# Patient Record
Sex: FEMALE | Race: WHITE | NOT HISPANIC OR LATINO | Employment: UNEMPLOYED | URBAN - METROPOLITAN AREA
[De-identification: names, ages, dates, MRNs, and addresses within clinical notes are randomized per-mention and may not be internally consistent; named-entity substitution may affect disease eponyms.]

---

## 2018-12-18 ENCOUNTER — HOSPITAL ENCOUNTER (INPATIENT)
Facility: HOSPITAL | Age: 58
LOS: 4 days | Discharge: HOME HEALTH | End: 2018-12-23
Attending: EMERGENCY MEDICINE | Admitting: HOSPITALIST

## 2018-12-18 DIAGNOSIS — G47.34 NOCTURNAL HYPOXIA: ICD-10-CM

## 2018-12-18 DIAGNOSIS — G47.33 OBSTRUCTIVE SLEEP APNEA SYNDROME: ICD-10-CM

## 2018-12-18 DIAGNOSIS — J95.00 TRACHEOSTOMY COMPLICATION, UNSPECIFIED COMPLICATION TYPE (HCC): Primary | ICD-10-CM

## 2018-12-18 PROCEDURE — 99284 EMERGENCY DEPT VISIT MOD MDM: CPT

## 2018-12-18 PROCEDURE — G0378 HOSPITAL OBSERVATION PER HR: HCPCS

## 2018-12-18 RX ORDER — OXYCODONE HCL 10 MG/1
10 TABLET, FILM COATED, EXTENDED RELEASE ORAL EVERY 12 HOURS
Status: ON HOLD | COMMUNITY
End: 2018-12-19

## 2018-12-18 RX ORDER — METOPROLOL SUCCINATE 100 MG/1
100 TABLET, EXTENDED RELEASE ORAL DAILY
COMMUNITY
End: 2018-12-23 | Stop reason: HOSPADM

## 2018-12-18 RX ORDER — ACETAMINOPHEN 650 MG
TABLET, EXTENDED RELEASE ORAL AS NEEDED
COMMUNITY
End: 2019-07-12 | Stop reason: HOSPADM

## 2018-12-18 RX ORDER — BISACODYL 10 MG
10 SUPPOSITORY, RECTAL RECTAL DAILY
COMMUNITY
End: 2018-12-23 | Stop reason: HOSPADM

## 2018-12-18 RX ORDER — IPRATROPIUM BROMIDE AND ALBUTEROL SULFATE 2.5; .5 MG/3ML; MG/3ML
3 SOLUTION RESPIRATORY (INHALATION) EVERY 4 HOURS PRN
Status: ON HOLD | COMMUNITY
End: 2019-09-25 | Stop reason: SDUPTHER

## 2018-12-18 RX ORDER — BUDESONIDE 0.5 MG/2ML
0.5 INHALANT ORAL
Status: ON HOLD | COMMUNITY
End: 2019-09-25 | Stop reason: SDUPTHER

## 2018-12-18 RX ORDER — FERROUS SULFATE 300 MG/5ML
300 LIQUID (ML) ORAL DAILY
COMMUNITY
End: 2018-12-23 | Stop reason: HOSPADM

## 2018-12-18 RX ORDER — OXYCODONE HYDROCHLORIDE AND ACETAMINOPHEN 5; 325 MG/1; MG/1
2 TABLET ORAL ONCE
Status: COMPLETED | OUTPATIENT
Start: 2018-12-18 | End: 2018-12-18

## 2018-12-18 RX ORDER — NICOTINE 21 MG/24HR
1 PATCH, TRANSDERMAL 24 HOURS TRANSDERMAL EVERY 24 HOURS
Status: ON HOLD | COMMUNITY
End: 2018-12-24

## 2018-12-18 RX ORDER — ESCITALOPRAM OXALATE 20 MG/1
20 TABLET ORAL DAILY
Status: ON HOLD | COMMUNITY
End: 2019-09-25 | Stop reason: SDUPTHER

## 2018-12-18 RX ORDER — DIAPER,BRIEF,INFANT-TODD,DISP
EACH MISCELLANEOUS 2 TIMES DAILY
COMMUNITY
End: 2018-12-23 | Stop reason: HOSPADM

## 2018-12-18 RX ORDER — PRAMIPEXOLE DIHYDROCHLORIDE 0.25 MG/1
0.25 TABLET ORAL NIGHTLY
Status: ON HOLD | COMMUNITY
End: 2019-09-25 | Stop reason: SDUPTHER

## 2018-12-18 RX ORDER — THIAMINE MONONITRATE (VIT B1) 100 MG
100 TABLET ORAL DAILY
Status: ON HOLD | COMMUNITY
End: 2019-09-25 | Stop reason: SDUPTHER

## 2018-12-18 RX ORDER — ACETAMINOPHEN 325 MG/1
650 TABLET ORAL EVERY 6 HOURS PRN
COMMUNITY
End: 2018-12-23 | Stop reason: HOSPADM

## 2018-12-18 RX ORDER — CHLORHEXIDINE GLUCONATE 0.12 MG/ML
15 RINSE ORAL 2 TIMES DAILY
COMMUNITY
End: 2018-12-23 | Stop reason: HOSPADM

## 2018-12-18 RX ORDER — FERROUS SULFATE 325(65) MG
325 TABLET ORAL
COMMUNITY
End: 2018-12-23 | Stop reason: HOSPADM

## 2018-12-18 RX ORDER — POTASSIUM CHLORIDE 20MEQ/15ML
LIQUID (ML) ORAL DAILY
COMMUNITY
End: 2018-12-23 | Stop reason: HOSPADM

## 2018-12-18 RX ORDER — CYCLOBENZAPRINE HCL 10 MG
10 TABLET ORAL 3 TIMES DAILY PRN
COMMUNITY
End: 2018-12-23 | Stop reason: HOSPADM

## 2018-12-18 RX ORDER — CLONAZEPAM 1 MG/1
1 TABLET ORAL 2 TIMES DAILY PRN
COMMUNITY
End: 2019-07-12 | Stop reason: HOSPADM

## 2018-12-18 RX ORDER — MINERAL OIL AND PETROLATUM 150; 830 MG/G; MG/G
1 OINTMENT OPHTHALMIC EVERY 12 HOURS
COMMUNITY
End: 2019-07-12 | Stop reason: HOSPADM

## 2018-12-18 RX ORDER — TRAZODONE HYDROCHLORIDE 50 MG/1
50 TABLET ORAL NIGHTLY PRN
Status: ON HOLD | COMMUNITY
End: 2019-09-22

## 2018-12-18 RX ORDER — PANTOPRAZOLE SODIUM 40 MG/1
40 TABLET, DELAYED RELEASE ORAL DAILY
Status: ON HOLD | COMMUNITY
End: 2019-09-22

## 2018-12-18 RX ORDER — SODIUM PHOSPHATE, DIBASIC AND SODIUM PHOSPHATE, MONOBASIC 7; 19 G/133ML; G/133ML
ENEMA RECTAL ONCE
COMMUNITY
End: 2018-12-23 | Stop reason: HOSPADM

## 2018-12-18 RX ADMIN — OXYCODONE HYDROCHLORIDE AND ACETAMINOPHEN 2 TABLET: 5; 325 TABLET ORAL at 22:30

## 2018-12-19 ENCOUNTER — APPOINTMENT (OUTPATIENT)
Dept: ULTRASOUND IMAGING | Facility: HOSPITAL | Age: 58
End: 2018-12-19

## 2018-12-19 ENCOUNTER — APPOINTMENT (OUTPATIENT)
Dept: GENERAL RADIOLOGY | Facility: HOSPITAL | Age: 58
End: 2018-12-19

## 2018-12-19 LAB
ANION GAP SERPL CALCULATED.3IONS-SCNC: 13.6 MMOL/L
BACTERIA UR QL AUTO: ABNORMAL /HPF
BASOPHILS # BLD AUTO: 0.04 10*3/MM3 (ref 0–0.2)
BASOPHILS NFR BLD AUTO: 0.4 % (ref 0–1.5)
BILIRUB UR QL STRIP: NEGATIVE
BUN BLD-MCNC: 30 MG/DL (ref 6–20)
BUN/CREAT SERPL: 12.6 (ref 7–25)
CALCIUM SPEC-SCNC: 10.7 MG/DL (ref 8.6–10.5)
CHLORIDE SERPL-SCNC: 100 MMOL/L (ref 98–107)
CHLORIDE UR-SCNC: <20 MMOL/L
CK SERPL-CCNC: 29 U/L (ref 20–180)
CLARITY UR: CLEAR
CO2 SERPL-SCNC: 26.4 MMOL/L (ref 22–29)
COLOR UR: YELLOW
CREAT BLD-MCNC: 2.36 MG/DL (ref 0.57–1)
CREAT BLD-MCNC: 2.39 MG/DL (ref 0.57–1)
CREAT UR-MCNC: 99.6 MG/DL
DEPRECATED RDW RBC AUTO: 48 FL (ref 37–54)
EOSINOPHIL # BLD AUTO: 0.54 10*3/MM3 (ref 0–0.7)
EOSINOPHIL NFR BLD AUTO: 6 % (ref 0.3–6.2)
EOSINOPHIL SPEC QL MICRO: 0 % EOS/100 CELLS (ref 0–0)
ERYTHROCYTE [DISTWIDTH] IN BLOOD BY AUTOMATED COUNT: 15.7 % (ref 11.7–13)
GFR SERPL CREATININE-BSD FRML MDRD: 21 ML/MIN/1.73
GFR SERPL CREATININE-BSD FRML MDRD: 21 ML/MIN/1.73
GLUCOSE BLD-MCNC: 119 MG/DL (ref 65–99)
GLUCOSE BLDC GLUCOMTR-MCNC: 140 MG/DL (ref 70–130)
GLUCOSE UR STRIP-MCNC: NEGATIVE MG/DL
HCT VFR BLD AUTO: 33 % (ref 35.6–45.5)
HGB BLD-MCNC: 10.4 G/DL (ref 11.9–15.5)
HGB UR QL STRIP.AUTO: NEGATIVE
HYALINE CASTS UR QL AUTO: ABNORMAL /LPF
IMM GRANULOCYTES # BLD: 0.01 10*3/MM3 (ref 0–0.03)
IMM GRANULOCYTES NFR BLD: 0.1 % (ref 0–0.5)
KETONES UR QL STRIP: NEGATIVE
LEUKOCYTE ESTERASE UR QL STRIP.AUTO: ABNORMAL
LYMPHOCYTES # BLD AUTO: 2.4 10*3/MM3 (ref 0.9–4.8)
LYMPHOCYTES NFR BLD AUTO: 26.5 % (ref 19.6–45.3)
MCH RBC QN AUTO: 26.5 PG (ref 26.9–32)
MCHC RBC AUTO-ENTMCNC: 31.5 G/DL (ref 32.4–36.3)
MCV RBC AUTO: 84.2 FL (ref 80.5–98.2)
MONOCYTES # BLD AUTO: 0.67 10*3/MM3 (ref 0.2–1.2)
MONOCYTES NFR BLD AUTO: 7.4 % (ref 5–12)
NEUTROPHILS # BLD AUTO: 5.42 10*3/MM3 (ref 1.9–8.1)
NEUTROPHILS NFR BLD AUTO: 59.7 % (ref 42.7–76)
NITRITE UR QL STRIP: NEGATIVE
PH UR STRIP.AUTO: 5.5 [PH] (ref 5–8)
PLATELET # BLD AUTO: 231 10*3/MM3 (ref 140–500)
PMV BLD AUTO: 10.8 FL (ref 6–12)
POTASSIUM BLD-SCNC: 3.8 MMOL/L (ref 3.5–5.2)
PROT UR QL STRIP: ABNORMAL
PROT UR-MCNC: 40 MG/DL
RBC # BLD AUTO: 3.92 10*6/MM3 (ref 3.9–5.2)
RBC # UR: ABNORMAL /HPF
REF LAB TEST METHOD: ABNORMAL
SODIUM BLD-SCNC: 140 MMOL/L (ref 136–145)
SODIUM UR-SCNC: 23 MMOL/L
SP GR UR STRIP: 1.02 (ref 1–1.03)
SQUAMOUS #/AREA URNS HPF: ABNORMAL /HPF
UROBILINOGEN UR QL STRIP: ABNORMAL
VANCOMYCIN TROUGH SERPL-MCNC: 32.2 MCG/ML (ref 5–20)
WBC NRBC COR # BLD: 9.07 10*3/MM3 (ref 4.5–10.7)
WBC UR QL AUTO: ABNORMAL /HPF
YEAST URNS QL MICRO: ABNORMAL /HPF

## 2018-12-19 PROCEDURE — 85025 COMPLETE CBC W/AUTO DIFF WBC: CPT | Performed by: HOSPITALIST

## 2018-12-19 PROCEDURE — 82565 ASSAY OF CREATININE: CPT | Performed by: HOSPITALIST

## 2018-12-19 PROCEDURE — 76775 US EXAM ABDO BACK WALL LIM: CPT

## 2018-12-19 PROCEDURE — 82570 ASSAY OF URINE CREATININE: CPT | Performed by: INTERNAL MEDICINE

## 2018-12-19 PROCEDURE — 87205 SMEAR GRAM STAIN: CPT | Performed by: INTERNAL MEDICINE

## 2018-12-19 PROCEDURE — 84156 ASSAY OF PROTEIN URINE: CPT | Performed by: INTERNAL MEDICINE

## 2018-12-19 PROCEDURE — 80048 BASIC METABOLIC PNL TOTAL CA: CPT | Performed by: HOSPITALIST

## 2018-12-19 PROCEDURE — 82550 ASSAY OF CK (CPK): CPT | Performed by: INTERNAL MEDICINE

## 2018-12-19 PROCEDURE — G0378 HOSPITAL OBSERVATION PER HR: HCPCS

## 2018-12-19 PROCEDURE — 80202 ASSAY OF VANCOMYCIN: CPT | Performed by: HOSPITALIST

## 2018-12-19 PROCEDURE — 84300 ASSAY OF URINE SODIUM: CPT | Performed by: INTERNAL MEDICINE

## 2018-12-19 PROCEDURE — 82436 ASSAY OF URINE CHLORIDE: CPT | Performed by: INTERNAL MEDICINE

## 2018-12-19 PROCEDURE — 94799 UNLISTED PULMONARY SVC/PX: CPT

## 2018-12-19 PROCEDURE — 94762 N-INVAS EAR/PLS OXIMTRY CONT: CPT

## 2018-12-19 PROCEDURE — 82962 GLUCOSE BLOOD TEST: CPT

## 2018-12-19 PROCEDURE — 81001 URINALYSIS AUTO W/SCOPE: CPT | Performed by: INTERNAL MEDICINE

## 2018-12-19 RX ORDER — POTASSIUM CHLORIDE 1.5 G/1.77G
10 POWDER, FOR SOLUTION ORAL ONCE
Status: DISCONTINUED | OUTPATIENT
Start: 2018-12-19 | End: 2018-12-19

## 2018-12-19 RX ORDER — BUDESONIDE 0.5 MG/2ML
0.5 INHALANT ORAL
Status: DISCONTINUED | OUTPATIENT
Start: 2018-12-19 | End: 2018-12-19

## 2018-12-19 RX ORDER — SODIUM CHLORIDE 9 MG/ML
125 INJECTION, SOLUTION INTRAVENOUS CONTINUOUS
Status: DISCONTINUED | OUTPATIENT
Start: 2018-12-19 | End: 2018-12-19

## 2018-12-19 RX ORDER — CHLORHEXIDINE GLUCONATE 0.12 MG/ML
15 RINSE ORAL EVERY 12 HOURS SCHEDULED
Status: DISCONTINUED | OUTPATIENT
Start: 2018-12-19 | End: 2018-12-19

## 2018-12-19 RX ORDER — THIAMINE MONONITRATE (VIT B1) 100 MG
100 TABLET ORAL DAILY
Status: DISCONTINUED | OUTPATIENT
Start: 2018-12-19 | End: 2018-12-23 | Stop reason: HOSPADM

## 2018-12-19 RX ORDER — FERROUS SULFATE 325(65) MG
325 TABLET ORAL 2 TIMES DAILY WITH MEALS
Status: DISCONTINUED | OUTPATIENT
Start: 2018-12-19 | End: 2018-12-19

## 2018-12-19 RX ORDER — CLONAZEPAM 0.5 MG/1
0.5 TABLET ORAL 2 TIMES DAILY
Status: DISCONTINUED | OUTPATIENT
Start: 2019-01-02 | End: 2018-12-23 | Stop reason: HOSPADM

## 2018-12-19 RX ORDER — ASCORBIC ACID 500 MG
500 TABLET ORAL DAILY
Status: DISCONTINUED | OUTPATIENT
Start: 2018-12-19 | End: 2018-12-23 | Stop reason: HOSPADM

## 2018-12-19 RX ORDER — ESCITALOPRAM OXALATE 20 MG/1
20 TABLET ORAL DAILY
Status: DISCONTINUED | OUTPATIENT
Start: 2018-12-19 | End: 2018-12-23 | Stop reason: HOSPADM

## 2018-12-19 RX ORDER — PRAMIPEXOLE DIHYDROCHLORIDE 0.25 MG/1
0.25 TABLET ORAL NIGHTLY
Status: DISCONTINUED | OUTPATIENT
Start: 2018-12-19 | End: 2018-12-23 | Stop reason: HOSPADM

## 2018-12-19 RX ORDER — CYCLOBENZAPRINE HCL 10 MG
10 TABLET ORAL 3 TIMES DAILY PRN
Status: DISCONTINUED | OUTPATIENT
Start: 2018-12-19 | End: 2018-12-23

## 2018-12-19 RX ORDER — OXYCODONE HYDROCHLORIDE 5 MG/1
10 TABLET ORAL EVERY 4 HOURS PRN
Status: DISCONTINUED | OUTPATIENT
Start: 2018-12-19 | End: 2018-12-21

## 2018-12-19 RX ORDER — ARFORMOTEROL TARTRATE 15 UG/2ML
15 SOLUTION RESPIRATORY (INHALATION)
Status: DISCONTINUED | OUTPATIENT
Start: 2018-12-19 | End: 2018-12-23 | Stop reason: HOSPADM

## 2018-12-19 RX ORDER — MINERAL OIL AND PETROLATUM 150; 830 MG/G; MG/G
1 OINTMENT OPHTHALMIC EVERY 12 HOURS
Status: DISCONTINUED | OUTPATIENT
Start: 2018-12-19 | End: 2018-12-23 | Stop reason: HOSPADM

## 2018-12-19 RX ORDER — DIPHENOXYLATE HYDROCHLORIDE AND ATROPINE SULFATE 2.5; .025 MG/1; MG/1
1 TABLET ORAL DAILY
Status: DISCONTINUED | OUTPATIENT
Start: 2018-12-19 | End: 2018-12-23 | Stop reason: HOSPADM

## 2018-12-19 RX ORDER — IPRATROPIUM BROMIDE AND ALBUTEROL SULFATE 2.5; .5 MG/3ML; MG/3ML
3 SOLUTION RESPIRATORY (INHALATION)
Status: DISCONTINUED | OUTPATIENT
Start: 2018-12-19 | End: 2018-12-19

## 2018-12-19 RX ORDER — IPRATROPIUM BROMIDE AND ALBUTEROL SULFATE 2.5; .5 MG/3ML; MG/3ML
3 SOLUTION RESPIRATORY (INHALATION) EVERY 4 HOURS PRN
Status: DISCONTINUED | OUTPATIENT
Start: 2018-12-19 | End: 2018-12-23

## 2018-12-19 RX ORDER — PANTOPRAZOLE SODIUM 40 MG/1
40 TABLET, DELAYED RELEASE ORAL
Status: DISCONTINUED | OUTPATIENT
Start: 2018-12-19 | End: 2018-12-23 | Stop reason: HOSPADM

## 2018-12-19 RX ORDER — NICOTINE 21 MG/24HR
1 PATCH, TRANSDERMAL 24 HOURS TRANSDERMAL
Status: DISCONTINUED | OUTPATIENT
Start: 2018-12-19 | End: 2018-12-23

## 2018-12-19 RX ORDER — IPRATROPIUM BROMIDE AND ALBUTEROL SULFATE 2.5; .5 MG/3ML; MG/3ML
3 SOLUTION RESPIRATORY (INHALATION) EVERY 4 HOURS PRN
Status: DISCONTINUED | OUTPATIENT
Start: 2018-12-19 | End: 2018-12-19

## 2018-12-19 RX ORDER — OXYCODONE HYDROCHLORIDE AND ACETAMINOPHEN 5; 325 MG/1; MG/1
2 TABLET ORAL ONCE
Status: DISCONTINUED | OUTPATIENT
Start: 2018-12-19 | End: 2018-12-19

## 2018-12-19 RX ORDER — METOPROLOL TARTRATE 100 MG/1
100 TABLET ORAL EVERY 12 HOURS SCHEDULED
Status: DISCONTINUED | OUTPATIENT
Start: 2018-12-19 | End: 2018-12-19

## 2018-12-19 RX ORDER — CLONAZEPAM 1 MG/1
1 TABLET ORAL 3 TIMES DAILY
Status: DISCONTINUED | OUTPATIENT
Start: 2018-12-19 | End: 2018-12-19

## 2018-12-19 RX ORDER — TRAZODONE HYDROCHLORIDE 50 MG/1
50 TABLET ORAL NIGHTLY
Status: DISCONTINUED | OUTPATIENT
Start: 2018-12-19 | End: 2018-12-23 | Stop reason: HOSPADM

## 2018-12-19 RX ORDER — ACETAMINOPHEN 325 MG/1
650 TABLET ORAL EVERY 4 HOURS PRN
Status: DISCONTINUED | OUTPATIENT
Start: 2018-12-19 | End: 2018-12-23

## 2018-12-19 RX ORDER — CLONAZEPAM 0.5 MG/1
0.5 TABLET ORAL 3 TIMES DAILY
Status: DISCONTINUED | OUTPATIENT
Start: 2018-12-19 | End: 2018-12-23 | Stop reason: HOSPADM

## 2018-12-19 RX ADMIN — IPRATROPIUM BROMIDE AND ALBUTEROL SULFATE 3 ML: 2.5; .5 SOLUTION RESPIRATORY (INHALATION) at 19:55

## 2018-12-19 RX ADMIN — OXYCODONE HYDROCHLORIDE 10 MG: 5 TABLET ORAL at 18:24

## 2018-12-19 RX ADMIN — TRAZODONE HYDROCHLORIDE 50 MG: 50 TABLET ORAL at 21:36

## 2018-12-19 RX ADMIN — OXYCODONE HYDROCHLORIDE 10 MG: 5 TABLET ORAL at 10:56

## 2018-12-19 RX ADMIN — Medication 1 TABLET: at 08:45

## 2018-12-19 RX ADMIN — SODIUM CHLORIDE 125 ML/HR: 9 INJECTION, SOLUTION INTRAVENOUS at 12:49

## 2018-12-19 RX ADMIN — OXYCODONE HYDROCHLORIDE AND ACETAMINOPHEN 500 MG: 500 TABLET ORAL at 08:45

## 2018-12-19 RX ADMIN — FERROUS SULFATE TAB 325 MG (65 MG ELEMENTAL FE) 325 MG: 325 (65 FE) TAB at 08:46

## 2018-12-19 RX ADMIN — OXYCODONE HYDROCHLORIDE 10 MG: 5 TABLET ORAL at 07:02

## 2018-12-19 RX ADMIN — IPRATROPIUM BROMIDE AND ALBUTEROL SULFATE 3 ML: 2.5; .5 SOLUTION RESPIRATORY (INHALATION) at 15:40

## 2018-12-19 RX ADMIN — Medication 100 MG: at 08:46

## 2018-12-19 RX ADMIN — ESCITALOPRAM 20 MG: 20 TABLET, FILM COATED ORAL at 08:45

## 2018-12-19 RX ADMIN — METOPROLOL TARTRATE 25 MG: 25 TABLET ORAL at 21:35

## 2018-12-19 RX ADMIN — IPRATROPIUM BROMIDE AND ALBUTEROL SULFATE 3 ML: 2.5; .5 SOLUTION RESPIRATORY (INHALATION) at 11:58

## 2018-12-19 RX ADMIN — METOPROLOL TARTRATE 100 MG: 100 TABLET, FILM COATED ORAL at 08:45

## 2018-12-19 RX ADMIN — CLONAZEPAM 0.5 MG: 0.5 TABLET ORAL at 21:36

## 2018-12-19 RX ADMIN — CLONAZEPAM 0.5 MG: 0.5 TABLET ORAL at 08:45

## 2018-12-19 RX ADMIN — BUDESONIDE 0.5 MG: 0.5 INHALANT RESPIRATORY (INHALATION) at 07:49

## 2018-12-19 RX ADMIN — PANTOPRAZOLE SODIUM 40 MG: 40 TABLET, DELAYED RELEASE ORAL at 07:02

## 2018-12-19 RX ADMIN — IPRATROPIUM BROMIDE AND ALBUTEROL SULFATE 3 ML: 2.5; .5 SOLUTION RESPIRATORY (INHALATION) at 07:49

## 2018-12-19 RX ADMIN — BUDESONIDE 0.5 MG: 0.5 INHALANT RESPIRATORY (INHALATION) at 19:55

## 2018-12-19 RX ADMIN — CHLORHEXIDINE GLUCONATE 15 ML: 1.2 RINSE ORAL at 08:46

## 2018-12-19 RX ADMIN — PRAMIPEXOLE DIHYDROCHLORIDE 0.25 MG: 0.25 TABLET ORAL at 21:36

## 2018-12-19 NOTE — ED TRIAGE NOTES
To ER via EMS.  Pt from [facility].  Pt sneezed and trach came out.  Sent in to replace trach.  Pt states trach was a #6. Pt c/o pain to rt jaw into neck.  Pt states they changed her trach yesterday and she has had pain since then.

## 2018-12-19 NOTE — H&P
History and physical    Primary care physician  Not known    Chief complaint  Patient pulled the trach out and wants it out    History of present illness  58-year-old white female with history of COPD chronic pain syndrome who is a nursing home resident who has had tracheostomy and G-tube placement 3 months ago brought to the emergency room when she put her trach out.  Patient is fully alert oriented eating and does not want her trach to be placed back.  Patient denies any shortness of breath.  Patient also wants his G-tube out.  Patient has no other complaint but hurting all over and wants her pain medications.    PAST MEDICAL HISTORY  COPD  Hypertension  Anxiety disorder  Depression  Gastroesophageal reflux disease  Chronic pain syndrome  Status post recent respiratory failure  Status post trach and G-tube placement    PAST SURGICAL HISTORY  Surgical History   History reviewed. No pertinent surgical history.     FAMILY HISTORY  History reviewed. No pertinent family history.     SOCIAL HISTORY  Social History               Socioeconomic History   • Marital status:        Spouse name: Not on file   • Number of children: Not on file   • Years of education: Not on file   • Highest education level: Not on file   Social Needs   • Financial resource strain: Not on file   • Food insecurity - worry: Not on file   • Food insecurity - inability: Not on file   • Transportation needs - medical: Not on file   • Transportation needs - non-medical: Not on file   Occupational History   • Not on file   Tobacco Use   • Smoking status: Not on file   Substance and Sexual Activity   • Alcohol use: Not on file   • Drug use: Not on file   • Sexual activity: Not on file   Other Topics Concern   • Not on file   Social History Narrative   • Not on file         ALLERGIES  Hydrocodone; Strawberry; and Zithromax [azithromycin]  Nursing home medications reviewed     REVIEW OF SYSTEMS  Review of Systems   Constitutional: Negative for  "chills and fever.        (+) trach replacement   HENT: Negative for sore throat.         (+) jaw pain   Respiratory: Negative for shortness of breath.    Cardiovascular: Negative for chest pain.   Gastrointestinal: Negative for nausea and vomiting.   Genitourinary: Negative for dysuria.   Musculoskeletal: Positive for neck pain. Negative for back pain.   Skin: Negative for rash.   Neurological: Negative for dizziness.   Psychiatric/Behavioral: The patient is not nervous/anxious.       PHYSICAL EXAM  Blood pressure 97/52, pulse 69, temperature 97 °F (36.1 °C), temperature source Oral, resp. rate 20, height 167.6 cm (66\"), weight 71.8 kg (158 lb 4.8 oz), SpO2 94 %.    Constitutional: No distress.   Head: Normocephalic and atraumatic.   Mouth/Throat: Oropharynx is clear and moist.   Eyes: Conjunctivae are normal.   Neck: There is a relatively small trach stoma without drainage or bleeding.    Cardiovascular: Normal rate and regular rhythm.   Pulmonary/Chest: Breath sounds normal. No respiratory distress.   Pt's O2 sats are 100% on 2L. Respirations unlabored    Abdominal: There is no tenderness.  G-tube in place   Musculoskeletal: She exhibits no edema or tenderness.   Neurological: She is alert.   Skin: No rash noted.     LAB RESULTS  Lab Results (last 24 hours)     Procedure Component Value Units Date/Time    POC Glucose Once [[id number]]  (Abnormal) Collected:  12/19/18 1148    Specimen:  Blood Updated:  12/19/18 1149     Glucose 140 mg/dL     Creatinine, Serum [[id number]]  (Abnormal) Collected:  12/19/18 0546    Specimen:  Blood Updated:  12/19/18 0813     Creatinine 2.36 mg/dL      eGFR Non African Amer 21 mL/min/1.73     Vancomycin, Trough [[id number]]  (Abnormal) Collected:  12/19/18 0546    Specimen:  Blood Updated:  12/19/18 0643     Vancomycin Trough 32.20 mcg/mL         Imaging Results (last 24 hours)     Procedure Component Value Units Date/Time    XR Chest Post CVA Port [[id number]] Collected:  12/19/18 1006 "     Updated:  12/19/18 1006    Narrative:       ONE VIEW PORTABLE CHEST AT 6:23 AM     HISTORY: PICC line placement.     FINDINGS:  There has been recent insertion of a left-sided PICC line  which ends in the SVC. There is mild cardiomegaly and slight vascular  congestion and no focal infiltrate.                   Current Facility-Administered Medications:   •  acetaminophen (TYLENOL) tablet 650 mg, 650 mg, Oral, Q4H PRN, Jose Antonio Stack MD  •  artificial tears 83-15 % ophthalmic ointment 1 application, 1 application, Both Eyes, Q12H, Jose Antonio Stack MD  •  budesonide (PULMICORT) nebulizer solution 0.5 mg, 0.5 mg, Nebulization, BID - RT, Jose Antonio Stack MD, 0.5 mg at 12/19/18 0749  •  chlorhexidine (PERIDEX) 0.12 % solution 15 mL, 15 mL, Mouth/Throat, Q12H, Jose Antonio Stack MD, 15 mL at 12/19/18 0846  •  clonazePAM (KlonoPIN) tablet 0.5 mg, 0.5 mg, Oral, TID, 0.5 mg at 12/19/18 0845 **FOLLOWED BY** [START ON 1/2/2019] clonazePAM (KlonoPIN) tablet 0.5 mg, 0.5 mg, Oral, BID, Jose Antonio Stack MD  •  cyclobenzaprine (FLEXERIL) tablet 10 mg, 10 mg, Oral, TID PRN, Jose Antonio Stack MD  •  escitalopram (LEXAPRO) tablet 20 mg, 20 mg, Oral, Daily, Jose Antonio Stack MD, 20 mg at 12/19/18 0845  •  ferrous sulfate tablet 325 mg, 325 mg, Oral, BID With Meals, Jose Antonio Stack MD, 325 mg at 12/19/18 0846  •  ipratropium-albuterol (DUO-NEB) nebulizer solution 3 mL, 3 mL, Nebulization, Q4H PRN, Jose Antonio Stack MD  •  metoprolol tartrate (LOPRESSOR) tablet 100 mg, 100 mg, Oral, Q12H, Jose Antonio Stack MD, 100 mg at 12/19/18 0845  •  multivitamin (THERAGRAN) tablet 1 tablet, 1 tablet, Oral, Daily, Jose Antonio Stack MD, 1 tablet at 12/19/18 0845  •  nicotine (NICODERM CQ) 21 MG/24HR patch 1 patch, 1 patch, Transdermal, Q24H, Jose Antonio Stack MD  •  oxyCODONE (ROXICODONE) immediate release tablet 10 mg, 10 mg, Oral, Q4H PRN, Jose Antonio Stack MD, 10 mg at 12/19/18 1056  •  pantoprazole (PROTONIX) EC tablet 40 mg, 40 mg, Oral, Q AM, Jose Antonio Stack MD, 40 mg at 12/19/18 0702  •   Pharmacy to dose vancomycin, , Does not apply, Continuous PRN, Cherri Stack MD  •  pramipexole (MIRAPEX) tablet 0.25 mg, 0.25 mg, Oral, Nightly, Cherri Stack MD  •  thiamine (VITAMIN B-1) tablet 100 mg, 100 mg, Oral, Daily, Cherri Stack MD, 100 mg at 12/19/18 0846  •  traZODone (DESYREL) tablet 50 mg, 50 mg, Oral, Nightly, Cherri Stack MD  •  Vancomycin Pharmacy Intermittent Dosing, , Does not apply, Daily, Cherri Stack MD  •  vitamin C (ASCORBIC ACID) tablet 500 mg, 500 mg, Oral, Daily, Cherri Stack MD, 500 mg at 12/19/18 0845     ASSESSMENT  Acute kidney injury  COPD  Hypertension  Gastroesophageal reflux disease  Status post recent respiratory failure  Status post trach which she removed  Status post G-tube placement  Anxiety disorder  Depression  Chronic pain syndrome    PLAN  Admit  IV fluid  Nephrology consult  Pulmonary to follow trach  Continue nursing home medication and adjust the doses  Stress ulcer DVT prophylaxis  Supportive care  Follow closely further recommendation according to hospital course    CHERRI STACK MD

## 2018-12-19 NOTE — PLAN OF CARE
Problem: Patient Care Overview  Goal: Plan of Care Review  Outcome: Ongoing (interventions implemented as appropriate)   12/19/18 0456   Coping/Psychosocial   Plan of Care Reviewed With patient   Plan of Care Review   Progress no change   OTHER   Outcome Summary o2 sats 88 89 when asleep oxygen applied at 2 liters does not complain of soa lungs clear       Problem: Fall Risk (Adult)  Goal: Identify Related Risk Factors and Signs and Symptoms  Outcome: Ongoing (interventions implemented as appropriate)   12/19/18 0456   Fall Risk (Adult)   Related Risk Factors (Fall Risk) environment unfamiliar;sensory deficits;gait/mobility problems   Signs and Symptoms (Fall Risk) presence of risk factors       Problem: Airway, Artificial (Adult)  Goal: Signs and Symptoms of Listed Potential Problems Will be Absent, Minimized or Managed (Airway, Artificial)   12/19/18 0456   Goal/Outcome Evaluation   Problems Assessed (Artificial Airway) all   Problems Present (Artificial Airway) none

## 2018-12-19 NOTE — ED NOTES
Pt resting in bed comfortably.   Pt asking for something to eat and drink.   RN will ask Dr. Bone.      Deepak Robles, JEMIMA  12/18/18 4854

## 2018-12-19 NOTE — CONSULTS
[facility]                                                                                             Nephrology Initial Consult Note    Patient Identification:  Name: Swetha Luis MRN: [id number]  Age: 58 y.o. : 1960  Sex: female  Date:2018    Requesting Physician: As per consult order.  Reason for Consultation: Acute renal failure  Information from:patient/ family/ chart      History of Present Illness: This is a 58 y.o. year old female  with the previously normal renal function, baseline creatinine about 1.0.  Labs about 1 month ago prior to her prolonged hospitalization.  She has received most for care after the [facility], she was treated at that facility for osteomyelitis on IV antibiotics also developed respiratory failure requiring tracheostomy placement and dysphagia requiring PEG tube placement.  She was a [place] at the nursing home, tracheostomy somehow got displaced but it sounds like her respiratory status had improved and she actually had not been needing the tracheostomy, only some nighttime oxygen.  She was started on IV vancomycin at [facility], creatinine had increased from baseline up to around 2.3 and was stable at that level for the last 5-6 days prior to discharge and she was discharged home on vancomycin 750 mg twice a day with home health.  Medical history also significant for COPD, hypertension, anxiety.  She was seen in the emergency department last night, transfer was being arranged back to [facility] but they were unable to secure bed so she was admitted to this facility for further management.  A random vancomycin level was drawn and was 32, creatinine here is 2.39 with normal electrolytes other than a calcium of 10.7.  She denies any acute complaints at this time     The following medical history and medications personally reviewed by me:    Problem List:   Patient  Active Problem List    Diagnosis   • Tracheostomy complication (CMS/[facility]) [J95.00]       Past Medical History:  Past Medical History:   Diagnosis Date   • Acute osteomyelitis (CMS/[facility])     Right shoulder   • COPD (chronic obstructive pulmonary disease) (CMS/[facility])    • Nontraumatic subarachnoid hemorrhage (CMS/[facility])    • Renal disorder     chronic kidney disease   • Tracheostomy present (CMS/[facility])        Past Surgical History:  Past Surgical History:   Procedure Laterality Date   • TRACHEOSTOMY          Home Meds:   Medications Prior to Admission   Medication Sig Dispense Refill Last Dose   • acetaminophen (TYLENOL) 325 MG tablet Take 650 mg by mouth Every 6 (Six) Hours As Needed for Mild Pain .   Past Week at Unknown time   • Ascorbic Acid 500 MG capsule Take  by mouth.   12/17/2018 at Unknown time   • bisacodyl (DULCOLAX) 10 MG suppository Insert 10 mg into the rectum Daily.   Past Month at Unknown time   • budesonide (PULMICORT) 0.5 MG/2ML nebulizer solution Take 0.5 mg by nebulization Daily. Via trach 2 times a day for SOA   12/17/2018 at Unknown time   • chlorhexidine (PERIDEX) 0.12 % solution Apply 15 mL to the mouth or throat 2 (Two) Times a Day. Give 15 mL by mouth two times a day for mouth care   Past Month at Unknown time   • clonazePAM (KlonoPIN) 1 MG tablet Take 1 mg by mouth 2 (Two) Times a Day As Needed for Seizures.   12/17/2018 at Unknown time   • cyclobenzaprine (FLEXERIL) 10 MG tablet Take 10 mg by mouth 3 (Three) Times a Day As Needed for Muscle Spasms.   12/17/2018 at Unknown time   • escitalopram (LEXAPRO) 20 MG tablet Take 20 mg by mouth Daily.   12/17/2018 at Unknown time   • ferrous sulfate 300 (60 Fe) MG/5ML syrup Take 300 mg by mouth Daily. Give 5 mL verbal by mouth two times a day for anemia   Past Month at Unknown time   • ferrous sulfate 325 (65 FE) MG tablet Take 325 mg by mouth Daily With Breakfast.   12/17/2018 at Unknown time   • hydrocortisone 1 % cream Apply  topically to the  appropriate area as directed 2 (Two) Times a Day.   Past Month at Unknown time   • ipratropium-albuterol (DUO-NEB) 0.5-2.5 mg/3 ml nebulizer Take 3 mL by nebulization Every 4 (Four) Hours As Needed for Wheezing.   Past Week at Unknown time   • magnesium hydroxide (MILK OF MAGNESIA) 400 MG/5ML suspension Take  by mouth Daily As Needed for Constipation.   Past Month at Unknown time   • metoprolol succinate XL (TOPROL-XL) 100 MG 24 hr tablet Take 100 mg by mouth Daily.   12/17/2018 at Unknown time   • MULTIPLE VITAMINS-MINERALS PO Take  by mouth.   12/17/2018 at Unknown time   • mupirocin (BACTROBAN) 2 % ointment Apply  topically to the appropriate area as directed 3 (Three) Times a Day. Apply to nose topically two times a day for redness   12/17/2018 at Unknown time   • OxyCODONE HCl (OXYCONTIN PO) Take 10 mg by mouth Every 4 (Four) Hours As Needed for Moderate Pain .      • pantoprazole (PROTONIX) 40 MG EC tablet Take 40 mg by mouth Daily.   12/17/2018 at Unknown time   • potassium chloride (KAYCIEL) 20 MEQ/15ML (10%) solution Take  by mouth Daily. Give 7.5 mL by mouth one time a day for supplement   12/17/2018 at Unknown time   • povidone-iodine (BETADINE) 10 % external solution Apply  topically to the appropriate area as directed As Needed for Wound Care (apply to left 2nd digit topically every day shift for unstageable.).   Past Week at Unknown time   • povidone-iodine (BETADINE) 10 % external solution Apply  topically to the appropriate area as directed As Needed for Wound Care (apply to right great toe, 2nd, 3rd topically every digit with day shift for unstageable).   Past Week at Unknown time   • pramipexole (MIRAPEX) 0.25 MG tablet Take 0.25 mg by mouth Every Night.   12/17/2018 at Unknown time   • Sodium Phosphates (FLEET ENEMA) 7-19 GM/118ML enema Insert  into the rectum 1 (One) Time.   Past Month at Unknown time   • thiamine (VITAMIN B-1) 100 MG tablet Take 100 mg by mouth Daily.   Past Week at Unknown time    • traZODone (DESYREL) 50 MG tablet Take 50 mg by mouth Every Night.   12/17/2018 at Unknown time   • Vancomycin HCl (VANCOCIN) 750 MG/7.5ML injection Infuse  into a venous catheter. Use 750 mg intravenously two times a day for MRSA infection   Past Week at Unknown time   • White Petrolatum-Mineral Oil (ARTIFICIAL TEARS) 83-15 % ointment ophthalmic ointment 1 application Every 12 (Twelve) Hours.   Past Week at Unknown time   • nicotine (NICODERM CQ) 21 MG/24HR patch Place 1 patch on the skin as directed by provider Daily.   Unknown at Unknown time       Current Meds:   Current Facility-Administered Medications   Medication Dose Route Frequency Provider Last Rate Last Dose   • acetaminophen (TYLENOL) tablet 650 mg  650 mg Oral Q4H PRN Jose Antonio Stack MD       • artificial tears 83-15 % ophthalmic ointment 1 application  1 application Both Eyes Q12H Jose Antonio Stack MD       • budesonide (PULMICORT) nebulizer solution 0.5 mg  0.5 mg Nebulization BID - RT Jose Antonio Stack MD   0.5 mg at 12/19/18 0749   • clonazePAM (KlonoPIN) tablet 0.5 mg  0.5 mg Oral TID Jose Antonio Stack MD   0.5 mg at 12/19/18 0845    Followed by   • [START ON 1/2/2019] clonazePAM (KlonoPIN) tablet 0.5 mg  0.5 mg Oral BID Jose Antonio Stack MD       • cyclobenzaprine (FLEXERIL) tablet 10 mg  10 mg Oral TID PRN Jose Antonio Stack MD       • escitalopram (LEXAPRO) tablet 20 mg  20 mg Oral Daily Jose Antonio Stack MD   20 mg at 12/19/18 0845   • ipratropium-albuterol (DUO-NEB) nebulizer solution 3 mL  3 mL Nebulization Q4H PRN Jose Antonio Stack MD   3 mL at 12/19/18 1540   • metoprolol tartrate (LOPRESSOR) tablet 25 mg  25 mg Oral Q12H Jose Antonio Stack MD       • multivitamin (THERAGRAN) tablet 1 tablet  1 tablet Oral Daily Jose Antonio Stack MD   1 tablet at 12/19/18 0845   • nicotine (NICODERM CQ) 21 MG/24HR patch 1 patch  1 patch Transdermal Q24H Jose Antonio Stack MD       • oxyCODONE (ROXICODONE) immediate release tablet 10 mg  10 mg Oral Q4H PRN Jose Antonio Stack MD   10 mg at 12/19/18 1053    • pantoprazole (PROTONIX) EC tablet 40 mg  40 mg Oral Q AM Jose Antonio Stack MD   40 mg at 18 0702   • pramipexole (MIRAPEX) tablet 0.25 mg  0.25 mg Oral Nightly Jose Antonio Stack MD       • Sodium chloride 0.9 % infusion  125 mL/hr Intravenous Continuous Jose Antonio Stack  mL/hr at 18 1249 125 mL/hr at 18 1249   • thiamine (VITAMIN B-1) tablet 100 mg  100 mg Oral Daily Jose Antonio Stack MD   100 mg at 18 0846   • traZODone (DESYREL) tablet 50 mg  50 mg Oral Nightly Jose Antonio Satck MD       • vitamin C (ASCORBIC ACID) tablet 500 mg  500 mg Oral Daily Jose Antonio Stack MD   500 mg at 18 0845       Allergies:  Allergies   Allergen Reactions   • Hydrocodone Unknown (See Comments)     unk   • Leigh Unknown (See Comments)     unk   • Zithromax [Azithromycin] Unknown (See Comments)     unk       Social History:   Social History     Socioeconomic History   • Marital status:      Spouse name: Not on file   • Number of children: Not on file   • Years of education: Not on file   • Highest education level: Not on file   Tobacco Use   • Smoking status: Former Smoker     Packs/day: 1.00     Types: Cigarettes     Last attempt to quit: 2018     Years since quittin.4   • Smokeless tobacco: Never Used   Substance and Sexual Activity   • Alcohol use: No     Frequency: Never   • Drug use: Yes     Types: Cocaine     Comment: 20 years ago occasional   • Sexual activity: Defer        Family History:  History reviewed. No pertinent family history.     Review of Systems: as per HPI, in addition:    General:      + weakness / fatigue,                       No fevers / chills                       no weight loss  HEENT:       no dysphagia / odynophagia  Neck:           normal range of motion, no swelling  Respiratory: no cough / congestion                      + mild shortness of air                       No wheezing  CV:              No chest pain                       No palpitations  Abdomen/GI: no  "nausea / vomiting                      No diarrhea / constipation                      No abdominal pain  :             no dysuria / urinary frequency                       No urgency, normal output  Endocrine:   no polyuria / polydipsia,                      No heat or cold intolerance  Skin:           no rashes or skin breakdown   Vascular:   No edema                     No claudication  Psych:        no depression/ anxiety  Neuro:        no focal weakness, no seizures  Musculoskeletal: no joint pain or deformities      Physical Exam:  Vitals:   Temp (24hrs), Av.7 °F (36.5 °C), Min:97 °F (36.1 °C), Max:98.6 °F (37 °C)    BP (!) 86/56 (BP Location: Right arm, Patient Position: Lying)   Pulse 68   Temp 97 °F (36.1 °C) (Oral)   Resp 18   Ht 167.6 cm (66\")   Wt 71.8 kg (158 lb 4.8 oz)   SpO2 96%   BMI 25.55 kg/m²   Intake/Output:     Intake/Output Summary (Last 24 hours) at 2018 1703  Last data filed at 2018 1203  Gross per 24 hour   Intake 600 ml   Output 800 ml   Net -200 ml        Wt Readings from Last 1 Encounters:   18 71.8 kg (158 lb 4.8 oz)       Exam:    General Appearance:  Awake, alert, oriented x3, no acute distress  Chronically ill-appearing   Head and Face:  Normocephalic, atraumatic, mucus membranes moist, oropharynx clear   Eyes:  No icterus, pupils equal round and reactive to light, extraocular movements intact    ENMT: Moist mucosa, tongue symmetric    Neck: Supple  no jugular venous distention  no thyromegaly, trach site clean    Pulmonary:  Respiratory effort: Normal  Auscultation of lungs: Clear bilaterally  No wheezes  No rhonchi  Good air movement, good expansion   Chest wall:  No tenderness or deformity   Cardiovascular:  Auscultation of the heart: Normal rhythm, no murmurs  No edema of extremities    Abdomen:  Abdomen: soft, non-tender, normal bowel sounds all four quadrants, no masses   Liver and spleen: no hepatosplenomegaly   Musculoskeletal: Digits and " nails: normal  Normal range of motion  No joint swelling or gross deformities    Skin: Skin inspection: color normal, no visible rashes or lesions  Skin palpation: texture, turgor normal, no palpable lesions   Lymphatic:  no cervical lymphadenopathy    Psychiatric: Judgement and insight: normal  Orientation to person place and time: normal  Mood and affect: normal       DATA:  Radiology and Labs:  The following labs independently reviewed by me  Old records independently reviewed showing records from [place] summarized above  The following radiologic studies independently viewed by me, findings chest x-ray with no acute findings, mild cardiomegaly  Interval notes, chart personally reviewed by me.   New problems include acute renal failure, probable antibiotic toxicity  Discussed with pt, pharmacist, RN  Platelet count 231    Risk/ complexity of medical care/ medical decision making  High, new ARF          Labs:   Recent Results (from the past 24 hour(s))   Vancomycin, Trough    Collection Time: 12/19/18  5:46 AM   Result Value Ref Range    Vancomycin Trough 32.20 (C) 5.00 - 20.00 mcg/mL   Creatinine, Serum    Collection Time: 12/19/18  5:46 AM   Result Value Ref Range    Creatinine 2.36 (H) 0.57 - 1.00 mg/dL    eGFR Non African Amer 21 (L) >60 mL/min/1.73   POC Glucose Once    Collection Time: 12/19/18 11:48 AM   Result Value Ref Range    Glucose 140 (H) 70 - 130 mg/dL   Basic Metabolic Panel    Collection Time: 12/19/18 12:46 PM   Result Value Ref Range    Glucose 119 (H) 65 - 99 mg/dL    BUN 30 (H) 6 - 20 mg/dL    Creatinine 2.39 (H) 0.57 - 1.00 mg/dL    Sodium 140 136 - 145 mmol/L    Potassium 3.8 3.5 - 5.2 mmol/L    Chloride 100 98 - 107 mmol/L    CO2 26.4 22.0 - 29.0 mmol/L    Calcium 10.7 (H) 8.6 - 10.5 mg/dL    eGFR Non African Amer 21 (L) >60 mL/min/1.73    BUN/Creatinine Ratio 12.6 7.0 - 25.0    Anion Gap 13.6 mmol/L   CBC Auto Differential    Collection Time: 12/19/18 12:46 PM   Result Value Ref Range     WBC 9.07 4.50 - 10.70 10*3/mm3    RBC 3.92 3.90 - 5.20 10*6/mm3    Hemoglobin 10.4 (L) 11.9 - 15.5 g/dL    Hematocrit 33.0 (L) 35.6 - 45.5 %    MCV 84.2 80.5 - 98.2 fL    MCH 26.5 (L) 26.9 - 32.0 pg    MCHC 31.5 (L) 32.4 - 36.3 g/dL    RDW 15.7 (H) 11.7 - 13.0 %    RDW-SD 48.0 37.0 - 54.0 fl    MPV 10.8 6.0 - 12.0 fL    Platelets 231 140 - 500 10*3/mm3    Neutrophil % 59.7 42.7 - 76.0 %    Lymphocyte % 26.5 19.6 - 45.3 %    Monocyte % 7.4 5.0 - 12.0 %    Eosinophil % 6.0 0.3 - 6.2 %    Basophil % 0.4 0.0 - 1.5 %    Immature Grans % 0.1 0.0 - 0.5 %    Neutrophils, Absolute 5.42 1.90 - 8.10 10*3/mm3    Lymphocytes, Absolute 2.40 0.90 - 4.80 10*3/mm3    Monocytes, Absolute 0.67 0.20 - 1.20 10*3/mm3    Eosinophils, Absolute 0.54 0.00 - 0.70 10*3/mm3    Basophils, Absolute 0.04 0.00 - 0.20 10*3/mm3    Immature Grans, Absolute 0.01 0.00 - 0.03 10*3/mm3       Radiology:  Imaging Results (last 24 hours)     Procedure Component Value Units Date/Time    XR Chest Post CVA Port [[id number]] Collected:  12/19/18 1006     Updated:  12/19/18 1252    Narrative:       ONE VIEW PORTABLE CHEST AT 6:23 AM     HISTORY: PICC line placement.     FINDINGS:  There has been recent insertion of a left-sided PICC line  which ends in the SVC. There is mild cardiomegaly and slight vascular  congestion and no focal infiltrate.     This report was finalized on 12/19/2018 12:48 PM by Dr. Mike Rivas M.D.                  ASSESSMENT:   Problem List:   New, acute renal failure, likely vancomycin toxicity  Chronic hypertension  Respiratory failure with recent tracheostomy  Dysphagia with recent G-tube placement  Chronic pain syndrome  Osteomyelitis on IV antibiotics  Tracheostomy complication  Hypercalcemia    PLAN:   Renal failure likely related to vancomycin toxicity  Volume status stable on exam, can stop IV fluids  Check renal ultrasound, urine studies, urine electrolytes  Recommend discontinuing vancomycin  Await input from infectious  disease      Continue to monitor electrolytes and volume closely, avoid IV contrast and nephrotoxic medications     I appreciate the opportunity to participate in this patient's care.  Please call with any questions or concerns.       Eliseo Davis M.D  [facility]  Office phone number: [phone]  Answering service phone number: [phone]        12/19/2018        Dictation via Dragon dictation software

## 2018-12-19 NOTE — CONSULTS
Adult Nutrition  Assessment/PES    Patient Name:  Swetha Luis  YOB: 1960  MRN: [id number]  Admit Date:  12/18/2018    Assessment Date:  12/19/2018    Nutrition assessment triggered by MST score-4 and RN consult. Patient reported appetite is improving. #.  Provided nutrition therapy. Encouraged adequate po intake. She agreed to boost BID.  RD to monitor/follow per protocol.     Reason for Assessment     Row Name 12/19/18 1140          Reason for Assessment    Reason For Assessment  nurse/nurse practitioner consult;identified at risk by screening criteria     Diagnosis   Primary Problem:  Tracheostomy complication (CMS/HCC)     Identified At Risk by Screening Criteria  MST SCORE 2+           Anthropometrics     Row Name 12/19/18 1141          Usual Body Weight (UBW)    Usual Body Weight  74.8 kg (165 lb)        Body Mass Index (BMI)    BMI Assessment  BMI 25-29.9: overweight         Labs/Tests/Procedures/Meds     Row Name 12/19/18 1141          Labs/Procedures/Meds    Lab Results Reviewed  reviewed, pertinent        Diagnostic Tests/Procedures    Diagnostic Test/Procedure Reviewed  reviewed, pertinent        Medications    Pertinent Medications Reviewed  reviewed, pertinent     Pertinent Medications Comments  iron, multivitamin, PPI, thiamine, Vitamin C         Physical Findings     Row Name 12/19/18 1141          Physical Findings    Overall Physical Appearance  -- B=19         Estimated/Assessed Needs     Row Name 12/19/18 1141          Calculation Measurements    Weight Used For Calculations  71.8 kg (158 lb 4.6 oz)        Estimated/Assessed Needs    Additional Documentation  KCAL/KG (Group);Protein Requirements (Group);Fluid Requirements (Group)        KCAL/KG                                                                kcal/kg (Specify)  -- 6209-9542        Nunnelly-St. Jeor Equation    RMR (Nunnelly-St. Jeor Equation)  1314.75        Protein Requirements    Est Protein Requirement Amount  (gms/kg)  1.2 gm protein     Estimated Protein Requirements (gms/day)  86.16        Fluid Requirements    Estimated Fluid Requirements (mL/day)  1800     RDA Method (mL)  1800     [place]-Haydenar Method (over 20 kg)  2936         Nutrition Prescription Ordered     Row Name 12/19/18 1142          Nutrition Prescription PO    Common Modifiers  Cardiac         Evaluation of Received Nutrient/Fluid Intake     Row Name 12/19/18 1141          Calculation Measurements    Weight Used For Calculations  71.8 kg (158 lb 4.6 oz)         Evaluation of Prescribed Nutrient/Fluid Intake     Row Name 12/19/18 1141          Calculation Measurements    Weight Used For Calculations  71.8 kg (158 lb 4.6 oz)             Problem/Interventions:  Problem 1     Row Name 12/19/18 1142          Nutrition Diagnoses Problem 1    Problem 1  Inadequate Nutrient Intake     Etiology (related to)  MNT for Treatment/Condition     Signs/Symptoms (evidenced by)  Report of Mnimal PO Intake                 Intervention Goal     Row Name 12/19/18 1142          Intervention Goal    General  Maintain nutrition;Meet nutritional needs for age/condition     PO  Tolerate PO;Maintain intake     Weight  Maintain weight         Nutrition Intervention     Row Name 12/19/18 1142          Nutrition Intervention    RD/Tech Action  Interview for preference;Follow Tx progress;Care plan reviewd;Encourage intake;Recommend/ordered;Supplement provided     Recommended/Ordered  Supplement         Nutrition Prescription     Row Name 12/19/18 1142          Nutrition Prescription PO    PO Prescription  Begin/change supplement     Supplement  Boost     Supplement Frequency  2 times a day     New PO Prescription Ordered?  Yes         Education/Evaluation     Row Name 12/19/18 1143          Education    Education  Will Instruct as appropriate        Monitor/Evaluation    Monitor  Per protocol           Electronically signed by:  Judie Fowler RD  12/19/18 11:43 AM

## 2018-12-19 NOTE — PROGRESS NOTES
Pharmacy was consulted for vanc dosing however med has since been dcd    Her vanc level  here 12/19 at 0546 was elevated at 32.2  Pt was on vanc 750 q48 12/17 at [place], see copied note dated 12/17   Nursing home vanc order was vancomycin 750 mg Q12H     Of note, creatinine similar to that 12/17 at [place] (~2.3) but it did increase from her baseline of ~1 during first week of that 19 day admission starting 11/29.     Would be happy to help if re-consulted.     Michelle Peterson, PharmD, BCPS  12/19/2018 2:23 PM

## 2018-12-19 NOTE — PLAN OF CARE
Problem: Pain, Acute (Adult)  Goal: Identify Related Risk Factors and Signs and Symptoms   12/19/18 0458   Pain, Acute (Adult)   Related Risk Factors (Acute Pain) infection;disease process   Signs and Symptoms (Acute Pain) alteration in muscle tone;verbalization of pain descriptors

## 2018-12-19 NOTE — PROGRESS NOTES
Continued Stay Note  [facility]     Patient Name: Swetha Luis  MRN: [id number]  Today's Date: 12/19/2018    Admit Date: 12/18/2018    Discharge Plan     Row Name 12/19/18 1342       Plan    Plan  Return to [facility]     Patient/Family in Agreement with Plan  yes    Plan Comments  Spoke with Monse/Zenaida who states pt has skilled bed with Medicaid bedhold and can return.  JACQUIE Hargrove RN    Row Name 12/19/18 1143       Plan    Plan  Return to [facility]     Patient/Family in Agreement with Plan  yes    Plan Comments  Met wi pt at bedside.  Introduced self, explained CCP role, facesheet verified.  Pt states she has been at [facility].  Could not tell me if it was for rehab or long term care.  Spoke with Monse/Zenaida who will check on bed status and notify CCP.  Pt states she has been back and forth from hosp to rehab facilities for 5 months.  Had been at a facility in [place] but could not remember name of facility.   Has BSC, walker, and rollator at home.  Pt states she will return to [facility] and her  can transport but she does not have O2 tank.  JACQUIE Hargrove RN        Discharge Codes    No documentation.             Angelina Hargrove

## 2018-12-19 NOTE — DISCHARGE PLACEMENT REQUEST
"Neo Spencer (58 y.o. Female)     Date of Birth Social Security Number Address Home Phone MRN    1960  [address]  [facility] [id number] [phone] [id number]    Mormonism Marital Status          None        Admission Date Admission Type Admitting Provider Attending Provider Department, Room/Bed    12/18/18 Emergency Jose Antonio Stack MD Ahmed, Aftab, MD [address], [address]    Discharge Date Discharge Disposition Discharge Destination                       Attending Provider:  Jose Antonio Stack MD    Allergies:  Hydrocodone, Strawberry, Zithromax [Azithromycin]    Isolation:  Contact   Infection:  MRSA (12/19/18)   Code Status:  CPR    Ht:  167.6 cm (66\")   Wt:  71.8 kg (158 lb 4.8 oz)    Admission Cmt:  None   Principal Problem:  None                Active Insurance as of 12/18/2018     Primary Coverage     Payor Plan Insurance Group Employer/Plan Group    P[employer]ORT [employer]ORT MEDICAID     Payor Plan Address Payor Plan Phone Number Payor Plan Fax Number Effective Dates    [address] [phone]  11/1/1997 - None Entered    [facility] [address]       Subscriber Name Subscriber Birth Date Member ID       NEO SPENCER 1960 [id number]                 Emergency Contacts      (Rel.) Home Phone Work Phone Mobile Phone    TJMATT CARRILLO (Spouse) [phone] -- --              "

## 2018-12-20 PROBLEM — I96 GANGRENE OF TOE (HCC): Status: ACTIVE | Noted: 2018-12-20

## 2018-12-20 LAB
ALBUMIN SERPL-MCNC: 3.2 G/DL (ref 3.5–5.2)
ALBUMIN/GLOB SERPL: 0.9 G/DL
ALP SERPL-CCNC: 123 U/L (ref 39–117)
ALT SERPL W P-5'-P-CCNC: 12 U/L (ref 1–33)
ANION GAP SERPL CALCULATED.3IONS-SCNC: 13.5 MMOL/L
AST SERPL-CCNC: 19 U/L (ref 1–32)
BASOPHILS # BLD AUTO: 0.04 10*3/MM3 (ref 0–0.2)
BASOPHILS NFR BLD AUTO: 0.6 % (ref 0–1.5)
BILIRUB SERPL-MCNC: 0.2 MG/DL (ref 0.1–1.2)
BUN BLD-MCNC: 33 MG/DL (ref 6–20)
BUN/CREAT SERPL: 16 (ref 7–25)
CALCIUM SPEC-SCNC: 10.5 MG/DL (ref 8.6–10.5)
CHLORIDE SERPL-SCNC: 104 MMOL/L (ref 98–107)
CHOLEST SERPL-MCNC: 223 MG/DL (ref 0–200)
CO2 SERPL-SCNC: 24.5 MMOL/L (ref 22–29)
CREAT BLD-MCNC: 2.06 MG/DL (ref 0.57–1)
DEPRECATED RDW RBC AUTO: 51.1 FL (ref 37–54)
EOSINOPHIL # BLD AUTO: 0.42 10*3/MM3 (ref 0–0.7)
EOSINOPHIL NFR BLD AUTO: 6.1 % (ref 0.3–6.2)
ERYTHROCYTE [DISTWIDTH] IN BLOOD BY AUTOMATED COUNT: 15.8 % (ref 11.7–13)
GFR SERPL CREATININE-BSD FRML MDRD: 25 ML/MIN/1.73
GLOBULIN UR ELPH-MCNC: 3.6 GM/DL
GLUCOSE BLD-MCNC: 92 MG/DL (ref 65–99)
HBA1C MFR BLD: 5.3 % (ref 4.8–5.6)
HCT VFR BLD AUTO: 32.5 % (ref 35.6–45.5)
HDLC SERPL-MCNC: 31 MG/DL (ref 40–60)
HGB BLD-MCNC: 9.7 G/DL (ref 11.9–15.5)
IMM GRANULOCYTES # BLD: 0 10*3/MM3 (ref 0–0.03)
IMM GRANULOCYTES NFR BLD: 0 % (ref 0–0.5)
LDLC SERPL CALC-MCNC: 146 MG/DL (ref 0–100)
LDLC/HDLC SERPL: 4.7 {RATIO}
LYMPHOCYTES # BLD AUTO: 1.91 10*3/MM3 (ref 0.9–4.8)
LYMPHOCYTES NFR BLD AUTO: 27.8 % (ref 19.6–45.3)
MCH RBC QN AUTO: 26.3 PG (ref 26.9–32)
MCHC RBC AUTO-ENTMCNC: 29.8 G/DL (ref 32.4–36.3)
MCV RBC AUTO: 88.1 FL (ref 80.5–98.2)
MONOCYTES # BLD AUTO: 0.49 10*3/MM3 (ref 0.2–1.2)
MONOCYTES NFR BLD AUTO: 7.1 % (ref 5–12)
NEUTROPHILS # BLD AUTO: 4 10*3/MM3 (ref 1.9–8.1)
NEUTROPHILS NFR BLD AUTO: 58.4 % (ref 42.7–76)
NT-PROBNP SERPL-MCNC: 1745 PG/ML (ref 5–900)
PHOSPHATE SERPL-MCNC: 4.1 MG/DL (ref 2.5–4.5)
PLATELET # BLD AUTO: 230 10*3/MM3 (ref 140–500)
PMV BLD AUTO: 11.2 FL (ref 6–12)
POTASSIUM BLD-SCNC: 3.4 MMOL/L (ref 3.5–5.2)
PROT SERPL-MCNC: 6.8 G/DL (ref 6–8.5)
RBC # BLD AUTO: 3.69 10*6/MM3 (ref 3.9–5.2)
SODIUM BLD-SCNC: 142 MMOL/L (ref 136–145)
TRIGL SERPL-MCNC: 232 MG/DL (ref 0–150)
TSH SERPL DL<=0.05 MIU/L-ACNC: 3.21 MIU/ML (ref 0.27–4.2)
URATE SERPL-MCNC: 11.5 MG/DL (ref 2.4–5.7)
VLDLC SERPL-MCNC: 46.4 MG/DL (ref 5–40)
WBC NRBC COR # BLD: 6.86 10*3/MM3 (ref 4.5–10.7)

## 2018-12-20 PROCEDURE — 80061 LIPID PANEL: CPT | Performed by: HOSPITALIST

## 2018-12-20 PROCEDURE — 84443 ASSAY THYROID STIM HORMONE: CPT | Performed by: HOSPITALIST

## 2018-12-20 PROCEDURE — 84100 ASSAY OF PHOSPHORUS: CPT | Performed by: INTERNAL MEDICINE

## 2018-12-20 PROCEDURE — 80053 COMPREHEN METABOLIC PANEL: CPT | Performed by: HOSPITALIST

## 2018-12-20 PROCEDURE — 83880 ASSAY OF NATRIURETIC PEPTIDE: CPT | Performed by: HOSPITALIST

## 2018-12-20 PROCEDURE — 25810000003 SODIUM CHLORIDE 0.9 % WITH KCL 20 MEQ 20-0.9 MEQ/L-% SOLUTION: Performed by: HOSPITALIST

## 2018-12-20 PROCEDURE — 83036 HEMOGLOBIN GLYCOSYLATED A1C: CPT | Performed by: HOSPITALIST

## 2018-12-20 PROCEDURE — 25010000002 DAPTOMYCIN PER 1 MG: Performed by: INTERNAL MEDICINE

## 2018-12-20 PROCEDURE — 85025 COMPLETE CBC W/AUTO DIFF WBC: CPT | Performed by: HOSPITALIST

## 2018-12-20 PROCEDURE — 84550 ASSAY OF BLOOD/URIC ACID: CPT | Performed by: INTERNAL MEDICINE

## 2018-12-20 PROCEDURE — 90791 PSYCH DIAGNOSTIC EVALUATION: CPT | Performed by: SOCIAL WORKER

## 2018-12-20 PROCEDURE — G0378 HOSPITAL OBSERVATION PER HR: HCPCS

## 2018-12-20 PROCEDURE — 94799 UNLISTED PULMONARY SVC/PX: CPT

## 2018-12-20 RX ORDER — SODIUM CHLORIDE 9 MG/ML
75 INJECTION, SOLUTION INTRAVENOUS CONTINUOUS
Status: DISCONTINUED | OUTPATIENT
Start: 2018-12-20 | End: 2018-12-20

## 2018-12-20 RX ORDER — ALLOPURINOL 100 MG/1
100 TABLET ORAL DAILY
Status: DISCONTINUED | OUTPATIENT
Start: 2018-12-20 | End: 2018-12-23 | Stop reason: HOSPADM

## 2018-12-20 RX ORDER — POTASSIUM CHLORIDE 750 MG/1
40 CAPSULE, EXTENDED RELEASE ORAL ONCE
Status: COMPLETED | OUTPATIENT
Start: 2018-12-20 | End: 2018-12-20

## 2018-12-20 RX ORDER — ATORVASTATIN CALCIUM 20 MG/1
40 TABLET, FILM COATED ORAL NIGHTLY
Status: DISCONTINUED | OUTPATIENT
Start: 2018-12-20 | End: 2018-12-23 | Stop reason: HOSPADM

## 2018-12-20 RX ORDER — SODIUM CHLORIDE AND POTASSIUM CHLORIDE 150; 900 MG/100ML; MG/100ML
75 INJECTION, SOLUTION INTRAVENOUS CONTINUOUS
Status: DISCONTINUED | OUTPATIENT
Start: 2018-12-20 | End: 2018-12-21

## 2018-12-20 RX ADMIN — OXYCODONE HYDROCHLORIDE 10 MG: 5 TABLET ORAL at 16:10

## 2018-12-20 RX ADMIN — PANTOPRAZOLE SODIUM 40 MG: 40 TABLET, DELAYED RELEASE ORAL at 05:06

## 2018-12-20 RX ADMIN — PRAMIPEXOLE DIHYDROCHLORIDE 0.25 MG: 0.25 TABLET ORAL at 22:37

## 2018-12-20 RX ADMIN — POTASSIUM CHLORIDE 40 MEQ: 750 CAPSULE, EXTENDED RELEASE ORAL at 08:31

## 2018-12-20 RX ADMIN — OXYCODONE HYDROCHLORIDE AND ACETAMINOPHEN 500 MG: 500 TABLET ORAL at 08:20

## 2018-12-20 RX ADMIN — ESCITALOPRAM 20 MG: 20 TABLET, FILM COATED ORAL at 08:20

## 2018-12-20 RX ADMIN — Medication 1 TABLET: at 08:20

## 2018-12-20 RX ADMIN — METOPROLOL TARTRATE 12.5 MG: 25 TABLET ORAL at 22:37

## 2018-12-20 RX ADMIN — POTASSIUM CHLORIDE AND SODIUM CHLORIDE 75 ML/HR: 900; 150 INJECTION, SOLUTION INTRAVENOUS at 16:10

## 2018-12-20 RX ADMIN — OXYCODONE HYDROCHLORIDE 10 MG: 5 TABLET ORAL at 22:37

## 2018-12-20 RX ADMIN — SODIUM CHLORIDE 75 ML/HR: 9 INJECTION, SOLUTION INTRAVENOUS at 09:00

## 2018-12-20 RX ADMIN — CLONAZEPAM 0.5 MG: 0.5 TABLET ORAL at 16:09

## 2018-12-20 RX ADMIN — MINERAL OIL AND WHITE PETROLATUM 1 APPLICATION: 150; 830 OINTMENT OPHTHALMIC at 16:32

## 2018-12-20 RX ADMIN — ARFORMOTEROL TARTRATE 15 MCG: 15 SOLUTION RESPIRATORY (INHALATION) at 08:40

## 2018-12-20 RX ADMIN — DAPTOMYCIN 400 MG: 500 INJECTION, POWDER, LYOPHILIZED, FOR SOLUTION INTRAVENOUS at 17:45

## 2018-12-20 RX ADMIN — ALLOPURINOL 100 MG: 100 TABLET ORAL at 16:10

## 2018-12-20 RX ADMIN — OXYCODONE HYDROCHLORIDE 10 MG: 5 TABLET ORAL at 03:49

## 2018-12-20 RX ADMIN — OXYCODONE HYDROCHLORIDE 10 MG: 5 TABLET ORAL at 08:31

## 2018-12-20 RX ADMIN — ACETAMINOPHEN 650 MG: 325 TABLET, FILM COATED ORAL at 16:09

## 2018-12-20 RX ADMIN — CLONAZEPAM 0.5 MG: 0.5 TABLET ORAL at 22:37

## 2018-12-20 RX ADMIN — ATORVASTATIN CALCIUM 40 MG: 20 TABLET, FILM COATED ORAL at 22:36

## 2018-12-20 RX ADMIN — Medication 100 MG: at 08:20

## 2018-12-20 RX ADMIN — TRAZODONE HYDROCHLORIDE 50 MG: 50 TABLET ORAL at 22:37

## 2018-12-20 RX ADMIN — ARFORMOTEROL TARTRATE 15 MCG: 15 SOLUTION RESPIRATORY (INHALATION) at 20:34

## 2018-12-20 RX ADMIN — METOPROLOL TARTRATE 25 MG: 25 TABLET ORAL at 08:20

## 2018-12-20 RX ADMIN — CLONAZEPAM 0.5 MG: 0.5 TABLET ORAL at 08:31

## 2018-12-20 NOTE — PROGRESS NOTES
"Daily progress note    Chief complaint  Doing much better  No new complaints  Wants to go back to nursing home    History of present illness  58-year-old white female with history of COPD chronic pain syndrome who is a nursing home resident who has had tracheostomy and G-tube placement 3 months ago brought to the emergency room when she put her trach out.  Patient is fully alert oriented eating and does not want her trach to be placed back.  Patient denies any shortness of breath.  Patient also wants his G-tube out.  Patient has no other complaint but hurting all over and wants her pain medications.     REVIEW OF SYSTEMS  Review of Systems   Constitutional: Negative for chills and fever.        (+) trach replacement   HENT: Negative for sore throat.         (+) jaw pain   Respiratory: Negative for shortness of breath.    Cardiovascular: Negative for chest pain.   Gastrointestinal: Negative for nausea and vomiting.   Genitourinary: Negative for dysuria.   Musculoskeletal: Positive for neck pain. Negative for back pain.   Skin: Negative for rash.   Neurological: Negative for dizziness.   Psychiatric/Behavioral: The patient is not nervous/anxious.       PHYSICAL EXAM  Blood pressure 101/79, pulse 72, temperature 98.2 °F (36.8 °C), temperature source Oral, resp. rate 20, height 167.6 cm (66\"), weight 71.8 kg (158 lb 4.8 oz), SpO2 99 %.    Constitutional: No distress.   Head: Normocephalic and atraumatic.   Mouth/Throat: Oropharynx is clear and moist.   Eyes: Conjunctivae are normal.   Neck: There is a relatively small trach stoma without drainage or bleeding.    Cardiovascular: Normal rate and regular rhythm.   Pulmonary/Chest: Breath sounds normal. No respiratory distress.   Pt's O2 sats are 100% on 2L. Respirations unlabored    Abdominal: There is no tenderness.  G-tube in place   Musculoskeletal: She exhibits no edema or tenderness.   Neurological: She is alert.   Skin: No rash noted.     LAB RESULTS  Lab Results " (last 24 hours)     Procedure Component Value Units Date/Time    Hemoglobin A1c [[id number]]  (Normal) Collected:  12/20/18 0437    Specimen:  Blood Updated:  12/20/18 0727     Hemoglobin A1C 5.30 %     Narrative:       Hemoglobin A1C Ranges:    Increased Risk for Diabetes  5.7% to 6.4%  Diabetes                     >= 6.5%  Diabetic Goal                < 7.0%    Comprehensive Metabolic Panel [[id number]]  (Abnormal) Collected:  12/20/18 0437    Specimen:  Blood Updated:  12/20/18 0647     Glucose 92 mg/dL      BUN 33 mg/dL      Creatinine 2.06 mg/dL      Sodium 142 mmol/L      Potassium 3.4 mmol/L      Chloride 104 mmol/L      CO2 24.5 mmol/L      Calcium 10.5 mg/dL      Total Protein 6.8 g/dL      Albumin 3.20 g/dL      ALT (SGPT) 12 U/L      AST (SGOT) 19 U/L      Alkaline Phosphatase 123 U/L      Total Bilirubin 0.2 mg/dL      eGFR Non African Amer 25 mL/min/1.73      Globulin 3.6 gm/dL      A/G Ratio 0.9 g/dL      BUN/Creatinine Ratio 16.0     Anion Gap 13.5 mmol/L     Uric Acid [[id number]]  (Abnormal) Collected:  12/20/18 0437    Specimen:  Blood Updated:  12/20/18 0647     Uric Acid 11.5 mg/dL     Lipid Panel [[id number]]  (Abnormal) Collected:  12/20/18 0437    Specimen:  Blood Updated:  12/20/18 0647     Total Cholesterol 223 mg/dL      Triglycerides 232 mg/dL      HDL Cholesterol 31 mg/dL      LDL Cholesterol  146 mg/dL      VLDL Cholesterol 46.4 mg/dL      LDL/HDL Ratio 4.70    Narrative:       Cholesterol Reference Ranges  (U.S. Department of Health and Human Services ATP III Classifications)    Desirable          <200 mg/dL  Borderline High    200-239 mg/dL  High Risk          >240 mg/dL      Triglyceride Reference Ranges  (U.S. Department of Health and Human Services ATP III Classifications)    Normal           <150 mg/dL  Borderline High  150-199 mg/dL  High             200-499 mg/dL  Very High        >500 mg/dL    HDL Reference Ranges  (U.S. Department of Health and Human Services ATP III  Classifcations)    Low     <40 mg/dl (major risk factor for CHD)  High    >60 mg/dl ('negative' risk factor for CHD)        LDL Reference Ranges  (U.S. Department of Health and Human Services ATP III Classifcations)    Optimal          <100 mg/dL  Near Optimal     100-129 mg/dL  Borderline High  130-159 mg/dL  High             160-189 mg/dL  Very High        >189 mg/dL    Phosphorus [[id number]]  (Normal) Collected:  12/20/18 0437    Specimen:  Blood Updated:  12/20/18 0647     Phosphorus 4.1 mg/dL     BNP [[id number]]  (Abnormal) Collected:  12/20/18 0437    Specimen:  Blood Updated:  12/20/18 0647     proBNP 1,745.0 pg/mL     Narrative:       Among patients with dyspnea, NT-proBNP is highly sensitive for the detection of acute congestive heart failure. In addition NT-proBNP of <300 pg/ml effectively rules out acute congestive heart failure with 99% negative predictive value.    TSH [[id number]]  (Normal) Collected:  12/20/18 0437    Specimen:  Blood Updated:  12/20/18 0647     TSH 3.210 mIU/mL     CBC & Differential [[id number]] Collected:  12/20/18 0437    Specimen:  Blood Updated:  12/20/18 0605    Narrative:       The following orders were created for panel order CBC & Differential.  Procedure                               Abnormality         Status                     ---------                               -----------         ------                     CBC Auto Differential[[id number]]        Abnormal            Final result                 Please view results for these tests on the individual orders.    CBC Auto Differential [[id number]]  (Abnormal) Collected:  12/20/18 0437    Specimen:  Blood Updated:  12/20/18 0605     WBC 6.86 10*3/mm3      RBC 3.69 10*6/mm3      Hemoglobin 9.7 g/dL      Hematocrit 32.5 %      MCV 88.1 fL      MCH 26.3 pg      MCHC 29.8 g/dL      RDW 15.8 %      RDW-SD 51.1 fl      MPV 11.2 fL      Platelets 230 10*3/mm3      Neutrophil % 58.4 %      Lymphocyte % 27.8 %      Monocyte % 7.1 %       Eosinophil % 6.1 %      Basophil % 0.6 %      Immature Grans % 0.0 %      Neutrophils, Absolute 4.00 10*3/mm3      Lymphocytes, Absolute 1.91 10*3/mm3      Monocytes, Absolute 0.49 10*3/mm3      Eosinophils, Absolute 0.42 10*3/mm3      Basophils, Absolute 0.04 10*3/mm3      Immature Grans, Absolute 0.00 10*3/mm3     Urinalysis With Microscopic If Indicated (No Culture) - [[id number]]  (Abnormal) Collected:  12/19/18 1747    Specimen:  Urine Updated:  12/19/18 1943     Color, UA Yellow     Appearance, UA Clear     pH, UA 5.5     Specific Gravity, UA 1.019     Glucose, UA Negative     Ketones, UA Negative     Bilirubin, UA Negative     Blood, UA Negative     Protein, UA 30 mg/dL (1+)     Leuk Esterase, UA Moderate (2+)     Nitrite, UA Negative     Urobilinogen, UA 0.2 E.U./dL    Urinalysis, Microscopic Only - Urine, Clean Catch [[id number]]  (Abnormal) Collected:  12/19/18 1747    Specimen:  Urine, Clean Catch Updated:  12/19/18 1943     RBC, UA 0-2 /HPF      WBC, UA 31-50 /HPF      Bacteria, UA None Seen /HPF      Squamous Epithelial Cells, UA 3-6 /HPF      Yeast, UA Small/1+ Yeast /HPF      Hyaline Casts, UA 3-6 /LPF      Methodology Manual Light Microscopy    Eosinophil Smear - Urine, Urine, Clean Catch [[id number]]  (Normal) Collected:  12/19/18 1747    Specimen:  Urine, Clean Catch Updated:  12/19/18 1941     Eosinophil Smear 0 % EOS/100 Cells     Chloride, Urine, Random - [[id number]] Collected:  12/19/18 1747    Specimen:  Urine Updated:  12/19/18 1902     Chloride, Urine <20 mmol/L     Narrative:       Reference intervals for random urine have not been established.  Clinical usage is dependent upon physician's interpretation in combination with other laboratory tests.     CK [[id number]]  (Normal) Collected:  12/19/18 1818    Specimen:  Blood Updated:  12/19/18 1901     Creatine Kinase 29 U/L     Sodium, Urine, Random - [[id number]] Collected:  12/19/18 1747    Specimen:  Urine Updated:  12/19/18 1822      Sodium, Urine 23 mmol/L     Narrative:       Reference intervals for random urine have not been established.  Clinical usage is dependent upon physician's interpretation in combination with other laboratory tests.     Creatinine, Urine, Random - [[id number]] Collected:  12/19/18 1747    Specimen:  Urine Updated:  12/19/18 1821     Creatinine, Urine 99.6 mg/dL     Narrative:       Reference intervals for random urine have not been established.  Clinical usage is dependent upon physician's interpretation in combination with other laboratory tests.     Protein, Urine, Random - [[id number]] Collected:  12/19/18 1747    Specimen:  Urine Updated:  12/19/18 1821     Total Protein, Urine 40.0 mg/dL     Narrative:       Reference intervals for random urine have not been established.  Clinical usage is dependent upon physician's interpretation in combination with other laboratory tests.         Imaging Results (last 24 hours)     Procedure Component Value Units Date/Time    US Renal Bilateral [[id number]] Collected:  12/19/18 2047     Updated:  12/19/18 2054    Narrative:       BILATERAL RENAL ULTRASOUND     CLINICAL HISTORY: Acute renal failure     Transverse and longitudinal images of both kidneys were obtained. There  is focal cortical thinning in the polar region of the right kidney  consistent with scarring, likely due to previous infection. A tiny cyst  is also noted in the upper pole. The right kidney is otherwise  unremarkable. There is no hydronephrosis. The right kidney measures 9.8  x 3.4 x 4.1 cm. There are no left kidney is normal in size and shape and  shows no hydronephrosis. There is a tiny minimally complex cyst in the  upper pole measuring 12 mm in diameter. A tiny 9 mm diameter lower pole  cyst is also noted. The left kidney measures 12.0 x 4.4 x 3.8 cm. The  urinary bladder could not be imaged due to the presence of Dove  catheter.     IMPRESSIONS: No evidence of hydronephrosis. Tiny bilateral renal  cysts  as noted.     This report was finalized on 12/19/2018 8:51 PM by Dr. Andrea Jones M.D.             Current Facility-Administered Medications:   •  acetaminophen (TYLENOL) tablet 650 mg, 650 mg, Oral, Q4H PRN, Jose Antonio Stack MD  •  arformoterol (BROVANA) nebulizer solution 15 mcg, 15 mcg, Nebulization, BID - RT, Dany Baez MD, 15 mcg at 12/20/18 0840  •  artificial tears 83-15 % ophthalmic ointment 1 application, 1 application, Both Eyes, Q12H, Jose Antonio Stack MD  •  clonazePAM (KlonoPIN) tablet 0.5 mg, 0.5 mg, Oral, TID, 0.5 mg at 12/20/18 0831 **FOLLOWED BY** [START ON 1/2/2019] clonazePAM (KlonoPIN) tablet 0.5 mg, 0.5 mg, Oral, BID, Jose Antonio Stack MD  •  cyclobenzaprine (FLEXERIL) tablet 10 mg, 10 mg, Oral, TID PRN, Jose Antonio Stack MD  •  escitalopram (LEXAPRO) tablet 20 mg, 20 mg, Oral, Daily, Jose Antonio Stack MD, 20 mg at 12/20/18 0820  •  ipratropium-albuterol (DUO-NEB) nebulizer solution 3 mL, 3 mL, Nebulization, Q4H PRN, Jose Antonio Stack MD, 3 mL at 12/19/18 1955  •  metoprolol tartrate (LOPRESSOR) tablet 25 mg, 25 mg, Oral, Q12H, Jose Antonio Stack MD, 25 mg at 12/20/18 0820  •  multivitamin (THERAGRAN) tablet 1 tablet, 1 tablet, Oral, Daily, Jose Antonio Stack MD, 1 tablet at 12/20/18 0820  •  nicotine (NICODERM CQ) 21 MG/24HR patch 1 patch, 1 patch, Transdermal, Q24H, Jose Antonio Stack MD  •  oxyCODONE (ROXICODONE) immediate release tablet 10 mg, 10 mg, Oral, Q4H PRN, Jose Antonio Stack MD, 10 mg at 12/20/18 0831  •  pantoprazole (PROTONIX) EC tablet 40 mg, 40 mg, Oral, Q AM, Jose Antonio Stack MD, 40 mg at 12/20/18 0506  •  pramipexole (MIRAPEX) tablet 0.25 mg, 0.25 mg, Oral, Nightly, Jose Antonio Stack MD, 0.25 mg at 12/19/18 2136  •  Sodium chloride 0.9 % infusion, 75 mL/hr, Intravenous, Continuous, Eliseo Davis MD, Last Rate: 75 mL/hr at 12/20/18 1226, 75 mL/hr at 12/20/18 1226  •  thiamine (VITAMIN B-1) tablet 100 mg, 100 mg, Oral, Daily, Jose Antonio Stack MD, 100 mg at 12/20/18 0820  •  traZODone (DESYREL) tablet 50  mg, 50 mg, Oral, Nightly, Cherri Stack MD, 50 mg at 12/19/18 2136  •  vitamin C (ASCORBIC ACID) tablet 500 mg, 500 mg, Oral, Daily, Cherri Stack MD, 500 mg at 12/20/18 0820     ASSESSMENT  Acute kidney injury  COPD  Hypertension  Gastroesophageal reflux disease  Status post recent respiratory failure  Status post trach which she removed and will close in 1-2 weeks  Status post G-tube placement  Anxiety disorder  Depression  Chronic pain syndrome  Peripheral vascular disease    PLAN  CPM  IV fluid  Off vancomycin  ID consult pending  Vascular surgery to see patient   Nephrology consult appreciated  Continue nursing home medication and adjust the doses  Stress ulcer DVT prophylaxis  Supportive care  Follow closely further recommendation according to hospital course    CHERRI STACK MD
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
## 2018-12-20 NOTE — PLAN OF CARE
Problem: Patient Care Overview  Goal: Plan of Care Review  Outcome: Ongoing (interventions implemented as appropriate)   12/20/18 0407   Coping/Psychosocial   Plan of Care Reviewed With patient   Plan of Care Review   Progress no change   OTHER   Outcome Summary VSS. Sleep study in progress. Patient c/o generalized pain being controlled with PO pain meds. Will monitor.      Goal: Individualization and Mutuality  Outcome: Ongoing (interventions implemented as appropriate)    Goal: Discharge Needs Assessment  Outcome: Ongoing (interventions implemented as appropriate)    Goal: Interprofessional Rounds/Family Conf  Outcome: Ongoing (interventions implemented as appropriate)      Problem: Fall Risk (Adult)  Goal: Identify Related Risk Factors and Signs and Symptoms  Outcome: Ongoing (interventions implemented as appropriate)    Goal: Absence of Fall  Outcome: Ongoing (interventions implemented as appropriate)      Problem: Airway, Artificial (Adult)  Goal: Signs and Symptoms of Listed Potential Problems Will be Absent, Minimized or Managed (Airway, Artificial)  Outcome: Ongoing (interventions implemented as appropriate)      Problem: Pain, Acute (Adult)  Goal: Identify Related Risk Factors and Signs and Symptoms  Outcome: Ongoing (interventions implemented as appropriate)    Goal: Acceptable Pain Control/Comfort Level  Outcome: Ongoing (interventions implemented as appropriate)      Problem: Skin Injury Risk (Adult)  Goal: Identify Related Risk Factors and Signs and Symptoms  Outcome: Outcome(s) achieved Date Met: 12/20/18    Goal: Skin Health and Integrity  Outcome: Ongoing (interventions implemented as appropriate)

## 2018-12-20 NOTE — CONSULTS
"Name: Swetha Luis ADMIT: 2018   : 1960  PCP: Provider, No Known    MRN: [id number] LOS: 0 days   AGE/SEX: 58 y.o. female  ROOM: E564/1     Inpatient Vascular Surgery Consult  Consult performed by: Mick Robles MD  Consult ordered by: Jose Antonio Stack MD  Reason for consult: Dry gangrene of the toes        Mick Robles MD     LOS: 0 days   Patient Care Team:  Provider, No Known as PCP - General  Provider, No Known as PCP - Family Medicine    Subjective     History of Present Illness  58 y.o. female patient who has chronic pain syndrome and COPD who is a nursing home resident after prolonged hospitalization at an outside hospital.  At that institution, she was treated for a sternoclavicular joint infection requiring multiple operations and debridement.  She became quite ill and had an extended ICU stay on multiple pressors.  As a result of this vasoconstriction she had necrosis of her toes.  This has been managed out the outside hospital with podiatry follow-up recommended.  She was admitted here because her trach came out and for concern about declining renal function.  We were asked to see her because of the toes.  The patient is very upset because Dr. Stack just left the room and told her \"you need to have her toes cut off\" according to the patient.  She tells me that she doesn't want that person to care for her any longer.    HPI Elements: Patient complains of wound. Problem is located mainly in the bilateral feet. The pain is described as aching, and is 3/10 in intensity. Pain is intermittent. Onset was several months ago. Symptoms have been gradually improving since.  The patient's risks factors for peripheral vascular disease include pressor use.  The patient denies a history of peripheral vascular disease    Review of Systems   Constitutional: Positive for fatigue.   Respiratory: Positive for shortness of breath.        PMH: Status post tracheostomy, COPD, chronic kidney disease, " "chronic pain syndrome,    Family History: Negative for DVT or aneurysm    Social History: Patient is a former smoker.  She quit during all of these acute hospitalizations    Allergies: Hydrocodone; Strawberry; and Zithromax [azithromycin]      Objective   Temp:  [97 °F (36.1 °C)-98.2 °F (36.8 °C)] 98.2 °F (36.8 °C)  Heart Rate:  [67-78] 72  Resp:  [16-20] 20  BP: ()/(55-79) 101/79    I/O this shift:  In: 1360 [P.O.:360; I.V.:1000]  Out: 300 [Urine:300]    Physical Exam   Constitutional: She is oriented to person, place, and time. She appears well-developed. She appears ill.   HENT:   Right Ear: External ear normal.   Left Ear: External ear normal.   Nose: Nose normal.   Mouth/Throat: Normal dentition.   Eyes: Conjunctivae, EOM and lids are normal. Pupils are equal, round, and reactive to light.   Neck: No JVD present. Carotid bruit is not present. No thyromegaly present.   Cardiovascular: Normal rate, regular rhythm and normal heart sounds.   No murmur heard.  Pulses:       Carotid pulses are 2+ on the right side, and 2+ on the left side.       Radial pulses are 2+ on the right side, and 2+ on the left side.   No peripheral edema.  Dry gangrene is photographed below.  Patient has excellent pedal Doppler signals   Pulmonary/Chest: Effort normal and breath sounds normal.   Patient has a \"trap door \"\" incision around the right clavicle and sternum consistent with the sternoclavicular joint exposure.  This appears to be healed with a minor area of granulation tissue at the corner.  No infection obvious.   Abdominal: Soft. She exhibits no distension. There is no hepatosplenomegaly. There is no tenderness.   PEG tube in place   Musculoskeletal:   Gait not examined. No bony tenderness or deformities.  No clubbing or cyanosis.     Neurological: She is alert and oriented to person, place, and time.   Skin: Skin is warm, dry and intact.   Psychiatric: Her behavior is normal. Judgment normal. Her mood appears anxious. "   Vitals reviewed.            Data Reviewed:  CBC    Results from last 7 days   Lab Units  12/20/18   0437  12/19/18   1246   WBC 10*3/mm3  6.86  9.07   HEMOGLOBIN g/dL  9.7*  10.4*   PLATELETS 10*3/mm3  230  231     BMP   Results from last 7 days   Lab Units  12/20/18   0437  12/19/18   1246  12/19/18   0546   SODIUM mmol/L  142  140   --    POTASSIUM mmol/L  3.4*  3.8   --    CHLORIDE mmol/L  104  100   --    CO2 mmol/L  24.5  26.4   --    BUN mg/dL  33*  30*   --    CREATININE mg/dL  2.06*  2.39*  2.36*   GLUCOSE mg/dL  92  119*   --    PHOSPHORUS mg/dL  4.1   --    --      Coag     HbA1C   Lab Results   Component Value Date    HGBA1C 5.30 12/20/2018     Infection     Radiology(recent) No radiology results for the last day    Imaging Studies:  Patient's chest x-ray reviewed independently.  She does have a left-sided PICC line terminates in the SVC.  There is some suggestion of cardiomegaly but this could be also due to technique.    Ronaldo's PENELOPE 11/2018  PROCEDURE PERFORMED: NVL ANKLE BRACHIAL INDEX    Conclusions: Right lower extremity demonstrates mild arterial circulation at rest with ankle-brachial index of 0.96. Unable to obtain digit pressure and waveform due to patients pain tolerance.   Left lower extremity demonstrates normal arterial circulation at rest with ankle-brachial index of 0.99 and digit pressure of 47 mmHg. Digit pressure is adequate for healing.  Compared to previous exam 8/7/18, the left digit pressure has increased from 0 to 47 mmHg.       Active Hospital Problems    Diagnosis Date Noted   • Gangrene of toe (CMS/[facility]) [I96] 12/20/2018   • Tracheostomy complication (CMS/[facility]) [J95.00] 12/18/2018      Resolved Hospital Problems   No resolved problems to display.     Problem Points:  4:  Patient has a new problem, with additional work-up planned  Total problem points:4 or more    Data Points:  1:  I have reviewed or order clinical lab test  2:  I have personally and independently review of  image, tracing, or specimen  2:  I have reviewed and summation of old records and/or discussed the patients care with another health care provider  Total data points:4 or more    Risk Points:  Moderate: New diagnosis with unknown prognosis    [place]illin    Assessment/Plan       Tracheostomy complication (CMS/[facility])    Gangrene of toe (CMS/[facility])        58 y.o. female with a prolonged hospitalization at an outside hospital where she was treated for a sternoclavicular joint infection by cardiothoracic surgery.  She required operative debridement was quite ill and spent a significant amount of time in the ICU.  During this time, she required pressor support for blood pressure and this came at the expense of digital necrosis.  We were asked to see her because of dry gangrene of the toes.  She already has seen podiatry at [facility] and had a lower extremity arterial study a few weeks ago that showed ABIs of 0.96 on one side and 0.97 on the other.  Toe pressures were reduced at 47.  Recommendation was to continue local wound care.  The patient says that some of the necrosis is are soft off.  She denies fevers.    On exam today I think she has excellent pedal signals but her pulses aren't palpable.  For this reason I'll disconfirm with a lower extremity arterial study to previous measurements to make sure we think she has adequate perfusion to heal.  I do think that's the case.  I agree with the current care plan of Betadine this and allowing it to continue to demarcate.    I discussed the patients findings and my recommendations with patient and nursing staff.  Please call my office with any question: [phone]    Mick Robles MD  18  12:55 PM

## 2018-12-20 NOTE — PROGRESS NOTES
"   LOS: 0 days   Patient Care Team:  Provider, No Known as PCP - General  Provider, No Known as PCP - Family Medicine    Chief Complaint/ Reason for encounter: Acute renal failure, hypokalemia        Subjective     Medical history reviewed:  History of Present Illness    Subjective:  Symptoms:  Improved.  She reports weakness.  No shortness of breath or chest pain.  (No new complaints today, feels somewhat better).    Diet:  Adequate intake.    Activity level: Returning to normal.    Pain:  She reports no pain.          History taken from: Patient and chart    Objective     Vital Signs  Temp:  [97.4 °F (36.3 °C)-98.2 °F (36.8 °C)] 98.2 °F (36.8 °C)  Heart Rate:  [67-78] 72  Resp:  [16-20] 20  BP: ()/(55-79) 101/79       Wt Readings from Last 1 Encounters:   12/18/18 2000 71.8 kg (158 lb 4.8 oz)       Objective:  General Appearance:  Comfortable, well-appearing, in no acute distress and not in pain.    Vital signs: (most recent): Blood pressure 101/79, pulse 72, temperature 98.2 °F (36.8 °C), temperature source Oral, resp. rate 20, height 167.6 cm (66\"), weight 71.8 kg (158 lb 4.8 oz), SpO2 99 %.  Vital signs are normal.  No fever.    Output: Producing urine.    HEENT: Normal HEENT exam.  (Right upper chest incision dressed)    Lungs:  Normal effort and normal respiratory rate.  Breath sounds clear to auscultation.  No rales or rhonchi.    Heart: Normal rate.  Regular rhythm.  S1 normal.    Abdomen: Abdomen is soft.  Bowel sounds are normal.   There is no abdominal tenderness.     Extremities: Normal range of motion.  There is deformity.  There is no dependent edema.  (Right foot bandaged)  Neurological: Patient is alert and oriented to person, place and time.    Skin:  Warm and dry.  No rash or cyanosis.             Results Review:    Intake/Output:     Intake/Output Summary (Last 24 hours) at 12/20/2018 1513  Last data filed at 12/20/2018 0911  Gross per 24 hour   Intake 1600 ml   Output 300 ml   Net 1300 ml "         DATA:  Radiology and Labs:  The following labs independently reviewed by me. Additional labs ordered for tomorrow a.m.  Interval notes, chart personally reviewed by me.   Old records independently reviewed showing previous records from [facility] reviewed showing renal failure likely due to antibiotics versus sepsis  The following radiologic studies independently viewed by me, findings renal ultrasound no sign of hydronephrosis, bilateral small cysts  New problems include hypokalemia  Discussed with the patient herself      Risk/ complexity of medical care/ medical decision making high given the severity of renal failure and need for close monitoring of labs      Labs:   Recent Results (from the past 24 hour(s))   Chloride, Urine, Random -    Collection Time: 12/19/18  5:47 PM   Result Value Ref Range    Chloride, Urine <20 mmol/L   Creatinine, Urine, Random -    Collection Time: 12/19/18  5:47 PM   Result Value Ref Range    Creatinine, Urine 99.6 mg/dL   Eosinophil Smear - Urine, Urine, Clean Catch    Collection Time: 12/19/18  5:47 PM   Result Value Ref Range    Eosinophil Smear 0 0 - 0 % EOS/100 Cells   Urinalysis With Microscopic If Indicated (No Culture) -    Collection Time: 12/19/18  5:47 PM   Result Value Ref Range    Color, UA Yellow Yellow, Straw    Appearance, UA Clear Clear    pH, UA 5.5 5.0 - 8.0    Specific Gravity, UA 1.019 1.005 - 1.030    Glucose, UA Negative Negative    Ketones, UA Negative Negative    Bilirubin, UA Negative Negative    Blood, UA Negative Negative    Protein, UA 30 mg/dL (1+) (A) Negative    Leuk Esterase, UA Moderate (2+) (A) Negative    Nitrite, UA Negative Negative    Urobilinogen, UA 0.2 E.U./dL 0.2 - 1.0 E.U./dL   Sodium, Urine, Random -    Collection Time: 12/19/18  5:47 PM   Result Value Ref Range    Sodium, Urine 23 mmol/L   Protein, Urine, Random -    Collection Time: 12/19/18  5:47 PM   Result Value Ref Range    Total Protein, Urine 40.0 mg/dL   Urinalysis,  Microscopic Only - Urine, Clean Catch    Collection Time: 12/19/18  5:47 PM   Result Value Ref Range    RBC, UA 0-2 None Seen, 0-2 /HPF    WBC, UA 31-50 (A) None Seen, 0-2 /HPF    Bacteria, UA None Seen None Seen /HPF    Squamous Epithelial Cells, UA 3-6 (A) None Seen, 0-2 /HPF    Yeast, UA Small/1+ Yeast None Seen /HPF    Hyaline Casts, UA 3-6 None Seen /LPF    Methodology Manual Light Microscopy    CK    Collection Time: 12/19/18  6:18 PM   Result Value Ref Range    Creatine Kinase 29 20 - 180 U/L   Comprehensive Metabolic Panel    Collection Time: 12/20/18  4:37 AM   Result Value Ref Range    Glucose 92 65 - 99 mg/dL    BUN 33 (H) 6 - 20 mg/dL    Creatinine 2.06 (H) 0.57 - 1.00 mg/dL    Sodium 142 136 - 145 mmol/L    Potassium 3.4 (L) 3.5 - 5.2 mmol/L    Chloride 104 98 - 107 mmol/L    CO2 24.5 22.0 - 29.0 mmol/L    Calcium 10.5 8.6 - 10.5 mg/dL    Total Protein 6.8 6.0 - 8.5 g/dL    Albumin 3.20 (L) 3.50 - 5.20 g/dL    ALT (SGPT) 12 1 - 33 U/L    AST (SGOT) 19 1 - 32 U/L    Alkaline Phosphatase 123 (H) 39 - 117 U/L    Total Bilirubin 0.2 0.1 - 1.2 mg/dL    eGFR Non African Amer 25 (L) >60 mL/min/1.73    Globulin 3.6 gm/dL    A/G Ratio 0.9 g/dL    BUN/Creatinine Ratio 16.0 7.0 - 25.0    Anion Gap 13.5 mmol/L   BNP    Collection Time: 12/20/18  4:37 AM   Result Value Ref Range    proBNP 1,745.0 (H) 5.0 - 900.0 pg/mL   TSH    Collection Time: 12/20/18  4:37 AM   Result Value Ref Range    TSH 3.210 0.270 - 4.200 mIU/mL   Lipid Panel    Collection Time: 12/20/18  4:37 AM   Result Value Ref Range    Total Cholesterol 223 (H) 0 - 200 mg/dL    Triglycerides 232 (H) 0 - 150 mg/dL    HDL Cholesterol 31 (L) 40 - 60 mg/dL    LDL Cholesterol  146 (H) 0 - 100 mg/dL    VLDL Cholesterol 46.4 (H) 5 - 40 mg/dL    LDL/HDL Ratio 4.70    Hemoglobin A1c    Collection Time: 12/20/18  4:37 AM   Result Value Ref Range    Hemoglobin A1C 5.30 4.80 - 5.60 %   Uric Acid    Collection Time: 12/20/18  4:37 AM   Result Value Ref Range    Uric  Acid 11.5 (H) 2.4 - 5.7 mg/dL   CBC Auto Differential    Collection Time: 12/20/18  4:37 AM   Result Value Ref Range    WBC 6.86 4.50 - 10.70 10*3/mm3    RBC 3.69 (L) 3.90 - 5.20 10*6/mm3    Hemoglobin 9.7 (L) 11.9 - 15.5 g/dL    Hematocrit 32.5 (L) 35.6 - 45.5 %    MCV 88.1 80.5 - 98.2 fL    MCH 26.3 (L) 26.9 - 32.0 pg    MCHC 29.8 (L) 32.4 - 36.3 g/dL    RDW 15.8 (H) 11.7 - 13.0 %    RDW-SD 51.1 37.0 - 54.0 fl    MPV 11.2 6.0 - 12.0 fL    Platelets 230 140 - 500 10*3/mm3    Neutrophil % 58.4 42.7 - 76.0 %    Lymphocyte % 27.8 19.6 - 45.3 %    Monocyte % 7.1 5.0 - 12.0 %    Eosinophil % 6.1 0.3 - 6.2 %    Basophil % 0.6 0.0 - 1.5 %    Immature Grans % 0.0 0.0 - 0.5 %    Neutrophils, Absolute 4.00 1.90 - 8.10 10*3/mm3    Lymphocytes, Absolute 1.91 0.90 - 4.80 10*3/mm3    Monocytes, Absolute 0.49 0.20 - 1.20 10*3/mm3    Eosinophils, Absolute 0.42 0.00 - 0.70 10*3/mm3    Basophils, Absolute 0.04 0.00 - 0.20 10*3/mm3    Immature Grans, Absolute 0.00 0.00 - 0.03 10*3/mm3   Phosphorus    Collection Time: 12/20/18  4:37 AM   Result Value Ref Range    Phosphorus 4.1 2.5 - 4.5 mg/dL       Radiology:  Imaging Results (last 24 hours)     Procedure Component Value Units Date/Time    US Renal Bilateral [[id number]] Collected:  12/19/18 2047     Updated:  12/19/18 2054    Narrative:       BILATERAL RENAL ULTRASOUND     CLINICAL HISTORY: Acute renal failure     Transverse and longitudinal images of both kidneys were obtained. There  is focal cortical thinning in the polar region of the right kidney  consistent with scarring, likely due to previous infection. A tiny cyst  is also noted in the upper pole. The right kidney is otherwise  unremarkable. There is no hydronephrosis. The right kidney measures 9.8  x 3.4 x 4.1 cm. There are no left kidney is normal in size and shape and  shows no hydronephrosis. There is a tiny minimally complex cyst in the  upper pole measuring 12 mm in diameter. A tiny 9 mm diameter lower pole  cyst is  also noted. The left kidney measures 12.0 x 4.4 x 3.8 cm. The  urinary bladder could not be imaged due to the presence of Dove  catheter.     IMPRESSIONS: No evidence of hydronephrosis. Tiny bilateral renal cysts  as noted.     This report was finalized on 12/19/2018 8:51 PM by Dr. Andrea Jones M.D.                Medications have been reviewed:  Current Facility-Administered Medications   Medication Dose Route Frequency Provider Last Rate Last Dose   • acetaminophen (TYLENOL) tablet 650 mg  650 mg Oral Q4H PRN Jose Antonio Stack MD       • allopurinol (ZYLOPRIM) tablet 100 mg  100 mg Oral Daily Jose Antonio Stack MD       • arformoterol (BROVANA) nebulizer solution 15 mcg  15 mcg Nebulization BID - RT Dany Baez MD   15 mcg at 12/20/18 0840   • artificial tears 83-15 % ophthalmic ointment 1 application  1 application Both Eyes Q12H Jose Antonio Stack MD       • atorvastatin (LIPITOR) tablet 40 mg  40 mg Oral Nightly Jose Antonio Stack MD       • clonazePAM (KlonoPIN) tablet 0.5 mg  0.5 mg Oral TID Jose Antonio Stack MD   0.5 mg at 12/20/18 0831    Followed by   • [START ON 1/2/2019] clonazePAM (KlonoPIN) tablet 0.5 mg  0.5 mg Oral BID Jose Antonio Stack MD       • cyclobenzaprine (FLEXERIL) tablet 10 mg  10 mg Oral TID PRN Jose Antonio Stack MD       • escitalopram (LEXAPRO) tablet 20 mg  20 mg Oral Daily Jose Antonio Stack MD   20 mg at 12/20/18 0820   • ipratropium-albuterol (DUO-NEB) nebulizer solution 3 mL  3 mL Nebulization Q4H PRN Jose Antonio Stack MD   3 mL at 12/19/18 1955   • metoprolol tartrate (LOPRESSOR) tablet 12.5 mg  12.5 mg Oral Q12H Jose Antonio Stack MD       • multivitamin (THERAGRAN) tablet 1 tablet  1 tablet Oral Daily Jose Antonio Stack MD   1 tablet at 12/20/18 0820   • nicotine (NICODERM CQ) 21 MG/24HR patch 1 patch  1 patch Transdermal Q24H Jose Antonio Stack MD       • oxyCODONE (ROXICODONE) immediate release tablet 10 mg  10 mg Oral Q4H PRN Jose Antonio Stack MD   10 mg at 12/20/18 0831   • pantoprazole (PROTONIX) EC tablet 40 mg  40 mg  Oral Q AM Jose Antonio Stack MD   40 mg at 12/20/18 0506   • pramipexole (MIRAPEX) tablet 0.25 mg  0.25 mg Oral Nightly Jose Antonio Stack MD   0.25 mg at 12/19/18 2136   • sodium chloride 0.9 % with KCl 20 mEq/L infusion  75 mL/hr Intravenous Continuous Jose Antonio Stack MD       • thiamine (VITAMIN B-1) tablet 100 mg  100 mg Oral Daily Jose Antonio Stack MD   100 mg at 12/20/18 0820   • traZODone (DESYREL) tablet 50 mg  50 mg Oral Nightly Jose Antonio Stack MD   50 mg at 12/19/18 2136   • vitamin C (ASCORBIC ACID) tablet 500 mg  500 mg Oral Daily Jose Antonio Stack MD   500 mg at 12/20/18 0820       ASSESSMENT:  acute renal failure, likely vancomycin toxicity, slightly improved today  Chronic hypertension, controlled  Respiratory failure with recent tracheostomy  Dysphagia with recent G-tube placement  Chronic pain syndrome  Osteomyelitis on IV antibiotics  Tracheostomy complication  Hypercalcemia  Gangrene right toes     PLAN:   Renal failure likely related to vancomycin toxicity, improved today  Continue IV fluids overnight and encourage oral fluid intake as able  Unremarkable renal ultrasound, urine studies, urine electrolytes  Recommend continuing off vancomycin  Vascular surgery recommendations noted         Eliseo Davis MD   [facility]   Office phone number: [phone]  Answering service phone number: [phone]    12/20/18  3:13 PM      Dictation performed using Dragon dictation software

## 2018-12-20 NOTE — NURSING NOTE
Called her sister, Hattie, per patient and spouse request.  Multiple questions answered.  Encouraged her to visit in light of the fact that no one has been here to visit and she continues to cry and want them here.

## 2018-12-20 NOTE — CONSULTS
Patient Care Team:  Provider, No Known as PCP - General  Provider, No Known as PCP - Family Medicine      Subjective     Patient is a 58 y.o. female.  Asked to see in consultation regarding tracheostomy.  Patient has received most of her care at [facility].  She has had a protracted course over the last 5 months or so she's had apparently severe sepsis and renal failure she had right shoulder MRSA osteomyelitis she's had 5 months of antibiotics now and her course of all this she was on a ventilator and was trached..  She says she has not liked the trach and she had some secretions or something stuck in it last night and she was trying to pull those out and pulled the trachea.  And when she arrived here she decided she did not want it replaced.  She says her breathing is doing fine since she had about an factor breathing is much better.  Initially she couldn't talk but now she can talk.  She does have a history of sleep apnea she used to use a CPAP machine but hasn't in a long time.  With her illness she has lost a whole lot of weight and says she is not aware that she has a problem with sleep apnea anymore as she does have a history of COPD and she was a smoker up until this event she says she made do with God if he would let her live she would stop smoking and she has honored it thus far and intends to continue remaining off of tobacco use.  This particular illness her breathing was starting to cause problems she has 2 grandkids that are the light of her life and she noticed if she did something fast with her chased him she would get short of breath also climbing stairs she was getting short of breath.  She is trying to do a little physical therapy trying to recover but apparently with all of her sepsis sounds which she was on vasopressors and got severe ischemia and some gangrene in her toes and she is not been able to walk much because of this are still working to try and save some of her  toes.      Review of Systems:    Her knowledge she's never had any heart disease in fact until this infection she thought she was pretty healthy other than smoking since she let a very clean life she didn't use drugs or alcohol and no history of liver disease or seizures or strokes no history of blood clots or bleeding problems or diabetes or dyslipidemia    History  Past Medical History:   Diagnosis Date   • Acute osteomyelitis (CMS/HCC)     Right shoulder   • COPD (chronic obstructive pulmonary disease) (CMS/HCC)    • Nontraumatic subarachnoid hemorrhage (CMS/HCC)    • Renal disorder     chronic kidney disease   • Tracheostomy present (CMS/HCC)      Past Surgical History:   Procedure Laterality Date   • TRACHEOSTOMY       Social History     Socioeconomic History   • Marital status:      Spouse name: Not on file   • Number of children: Not on file   • Years of education: Not on file   • Highest education level: Not on file   Tobacco Use   • Smoking status: Former Smoker     Packs/day: 1.00     Types: Cigarettes     Last attempt to quit: 2018     Years since quittin.4   • Smokeless tobacco: Never Used   Substance and Sexual Activity   • Alcohol use: No     Frequency: Never   • Drug use: Yes     Types: Cocaine     Comment: 20 years ago occasional   • Sexual activity: Defer     History reviewed. No pertinent family history.      Allergies:  Hydrocodone; Strawberry; and Zithromax [azithromycin]    Medications:  Prior to Admission medications    Medication Sig Start Date End Date Taking? Authorizing Provider   acetaminophen (TYLENOL) 325 MG tablet Take 650 mg by mouth Every 6 (Six) Hours As Needed for Mild Pain .   Yes Hugh Espana MD   Ascorbic Acid 500 MG capsule Take  by mouth.   Yes ProviderHugh MD   bisacodyl (DULCOLAX) 10 MG suppository Insert 10 mg into the rectum Daily.   Yes Hugh Espana MD   budesonide (PULMICORT) 0.5 MG/2ML nebulizer solution Take 0.5 mg by  nebulization Daily. Via trach 2 times a day for SOA   Yes Hugh Espana MD   chlorhexidine (PERIDEX) 0.12 % solution Apply 15 mL to the mouth or throat 2 (Two) Times a Day. Give 15 mL by mouth two times a day for mouth care   Yes Hugh Espana MD   clonazePAM (KlonoPIN) 1 MG tablet Take 1 mg by mouth 2 (Two) Times a Day As Needed for Seizures.   Yes Hugh Espana MD   cyclobenzaprine (FLEXERIL) 10 MG tablet Take 10 mg by mouth 3 (Three) Times a Day As Needed for Muscle Spasms.   Yes Hugh Espana MD   escitalopram (LEXAPRO) 20 MG tablet Take 20 mg by mouth Daily.   Yes Hugh Espana MD   ferrous sulfate 300 (60 Fe) MG/5ML syrup Take 300 mg by mouth Daily. Give 5 mL verbal by mouth two times a day for anemia   Yes Hugh Espana MD   ferrous sulfate 325 (65 FE) MG tablet Take 325 mg by mouth Daily With Breakfast.   Yes Hugh Espana MD   hydrocortisone 1 % cream Apply  topically to the appropriate area as directed 2 (Two) Times a Day.   Yes Hugh Espana MD   ipratropium-albuterol (DUO-NEB) 0.5-2.5 mg/3 ml nebulizer Take 3 mL by nebulization Every 4 (Four) Hours As Needed for Wheezing.   Yes Hugh Espana MD   magnesium hydroxide (MILK OF MAGNESIA) 400 MG/5ML suspension Take  by mouth Daily As Needed for Constipation.   Yes Hugh Espana MD   metoprolol succinate XL (TOPROL-XL) 100 MG 24 hr tablet Take 100 mg by mouth Daily.   Yes Hugh Espana MD   MULTIPLE VITAMINS-MINERALS PO Take  by mouth.   Yes Hugh Espana MD   mupirocin (BACTROBAN) 2 % ointment Apply  topically to the appropriate area as directed 3 (Three) Times a Day. Apply to nose topically two times a day for redness   Yes Hugh Espana MD   OxyCODONE HCl (OXYCONTIN PO) Take 10 mg by mouth Every 4 (Four) Hours As Needed for Moderate Pain .   Yes Hugh Espana MD   pantoprazole (PROTONIX) 40 MG EC tablet Take 40 mg by mouth Daily.   Yes Malorie  MD Hugh   potassium chloride (KAYCIEL) 20 MEQ/15ML (10%) solution Take  by mouth Daily. Give 7.5 mL by mouth one time a day for supplement   Yes Hugh Espana MD   povidone-iodine (BETADINE) 10 % external solution Apply  topically to the appropriate area as directed As Needed for Wound Care (apply to left 2nd digit topically every day shift for unstageable.).   Yes Hugh Espana MD   povidoneErastoiodine (BETADINE) 10 % external solution Apply  topically to the appropriate area as directed As Needed for Wound Care (apply to right great toe, 2nd, 3rd topically every digit with day shift for unstageable).   Yes Hugh Espana MD   pramipexole (MIRAPEX) 0.25 MG tablet Take 0.25 mg by mouth Every Night.   Yes Hugh Espana MD   Sodium Phosphates (FLEET ENEMA) 7-19 GM/118ML enema Insert  into the rectum 1 (One) Time.   Yes Hugh Espana MD   thiamine (VITAMIN B-1) 100 MG tablet Take 100 mg by mouth Daily.   Yes Hugh Espana MD   traZODone (DESYREL) 50 MG tablet Take 50 mg by mouth Every Night.   Yes Hugh Espana MD   Vancomycin HCl (VANCOCIN) 750 MG/7.5ML injection Infuse  into a venous catheter. Use 750 mg intravenously two times a day for MRSA infection   Yes Hugh Espana MD   White Petrolatum-Mineral Oil (ARTIFICIAL TEARS) 83-15 % ointment ophthalmic ointment 1 application Every 12 (Twelve) Hours.   Yes Hugh Espana MD   oxyCODONE (oxyCONTIN) 10 MG 12 hr tablet Take 10 mg by mouth Every 12 (Twelve) Hours.  12/19/18 Yes Hugh Espana MD   nicotine (NICODERM CQ) 21 MG/24HR patch Place 1 patch on the skin as directed by provider Daily.    Hugh Espana MD       artificial tears 1 application Both Eyes Q12H   budesonide 0.5 mg Nebulization BID - RT   clonazePAM 0.5 mg Oral TID   Followed by      [START ON 1/2/2019] clonazePAM 0.5 mg Oral BID   escitalopram 20 mg Oral Daily   metoprolol tartrate 25 mg Oral Q12H   multivitamin 1  "tablet Oral Daily   nicotine 1 patch Transdermal Q24H   pantoprazole 40 mg Oral Q AM   pramipexole 0.25 mg Oral Nightly   thiamine 100 mg Oral Daily   traZODone 50 mg Oral Nightly   vitamin C 500 mg Oral Daily          Objective     Vital Signs  Vital Sign Min/Max for last 24 hours  Temp  Min: 97 °F (36.1 °C)  Max: 98.1 °F (36.7 °C)   BP  Min: 83/55  Max: 125/72   Pulse  Min: 60  Max: 75   Resp  Min: 16  Max: 20   SpO2  Min: 94 %  Max: 100 %   Flow (L/min)  Min: 2  Max: 2   No Data Recorded       Intake/Output Summary (Last 24 hours) at 12/19/2018 2032  Last data filed at 12/19/2018 1743  Gross per 24 hour   Intake 840 ml   Output 800 ml   Net 40 ml     No intake/output data recorded.  Last Weight and Admission Weight        12/18/18 2000   Weight: 71.8 kg (158 lb 4.8 oz)     Flowsheet Rows      First Filed Value   Admission Height  167.6 cm (66\") Documented at 12/18/2018 1944   Admission Weight  71.8 kg (158 lb 4.8 oz) Documented at 12/18/2018 2000          Body mass index is 25.55 kg/m².           Physical Exam:  General Appearance: Well-developed white female looks older than her stated age in no acute distress  Eyes: Conjunctiva are clear and anicteric  ENT: His membranes are moist no erythema no exudates she has a Mallampati 2 almost 3 airway  Neck: No adenopathy or thyromegaly trachea is midline her tracheostomy site has almost completely closed there is just a small about 2 mm opening there is a small amount of granulation tissue sticking out  Lungs: Clear nonlabored symmetric expansion  Cardiac: Regular rate and rhythm no murmur  Abdomen: Soft nontender no palpable organomegaly or masses left upper quadrant PEG tube type site  : Not examined  Musculoskeletal: Both of her feet are in dressings I did not take these down.  She has some scars and healed incisions in her upper anterior right chest and there is a left upper extremity PICC type catheter site looks okay  Skin: No jaundice no petechiae  Neuro: " Alert oriented pleasant cooperative  Extremities/P Vascular: No clubbing in her upper extremities again I have looked at her distal lower extremities  MSE: Seems to be in pretty good spirits      Labs:  Results from last 7 days   Lab Units  12/19/18   1246  12/19/18   0546   GLUCOSE mg/dL  119*   --    SODIUM mmol/L  140   --    POTASSIUM mmol/L  3.8   --    CO2 mmol/L  26.4   --    CHLORIDE mmol/L  100   --    ANION GAP mmol/L  13.6   --    CREATININE mg/dL  2.39*  2.36*   BUN mg/dL  30*   --    BUN / CREAT RATIO   12.6   --    CALCIUM mg/dL  10.7*   --    EGFR IF NONAFRICN AM mL/min/1.73  21*  21*     Estimated Creatinine Clearance: 26 mL/min (A) (by C-G formula based on SCr of 2.39 mg/dL (H)).      Results from last 7 days   Lab Units  12/19/18   1246   WBC 10*3/mm3  9.07   RBC 10*6/mm3  3.92   HEMOGLOBIN g/dL  10.4*   HEMATOCRIT %  33.0*   MCV fL  84.2   MCH pg  26.5*   MCHC g/dL  31.5*   RDW %  15.7*   RDW-SD fl  48.0   MPV fL  10.8   PLATELETS 10*3/mm3  231   NEUTROPHIL % %  59.7   LYMPHOCYTE % %  26.5   MONOCYTES % %  7.4   EOSINOPHIL % %  6.0   BASOPHIL % %  0.4   IMM GRAN % %  0.1   NEUTROS ABS 10*3/mm3  5.42   LYMPHS ABS 10*3/mm3  2.40   MONOS ABS 10*3/mm3  0.67   EOS ABS 10*3/mm3  0.54   BASOS ABS 10*3/mm3  0.04   IMMATURE GRANS (ABS) 10*3/mm3  0.01         Results from last 7 days   Lab Units  12/19/18   1818   CK TOTAL U/L  29                     Microbiology Results (last 10 days)     Procedure Component Value - Date/Time    Eosinophil Smear - Urine, Urine, Clean Catch [[id number]]  (Normal) Collected:  12/19/18 1747    Lab Status:  Final result Specimen:  Urine, Clean Catch Updated:  12/19/18 1941     Eosinophil Smear 0 % EOS/100 Cells             Diagnostics:  Xr Chest Post Cva Port    Result Date: 12/19/2018  ONE VIEW PORTABLE CHEST AT 6:23 AM  HISTORY: PICC line placement.  FINDINGS:  There has been recent insertion of a left-sided PICC line which ends in the SVC. There is mild cardiomegaly and  slight vascular congestion and no focal infiltrate.  This report was finalized on 12/19/2018 12:48 PM by Dr. Mike Rivas M.D.         Chest x-ray reviewed and I agree with the above      Assessment/Plan     1. Tracheostomy status post dislodgment patient does not want the trach replaced she seems to be doing very well with the trach out the whole his closest significantly there is a small piece of granulation tissue tissue that I hope doesn't prevent complete closure and we'll need to give it a week or 2 to see how she does right now is closing so fast I'm not going occluded but if it doesn't keep closing we may put an occlusive dressing over it.  2. Obstructive sleep apnea by history as she was considerably heavier when she was diagnosed whether she has apnea now or not until the ostomy seals it's not a good time to use but I will check a nocturnal oximetry tonight just to see if there is suggestion of apnea.  3. COPD she's done the best for herself by stopping smoking I'm not sure that inhaled steroids are the best treatment I think long-acting beta agonist or anticholinergic would be better since for right now we will probably need to use a nebulizer I will go with the long-acting beta agonist as they are available.  4. Acute kidney injury per nephrology  5. Oral pharyngeal dysphagia suspect this is improved line osteomyelitis on IV antibiotics  6. Hypertension chronic anemia mild  7. Hypocalcemia mild      Dany Baez MD  12/19/18  8:32 PM    Time:

## 2018-12-20 NOTE — CONSULTS
CONSULT NOTE    Infectious Diseases - Rafa Tidwell MD  [facility]       Patient Identification:  Name: Swetha Luis  Age: 58 y.o.  Sex: female  :  1960  MRN: [id number]             Date of Consultation:  2018       Primary Care Physician: Provider, No Known                               Requesting Physician: Dr. varela   Reason for Consultation: Disseminated MRSA infection    Impression: 58-year-old female with a complicated past medical history including history of severe sepsis requiring hospitalization and admission to ICU with prolonged use of ventilator and pressors in the preceding months with most latest hospitalization from 2018 through 2018 when she was hospitalized with MRSA sepsis associated with right-sided sternoclavicular joint septic arthritis and clavicular osteomyelitis requiring debridement and resection of the first rib and clavicle followed by local wound care and subsequent closure by plastic surgery service.  Because of patient's prolonged hospitalization and being dependent on ventilator patient was given tracheostomy.  Patient was discharged to subacute rehabilitation facility on IV antibiotics via left arm PICC line with instructions to complete antibiotic treatment on 2019.  Her discharge: 2018.  Patient came back to the emergency room.  Because while there she coughed a tracheostomy tube came out.  Patient has been doing well without replacement of tracheostomy in terms of maintaining her oxygen saturation and keeping a conversation.  During her ICU stay at [facility], patient developed digital necrosis involving the feet because of the prolonged need for pressors for sepsis.  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  2-chronic respiratory failure requiring tracheostomy and PEG tube placement dislodgment and able to maintain good oxygen saturation despite lack of tracheostomy management as per pulmonary service  3-COPD with obstructive sleep apnea  4-recent prolonged ICU stay requiring mechanical ventilator and pressors resulting in digital ischemia of the lower extremities  5-history of IV drug abuse  6-acute on chronic renal failure multifactorial including use of vancomycin  7-chronic anemia  8-critical illness and deconditioning      Recommendations/Discussions: At this juncture continue and MRSA treatment as planned by infectious disease physician and surgery service at [facility] with intention to complete the treatment on 1/20/2019.  Vancomycin toxicity is a concern extruding renal insufficiency then empirically switch her to IV daptomycin while in the function is supportive.  Patient would need close follow-up with plastic surgery service, thoracic surgery service and infectious disease service as per their recommendation.        History of Present Illness:   8-year-old female with a complicated past medical history including history of severe sepsis requiring hospitalization and admission to ICU with prolonged use of ventilator and pressors in the preceding months with most latest hospitalization from 11/29/2018 through 12/17/2018 when she was hospitalized with MRSA sepsis associated with right-sided sternoclavicular joint septic arthritis and clavicular osteomyelitis requiring debridement and resection of the first rib and clavicle followed by local wound care and subsequent closure by plastic surgery service.  Because of patient's prolonged hospitalization and being dependent on ventilator patient was given tracheostomy.  Patient was discharged to subacute rehabilitation facility on IV antibiotics via left arm PICC line with instructions to complete antibiotic  treatment on 1/20/2019.  Her discharge: 12/17/2018.  Patient came back to the emergency room.  Because while there she coughed a tracheostomy tube came out.  Patient has been doing well without replacement of tracheostomy in terms of maintaining her oxygen saturation and keeping a conversation.  During her ICU stay at [facility], patient developed digital necrosis involving the feet because of the prolonged need for pressors for sepsis.      Past Medical History:  Past Medical History:   Diagnosis Date   • Acute osteomyelitis (CMS/HCC)     Right shoulder   • COPD (chronic obstructive pulmonary disease) (CMS/HCC)    • Nontraumatic subarachnoid hemorrhage (CMS/HCC)    • Renal disorder     chronic kidney disease   • Tracheostomy present (CMS/[facility])      Past Surgical History:  Past Surgical History:   Procedure Laterality Date   • TRACHEOSTOMY        Home Meds:  Medications Prior to Admission   Medication Sig Dispense Refill Last Dose   • acetaminophen (TYLENOL) 325 MG tablet Take 650 mg by mouth Every 6 (Six) Hours As Needed for Mild Pain .   Past Week at Unknown time   • Ascorbic Acid 500 MG capsule Take  by mouth.   12/17/2018 at Unknown time   • bisacodyl (DULCOLAX) 10 MG suppository Insert 10 mg into the rectum Daily.   Past Month at Unknown time   • budesonide (PULMICORT) 0.5 MG/2ML nebulizer solution Take 0.5 mg by nebulization Daily. Via trach 2 times a day for SOA   12/17/2018 at Unknown time   • chlorhexidine (PERIDEX) 0.12 % solution Apply 15 mL to the mouth or throat 2 (Two) Times a Day. Give 15 mL by mouth two times a day for mouth care   Past Month at Unknown time   • clonazePAM (KlonoPIN) 1 MG tablet Take 1 mg by mouth 2 (Two) Times a Day As Needed for Seizures.   12/17/2018 at Unknown time   • cyclobenzaprine (FLEXERIL) 10 MG tablet Take 10 mg by mouth 3 (Three) Times a Day As Needed for Muscle Spasms.   12/17/2018 at Unknown time   • escitalopram (LEXAPRO) 20 MG tablet Take 20 mg by mouth Daily.    12/17/2018 at Unknown time   • ferrous sulfate 300 (60 Fe) MG/5ML syrup Take 300 mg by mouth Daily. Give 5 mL verbal by mouth two times a day for anemia   Past Month at Unknown time   • ferrous sulfate 325 (65 FE) MG tablet Take 325 mg by mouth Daily With Breakfast.   12/17/2018 at Unknown time   • hydrocortisone 1 % cream Apply  topically to the appropriate area as directed 2 (Two) Times a Day.   Past Month at Unknown time   • ipratropium-albuterol (DUO-NEB) 0.5-2.5 mg/3 ml nebulizer Take 3 mL by nebulization Every 4 (Four) Hours As Needed for Wheezing.   Past Week at Unknown time   • magnesium hydroxide (MILK OF MAGNESIA) 400 MG/5ML suspension Take  by mouth Daily As Needed for Constipation.   Past Month at Unknown time   • metoprolol succinate XL (TOPROL-XL) 100 MG 24 hr tablet Take 100 mg by mouth Daily.   12/17/2018 at Unknown time   • MULTIPLE VITAMINS-MINERALS PO Take  by mouth.   12/17/2018 at Unknown time   • mupirocin (BACTROBAN) 2 % ointment Apply  topically to the appropriate area as directed 3 (Three) Times a Day. Apply to nose topically two times a day for redness   12/17/2018 at Unknown time   • OxyCODONE HCl (OXYCONTIN PO) Take 10 mg by mouth Every 4 (Four) Hours As Needed for Moderate Pain .      • pantoprazole (PROTONIX) 40 MG EC tablet Take 40 mg by mouth Daily.   12/17/2018 at Unknown time   • potassium chloride (KAYCIEL) 20 MEQ/15ML (10%) solution Take  by mouth Daily. Give 7.5 mL by mouth one time a day for supplement   12/17/2018 at Unknown time   • povidone-iodine (BETADINE) 10 % external solution Apply  topically to the appropriate area as directed As Needed for Wound Care (apply to left 2nd digit topically every day shift for unstageable.).   Past Week at Unknown time   • povidone-iodine (BETADINE) 10 % external solution Apply  topically to the appropriate area as directed As Needed for Wound Care (apply to right great toe, 2nd, 3rd topically every digit with day shift for unstageable).    Past Week at Unknown time   • pramipexole (MIRAPEX) 0.25 MG tablet Take 0.25 mg by mouth Every Night.   12/17/2018 at Unknown time   • Sodium Phosphates (FLEET ENEMA) 7-19 GM/118ML enema Insert  into the rectum 1 (One) Time.   Past Month at Unknown time   • thiamine (VITAMIN B-1) 100 MG tablet Take 100 mg by mouth Daily.   Past Week at Unknown time   • traZODone (DESYREL) 50 MG tablet Take 50 mg by mouth Every Night.   12/17/2018 at Unknown time   • Vancomycin HCl (VANCOCIN) 750 MG/7.5ML injection Infuse  into a venous catheter. Use 750 mg intravenously two times a day for MRSA infection   Past Week at Unknown time   • White Petrolatum-Mineral Oil (ARTIFICIAL TEARS) 83-15 % ointment ophthalmic ointment 1 application Every 12 (Twelve) Hours.   Past Week at Unknown time   • nicotine (NICODERM CQ) 21 MG/24HR patch Place 1 patch on the skin as directed by provider Daily.   Unknown at Unknown time     Current Meds:     Current Facility-Administered Medications:   •  acetaminophen (TYLENOL) tablet 650 mg, 650 mg, Oral, Q4H PRN, Jose Antonio Stack MD  •  allopurinol (ZYLOPRIM) tablet 100 mg, 100 mg, Oral, Daily, Jose Antonio Stack MD  •  arformoterol (BROVANA) nebulizer solution 15 mcg, 15 mcg, Nebulization, BID - RT, Dany Baez MD, 15 mcg at 12/20/18 0840  •  artificial tears 83-15 % ophthalmic ointment 1 application, 1 application, Both Eyes, Q12H, Jose Antonio Stack MD  •  atorvastatin (LIPITOR) tablet 40 mg, 40 mg, Oral, Nightly, Jose Antonio Stack MD  •  clonazePAM (KlonoPIN) tablet 0.5 mg, 0.5 mg, Oral, TID, 0.5 mg at 12/20/18 0831 **FOLLOWED BY** [START ON 1/2/2019] clonazePAM (KlonoPIN) tablet 0.5 mg, 0.5 mg, Oral, BID, Jose Antonio Stack MD  •  cyclobenzaprine (FLEXERIL) tablet 10 mg, 10 mg, Oral, TID PRN, Jose Antonio Stack MD  •  escitalopram (LEXAPRO) tablet 20 mg, 20 mg, Oral, Daily, Jose Antonio Stack MD, 20 mg at 12/20/18 0820  •  ipratropium-albuterol (DUO-NEB) nebulizer solution 3 mL, 3 mL, Nebulization, Q4H PRN, Jose Antonio Stack MD,  "3 mL at 185  •  metoprolol tartrate (LOPRESSOR) tablet 12.5 mg, 12.5 mg, Oral, Q12H, Jose Antonio Stack MD  •  multivitamin (THERAGRAN) tablet 1 tablet, 1 tablet, Oral, Daily, Jose Antonio Stack MD, 1 tablet at 18 0820  •  nicotine (NICODERM CQ) 21 MG/24HR patch 1 patch, 1 patch, Transdermal, Q24H, Jose Antonio Stack MD  •  oxyCODONE (ROXICODONE) immediate release tablet 10 mg, 10 mg, Oral, Q4H PRN, Jose Antonio Stack MD, 10 mg at 18 0831  •  pantoprazole (PROTONIX) EC tablet 40 mg, 40 mg, Oral, Q AM, Jose Antonio Stack MD, 40 mg at 18 0506  •  pramipexole (MIRAPEX) tablet 0.25 mg, 0.25 mg, Oral, Nightly, Jose Antonio Stack MD, 0.25 mg at 18  •  sodium chloride 0.9 % with KCl 20 mEq/L infusion, 75 mL/hr, Intravenous, Continuous, Jose Antonio Stack MD  •  thiamine (VITAMIN B-1) tablet 100 mg, 100 mg, Oral, Daily, Jose Antonio Stack MD, 100 mg at 18  •  traZODone (DESYREL) tablet 50 mg, 50 mg, Oral, Nightly, Jose Antonio Stack MD, 50 mg at 18  •  vitamin C (ASCORBIC ACID) tablet 500 mg, 500 mg, Oral, Daily, Jose Antonio Stack MD, 500 mg at 18  Allergies:  Allergies   Allergen Reactions   • Hydrocodone Unknown (See Comments)     unk   • [place] Unknown (See Comments)     unk   • Zithromax [Azithromycin] Unknown (See Comments)     unk     Social History:   Social History     Tobacco Use   • Smoking status: Former Smoker     Packs/day: 1.00     Types: Cigarettes     Last attempt to quit: 2018     Years since quittin.4   • Smokeless tobacco: Never Used   Substance Use Topics   • Alcohol use: No     Frequency: Never      Family History:  History reviewed. No pertinent family history.       Review of Systems  See history of present illness and past medical history.  Remainder of ROS is negative.      Vitals:   /79 (BP Location: Right arm, Patient Position: Sitting)   Pulse 72   Temp 98.2 °F (36.8 °C) (Oral)   Resp 20   Ht 167.6 cm (66\")   Wt 71.8 kg (158 lb 4.8 oz)   SpO2 99%   " BMI 25.55 kg/m²   I/O:     Intake/Output Summary (Last 24 hours) at 12/20/2018 1534  Last data filed at 12/20/2018 0911  Gross per 24 hour   Intake 1600 ml   Output 300 ml   Net 1300 ml     Exam:  General Appearance:    Alert, cooperative, no distress, appears stated age, emotional and crying during conversation.     Head:    Normocephalic, without obvious abnormality, atraumatic   Eyes:    PERRL, conjunctiva/corneas clear, EOM's intact, both eyes   Ears:    Normal external ear canals, both ears   Nose:   Nares normal, septum midline, mucosa normal, no drainage    or sinus tenderness   Throat:   Lips, tongue, gums normal; oral mucosa pink and moist   Neck:   Tracheostomy site appears to be covered    Back:     Symmetric, no curvature, ROM normal, no CVA tenderness   Lungs:     Clear to auscultation bilaterally, respirations unlabored   Chest Wall:    Right upper chest surgical site clean and no cellulitis or drainage noted     Heart:    Regular rate and rhythm, S1 and S2 normal, no murmur, rub   or gallop   Abdomen:     Soft, non-tender, bowel sounds active all four quadrants,     no masses, no hepatomegaly, no splenomegaly   Extremities:   Dry Digital gangrene with no surrounding cellulitis noted    Pulses:   Pulses palpable in all extremities; symmetric all extremities   Skin:   Skin color normal, Skin is warm and dry,  no rashes or palpable lesions       Data Review:    I reviewed the patient's new clinical results.  Results from last 7 days   Lab Units  12/20/18   0437  12/19/18   1246   WBC 10*3/mm3  6.86  9.07   HEMOGLOBIN g/dL  9.7*  10.4*   PLATELETS 10*3/mm3  230  231     Results from last 7 days   Lab Units  12/20/18   0437  12/19/18   1246  12/19/18   0546   SODIUM mmol/L  142  140   --    POTASSIUM mmol/L  3.4*  3.8   --    CHLORIDE mmol/L  104  100   --    CO2 mmol/L  24.5  26.4   --    BUN mg/dL  33*  30*   --    CREATININE mg/dL  2.06*  2.39*  2.36*   CALCIUM mg/dL  10.5  10.7*   --    GLUCOSE mg/dL   92  119*   --      Microbiology Results (last 10 days)     Procedure Component Value - Date/Time    Eosinophil Smear - Urine, Urine, Clean Catch [[id number]]  (Normal) Collected:  12/19/18 1747    Lab Status:  Final result Specimen:  Urine, Clean Catch Updated:  12/19/18 1941     Eosinophil Smear 0 % EOS/100 Cells             Assessment:  Active Hospital Problems    Diagnosis Date Noted   • Gangrene of toe (CMS/[facility]) [I96] 12/20/2018   • Tracheostomy complication (CMS/[facility]) [J95.00] 12/18/2018      Resolved Hospital Problems   No resolved problems to display.         Plan:  See above  Rafa Fowler MD   12/20/2018  3:34 PM    Much of this encounter note is an electronic transcription/translation of spoken language to printed text. The electronic translation of spoken language may permit erroneous, or at times, nonsensical words or phrases to be inadvertently transcribed; Although I have reviewed the note for such errors, some may still exist

## 2018-12-20 NOTE — PLAN OF CARE
Problem: Patient Care Overview  Goal: Plan of Care Review  Outcome: Ongoing (interventions implemented as appropriate)   12/20/18 5523   Coping/Psychosocial   Plan of Care Reviewed With patient;spouse;daughter;sibling   Plan of Care Review   Progress improving   OTHER   Outcome Summary VSS. AAO. Up in chair much of day. Very emotional. Much pain--states has a bad back.. IVFs and anitbiotics continue. Will continue to monitor.       Problem: Fall Risk (Adult)  Goal: Identify Related Risk Factors and Signs and Symptoms  Outcome: Ongoing (interventions implemented as appropriate)      Problem: Pain, Acute (Adult)  Goal: Acceptable Pain Control/Comfort Level  Outcome: Ongoing (interventions implemented as appropriate)

## 2018-12-20 NOTE — NURSING NOTE
Returned call to daughter, Shazia, after speaking to patient to confirm that is okay.  She also has multiple questions about her mother.

## 2018-12-21 ENCOUNTER — APPOINTMENT (OUTPATIENT)
Dept: CARDIOLOGY | Facility: HOSPITAL | Age: 58
End: 2018-12-21
Attending: SURGERY

## 2018-12-21 LAB
ALBUMIN SERPL-MCNC: 3.2 G/DL (ref 3.5–5.2)
ANION GAP SERPL CALCULATED.3IONS-SCNC: 11.8 MMOL/L
BASOPHILS # BLD AUTO: 0.02 10*3/MM3 (ref 0–0.2)
BASOPHILS NFR BLD AUTO: 0.3 % (ref 0–1.5)
BH CV LOWER ARTERIAL LEFT 2ND DIGIT SYS MAX: 122 MMHG
BH CV LOWER ARTERIAL LEFT ABI RATIO: 1
BH CV LOWER ARTERIAL LEFT DORSALIS PEDIS SYS MAX: 139 MMHG
BH CV LOWER ARTERIAL LEFT GREAT TOE SYS MAX: 127 MMHG
BH CV LOWER ARTERIAL LEFT HIGH THIGH SYS MAX: 161 MMHG
BH CV LOWER ARTERIAL LEFT LOW THIGH SYS MAX: 135 MMHG
BH CV LOWER ARTERIAL LEFT POPLITEAL SYS MAX: 142 MMHG
BH CV LOWER ARTERIAL LEFT POST TIBIAL SYS MAX: 122 MMHG
BH CV LOWER ARTERIAL RIGHT ABI RATIO: 1
BH CV LOWER ARTERIAL RIGHT DORSALIS PEDIS SYS MAX: 138 MMHG
BH CV LOWER ARTERIAL RIGHT HIGH THIGH SYS MAX: 157 MMHG
BH CV LOWER ARTERIAL RIGHT LOW THIGH SYS MAX: 158 MMHG
BH CV LOWER ARTERIAL RIGHT POPLITEAL SYS MAX: 121 MMHG
BH CV LOWER ARTERIAL RIGHT POST TIBIAL SYS MAX: 126 MMHG
BUN BLD-MCNC: 28 MG/DL (ref 6–20)
BUN/CREAT SERPL: 13.8 (ref 7–25)
CALCIUM SPEC-SCNC: 10.3 MG/DL (ref 8.6–10.5)
CHLORIDE SERPL-SCNC: 111 MMOL/L (ref 98–107)
CK SERPL-CCNC: 30 U/L (ref 20–180)
CO2 SERPL-SCNC: 22.2 MMOL/L (ref 22–29)
CREAT BLD-MCNC: 2.03 MG/DL (ref 0.57–1)
DEPRECATED RDW RBC AUTO: 49 FL (ref 37–54)
EOSINOPHIL # BLD AUTO: 0.43 10*3/MM3 (ref 0–0.7)
EOSINOPHIL NFR BLD AUTO: 6.6 % (ref 0.3–6.2)
ERYTHROCYTE [DISTWIDTH] IN BLOOD BY AUTOMATED COUNT: 15.7 % (ref 11.7–13)
GFR SERPL CREATININE-BSD FRML MDRD: 25 ML/MIN/1.73
GLUCOSE BLD-MCNC: 123 MG/DL (ref 65–99)
HCT VFR BLD AUTO: 30.1 % (ref 35.6–45.5)
HGB BLD-MCNC: 9.4 G/DL (ref 11.9–15.5)
IMM GRANULOCYTES # BLD AUTO: 0.02 10*3/MM3 (ref 0–0.03)
IMM GRANULOCYTES NFR BLD AUTO: 0.3 % (ref 0–0.5)
LYMPHOCYTES # BLD AUTO: 1.45 10*3/MM3 (ref 0.9–4.8)
LYMPHOCYTES NFR BLD AUTO: 22.2 % (ref 19.6–45.3)
MCH RBC QN AUTO: 26.6 PG (ref 26.9–32)
MCHC RBC AUTO-ENTMCNC: 31.2 G/DL (ref 32.4–36.3)
MCV RBC AUTO: 85 FL (ref 80.5–98.2)
MONOCYTES # BLD AUTO: 0.45 10*3/MM3 (ref 0.2–1.2)
MONOCYTES NFR BLD AUTO: 6.9 % (ref 5–12)
NEUTROPHILS # BLD AUTO: 4.19 10*3/MM3 (ref 1.9–8.1)
NEUTROPHILS NFR BLD AUTO: 64 % (ref 42.7–76)
PHOSPHATE SERPL-MCNC: 3.6 MG/DL (ref 2.5–4.5)
PLATELET # BLD AUTO: 196 10*3/MM3 (ref 140–500)
PMV BLD AUTO: 11 FL (ref 6–12)
POTASSIUM BLD-SCNC: 3.8 MMOL/L (ref 3.5–5.2)
RBC # BLD AUTO: 3.54 10*6/MM3 (ref 3.9–5.2)
SODIUM BLD-SCNC: 145 MMOL/L (ref 136–145)
UPPER ARTERIAL RIGHT ARM BRACHIAL SYS MAX: 137 MMHG
WBC NRBC COR # BLD: 6.54 10*3/MM3 (ref 4.5–10.7)

## 2018-12-21 PROCEDURE — 25010000002 DAPTOMYCIN PER 1 MG: Performed by: INTERNAL MEDICINE

## 2018-12-21 PROCEDURE — 93923 UPR/LXTR ART STDY 3+ LVLS: CPT

## 2018-12-21 PROCEDURE — 82550 ASSAY OF CK (CPK): CPT | Performed by: INTERNAL MEDICINE

## 2018-12-21 PROCEDURE — 94799 UNLISTED PULMONARY SVC/PX: CPT

## 2018-12-21 PROCEDURE — 85025 COMPLETE CBC W/AUTO DIFF WBC: CPT | Performed by: HOSPITALIST

## 2018-12-21 PROCEDURE — 80069 RENAL FUNCTION PANEL: CPT | Performed by: INTERNAL MEDICINE

## 2018-12-21 PROCEDURE — 25810000003 SODIUM CHLORIDE 0.9 % WITH KCL 20 MEQ 20-0.9 MEQ/L-% SOLUTION: Performed by: HOSPITALIST

## 2018-12-21 PROCEDURE — 25010000002 HYDROMORPHONE PER 4 MG: Performed by: HOSPITALIST

## 2018-12-21 RX ORDER — HYDROMORPHONE HYDROCHLORIDE 1 MG/ML
0.5 INJECTION, SOLUTION INTRAMUSCULAR; INTRAVENOUS; SUBCUTANEOUS
Status: DISCONTINUED | OUTPATIENT
Start: 2018-12-21 | End: 2018-12-21

## 2018-12-21 RX ORDER — HYDROMORPHONE HYDROCHLORIDE 1 MG/ML
0.5 INJECTION, SOLUTION INTRAMUSCULAR; INTRAVENOUS; SUBCUTANEOUS EVERY 4 HOURS PRN
Status: DISCONTINUED | OUTPATIENT
Start: 2018-12-21 | End: 2018-12-23

## 2018-12-21 RX ADMIN — CLONAZEPAM 0.5 MG: 0.5 TABLET ORAL at 21:09

## 2018-12-21 RX ADMIN — METOPROLOL TARTRATE 12.5 MG: 25 TABLET ORAL at 08:50

## 2018-12-21 RX ADMIN — DAPTOMYCIN 400 MG: 500 INJECTION, POWDER, LYOPHILIZED, FOR SOLUTION INTRAVENOUS at 19:00

## 2018-12-21 RX ADMIN — POTASSIUM CHLORIDE AND SODIUM CHLORIDE 75 ML/HR: 900; 150 INJECTION, SOLUTION INTRAVENOUS at 05:43

## 2018-12-21 RX ADMIN — IPRATROPIUM BROMIDE AND ALBUTEROL SULFATE 3 ML: 2.5; .5 SOLUTION RESPIRATORY (INHALATION) at 20:05

## 2018-12-21 RX ADMIN — MINERAL OIL AND WHITE PETROLATUM 1 APPLICATION: 150; 830 OINTMENT OPHTHALMIC at 04:46

## 2018-12-21 RX ADMIN — METOPROLOL TARTRATE 12.5 MG: 25 TABLET ORAL at 21:09

## 2018-12-21 RX ADMIN — IPRATROPIUM BROMIDE AND ALBUTEROL SULFATE 3 ML: 2.5; .5 SOLUTION RESPIRATORY (INHALATION) at 03:01

## 2018-12-21 RX ADMIN — ATORVASTATIN CALCIUM 40 MG: 20 TABLET, FILM COATED ORAL at 21:09

## 2018-12-21 RX ADMIN — ARFORMOTEROL TARTRATE 15 MCG: 15 SOLUTION RESPIRATORY (INHALATION) at 23:39

## 2018-12-21 RX ADMIN — CLONAZEPAM 0.5 MG: 0.5 TABLET ORAL at 08:49

## 2018-12-21 RX ADMIN — ALLOPURINOL 100 MG: 100 TABLET ORAL at 08:48

## 2018-12-21 RX ADMIN — HYDROMORPHONE HYDROCHLORIDE 0.5 MG: 1 INJECTION, SOLUTION INTRAMUSCULAR; INTRAVENOUS; SUBCUTANEOUS at 16:39

## 2018-12-21 RX ADMIN — Medication 100 MG: at 08:48

## 2018-12-21 RX ADMIN — MINERAL OIL AND WHITE PETROLATUM 1 APPLICATION: 150; 830 OINTMENT OPHTHALMIC at 16:40

## 2018-12-21 RX ADMIN — OXYCODONE HYDROCHLORIDE 10 MG: 5 TABLET ORAL at 04:45

## 2018-12-21 RX ADMIN — CLONAZEPAM 0.5 MG: 0.5 TABLET ORAL at 16:39

## 2018-12-21 RX ADMIN — HYDROMORPHONE HYDROCHLORIDE 0.5 MG: 1 INJECTION, SOLUTION INTRAMUSCULAR; INTRAVENOUS; SUBCUTANEOUS at 21:07

## 2018-12-21 RX ADMIN — ARFORMOTEROL TARTRATE 15 MCG: 15 SOLUTION RESPIRATORY (INHALATION) at 11:21

## 2018-12-21 RX ADMIN — NICOTINE 1 PATCH: 21 PATCH, EXTENDED RELEASE TRANSDERMAL at 08:48

## 2018-12-21 RX ADMIN — PRAMIPEXOLE DIHYDROCHLORIDE 0.25 MG: 0.25 TABLET ORAL at 21:09

## 2018-12-21 RX ADMIN — ESCITALOPRAM 20 MG: 20 TABLET, FILM COATED ORAL at 08:49

## 2018-12-21 RX ADMIN — Medication 1 TABLET: at 08:48

## 2018-12-21 RX ADMIN — PANTOPRAZOLE SODIUM 40 MG: 40 TABLET, DELAYED RELEASE ORAL at 05:44

## 2018-12-21 RX ADMIN — OXYCODONE HYDROCHLORIDE AND ACETAMINOPHEN 500 MG: 500 TABLET ORAL at 08:49

## 2018-12-21 RX ADMIN — OXYCODONE HYDROCHLORIDE 10 MG: 5 TABLET ORAL at 08:49

## 2018-12-21 RX ADMIN — TRAZODONE HYDROCHLORIDE 50 MG: 50 TABLET ORAL at 21:09

## 2018-12-21 RX ADMIN — HYDROMORPHONE HYDROCHLORIDE 0.5 MG: 1 INJECTION, SOLUTION INTRAMUSCULAR; INTRAVENOUS; SUBCUTANEOUS at 12:34

## 2018-12-21 NOTE — PLAN OF CARE
Problem: Patient Care Overview  Goal: Plan of Care Review  Outcome: Ongoing (interventions implemented as appropriate)   12/21/18 0658   Coping/Psychosocial   Plan of Care Reviewed With patient   Plan of Care Review   Progress improving   OTHER   Outcome Summary VSS, Oxycodone for back pain, IV antibiotics, continue IVF, labs in am, will continue to monitor       Problem: Fall Risk (Adult)  Goal: Absence of Fall  Outcome: Ongoing (interventions implemented as appropriate)      Problem: Pain, Acute (Adult)  Goal: Acceptable Pain Control/Comfort Level  Outcome: Ongoing (interventions implemented as appropriate)      Problem: Skin Injury Risk (Adult)  Goal: Skin Health and Integrity  Outcome: Ongoing (interventions implemented as appropriate)

## 2018-12-21 NOTE — PROGRESS NOTES
LOS: 0 days   Patient Care Team:  Bettye Suarez MD as PCP - General (Internal Medicine)  Provider, No Known as PCP - Family Medicine    Subjective     Patient has no problems breathing but she is complaining and crying that she hurts all over from her feet to the top of her head    Review of Systems:         Objective     Vital Signs  Vital Sign Min/Max for last 24 hours  Temp  Min: 97.9 °F (36.6 °C)  Max: 98.1 °F (36.7 °C)   BP  Min: 107/80  Max: 161/86   Pulse  Min: 68  Max: 100   Resp  Min: 18  Max: 24   SpO2  Min: 93 %  Max: 100 %   Flow (L/min)  Min: 2  Max: 2   No Data Recorded        Ventilator/Non-Invasive Ventilation Settings (From admission, onward)    None                       Body mass index is 25.55 kg/m².  I/O last 3 completed shifts:  In: 2500 [P.O.:600; I.V.:1900]  Out: 2600 [Urine:2600]  I/O this shift:  In: 600 [P.O.:600]  Out: 900 [Urine:900]        Physical Exam:  Is lying in bed crying wanting her pain medication.  Chest: Clear good air movement no wheezes or rales  Neck her tracheal ostomy site has almost completely closed when she coughed I heard a little squeaky air I couldn't feel it       Labs:  Results from last 7 days   Lab Units  12/21/18   0616  12/20/18   0437  12/19/18   1246  12/19/18   0546   GLUCOSE mg/dL  123*  92  119*   --    SODIUM mmol/L  145  142  140   --    POTASSIUM mmol/L  3.8  3.4*  3.8   --    CO2 mmol/L  22.2  24.5  26.4   --    CHLORIDE mmol/L  111*  104  100   --    ANION GAP mmol/L  11.8  13.5  13.6   --    CREATININE mg/dL  2.03*  2.06*  2.39*  2.36*   BUN mg/dL  28*  33*  30*   --    BUN / CREAT RATIO   13.8  16.0  12.6   --    CALCIUM mg/dL  10.3  10.5  10.7*   --    EGFR IF NONAFRICN AM mL/min/1.73  25*  25*  21*  21*   ALK PHOS U/L   --   123*   --    --    TOTAL PROTEIN g/dL   --   6.8   --    --    ALT (SGPT) U/L   --   12   --    --    AST (SGOT) U/L   --   19   --    --    BILIRUBIN mg/dL   --   0.2   --    --    ALBUMIN g/dL  3.20*  3.20*   --     --    GLOBULIN gm/dL   --   3.6   --    --      Estimated Creatinine Clearance: 30.7 mL/min (A) (by C-G formula based on SCr of 2.03 mg/dL (H)).      Results from last 7 days   Lab Units  12/21/18   0616  12/20/18   0437  12/19/18   1246   WBC 10*3/mm3  6.54  6.86  9.07   RBC 10*6/mm3  3.54*  3.69*  3.92   HEMOGLOBIN g/dL  9.4*  9.7*  10.4*   HEMATOCRIT %  30.1*  32.5*  33.0*   MCV fL  85.0  88.1  84.2   MCH pg  26.6*  26.3*  26.5*   MCHC g/dL  31.2*  29.8*  31.5*   RDW %  15.7*  15.8*  15.7*   RDW-SD fl  49.0  51.1  48.0   MPV fL  11.0  11.2  10.8   PLATELETS 10*3/mm3  196  230  231   NEUTROPHIL % %  64.0  58.4  59.7   LYMPHOCYTE % %  22.2  27.8  26.5   MONOCYTES % %  6.9  7.1  7.4   EOSINOPHIL % %  6.6*  6.1  6.0   BASOPHIL % %  0.3  0.6  0.4   IMM GRAN % %  0.3  0.0  0.1   NEUTROS ABS 10*3/mm3  4.19  4.00  5.42   LYMPHS ABS 10*3/mm3  1.45  1.91  2.40   MONOS ABS 10*3/mm3  0.45  0.49  0.67   EOS ABS 10*3/mm3  0.43  0.42  0.54   BASOS ABS 10*3/mm3  0.02  0.04  0.04   IMMATURE GRANS (ABS) 10*3/mm3  0.02  0.00  0.01         Results from last 7 days   Lab Units  12/21/18   0616  12/19/18   1818   CK TOTAL U/L  30  29     Results from last 7 days   Lab Units  12/20/18   0437   PROBNP pg/mL  1,745.0*     Results from last 7 days   Lab Units  12/20/18   0437   TSH mIU/mL  3.210             Microbiology Results (last 10 days)     Procedure Component Value - Date/Time    Eosinophil Smear - Urine, Urine, Clean Catch [[id number]]  (Normal) Collected:  12/19/18 1747    Lab Status:  Final result Specimen:  Urine, Clean Catch Updated:  12/19/18 1941     Eosinophil Smear 0 % EOS/100 Cells                 allopurinol 100 mg Oral Daily   arformoterol 15 mcg Nebulization BID - RT   artificial tears 1 application Both Eyes Q12H   atorvastatin 40 mg Oral Nightly   clonazePAM 0.5 mg Oral TID   Followed by      [START ON 1/2/2019] clonazePAM 0.5 mg Oral BID   DAPTOmycin 6 mg/kg (Adjusted) Intravenous Q24H   escitalopram 20 mg Oral  Daily   metoprolol tartrate 12.5 mg Oral Q12H   multivitamin 1 tablet Oral Daily   nicotine 1 patch Transdermal Q24H   pantoprazole 40 mg Oral Q AM   pramipexole 0.25 mg Oral Nightly   thiamine 100 mg Oral Daily   traZODone 50 mg Oral Nightly   vitamin C 500 mg Oral Daily          Diagnostics:  Us Renal Bilateral    Result Date: 12/19/2018  BILATERAL RENAL ULTRASOUND  CLINICAL HISTORY: Acute renal failure  Transverse and longitudinal images of both kidneys were obtained. There is focal cortical thinning in the polar region of the right kidney consistent with scarring, likely due to previous infection. A tiny cyst is also noted in the upper pole. The right kidney is otherwise unremarkable. There is no hydronephrosis. The right kidney measures 9.8 x 3.4 x 4.1 cm. There are no left kidney is normal in size and shape and shows no hydronephrosis. There is a tiny minimally complex cyst in the upper pole measuring 12 mm in diameter. A tiny 9 mm diameter lower pole cyst is also noted. The left kidney measures 12.0 x 4.4 x 3.8 cm. The urinary bladder could not be imaged due to the presence of Dove catheter.  IMPRESSIONS: No evidence of hydronephrosis. Tiny bilateral renal cysts as noted.  This report was finalized on 12/19/2018 8:51 PM by Dr. Andrea Jones M.D.      Xr Chest Post Cva Port    Result Date: 12/19/2018  ONE VIEW PORTABLE CHEST AT 6:23 AM  HISTORY: PICC line placement.  FINDINGS:  There has been recent insertion of a left-sided PICC line which ends in the SVC. There is mild cardiomegaly and slight vascular congestion and no focal infiltrate.  This report was finalized on 12/19/2018 12:48 PM by Dr. Mike Rivas M.D.               Active Hospital Problems    Diagnosis Date Noted   • Gangrene of toe (CMS/HCC) [I96] 12/20/2018   • Tracheostomy complication (CMS/HCC) [J95.00] 12/18/2018      Resolved Hospital Problems   No resolved problems to display.         Assessment/Plan     1. Trach stoma seems to be healing  very nicely I think we'll be completely close in another couple of days.  I don't think any therapy is necessary if it doesn't close completely in a couple of weeks then we might need to do a little silver nitrate to some granulation tissue.  Really don't even see much granulation tissue   2. Nocturnal hypoxemia she did spend 26 minutes with her oxygen saturations less than 89% last night she needs supplemental O2 with sleep at 2 L.  3. History of sleep apnea with nocturnal desaturation she will need a outpatient sleep study we'll need to wait several weeks at least until her stoma has healed and the patient cannot use a Pap machine until her tracheal stoma has completely healed which may take a couple of weeks or more    Plan for disposition: Pulmonary standpoint she could be discharged any time I have put orders for a sleep study and follow-up in the computer.  Spoke with Lena rizzo [facility] regarding oxygen set up.    Dany Baez MD  12/21/18  3:55 PM    Time:

## 2018-12-21 NOTE — PROGRESS NOTES
"Daily progress note    Chief complaint  Doing better  No new complaints  Wants to go back to nursing home    History of present illness  58-year-old white female with history of COPD chronic pain syndrome who is a nursing home resident who has had tracheostomy and G-tube placement 3 months ago brought to the emergency room when she put her trach out.  Patient is fully alert oriented eating and does not want her trach to be placed back.  Patient denies any shortness of breath.  Patient also wants his G-tube out.  Patient has no other complaint but hurting all over and wants her pain medications.     REVIEW OF SYSTEMS  Review of Systems   Constitutional: Negative for chills and fever.        (+) trach replacement   HENT: Negative for sore throat.         (+) jaw pain   Respiratory: Negative for shortness of breath.    Cardiovascular: Negative for chest pain.   Gastrointestinal: Negative for nausea and vomiting.   Genitourinary: Negative for dysuria.   Musculoskeletal: Positive for neck pain. Negative for back pain.   Skin: Negative for rash.   Neurological: Negative for dizziness.   Psychiatric/Behavioral: The patient is not nervous/anxious.       PHYSICAL EXAM  Blood pressure 161/86, pulse 74, temperature 97.9 °F (36.6 °C), temperature source Oral, resp. rate 18, height 167.6 cm (66\"), weight 71.8 kg (158 lb 4.8 oz), SpO2 97 %.    Constitutional: No distress.   Head: Normocephalic and atraumatic.   Mouth/Throat: Oropharynx is clear and moist.   Eyes: Conjunctivae are normal.   Neck: There is a relatively small trach stoma without drainage or bleeding.    Cardiovascular: Normal rate and regular rhythm.   Pulmonary/Chest: Breath sounds normal. No respiratory distress.   Pt's O2 sats are 100% on 2L. Respirations unlabored    Abdominal: There is no tenderness.  G-tube in place   Musculoskeletal: She exhibits no edema or tenderness.   Neurological: She is alert.   Skin: No rash noted.     LAB RESULTS  Lab Results (last 24 " hours)     Procedure Component Value Units Date/Time    Renal Function Panel [[id number]]  (Abnormal) Collected:  12/21/18 0616    Specimen:  Blood Updated:  12/21/18 0713     Glucose 123 mg/dL      BUN 28 mg/dL      Creatinine 2.03 mg/dL      Sodium 145 mmol/L      Potassium 3.8 mmol/L      Chloride 111 mmol/L      CO2 22.2 mmol/L      Calcium 10.3 mg/dL      Albumin 3.20 g/dL      Phosphorus 3.6 mg/dL      Anion Gap 11.8 mmol/L      BUN/Creatinine Ratio 13.8     eGFR Non African Amer 25 mL/min/1.73     CK [[id number]]  (Normal) Collected:  12/21/18 0616    Specimen:  Blood Updated:  12/21/18 0713     Creatine Kinase 30 U/L     CBC & Differential [[id number]] Collected:  12/21/18 0616    Specimen:  Blood Updated:  12/21/18 0651    Narrative:       The following orders were created for panel order CBC & Differential.  Procedure                               Abnormality         Status                     ---------                               -----------         ------                     CBC Auto Differential[[id number]]        Abnormal            Final result                 Please view results for these tests on the individual orders.    CBC Auto Differential [[id number]]  (Abnormal) Collected:  12/21/18 0616    Specimen:  Blood Updated:  12/21/18 0651     WBC 6.54 10*3/mm3      RBC 3.54 10*6/mm3      Hemoglobin 9.4 g/dL      Hematocrit 30.1 %      MCV 85.0 fL      MCH 26.6 pg      MCHC 31.2 g/dL      RDW 15.7 %      RDW-SD 49.0 fl      MPV 11.0 fL      Platelets 196 10*3/mm3      Neutrophil % 64.0 %      Lymphocyte % 22.2 %      Monocyte % 6.9 %      Eosinophil % 6.6 %      Basophil % 0.3 %      Immature Grans % 0.3 %      Neutrophils, Absolute 4.19 10*3/mm3      Lymphocytes, Absolute 1.45 10*3/mm3      Monocytes, Absolute 0.45 10*3/mm3      Eosinophils, Absolute 0.43 10*3/mm3      Basophils, Absolute 0.02 10*3/mm3      Immature Grans, Absolute 0.02 10*3/mm3         Imaging Results (last 24 hours)     ** No results  found for the last 24 hours. **          Current Facility-Administered Medications:   •  acetaminophen (TYLENOL) tablet 650 mg, 650 mg, Oral, Q4H PRN, Jose Antonio Stack MD, 650 mg at 12/20/18 1609  •  allopurinol (ZYLOPRIM) tablet 100 mg, 100 mg, Oral, Daily, Jose Antonio Stack MD, 100 mg at 12/21/18 0848  •  arformoterol (BROVANA) nebulizer solution 15 mcg, 15 mcg, Nebulization, BID - RT, Dany Baez MD, 15 mcg at 12/21/18 1121  •  artificial tears 83-15 % ophthalmic ointment 1 application, 1 application, Both Eyes, Q12H, Jose Antonio Stack MD, 1 application at 12/21/18 0446  •  atorvastatin (LIPITOR) tablet 40 mg, 40 mg, Oral, Nightly, Jose Antonio Stack MD, 40 mg at 12/20/18 2236  •  clonazePAM (KlonoPIN) tablet 0.5 mg, 0.5 mg, Oral, TID, 0.5 mg at 12/21/18 0849 **FOLLOWED BY** [START ON 1/2/2019] clonazePAM (KlonoPIN) tablet 0.5 mg, 0.5 mg, Oral, BID, Jose Antonio Stack MD  •  cyclobenzaprine (FLEXERIL) tablet 10 mg, 10 mg, Oral, TID PRN, Jose Antonio Stack MD  •  DAPTOmycin (CUBICIN) 400 mg in Sodium chloride 0.9 % 50 mL IVPB, 6 mg/kg (Adjusted), Intravenous, Q24H, Rafa Fowler MD, Stopped at 12/20/18 2316  •  escitalopram (LEXAPRO) tablet 20 mg, 20 mg, Oral, Daily, Jose Antonio Stack MD, 20 mg at 12/21/18 0849  •  HYDROmorphone (DILAUDID) injection 0.5 mg, 0.5 mg, Intravenous, Q4H PRN, Jose Antonio Stack MD, 0.5 mg at 12/21/18 1234  •  ipratropium-albuterol (DUO-NEB) nebulizer solution 3 mL, 3 mL, Nebulization, Q4H PRN, Jose Antonio Stack MD, 3 mL at 12/21/18 0301  •  metoprolol tartrate (LOPRESSOR) tablet 12.5 mg, 12.5 mg, Oral, Q12H, Jose Antonio Stack MD, 12.5 mg at 12/21/18 0850  •  multivitamin (THERAGRAN) tablet 1 tablet, 1 tablet, Oral, Daily, Jose Antonio Stack MD, 1 tablet at 12/21/18 0848  •  nicotine (NICODERM CQ) 21 MG/24HR patch 1 patch, 1 patch, Transdermal, Q24H, Jose Antonio Stack MD, 1 patch at 12/21/18 0848  •  pantoprazole (PROTONIX) EC tablet 40 mg, 40 mg, Oral, Q AM, Jose Antonio Stack MD, 40 mg at 12/21/18 0544  •  pramipexole (MIRAPEX)  tablet 0.25 mg, 0.25 mg, Oral, Nightly, Cherri Stack MD, 0.25 mg at 12/20/18 2237  •  thiamine (VITAMIN B-1) tablet 100 mg, 100 mg, Oral, Daily, Cherri Stack MD, 100 mg at 12/21/18 0848  •  traZODone (DESYREL) tablet 50 mg, 50 mg, Oral, Nightly, Cherri Stack MD, 50 mg at 12/20/18 2237  •  vitamin C (ASCORBIC ACID) tablet 500 mg, 500 mg, Oral, Daily, Cherri Stack MD, 500 mg at 12/21/18 0849     ASSESSMENT  Acute kidney injury  COPD  Hypertension  Gastroesophageal reflux disease  Status post recent respiratory failure  Status post trach which she removed and will close in 1-2 weeks  Status post G-tube placement  Anxiety disorder  Depression  Chronic pain syndrome  Peripheral vascular disease    PLAN  CPM  IV fluid STOP  IV antibiotics per infectious disease  Nephrology consult appreciated  Continue nursing home medication and adjust the doses  Stress ulcer DVT prophylaxis  Supportive care  Discussed with her   Follow closely further recommendation according to hospital course    CHERRI STACK MD

## 2018-12-21 NOTE — PROGRESS NOTES
"  Infectious Diseases Progress Note    Rafa Fowler MD     [facility]  [place]: 0 days  Patient Identification:  Name: Swetha Luis  Age: 58 y.o.  Sex: female  :  1960  MRN: [id number]         Primary Care Physician: Provider, No Known            Subjective: Crying and complaining about when can she go home.  Interval History: See consultation note    Objective:    Scheduled Meds:  allopurinol 100 mg Oral Daily   arformoterol 15 mcg Nebulization BID - RT   artificial tears 1 application Both Eyes Q12H   atorvastatin 40 mg Oral Nightly   clonazePAM 0.5 mg Oral TID   Followed by      [START ON 2019] clonazePAM 0.5 mg Oral BID   DAPTOmycin 6 mg/kg (Adjusted) Intravenous Q24H   escitalopram 20 mg Oral Daily   metoprolol tartrate 12.5 mg Oral Q12H   multivitamin 1 tablet Oral Daily   nicotine 1 patch Transdermal Q24H   pantoprazole 40 mg Oral Q AM   pramipexole 0.25 mg Oral Nightly   thiamine 100 mg Oral Daily   traZODone 50 mg Oral Nightly   vitamin C 500 mg Oral Daily     Continuous Infusions:     Vital signs in last 24 hours:  Temp:  [97.9 °F (36.6 °C)-98.4 °F (36.9 °C)] 98 °F (36.7 °C)  Heart Rate:  [] 78  Resp:  [18-24] 18  BP: (101-142)/(65-91) 142/91    Intake/Output:    Intake/Output Summary (Last 24 hours) at 2018 1125  Last data filed at 2018 1100  Gross per 24 hour   Intake 1500 ml   Output 3200 ml   Net -1700 ml       Exam:  /91 (BP Location: Right arm)   Pulse 78   Temp 98 °F (36.7 °C) (Oral)   Resp 18   Ht 167.6 cm (66\")   Wt 71.8 kg (158 lb 4.8 oz)   SpO2 95%   BMI 25.55 kg/m²     General Appearance:    Alert, cooperative, no distress, AAOx3                          Head:    Normocephalic, without obvious abnormality, atraumatic                           Eyes:    PERRL, conjunctivae/corneas clear, EOM's intact, both eyes                         Throat:   Lips, tongue, gums normal; oral mucosa pink and moist                           Neck:   Excite is no " inflammation                         Lungs:    Clear to auscultation bilaterally, respirations unlabored                 Chest Wall:    Right sternoclavicular joint area surgical site is clean                          Heart:    Regular rate and rhythm, S1 and S2 normal, no murmur, no rub                                         or gallop                  Abdomen:     Soft, non-tender, bowel sounds active, no masses, no                                                        organomegaly                  Extremities:   Extremities normal, atraumatic, no cyanosis or edema                        Pulses:   Pulses palpable in all extremities                            Skin:   Skin is warm and dry,  no rashes or palpable lesions                Data Review:    I reviewed the patient's new clinical results.  Results from last 7 days   Lab Units  12/21/18   0616  12/20/18   0437  12/19/18   1246   WBC 10*3/mm3  6.54  6.86  9.07   HEMOGLOBIN g/dL  9.4*  9.7*  10.4*   PLATELETS 10*3/mm3  196  230  231     Results from last 7 days   Lab Units  12/21/18   0616  12/20/18   0437  12/19/18   1246  12/19/18   0546   SODIUM mmol/L  145  142  140   --    POTASSIUM mmol/L  3.8  3.4*  3.8   --    CHLORIDE mmol/L  111*  104  100   --    CO2 mmol/L  22.2  24.5  26.4   --    BUN mg/dL  28*  33*  30*   --    CREATININE mg/dL  2.03*  2.06*  2.39*  2.36*   CALCIUM mg/dL  10.3  10.5  10.7*   --    GLUCOSE mg/dL  123*  92  119*   --          Assessment:  1-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  2-chronic respiratory failure requiring tracheostomy and PEG tube placement dislodgment and able to maintain good oxygen saturation despite lack of tracheostomy management as per pulmonary  service  3-COPD with obstructive sleep apnea  4-recent prolonged ICU stay requiring mechanical ventilator and pressors resulting in digital ischemia of the lower extremities  5-history of IV drug abuse  6-acute on chronic renal failure multifactorial including use of vancomycin  7-chronic anemia  8-critical illness and deconditioning         Plan:  Continue daptomycin.  Upon reviewing the database from [facility] and it looks like the end of the treatment was supposed to be 1/20/2019.    Rafa Fowler MD  12/21/2018  11:25 AM    Much of this encounter note is an electronic transcription/translation of spoken language to printed text. The electronic translation of spoken language may permit erroneous, or at times, nonsensical words or phrases to be inadvertently transcribed; Although I have reviewed the note for such errors, some may still exist

## 2018-12-21 NOTE — PAYOR COMM NOTE
"UR CONTACT:  OSCAR   P: [phone]         F: [phone]        Neo Spencer (58 y.o. Female)     Date of Birth Social Security Number Address Home Phone MRN    1960  [id number]  [facility] [address] [phone] [id number]    Mosque Marital Status          None        Admission Date Admission Type Admitting Provider Attending Provider Department, Room/Bed    12/18/18 Emergency Jose Antonio Stack MD Ahmed, Aftab, MD [address], [address]    Discharge Date Discharge Disposition Discharge Destination                       Attending Provider:  Jose Antonio Stack MD    Allergies:  Hydrocodone, Strawberry, Zithromax [Azithromycin]    Isolation:  Contact   Infection:  MRSA (12/19/18)   Code Status:  CPR    Ht:  167.6 cm (66\")   Wt:  71.8 kg (158 lb 4.8 oz)    Admission Cmt:  None   Principal Problem:  None                Active Insurance as of 12/18/2018     Primary Coverage     Payor Plan Insurance Group Employer/Plan Group    PASSPORT PAS[employer]ORT MEDICAID     Payor Plan Address Payor Plan Phone Number Payor Plan Fax Number Effective Dates    [address] [phone]  11/1/1997 - None Entered    [facility] [id number]       Subscriber Name Subscriber Birth Date Member ID       NEO SPENCER 1960 [id number]                 Emergency Contacts      (Rel.) Home Phone Work Phone Mobile Phone    MATT SPENCER (Spouse) [phone] -- --    Shazia Spencer (Daughter) -- -- [phone]    Sister, \"Hattie\" (Sister) -- -- [phone]               History & Physical      Jose Antonio Stack MD at 12/19/2018 12:26 PM          History and physical    Primary care physician  Not known    Chief complaint  Patient pulled the trach out and wants it out    History of present illness  58-year-old white female with history of COPD chronic pain syndrome who is a nursing home resident who has had tracheostomy and G-tube placement 3 months ago brought to the emergency room when she put her trach " out.  Patient is fully alert oriented eating and does not want her trach to be placed back.  Patient denies any shortness of breath.  Patient also wants his G-tube out.  Patient has no other complaint but hurting all over and wants her pain medications.    PAST MEDICAL HISTORY  COPD  Hypertension  Anxiety disorder  Depression  Gastroesophageal reflux disease  Chronic pain syndrome  Status post recent respiratory failure  Status post trach and G-tube placement    PAST SURGICAL HISTORY  Surgical History   History reviewed. No pertinent surgical history.     FAMILY HISTORY  History reviewed. No pertinent family history.     SOCIAL HISTORY  Social History               Socioeconomic History   • Marital status:        Spouse name: Not on file   • Number of children: Not on file   • Years of education: Not on file   • Highest education level: Not on file   Social Needs   • Financial resource strain: Not on file   • Food insecurity - worry: Not on file   • Food insecurity - inability: Not on file   • Transportation needs - medical: Not on file   • Transportation needs - non-medical: Not on file   Occupational History   • Not on file   Tobacco Use   • Smoking status: Not on file   Substance and Sexual Activity   • Alcohol use: Not on file   • Drug use: Not on file   • Sexual activity: Not on file   Other Topics Concern   • Not on file   Social History Narrative   • Not on file         ALLERGIES  Hydrocodone; Strawberry; and Zithromax [azithromycin]  Nursing home medications reviewed     REVIEW OF SYSTEMS  Review of Systems   Constitutional: Negative for chills and fever.        (+) trach replacement   HENT: Negative for sore throat.         (+) jaw pain   Respiratory: Negative for shortness of breath.    Cardiovascular: Negative for chest pain.   Gastrointestinal: Negative for nausea and vomiting.   Genitourinary: Negative for dysuria.   Musculoskeletal: Positive for neck pain. Negative for back pain.   Skin:  "Negative for rash.   Neurological: Negative for dizziness.   Psychiatric/Behavioral: The patient is not nervous/anxious.       PHYSICAL EXAM  Blood pressure 97/52, pulse 69, temperature 97 °F (36.1 °C), temperature source Oral, resp. rate 20, height 167.6 cm (66\"), weight 71.8 kg (158 lb 4.8 oz), SpO2 94 %.    Constitutional: No distress.   Head: Normocephalic and atraumatic.   Mouth/Throat: Oropharynx is clear and moist.   Eyes: Conjunctivae are normal.   Neck: There is a relatively small trach stoma without drainage or bleeding.    Cardiovascular: Normal rate and regular rhythm.   Pulmonary/Chest: Breath sounds normal. No respiratory distress.   Pt's O2 sats are 100% on 2L. Respirations unlabored    Abdominal: There is no tenderness.  G-tube in place   Musculoskeletal: She exhibits no edema or tenderness.   Neurological: She is alert.   Skin: No rash noted.     LAB RESULTS  Lab Results (last 24 hours)     Procedure Component Value Units Date/Time    POC Glucose Once [[id number]]  (Abnormal) Collected:  12/19/18 1148    Specimen:  Blood Updated:  12/19/18 1149     Glucose 140 mg/dL     Creatinine, Serum [[id number]]  (Abnormal) Collected:  12/19/18 0546    Specimen:  Blood Updated:  12/19/18 0813     Creatinine 2.36 mg/dL      eGFR Non African Amer 21 mL/min/1.73     Vancomycin, Trough [[id number]]  (Abnormal) Collected:  12/19/18 0546    Specimen:  Blood Updated:  12/19/18 0643     Vancomycin Trough 32.20 mcg/mL         Imaging Results (last 24 hours)     Procedure Component Value Units Date/Time    XR Chest Post CVA Port [[id number]] Collected:  12/19/18 1006     Updated:  12/19/18 1006    Narrative:       ONE VIEW PORTABLE CHEST AT 6:23 AM     HISTORY: PICC line placement.     FINDINGS:  There has been recent insertion of a left-sided PICC line  which ends in the SVC. There is mild cardiomegaly and slight vascular  congestion and no focal infiltrate.                   Current Facility-Administered Medications:   •  " acetaminophen (TYLENOL) tablet 650 mg, 650 mg, Oral, Q4H PRN, Jose Antonio Stack MD  •  artificial tears 83-15 % ophthalmic ointment 1 application, 1 application, Both Eyes, Q12H, Jose Antonio Stack MD  •  budesonide (PULMICORT) nebulizer solution 0.5 mg, 0.5 mg, Nebulization, BID - RT, Jose Antonio Stack MD, 0.5 mg at 12/19/18 0749  •  chlorhexidine (PERIDEX) 0.12 % solution 15 mL, 15 mL, Mouth/Throat, Q12H, Jose Antonio Stack MD, 15 mL at 12/19/18 0846  •  clonazePAM (KlonoPIN) tablet 0.5 mg, 0.5 mg, Oral, TID, 0.5 mg at 12/19/18 0845 **FOLLOWED BY** [START ON 1/2/2019] clonazePAM (KlonoPIN) tablet 0.5 mg, 0.5 mg, Oral, BID, Jose Antonio Stack MD  •  cyclobenzaprine (FLEXERIL) tablet 10 mg, 10 mg, Oral, TID PRN, Jose Antonio Stack MD  •  escitalopram (LEXAPRO) tablet 20 mg, 20 mg, Oral, Daily, Jose Antonio Stack MD, 20 mg at 12/19/18 0845  •  ferrous sulfate tablet 325 mg, 325 mg, Oral, BID With Meals, Jose Antonio Stack MD, 325 mg at 12/19/18 0846  •  ipratropium-albuterol (DUO-NEB) nebulizer solution 3 mL, 3 mL, Nebulization, Q4H PRN, Jose Antonio Stack MD  •  metoprolol tartrate (LOPRESSOR) tablet 100 mg, 100 mg, Oral, Q12H, Jose Antonio Stack MD, 100 mg at 12/19/18 0845  •  multivitamin (THERAGRAN) tablet 1 tablet, 1 tablet, Oral, Daily, Jose Antonio Stack MD, 1 tablet at 12/19/18 0845  •  nicotine (NICODERM CQ) 21 MG/24HR patch 1 patch, 1 patch, Transdermal, Q24H, Jose Antonio Stack MD  •  oxyCODONE (ROXICODONE) immediate release tablet 10 mg, 10 mg, Oral, Q4H PRN, Jose Antonio Stack MD, 10 mg at 12/19/18 1056  •  pantoprazole (PROTONIX) EC tablet 40 mg, 40 mg, Oral, Q AM, Jose Antonio Stack MD, 40 mg at 12/19/18 0702  •  Pharmacy to dose vancomycin, , Does not apply, Continuous PRN, Jose Antonio Stack MD  •  pramipexole (MIRAPEX) tablet 0.25 mg, 0.25 mg, Oral, Nightly, Jose Antonio Stack MD  •  thiamine (VITAMIN B-1) tablet 100 mg, 100 mg, Oral, Daily, Jose Antonio Stack MD, 100 mg at 12/19/18 0846  •  traZODone (DESYREL) tablet 50 mg, 50 mg, Oral, Nightly, Jose Antonio Stack MD  •  Vancomycin  Pharmacy Intermittent Dosing, , Does not apply, Daily, Cherri Stack MD  •  vitamin C (ASCORBIC ACID) tablet 500 mg, 500 mg, Oral, Daily, Cherri Stack MD, 500 mg at 12/19/18 0845     ASSESSMENT  Acute kidney injury  COPD  Hypertension  Gastroesophageal reflux disease  Status post recent respiratory failure  Status post trach which she removed  Status post G-tube placement  Anxiety disorder  Depression  Chronic pain syndrome    PLAN  Admit  IV fluid  Nephrology consult  Pulmonary to follow trach  Continue nursing home medication and adjust the doses  Stress ulcer DVT prophylaxis  Supportive care  Follow closely further recommendation according to hospital course    CHERRI STACK MD            Electronically signed by Cherri Stack MD at 12/19/2018 12:35 PM          Emergency Department Notes      Karissa Villatoro RN at 12/18/2018  7:42 PM        To ER via EMS.  Pt from [facility].  Pt sneezed and trach came out.  Sent in to replace trach.  Pt states trach was a #6. Pt c/o pain to rt jaw into neck.  Pt states they changed her trach yesterday and she has had pain since then.    Electronically signed by Karissa Villatoro RN at 12/18/2018  7:47 PM     Andrea Bone MD at 12/18/2018  8:11 PM           EMERGENCY DEPARTMENT ENCOUNTER    CHIEF COMPLAINT  Chief Complaint: trach replacement  History given by: patient  History limited by: none  Room Number: 13/13  PMD: Provider, No Known      HPI:  Pt is a 58 y.o. female who presents complaining of needing trach replacement. Pt states that she sneezed and the trach came out PTA. Per pt, [facility] replaced her trach yesterday and downsized it to a #6. Pt states since they changed the trach she had right jaw and neck pain that is worsening. Pt states she had had a trach for several months. Pt states that is not actively using the trach and is on oxygen at night.         Duration/Onset/Timing: constant, PTA  Location: neck  Radiation: none  Quality: NA  Intensity/Severity:  moderate  Associated Symptoms: right jaw and neck pain  Aggravating or Alleviating Factors: none  Previous Episodes: none      PAST MEDICAL HISTORY  Active Ambulatory Problems     Diagnosis Date Noted   • No Active Ambulatory Problems     Resolved Ambulatory Problems     Diagnosis Date Noted   • No Resolved Ambulatory Problems     No Additional Past Medical History       PAST SURGICAL HISTORY  History reviewed. No pertinent surgical history.    FAMILY HISTORY  History reviewed. No pertinent family history.    SOCIAL HISTORY  Social History     Socioeconomic History   • Marital status:      Spouse name: Not on file   • Number of children: Not on file   • Years of education: Not on file   • Highest education level: Not on file   Social Needs   • Financial resource strain: Not on file   • Food insecurity - worry: Not on file   • Food insecurity - inability: Not on file   • Transportation needs - medical: Not on file   • Transportation needs - non-medical: Not on file   Occupational History   • Not on file   Tobacco Use   • Smoking status: Not on file   Substance and Sexual Activity   • Alcohol use: Not on file   • Drug use: Not on file   • Sexual activity: Not on file   Other Topics Concern   • Not on file   Social History Narrative   • Not on file       ALLERGIES  Hydrocodone; Strawberry; and Zithromax [azithromycin]    REVIEW OF SYSTEMS  Review of Systems   Constitutional: Negative for chills and fever.        (+) trach replacement   HENT: Negative for sore throat.         (+) jaw pain   Respiratory: Negative for shortness of breath.    Cardiovascular: Negative for chest pain.   Gastrointestinal: Negative for nausea and vomiting.   Genitourinary: Negative for dysuria.   Musculoskeletal: Positive for neck pain. Negative for back pain.   Skin: Negative for rash.   Neurological: Negative for dizziness.   Psychiatric/Behavioral: The patient is not nervous/anxious.        PHYSICAL EXAM  ED Triage Vitals [12/18/18  1944]   Temp Heart Rate Resp BP SpO2   98.6 °F (37 °C) 65 18 100/63 100 %      Temp src Heart Rate Source Patient Position BP Location FiO2 (%)   Tympanic -- -- -- --       Physical Exam   Constitutional: No distress.   HENT:   Head: Normocephalic and atraumatic.   Mouth/Throat: Oropharynx is clear and moist.   Eyes: Conjunctivae are normal.   Neck:   There is a relatively small trach stoma without drainage or bleeding.    Cardiovascular: Normal rate and regular rhythm.   Pulmonary/Chest: Breath sounds normal. No respiratory distress.   Pt's O2 sats are 100% on 2L.   Respirations unlabored    Abdominal: There is no tenderness.   Musculoskeletal: She exhibits no edema or tenderness.   Neurological: She is alert.   Skin: No rash noted.   Nursing note and vitals reviewed.      LAB RESULTS  Lab Results (last 24 hours)     ** No results found for the last 24 hours. **        .       PROCEDURES  Procedures      PROGRESS AND CONSULTS     2019-Placed call to pulmonology for consult.     2033-Discussed pt case with  (pulmonology) who ask I transfer pt [facility] for overnight observation wihout trach in place.He was fearful pt would become hypoxic without trach in place. I will place call for transfer.     2038-Placed call to  (PCP) for consult. Dr Suarez answered and said she cannot admit to [facility].  She said she would text Dr HSU.    2230 - Still no answer and have called [facility] multiple times over the last hour without response.  Will contact Dr Stack about possible admission for further eval,    2300-Dr Stack will admit.  Pt watching TV in NAD.  Sats upper 90s on 2L n/c.        MEDICAL DECISION MAKING  Results were reviewed/discussed with the patient and they were also made aware of online access. Pt also made aware that some labs, such as cultures, will not be resulted during ER visit and follow up with PMD is necessary.     MDM  Number of Diagnoses or Management Options     Amount and/or  Complexity of Data Reviewed  Decide to obtain previous medical records or to obtain history from someone other than the patient: yes  Review and summarize past medical records: yes (Reviewed recent hospitalization of [facility]. )  Discuss the patient with other providers: yes ( (pulmonology))           DIAGNOSIS  Final diagnoses:   Tracheostomy complication, unspecified complication type (CMS/HCC)       DISPOSITION  ADMISSION    Discussed treatment plan and reason for admission with pt/family and admitting physician.  Pt/family voiced understanding of the plan for admission for further testing/treatment as needed.         Latest Documented Vital Signs:  As of 11:00 PM  BP- 100/71 HR- 64 Temp- 98.6 °F (37 °C) (Tympanic) O2 sat- 98%    --  Documentation assistance provided by emilia Em for .  Information recorded by the scribe was done at my direction and has been verified and validated by me.     Savi Em  12/18/18 2217       Andrea Bone MD  12/18/18 2315      Electronically signed by Andrea Bone MD at 12/18/2018 11:15 PM     Deepak Robles RN at 12/18/2018 10:14 PM        Pt resting in bed comfortably.   Pt asking for something to eat and drink.   RN will ask Dr. Bone.      Deepak Robles RN  12/18/18 2214      Electronically signed by Deepak Robles RN at 12/18/2018 10:14 PM     Deepak Robles RN at 12/18/2018 10:32 PM        RN gave patient a box lunch and a mallory mist.      Deepak Robles RN  12/18/18 2232      Electronically signed by Deepak Robles RN at 12/18/2018 10:32 PM       ICU Vital Signs     Row Name 12/21/18 1125 12/21/18 1121 12/21/18 0848 12/21/18 0301 12/21/18 0205       Vitals    Temp  --  --  98 °F (36.7 °C)  --  --    Temp src  --  --  Oral  --  --    Pulse  71  78  100  92  --    Heart Rate Source  Monitor  Monitor  Monitor  Monitor  --    Resp  18  18  20  24  --    Resp Rate Source  Visual  Visual  Visual  Visual  --    BP  --  --   "142/91  --  --    BP Location  --  --  Right arm  --  --       Oxygen Therapy    SpO2  96 %  95 %  95 %  93 %  --    Pulse Oximetry Type  --  Continuous  Continuous  Continuous  --    Device (Oxygen Therapy)  --  room air  room air  nasal cannula  nasal cannula    Flow (L/min)  --  --  --  2  2     Lines, Drains & Airways    Active LDAs     Name:   Placement date:   Placement time:   Site:   Days:    PICC Double Lumen Right Brachial   --    --    Brachial            Inactive LDAs     Name:   Placement date:   Placement time:   Removal date:   Removal time:   Site:   Days:    [REMOVED] Surgical Airway Other (Comment)   --    --    12/18/18    --    --                   Hospital Medications (active)       Dose Frequency Start End    acetaminophen (TYLENOL) tablet 650 mg 650 mg Every 4 Hours PRN 12/19/2018     Sig - Route: Take 2 tablets by mouth Every 4 (Four) Hours As Needed for Mild Pain . - Oral    allopurinol (ZYLOPRIM) tablet 100 mg 100 mg Daily 12/20/2018     Sig - Route: Take 1 tablet by mouth Daily. - Oral    arformoterol (BROVANA) nebulizer solution 15 mcg 15 mcg 2 Times Daily - RT 12/19/2018     Sig - Route: Take 2 mL by nebulization 2 (Two) Times a Day. - Nebulization    artificial tears 83-15 % ophthalmic ointment 1 application 1 application Every 12 Hours 12/19/2018     Sig - Route: Administer 1 application to both eyes Every 12 (Twelve) Hours. - Both Eyes    atorvastatin (LIPITOR) tablet 40 mg 40 mg Nightly 12/20/2018     Sig - Route: Take 2 tablets by mouth Every Night. - Oral    clonazePAM (KlonoPIN) tablet 0.5 mg 0.5 mg 3 Times Daily 12/19/2018 1/2/2019    Sig - Route: Take 1 tablet by mouth 3 (Three) Times a Day. - Oral    Linked Group 1:  \"Followed by\" Linked Group Details        clonazePAM (KlonoPIN) tablet 0.5 mg 0.5 mg 2 Times Daily 1/2/2019     Sig - Route: Take 1 tablet by mouth 2 (Two) Times a Day. - Oral    Linked Group 1:  \"Followed by\" Linked Group Details        cyclobenzaprine (FLEXERIL) " tablet 10 mg 10 mg 3 Times Daily PRN 12/19/2018     Sig - Route: Take 1 tablet by mouth 3 (Three) Times a Day As Needed for Muscle Spasms (pain). - Oral    DAPTOmycin (CUBICIN) 400 mg in Sodium chloride 0.9 % 50 mL IVPB 6 mg/kg × 64.3 kg (Adjusted) Every 24 Hours 12/20/2018 1/19/2019    Sig - Route: Infuse 400 mg into a venous catheter Daily. - Intravenous    escitalopram (LEXAPRO) tablet 20 mg 20 mg Daily 12/19/2018     Sig - Route: Take 1 tablet by mouth Daily. - Oral    ipratropium-albuterol (DUO-NEB) nebulizer solution 3 mL 3 mL Every 4 Hours PRN 12/19/2018     Sig - Route: Take 3 mL by nebulization Every 4 (Four) Hours As Needed for Shortness of Air. - Nebulization    metoprolol tartrate (LOPRESSOR) tablet 12.5 mg 12.5 mg Every 12 Hours Scheduled 12/20/2018     Sig - Route: Take 0.5 tablets by mouth Every 12 (Twelve) Hours. - Oral    multivitamin (THERAGRAN) tablet 1 tablet 1 tablet Daily 12/19/2018     Sig - Route: Take 1 tablet by mouth Daily. - Oral    nicotine (NICODERM CQ) 21 MG/24HR patch 1 patch 1 patch Every 24 Hours Scheduled 12/19/2018     Sig - Route: Place 1 patch on the skin as directed by provider Daily. - Transdermal    oxyCODONE (ROXICODONE) immediate release tablet 10 mg 10 mg Every 4 Hours PRN 12/19/2018 12/29/2018    Sig - Route: Take 2 tablets by mouth Every 4 (Four) Hours As Needed for Moderate Pain . - Oral    pantoprazole (PROTONIX) EC tablet 40 mg 40 mg Every Early Morning 12/19/2018     Sig - Route: Take 1 tablet by mouth Every Morning. - Oral    pramipexole (MIRAPEX) tablet 0.25 mg 0.25 mg Nightly 12/19/2018     Sig - Route: Take 1 tablet by mouth Every Night. - Oral    thiamine (VITAMIN B-1) tablet 100 mg 100 mg Daily 12/19/2018     Sig - Route: Take 1 tablet by mouth Daily. - Oral    traZODone (DESYREL) tablet 50 mg 50 mg Nightly 12/19/2018     Sig - Route: Take 1 tablet by mouth Every Night. - Oral    vitamin C (ASCORBIC ACID) tablet 500 mg 500 mg Daily 12/19/2018     Sig - Route:  "Take 1 tablet by mouth Daily. - Oral          Physician Progress Notes      Rafa Fowler MD at 2018 11:25 AM            Infectious Diseases Progress Note    Rafa Fowler MD     [facility]  [place]: 0 days  Patient Identification:  Name: Swetha Luis  Age: 58 y.o.  Sex: female  :  1960  MRN: [id number]         Primary Care Physician: Provider, No Known            Subjective: Crying and complaining about when can she go home.  Interval History: See consultation note    Objective:    Scheduled Meds:  allopurinol 100 mg Oral Daily   arformoterol 15 mcg Nebulization BID - RT   artificial tears 1 application Both Eyes Q12H   atorvastatin 40 mg Oral Nightly   clonazePAM 0.5 mg Oral TID   Followed by      [START ON 2019] clonazePAM 0.5 mg Oral BID   DAPTOmycin 6 mg/kg (Adjusted) Intravenous Q24H   escitalopram 20 mg Oral Daily   metoprolol tartrate 12.5 mg Oral Q12H   multivitamin 1 tablet Oral Daily   nicotine 1 patch Transdermal Q24H   pantoprazole 40 mg Oral Q AM   pramipexole 0.25 mg Oral Nightly   thiamine 100 mg Oral Daily   traZODone 50 mg Oral Nightly   vitamin C 500 mg Oral Daily     Continuous Infusions:     Vital signs in last 24 hours:  Temp:  [97.9 °F (36.6 °C)-98.4 °F (36.9 °C)] 98 °F (36.7 °C)  Heart Rate:  [] 78  Resp:  [18-24] 18  BP: (101-142)/(65-91) 142/91    Intake/Output:    Intake/Output Summary (Last 24 hours) at 2018 1125  Last data filed at 2018 1100  Gross per 24 hour   Intake 1500 ml   Output 3200 ml   Net -1700 ml       Exam:  /91 (BP Location: Right arm)   Pulse 78   Temp 98 °F (36.7 °C) (Oral)   Resp 18   Ht 167.6 cm (66\")   Wt 71.8 kg (158 lb 4.8 oz)   SpO2 95%   BMI 25.55 kg/m²      General Appearance:    Alert, cooperative, no distress, AAOx3                          Head:    Normocephalic, without obvious abnormality, atraumatic                           Eyes:    PERRL, conjunctivae/corneas clear, EOM's intact, both eyes           "               Throat:   Lips, tongue, gums normal; oral mucosa pink and moist                           Neck:   Excite is no inflammation                         Lungs:    Clear to auscultation bilaterally, respirations unlabored                 Chest Wall:    Right sternoclavicular joint area surgical site is clean                          Heart:    Regular rate and rhythm, S1 and S2 normal, no murmur, no rub                                         or gallop                  Abdomen:     Soft, non-tender, bowel sounds active, no masses, no                                                        organomegaly                  Extremities:   Extremities normal, atraumatic, no cyanosis or edema                        Pulses:   Pulses palpable in all extremities                            Skin:   Skin is warm and dry,  no rashes or palpable lesions                Data Review:    I reviewed the patient's new clinical results.  Results from last 7 days   Lab Units  12/21/18   0616  12/20/18   0437  12/19/18   1246   WBC 10*3/mm3  6.54  6.86  9.07   HEMOGLOBIN g/dL  9.4*  9.7*  10.4*   PLATELETS 10*3/mm3  196  230  231     Results from last 7 days   Lab Units  12/21/18   0616  12/20/18   0437  12/19/18   1246  12/19/18   0546   SODIUM mmol/L  145  142  140   --    POTASSIUM mmol/L  3.8  3.4*  3.8   --    CHLORIDE mmol/L  111*  104  100   --    CO2 mmol/L  22.2  24.5  26.4   --    BUN mg/dL  28*  33*  30*   --    CREATININE mg/dL  2.03*  2.06*  2.39*  2.36*   CALCIUM mg/dL  10.3  10.5  10.7*   --    GLUCOSE mg/dL  123*  92  119*   --          Assessment:  1-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  2-chronic respiratory failure requiring tracheostomy and PEG tube  placement dislodgment and able to maintain good oxygen saturation despite lack of tracheostomy management as per pulmonary service  3-COPD with obstructive sleep apnea  4-recent prolonged ICU stay requiring mechanical ventilator and pressors resulting in digital ischemia of the lower extremities  5-history of IV drug abuse  6-acute on chronic renal failure multifactorial including use of vancomycin  7-chronic anemia  8-critical illness and deconditioning         Plan:  Continue daptomycin.  Upon reviewing the database from [facility] and it looks like the end of the treatment was supposed to be 1/20/2019.    Rafa Fowler MD  12/21/2018  11:25 AM    Much of this encounter note is an electronic transcription/translation of spoken language to printed text. The electronic translation of spoken language may permit erroneous, or at times, nonsensical words or phrases to be inadvertently transcribed; Although I have reviewed the note for such errors, some may still exist      Electronically signed by Rafa Fowler MD at 12/21/2018 11:28 AM     Dany Baez MD at 12/20/2018  7:20 PM                   LOS: 0 days   Patient Care Team:  Provider, No Known as PCP - General  Provider, No Known as PCP - Family Medicine    Subjective     Breathing is doing well the only time she's had any air, out of her trach stoma today was when she was bearing down to have a bowel movement.  No difficulties with her breathing.    Review of Systems:         Objective     Vital Signs  Vital Sign Min/Max for last 24 hours  Temp  Min: 97.4 °F (36.3 °C)  Max: 98.4 °F (36.9 °C)   BP  Min: 101/79  Max: 117/65   Pulse  Min: 67  Max: 78   Resp  Min: 18  Max: 20   SpO2  Min: 92 %  Max: 99 %   Flow (L/min)  Min: 2  Max: 2   No Data Recorded        Ventilator/Non-Invasive Ventilation Settings (From admission, onward)    None                       Body mass index is 25.55 kg/m².  I/O last 3 completed shifts:  In: 2440 [P.O.:1440;  I.V.:1000]  Out: 300 [Urine:300]  No intake/output data recorded.        Physical Exam:  Patient doesn't appear in any distress resting comfortably in bed.  Chest: Clear good air movement no wheezes or rales  Neck her tracheal ostomy site has almost completely closed and see no hole in when I had her cough I could feel no air coming from her stoma.       Labs:  Results from last 7 days   Lab Units  12/20/18   0437  12/19/18   1246  12/19/18   0546   GLUCOSE mg/dL  92  119*   --    SODIUM mmol/L  142  140   --    POTASSIUM mmol/L  3.4*  3.8   --    CO2 mmol/L  24.5  26.4   --    CHLORIDE mmol/L  104  100   --    ANION GAP mmol/L  13.5  13.6   --    CREATININE mg/dL  2.06*  2.39*  2.36*   BUN mg/dL  33*  30*   --    BUN / CREAT RATIO   16.0  12.6   --    CALCIUM mg/dL  10.5  10.7*   --    EGFR IF NONAFRICN AM mL/min/1.73  25*  21*  21*   ALK PHOS U/L  123*   --    --    TOTAL PROTEIN g/dL  6.8   --    --    ALT (SGPT) U/L  12   --    --    AST (SGOT) U/L  19   --    --    BILIRUBIN mg/dL  0.2   --    --    ALBUMIN g/dL  3.20*   --    --    GLOBULIN gm/dL  3.6   --    --      Estimated Creatinine Clearance: 30.2 mL/min (A) (by C-G formula based on SCr of 2.06 mg/dL (H)).      Results from last 7 days   Lab Units  12/20/18   0437  12/19/18   1246   WBC 10*3/mm3  6.86  9.07   RBC 10*6/mm3  3.69*  3.92   HEMOGLOBIN g/dL  9.7*  10.4*   HEMATOCRIT %  32.5*  33.0*   MCV fL  88.1  84.2   MCH pg  26.3*  26.5*   MCHC g/dL  29.8*  31.5*   RDW %  15.8*  15.7*   RDW-SD fl  51.1  48.0   MPV fL  11.2  10.8   PLATELETS 10*3/mm3  230  231   NEUTROPHIL % %  58.4  59.7   LYMPHOCYTE % %  27.8  26.5   MONOCYTES % %  7.1  7.4   EOSINOPHIL % %  6.1  6.0   BASOPHIL % %  0.6  0.4   IMM GRAN % %  0.0  0.1   NEUTROS ABS 10*3/mm3  4.00  5.42   LYMPHS ABS 10*3/mm3  1.91  2.40   MONOS ABS 10*3/mm3  0.49  0.67   EOS ABS 10*3/mm3  0.42  0.54   BASOS ABS 10*3/mm3  0.04  0.04   IMMATURE GRANS (ABS) 10*3/mm3  0.00  0.01         Results from last 7 days    Lab Units  12/19/18   1818   CK TOTAL U/L  29     Results from last 7 days   Lab Units  12/20/18   0437   PROBNP pg/mL  1,745.0*     Results from last 7 days   Lab Units  12/20/18   0437   TSH mIU/mL  3.210             Microbiology Results (last 10 days)     Procedure Component Value - Date/Time    Eosinophil Smear - Urine, Urine, Clean Catch [[id number]]  (Normal) Collected:  12/19/18 1747    Lab Status:  Final result Specimen:  Urine, Clean Catch Updated:  12/19/18 1941     Eosinophil Smear 0 % EOS/100 Cells                 allopurinol 100 mg Oral Daily   arformoterol 15 mcg Nebulization BID - RT   artificial tears 1 application Both Eyes Q12H   atorvastatin 40 mg Oral Nightly   clonazePAM 0.5 mg Oral TID   Followed by      [START ON 1/2/2019] clonazePAM 0.5 mg Oral BID   DAPTOmycin 6 mg/kg (Adjusted) Intravenous Q24H   escitalopram 20 mg Oral Daily   metoprolol tartrate 12.5 mg Oral Q12H   multivitamin 1 tablet Oral Daily   nicotine 1 patch Transdermal Q24H   pantoprazole 40 mg Oral Q AM   pramipexole 0.25 mg Oral Nightly   thiamine 100 mg Oral Daily   traZODone 50 mg Oral Nightly   vitamin C 500 mg Oral Daily       sodium chloride 0.9 % with KCl 20 mEq 75 mL/hr Last Rate: 75 mL/hr (12/20/18 1800)       Diagnostics:  Us Renal Bilateral    Result Date: 12/19/2018  BILATERAL RENAL ULTRASOUND  CLINICAL HISTORY: Acute renal failure  Transverse and longitudinal images of both kidneys were obtained. There is focal cortical thinning in the polar region of the right kidney consistent with scarring, likely due to previous infection. A tiny cyst is also noted in the upper pole. The right kidney is otherwise unremarkable. There is no hydronephrosis. The right kidney measures 9.8 x 3.4 x 4.1 cm. There are no left kidney is normal in size and shape and shows no hydronephrosis. There is a tiny minimally complex cyst in the upper pole measuring 12 mm in diameter. A tiny 9 mm diameter lower pole cyst is also noted. The left  kidney measures 12.0 x 4.4 x 3.8 cm. The urinary bladder could not be imaged due to the presence of Dove catheter.  IMPRESSIONS: No evidence of hydronephrosis. Tiny bilateral renal cysts as noted.  This report was finalized on 12/19/2018 8:51 PM by Dr. Andrea Jones M.D.      Xr Chest Post Cva Port    Result Date: 12/19/2018  ONE VIEW PORTABLE CHEST AT 6:23 AM  HISTORY: PICC line placement.  FINDINGS:  There has been recent insertion of a left-sided PICC line which ends in the SVC. There is mild cardiomegaly and slight vascular congestion and no focal infiltrate.  This report was finalized on 12/19/2018 12:48 PM by Dr. Mike Rivas M.D.               Active Hospital Problems    Diagnosis Date Noted   • Gangrene of toe (CMS/[facility]) [I96] 12/20/2018   • Tracheostomy complication (CMS/[facility]) [J95.00] 12/18/2018      Resolved Hospital Problems   No resolved problems to display.         Assessment/Plan     1. Trach stoma seems to be healing very nicely I think we'll be completely close in another couple of days.  I don't think any therapy is necessary if it doesn't close completely in a couple of weeks then we might need to do a little silver nitrate to some granulation tissue.  Really don't even see much granulation tissue today  2. Nocturnal hypoxemia she did spend 26 minutes with her oxygen saturations less than 89% last night she needs supplemental O2 with sleep at 2 L.  3. History of sleep apnea with nocturnal desaturation she will need a outpatient sleep study we'll need to wait several weeks at least until her stoma has healed    Plan for disposition: Pulmonary standpoint she could be discharged any time I have put orders for a sleep study and follow-up in the computer    Dany Baez MD  12/20/18  7:20 PM    Time:       Electronically signed by Dany Baez MD at 12/20/2018  8:02 PM     Eliseo Davis MD at 12/20/2018  3:13 PM             LOS: 0 days   Patient Care Team:  Provider, No Known  "as PCP - General  Provider, No Known as PCP - Family Medicine    Chief Complaint/ Reason for encounter: Acute renal failure, hypokalemia        Subjective     Medical history reviewed:  History of Present Illness    Subjective:  Symptoms:  Improved.  She reports weakness.  No shortness of breath or chest pain.  (No new complaints today, feels somewhat better).    Diet:  Adequate intake.    Activity level: Returning to normal.    Pain:  She reports no pain.          History taken from: Patient and chart    Objective     Vital Signs  Temp:  [97.4 °F (36.3 °C)-98.2 °F (36.8 °C)] 98.2 °F (36.8 °C)  Heart Rate:  [67-78] 72  Resp:  [16-20] 20  BP: ()/(55-79) 101/79       Wt Readings from Last 1 Encounters:   12/18/18 2000 71.8 kg (158 lb 4.8 oz)       Objective:  General Appearance:  Comfortable, well-appearing, in no acute distress and not in pain.    Vital signs: (most recent): Blood pressure 101/79, pulse 72, temperature 98.2 °F (36.8 °C), temperature source Oral, resp. rate 20, height 167.6 cm (66\"), weight 71.8 kg (158 lb 4.8 oz), SpO2 99 %.  Vital signs are normal.  No fever.    Output: Producing urine.    HEENT: Normal HEENT exam.  (Right upper chest incision dressed)    Lungs:  Normal effort and normal respiratory rate.  Breath sounds clear to auscultation.  No rales or rhonchi.    Heart: Normal rate.  Regular rhythm.  S1 normal.    Abdomen: Abdomen is soft.  Bowel sounds are normal.   There is no abdominal tenderness.     Extremities: Normal range of motion.  There is deformity.  There is no dependent edema.  (Right foot bandaged)  Neurological: Patient is alert and oriented to person, place and time.    Skin:  Warm and dry.  No rash or cyanosis.             Results Review:    Intake/Output:     Intake/Output Summary (Last 24 hours) at 12/20/2018 1513  Last data filed at 12/20/2018 0911  Gross per 24 hour   Intake 1600 ml   Output 300 ml   Net 1300 ml         DATA:  Radiology and Labs:  The following labs " independently reviewed by me. Additional labs ordered for tomorrow a.m.  Interval notes, chart personally reviewed by me.   Old records independently reviewed showing previous records from [facility] reviewed showing renal failure likely due to antibiotics versus sepsis  The following radiologic studies independently viewed by me, findings renal ultrasound no sign of hydronephrosis, bilateral small cysts  New problems include hypokalemia  Discussed with the patient herself      Risk/ complexity of medical care/ medical decision making high given the severity of renal failure and need for close monitoring of labs      Labs:   Recent Results (from the past 24 hour(s))   Chloride, Urine, Random -    Collection Time: 12/19/18  5:47 PM   Result Value Ref Range    Chloride, Urine <20 mmol/L   Creatinine, Urine, Random -    Collection Time: 12/19/18  5:47 PM   Result Value Ref Range    Creatinine, Urine 99.6 mg/dL   Eosinophil Smear - Urine, Urine, Clean Catch    Collection Time: 12/19/18  5:47 PM   Result Value Ref Range    Eosinophil Smear 0 0 - 0 % EOS/100 Cells   Urinalysis With Microscopic If Indicated (No Culture) -    Collection Time: 12/19/18  5:47 PM   Result Value Ref Range    Color, UA Yellow Yellow, Straw    Appearance, UA Clear Clear    pH, UA 5.5 5.0 - 8.0    Specific Gravity, UA 1.019 1.005 - 1.030    Glucose, UA Negative Negative    Ketones, UA Negative Negative    Bilirubin, UA Negative Negative    Blood, UA Negative Negative    Protein, UA 30 mg/dL (1+) (A) Negative    Leuk Esterase, UA Moderate (2+) (A) Negative    Nitrite, UA Negative Negative    Urobilinogen, UA 0.2 E.U./dL 0.2 - 1.0 E.U./dL   Sodium, Urine, Random -    Collection Time: 12/19/18  5:47 PM   Result Value Ref Range    Sodium, Urine 23 mmol/L   Protein, Urine, Random -    Collection Time: 12/19/18  5:47 PM   Result Value Ref Range    Total Protein, Urine 40.0 mg/dL   Urinalysis, Microscopic Only - Urine, Clean Catch    Collection Time:  12/19/18  5:47 PM   Result Value Ref Range    RBC, UA 0-2 None Seen, 0-2 /HPF    WBC, UA 31-50 (A) None Seen, 0-2 /HPF    Bacteria, UA None Seen None Seen /HPF    Squamous Epithelial Cells, UA 3-6 (A) None Seen, 0-2 /HPF    Yeast, UA Small/1+ Yeast None Seen /HPF    Hyaline Casts, UA 3-6 None Seen /LPF    Methodology Manual Light Microscopy    CK    Collection Time: 12/19/18  6:18 PM   Result Value Ref Range    Creatine Kinase 29 20 - 180 U/L   Comprehensive Metabolic Panel    Collection Time: 12/20/18  4:37 AM   Result Value Ref Range    Glucose 92 65 - 99 mg/dL    BUN 33 (H) 6 - 20 mg/dL    Creatinine 2.06 (H) 0.57 - 1.00 mg/dL    Sodium 142 136 - 145 mmol/L    Potassium 3.4 (L) 3.5 - 5.2 mmol/L    Chloride 104 98 - 107 mmol/L    CO2 24.5 22.0 - 29.0 mmol/L    Calcium 10.5 8.6 - 10.5 mg/dL    Total Protein 6.8 6.0 - 8.5 g/dL    Albumin 3.20 (L) 3.50 - 5.20 g/dL    ALT (SGPT) 12 1 - 33 U/L    AST (SGOT) 19 1 - 32 U/L    Alkaline Phosphatase 123 (H) 39 - 117 U/L    Total Bilirubin 0.2 0.1 - 1.2 mg/dL    eGFR Non African Amer 25 (L) >60 mL/min/1.73    Globulin 3.6 gm/dL    A/G Ratio 0.9 g/dL    BUN/Creatinine Ratio 16.0 7.0 - 25.0    Anion Gap 13.5 mmol/L   BNP    Collection Time: 12/20/18  4:37 AM   Result Value Ref Range    proBNP 1,745.0 (H) 5.0 - 900.0 pg/mL   TSH    Collection Time: 12/20/18  4:37 AM   Result Value Ref Range    TSH 3.210 0.270 - 4.200 mIU/mL   Lipid Panel    Collection Time: 12/20/18  4:37 AM   Result Value Ref Range    Total Cholesterol 223 (H) 0 - 200 mg/dL    Triglycerides 232 (H) 0 - 150 mg/dL    HDL Cholesterol 31 (L) 40 - 60 mg/dL    LDL Cholesterol  146 (H) 0 - 100 mg/dL    VLDL Cholesterol 46.4 (H) 5 - 40 mg/dL    LDL/HDL Ratio 4.70    Hemoglobin A1c    Collection Time: 12/20/18  4:37 AM   Result Value Ref Range    Hemoglobin A1C 5.30 4.80 - 5.60 %   Uric Acid    Collection Time: 12/20/18  4:37 AM   Result Value Ref Range    Uric Acid 11.5 (H) 2.4 - 5.7 mg/dL   CBC Auto Differential     Collection Time: 12/20/18  4:37 AM   Result Value Ref Range    WBC 6.86 4.50 - 10.70 10*3/mm3    RBC 3.69 (L) 3.90 - 5.20 10*6/mm3    Hemoglobin 9.7 (L) 11.9 - 15.5 g/dL    Hematocrit 32.5 (L) 35.6 - 45.5 %    MCV 88.1 80.5 - 98.2 fL    MCH 26.3 (L) 26.9 - 32.0 pg    MCHC 29.8 (L) 32.4 - 36.3 g/dL    RDW 15.8 (H) 11.7 - 13.0 %    RDW-SD 51.1 37.0 - 54.0 fl    MPV 11.2 6.0 - 12.0 fL    Platelets 230 140 - 500 10*3/mm3    Neutrophil % 58.4 42.7 - 76.0 %    Lymphocyte % 27.8 19.6 - 45.3 %    Monocyte % 7.1 5.0 - 12.0 %    Eosinophil % 6.1 0.3 - 6.2 %    Basophil % 0.6 0.0 - 1.5 %    Immature Grans % 0.0 0.0 - 0.5 %    Neutrophils, Absolute 4.00 1.90 - 8.10 10*3/mm3    Lymphocytes, Absolute 1.91 0.90 - 4.80 10*3/mm3    Monocytes, Absolute 0.49 0.20 - 1.20 10*3/mm3    Eosinophils, Absolute 0.42 0.00 - 0.70 10*3/mm3    Basophils, Absolute 0.04 0.00 - 0.20 10*3/mm3    Immature Grans, Absolute 0.00 0.00 - 0.03 10*3/mm3   Phosphorus    Collection Time: 12/20/18  4:37 AM   Result Value Ref Range    Phosphorus 4.1 2.5 - 4.5 mg/dL       Radiology:  Imaging Results (last 24 hours)     Procedure Component Value Units Date/Time    US Renal Bilateral [[id number]] Collected:  12/19/18 2047     Updated:  12/19/18 2054    Narrative:       BILATERAL RENAL ULTRASOUND     CLINICAL HISTORY: Acute renal failure     Transverse and longitudinal images of both kidneys were obtained. There  is focal cortical thinning in the polar region of the right kidney  consistent with scarring, likely due to previous infection. A tiny cyst  is also noted in the upper pole. The right kidney is otherwise  unremarkable. There is no hydronephrosis. The right kidney measures 9.8  x 3.4 x 4.1 cm. There are no left kidney is normal in size and shape and  shows no hydronephrosis. There is a tiny minimally complex cyst in the  upper pole measuring 12 mm in diameter. A tiny 9 mm diameter lower pole  cyst is also noted. The left kidney measures 12.0 x 4.4 x 3.8 cm.  The  urinary bladder could not be imaged due to the presence of Dove  catheter.     IMPRESSIONS: No evidence of hydronephrosis. Tiny bilateral renal cysts  as noted.     This report was finalized on 12/19/2018 8:51 PM by Dr. Andrea Jones M.D.                Medications have been reviewed:  Current Facility-Administered Medications   Medication Dose Route Frequency Provider Last Rate Last Dose   • acetaminophen (TYLENOL) tablet 650 mg  650 mg Oral Q4H PRN Jose Antonio Stack MD       • allopurinol (ZYLOPRIM) tablet 100 mg  100 mg Oral Daily Jose Antonio Stack MD       • arformoterol (BROVANA) nebulizer solution 15 mcg  15 mcg Nebulization BID - RT Dany Baez MD   15 mcg at 12/20/18 0840   • artificial tears 83-15 % ophthalmic ointment 1 application  1 application Both Eyes Q12H Jose Antonio Stack MD       • atorvastatin (LIPITOR) tablet 40 mg  40 mg Oral Nightly Jose Antonio Stack MD       • clonazePAM (KlonoPIN) tablet 0.5 mg  0.5 mg Oral TID Jose Antonio Stack MD   0.5 mg at 12/20/18 0831    Followed by   • [START ON 1/2/2019] clonazePAM (KlonoPIN) tablet 0.5 mg  0.5 mg Oral BID Jose Antonio Stack MD       • cyclobenzaprine (FLEXERIL) tablet 10 mg  10 mg Oral TID PRN Jose Antonio Stack MD       • escitalopram (LEXAPRO) tablet 20 mg  20 mg Oral Daily Jose Antonio Stack MD   20 mg at 12/20/18 0820   • ipratropium-albuterol (DUO-NEB) nebulizer solution 3 mL  3 mL Nebulization Q4H PRN Jose Antonio Stack MD   3 mL at 12/19/18 1955   • metoprolol tartrate (LOPRESSOR) tablet 12.5 mg  12.5 mg Oral Q12H Jose Antonio Stack MD       • multivitamin (THERAGRAN) tablet 1 tablet  1 tablet Oral Daily Jose Antonio Stack MD   1 tablet at 12/20/18 0820   • nicotine (NICODERM CQ) 21 MG/24HR patch 1 patch  1 patch Transdermal Q24H Jose Antonio Stack MD       • oxyCODONE (ROXICODONE) immediate release tablet 10 mg  10 mg Oral Q4H PRN Jose Antonio Stack MD   10 mg at 12/20/18 0831   • pantoprazole (PROTONIX) EC tablet 40 mg  40 mg Oral Q AM Jose Antonio Stack MD   40 mg at 12/20/18 0506   •  pramipexole (MIRAPEX) tablet 0.25 mg  0.25 mg Oral Nightly Jose Antonio Stack MD   0.25 mg at 12/19/18 2136   • sodium chloride 0.9 % with KCl 20 mEq/L infusion  75 mL/hr Intravenous Continuous Jose Antonio Stack MD       • thiamine (VITAMIN B-1) tablet 100 mg  100 mg Oral Daily Jose Antonio Stack MD   100 mg at 12/20/18 0820   • traZODone (DESYREL) tablet 50 mg  50 mg Oral Nightly Jose Antonio Stack MD   50 mg at 12/19/18 2136   • vitamin C (ASCORBIC ACID) tablet 500 mg  500 mg Oral Daily Jose Antonio Stack MD   500 mg at 12/20/18 0820       ASSESSMENT:  acute renal failure, likely vancomycin toxicity, slightly improved today  Chronic hypertension, controlled  Respiratory failure with recent tracheostomy  Dysphagia with recent G-tube placement  Chronic pain syndrome  Osteomyelitis on IV antibiotics  Tracheostomy complication  Hypercalcemia  Gangrene right toes     PLAN:   Renal failure likely related to vancomycin toxicity, improved today  Continue IV fluids overnight and encourage oral fluid intake as able  Unremarkable renal ultrasound, urine studies, urine electrolytes  Recommend continuing off vancomycin  Vascular surgery recommendations noted         Eliseo Davis MD   [facility]   Office phone number: [phone]  Answering service phone number: [phone]    12/20/18  3:13 PM      Dictation performed using Dragon dictation software              Electronically signed by Eliseo Davis MD at 12/20/2018  3:16 PM     Jose Antonio Stack MD at 12/20/2018  2:38 PM          Daily progress note    Chief complaint  Doing much better  No new complaints  Wants to go back to nursing home    History of present illness  58-year-old white female with history of COPD chronic pain syndrome who is a nursing home resident who has had tracheostomy and G-tube placement 3 months ago brought to the emergency room when she put her trach out.  Patient is fully alert oriented eating and does not want her trach to be placed back.  Patient  "denies any shortness of breath.  Patient also wants his G-tube out.  Patient has no other complaint but hurting all over and wants her pain medications.     REVIEW OF SYSTEMS  Review of Systems   Constitutional: Negative for chills and fever.        (+) trach replacement   HENT: Negative for sore throat.         (+) jaw pain   Respiratory: Negative for shortness of breath.    Cardiovascular: Negative for chest pain.   Gastrointestinal: Negative for nausea and vomiting.   Genitourinary: Negative for dysuria.   Musculoskeletal: Positive for neck pain. Negative for back pain.   Skin: Negative for rash.   Neurological: Negative for dizziness.   Psychiatric/Behavioral: The patient is not nervous/anxious.       PHYSICAL EXAM  Blood pressure 101/79, pulse 72, temperature 98.2 °F (36.8 °C), temperature source Oral, resp. rate 20, height 167.6 cm (66\"), weight 71.8 kg (158 lb 4.8 oz), SpO2 99 %.    Constitutional: No distress.   Head: Normocephalic and atraumatic.   Mouth/Throat: Oropharynx is clear and moist.   Eyes: Conjunctivae are normal.   Neck: There is a relatively small trach stoma without drainage or bleeding.    Cardiovascular: Normal rate and regular rhythm.   Pulmonary/Chest: Breath sounds normal. No respiratory distress.   Pt's O2 sats are 100% on 2L. Respirations unlabored    Abdominal: There is no tenderness.  G-tube in place   Musculoskeletal: She exhibits no edema or tenderness.   Neurological: She is alert.   Skin: No rash noted.     LAB RESULTS  Lab Results (last 24 hours)     Procedure Component Value Units Date/Time    Hemoglobin A1c [[id number]]  (Normal) Collected:  12/20/18 0437    Specimen:  Blood Updated:  12/20/18 0727     Hemoglobin A1C 5.30 %     Narrative:       Hemoglobin A1C Ranges:    Increased Risk for Diabetes  5.7% to 6.4%  Diabetes                     >= 6.5%  Diabetic Goal                < 7.0%    Comprehensive Metabolic Panel [[id number]]  (Abnormal) Collected:  12/20/18 0437    Specimen: "  Blood Updated:  12/20/18 0647     Glucose 92 mg/dL      BUN 33 mg/dL      Creatinine 2.06 mg/dL      Sodium 142 mmol/L      Potassium 3.4 mmol/L      Chloride 104 mmol/L      CO2 24.5 mmol/L      Calcium 10.5 mg/dL      Total Protein 6.8 g/dL      Albumin 3.20 g/dL      ALT (SGPT) 12 U/L      AST (SGOT) 19 U/L      Alkaline Phosphatase 123 U/L      Total Bilirubin 0.2 mg/dL      eGFR Non African Amer 25 mL/min/1.73      Globulin 3.6 gm/dL      A/G Ratio 0.9 g/dL      BUN/Creatinine Ratio 16.0     Anion Gap 13.5 mmol/L     Uric Acid [[id number]]  (Abnormal) Collected:  12/20/18 0437    Specimen:  Blood Updated:  12/20/18 0647     Uric Acid 11.5 mg/dL     Lipid Panel [[id number]]  (Abnormal) Collected:  12/20/18 0437    Specimen:  Blood Updated:  12/20/18 0647     Total Cholesterol 223 mg/dL      Triglycerides 232 mg/dL      HDL Cholesterol 31 mg/dL      LDL Cholesterol  146 mg/dL      VLDL Cholesterol 46.4 mg/dL      LDL/HDL Ratio 4.70    Narrative:       Cholesterol Reference Ranges  (U.S. Department of Health and Human Services ATP III Classifications)    Desirable          <200 mg/dL  Borderline High    200-239 mg/dL  High Risk          >240 mg/dL      Triglyceride Reference Ranges  (U.S. Department of Health and Human Services ATP III Classifications)    Normal           <150 mg/dL  Borderline High  150-199 mg/dL  High             200-499 mg/dL  Very High        >500 mg/dL    HDL Reference Ranges  (U.S. Department of Health and Human Services ATP III Classifcations)    Low     <40 mg/dl (major risk factor for CHD)  High    >60 mg/dl ('negative' risk factor for CHD)        LDL Reference Ranges  (U.S. Department of Health and Human Services ATP III Classifcations)    Optimal          <100 mg/dL  Near Optimal     100-129 mg/dL  Borderline High  130-159 mg/dL  High             160-189 mg/dL  Very High        >189 mg/dL    Phosphorus [[id number]]  (Normal) Collected:  12/20/18 0437    Specimen:  Blood Updated:  12/20/18  0647     Phosphorus 4.1 mg/dL     BNP [[id number]]  (Abnormal) Collected:  12/20/18 0437    Specimen:  Blood Updated:  12/20/18 0647     proBNP 1,745.0 pg/mL     Narrative:       Among patients with dyspnea, NT-proBNP is highly sensitive for the detection of acute congestive heart failure. In addition NT-proBNP of <300 pg/ml effectively rules out acute congestive heart failure with 99% negative predictive value.    TSH [[id number]]  (Normal) Collected:  12/20/18 0437    Specimen:  Blood Updated:  12/20/18 0647     TSH 3.210 mIU/mL     CBC & Differential [[id number]] Collected:  12/20/18 0437    Specimen:  Blood Updated:  12/20/18 0605    Narrative:       The following orders were created for panel order CBC & Differential.  Procedure                               Abnormality         Status                     ---------                               -----------         ------                     CBC Auto Differential[[id number]]        Abnormal            Final result                 Please view results for these tests on the individual orders.    CBC Auto Differential [[id number]]  (Abnormal) Collected:  12/20/18 0437    Specimen:  Blood Updated:  12/20/18 0605     WBC 6.86 10*3/mm3      RBC 3.69 10*6/mm3      Hemoglobin 9.7 g/dL      Hematocrit 32.5 %      MCV 88.1 fL      MCH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 Catch  Updated:  18 194     Eosinophil Smear 0 % EOS/100 Cells             Assessment:  Active Hospital Problems    Diagnosis Date Noted   • Gangrene of toe (CMS/[facility]) [I96] 2018   • Tracheostomy complication (CMS/[facility]) [J95.00] 2018      Resolved Hospital Problems   No resolved problems to display.         Plan:  See above  Rafa Fowler MD   2018  3:34 PM    Much of this encounter note is an electronic transcription/translation of spoken language to printed text. The electronic translation of spoken language may permit erroneous, or at times, nonsensical words or phrases to be inadvertently transcribed; Although I have reviewed the note for such errors, some may still exist      Electronically signed by Rafa Fowler MD at 2018  3:53 PM     Mick Robles MD at 2018 12:04 PM      Consult Orders    1. Inpatient Vascular Surgery Consult [[id number]] ordered by Jose Antonio Stack MD at 18 1107                Name: Swetha Luis ADMIT: 2018   : 1960  PCP: Provider, No Known    MRN: [id number] LOS: 0 days   AGE/SEX: 58 y.o. female  ROOM: E564/     Inpatient Vascular Surgery Consult  Consult performed by: Mick Robles MD  Consult ordered by: Jose Antonio Stack MD  Reason for consult: Dry gangrene of the toes        Mick Robles MD     LOS: 0 days   Patient Care Team:  Provider, No Known as PCP - General  Provider, No Known as PCP - Family Medicine    Subjective     History of Present Illness  58 y.o. female patient who has chronic pain syndrome and COPD who is a nursing home resident after prolonged hospitalization at an outside hospital.  At that institution, she was treated for a sternoclavicular joint infection requiring multiple operations and debridement.  She became quite ill and had an extended ICU stay on multiple pressors.  As a result of this vasoconstriction she had necrosis of her toes.  This has been managed out the outside hospital with podiatry  "follow-up recommended.  She was admitted here because her trach came out and for concern about declining renal function.  We were asked to see her because of the toes.  The patient is very upset because Dr. Stack just left the room and told her \"you need to have her toes cut off\" according to the patient.  She tells me that she doesn't want that person to care for her any longer.    HPI Elements: Patient complains of wound. Problem is located mainly in the bilateral feet. The pain is described as aching, and is 3/10 in intensity. Pain is intermittent. Onset was several months ago. Symptoms have been gradually improving since.  The patient's risks factors for peripheral vascular disease include pressor use.  The patient denies a history of peripheral vascular disease    Review of Systems   Constitutional: Positive for fatigue.   Respiratory: Positive for shortness of breath.        PMH: Status post tracheostomy, COPD, chronic kidney disease, chronic pain syndrome,    Family History: Negative for DVT or aneurysm    Social History: Patient is a former smoker.  She quit during all of these acute hospitalizations    Allergies: Hydrocodone; Strawberry; and Zithromax [azithromycin]      Objective   Temp:  [97 °F (36.1 °C)-98.2 °F (36.8 °C)] 98.2 °F (36.8 °C)  Heart Rate:  [67-78] 72  Resp:  [16-20] 20  BP: ()/(55-79) 101/79    I/O this shift:  In: 1360 [P.O.:360; I.V.:1000]  Out: 300 [Urine:300]    Physical Exam   Constitutional: She is oriented to person, place, and time. She appears well-developed. She appears ill.   HENT:   Right Ear: External ear normal.   Left Ear: External ear normal.   Nose: Nose normal.   Mouth/Throat: Normal dentition.   Eyes: Conjunctivae, EOM and lids are normal. Pupils are equal, round, and reactive to light.   Neck: No JVD present. Carotid bruit is not present. No thyromegaly present.   Cardiovascular: Normal rate, regular rhythm and normal heart sounds.   No murmur heard.  Pulses:       " "Carotid pulses are 2+ on the right side, and 2+ on the left side.       Radial pulses are 2+ on the right side, and 2+ on the left side.   No peripheral edema.  Dry gangrene is photographed below.  Patient has excellent pedal Doppler signals   Pulmonary/Chest: Effort normal and breath sounds normal.   Patient has a \"trap door \"\" incision around the right clavicle and sternum consistent with the sternoclavicular joint exposure.  This appears to be healed with a minor area of granulation tissue at the corner.  No infection obvious.   Abdominal: Soft. She exhibits no distension. There is no hepatosplenomegaly. There is no tenderness.   PEG tube in place   Musculoskeletal:   Gait not examined. No bony tenderness or deformities.  No clubbing or cyanosis.     Neurological: She is alert and oriented to person, place, and time.   Skin: Skin is warm, dry and intact.   Psychiatric: Her behavior is normal. Judgment normal. Her mood appears anxious.   Vitals reviewed.            Data Reviewed:  CBC    Results from last 7 days   Lab Units  12/20/18   0437  12/19/18   1246   WBC 10*3/mm3  6.86  9.07   HEMOGLOBIN g/dL  9.7*  10.4*   PLATELETS 10*3/mm3  230  231     BMP   Results from last 7 days   Lab Units  12/20/18   0437  12/19/18   1246  12/19/18   0546   SODIUM mmol/L  142  140   --    POTASSIUM mmol/L  3.4*  3.8   --    CHLORIDE mmol/L  104  100   --    CO2 mmol/L  24.5  26.4   --    BUN mg/dL  33*  30*   --    CREATININE mg/dL  2.06*  2.39*  2.36*   GLUCOSE mg/dL  92  119*   --    PHOSPHORUS mg/dL  4.1   --    --      Coag     HbA1C   Lab Results   Component Value Date    HGBA1C 5.30 12/20/2018     Infection     Radiology(recent) No radiology results for the last day    Imaging Studies:  Patient's chest x-ray reviewed independently.  She does have a left-sided PICC line terminates in the SVC.  There is some suggestion of cardiomegaly but this could be also due to technique.    Higgins's PENELOPE 11/2018  PROCEDURE PERFORMED: NVL " ANKLE BRACHIAL INDEX    Conclusions: Right lower extremity demonstrates mild arterial circulation at rest with ankle-brachial index of 0.96. Unable to obtain digit pressure and waveform due to patients pain tolerance.   Left lower extremity demonstrates normal arterial circulation at rest with ankle-brachial index of 0.99 and digit pressure of 47 mmHg. Digit pressure is adequate for healing.  Compared to previous exam 18, the left digit pressure has increased from 0 to 47 mmHg.       Active Hospital Problems    Diagnosis Date Noted   • Gangrene of toe (CMS/[facility]) [I96] 2018   • Tracheostomy complication (CMS/[facility]) [J95.00] 2018      Resolved Hospital Problems   No resolved problems to display.     Problem Points:  4:  Patient has a new problem, with additional work-up planned  Total problem points:4 or more    Data Points:  1:  I have reviewed or order clinical lab test  2:  I have personally and independently review of image, tracing, or specimen  2:  I have reviewed and summation of old records and/or discussed the patients care with another health care provider  Total data points:4 or more    Risk Points:  Moderate: New diagnosis with unknown prognosis    [place]illin    Assessment/Plan       Tracheostomy complication (CMS/[facility])    Gangrene of toe (CMS/[facility])        58 y.o. female with a prolonged hospitalization at an outside hospital where she was treated for a sternoclavicular joint infection by cardiothoracic surgery.  She required operative debridement was quite ill and spent a significant amount of time in the ICU.  During this time, she required pressor support for blood pressure and this came at the expense of digital necrosis.  We were asked to see her because of dry gangrene of the toes.  She already has seen podiatry at [facility] and had a lower extremity arterial study a few weeks ago that showed ABIs of 0.96 on one side and 0.97 on the other.  Toe pressures were reduced at 47.   Recommendation was to continue local wound care.  The patient says that some of the necrosis is are soft off.  She denies fevers.    On exam today I think she has excellent pedal signals but her pulses aren't palpable.  For this reason I'll disconfirm with a lower extremity arterial study to previous measurements to make sure we think she has adequate perfusion to heal.  I do think that's the case.  I agree with the current care plan of Betadine this and allowing it to continue to demarcate.    I discussed the patients findings and my recommendations with patient and nursing staff.  Please call my office with any question: [phone]    Mick Robles MD  12/20/18  12:55 PM                      Electronically signed by Mick Robles MD at 12/20/2018 12:58 PM     Dany Baez MD at 12/19/2018  8:31 PM                        Patient Care Team:  Provider, No Known as PCP - General  Provider, No Known as PCP - Family Medicine      Subjective     Patient is a 58 y.o. female.  Asked to see in consultation regarding tracheostomy.  Patient has received most of her care at [facility].  She has had a protracted course over the last 5 months or so she's had apparently severe sepsis and renal failure she had right shoulder MRSA osteomyelitis she's had 5 months of antibiotics now and her course of all this she was on a ventilator and was trached..  She says she has not liked the trach and she had some secretions or something stuck in it last night and she was trying to pull those out and pulled the trachea.  And when she arrived here she decided she did not want it replaced.  She says her breathing is doing fine since she had about an factor breathing is much better.  Initially she couldn't talk but now she can talk.  She does have a history of sleep apnea she used to use a CPAP machine but hasn't in a long time.  With her illness she has lost a whole lot of weight and says she is not aware  that she has a problem with sleep apnea anymore as she does have a history of COPD and she was a smoker up until this event she says she made do with God if he would let her live she would stop smoking and she has honored it thus far and intends to continue remaining off of tobacco use.  This particular illness her breathing was starting to cause problems she has 2 grandkids that are the light of her life and she noticed if she did something fast with her chased him she would get short of breath also climbing stairs she was getting short of breath.  She is trying to do a little physical therapy trying to recover but apparently with all of her sepsis sounds which she was on vasopressors and got severe ischemia and some gangrene in her toes and she is not been able to walk much because of this are still working to try and save some of her toes.      Review of Systems:    Her knowledge she's never had any heart disease in fact until this infection she thought she was pretty healthy other than smoking since she let a very clean life she didn't use drugs or alcohol and no history of liver disease or seizures or strokes no history of blood clots or bleeding problems or diabetes or dyslipidemia    History  Past Medical History:   Diagnosis Date   • Acute osteomyelitis (CMS/HCC)     Right shoulder   • COPD (chronic obstructive pulmonary disease) (CMS/HCC)    • Nontraumatic subarachnoid hemorrhage (CMS/HCC)    • Renal disorder     chronic kidney disease   • Tracheostomy present (CMS/HCC)      Past Surgical History:   Procedure Laterality Date   • TRACHEOSTOMY       Social History     Socioeconomic History   • Marital status:      Spouse name: Not on file   • Number of children: Not on file   • Years of education: Not on file   • Highest education level: Not on file   Tobacco Use   • Smoking status: Former Smoker     Packs/day: 1.00     Types: Cigarettes     Last attempt to quit: 2018     Years since quittin.4  "  • Smokeless tobacco: Never Used   Substance and Sexual Activity   • Alcohol use: No     Frequency: Never   • Drug use: Yes     Types: Cocaine     Comment: 20 years ago occasional   • Sexual activity: Defer     History reviewed. No pertinent family history.      Allergies:  Hydrocodone; Strawberry; and Zithromax [azithromycin]        artificial tears 1 application Both Eyes Q12H   budesonide 0.5 mg Nebulization BID - RT   clonazePAM 0.5 mg Oral TID   Followed by      [START ON 1/2/2019] clonazePAM 0.5 mg Oral BID   escitalopram 20 mg Oral Daily   metoprolol tartrate 25 mg Oral Q12H   multivitamin 1 tablet Oral Daily   nicotine 1 patch Transdermal Q24H   pantoprazole 40 mg Oral Q AM   pramipexole 0.25 mg Oral Nightly   thiamine 100 mg Oral Daily   traZODone 50 mg Oral Nightly   vitamin C 500 mg Oral Daily          Objective     Vital Signs  Vital Sign Min/Max for last 24 hours  Temp  Min: 97 °F (36.1 °C)  Max: 98.1 °F (36.7 °C)   BP  Min: 83/55  Max: 125/72   Pulse  Min: 60  Max: 75   Resp  Min: 16  Max: 20   SpO2  Min: 94 %  Max: 100 %   Flow (L/min)  Min: 2  Max: 2   No Data Recorded       Intake/Output Summary (Last 24 hours) at 12/19/2018 2032  Last data filed at 12/19/2018 1743  Gross per 24 hour   Intake 840 ml   Output 800 ml   Net 40 ml     No intake/output data recorded.  Last Weight and Admission Weight        12/18/18 2000   Weight: 71.8 kg (158 lb 4.8 oz)     Flowsheet Rows      First Filed Value   Admission Height  167.6 cm (66\") Documented at 12/18/2018 1944   Admission Weight  71.8 kg (158 lb 4.8 oz) Documented at 12/18/2018 2000          Body mass index is 25.55 kg/m².           Physical Exam:  General Appearance: Well-developed white female looks older than her stated age in no acute distress  Eyes: Conjunctiva are clear and anicteric  ENT: His membranes are moist no erythema no exudates she has a Mallampati 2 almost 3 airway  Neck: No adenopathy or thyromegaly trachea is midline her tracheostomy " site has almost completely closed there is just a small about 2 mm opening there is a small amount of granulation tissue sticking out  Lungs: Clear nonlabored symmetric expansion  Cardiac: Regular rate and rhythm no murmur  Abdomen: Soft nontender no palpable organomegaly or masses left upper quadrant PEG tube type site  : Not examined  Musculoskeletal: Both of her feet are in dressings I did not take these down.  She has some scars and healed incisions in her upper anterior right chest and there is a left upper extremity PICC type catheter site looks okay  Skin: No jaundice no petechiae  Neuro: Alert oriented pleasant cooperative  Extremities/P Vascular: No clubbing in her upper extremities again I have looked at her distal lower extremities  MSE: Seems to be in pretty good spirits      Labs:  Results from last 7 days   Lab Units  12/19/18   1246  12/19/18   0546   GLUCOSE mg/dL  119*   --    SODIUM mmol/L  140   --    POTASSIUM mmol/L  3.8   --    CO2 mmol/L  26.4   --    CHLORIDE mmol/L  100   --    ANION GAP mmol/L  13.6   --    CREATININE mg/dL  2.39*  2.36*   BUN mg/dL  30*   --    BUN / CREAT RATIO   12.6   --    CALCIUM mg/dL  10.7*   --    EGFR IF NONAFRICN AM mL/min/1.73  21*  21*     Estimated Creatinine Clearance: 26 mL/min (A) (by C-G formula based on SCr of 2.39 mg/dL (H)).      Results from last 7 days   Lab Units  12/19/18   1246   WBC 10*3/mm3  9.07   RBC 10*6/mm3  3.92   HEMOGLOBIN g/dL  10.4*   HEMATOCRIT %  33.0*   MCV fL  84.2   MCH pg  26.5*   MCHC g/dL  31.5*   RDW %  15.7*   RDW-SD fl  48.0   MPV fL  10.8   PLATELETS 10*3/mm3  231   NEUTROPHIL % %  59.7   LYMPHOCYTE % %  26.5   MONOCYTES % %  7.4   EOSINOPHIL % %  6.0   BASOPHIL % %  0.4   IMM GRAN % %  0.1   NEUTROS ABS 10*3/mm3  5.42   LYMPHS ABS 10*3/mm3  2.40   MONOS ABS 10*3/mm3  0.67   EOS ABS 10*3/mm3  0.54   BASOS ABS 10*3/mm3  0.04   IMMATURE GRANS (ABS) 10*3/mm3  0.01         Results from last 7 days   Lab Units  12/19/18   6685    CK TOTAL U/L  29                     Microbiology Results (last 10 days)     Procedure Component Value - Date/Time    Eosinophil Smear - Urine, Urine, Clean Catch [[id number]]  (Normal) Collected:  12/19/18 1747    Lab Status:  Final result Specimen:  Urine, Clean Catch Updated:  12/19/18 1941     Eosinophil Smear 0 % EOS/100 Cells             Diagnostics:  Xr Chest Post Cva Port    Result Date: 12/19/2018  ONE VIEW PORTABLE CHEST AT 6:23 AM  HISTORY: PICC line placement.  FINDINGS:  There has been recent insertion of a left-sided PICC line which ends in the SVC. There is mild cardiomegaly and slight vascular congestion and no focal infiltrate.  This report was finalized on 12/19/2018 12:48 PM by Dr. Mike Rivas M.D.         Chest x-ray reviewed and I agree with the above      Assessment/Plan     1. Tracheostomy status post dislodgment patient does not want the trach replaced she seems to be doing very well with the trach out the whole his closest significantly there is a small piece of granulation tissue tissue that I hope doesn't prevent complete closure and we'll need to give it a week or 2 to see how she does right now is closing so fast I'm not going occluded but if it doesn't keep closing we may put an occlusive dressing over it.  2. Obstructive sleep apnea by history as she was considerably heavier when she was diagnosed whether she has apnea now or not until the ostomy seals it's not a good time to use but I will check a nocturnal oximetry tonight just to see if there is suggestion of apnea.  3. COPD she's done the best for herself by stopping smoking I'm not sure that inhaled steroids are the best treatment I think long-acting beta agonist or anticholinergic would be better since for right now we will probably need to use a nebulizer I will go with the long-acting beta agonist as they are available.  4. Acute kidney injury per nephrology  5. Oral pharyngeal dysphagia suspect this is improved line  osteomyelitis on IV antibiotics  6. Hypertension chronic anemia mild  7. Hypocalcemia mild      Dany Baez MD  18  8:32 PM    Time:     Electronically signed by Dany Baez MD at 2018  9:46 PM     Eliseo Davis MD at 2018  5:03 PM      Consult Orders    1. Inpatient Nephrology Consult [[id number]] ordered by Jose Antonio Stack MD at 18 1237                                                                                                                [facility]                                                                                             Nephrology Initial Consult Note    Patient Identification:  Name: Swetha Luis MRN: [id number]  Age: 58 y.o. : 1960  Sex: female  Date:2018    Requesting Physician: As per consult order.  Reason for Consultation: Acute renal failure  Information from:patient/ family/ chart      History of Present Illness: This is a 58 y.o. year old female  with the previously normal renal function, baseline creatinine about 1.0.  Labs about 1 month ago prior to her prolonged hospitalization.  She has received most for care after the [facility], she was treated at that facility for osteomyelitis on IV antibiotics also developed respiratory failure requiring tracheostomy placement and dysphagia requiring PEG tube placement.  She was a Edling at the nursing home, tracheostomy somehow got displaced but it sounds like her respiratory status had improved and she actually had not been needing the tracheostomy, only some nighttime oxygen.  She was started on IV vancomycin at [facility], creatinine had increased from baseline up to around 2.3 and was stable at that level for the last 5-6 days prior to discharge and she was discharged home on vancomycin 750 mg twice a day with home health.  Medical history also significant for COPD, hypertension, anxiety.  She was seen in the emergency department last night, transfer was being  arranged back to [facility] but they were unable to secure bed so she was admitted to this facility for further management.  A random vancomycin level was drawn and was 32, creatinine here is 2.39 with normal electrolytes other than a calcium of 10.7.  She denies any acute complaints at this time     The following medical history and medications personally reviewed by me:    Problem List:   Patient Active Problem List    Diagnosis   • Tracheostomy complication (CMS/[facility]) [J95.00]           Allergies:  Allergies   Allergen Reactions   • Hydrocodone Unknown (See Comments)     unk   • Baker Unknown (See Comments)     unk   • Zithromax [Azithromycin] Unknown (See Comments)     unk       Social History:   Social History     Socioeconomic History   • Marital status:      Spouse name: Not on file   • Number of children: Not on file   • Years of education: Not on file   • Highest education level: Not on file   Tobacco Use   • Smoking status: Former Smoker     Packs/day: 1.00     Types: Cigarettes     Last attempt to quit: 2018     Years since quittin.4   • Smokeless tobacco: Never Used   Substance and Sexual Activity   • Alcohol use: No     Frequency: Never   • Drug use: Yes     Types: Cocaine     Comment: 20 years ago occasional   • Sexual activity: Defer        Family History:  History reviewed. No pertinent family history.     Review of Systems: as per HPI, in addition:    General:      + weakness / fatigue,                       No fevers / chills                       no weight loss  HEENT:       no dysphagia / odynophagia  Neck:           normal range of motion, no swelling  Respiratory: no cough / congestion                      + mild shortness of air                       No wheezing  CV:              No chest pain                       No palpitations  Abdomen/GI: no nausea / vomiting                      No diarrhea / constipation                      No abdominal pain  :             no  "dysuria / urinary frequency                       No urgency, normal output  Endocrine:   no polyuria / polydipsia,                      No heat or cold intolerance  Skin:           no rashes or skin breakdown   Vascular:   No edema                     No claudication  Psych:        no depression/ anxiety  Neuro:        no focal weakness, no seizures  Musculoskeletal: no joint pain or deformities      Physical Exam:  Vitals:   Temp (24hrs), Av.7 °F (36.5 °C), Min:97 °F (36.1 °C), Max:98.6 °F (37 °C)    BP (!) 86/56 (BP Location: Right arm, Patient Position: Lying)   Pulse 68   Temp 97 °F (36.1 °C) (Oral)   Resp 18   Ht 167.6 cm (66\")   Wt 71.8 kg (158 lb 4.8 oz)   SpO2 96%   BMI 25.55 kg/m²    Intake/Output:     Intake/Output Summary (Last 24 hours) at 2018 1703  Last data filed at 2018 1203  Gross per 24 hour   Intake 600 ml   Output 800 ml   Net -200 ml        Wt Readings from Last 1 Encounters:   18 71.8 kg (158 lb 4.8 oz)       Exam:    General Appearance:  Awake, alert, oriented x3, no acute distress  Chronically ill-appearing   Head and Face:  Normocephalic, atraumatic, mucus membranes moist, oropharynx clear   Eyes:  No icterus, pupils equal round and reactive to light, extraocular movements intact    ENMT: Moist mucosa, tongue symmetric    Neck: Supple  no jugular venous distention  no thyromegaly, trach site clean    Pulmonary:  Respiratory effort: Normal  Auscultation of lungs: Clear bilaterally  No wheezes  No rhonchi  Good air movement, good expansion   Chest wall:  No tenderness or deformity   Cardiovascular:  Auscultation of the heart: Normal rhythm, no murmurs  No edema of extremities    Abdomen:  Abdomen: soft, non-tender, normal bowel sounds all four quadrants, no masses   Liver and spleen: no hepatosplenomegaly   Musculoskeletal: Digits and nails: normal  Normal range of motion  No joint swelling or gross deformities    Skin: Skin inspection: color normal, no " visible rashes or lesions  Skin palpation: texture, turgor normal, no palpable lesions   Lymphatic:  no cervical lymphadenopathy    Psychiatric: Judgement and insight: normal  Orientation to person place and time: normal  Mood and affect: normal       DATA:  Radiology and Labs:  The following labs independently reviewed by me  Old records independently reviewed showing records from [place] summarized above  The following radiologic studies independently viewed by me, findings chest x-ray with no acute findings, mild cardiomegaly  Interval notes, chart personally reviewed by me.   New problems include acute renal failure, probable antibiotic toxicity  Discussed with pt, pharmacist, RN  Platelet count 231    Risk/ complexity of medical care/ medical decision making  High, new ARF          Labs:   Recent Results (from the past 24 hour(s))   Vancomycin, Trough    Collection Time: 12/19/18  5:46 AM   Result Value Ref Range    Vancomycin Trough 32.20 (C) 5.00 - 20.00 mcg/mL   Creatinine, Serum    Collection Time: 12/19/18  5:46 AM   Result Value Ref Range    Creatinine 2.36 (H) 0.57 - 1.00 mg/dL    eGFR Non African Amer 21 (L) >60 mL/min/1.73   POC Glucose Once    Collection Time: 12/19/18 11:48 AM   Result Value Ref Range    Glucose 140 (H) 70 - 130 mg/dL   Basic Metabolic Panel    Collection Time: 12/19/18 12:46 PM   Result Value Ref Range    Glucose 119 (H) 65 - 99 mg/dL    BUN 30 (H) 6 - 20 mg/dL    Creatinine 2.39 (H) 0.57 - 1.00 mg/dL    Sodium 140 136 - 145 mmol/L    Potassium 3.8 3.5 - 5.2 mmol/L    Chloride 100 98 - 107 mmol/L    CO2 26.4 22.0 - 29.0 mmol/L    Calcium 10.7 (H) 8.6 - 10.5 mg/dL    eGFR Non African Amer 21 (L) >60 mL/min/1.73    BUN/Creatinine Ratio 12.6 7.0 - 25.0    Anion Gap 13.6 mmol/L   CBC Auto Differential    Collection Time: 12/19/18 12:46 PM   Result Value Ref Range    WBC 9.07 4.50 - 10.70 10*3/mm3    RBC 3.92 3.90 - 5.20 10*6/mm3    Hemoglobin 10.4 (L) 11.9 - 15.5 g/dL    Hematocrit 33.0  (L) 35.6 - 45.5 %    MCV 84.2 80.5 - 98.2 fL    MCH 26.5 (L) 26.9 - 32.0 pg    MCHC 31.5 (L) 32.4 - 36.3 g/dL    RDW 15.7 (H) 11.7 - 13.0 %    RDW-SD 48.0 37.0 - 54.0 fl    MPV 10.8 6.0 - 12.0 fL    Platelets 231 140 - 500 10*3/mm3    Neutrophil % 59.7 42.7 - 76.0 %    Lymphocyte % 26.5 19.6 - 45.3 %    Monocyte % 7.4 5.0 - 12.0 %    Eosinophil % 6.0 0.3 - 6.2 %    Basophil % 0.4 0.0 - 1.5 %    Immature Grans % 0.1 0.0 - 0.5 %    Neutrophils, Absolute 5.42 1.90 - 8.10 10*3/mm3    Lymphocytes, Absolute 2.40 0.90 - 4.80 10*3/mm3    Monocytes, Absolute 0.67 0.20 - 1.20 10*3/mm3    Eosinophils, Absolute 0.54 0.00 - 0.70 10*3/mm3    Basophils, Absolute 0.04 0.00 - 0.20 10*3/mm3    Immature Grans, Absolute 0.01 0.00 - 0.03 10*3/mm3       Radiology:  Imaging Results (last 24 hours)     Procedure Component Value Units Date/Time    XR Chest Post CVA Port [[id number]] Collected:  12/19/18 1006     Updated:  12/19/18 1252    Narrative:       ONE VIEW PORTABLE CHEST AT 6:23 AM     HISTORY: PICC line placement.     FINDINGS:  There has been recent insertion of a left-sided PICC line  which ends in the SVC. There is mild cardiomegaly and slight vascular  congestion and no focal infiltrate.     This report was finalized on 12/19/2018 12:48 PM by Dr. Mike Rivas M.D.                  ASSESSMENT:   Problem List:   New, acute renal failure, likely vancomycin toxicity  Chronic hypertension  Respiratory failure with recent tracheostomy  Dysphagia with recent G-tube placement  Chronic pain syndrome  Osteomyelitis on IV antibiotics  Tracheostomy complication  Hypercalcemia    PLAN:   Renal failure likely related to vancomycin toxicity  Volume status stable on exam, can stop IV fluids  Check renal ultrasound, urine studies, urine electrolytes  Recommend discontinuing vancomycin  Await input from infectious disease      Continue to monitor electrolytes and volume closely, avoid IV contrast and nephrotoxic medications     I appreciate  the opportunity to participate in this patient's care.  Please call with any questions or concerns.       Eliseo Davis M.D  [facility]  Office phone number: [phone]  Answering service phone number: [phone]        12/19/2018        Dictation via Dragon dictation software    Electronically signed by Eliseo Davis MD at 12/19/2018  5:10 PM           Patrick Shahid, RN   Registered Nurse      Plan of Care   Signed   Date of Service:  12/21/2018  6:59 AM               Signed           Problem: Patient Care Overview  Goal: Plan of Care Review  Outcome: Ongoing (interventions implemented as appropriate)    12/21/18 0658   Coping/Psychosocial   Plan of Care Reviewed With patient   Plan of Care Review   Progress improving   OTHER   Outcome Summary VSS, Oxycodone for back pain, IV antibiotics, continue IVF, labs in am, will continue to monitor                     Angelina Hargrove      Case Management   Progress Notes   Signed   Date of Service:  12/19/2018  1:49 PM               Signed                 Continued Stay Note  [facility]     Patient Name: Swetha Luis              MRN: [id number]  Today's Date: 12/19/2018                   Admit Date: 12/18/2018          Discharge Plan      Row Name 12/19/18 1342           Plan     Plan  Return to [facility]      Patient/Family in Agreement with Plan  yes     Plan Comments  Spoke with Monse/Zenaida who states pt has skilled bed with Medicaid bedhold and can return.  JACQUIE Hargrove RN     Row Name 12/19/18 1149           Plan     Plan  Return to [facility]      Patient/Family in Agreement with Plan  yes     Plan Comments  [facility] pt at bedside.  Introduced self, explained CCP role, facesheet verified.  Pt states she has been at [facility].  Could not tell me if it was for rehab or long term care.  Spoke with Monse/Zenaida who will check on bed status and notify CCP.  Pt states she has been back and forth from [facility] to rehab facilities for 5  months.  Had been at a facility in [place] but could not remember name of facility.   Has BSC, walker, and rollator at home.  Pt states she will return to [facility] and her  can transport but she does not have O2 tank.  JACQUIE Hargrove RN

## 2018-12-21 NOTE — PROGRESS NOTES
Continued Stay Note  [facility]     Patient Name: Swetha Luis  MRN: [id number]  Today's Date: 12/21/2018    Admit Date: 12/18/2018    Discharge Plan     Row Name 12/21/18 1206       Plan    Plan  Return to [facility]    Patient/Family in Agreement with Plan  yes    Plan Comments  Spoke with Monse/Zenaida who states pt can return unless pt needs to continue daptomycin.   Discussed with pt who confirms plan.  Family will transport when discharged.  Monse states they will need 30 day.  Signed 30 day on chart.  Will watch for antibiotic plan.  JACQUIE Hargrove RN        Discharge Codes    No documentation.             Angelina Hargrove

## 2018-12-21 NOTE — NURSING NOTE
[facility] follow-up.  Patient is awake RIB, she is pleasant and cooperative.  She is talkative.  She complains of generalized pain but stated this is better since her pain medication was changed from Percocet to Dilaudid.  She talked about her significant medical history that started in July after she fell at home, developed and abscess became septic and went into a coma.  She stated being in a coma till November.  She reported having added stress of current pain and having to move from her apartment as her lease will be up in November.      She stated seeing Dr. Varma in the past and will follow-up with her office after discharge as she is unsure if she is still practicing.      She denied anxiety and depression at this time.  She denied SI stating she has to live for her grandchildren.  She reports history of anxiety with panic attacks 3-4 x day, but this has improved.  She is open to receiving resources for outpatient therapy.  Provided her with phone number for [facility].  Notified RN, Angela, of the same.    AC will continue to follow.

## 2018-12-21 NOTE — PROGRESS NOTES
"   LOS: 0 days   Patient Care Team:  Bettye Suarez MD as PCP - General (Internal Medicine)  Provider, No Known as PCP - Family Medicine    Chief Complaint/ Reason for encounter: Acute renal failure, hypokalemia        Subjective     Medical history reviewed:  History of Present Illness    Subjective:  Symptoms:  Improved.  No shortness of breath, chest pain or weakness.  (No new complaints today, feels somewhat better).    Diet:  Adequate intake.    Activity level: Returning to normal.    Pain:  She reports no pain.          History taken from: Patient and chart    Objective     Vital Signs  Temp:  [97.9 °F (36.6 °C)-98.1 °F (36.7 °C)] 98 °F (36.7 °C)  Heart Rate:  [] 80  Resp:  [18-24] 18  BP: (107-161)/(65-91) 155/79       Wt Readings from Last 1 Encounters:   12/18/18 2000 71.8 kg (158 lb 4.8 oz)       Objective:  General Appearance:  Comfortable, well-appearing, in no acute distress and not in pain.    Vital signs: (most recent): Blood pressure 155/79, pulse 80, temperature 98 °F (36.7 °C), temperature source Oral, resp. rate 18, height 167.6 cm (66\"), weight 71.8 kg (158 lb 4.8 oz), SpO2 99 %.  Vital signs are normal.  No fever.    Output: Producing urine.    HEENT: Normal HEENT exam.  (Right upper chest incision dressed)    Lungs:  Normal effort and normal respiratory rate.  Breath sounds clear to auscultation.  No rales or rhonchi.    Heart: Normal rate.  Regular rhythm.  S1 normal.    Abdomen: Abdomen is soft.  Bowel sounds are normal.   There is no abdominal tenderness.     Extremities: Normal range of motion.  There is deformity.  There is no dependent edema.  (Right foot bandaged)  Neurological: Patient is alert and oriented to person, place and time.    Skin:  Warm and dry.  No rash or cyanosis.             Results Review:    Intake/Output:     Intake/Output Summary (Last 24 hours) at 12/21/2018 0788  Last data filed at 12/21/2018 1200  Gross per 24 hour   Intake 1740 ml   Output 3200 ml   Net " -1460 ml         DATA:  Radiology and Labs:  The following labs independently reviewed by me. Additional labs ordered for tomorrow a.m.  Interval notes, chart personally reviewed by me.   Old records independently reviewed showing previous records from [facility] reviewed showing renal failure likely due to antibiotics versus sepsis   Labs:   Recent Results (from the past 24 hour(s))   Renal Function Panel    Collection Time: 12/21/18  6:16 AM   Result Value Ref Range    Glucose 123 (H) 65 - 99 mg/dL    BUN 28 (H) 6 - 20 mg/dL    Creatinine 2.03 (H) 0.57 - 1.00 mg/dL    Sodium 145 136 - 145 mmol/L    Potassium 3.8 3.5 - 5.2 mmol/L    Chloride 111 (H) 98 - 107 mmol/L    CO2 22.2 22.0 - 29.0 mmol/L    Calcium 10.3 8.6 - 10.5 mg/dL    Albumin 3.20 (L) 3.50 - 5.20 g/dL    Phosphorus 3.6 2.5 - 4.5 mg/dL    Anion Gap 11.8 mmol/L    BUN/Creatinine Ratio 13.8 7.0 - 25.0    eGFR Non African Amer 25 (L) >60 mL/min/1.73   CK    Collection Time: 12/21/18  6:16 AM   Result Value Ref Range    Creatine Kinase 30 20 - 180 U/L   CBC Auto Differential    Collection Time: 12/21/18  6:16 AM   Result Value Ref Range    WBC 6.54 4.50 - 10.70 10*3/mm3    RBC 3.54 (L) 3.90 - 5.20 10*6/mm3    Hemoglobin 9.4 (L) 11.9 - 15.5 g/dL    Hematocrit 30.1 (L) 35.6 - 45.5 %    MCV 85.0 80.5 - 98.2 fL    MCH 26.6 (L) 26.9 - 32.0 pg    MCHC 31.2 (L) 32.4 - 36.3 g/dL    RDW 15.7 (H) 11.7 - 13.0 %    RDW-SD 49.0 37.0 - 54.0 fl    MPV 11.0 6.0 - 12.0 fL    Platelets 196 140 - 500 10*3/mm3    Neutrophil % 64.0 42.7 - 76.0 %    Lymphocyte % 22.2 19.6 - 45.3 %    Monocyte % 6.9 5.0 - 12.0 %    Eosinophil % 6.6 (H) 0.3 - 6.2 %    Basophil % 0.3 0.0 - 1.5 %    Immature Grans % 0.3 0.0 - 0.5 %    Neutrophils, Absolute 4.19 1.90 - 8.10 10*3/mm3    Lymphocytes, Absolute 1.45 0.90 - 4.80 10*3/mm3    Monocytes, Absolute 0.45 0.20 - 1.20 10*3/mm3    Eosinophils, Absolute 0.43 0.00 - 0.70 10*3/mm3    Basophils, Absolute 0.02 0.00 - 0.20 10*3/mm3    Immature Grans,  Absolute 0.02 0.00 - 0.03 10*3/mm3   Doppler Arterial Multi Level Lower Extremity - Bilateral CAR    Collection Time: 12/21/18  8:30 AM   Result Value Ref Range    RIGHT HIGH THIGH SYS  mmHg    RIGHT LOW THIGH SYS  mmHg    RIGHT POPLITEAL SYS  mmHg    RIGHT DORSALIS PEDIS SYS  mmHg    RIGHT POST TIBIAL SYS  mmHg    RIGHT PENELOPE RATIO 1     LEFT HIGH THIGH SYS  mmHg    LEFT LOW THIGH SYS  mmHg    LEFT POPLITEAL SYS  mmHg    LEFT DORSALIS PEDIS SYS  mmHg    LEFT POST TIBIAL SYS  mmHg    LEFT GREAT TOE SYS  mmHg    LEFT 2ND DIGIT SYS  mmHg    LEFT PENELOPE RATIO 1     Upper arterial right arm brachial sys max 137 mmHg       Radiology:  Imaging Results (last 24 hours)     ** No results found for the last 24 hours. **             Medications have been reviewed:  Current Facility-Administered Medications   Medication Dose Route Frequency Provider Last Rate Last Dose   • acetaminophen (TYLENOL) tablet 650 mg  650 mg Oral Q4H PRN Jose Antonio Stack MD   650 mg at 12/20/18 1609   • allopurinol (ZYLOPRIM) tablet 100 mg  100 mg Oral Daily Jose Antonio Stack MD   100 mg at 12/21/18 0848   • arformoterol (BROVANA) nebulizer solution 15 mcg  15 mcg Nebulization BID - RT Dany Baez MD   15 mcg at 12/21/18 1121   • artificial tears 83-15 % ophthalmic ointment 1 application  1 application Both Eyes Q12H Jose Antonio Stack MD   1 application at 12/21/18 1640   • atorvastatin (LIPITOR) tablet 40 mg  40 mg Oral Nightly Jose Antonio Stack MD   40 mg at 12/20/18 2236   • clonazePAM (KlonoPIN) tablet 0.5 mg  0.5 mg Oral TID Jose Antonio Stack MD   0.5 mg at 12/21/18 1639    Followed by   • [START ON 1/2/2019] clonazePAM (KlonoPIN) tablet 0.5 mg  0.5 mg Oral BID Jose Antonio Stack MD       • cyclobenzaprine (FLEXERIL) tablet 10 mg  10 mg Oral TID PRN Jose Antonio Stack MD       • DAPTOmycin (CUBICIN) 400 mg in Sodium chloride 0.9 % 50 mL IVPB  6 mg/kg (Adjusted) Intravenous Q24H Malcolm,  MD Rafa   Stopped at 12/20/18 2316   • escitalopram (LEXAPRO) tablet 20 mg  20 mg Oral Daily Jose Antonio Stack MD   20 mg at 12/21/18 0849   • HYDROmorphone (DILAUDID) injection 0.5 mg  0.5 mg Intravenous Q4H PRN Jose Antonio Stack MD   0.5 mg at 12/21/18 1639   • ipratropium-albuterol (DUO-NEB) nebulizer solution 3 mL  3 mL Nebulization Q4H PRN Jose Antonio Stack MD   3 mL at 12/21/18 0301   • metoprolol tartrate (LOPRESSOR) tablet 12.5 mg  12.5 mg Oral Q12H Jose Antonio Stack MD   12.5 mg at 12/21/18 0850   • multivitamin (THERAGRAN) tablet 1 tablet  1 tablet Oral Daily Jose Antonio Stack MD   1 tablet at 12/21/18 0848   • nicotine (NICODERM CQ) 21 MG/24HR patch 1 patch  1 patch Transdermal Q24H Jose Antonio Stack MD   1 patch at 12/21/18 0848   • pantoprazole (PROTONIX) EC tablet 40 mg  40 mg Oral Q AM Jose Antonio Stack MD   40 mg at 12/21/18 0544   • pramipexole (MIRAPEX) tablet 0.25 mg  0.25 mg Oral Nightly Jose Antonio Stack MD   0.25 mg at 12/20/18 2237   • thiamine (VITAMIN B-1) tablet 100 mg  100 mg Oral Daily Jose Antonio Stack MD   100 mg at 12/21/18 0848   • traZODone (DESYREL) tablet 50 mg  50 mg Oral Nightly Jose Antonio Stack MD   50 mg at 12/20/18 2237   • vitamin C (ASCORBIC ACID) tablet 500 mg  500 mg Oral Daily Jose Antonio Stack MD   500 mg at 12/21/18 0849       ASSESSMENT:  acute renal failure, likely vancomycin toxicity, slightly improved today  Chronic hypertension, controlled  Respiratory failure with recent tracheostomy  Dysphagia with recent G-tube placement  Chronic pain syndrome  Osteomyelitis on IV antibiotics  Tracheostomy complication  Hypercalcemia, better  Gangrene right toes       PLAN:   Renal failure likely related to vancomycin toxicity, improved today  Stop IV fluids, encourage oral fluid intake as able  Unremarkable renal ultrasound, urine studies, urine electrolytes  Recommend continuing off vancomycin  On daptomycin per infectious disease  Vascular surgery recommendations noted   Stable for discharge at any time from a renal  standpoint      Eliseo Davis MD   [facility]   Office phone number: [phone]  Answering service phone number: [phone]    12/21/18  4:55 PM      Dictation performed using Dragon dictation [facility]

## 2018-12-21 NOTE — PROGRESS NOTES
"Lower extremity arterial study reviewed.  Patient has normal circulation.  No evidence of peripheral arterial disease.  Her necrotic toes are simply result of vasopressor use while she was admitted at [facility].    Patient needs to continue with care plan as developed at [facility].  She should follow-up with podiatry as recommended there in the discharge plan.  No need for vascular surgery follow-up.  She will ultimately either \"auto amputate\"these toes will require amputation.  There is no reason to involve another new service and her already complex care plan.    Will sign off.  "

## 2018-12-21 NOTE — PROGRESS NOTES
Continued Stay Note  [facility]     Patient Name: Swetha Luis  MRN: [id number]  Today's Date: 12/21/2018    Admit Date: 12/18/2018    Discharge Plan     Row Name 12/21/18 1418       Plan    Plan  Home with [facility]    Patient/Family in Agreement with Plan  yes    Plan Comments  Pt to be on daptomycin at discharge.  [facility] cannot accept on Daptomycin due to cost.  Discussed with pt who states she will return home with .  Has had [facility]  in past.  Spoke with [facility] who states they cannot staff at this time.  Voicemail left for [facility] to follow.  Spoke with [place] [place] who states daptomycin will be covered.  Order noted for night time O2.  Spoke with [facility] who will provide.  If pt does not discharge tomorrow, repeat overnight ox will need to be done.  CCP will follow.  JACQUIE Hargrove RN    Row Name 12/21/18 9661       Plan    Plan  Return to [facility]    Patient/Family in Agreement with Plan  yes    Plan Comments  Spoke with Monse/Zenaida who states pt can return unless pt needs to continue daptomycin.   Discussed with pt who confirms plan.  Family will transport when discharged.  Monse states they will need 30 day.  Signed 30 day on chart.  Will watch for antibiotic plan.  JACQUIE Hargrove RN        Discharge Codes    No documentation.             Angelina Hargrove

## 2018-12-21 NOTE — DISCHARGE PLACEMENT REQUEST
"Neo Spencer (58 y.o. Female)     Date of Birth Social Security Number Address Home Phone MRN    1960  [id number]  [facility] [phone] [id number]    Religious Marital Status          None        Admission Date Admission Type Admitting Provider Attending Provider Department, Room/Bed    12/18/18 Emergency Jose Antonio Stack MD Ahmed, Aftab, MD [address], [address]    Discharge Date Discharge Disposition Discharge Destination                       Attending Provider:  Jose Antonio Stack MD    Allergies:  Hydrocodone, Strawberry, Zithromax [Azithromycin]    Isolation:  Contact   Infection:  MRSA (12/19/18)   Code Status:  CPR    Ht:  167.6 cm (66\")   Wt:  71.8 kg (158 lb 4.8 oz)    Admission Cmt:  None   Principal Problem:  None                Active Insurance as of 12/18/2018     Primary Coverage     Payor Plan Insurance Group Employer/Plan Group    [employer] MEDICAID     Payor Plan Address Payor Plan Phone Number Payor Plan Fax Number Effective Dates    [address] [phone]  11/1/1997 - None Entered    [facility] [id number]       Subscriber Name Subscriber Birth Date Member ID       NEO SPENCER 1960 [id number]                 Emergency Contacts      (Rel.) Home Phone Work Phone Mobile Phone    MATT SPENCER (Spouse) [phone] -- --    TobyShazia (Daughter) -- -- [phone]    Sister, \"Hattie\" (Sister) -- -- [phone]              "

## 2018-12-22 LAB
ALBUMIN SERPL-MCNC: 3.1 G/DL (ref 3.5–5.2)
ANION GAP SERPL CALCULATED.3IONS-SCNC: 11.4 MMOL/L
BUN BLD-MCNC: 21 MG/DL (ref 6–20)
BUN/CREAT SERPL: 11.5 (ref 7–25)
CALCIUM SPEC-SCNC: 10.5 MG/DL (ref 8.6–10.5)
CHLORIDE SERPL-SCNC: 106 MMOL/L (ref 98–107)
CO2 SERPL-SCNC: 22.6 MMOL/L (ref 22–29)
CREAT BLD-MCNC: 1.82 MG/DL (ref 0.57–1)
GFR SERPL CREATININE-BSD FRML MDRD: 29 ML/MIN/1.73
GLUCOSE BLD-MCNC: 99 MG/DL (ref 65–99)
PHOSPHATE SERPL-MCNC: 3.9 MG/DL (ref 2.5–4.5)
POTASSIUM BLD-SCNC: 4.1 MMOL/L (ref 3.5–5.2)
SODIUM BLD-SCNC: 140 MMOL/L (ref 136–145)

## 2018-12-22 PROCEDURE — 94762 N-INVAS EAR/PLS OXIMTRY CONT: CPT

## 2018-12-22 PROCEDURE — 25010000002 DAPTOMYCIN PER 1 MG: Performed by: INTERNAL MEDICINE

## 2018-12-22 PROCEDURE — 94799 UNLISTED PULMONARY SVC/PX: CPT

## 2018-12-22 PROCEDURE — 80069 RENAL FUNCTION PANEL: CPT | Performed by: INTERNAL MEDICINE

## 2018-12-22 PROCEDURE — 25010000002 HYDROMORPHONE PER 4 MG: Performed by: HOSPITALIST

## 2018-12-22 RX ORDER — HYDROCODONE BITARTRATE AND ACETAMINOPHEN 5; 325 MG/1; MG/1
1 TABLET ORAL EVERY 6 HOURS PRN
Status: DISCONTINUED | OUTPATIENT
Start: 2018-12-22 | End: 2018-12-23

## 2018-12-22 RX ORDER — GABAPENTIN 300 MG/1
300 CAPSULE ORAL EVERY 8 HOURS SCHEDULED
Status: DISCONTINUED | OUTPATIENT
Start: 2018-12-22 | End: 2018-12-22

## 2018-12-22 RX ORDER — GABAPENTIN 300 MG/1
300 CAPSULE ORAL EVERY 8 HOURS SCHEDULED
Status: DISCONTINUED | OUTPATIENT
Start: 2018-12-22 | End: 2018-12-23 | Stop reason: HOSPADM

## 2018-12-22 RX ADMIN — CLONAZEPAM 0.5 MG: 0.5 TABLET ORAL at 21:02

## 2018-12-22 RX ADMIN — HYDROMORPHONE HYDROCHLORIDE 0.5 MG: 1 INJECTION, SOLUTION INTRAMUSCULAR; INTRAVENOUS; SUBCUTANEOUS at 04:41

## 2018-12-22 RX ADMIN — HYDROMORPHONE HYDROCHLORIDE 0.5 MG: 1 INJECTION, SOLUTION INTRAMUSCULAR; INTRAVENOUS; SUBCUTANEOUS at 21:02

## 2018-12-22 RX ADMIN — CLONAZEPAM 0.5 MG: 0.5 TABLET ORAL at 08:29

## 2018-12-22 RX ADMIN — ESCITALOPRAM 20 MG: 20 TABLET, FILM COATED ORAL at 08:30

## 2018-12-22 RX ADMIN — Medication 100 MG: at 08:30

## 2018-12-22 RX ADMIN — GABAPENTIN 300 MG: 300 CAPSULE ORAL at 17:29

## 2018-12-22 RX ADMIN — OXYCODONE HYDROCHLORIDE AND ACETAMINOPHEN 500 MG: 500 TABLET ORAL at 08:30

## 2018-12-22 RX ADMIN — ATORVASTATIN CALCIUM 40 MG: 20 TABLET, FILM COATED ORAL at 21:02

## 2018-12-22 RX ADMIN — PANTOPRAZOLE SODIUM 40 MG: 40 TABLET, DELAYED RELEASE ORAL at 06:19

## 2018-12-22 RX ADMIN — METOPROLOL TARTRATE 12.5 MG: 25 TABLET ORAL at 08:30

## 2018-12-22 RX ADMIN — Medication 1 TABLET: at 08:30

## 2018-12-22 RX ADMIN — ACETAMINOPHEN 650 MG: 325 TABLET, FILM COATED ORAL at 00:38

## 2018-12-22 RX ADMIN — DAPTOMYCIN 400 MG: 500 INJECTION, POWDER, LYOPHILIZED, FOR SOLUTION INTRAVENOUS at 17:29

## 2018-12-22 RX ADMIN — MINERAL OIL AND WHITE PETROLATUM 1 APPLICATION: 150; 830 OINTMENT OPHTHALMIC at 16:48

## 2018-12-22 RX ADMIN — TRAZODONE HYDROCHLORIDE 50 MG: 50 TABLET ORAL at 21:02

## 2018-12-22 RX ADMIN — CYCLOBENZAPRINE 10 MG: 10 TABLET, FILM COATED ORAL at 16:46

## 2018-12-22 RX ADMIN — HYDROMORPHONE HYDROCHLORIDE 0.5 MG: 1 INJECTION, SOLUTION INTRAMUSCULAR; INTRAVENOUS; SUBCUTANEOUS at 14:11

## 2018-12-22 RX ADMIN — METOPROLOL TARTRATE 12.5 MG: 25 TABLET ORAL at 21:03

## 2018-12-22 RX ADMIN — PRAMIPEXOLE DIHYDROCHLORIDE 0.25 MG: 0.25 TABLET ORAL at 21:02

## 2018-12-22 RX ADMIN — ARFORMOTEROL TARTRATE 15 MCG: 15 SOLUTION RESPIRATORY (INHALATION) at 07:28

## 2018-12-22 RX ADMIN — CLONAZEPAM 0.5 MG: 0.5 TABLET ORAL at 16:10

## 2018-12-22 RX ADMIN — MINERAL OIL AND WHITE PETROLATUM 1 APPLICATION: 150; 830 OINTMENT OPHTHALMIC at 04:42

## 2018-12-22 RX ADMIN — HYDROMORPHONE HYDROCHLORIDE 0.5 MG: 1 INJECTION, SOLUTION INTRAMUSCULAR; INTRAVENOUS; SUBCUTANEOUS at 10:04

## 2018-12-22 RX ADMIN — HYDROCODONE BITARTRATE AND ACETAMINOPHEN 1 TABLET: 5; 325 TABLET ORAL at 16:10

## 2018-12-22 RX ADMIN — ALLOPURINOL 100 MG: 100 TABLET ORAL at 08:30

## 2018-12-22 NOTE — NURSING NOTE
Pt is crying without tears, c/o wanting to go home, c/o pain.  Per RN this behavior occurred during the night, also.  RN giving pain med.  Clonazepam reduced by half when admitted.  Pt has had difficult time coping, has been dependent on others.  This clinician saw pt in 2014, that note reviewed.  Pt has been given psych referrals.  Pt states there are people at home to help her.

## 2018-12-22 NOTE — PROGRESS NOTES
LOS: 1 day   Patient Care Team:  Bettye Suarez MD as PCP - General (Internal Medicine)  Provider, No Known as PCP - Family Medicine    Chief Complaint: acute renal failure    Subjective     History of Present Illness    Subjective    History taken from: patient chart follow up of acute renal failure from vanc toxicity    Objective     Vital Signs  Temp:  [97.9 °F (36.6 °C)-98.1 °F (36.7 °C)] 98 °F (36.7 °C)  Heart Rate:  [] 83  Resp:  [18-20] 20  BP: (142-161)/(79-99) 148/99    Objective  General Appearance:  In no acute distress.    HEENT: Normal HEENT exam.    Lungs:  Normal respiratory rate and normal effort.  He is not in respiratory distress.  Breath sounds clear to auscultation.  No wheezes, rales or rhonchi.    Heart: Normal rate.  Regular rhythm.  S1 normal and S2 normal.  No murmur or gallop.   Chest: symmetric chest wall expansion.   Extremities: Normal range of motion.  There is no deformity, effusion or dependent edema.    Neurological: Patient is alert   Skin:  Warm and dry.  No rash.   Abdomen: Abdomen is soft and non-distended.    Results Review:     I reviewed the patient's new clinical results.    Medication Review:   Scheduled Meds:  allopurinol 100 mg Oral Daily   arformoterol 15 mcg Nebulization BID - RT   artificial tears 1 application Both Eyes Q12H   atorvastatin 40 mg Oral Nightly   clonazePAM 0.5 mg Oral TID   Followed by      [START ON 1/2/2019] clonazePAM 0.5 mg Oral BID   DAPTOmycin 6 mg/kg (Adjusted) Intravenous Q24H   escitalopram 20 mg Oral Daily   metoprolol tartrate 12.5 mg Oral Q12H   multivitamin 1 tablet Oral Daily   nicotine 1 patch Transdermal Q24H   pantoprazole 40 mg Oral Q AM   pramipexole 0.25 mg Oral Nightly   thiamine 100 mg Oral Daily   traZODone 50 mg Oral Nightly   vitamin C 500 mg Oral Daily     Continuous Infusions:   PRN Meds:.•  acetaminophen  •  cyclobenzaprine  •  HYDROmorphone  •  ipratropium-albuterol    Assessment/Plan       Tracheostomy complication  (CMS/HCC)    Gangrene of toe (CMS/HCC)      Assessment & Plan  1. Acute renal failure  2. vanc toxicity  3. Osteomyelitis  4. htn  5. Chronic resp failure    Patient seen and examined. Labs reviewed. With acute renal failure from vanc toxicity.  Renal function improving with cr of 1.8.  Off ivf. C/o neck pain. No changes at this time. Will monitor        Eloina Young MD  12/22/18  7:51 AM

## 2018-12-22 NOTE — PLAN OF CARE
Problem: Patient Care Overview  Goal: Plan of Care Review  Outcome: Ongoing (interventions implemented as appropriate)   12/22/18 0120   Coping/Psychosocial   Plan of Care Reviewed With patient   Plan of Care Review   Progress improving   OTHER   Outcome Summary Monitor pain,labs,and vitals. IV ABX, PRN pain medications for chronic back pain per orders. O2 2-3L NC. Will cont. to monitor.     Goal: Individualization and Mutuality  Outcome: Ongoing (interventions implemented as appropriate)    Goal: Discharge Needs Assessment  Outcome: Ongoing (interventions implemented as appropriate)    Goal: Interprofessional Rounds/Family Conf  Outcome: Ongoing (interventions implemented as appropriate)      Problem: Fall Risk (Adult)  Goal: Identify Related Risk Factors and Signs and Symptoms  Outcome: Outcome(s) achieved Date Met: 12/22/18 12/22/18 0120   Fall Risk (Adult)   Related Risk Factors (Fall Risk) gait/mobility problems;culprit medication(s)   Signs and Symptoms (Fall Risk) presence of risk factors     Goal: Absence of Fall  Outcome: Ongoing (interventions implemented as appropriate)   12/22/18 0120   Fall Risk (Adult)   Absence of Fall making progress toward outcome       Problem: Pain, Acute (Adult)  Goal: Identify Related Risk Factors and Signs and Symptoms  Outcome: Outcome(s) achieved Date Met: 12/22/18 12/22/18 0120   Pain, Acute (Adult)   Related Risk Factors (Acute Pain) knowledge deficit;patient perception;disease process   Signs and Symptoms (Acute Pain) verbalization of pain descriptors     Goal: Acceptable Pain Control/Comfort Level  Outcome: Ongoing (interventions implemented as appropriate)   12/22/18 0120   Pain, Acute (Adult)   Acceptable Pain Control/Comfort Level making progress toward outcome       Problem: Skin Injury Risk (Adult)  Goal: Identify Related Risk Factors and Signs and Symptoms  Outcome: Outcome(s) achieved Date Met: 12/22/18 12/22/18 0120   Skin Injury Risk (Adult)   Related Risk  Factors (Skin Injury Risk) mobility impaired     Goal: Skin Health and Integrity  Outcome: Ongoing (interventions implemented as appropriate)   12/22/18 0120   Skin Injury Risk (Adult)   Skin Health and Integrity making progress toward outcome

## 2018-12-22 NOTE — PROGRESS NOTES
LOS: 1 day   Patient Care Team:  Bettye Suarez MD as PCP - General (Internal Medicine)  Provider, No Known as PCP - Family Medicine    Subjective     Patient is crying she hurts she can't breathe ever bodies ignoring her she just wants to go home she doesn't need more antibiotics    Review of Systems:         Objective     Vital Signs  Vital Sign Min/Max for last 24 hours  Temp  Min: 98 °F (36.7 °C)  Max: 98.3 °F (36.8 °C)   BP  Min: 142/92  Max: 165/102   Pulse  Min: 80  Max: 99   Resp  Min: 18  Max: 21   SpO2  Min: 92 %  Max: 100 %   Flow (L/min)  Min: 2  Max: 3   No Data Recorded        Ventilator/Non-Invasive Ventilation Settings (From admission, onward)    None                       Body mass index is 25.55 kg/m².  I/O last 3 completed shifts:  In: 2230 [P.O.:1330; I.V.:900]  Out: 7150 [Urine:7150]  I/O this shift:  In: 220 [P.O.:220]  Out: 350 [Urine:350]        Physical Exam:  Is lying in bed crying wanting her pain medication.  Chest: Clear good air movement no wheezes or rales  Neck her tracheal ostomy site has almost completely closed when she coughed I heard a little squeaky air several little piece of granulation tissue sticking out that I think is preventing it from closing completely    Labs:  Results from last 7 days   Lab Units  12/22/18   0441  12/21/18   0616  12/20/18   0437  12/19/18   1246  12/19/18   0546   GLUCOSE mg/dL  99  123*  92  119*   --    SODIUM mmol/L  140  145  142  140   --    POTASSIUM mmol/L  4.1  3.8  3.4*  3.8   --    CO2 mmol/L  22.6  22.2  24.5  26.4   --    CHLORIDE mmol/L  106  111*  104  100   --    ANION GAP mmol/L  11.4  11.8  13.5  13.6   --    CREATININE mg/dL  1.82*  2.03*  2.06*  2.39*  2.36*   BUN mg/dL  21*  28*  33*  30*   --    BUN / CREAT RATIO   11.5  13.8  16.0  12.6   --    CALCIUM mg/dL  10.5  10.3  10.5  10.7*   --    EGFR IF NONAFRICN AM mL/min/1.73  29*  25*  25*  21*  21*   ALK PHOS U/L   --    --   123*   --    --    TOTAL PROTEIN g/dL   --     --   6.8   --    --    ALT (SGPT) U/L   --    --   12   --    --    AST (SGOT) U/L   --    --   19   --    --    BILIRUBIN mg/dL   --    --   0.2   --    --    ALBUMIN g/dL  3.10*  3.20*  3.20*   --    --    GLOBULIN gm/dL   --    --   3.6   --    --      Estimated Creatinine Clearance: 34.2 mL/min (A) (by C-G formula based on SCr of 1.82 mg/dL (H)).      Results from last 7 days   Lab Units  12/21/18   0616  12/20/18   0437  12/19/18   1246   WBC 10*3/mm3  6.54  6.86  9.07   RBC 10*6/mm3  3.54*  3.69*  3.92   HEMOGLOBIN g/dL  9.4*  9.7*  10.4*   HEMATOCRIT %  30.1*  32.5*  33.0*   MCV fL  85.0  88.1  84.2   MCH pg  26.6*  26.3*  26.5*   MCHC g/dL  31.2*  29.8*  31.5*   RDW %  15.7*  15.8*  15.7*   RDW-SD fl  49.0  51.1  48.0   MPV fL  11.0  11.2  10.8   PLATELETS 10*3/mm3  196  230  231   NEUTROPHIL % %  64.0  58.4  59.7   LYMPHOCYTE % %  22.2  27.8  26.5   MONOCYTES % %  6.9  7.1  7.4   EOSINOPHIL % %  6.6*  6.1  6.0   BASOPHIL % %  0.3  0.6  0.4   IMM GRAN % %  0.3  0.0  0.1   NEUTROS ABS 10*3/mm3  4.19  4.00  5.42   LYMPHS ABS 10*3/mm3  1.45  1.91  2.40   MONOS ABS 10*3/mm3  0.45  0.49  0.67   EOS ABS 10*3/mm3  0.43  0.42  0.54   BASOS ABS 10*3/mm3  0.02  0.04  0.04   IMMATURE GRANS (ABS) 10*3/mm3  0.02  0.00  0.01         Results from last 7 days   Lab Units  12/21/18   0616  12/19/18   1818   CK TOTAL U/L  30  29     Results from last 7 days   Lab Units  12/20/18   0437   PROBNP pg/mL  1,745.0*     Results from last 7 days   Lab Units  12/20/18   0437   TSH mIU/mL  3.210             Microbiology Results (last 10 days)     Procedure Component Value - Date/Time    Eosinophil Smear - Urine, Urine, Clean Catch [[id number]]  (Normal) Collected:  12/19/18 1747    Lab Status:  Final result Specimen:  Urine, Clean Catch Updated:  12/19/18 1941     Eosinophil Smear 0 % EOS/100 Cells                 allopurinol 100 mg Oral Daily   arformoterol 15 mcg Nebulization BID - RT   artificial tears 1 application Both Eyes Q12H    atorvastatin 40 mg Oral Nightly   clonazePAM 0.5 mg Oral TID   Followed by      [START ON 1/2/2019] clonazePAM 0.5 mg Oral BID   DAPTOmycin 6 mg/kg (Adjusted) Intravenous Q24H   escitalopram 20 mg Oral Daily   metoprolol tartrate 12.5 mg Oral Q12H   multivitamin 1 tablet Oral Daily   nicotine 1 patch Transdermal Q24H   pantoprazole 40 mg Oral Q AM   pramipexole 0.25 mg Oral Nightly   silver nitrate  Topical Once   thiamine 100 mg Oral Daily   traZODone 50 mg Oral Nightly   vitamin C 500 mg Oral Daily          Diagnostics:  Us Renal Bilateral    Result Date: 12/19/2018  BILATERAL RENAL ULTRASOUND  CLINICAL HISTORY: Acute renal failure  Transverse and longitudinal images of both kidneys were obtained. There is focal cortical thinning in the polar region of the right kidney consistent with scarring, likely due to previous infection. A tiny cyst is also noted in the upper pole. The right kidney is otherwise unremarkable. There is no hydronephrosis. The right kidney measures 9.8 x 3.4 x 4.1 cm. There are no left kidney is normal in size and shape and shows no hydronephrosis. There is a tiny minimally complex cyst in the upper pole measuring 12 mm in diameter. A tiny 9 mm diameter lower pole cyst is also noted. The left kidney measures 12.0 x 4.4 x 3.8 cm. The urinary bladder could not be imaged due to the presence of Dove catheter.  IMPRESSIONS: No evidence of hydronephrosis. Tiny bilateral renal cysts as noted.  This report was finalized on 12/19/2018 8:51 PM by Dr. Andrea Jones M.D.      Xr Chest Post Cva Port    Result Date: 12/19/2018  ONE VIEW PORTABLE CHEST AT 6:23 AM  HISTORY: PICC line placement.  FINDINGS:  There has been recent insertion of a left-sided PICC line which ends in the SVC. There is mild cardiomegaly and slight vascular congestion and no focal infiltrate.  This report was finalized on 12/19/2018 12:48 PM by Dr. Mike Rivas M.D.               Active Hospital Problems    Diagnosis Date Noted    • Gangrene of toe (CMS/[facility]) [I96] 12/20/2018   • Tracheostomy complication (CMS/[facility]) [J95.00] 12/18/2018      Resolved Hospital Problems   No resolved problems to display.         Assessment/Plan     1. Trach stoma seems to have a small piece of granulation tissue keeping her from completely closing.  I'm going to see if we can get some silver nitrate swabs and treat that granulation tissue.  2. Nocturnal hypoxemia she did spend 26 minutes with her oxygen saturations less than 89% last night she needs supplemental O2 with sleep at 2 L. she will probably need a repeat nocturnal oximetry before she is discharged if she doesn't leave today because it will be too long from discharge.  3. History of sleep apnea with nocturnal desaturation she will need a outpatient sleep study we'll need to wait several weeks at least until her stoma has healed and the patient cannot use a Pap machine until her tracheal stoma has completely healed which may take a couple of weeks or more    Plan for disposition: Pulmonary standpoint she could be discharged any time I have put orders for a sleep study and follow-up in the computer.  Spoke with [facility] regarding oxygen set up.  She's probably need another nocturnal oximetry prior to discharge has to be within 48 hours to qualify    Dany Baez MD  12/22/18  2:33 PM    Time:

## 2018-12-22 NOTE — PROGRESS NOTES
LOS: 1 day   Patient Care Team:  Bettye Suarez MD as PCP - General (Internal Medicine)  Provider, No Known as PCP - Family Medicine    Subjective         Review of Systems:         Objective     Vital Signs  Vital Sign Min/Max for last 24 hours  Temp  Min: 98 °F (36.7 °C)  Max: 98.3 °F (36.8 °C)   BP  Min: 142/92  Max: 165/102   Pulse  Min: 80  Max: 99   Resp  Min: 18  Max: 21   SpO2  Min: 92 %  Max: 100 %   Flow (L/min)  Min: 2  Max: 3   No Data Recorded        Ventilator/Non-Invasive Ventilation Settings (From admission, onward)    None                       Body mass index is 25.55 kg/m².  I/O last 3 completed shifts:  In: 2230 [P.O.:1330; I.V.:900]  Out: 7150 [Urine:7150]  I/O this shift:  In: 220 [P.O.:220]  Out: -         Physical Exam:  Is lying in bed crying wanting her pain medication.  Chest: Clear good air movement no wheezes or rales  Neck her tracheal ostomy site has almost completely closed when she coughed I heard a little squeaky air I couldn't feel it       Labs:  Results from last 7 days   Lab Units  12/22/18   0441  12/21/18   0616  12/20/18   0437  12/19/18   1246  12/19/18   0546   GLUCOSE mg/dL  99  123*  92  119*   --    SODIUM mmol/L  140  145  142  140   --    POTASSIUM mmol/L  4.1  3.8  3.4*  3.8   --    CO2 mmol/L  22.6  22.2  24.5  26.4   --    CHLORIDE mmol/L  106  111*  104  100   --    ANION GAP mmol/L  11.4  11.8  13.5  13.6   --    CREATININE mg/dL  1.82*  2.03*  2.06*  2.39*  2.36*   BUN mg/dL  21*  28*  33*  30*   --    BUN / CREAT RATIO   11.5  13.8  16.0  12.6   --    CALCIUM mg/dL  10.5  10.3  10.5  10.7*   --    EGFR IF NONAFRICN AM mL/min/1.73  29*  25*  25*  21*  21*   ALK PHOS U/L   --    --   123*   --    --    TOTAL PROTEIN g/dL   --    --   6.8   --    --    ALT (SGPT) U/L   --    --   12   --    --    AST (SGOT) U/L   --    --   19   --    --    BILIRUBIN mg/dL   --    --   0.2   --    --    ALBUMIN g/dL  3.10*  3.20*  3.20*   --    --    GLOBULIN gm/dL   --     --   3.6   --    --      Estimated Creatinine Clearance: 34.2 mL/min (A) (by C-G formula based on SCr of 1.82 mg/dL (H)).      Results from last 7 days   Lab Units  12/21/18   0616  12/20/18   0437  12/19/18   1246   WBC 10*3/mm3  6.54  6.86  9.07   RBC 10*6/mm3  3.54*  3.69*  3.92   HEMOGLOBIN g/dL  9.4*  9.7*  10.4*   HEMATOCRIT %  30.1*  32.5*  33.0*   MCV fL  85.0  88.1  84.2   MCH pg  26.6*  26.3*  26.5*   MCHC g/dL  31.2*  29.8*  31.5*   RDW %  15.7*  15.8*  15.7*   RDW-SD fl  49.0  51.1  48.0   MPV fL  11.0  11.2  10.8   PLATELETS 10*3/mm3  196  230  231   NEUTROPHIL % %  64.0  58.4  59.7   LYMPHOCYTE % %  22.2  27.8  26.5   MONOCYTES % %  6.9  7.1  7.4   EOSINOPHIL % %  6.6*  6.1  6.0   BASOPHIL % %  0.3  0.6  0.4   IMM GRAN % %  0.3  0.0  0.1   NEUTROS ABS 10*3/mm3  4.19  4.00  5.42   LYMPHS ABS 10*3/mm3  1.45  1.91  2.40   MONOS ABS 10*3/mm3  0.45  0.49  0.67   EOS ABS 10*3/mm3  0.43  0.42  0.54   BASOS ABS 10*3/mm3  0.02  0.04  0.04   IMMATURE GRANS (ABS) 10*3/mm3  0.02  0.00  0.01         Results from last 7 days   Lab Units  12/21/18   0616  12/19/18   1818   CK TOTAL U/L  30  29     Results from last 7 days   Lab Units  12/20/18   0437   PROBNP pg/mL  1,745.0*     Results from last 7 days   Lab Units  12/20/18   0437   TSH mIU/mL  3.210             Microbiology Results (last 10 days)     Procedure Component Value - Date/Time    Eosinophil Smear - Urine, Urine, Clean Catch [[id number]]  (Normal) Collected:  12/19/18 1747    Lab Status:  Final result Specimen:  Urine, Clean Catch Updated:  12/19/18 1941     Eosinophil Smear 0 % EOS/100 Cells                 allopurinol 100 mg Oral Daily   arformoterol 15 mcg Nebulization BID - RT   artificial tears 1 application Both Eyes Q12H   atorvastatin 40 mg Oral Nightly   clonazePAM 0.5 mg Oral TID   Followed by      [START ON 1/2/2019] clonazePAM 0.5 mg Oral BID   DAPTOmycin 6 mg/kg (Adjusted) Intravenous Q24H   escitalopram 20 mg Oral Daily   metoprolol tartrate  12.5 mg Oral Q12H   multivitamin 1 tablet Oral Daily   nicotine 1 patch Transdermal Q24H   pantoprazole 40 mg Oral Q AM   pramipexole 0.25 mg Oral Nightly   thiamine 100 mg Oral Daily   traZODone 50 mg Oral Nightly   vitamin C 500 mg Oral Daily          Diagnostics:  Us Renal Bilateral    Result Date: 12/19/2018  BILATERAL RENAL ULTRASOUND  CLINICAL HISTORY: Acute renal failure  Transverse and longitudinal images of both kidneys were obtained. There is focal cortical thinning in the polar region of the right kidney consistent with scarring, likely due to previous infection. A tiny cyst is also noted in the upper pole. The right kidney is otherwise unremarkable. There is no hydronephrosis. The right kidney measures 9.8 x 3.4 x 4.1 cm. There are no left kidney is normal in size and shape and shows no hydronephrosis. There is a tiny minimally complex cyst in the upper pole measuring 12 mm in diameter. A tiny 9 mm diameter lower pole cyst is also noted. The left kidney measures 12.0 x 4.4 x 3.8 cm. The urinary bladder could not be imaged due to the presence of Dove catheter.  IMPRESSIONS: No evidence of hydronephrosis. Tiny bilateral renal cysts as noted.  This report was finalized on 12/19/2018 8:51 PM by Dr. Andrea Jones M.D.      Xr Chest Post Cva Port    Result Date: 12/19/2018  ONE VIEW PORTABLE CHEST AT 6:23 AM  HISTORY: PICC line placement.  FINDINGS:  There has been recent insertion of a left-sided PICC line which ends in the SVC. There is mild cardiomegaly and slight vascular congestion and no focal infiltrate.  This report was finalized on 12/19/2018 12:48 PM by Dr. Mike Rivas M.D.               Active Hospital Problems    Diagnosis Date Noted   • Gangrene of toe (CMS/[facility]) [I96] 12/20/2018   • Tracheostomy complication (CMS/[facility]) [J95.00] 12/18/2018      Resolved Hospital Problems   No resolved problems to display.         Assessment/Plan     1. Trach stoma seems to be healing very nicely I think we'll  be completely close in another couple of days.  I don't think any therapy is necessary if it doesn't close completely in a couple of weeks then we might need to do a little silver nitrate to some granulation tissue.  Really don't even see much granulation tissue   2. Nocturnal hypoxemia she did spend 26 minutes with her oxygen saturations less than 89% last night she needs supplemental O2 with sleep at 2 L.  3. History of sleep apnea with nocturnal desaturation she will need a outpatient sleep study we'll need to wait several weeks at least until her stoma has healed and the patient cannot use a Pap machine until her tracheal stoma has completely healed which may take a couple of weeks or more    Plan for disposition: Pulmonary standpoint she could be discharged any time I have put orders for a sleep study and follow-up in the computer.  Spoke with [facility] regarding oxygen set up.    Dany Baez MD  12/22/18  2:22 PM    Time:

## 2018-12-22 NOTE — PLAN OF CARE
Problem: Patient Care Overview  Goal: Plan of Care Review  Outcome: Ongoing (interventions implemented as appropriate)   18 1084   Coping/Psychosocial   Plan of Care Reviewed With patient   Plan of Care Review   Progress no change   OTHER   Outcome Summary vss, pain with complaints of severe generalized pain even with iv narcotics, manic/anxious, klonopin given 3 times a day scheduled, psychiatry to see r/t hopelessness and depression, family overbearing and demanding accusing staff of not caring for their family members pain . ready for discharge when okay with hospitalist, oxygen set up with [facility] overnight oximetry ordered for this pm as qualification will  tonight, [facility] infusion following patient and will accept. continue to monitor

## 2018-12-22 NOTE — PROGRESS NOTES
"Daily progress note    Chief complaint  Doing better  No new complaints  Wants her G-tube to be removed    History of present illness  58-year-old white female with history of COPD chronic pain syndrome who is a nursing home resident who has had tracheostomy and G-tube placement 3 months ago brought to the emergency room when she put her trach out.  Patient is fully alert oriented eating and does not want her trach to be placed back.  Patient denies any shortness of breath.  Patient also wants his G-tube out.  Patient has no other complaint but hurting all over and wants her pain medications.     REVIEW OF SYSTEMS  Review of Systems   Constitutional: Negative for chills and fever.        (+) trach replacement   HENT: Negative for sore throat.         (+) jaw pain   Respiratory: Negative for shortness of breath.    Cardiovascular: Negative for chest pain.   Gastrointestinal: Negative for nausea and vomiting.   Genitourinary: Negative for dysuria.   Musculoskeletal: Positive for neck pain. Negative for back pain.   Skin: Negative for rash.   Neurological: Negative for dizziness.   Psychiatric/Behavioral: The patient is not nervous/anxious.       PHYSICAL EXAM  Blood pressure 145/92, pulse 92, temperature 98.3 °F (36.8 °C), temperature source Oral, resp. rate 20, height 167.6 cm (66\"), weight 71.8 kg (158 lb 4.8 oz), SpO2 100 %.    Constitutional: No distress.   Head: Normocephalic and atraumatic.   Mouth/Throat: Oropharynx is clear and moist.   Eyes: Conjunctivae are normal.   Neck: There is a relatively small trach stoma without drainage or bleeding.    Cardiovascular: Normal rate and regular rhythm.   Pulmonary/Chest: Breath sounds normal. No respiratory distress.   Pt's O2 sats are 100% on 2L. Respirations unlabored    Abdominal: There is no tenderness.  G-tube in place   Musculoskeletal: She exhibits no edema or tenderness.   Neurological: She is alert.   Skin: No rash noted.     LAB RESULTS  Lab Results (last 24 " hours)     Procedure Component Value Units Date/Time    Renal Function Panel [[id number]]  (Abnormal) Collected:  12/22/18 0441    Specimen:  Blood Updated:  12/22/18 0542     Glucose 99 mg/dL      BUN 21 mg/dL      Creatinine 1.82 mg/dL      Sodium 140 mmol/L      Potassium 4.1 mmol/L      Chloride 106 mmol/L      CO2 22.6 mmol/L      Calcium 10.5 mg/dL      Albumin 3.10 g/dL      Phosphorus 3.9 mg/dL      Anion Gap 11.4 mmol/L      BUN/Creatinine Ratio 11.5     eGFR Non African Amer 29 mL/min/1.73         Imaging Results (last 24 hours)     ** No results found for the last 24 hours. **          Current Facility-Administered Medications:   •  acetaminophen (TYLENOL) tablet 650 mg, 650 mg, Oral, Q4H PRN, Jose Antonio Stack MD, 650 mg at 12/22/18 0038  •  allopurinol (ZYLOPRIM) tablet 100 mg, 100 mg, Oral, Daily, Jose Antonio Stack MD, 100 mg at 12/22/18 0830  •  arformoterol (BROVANA) nebulizer solution 15 mcg, 15 mcg, Nebulization, BID - RT, Dany Baez MD, 15 mcg at 12/22/18 0728  •  artificial tears 83-15 % ophthalmic ointment 1 application, 1 application, Both Eyes, Q12H, Jose Antonio Stack MD, 1 application at 12/22/18 0442  •  atorvastatin (LIPITOR) tablet 40 mg, 40 mg, Oral, Nightly, Jose Antonio Stack MD, 40 mg at 12/21/18 2109  •  clonazePAM (KlonoPIN) tablet 0.5 mg, 0.5 mg, Oral, TID, 0.5 mg at 12/22/18 0829 **FOLLOWED BY** [START ON 1/2/2019] clonazePAM (KlonoPIN) tablet 0.5 mg, 0.5 mg, Oral, BID, Jose Antonio Stack MD  •  cyclobenzaprine (FLEXERIL) tablet 10 mg, 10 mg, Oral, TID PRN, Jose Antonio Stack MD  •  DAPTOmycin (CUBICIN) 400 mg in Sodium chloride 0.9 % 50 mL IVPB, 6 mg/kg (Adjusted), Intravenous, Q24H, Rafa Fowler MD, Last Rate: 100 mL/hr at 12/21/18 1900, 400 mg at 12/21/18 1900  •  escitalopram (LEXAPRO) tablet 20 mg, 20 mg, Oral, Daily, Jose Antonio Stack MD, 20 mg at 12/22/18 0830  •  HYDROmorphone (DILAUDID) injection 0.5 mg, 0.5 mg, Intravenous, Q4H PRN, Jose Antonio Stack MD, 0.5 mg at 12/22/18 1411  •   ipratropium-albuterol (DUO-NEB) nebulizer solution 3 mL, 3 mL, Nebulization, Q4H PRN, Cherri Stack MD, 3 mL at 12/21/18 2005  •  metoprolol tartrate (LOPRESSOR) tablet 12.5 mg, 12.5 mg, Oral, Q12H, Cherri Stack MD, 12.5 mg at 12/22/18 0830  •  multivitamin (THERAGRAN) tablet 1 tablet, 1 tablet, Oral, Daily, Cherri Stack MD, 1 tablet at 12/22/18 0830  •  nicotine (NICODERM CQ) 21 MG/24HR patch 1 patch, 1 patch, Transdermal, Q24H, Cherri Stack MD, 1 patch at 12/22/18 0830  •  pantoprazole (PROTONIX) EC tablet 40 mg, 40 mg, Oral, Q AM, Cherri Stack MD, 40 mg at 12/22/18 0619  •  pramipexole (MIRAPEX) tablet 0.25 mg, 0.25 mg, Oral, Nightly, Chreri Stack MD, 0.25 mg at 12/21/18 2109  •  silver nitrate 75-25 % applicator, , Topical, Once, Dany Baez MD  •  thiamine (VITAMIN B-1) tablet 100 mg, 100 mg, Oral, Daily, Cherri Stack MD, 100 mg at 12/22/18 0830  •  traZODone (DESYREL) tablet 50 mg, 50 mg, Oral, Nightly, Cherri Stack MD, 50 mg at 12/21/18 2109  •  vitamin C (ASCORBIC ACID) tablet 500 mg, 500 mg, Oral, Daily, Cherri Stack MD, 500 mg at 12/22/18 0830     ASSESSMENT  Acute kidney injury  COPD  Hypertension  Gastroesophageal reflux disease  Status post recent respiratory failure  Status post trach which she removed and will close in 1-2 weeks  Status post G-tube placement  Anxiety disorder  Depression  Chronic pain syndrome  Peripheral vascular disease    PLAN  CPM  IV fluid STOP  IV antibiotics per infectious disease  GI consult to remove G-tube  Continue nursing home medication and adjust the doses  Stress ulcer DVT prophylaxis  Supportive care  Discussed with her   Discharge once arrangement made for outpatient daptomycin for 6 weeks    CHERRI STACK MD

## 2018-12-22 NOTE — PROGRESS NOTES
"  Infectious Diseases Progress Note    Rafa Fowler MD     [facility]  [place]: 1 day  Patient Identification:  Name: Swetha Luis  Age: 58 y.o.  Sex: female  :  1960  MRN: [id number]         Primary Care Physician: Bettye Suarez MD            Subjective: Doesn't want to engage and answered questions regarding her symptoms.  She'll obviously crying and really wants to go home.  Interval History: See consultation note    Objective:    Scheduled Meds:    allopurinol 100 mg Oral Daily   arformoterol 15 mcg Nebulization BID - RT   artificial tears 1 application Both Eyes Q12H   atorvastatin 40 mg Oral Nightly   clonazePAM 0.5 mg Oral TID   Followed by      [START ON 2019] clonazePAM 0.5 mg Oral BID   DAPTOmycin 6 mg/kg (Adjusted) Intravenous Q24H   escitalopram 20 mg Oral Daily   metoprolol tartrate 12.5 mg Oral Q12H   multivitamin 1 tablet Oral Daily   nicotine 1 patch Transdermal Q24H   pantoprazole 40 mg Oral Q AM   pramipexole 0.25 mg Oral Nightly   thiamine 100 mg Oral Daily   traZODone 50 mg Oral Nightly   vitamin C 500 mg Oral Daily     Continuous Infusions:     Vital signs in last 24 hours:  Temp:  [97.9 °F (36.6 °C)-98.1 °F (36.7 °C)] 98 °F (36.7 °C)  Heart Rate:  [74-99] 92  Resp:  [18-21] 20  BP: (142-165)/() 145/92    Intake/Output:    Intake/Output Summary (Last 24 hours) at 2018 1134  Last data filed at 2018 0645  Gross per 24 hour   Intake 970 ml   Output 4550 ml   Net -3580 ml       Exam:  /92   Pulse 92   Temp 98 °F (36.7 °C) (Oral)   Resp 20   Ht 167.6 cm (66\")   Wt 71.8 kg (158 lb 4.8 oz)   SpO2 100%   BMI 25.55 kg/m²     General Appearance:    Alert, cooperative, no distress, AAOx3                          Head:    Normocephalic, without obvious abnormality, atraumatic                           Eyes:    PERRL, conjunctivae/corneas clear, EOM's intact, both eyes                         Throat:   Lips, tongue, gums normal; oral mucosa pink and " moist                           Neck:   Excite is no inflammation                         Lungs:    Clear to auscultation bilaterally, respirations unlabored                 Chest Wall:    Right sternoclavicular joint area surgical site is clean                          Heart:    Regular rate and rhythm, S1 and S2 normal                  Abdomen:     Soft, non-tender, bowel sounds active, no masses, no                                                        organomegaly                  Extremities:   Extremities normal, atraumatic, no cyanosis or edema                        Pulses:   Pulses palpable in all extremities                            Skin:   Skin is warm and dry,  no rashes or palpable lesions                Data Review:    I reviewed the patient's new clinical results.  Results from last 7 days   Lab Units  12/21/18   0616  12/20/18   0437  12/19/18   1246   WBC 10*3/mm3  6.54  6.86  9.07   HEMOGLOBIN g/dL  9.4*  9.7*  10.4*   PLATELETS 10*3/mm3  196  230  231     Results from last 7 days   Lab Units  12/22/18   0441  12/21/18   0616  12/20/18   0437  12/19/18   1246  12/19/18   0546   SODIUM mmol/L  140  145  142  140   --    POTASSIUM mmol/L  4.1  3.8  3.4*  3.8   --    CHLORIDE mmol/L  106  111*  104  100   --    CO2 mmol/L  22.6  22.2  24.5  26.4   --    BUN mg/dL  21*  28*  33*  30*   --    CREATININE mg/dL  1.82*  2.03*  2.06*  2.39*  2.36*   CALCIUM mg/dL  10.5  10.3  10.5  10.7*   --    GLUCOSE mg/dL  99  123*  92  119*   --          Assessment:  1-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  2-chronic respiratory failure requiring tracheostomy and PEG tube placement dislodgment and able to maintain good oxygen saturation despite lack of  tracheostomy management as per pulmonary service  3-COPD with obstructive sleep apnea  4-recent prolonged ICU stay requiring mechanical ventilator and pressors resulting in digital ischemia of the lower extremities  5-history of IV drug abuse  6-acute on chronic renal failure multifactorial including use of vancomycin  7-chronic anemia  8-critical illness and deconditioning         Recommendation:  Continue daptomycin.  Upon reviewing the database from [facility] and it looks like the end of the treatment was supposed to be 1/20/2019.    Rafa Fowler MD  12/22/2018  11:34 AM    Much of this encounter note is an electronic transcription/translation of spoken language to printed text. The electronic translation of spoken language may permit erroneous, or at times, nonsensical words or phrases to be inadvertently transcribed; Although I have reviewed the note for such errors, some may still exist

## 2018-12-23 VITALS
OXYGEN SATURATION: 92 % | BODY MASS INDEX: 25.44 KG/M2 | HEIGHT: 66 IN | TEMPERATURE: 98 F | SYSTOLIC BLOOD PRESSURE: 112 MMHG | RESPIRATION RATE: 20 BRPM | HEART RATE: 90 BPM | WEIGHT: 158.3 LBS | DIASTOLIC BLOOD PRESSURE: 65 MMHG

## 2018-12-23 LAB
ALBUMIN SERPL-MCNC: 3.1 G/DL (ref 3.5–5.2)
ANION GAP SERPL CALCULATED.3IONS-SCNC: 13 MMOL/L
BUN BLD-MCNC: 21 MG/DL (ref 6–20)
BUN/CREAT SERPL: 11.4 (ref 7–25)
CALCIUM SPEC-SCNC: 10.3 MG/DL (ref 8.6–10.5)
CHLORIDE SERPL-SCNC: 104 MMOL/L (ref 98–107)
CO2 SERPL-SCNC: 23 MMOL/L (ref 22–29)
CREAT BLD-MCNC: 1.84 MG/DL (ref 0.57–1)
GFR SERPL CREATININE-BSD FRML MDRD: 28 ML/MIN/1.73
GLUCOSE BLD-MCNC: 141 MG/DL (ref 65–99)
PHOSPHATE SERPL-MCNC: 3.3 MG/DL (ref 2.5–4.5)
POTASSIUM BLD-SCNC: 3.6 MMOL/L (ref 3.5–5.2)
SODIUM BLD-SCNC: 140 MMOL/L (ref 136–145)

## 2018-12-23 PROCEDURE — 94799 UNLISTED PULMONARY SVC/PX: CPT

## 2018-12-23 PROCEDURE — 25010000002 HYDROMORPHONE PER 4 MG: Performed by: HOSPITALIST

## 2018-12-23 PROCEDURE — 25010000002 DAPTOMYCIN PER 1 MG: Performed by: INTERNAL MEDICINE

## 2018-12-23 PROCEDURE — 97162 PT EVAL MOD COMPLEX 30 MIN: CPT | Performed by: PHYSICAL THERAPIST

## 2018-12-23 PROCEDURE — 80069 RENAL FUNCTION PANEL: CPT | Performed by: INTERNAL MEDICINE

## 2018-12-23 PROCEDURE — 99252 IP/OBS CONSLTJ NEW/EST SF 35: CPT | Performed by: INTERNAL MEDICINE

## 2018-12-23 PROCEDURE — 97116 GAIT TRAINING THERAPY: CPT | Performed by: PHYSICAL THERAPIST

## 2018-12-23 RX ORDER — ALLOPURINOL 100 MG/1
100 TABLET ORAL DAILY
Qty: 30 TABLET | Refills: 0 | Status: ON HOLD | OUTPATIENT
Start: 2018-12-24 | End: 2018-12-24

## 2018-12-23 RX ORDER — IPRATROPIUM BROMIDE AND ALBUTEROL SULFATE 2.5; .5 MG/3ML; MG/3ML
3 SOLUTION RESPIRATORY (INHALATION)
Status: COMPLETED | OUTPATIENT
Start: 2018-12-23 | End: 2018-12-23

## 2018-12-23 RX ORDER — ATORVASTATIN CALCIUM 40 MG/1
40 TABLET, FILM COATED ORAL NIGHTLY
Qty: 30 TABLET | Refills: 0 | Status: SHIPPED | OUTPATIENT
Start: 2018-12-23 | End: 2019-01-22

## 2018-12-23 RX ORDER — HYDROCODONE BITARTRATE AND ACETAMINOPHEN 5; 325 MG/1; MG/1
1 TABLET ORAL EVERY 6 HOURS PRN
Qty: 20 TABLET | Refills: 0 | Status: ON HOLD | OUTPATIENT
Start: 2018-12-23 | End: 2019-01-01

## 2018-12-23 RX ADMIN — HYDROMORPHONE HYDROCHLORIDE 0.5 MG: 1 INJECTION, SOLUTION INTRAMUSCULAR; INTRAVENOUS; SUBCUTANEOUS at 10:53

## 2018-12-23 RX ADMIN — CLONAZEPAM 0.5 MG: 0.5 TABLET ORAL at 08:17

## 2018-12-23 RX ADMIN — HYDROCODONE BITARTRATE AND ACETAMINOPHEN 1 TABLET: 5; 325 TABLET ORAL at 08:17

## 2018-12-23 RX ADMIN — Medication 1 TABLET: at 08:17

## 2018-12-23 RX ADMIN — ARFORMOTEROL TARTRATE 15 MCG: 15 SOLUTION RESPIRATORY (INHALATION) at 06:29

## 2018-12-23 RX ADMIN — IPRATROPIUM BROMIDE AND ALBUTEROL SULFATE 3 ML: 2.5; .5 SOLUTION RESPIRATORY (INHALATION) at 15:21

## 2018-12-23 RX ADMIN — GABAPENTIN 300 MG: 300 CAPSULE ORAL at 05:04

## 2018-12-23 RX ADMIN — DAPTOMYCIN 400 MG: 500 INJECTION, POWDER, LYOPHILIZED, FOR SOLUTION INTRAVENOUS at 16:00

## 2018-12-23 RX ADMIN — HYDROMORPHONE HYDROCHLORIDE 0.5 MG: 1 INJECTION, SOLUTION INTRAMUSCULAR; INTRAVENOUS; SUBCUTANEOUS at 02:14

## 2018-12-23 RX ADMIN — GABAPENTIN 300 MG: 300 CAPSULE ORAL at 14:00

## 2018-12-23 RX ADMIN — Medication 100 MG: at 08:17

## 2018-12-23 RX ADMIN — CLONAZEPAM 0.5 MG: 0.5 TABLET ORAL at 16:30

## 2018-12-23 RX ADMIN — METOPROLOL TARTRATE 12.5 MG: 25 TABLET ORAL at 08:17

## 2018-12-23 RX ADMIN — IPRATROPIUM BROMIDE AND ALBUTEROL SULFATE 3 ML: 2.5; .5 SOLUTION RESPIRATORY (INHALATION) at 02:16

## 2018-12-23 RX ADMIN — ESCITALOPRAM 20 MG: 20 TABLET, FILM COATED ORAL at 08:17

## 2018-12-23 RX ADMIN — OXYCODONE HYDROCHLORIDE AND ACETAMINOPHEN 500 MG: 500 TABLET ORAL at 08:17

## 2018-12-23 RX ADMIN — ALLOPURINOL 100 MG: 100 TABLET ORAL at 08:17

## 2018-12-23 RX ADMIN — CYCLOBENZAPRINE 10 MG: 10 TABLET, FILM COATED ORAL at 02:14

## 2018-12-23 RX ADMIN — PANTOPRAZOLE SODIUM 40 MG: 40 TABLET, DELAYED RELEASE ORAL at 05:04

## 2018-12-23 RX ADMIN — CYCLOBENZAPRINE 10 MG: 10 TABLET, FILM COATED ORAL at 10:53

## 2018-12-23 NOTE — CONSULTS
[facility]  Initial Inpatient Consult Note    Referring Provider: Dr. Bettye Suarez    Reason for Consultation: G-tube removal    Subjective     History of present illness:    58 y.o. female who presented to this institution after she had removed her tracheostomy.  While here she wishes to have her gastrostomy tube removed.  According to her  this was placed at [facility] in August by Dr. Lyn.  Inspection of the tube reveals it does not appear to have a balloon-tip but is not recognized as a standard tube use at this institution.  Given that fact, to safely remove this tube would require endoscopic assessment of the internal bumper to avoid significant trauma by percutaneous removal.  I advised patient discussed this with the physician that place the tube to see if this could be avoided.  They will contact that service and make their decision.  Given that this is an elective procedure I would not push to have it done until communication had been made.    Past Medical History:  Past Medical History:   Diagnosis Date   • Acute osteomyelitis (CMS/HCC)     Right shoulder   • COPD (chronic obstructive pulmonary disease) (CMS/HCC)    • Nontraumatic subarachnoid hemorrhage (CMS/HCC)    • Renal disorder     chronic kidney disease   • Tracheostomy present (CMS/HCC)      Past Surgical History:  Past Surgical History:   Procedure Laterality Date   • TRACHEOSTOMY        Social History:   Social History     Tobacco Use   • Smoking status: Former Smoker     Packs/day: 1.00     Types: Cigarettes     Last attempt to quit: 2018     Years since quittin.4   • Smokeless tobacco: Never Used   Substance Use Topics   • Alcohol use: No     Frequency: Never      Family History:  History reviewed. No pertinent family history.    Home Meds:  Medications Prior to Admission   Medication Sig Dispense Refill Last Dose   • acetaminophen (TYLENOL) 325 MG tablet Take 650 mg by mouth Every 6  (Six) Hours As Needed for Mild Pain .   Past Week at Unknown time   • Ascorbic Acid 500 MG capsule Take  by mouth.   12/17/2018 at Unknown time   • bisacodyl (DULCOLAX) 10 MG suppository Insert 10 mg into the rectum Daily.   Past Month at Unknown time   • budesonide (PULMICORT) 0.5 MG/2ML nebulizer solution Take 0.5 mg by nebulization Daily. Via trach 2 times a day for SOA   12/17/2018 at Unknown time   • chlorhexidine (PERIDEX) 0.12 % solution Apply 15 mL to the mouth or throat 2 (Two) Times a Day. Give 15 mL by mouth two times a day for mouth care   Past Month at Unknown time   • clonazePAM (KlonoPIN) 1 MG tablet Take 1 mg by mouth 2 (Two) Times a Day As Needed for Seizures.   12/17/2018 at Unknown time   • cyclobenzaprine (FLEXERIL) 10 MG tablet Take 10 mg by mouth 3 (Three) Times a Day As Needed for Muscle Spasms.   12/17/2018 at Unknown time   • escitalopram (LEXAPRO) 20 MG tablet Take 20 mg by mouth Daily.   12/17/2018 at Unknown time   • ferrous sulfate 300 (60 Fe) MG/5ML syrup Take 300 mg by mouth Daily. Give 5 mL verbal by mouth two times a day for anemia   Past Month at Unknown time   • ferrous sulfate 325 (65 FE) MG tablet Take 325 mg by mouth Daily With Breakfast.   12/17/2018 at Unknown time   • hydrocortisone 1 % cream Apply  topically to the appropriate area as directed 2 (Two) Times a Day.   Past Month at Unknown time   • ipratropium-albuterol (DUO-NEB) 0.5-2.5 mg/3 ml nebulizer Take 3 mL by nebulization Every 4 (Four) Hours As Needed for Wheezing.   Past Week at Unknown time   • magnesium hydroxide (MILK OF MAGNESIA) 400 MG/5ML suspension Take  by mouth Daily As Needed for Constipation.   Past Month at Unknown time   • metoprolol succinate XL (TOPROL-XL) 100 MG 24 hr tablet Take 100 mg by mouth Daily.   12/17/2018 at Unknown time   • MULTIPLE VITAMINS-MINERALS PO Take  by mouth.   12/17/2018 at Unknown time   • mupirocin (BACTROBAN) 2 % ointment Apply  topically to the appropriate area as directed 3  (Three) Times a Day. Apply to nose topically two times a day for redness   12/17/2018 at Unknown time   • OxyCODONE HCl (OXYCONTIN PO) Take 10 mg by mouth Every 4 (Four) Hours As Needed for Moderate Pain .      • pantoprazole (PROTONIX) 40 MG EC tablet Take 40 mg by mouth Daily.   12/17/2018 at Unknown time   • potassium chloride (KAYCIEL) 20 MEQ/15ML (10%) solution Take  by mouth Daily. Give 7.5 mL by mouth one time a day for supplement   12/17/2018 at Unknown time   • povidone-iodine (BETADINE) 10 % external solution Apply  topically to the appropriate area as directed As Needed for Wound Care (apply to left 2nd digit topically every day shift for unstageable.).   Past Week at Unknown time   • povidone-iodine (BETADINE) 10 % external solution Apply  topically to the appropriate area as directed As Needed for Wound Care (apply to right great toe, 2nd, 3rd topically every digit with day shift for unstageable).   Past Week at Unknown time   • pramipexole (MIRAPEX) 0.25 MG tablet Take 0.25 mg by mouth Every Night.   12/17/2018 at Unknown time   • Sodium Phosphates (FLEET ENEMA) 7-19 GM/118ML enema Insert  into the rectum 1 (One) Time.   Past Month at Unknown time   • thiamine (VITAMIN B-1) 100 MG tablet Take 100 mg by mouth Daily.   Past Week at Unknown time   • traZODone (DESYREL) 50 MG tablet Take 50 mg by mouth Every Night.   12/17/2018 at Unknown time   • Vancomycin HCl (VANCOCIN) 750 MG/7.5ML injection Infuse  into a venous catheter. Use 750 mg intravenously two times a day for MRSA infection   Past Week at Unknown time   • White Petrolatum-Mineral Oil (ARTIFICIAL TEARS) 83-15 % ointment ophthalmic ointment 1 application Every 12 (Twelve) Hours.   Past Week at Unknown time   • nicotine (NICODERM CQ) 21 MG/24HR patch Place 1 patch on the skin as directed by provider Daily.   Unknown at Unknown time     Current Meds:     allopurinol 100 mg Oral Daily   arformoterol 15 mcg Nebulization BID - RT   artificial tears 1  application Both Eyes Q12H   atorvastatin 40 mg Oral Nightly   clonazePAM 0.5 mg Oral TID   Followed by      [START ON 1/2/2019] clonazePAM 0.5 mg Oral BID   DAPTOmycin 6 mg/kg (Adjusted) Intravenous Q24H   escitalopram 20 mg Oral Daily   gabapentin 300 mg Oral Q8H   metoprolol tartrate 12.5 mg Oral Q12H   multivitamin 1 tablet Oral Daily   nicotine 1 patch Transdermal Q24H   pantoprazole 40 mg Oral Q AM   pramipexole 0.25 mg Oral Nightly   silver nitrate  Topical Once   thiamine 100 mg Oral Daily   traZODone 50 mg Oral Nightly   vitamin C 500 mg Oral Daily     Allergies:  Allergies   Allergen Reactions   • Strawberry Unknown (See Comments)     unk   • Zithromax [Azithromycin] Unknown (See Comments)     unk     Review of Systems  Pertinent items are noted in HPI, all other systems reviewed and negative     Objective     Vital Signs  Temp:  [97.6 °F (36.4 °C)-98.3 °F (36.8 °C)] 97.6 °F (36.4 °C)  Heart Rate:  [] 101  Resp:  [14-22] 20  BP: (108-145)/(69-92) 108/69  Physical Exam:  General Appearance:    Alert, cooperative, in no acute distress   Head:    Normocephalic, without obvious abnormality, atraumatic   Eyes:            Lids and lashes normal, conjunctivae and sclerae normal, no   icterus   Throat:   No oral lesions, no thrush, oral mucosa moist   Neck:   No adenopathy, supple, trachea midline, no thyromegaly, no   carotid bruit, no JVD   Lungs:     Clear to auscultation,respirations regular, even and                   unlabored    Heart:    Regular rhythm and normal rate, normal S1 and S2, no            murmur, no gallop, no rub, no click   Chest Wall:    No abnormalities observed   Abdomen:     Normal bowel sounds, no masses, no organomegaly, soft        non-tender, non-distended, no guarding, no rebound                 Tenderness G-tube in place in left upper quadrant with attempt made to deflate the balloon using the side port.  This was unsuccessful.   Rectal:     Deferred   Extremities:   no edema,  no cyanosis, no redness   Skin:   No bleeding, bruising or rash   Lymph nodes:   No palpable adenopathy   Psychiatric:  Judgement and insight: normal   Orientation to person place and time: normal   Mood and affect: normal   Results Review:   I reviewed the patient's new clinical results.    Results from last 7 days   Lab Units  12/21/18   0616  12/20/18   0437  12/19/18   1246   WBC 10*3/mm3  6.54  6.86  9.07   HEMOGLOBIN g/dL  9.4*  9.7*  10.4*   HEMATOCRIT %  30.1*  32.5*  33.0*   PLATELETS 10*3/mm3  196  230  231     Results from last 7 days   Lab Units  12/23/18   0422  12/22/18   0441  12/21/18   0616  12/20/18   0437   SODIUM mmol/L  140  140  145  142   POTASSIUM mmol/L  3.6  4.1  3.8  3.4*   CHLORIDE mmol/L  104  106  111*  104   CO2 mmol/L  23.0  22.6  22.2  24.5   BUN mg/dL  21*  21*  28*  33*   CREATININE mg/dL  1.84*  1.82*  2.03*  2.06*   CALCIUM mg/dL  10.3  10.5  10.3  10.5   BILIRUBIN mg/dL   --    --    --   0.2   ALK PHOS U/L   --    --    --   123*   ALT (SGPT) U/L   --    --    --   12   AST (SGOT) U/L   --    --    --   19   GLUCOSE mg/dL  141*  99  123*  92         Lab Results   Lab Value Date/Time    LIPASE 28 05/14/2014 1926    LIPASE 30 03/26/2014 1731       Radiology:  US Renal Bilateral   Final Result      XR Chest Post CVA Port   Final Result          Assessment/Plan   Patient Active Problem List   Diagnosis   • Tracheostomy complication (CMS/HCC)   • Gangrene of toe (CMS/HCC)     Impression  #1 G-tube removal: Would defer to the physician that placed the tube to be removed on an elective basis.  Otherwise would need to confirm the type of internal bumper endoscopically.  Patient to communicate with that physician and decide how she wishes this to be addressed.  Can certainly be deferred and performed in an outpatient setting.      Recommendation  Define the type of G-tube placed to decide the best method of removal  Will defer to the physician that placed this.  Will sign off but  available as needed.  I discussed the patients findings and my recommendations with patient, family and nursing staff.    Baljit Morton MD

## 2018-12-23 NOTE — DISCHARGE SUMMARY
Discharge summary    Date of admission 12/18/2018  Date of discharge 12/23/2018    Final diagnosis  Acute kidney injury secondary to vancomycin resolving  Chronic kidney disease stage III  Recent MRSA right first rib osteomyelitis and clavicle osteomyelitis with abscess status post I&D and dissection of the first rib and medial clavicle followed by subsequent plastic closure  Chronic respiratory failure  COPD  Hypertension  Gastroesophageal reflux disease  Status post trach   Status post G-tube placement  Anxiety disorder  Depression  Chronic anemia  History of IV drug abuse  Chronic pain syndrome  Peripheral vascular disease secondary to use of vasopressors    Discharge medications    Current Facility-Administered Medications:   •  allopurinol (ZYLOPRIM) tablet 100 mg, 100 mg, Oral, Daily, Jose Antonio Stack MD, 100 mg at 12/23/18 0817  •  arformoterol (BROVANA) nebulizer solution 15 mcg, 15 mcg, Nebulization, BID - RT, Dany Baez MD, 15 mcg at 12/23/18 0629  •  artificial tears 83-15 % ophthalmic ointment 1 application, 1 application, Both Eyes, Q12H, Jose Antonio Stack MD, 1 application at 12/22/18 1648  •  atorvastatin (LIPITOR) tablet 40 mg, 40 mg, Oral, Nightly, Jose Antonio Stack MD, 40 mg at 12/22/18 2102  •  clonazePAM (KlonoPIN) tablet 0.5 mg, 0.5 mg, Oral, TID, 0.5 mg at 12/23/18 0817 **FOLLOWED BY** [START ON 1/2/2019] clonazePAM (KlonoPIN) tablet 0.5 mg, 0.5 mg, Oral, BID, Jose Antonio Stack MD  •  DAPTOmycin (CUBICIN) 400 mg in Sodium chloride 0.9 % 50 mL IVPB, 6 mg/kg (Adjusted), Intravenous, Q24H, Rafa Fowler MD, Last Rate: 100 mL/hr at 12/22/18 1729, 400 mg at 12/22/18 1729  •  escitalopram (LEXAPRO) tablet 20 mg, 20 mg, Oral, Daily, Jose Antonio Stack MD, 20 mg at 12/23/18 0817  •  gabapentin (NEURONTIN) capsule 300 mg, 300 mg, Oral, Q8H, Jose Antonio Stack MD, 300 mg at 12/23/18 0504  •  metoprolol tartrate (LOPRESSOR) tablet 12.5 mg, 12.5 mg, Oral, Q12H, Jose Antonio Stack MD, 12.5 mg at 12/23/18 0817  •  multivitamin  (THERAGRAN) tablet 1 tablet, 1 tablet, Oral, Daily, Jose Antonio Stack MD, 1 tablet at 12/23/18 0817  •  pantoprazole (PROTONIX) EC tablet 40 mg, 40 mg, Oral, Q AM, Jose Antonio Stack MD, 40 mg at 12/23/18 0504  •  pramipexole (MIRAPEX) tablet 0.25 mg, 0.25 mg, Oral, Nightly, Jose Antonio Stack MD, 0.25 mg at 12/22/18 2102  •  thiamine (VITAMIN B-1) tablet 100 mg, 100 mg, Oral, Daily, Jose Antonio Stack MD, 100 mg at 12/23/18 0817  •  traZODone (DESYREL) tablet 50 mg, 50 mg, Oral, Nightly, Jose Antonio Stack MD, 50 mg at 12/22/18 2102  •  vitamin C (ASCORBIC ACID) tablet 500 mg, 500 mg, Oral, Daily, Jose Antonio Stack MD, 500 mg at 12/23/18 0817     Consult obtained  Vascular surgery  Infectious disease  Nephrology  Gastroenterology  Psychiatry  Pulmonary    Procedures  None    Hospital course  58-year-old white female with very complex past medical history with history of IV drug abuse who was recently admitted to [facility] in septic shock with respiratory failure requiring G-tube and trach and prolonged use of vasopressors and workup revealed MRSA first rib osteomyelitis and clavicle osteomyelitis with abscess required debridement and resection of the first rib and medial clavicle followed by subsequent plastic closure.  Patient admitted to the hospital as as she pulled the trach out.  Patient admitted and her trach remain out and let the trach site healed and close naturally.  Further workup revealed that patient has acute kidney injury and her kidney function was normal a few weeks ago.  After further evaluation she has acute kidney injury  secondary to IV vancomycin which she is getting for her MRSA infection.  Patient IV vancomycin stopped and she received fluids and her kidney function improved but she still required IV antibiotics was changed to daptomycin per infectious disease and will finish on the 20th of next month.  Patient also wants her G-tube out with these further evaluated by GI and they recommend needs to be taken out  as an outpatient as they don't know what type it is.  Finally patient and family agrees that she can go home and finish the IV antibiotics at home as her kidney function improved.  Patient clear with all this patient is to be discharged.  Patient also evaluated by psychiatry.  Patient also evaluated by vascular surgery for her dry gangrene and the recommend no further workup as it might fall off by itself.    Discharge diet regular    Activity as tolerated    Medication as above    Follow with primary doctor in 1 week and follow up with all this patient as per the instruction and take medication as directed    CHERRI PAINTING MD

## 2018-12-23 NOTE — DISCHARGE INSTRUCTIONS
Must have central line dressing changed every 7 days or sooner if needed.  Make sure you keep the dressing clean and dry at all times. The RN from home health will do this--it is a sterile procedure.    The toes must be dressed daily with betadine and gauze as instructed.    Wear your oxygen every night while you sleep.    [facility] will provide oxygen and wheelchair.

## 2018-12-23 NOTE — PROGRESS NOTES
LOS: 2 days   Patient Care Team:  Bettye Suarez MD as PCP - General (Internal Medicine)  Provider, No Known as PCP - Family Medicine    Chief Complaint: acute renal failure       Subjective     History of Present Illness    Subjective    History taken from: follow up of acute renal failure from vanc toxicity       Objective     Vital Signs  Temp:  [97.6 °F (36.4 °C)-98.3 °F (36.8 °C)] 97.6 °F (36.4 °C)  Heart Rate:  [] 101  Resp:  [14-22] 20  BP: (108-145)/(69-92) 108/69    Objective  General Appearance:  In no acute distress.    HEENT: Normal HEENT exam.    Lungs:  Normal respiratory rate and normal effort.  He is not in respiratory distress.  Breath sounds clear to auscultation.  No wheezes, rales or rhonchi.    Heart: Normal rate.  Regular rhythm.  S1 normal and S2 normal.  No murmur or gallop.   Chest: symmetric chest wall expansion.   Extremities: Normal range of motion.  There is no deformity, effusion or dependent edema.    Neurological: Patient is alert   Skin:  Warm and dry.  No rash.   Abdomen: Abdomen is soft and non-distended     Results Review:     I reviewed the patient's new clinical results.    Medication Review:   Scheduled Meds:  allopurinol 100 mg Oral Daily   arformoterol 15 mcg Nebulization BID - RT   artificial tears 1 application Both Eyes Q12H   atorvastatin 40 mg Oral Nightly   clonazePAM 0.5 mg Oral TID   Followed by      [START ON 1/2/2019] clonazePAM 0.5 mg Oral BID   DAPTOmycin 6 mg/kg (Adjusted) Intravenous Q24H   escitalopram 20 mg Oral Daily   gabapentin 300 mg Oral Q8H   metoprolol tartrate 12.5 mg Oral Q12H   multivitamin 1 tablet Oral Daily   nicotine 1 patch Transdermal Q24H   pantoprazole 40 mg Oral Q AM   pramipexole 0.25 mg Oral Nightly   silver nitrate  Topical Once   thiamine 100 mg Oral Daily   traZODone 50 mg Oral Nightly   vitamin C 500 mg Oral Daily     Continuous Infusions:   PRN Meds:.•  acetaminophen  •  cyclobenzaprine  •  HYDROcodone-acetaminophen  •   HYDROmorphone  •  ipratropium-albuterol    Assessment/Plan       Tracheostomy complication (CMS/HCC)    Gangrene of toe (CMS/HCC)      Assessment & Plan  1. Acute renal failure  2. vanc toxicity  3. Osteomyelitis  4. htn  5. Chronic resp failure    Patient seen and examined. Awake, alert. In no distress. Labs reviewed. Renal function stable with cr of 1.8. Off ivf. Ok to d/c from renal standpoint. Will need f/u in 4-6 weeks    Eloina Young MD  12/23/18  7:36 AM

## 2018-12-23 NOTE — PROGRESS NOTES
"Daily progress note    Chief complaint  Doing better  No new complaints    History of present illness  58-year-old white female with history of COPD chronic pain syndrome who is a nursing home resident who has had tracheostomy and G-tube placement 3 months ago brought to the emergency room when she put her trach out.  Patient is fully alert oriented eating and does not want her trach to be placed back.  Patient denies any shortness of breath.  Patient also wants his G-tube out.  Patient has no other complaint but hurting all over and wants her pain medications.     REVIEW OF SYSTEMS  Review of Systems   Constitutional: Negative for chills and fever.        (+) trach replacement   HENT: Negative for sore throat.         (+) jaw pain   Respiratory: Negative for shortness of breath.    Cardiovascular: Negative for chest pain.   Gastrointestinal: Negative for nausea and vomiting.   Genitourinary: Negative for dysuria.   Musculoskeletal: Positive for neck pain. Negative for back pain.   Skin: Negative for rash.   Neurological: Negative for dizziness.   Psychiatric/Behavioral: The patient is not nervous/anxious.       PHYSICAL EXAM  Blood pressure 112/65, pulse 86, temperature 98 °F (36.7 °C), temperature source Oral, resp. rate 18, height 167.6 cm (66\"), weight 71.8 kg (158 lb 4.8 oz), SpO2 92 %.    Constitutional: No distress.   Head: Normocephalic and atraumatic.   Mouth/Throat: Oropharynx is clear and moist.   Eyes: Conjunctivae are normal.   Neck: There is a relatively small trach stoma without drainage or bleeding.    Cardiovascular: Normal rate and regular rhythm.   Pulmonary/Chest: Breath sounds normal. No respiratory distress.   Pt's O2 sats are 100% on 2L. Respirations unlabored    Abdominal: There is no tenderness.  G-tube in place   Musculoskeletal: She exhibits no edema or tenderness.   Neurological: She is alert.   Skin: No rash noted.     LAB RESULTS  Lab Results (last 24 hours)     Procedure Component " Value Units Date/Time    Renal Function Panel [[id number]]  (Abnormal) Collected:  12/23/18 0422    Specimen:  Blood Updated:  12/23/18 0507     Glucose 141 mg/dL      BUN 21 mg/dL      Creatinine 1.84 mg/dL      Sodium 140 mmol/L      Potassium 3.6 mmol/L      Chloride 104 mmol/L      CO2 23.0 mmol/L      Calcium 10.3 mg/dL      Albumin 3.10 g/dL      Phosphorus 3.3 mg/dL      Anion Gap 13.0 mmol/L      BUN/Creatinine Ratio 11.4     eGFR Non African Amer 28 mL/min/1.73         Imaging Results (last 24 hours)     ** No results found for the last 24 hours. **          Current Facility-Administered Medications:   •  allopurinol (ZYLOPRIM) tablet 100 mg, 100 mg, Oral, Daily, Jose Antonio Stack MD, 100 mg at 12/23/18 0817  •  arformoterol (BROVANA) nebulizer solution 15 mcg, 15 mcg, Nebulization, BID - RT, Dany Baez MD, 15 mcg at 12/23/18 0629  •  artificial tears 83-15 % ophthalmic ointment 1 application, 1 application, Both Eyes, Q12H, Jose Antonio Stack MD, 1 application at 12/22/18 1648  •  atorvastatin (LIPITOR) tablet 40 mg, 40 mg, Oral, Nightly, Jose Antonio Stack MD, 40 mg at 12/22/18 2102  •  clonazePAM (KlonoPIN) tablet 0.5 mg, 0.5 mg, Oral, TID, 0.5 mg at 12/23/18 0817 **FOLLOWED BY** [START ON 1/2/2019] clonazePAM (KlonoPIN) tablet 0.5 mg, 0.5 mg, Oral, BID, Jose Antonio Stack MD  •  DAPTOmycin (CUBICIN) 400 mg in Sodium chloride 0.9 % 50 mL IVPB, 6 mg/kg (Adjusted), Intravenous, Q24H, Rafa Fowler MD, Last Rate: 100 mL/hr at 12/22/18 1729, 400 mg at 12/22/18 1729  •  escitalopram (LEXAPRO) tablet 20 mg, 20 mg, Oral, Daily, Jose Antonio Stack MD, 20 mg at 12/23/18 0817  •  gabapentin (NEURONTIN) capsule 300 mg, 300 mg, Oral, Q8H, Jose Antonio Stack MD, 300 mg at 12/23/18 0504  •  metoprolol tartrate (LOPRESSOR) tablet 12.5 mg, 12.5 mg, Oral, Q12H, Jose Antonio Stack MD, 12.5 mg at 12/23/18 0817  •  multivitamin (THERAGRAN) tablet 1 tablet, 1 tablet, Oral, Daily, Jose Antonio Stack MD, 1 tablet at 12/23/18 0817  •  pantoprazole  (PROTONIX) EC tablet 40 mg, 40 mg, Oral, Q AM, Cherri Stack MD, 40 mg at 12/23/18 0504  •  pramipexole (MIRAPEX) tablet 0.25 mg, 0.25 mg, Oral, Nightly, Cherri Stack MD, 0.25 mg at 12/22/18 2102  •  thiamine (VITAMIN B-1) tablet 100 mg, 100 mg, Oral, Daily, Cherri Stack MD, 100 mg at 12/23/18 0817  •  traZODone (DESYREL) tablet 50 mg, 50 mg, Oral, Nightly, Cherri Stack MD, 50 mg at 12/22/18 2102  •  vitamin C (ASCORBIC ACID) tablet 500 mg, 500 mg, Oral, Daily, Cherri Stack MD, 500 mg at 12/23/18 0817     ASSESSMENT  Acute kidney injury  COPD  Hypertension  Gastroesophageal reflux disease  Status post recent respiratory failure  Status post trach which she removed and will close in 1-2 weeks  Status post G-tube placement  Anxiety disorder  Depression  Chronic pain syndrome  Peripheral vascular disease    PLAN  Discharge home on IV antibiotics until January 20, 2090  Discharge summary dictated    CHERRI STACK MD

## 2018-12-23 NOTE — NURSING NOTE
Oxygen saturation 81% on room air while sitting.  Oxygen saturation increased to 92% when placed on nasal cannula at 3lpm.

## 2018-12-23 NOTE — PROGRESS NOTES
Continued Stay Note  [facility]     Patient Name: Swetha Luis  MRN: [id number]  Today's Date: 12/23/2018    Admit Date: 12/18/2018    Discharge Plan     Row Name 12/23/18 1156       Plan    Plan Comments  Inbound call rec'd from JEMIMA Rea advising of d/c today and ID dc orders.  Dr Stack to sign DME form for light wheelchair and RN to fax to [facility] at fax# [phone] or provide to patient to  herself when spouse comes to pick her up.  CCP faxed updated overnight oximetry to [facility], and called answering service and advised.  Called [facility] and left  for Bill on answering machine advising of d/c.        1221p: s/w Harriett at [facility] and she rec'd oximetry results and will contact patient later to arrange nocturnal  Oxygen.  Advised of pending fax for w/c and will deliver it at same time.               Ruth Xie RN

## 2018-12-23 NOTE — PLAN OF CARE
Problem: Patient Care Overview  Goal: Plan of Care Review  Outcome: Ongoing (interventions implemented as appropriate)   12/23/18 0230   Coping/Psychosocial   Plan of Care Reviewed With patient   Plan of Care Review   Progress no change   OTHER   Outcome Summary Monitor pain,labs,and vitals. Pain is better controlled on current shift with PRN medications. Pt unable to tolerate overnight oximetry-currently on 2L NC-respiratory therapy notified. Will continue to monitor.     Goal: Individualization and Mutuality  Outcome: Ongoing (interventions implemented as appropriate)    Goal: Discharge Needs Assessment  Outcome: Ongoing (interventions implemented as appropriate)    Goal: Interprofessional Rounds/Family Conf  Outcome: Ongoing (interventions implemented as appropriate)      Problem: Fall Risk (Adult)  Goal: Absence of Fall  Outcome: Ongoing (interventions implemented as appropriate)   12/23/18 0230   Fall Risk (Adult)   Absence of Fall making progress toward outcome       Problem: Pain, Acute (Adult)  Goal: Acceptable Pain Control/Comfort Level  Outcome: Ongoing (interventions implemented as appropriate)   12/23/18 0230   Pain, Acute (Adult)   Acceptable Pain Control/Comfort Level making progress toward outcome       Problem: Skin Injury Risk (Adult)  Goal: Skin Health and Integrity  Outcome: Ongoing (interventions implemented as appropriate)   12/23/18 0230   Skin Injury Risk (Adult)   Skin Health and Integrity making progress toward outcome

## 2018-12-23 NOTE — PROGRESS NOTES
"  Infectious Diseases Progress Note    Rafa Fowler MD     [facility]  [place]: 2 days  Patient Identification:  Name: Swetha Luis  Age: 58 y.o.  Sex: female  :  1960  MRN: [id number]         Primary Care Physician: Bettye Suarez MD            Subjective: No new issues appears to be much calmer.  Interval History: See consultation note    Objective:    Scheduled Meds:    allopurinol 100 mg Oral Daily   arformoterol 15 mcg Nebulization BID - RT   artificial tears 1 application Both Eyes Q12H   atorvastatin 40 mg Oral Nightly   clonazePAM 0.5 mg Oral TID   Followed by      [START ON 2019] clonazePAM 0.5 mg Oral BID   DAPTOmycin 6 mg/kg (Adjusted) Intravenous Q24H   escitalopram 20 mg Oral Daily   gabapentin 300 mg Oral Q8H   metoprolol tartrate 12.5 mg Oral Q12H   multivitamin 1 tablet Oral Daily   nicotine 1 patch Transdermal Q24H   pantoprazole 40 mg Oral Q AM   pramipexole 0.25 mg Oral Nightly   silver nitrate  Topical Once   thiamine 100 mg Oral Daily   traZODone 50 mg Oral Nightly   vitamin C 500 mg Oral Daily     Continuous Infusions:     Vital signs in last 24 hours:  Temp:  [97.6 °F (36.4 °C)-98.3 °F (36.8 °C)] 97.8 °F (36.6 °C)  Heart Rate:  [] 113  Resp:  [14-22] 18  BP: (108-145)/(69-92) 113/84    Intake/Output:    Intake/Output Summary (Last 24 hours) at 2018 0852  Last data filed at 2018 0828  Gross per 24 hour   Intake 760 ml   Output 1200 ml   Net -440 ml       Exam:  /84 (BP Location: Right arm, Patient Position: Lying)   Pulse 113   Temp 97.8 °F (36.6 °C) (Oral)   Resp 18   Ht 167.6 cm (66\")   Wt 71.8 kg (158 lb 4.8 oz)   SpO2 94%   BMI 25.55 kg/m²     General Appearance:    Alert, cooperative, no distress, AAOx3                          Head:    Normocephalic, without obvious abnormality, atraumatic                           Eyes:    PERRL, conjunctivae/corneas clear, EOM's intact, both eyes                         Throat:   Lips, tongue, gums " normal; oral mucosa pink and moist                           Neck:   Excite is no inflammation                         Lungs:    Clear to auscultation bilaterally, respirations unlabored                 Chest Wall:    Right sternoclavicular joint area surgical site is clean                          Heart:    Regular rate and rhythm, S1 and S2 normal                  Abdomen:     Soft, non-tender, bowel sounds active, no masses, no                                                        organomegaly                  Extremities:   Extremities normal, atraumatic, no cyanosis or edema                        Pulses:   Pulses palpable in all extremities                            Skin:   Skin is warm and dry,  no rashes or palpable lesions                Data Review:    I reviewed the patient's new clinical results.  Results from last 7 days   Lab Units  12/21/18   0616  12/20/18   0437  12/19/18   1246   WBC 10*3/mm3  6.54  6.86  9.07   HEMOGLOBIN g/dL  9.4*  9.7*  10.4*   PLATELETS 10*3/mm3  196  230  231     Results from last 7 days   Lab Units  12/23/18   0422  12/22/18   0441  12/21/18   0616  12/20/18   0437  12/19/18   1246  12/19/18   0546   SODIUM mmol/L  140  140  145  142  140   --    POTASSIUM mmol/L  3.6  4.1  3.8  3.4*  3.8   --    CHLORIDE mmol/L  104  106  111*  104  100   --    CO2 mmol/L  23.0  22.6  22.2  24.5  26.4   --    BUN mg/dL  21*  21*  28*  33*  30*   --    CREATININE mg/dL  1.84*  1.82*  2.03*  2.06*  2.39*  2.36*   CALCIUM mg/dL  10.3  10.5  10.3  10.5  10.7*   --    GLUCOSE mg/dL  141*  99  123*  92  119*   --          Assessment:  1-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  2-chronic respiratory failure requiring tracheostomy and PEG  tube placement dislodgment and able to maintain good oxygen saturation despite lack of tracheostomy management as per pulmonary service  3-COPD with obstructive sleep apnea  4-recent prolonged ICU stay requiring mechanical ventilator and pressors resulting in digital ischemia of the lower extremities  5-history of IV drug abuse  6-acute on chronic renal failure multifactorial including use of vancomycin  7-chronic anemia  8-critical illness and deconditioning         Recommendation:  Continue daptomycin.    Upon reviewing the database from [facility] and it looks like the end of the treatment was supposed to be 1/20/2019.    Rafa Fowler MD  12/23/2018  8:52 AM    Much of this encounter note is an electronic transcription/translation of spoken language to printed text. The electronic translation of spoken language may permit erroneous, or at times, nonsensical words or phrases to be inadvertently transcribed; Although I have reviewed the note for such errors, some may still exist

## 2018-12-23 NOTE — PLAN OF CARE
Problem: Patient Care Overview  Goal: Plan of Care Review   12/23/18 3881   Coping/Psychosocial   Plan of Care Reviewed With patient   Plan of Care Review   Progress improving   OTHER   Outcome Summary She ambulated 15 feet, front wheeled walker, CGA-1.

## 2018-12-23 NOTE — THERAPY EVALUATION
Acute Care - Physical Therapy Initial Evaluation  [facility]     Patient Name: Swetha Luis  : 1960  MRN: [id number]  Today's Date: 2018   Onset of Illness/Injury or Date of Surgery: 18  Date of Referral to PT: 18  Referring Physician: SAUL Stack M.D.      Admit Date: 2018    Visit Dx:     ICD-10-CM ICD-9-CM   1. Tracheostomy complication, unspecified complication type (CMS/HCC) J95.00 519.00   2. Obstructive sleep apnea syndrome G47.33 327.23   3. Nocturnal hypoxia G47.34 327.24     Patient Active Problem List   Diagnosis   • Tracheostomy complication (CMS/HCC)   • Gangrene of toe (CMS/HCC)     Past Medical History:   Diagnosis Date   • Acute osteomyelitis (CMS/HCC)     Right shoulder   • COPD (chronic obstructive pulmonary disease) (CMS/HCC)    • Nontraumatic subarachnoid hemorrhage (CMS/HCC)    • Renal disorder     chronic kidney disease   • Tracheostomy present (CMS/HCC)      Past Surgical History:   Procedure Laterality Date   • TRACHEOSTOMY          PT ASSESSMENT (last 12 hours)      Physical Therapy Evaluation     Row Name 18 1300          PT Evaluation Time/Intention    Document Type  evaluation  -MP     Mode of Treatment  physical therapy  -MP     Total Evaluation Minutes, Physical Therapy  30  -MP     Patient Effort  good  -MP     Symptoms Noted During/After Treatment  fatigue;shortness of breath  -MP     Row Name 18 1300          General Information    Patient Profile Reviewed?  yes  -MP     Onset of Illness/Injury or Date of Surgery  18  -MP     Referring Physician  SAUL Stack M.D.  -MP     General Observations of Patient  Supine, using oxygen at 4 L/minute with nasal canula.  She is alert and oriented x 4.  -MP     Prior Level of Function  all household mobility;gait  -MP     Equipment Currently Used at Home  walker, rolling;wheelchair  -MP     Pertinent History of Current Functional Problem  Swetha reports recent months have brought a loss of function  and diminishing ambulatory ability  -MP     Row Name 12/23/18 1300          Relationship/Environment    Primary Source of Support/Comfort  other (see comments) children and ex-  -MP     Row Name 12/23/18 1300          Resource/Environmental Concerns    Current Living Arrangements  extended care facility  -MP     Row Name 12/23/18 1300          Cognitive Assessment/Intervention- PT/OT    Orientation Status (Cognition)  oriented x 4  -MP     Follows Commands (Cognition)  WNL  -MP     Row Name 12/23/18 1300          Safety Issues, Functional Mobility    Impairments Affecting Function (Mobility)  endurance/activity tolerance;shortness of breath;strength  -MP     Row Name 12/23/18 1300          Bed Mobility Assessment/Treatment    Bed Mobility Assessment/Treatment  bed mobility (all) activities  -MP     Russell Level (Bed Mobility)  independent  -MP     Assistive Device (Bed Mobility)  bed rails  -MP     Row Name 12/23/18 1300          Transfer Assessment/Treatment    Transfer Assessment/Treatment  sit-stand transfer;stand-sit transfer  -MP     Maintains Weight-bearing Status (Transfers)  able to maintain  -MP     Sit-Stand Russell (Transfers)  contact guard  -MP     Stand-Sit Russell (Transfers)  contact guard  -MP     Row Name 12/23/18 1300          Sit-Stand Transfer    Assistive Device (Sit-Stand Transfers)  walker, front-wheeled  -MP     Row Name 12/23/18 1300          Stand-Sit Transfer    Assistive Device (Stand-Sit Transfers)  walker, front-wheeled  -MP     Row Name 12/23/18 1300          Gait/Stairs Assessment/Training    Russell Level (Gait)  contact guard  -MP     Assistive Device (Gait)  walker, front-wheeled  -MP     Distance in Feet (Gait)  15  -MP     Pattern (Gait)  step-through  -MP     Maintains Weight-bearing Status (Gait)  able to maintain  -MP     Comment (Gait/Stairs)  She ambulated without oxygen, for assessment purposes, and she did notice SOA.    -MP     Row Name  12/23/18 1300          General ROM    GENERAL ROM COMMENTS  All extremities were WNL  -MP     Row Name 12/23/18 1300          MMT (Manual Muscle Testing)    General MMT Comments  All extremities were 4-/5,  was 3+/5 B  -MP     Row Name             Wound 12/20/18 2237 Right toe other (see comments)    Wound - Properties Group Date first assessed: 12/20/18  -AB, present on admission, no LDA added, MD aware  Time first assessed: 2237  -AB Side: Right  -AB Location: toe  -AB Type: other (see comments)  -AB Additional Comments: necrotic toes  -AB    Row Name             Wound 12/20/18 2237 Left toe other (see comments)    Wound - Properties Group Date first assessed: 12/20/18  -AB, present on admission, no LDA added, MD aware  Time first assessed: 2237  -AB Side: Left  -AB Location: toe  -AB Type: other (see comments)  -AB Additional Comments: necrotic toes  -AB    Row Name 12/23/18 1300          Plan of Care Review    Plan of Care Reviewed With  patient  -MP     Row Name 12/23/18 1300          Physical Therapy Clinical Impression    Date of Referral to PT  12/22/18  -MP     PT Diagnosis (PT Clinical Impression)  Difficulty walking, generalized weakness  -MP     Prognosis (PT Clinical Impression)  Good  -MP     Functional Level at Time of Evaluation (PT Clinical Impression)  She is in need of assist for safety/fall prevention for transfers and ambulation.  -MP     Patient/Family Goals Statement (PT Clinical Impression)  She reports that she is going to a family home for 3 days and then to [facility] for extended care rehab.  -MP     Criteria for Skilled Interventions Met (PT Clinical Impression)  yes;treatment indicated  -MP     Pathology/Pathophysiology Noted (Describe Specifically for Each System)  musculoskeletal;pulmonary  -MP     Rehab Potential (PT Clinical Summary)  good, to achieve stated therapy goals  -MP     Predicted Duration of Therapy (PT)  2 days  -MP     Patient/Family Concerns, Equipment Needs at  Discharge (PT)  She reports having a walker at home which coincides with information in the chart.  -MP     Row Name 12/23/18 1300          Physical Therapy Goals    Transfer Goal Selection (PT)  transfer, PT goal 1  -MP     Gait Training Goal Selection (PT)  gait training, PT goal 1  -MP     Row Name 12/23/18 1300          Transfer Goal 1 (PT)    Activity/Assistive Device (Transfer Goal 1, PT)  transfers, all  -MP     Largo Level/Cues Needed (Transfer Goal 1, PT)  independent  -MP     Time Frame (Transfer Goal 1, PT)  2 days  -MP     Row Name 12/23/18 1300          Gait Training Goal 1 (PT)    Activity/Assistive Device (Gait Training Goal 1, PT)  gait (walking locomotion);assistive device use  -MP     Largo Level (Gait Training Goal 1, PT)  supervision required  -MP     Distance (Gait Goal 1, PT)  50 feet  -MP     Time Frame (Gait Training Goal 1, PT)  2 days  -MP     Row Name 12/23/18 1300          Positioning and Restraints    Pre-Treatment Position  in bed  -MP     Post Treatment Position  bed  -MP     In Bed  call light within reach;encouraged to call for assist;exit alarm on  -MP       User Key  (r) = Recorded By, (t) = Taken By, (c) = Cosigned By    Initials Name Provider Type    Patrick Marcelo, RN Registered Nurse    Reinaldo Morgan, PT Physical Therapist        Physical Therapy Education     Title: PT OT SLP Therapies (Done)     Topic: Physical Therapy (Done)     Point: Mobility training (Done)     Learning Progress Summary           Patient HONEY Barron VU by SRIDHAR at 12/23/2018  1:45 PM                   Point: Home exercise program (Done)     Learning Progress Summary           Patient HONEY Barron VU by SRIDHAR at 12/23/2018  1:45 PM                   Point: Body mechanics (Done)     Learning Progress Summary           Patient HONEY Barron VU by SRIDHAR at 12/23/2018  1:45 PM                   Point: Precautions (Done)     Learning Progress Summary           Patient HONEY Barron VU by SRIDHAR at 12/23/2018  1:45  PM                               User Key     Initials Effective Dates Name Provider Type Discipline    MP 06/22/16 -  Reinaldo Gómez PT Physical Therapist PT              PT Recommendation and Plan  Anticipated Discharge Disposition (PT): home, other (see comments)(Home for 3 days and then to extended care rehab)  Planned Therapy Interventions (PT Eval): bed mobility training, gait training, patient/family education, transfer training  Therapy Frequency (PT Clinical Impression): daily  Outcome Summary/Treatment Plan (PT)  Patient/Family Concerns, Equipment Needs at Discharge (PT): She reports having a walker at home which coincides with information in the chart.  Anticipated Discharge Disposition (PT): home, other (see comments)(Home for 3 days and then to extended care rehab)  Plan of Care Reviewed With: patient  Progress: improving  Outcome Summary: She ambulated 15 feet, front wheeled walker, CGA-1.    Outcome Measures     Row Name 12/23/18 1300             How much help from another person do you currently need...    Turning from your back to your side while in flat bed without using bedrails?  4  -MP      Moving from lying on back to sitting on the side of a flat bed without bedrails?  4  -MP      Moving to and from a bed to a chair (including a wheelchair)?  3  -MP      Standing up from a chair using your arms (e.g., wheelchair, bedside chair)?  3  -MP      Climbing 3-5 steps with a railing?  1  -MP      To walk in hospital room?  2  -MP      AM-PAC 6 Clicks Score  17  -MP         Functional Assessment    Outcome Measure Options  AM-PAC 6 Clicks Basic Mobility (PT)  -MP        User Key  (r) = Recorded By, (t) = Taken By, (c) = Cosigned By    Initials Name Provider Type    MP Reinaldo Gómez, PT Physical Therapist         Time Calculation:   PT Charges     Row Name 12/23/18 1347             Time Calculation    Start Time  1310  -MP      Stop Time  1340  -MP      Time Calculation (min)  30 min  -MP        User  Key  (r) = Recorded By, (t) = Taken By, (c) = Cosigned By    Initials Name Provider Type    MP Reinaldo Gómez, PT Physical Therapist        Therapy Suggested Charges     Code   Minutes Charges    None           Therapy Charges for Today     Code Description Service Date Service Provider Modifiers Qty    13933315325 HC PT EVAL MOD COMPLEXITY 1 12/23/2018 Reinaldo Gómez, PT GP 1    [id number] HC GAIT TRAINING EA 15 MIN 12/23/2018 Reinaldo Gómez, PT GP 1          PT G-Codes  Outcome Measure Options: AM-PAC 6 Clicks Basic Mobility (PT)  AM-PAC 6 Clicks Score: 17      Reinaldo Gómez PT  12/23/2018

## 2018-12-23 NOTE — PROGRESS NOTES
LOS: 2 days   Patient Care Team:  Bettye Suarez MD as PCP - General (Internal Medicine)  Provider, No Known as PCP - Family Medicine    Subjective     .  Patient appears in much better spirits today she is anxious to go home she is dressed husbands in the room they're just waiting for discharge orders and waiting for me to try and treat that little piece of granulation tissue in her tracheostomy.    Review of Systems:         Objective     Vital Signs  Vital Sign Min/Max for last 24 hours  Temp  Min: 97.6 °F (36.4 °C)  Max: 97.9 °F (36.6 °C)   BP  Min: 108/69  Max: 135/92   Pulse  Min: 91  Max: 113   Resp  Min: 14  Max: 22   SpO2  Min: 91 %  Max: 100 %   Flow (L/min)  Min: 2  Max: 4   No Data Recorded        Ventilator/Non-Invasive Ventilation Settings (From admission, onward)    None                       Body mass index is 25.55 kg/m².  I/O last 3 completed shifts:  In: 800 [P.O.:800]  Out: 4300 [Urine:4300]  I/O this shift:  In: 210 [P.O.:210]  Out: -         Physical Exam:  Much better spirits today  Chest: Clear good air movement no wheezes or rales  Neck her tracheal ostomy site has almost completely closed when she coughed I heard a little squeaky air a little piece of granulation tissue sticking out that I think is preventing it from closing completely I took a silver nitrate Q-tip and applied to this little polypoid granulation tissue tolerated very well    Labs:  Results from last 7 days   Lab Units  12/23/18   0422  12/22/18   0441  12/21/18   0616  12/20/18   0437  12/19/18   1246  12/19/18   0546   GLUCOSE mg/dL  141*  99  123*  92  119*   --    SODIUM mmol/L  140  140  145  142  140   --    POTASSIUM mmol/L  3.6  4.1  3.8  3.4*  3.8   --    CO2 mmol/L  23.0  22.6  22.2  24.5  26.4   --    CHLORIDE mmol/L  104  106  111*  104  100   --    ANION GAP mmol/L  13.0  11.4  11.8  13.5  13.6   --    CREATININE mg/dL  1.84*  1.82*  2.03*  2.06*  2.39*  2.36*   BUN mg/dL  21*  21*  28*  33*  30*   --     BUN / CREAT RATIO   11.4  11.5  13.8  16.0  12.6   --    CALCIUM mg/dL  10.3  10.5  10.3  10.5  10.7*   --    EGFR IF NONAFRICN AM mL/min/1.73  28*  29*  25*  25*  21*  21*   ALK PHOS U/L   --    --    --   123*   --    --    TOTAL PROTEIN g/dL   --    --    --   6.8   --    --    ALT (SGPT) U/L   --    --    --   12   --    --    AST (SGOT) U/L   --    --    --   19   --    --    BILIRUBIN mg/dL   --    --    --   0.2   --    --    ALBUMIN g/dL  3.10*  3.10*  3.20*  3.20*   --    --    GLOBULIN gm/dL   --    --    --   3.6   --    --      Estimated Creatinine Clearance: 33.8 mL/min (A) (by C-G formula based on SCr of 1.84 mg/dL (H)).      Results from last 7 days   Lab Units  12/21/18   0616  12/20/18   0437  12/19/18   1246   WBC 10*3/mm3  6.54  6.86  9.07   RBC 10*6/mm3  3.54*  3.69*  3.92   HEMOGLOBIN g/dL  9.4*  9.7*  10.4*   HEMATOCRIT %  30.1*  32.5*  33.0*   MCV fL  85.0  88.1  84.2   MCH pg  26.6*  26.3*  26.5*   MCHC g/dL  31.2*  29.8*  31.5*   RDW %  15.7*  15.8*  15.7*   RDW-SD fl  49.0  51.1  48.0   MPV fL  11.0  11.2  10.8   PLATELETS 10*3/mm3  196  230  231   NEUTROPHIL % %  64.0  58.4  59.7   LYMPHOCYTE % %  22.2  27.8  26.5   MONOCYTES % %  6.9  7.1  7.4   EOSINOPHIL % %  6.6*  6.1  6.0   BASOPHIL % %  0.3  0.6  0.4   IMM GRAN % %  0.3  0.0  0.1   NEUTROS ABS 10*3/mm3  4.19  4.00  5.42   LYMPHS ABS 10*3/mm3  1.45  1.91  2.40   MONOS ABS 10*3/mm3  0.45  0.49  0.67   EOS ABS 10*3/mm3  0.43  0.42  0.54   BASOS ABS 10*3/mm3  0.02  0.04  0.04   IMMATURE GRANS (ABS) 10*3/mm3  0.02  0.00  0.01         Results from last 7 days   Lab Units  12/21/18   0616  12/19/18   1818   CK TOTAL U/L  30  29     Results from last 7 days   Lab Units  12/20/18   0437   PROBNP pg/mL  1,745.0*     Results from last 7 days   Lab Units  12/20/18   0437   TSH mIU/mL  3.210             Microbiology Results (last 10 days)     Procedure Component Value - Date/Time    Eosinophil Smear - Urine, Urine, Clean Catch [[id number]]   (Normal) Collected:  12/19/18 1219    Lab Status:  Final result Specimen:  Urine, Clean Catch Updated:  12/19/18 1941     Eosinophil Smear 0 % EOS/100 Cells                 allopurinol 100 mg Oral Daily   arformoterol 15 mcg Nebulization BID - RT   artificial tears 1 application Both Eyes Q12H   atorvastatin 40 mg Oral Nightly   clonazePAM 0.5 mg Oral TID   Followed by      [START ON 1/2/2019] clonazePAM 0.5 mg Oral BID   DAPTOmycin 6 mg/kg (Adjusted) Intravenous Q24H   escitalopram 20 mg Oral Daily   gabapentin 300 mg Oral Q8H   metoprolol tartrate 12.5 mg Oral Q12H   multivitamin 1 tablet Oral Daily   nicotine 1 patch Transdermal Q24H   pantoprazole 40 mg Oral Q AM   pramipexole 0.25 mg Oral Nightly   silver nitrate  Topical Once   thiamine 100 mg Oral Daily   traZODone 50 mg Oral Nightly   vitamin C 500 mg Oral Daily          Diagnostics:  Us Renal Bilateral    Result Date: 12/19/2018  BILATERAL RENAL ULTRASOUND  CLINICAL HISTORY: Acute renal failure  Transverse and longitudinal images of both kidneys were obtained. There is focal cortical thinning in the polar region of the right kidney consistent with scarring, likely due to previous infection. A tiny cyst is also noted in the upper pole. The right kidney is otherwise unremarkable. There is no hydronephrosis. The right kidney measures 9.8 x 3.4 x 4.1 cm. There are no left kidney is normal in size and shape and shows no hydronephrosis. There is a tiny minimally complex cyst in the upper pole measuring 12 mm in diameter. A tiny 9 mm diameter lower pole cyst is also noted. The left kidney measures 12.0 x 4.4 x 3.8 cm. The urinary bladder could not be imaged due to the presence of Dove catheter.  IMPRESSIONS: No evidence of hydronephrosis. Tiny bilateral renal cysts as noted.  This report was finalized on 12/19/2018 8:51 PM by Dr. Andrea Jones M.D.      Xr Chest Post Cva Port    Result Date: 12/19/2018  ONE VIEW PORTABLE CHEST AT 6:23 AM  HISTORY: PICC line  placement.  FINDINGS:  There has been recent insertion of a left-sided PICC line which ends in the SVC. There is mild cardiomegaly and slight vascular congestion and no focal infiltrate.  This report was finalized on 12/19/2018 12:48 PM by Dr. Mike Rivas M.D.               Active Hospital Problems    Diagnosis Date Noted   • Gangrene of toe (CMS/[facility]) [I96] 12/20/2018   • Tracheostomy complication (CMS/[facility]) [J95.00] 12/18/2018      Resolved Hospital Problems   No resolved problems to display.         Assessment/Plan     1. Trach stoma seems to have a small piece of granulation tissue keeping her from completely closing.  I did apply a little silver nitrate to his piece of granulation tissue a probable slough off and hopefully the ostomy will close if not she'll need to see the surgeon that did the tracheostomy  2. Nocturnal hypoxemia last night's nocturnal study again showed significant desaturation she's been over 2 hours less than 89% so she still qualifies for 2 L O2 with sleep  3. History of sleep apnea with nocturnal desaturation she will need a outpatient sleep study we'll need to wait several weeks at least until her stoma has healed and the patient cannot use a Pap machine until her tracheal stoma has completely healed which may take a couple of weeks or more    Plan for disposition: Pulmonary standpoint she could be discharged any time I have put orders for a sleep study and follow-up in the computer.  And we'll see when necessary thank you    Dany Baez MD  12/23/18  12:23 PM    Time:

## 2018-12-24 ENCOUNTER — HOSPITAL ENCOUNTER (INPATIENT)
Facility: HOSPITAL | Age: 58
LOS: 8 days | Discharge: HOME HEALTH | End: 2019-01-01
Attending: EMERGENCY MEDICINE | Admitting: HOSPITALIST

## 2018-12-24 ENCOUNTER — APPOINTMENT (OUTPATIENT)
Dept: GENERAL RADIOLOGY | Facility: HOSPITAL | Age: 58
End: 2018-12-24
Attending: HOSPITALIST

## 2018-12-24 ENCOUNTER — APPOINTMENT (OUTPATIENT)
Dept: GENERAL RADIOLOGY | Facility: HOSPITAL | Age: 58
End: 2018-12-24

## 2018-12-24 ENCOUNTER — READMISSION MANAGEMENT (OUTPATIENT)
Dept: CALL CENTER | Facility: HOSPITAL | Age: 58
End: 2018-12-24

## 2018-12-24 DIAGNOSIS — J96.01 ACUTE RESPIRATORY FAILURE WITH HYPOXIA AND HYPERCAPNIA (HCC): ICD-10-CM

## 2018-12-24 DIAGNOSIS — I50.9 ACUTE CONGESTIVE HEART FAILURE, UNSPECIFIED HEART FAILURE TYPE (HCC): Primary | ICD-10-CM

## 2018-12-24 DIAGNOSIS — R77.8 ELEVATED TROPONIN: ICD-10-CM

## 2018-12-24 DIAGNOSIS — E87.6 HYPOKALEMIA: ICD-10-CM

## 2018-12-24 DIAGNOSIS — J96.02 ACUTE RESPIRATORY FAILURE WITH HYPOXIA AND HYPERCAPNIA (HCC): ICD-10-CM

## 2018-12-24 DIAGNOSIS — Z74.09 IMPAIRED FUNCTIONAL MOBILITY, BALANCE, GAIT, AND ENDURANCE: ICD-10-CM

## 2018-12-24 PROBLEM — N17.9 ARF (ACUTE RENAL FAILURE) (HCC): Status: ACTIVE | Noted: 2018-12-24

## 2018-12-24 PROBLEM — N18.9 CKD (CHRONIC KIDNEY DISEASE): Status: ACTIVE | Noted: 2018-12-24

## 2018-12-24 LAB
ALBUMIN SERPL-MCNC: 3.4 G/DL (ref 3.5–5.2)
ALBUMIN/GLOB SERPL: 0.8 G/DL
ALP SERPL-CCNC: 164 U/L (ref 39–117)
ALT SERPL W P-5'-P-CCNC: 14 U/L (ref 1–33)
ANION GAP SERPL CALCULATED.3IONS-SCNC: 18.4 MMOL/L
ARTERIAL PATENCY WRIST A: POSITIVE
AST SERPL-CCNC: 20 U/L (ref 1–32)
ATMOSPHERIC PRESS: 758.2 MMHG
BASE EXCESS BLDA CALC-SCNC: -2.3 MMOL/L (ref 0–2)
BASOPHILS # BLD AUTO: 0.03 10*3/MM3 (ref 0–0.2)
BASOPHILS NFR BLD AUTO: 0.2 % (ref 0–1.5)
BDY SITE: ABNORMAL
BILIRUB SERPL-MCNC: 0.3 MG/DL (ref 0.1–1.2)
BUN BLD-MCNC: 27 MG/DL (ref 6–20)
BUN/CREAT SERPL: 14.2 (ref 7–25)
CALCIUM SPEC-SCNC: 10.5 MG/DL (ref 8.6–10.5)
CHLORIDE SERPL-SCNC: 106 MMOL/L (ref 98–107)
CO2 SERPL-SCNC: 19.6 MMOL/L (ref 22–29)
CREAT BLD-MCNC: 1.9 MG/DL (ref 0.57–1)
D-LACTATE SERPL-SCNC: 1.5 MMOL/L (ref 0.5–2)
DEPRECATED RDW RBC AUTO: 51.4 FL (ref 37–54)
EOSINOPHIL # BLD AUTO: 0.18 10*3/MM3 (ref 0–0.7)
EOSINOPHIL NFR BLD AUTO: 1 % (ref 0.3–6.2)
ERYTHROCYTE [DISTWIDTH] IN BLOOD BY AUTOMATED COUNT: 16.7 % (ref 11.7–13)
GAS FLOW AIRWAY: 5 LPM
GFR SERPL CREATININE-BSD FRML MDRD: 27 ML/MIN/1.73
GLOBULIN UR ELPH-MCNC: 4.5 GM/DL
GLUCOSE BLD-MCNC: 134 MG/DL (ref 65–99)
HCO3 BLDA-SCNC: 21 MMOL/L (ref 22–28)
HCT VFR BLD AUTO: 34.2 % (ref 35.6–45.5)
HGB BLD-MCNC: 11.1 G/DL (ref 11.9–15.5)
IMM GRANULOCYTES # BLD AUTO: 0.05 10*3/MM3 (ref 0–0.03)
IMM GRANULOCYTES NFR BLD AUTO: 0.3 % (ref 0–0.5)
INR PPP: 1.19 (ref 0.9–1.1)
LYMPHOCYTES # BLD AUTO: 1.28 10*3/MM3 (ref 0.9–4.8)
LYMPHOCYTES NFR BLD AUTO: 6.8 % (ref 19.6–45.3)
MCH RBC QN AUTO: 27.3 PG (ref 26.9–32)
MCHC RBC AUTO-ENTMCNC: 32.5 G/DL (ref 32.4–36.3)
MCV RBC AUTO: 84 FL (ref 80.5–98.2)
MODALITY: ABNORMAL
MONOCYTES # BLD AUTO: 0.89 10*3/MM3 (ref 0.2–1.2)
MONOCYTES NFR BLD AUTO: 4.7 % (ref 5–12)
NEUTROPHILS # BLD AUTO: 16.54 10*3/MM3 (ref 1.9–8.1)
NEUTROPHILS NFR BLD AUTO: 87.3 % (ref 42.7–76)
NT-PROBNP SERPL-MCNC: ABNORMAL PG/ML (ref 0–900)
PCO2 BLDA: 30.8 MM HG (ref 35–45)
PH BLDA: 7.44 PH UNITS (ref 7.35–7.45)
PLATELET # BLD AUTO: 263 10*3/MM3 (ref 140–500)
PMV BLD AUTO: 11.6 FL (ref 6–12)
PO2 BLDA: 57.9 MM HG (ref 80–100)
POTASSIUM BLD-SCNC: 3.1 MMOL/L (ref 3.5–5.2)
PROCALCITONIN SERPL-MCNC: 0.68 NG/ML (ref 0.1–0.25)
PROT SERPL-MCNC: 7.9 G/DL (ref 6–8.5)
PROTHROMBIN TIME: 14.9 SECONDS (ref 11.7–14.2)
RBC # BLD AUTO: 4.07 10*6/MM3 (ref 3.9–5.2)
SAO2 % BLDCOA: 91.1 % (ref 92–99)
SET MECH RESP RATE: 38
SODIUM BLD-SCNC: 144 MMOL/L (ref 136–145)
TROPONIN T SERPL-MCNC: 0.09 NG/ML (ref 0–0.03)
TROPONIN T SERPL-MCNC: 0.1 NG/ML (ref 0–0.03)
WBC NRBC COR # BLD: 18.92 10*3/MM3 (ref 4.5–10.7)

## 2018-12-24 PROCEDURE — 82803 BLOOD GASES ANY COMBINATION: CPT

## 2018-12-24 PROCEDURE — 84484 ASSAY OF TROPONIN QUANT: CPT | Performed by: EMERGENCY MEDICINE

## 2018-12-24 PROCEDURE — 83880 ASSAY OF NATRIURETIC PEPTIDE: CPT | Performed by: EMERGENCY MEDICINE

## 2018-12-24 PROCEDURE — 25010000002 DAPTOMYCIN PER 1 MG: Performed by: HOSPITALIST

## 2018-12-24 PROCEDURE — 93010 ELECTROCARDIOGRAM REPORT: CPT | Performed by: INTERNAL MEDICINE

## 2018-12-24 PROCEDURE — 83605 ASSAY OF LACTIC ACID: CPT | Performed by: HOSPITALIST

## 2018-12-24 PROCEDURE — 87040 BLOOD CULTURE FOR BACTERIA: CPT | Performed by: HOSPITALIST

## 2018-12-24 PROCEDURE — 84484 ASSAY OF TROPONIN QUANT: CPT | Performed by: HOSPITALIST

## 2018-12-24 PROCEDURE — 85610 PROTHROMBIN TIME: CPT | Performed by: EMERGENCY MEDICINE

## 2018-12-24 PROCEDURE — 94799 UNLISTED PULMONARY SVC/PX: CPT

## 2018-12-24 PROCEDURE — 80053 COMPREHEN METABOLIC PANEL: CPT | Performed by: EMERGENCY MEDICINE

## 2018-12-24 PROCEDURE — 85025 COMPLETE CBC W/AUTO DIFF WBC: CPT | Performed by: EMERGENCY MEDICINE

## 2018-12-24 PROCEDURE — 99285 EMERGENCY DEPT VISIT HI MDM: CPT

## 2018-12-24 PROCEDURE — 94640 AIRWAY INHALATION TREATMENT: CPT

## 2018-12-24 PROCEDURE — 93005 ELECTROCARDIOGRAM TRACING: CPT | Performed by: HOSPITALIST

## 2018-12-24 PROCEDURE — 71045 X-RAY EXAM CHEST 1 VIEW: CPT

## 2018-12-24 PROCEDURE — 93005 ELECTROCARDIOGRAM TRACING: CPT | Performed by: EMERGENCY MEDICINE

## 2018-12-24 PROCEDURE — 84145 PROCALCITONIN (PCT): CPT | Performed by: HOSPITALIST

## 2018-12-24 PROCEDURE — 25010000002 FUROSEMIDE PER 20 MG: Performed by: EMERGENCY MEDICINE

## 2018-12-24 PROCEDURE — 36600 WITHDRAWAL OF ARTERIAL BLOOD: CPT

## 2018-12-24 PROCEDURE — 25010000002 HYDROMORPHONE PER 4 MG: Performed by: HOSPITALIST

## 2018-12-24 RX ORDER — SODIUM CHLORIDE 0.9 % (FLUSH) 0.9 %
3 SYRINGE (ML) INJECTION EVERY 12 HOURS SCHEDULED
Status: DISCONTINUED | OUTPATIENT
Start: 2018-12-24 | End: 2019-01-01 | Stop reason: HOSPADM

## 2018-12-24 RX ORDER — SODIUM CHLORIDE 0.9 % (FLUSH) 0.9 %
3-10 SYRINGE (ML) INJECTION AS NEEDED
Status: DISCONTINUED | OUTPATIENT
Start: 2018-12-24 | End: 2019-01-01 | Stop reason: HOSPADM

## 2018-12-24 RX ORDER — CLONAZEPAM 1 MG/1
1 TABLET ORAL 2 TIMES DAILY PRN
Status: DISCONTINUED | OUTPATIENT
Start: 2018-12-24 | End: 2019-01-01 | Stop reason: HOSPADM

## 2018-12-24 RX ORDER — ESCITALOPRAM OXALATE 20 MG/1
20 TABLET ORAL DAILY
Status: DISCONTINUED | OUTPATIENT
Start: 2018-12-24 | End: 2019-01-01 | Stop reason: HOSPADM

## 2018-12-24 RX ORDER — ATORVASTATIN CALCIUM 20 MG/1
40 TABLET, FILM COATED ORAL NIGHTLY
Status: DISCONTINUED | OUTPATIENT
Start: 2018-12-24 | End: 2019-01-01 | Stop reason: HOSPADM

## 2018-12-24 RX ORDER — ACETAMINOPHEN 325 MG/1
650 TABLET ORAL EVERY 4 HOURS PRN
Status: DISCONTINUED | OUTPATIENT
Start: 2018-12-24 | End: 2019-01-01 | Stop reason: HOSPADM

## 2018-12-24 RX ORDER — HYDROCODONE BITARTRATE AND ACETAMINOPHEN 5; 325 MG/1; MG/1
1 TABLET ORAL EVERY 6 HOURS PRN
Status: DISPENSED | OUTPATIENT
Start: 2018-12-24 | End: 2018-12-30

## 2018-12-24 RX ORDER — IPRATROPIUM BROMIDE AND ALBUTEROL SULFATE 2.5; .5 MG/3ML; MG/3ML
3 SOLUTION RESPIRATORY (INHALATION) EVERY 4 HOURS PRN
Status: DISCONTINUED | OUTPATIENT
Start: 2018-12-24 | End: 2019-01-01 | Stop reason: HOSPADM

## 2018-12-24 RX ORDER — TRAZODONE HYDROCHLORIDE 50 MG/1
50 TABLET ORAL NIGHTLY PRN
Status: DISCONTINUED | OUTPATIENT
Start: 2018-12-24 | End: 2019-01-01 | Stop reason: HOSPADM

## 2018-12-24 RX ORDER — PANTOPRAZOLE SODIUM 40 MG/1
40 TABLET, DELAYED RELEASE ORAL DAILY
Status: DISCONTINUED | OUTPATIENT
Start: 2018-12-24 | End: 2019-01-01 | Stop reason: HOSPADM

## 2018-12-24 RX ORDER — HYDROCODONE BITARTRATE AND ACETAMINOPHEN 7.5; 325 MG/1; MG/1
1 TABLET ORAL EVERY 6 HOURS PRN
Status: DISCONTINUED | OUTPATIENT
Start: 2018-12-24 | End: 2019-01-01 | Stop reason: HOSPADM

## 2018-12-24 RX ORDER — THIAMINE MONONITRATE (VIT B1) 100 MG
100 TABLET ORAL DAILY
Status: DISCONTINUED | OUTPATIENT
Start: 2018-12-24 | End: 2019-01-01 | Stop reason: HOSPADM

## 2018-12-24 RX ORDER — IPRATROPIUM BROMIDE AND ALBUTEROL SULFATE 2.5; .5 MG/3ML; MG/3ML
3 SOLUTION RESPIRATORY (INHALATION) ONCE
Status: COMPLETED | OUTPATIENT
Start: 2018-12-24 | End: 2018-12-24

## 2018-12-24 RX ORDER — HYDROMORPHONE HYDROCHLORIDE 1 MG/ML
0.5 INJECTION, SOLUTION INTRAMUSCULAR; INTRAVENOUS; SUBCUTANEOUS
Status: DISCONTINUED | OUTPATIENT
Start: 2018-12-24 | End: 2018-12-25

## 2018-12-24 RX ORDER — MULTIVIT AND MINERALS-FERROUS GLUCONATE 9 MG IRON/15 ML ORAL LIQUID 9 MG/15 ML
15 LIQUID (ML) ORAL DAILY
Status: DISCONTINUED | OUTPATIENT
Start: 2018-12-24 | End: 2019-01-01 | Stop reason: HOSPADM

## 2018-12-24 RX ORDER — PRAMIPEXOLE DIHYDROCHLORIDE 0.25 MG/1
0.25 TABLET ORAL NIGHTLY
Status: DISCONTINUED | OUTPATIENT
Start: 2018-12-24 | End: 2019-01-01 | Stop reason: HOSPADM

## 2018-12-24 RX ORDER — ACETAMINOPHEN 650 MG
TABLET, EXTENDED RELEASE ORAL AS NEEDED
Status: DISCONTINUED | OUTPATIENT
Start: 2018-12-24 | End: 2019-01-01 | Stop reason: HOSPADM

## 2018-12-24 RX ORDER — BUDESONIDE 0.5 MG/2ML
0.5 INHALANT ORAL
Status: DISCONTINUED | OUTPATIENT
Start: 2018-12-24 | End: 2019-01-01 | Stop reason: HOSPADM

## 2018-12-24 RX ORDER — FUROSEMIDE 10 MG/ML
40 INJECTION INTRAMUSCULAR; INTRAVENOUS ONCE
Status: COMPLETED | OUTPATIENT
Start: 2018-12-24 | End: 2018-12-24

## 2018-12-24 RX ADMIN — DAPTOMYCIN 400 MG: 500 INJECTION, POWDER, LYOPHILIZED, FOR SOLUTION INTRAVENOUS at 20:59

## 2018-12-24 RX ADMIN — Medication 100 MG: at 20:58

## 2018-12-24 RX ADMIN — ATORVASTATIN CALCIUM 40 MG: 20 TABLET, FILM COATED ORAL at 20:58

## 2018-12-24 RX ADMIN — MULTIVITAMIN 15 ML: LIQUID ORAL at 20:58

## 2018-12-24 RX ADMIN — IPRATROPIUM BROMIDE AND ALBUTEROL SULFATE 3 ML: 2.5; .5 SOLUTION RESPIRATORY (INHALATION) at 12:15

## 2018-12-24 RX ADMIN — SODIUM CHLORIDE, PRESERVATIVE FREE 3 ML: 5 INJECTION INTRAVENOUS at 20:58

## 2018-12-24 RX ADMIN — POVIDONE-IODINE: 10 SOLUTION TOPICAL at 21:20

## 2018-12-24 RX ADMIN — MUPIROCIN: 20 OINTMENT TOPICAL at 22:10

## 2018-12-24 RX ADMIN — NITROGLYCERIN 1 INCH: 20 OINTMENT TOPICAL at 13:10

## 2018-12-24 RX ADMIN — PRAMIPEXOLE DIHYDROCHLORIDE 0.25 MG: 0.25 TABLET ORAL at 20:58

## 2018-12-24 RX ADMIN — HYDROMORPHONE HYDROCHLORIDE 0.5 MG: 1 INJECTION, SOLUTION INTRAMUSCULAR; INTRAVENOUS; SUBCUTANEOUS at 22:05

## 2018-12-24 RX ADMIN — FUROSEMIDE 40 MG: 10 INJECTION, SOLUTION INTRAMUSCULAR; INTRAVENOUS at 13:10

## 2018-12-24 RX ADMIN — BUDESONIDE 0.5 MG: 0.5 INHALANT RESPIRATORY (INHALATION) at 21:18

## 2018-12-24 RX ADMIN — PANTOPRAZOLE SODIUM 40 MG: 40 TABLET, DELAYED RELEASE ORAL at 20:58

## 2018-12-24 RX ADMIN — HYDROCODONE BITARTRATE AND ACETAMINOPHEN 1 TABLET: 5; 325 TABLET ORAL at 18:40

## 2018-12-24 RX ADMIN — IPRATROPIUM BROMIDE AND ALBUTEROL SULFATE 3 ML: 2.5; .5 SOLUTION RESPIRATORY (INHALATION) at 21:18

## 2018-12-24 RX ADMIN — METOPROLOL TARTRATE 12.5 MG: 25 TABLET ORAL at 20:57

## 2018-12-24 RX ADMIN — ESCITALOPRAM 20 MG: 20 TABLET, FILM COATED ORAL at 20:57

## 2018-12-24 NOTE — OUTREACH NOTE
Prep Survey      Responses   Facility patient discharged from?  [facility]   Is patient eligible?  Yes   Discharge diagnosis  acute kidney injury   Does the patient have one of the following disease processes/diagnoses(primary or secondary)?  Other   Does the patient have Home health ordered?  Yes   What is the Home health agency?   [facility]   Is there a DME ordered?  Yes   What DME was ordered?  [facility]   Prep survey completed?  Yes          Nirmala Perales RN

## 2018-12-25 LAB
AMPHET+METHAMPHET UR QL: NEGATIVE
ANION GAP SERPL CALCULATED.3IONS-SCNC: 13.3 MMOL/L
BACTERIA UR QL AUTO: ABNORMAL /HPF
BARBITURATES UR QL SCN: NEGATIVE
BASOPHILS # BLD AUTO: 0.01 10*3/MM3 (ref 0–0.2)
BASOPHILS NFR BLD AUTO: 0.1 % (ref 0–1.5)
BENZODIAZ UR QL SCN: POSITIVE
BILIRUB UR QL STRIP: NEGATIVE
BUN BLD-MCNC: 27 MG/DL (ref 6–20)
BUN/CREAT SERPL: 15.9 (ref 7–25)
CALCIUM SPEC-SCNC: 10.2 MG/DL (ref 8.6–10.5)
CANNABINOIDS SERPL QL: NEGATIVE
CHLORIDE SERPL-SCNC: 107 MMOL/L (ref 98–107)
CLARITY UR: ABNORMAL
CO2 SERPL-SCNC: 22.7 MMOL/L (ref 22–29)
COCAINE UR QL: NEGATIVE
COD CRY URNS QL: ABNORMAL /HPF
COLOR UR: YELLOW
CREAT BLD-MCNC: 1.7 MG/DL (ref 0.57–1)
DEPRECATED RDW RBC AUTO: 55.3 FL (ref 37–54)
EOSINOPHIL # BLD AUTO: 0.79 10*3/MM3 (ref 0–0.7)
EOSINOPHIL NFR BLD AUTO: 7.3 % (ref 0.3–6.2)
ERYTHROCYTE [DISTWIDTH] IN BLOOD BY AUTOMATED COUNT: 16.8 % (ref 11.7–13)
GFR SERPL CREATININE-BSD FRML MDRD: 31 ML/MIN/1.73
GLUCOSE BLD-MCNC: 121 MG/DL (ref 65–99)
GLUCOSE UR STRIP-MCNC: NEGATIVE MG/DL
HCT VFR BLD AUTO: 32.1 % (ref 35.6–45.5)
HGB BLD-MCNC: 9.6 G/DL (ref 11.9–15.5)
HGB UR QL STRIP.AUTO: NEGATIVE
HYALINE CASTS UR QL AUTO: ABNORMAL /LPF
IMM GRANULOCYTES # BLD AUTO: 0.02 10*3/MM3 (ref 0–0.03)
IMM GRANULOCYTES NFR BLD AUTO: 0.2 % (ref 0–0.5)
KETONES UR QL STRIP: NEGATIVE
LEUKOCYTE ESTERASE UR QL STRIP.AUTO: ABNORMAL
LYMPHOCYTES # BLD AUTO: 1.02 10*3/MM3 (ref 0.9–4.8)
LYMPHOCYTES NFR BLD AUTO: 9.4 % (ref 19.6–45.3)
MAGNESIUM SERPL-MCNC: 2.1 MG/DL (ref 1.6–2.6)
MCH RBC QN AUTO: 26.7 PG (ref 26.9–32)
MCHC RBC AUTO-ENTMCNC: 29.9 G/DL (ref 32.4–36.3)
MCV RBC AUTO: 89.2 FL (ref 80.5–98.2)
METHADONE UR QL SCN: NEGATIVE
MONOCYTES # BLD AUTO: 0.81 10*3/MM3 (ref 0.2–1.2)
MONOCYTES NFR BLD AUTO: 7.4 % (ref 5–12)
NEUTROPHILS # BLD AUTO: 8.23 10*3/MM3 (ref 1.9–8.1)
NEUTROPHILS NFR BLD AUTO: 75.6 % (ref 42.7–76)
NITRITE UR QL STRIP: NEGATIVE
OPIATES UR QL: POSITIVE
OXYCODONE UR QL SCN: NEGATIVE
PH UR STRIP.AUTO: <=5 [PH] (ref 5–8)
PLATELET # BLD AUTO: 235 10*3/MM3 (ref 140–500)
PMV BLD AUTO: 11.4 FL (ref 6–12)
POTASSIUM BLD-SCNC: 2.9 MMOL/L (ref 3.5–5.2)
PROT UR QL STRIP: ABNORMAL
RBC # BLD AUTO: 3.6 10*6/MM3 (ref 3.9–5.2)
RBC # UR: ABNORMAL /HPF
REF LAB TEST METHOD: ABNORMAL
SODIUM BLD-SCNC: 143 MMOL/L (ref 136–145)
SP GR UR STRIP: 1.02 (ref 1–1.03)
SQUAMOUS #/AREA URNS HPF: ABNORMAL /HPF
UROBILINOGEN UR QL STRIP: ABNORMAL
WBC NRBC COR # BLD: 10.88 10*3/MM3 (ref 4.5–10.7)
WBC UR QL AUTO: ABNORMAL /HPF

## 2018-12-25 PROCEDURE — 80307 DRUG TEST PRSMV CHEM ANLYZR: CPT | Performed by: HOSPITALIST

## 2018-12-25 PROCEDURE — 94799 UNLISTED PULMONARY SVC/PX: CPT

## 2018-12-25 PROCEDURE — 25010000002 FUROSEMIDE PER 20 MG: Performed by: INTERNAL MEDICINE

## 2018-12-25 PROCEDURE — 87077 CULTURE AEROBIC IDENTIFY: CPT | Performed by: HOSPITALIST

## 2018-12-25 PROCEDURE — 81001 URINALYSIS AUTO W/SCOPE: CPT | Performed by: HOSPITALIST

## 2018-12-25 PROCEDURE — 87186 SC STD MICRODIL/AGAR DIL: CPT | Performed by: HOSPITALIST

## 2018-12-25 PROCEDURE — 85025 COMPLETE CBC W/AUTO DIFF WBC: CPT | Performed by: HOSPITALIST

## 2018-12-25 PROCEDURE — 87086 URINE CULTURE/COLONY COUNT: CPT | Performed by: HOSPITALIST

## 2018-12-25 PROCEDURE — 25010000002 HYDROMORPHONE PER 4 MG: Performed by: HOSPITALIST

## 2018-12-25 PROCEDURE — 83735 ASSAY OF MAGNESIUM: CPT | Performed by: INTERNAL MEDICINE

## 2018-12-25 PROCEDURE — 99254 IP/OBS CNSLTJ NEW/EST MOD 60: CPT | Performed by: INTERNAL MEDICINE

## 2018-12-25 PROCEDURE — 90791 PSYCH DIAGNOSTIC EVALUATION: CPT | Performed by: SOCIAL WORKER

## 2018-12-25 PROCEDURE — 25010000002 ALTEPLASE PER 1 MG: Performed by: INTERNAL MEDICINE

## 2018-12-25 PROCEDURE — 80048 BASIC METABOLIC PNL TOTAL CA: CPT | Performed by: HOSPITALIST

## 2018-12-25 PROCEDURE — 25010000002 DAPTOMYCIN PER 1 MG: Performed by: HOSPITALIST

## 2018-12-25 RX ORDER — POTASSIUM CHLORIDE 750 MG/1
40 CAPSULE, EXTENDED RELEASE ORAL EVERY 4 HOURS
Status: COMPLETED | OUTPATIENT
Start: 2018-12-25 | End: 2018-12-25

## 2018-12-25 RX ORDER — POTASSIUM CHLORIDE 1.5 G/1.77G
40 POWDER, FOR SOLUTION ORAL ONCE
Status: COMPLETED | OUTPATIENT
Start: 2018-12-25 | End: 2018-12-25

## 2018-12-25 RX ORDER — SODIUM CHLORIDE 0.9 % (FLUSH) 0.9 %
10 SYRINGE (ML) INJECTION EVERY 12 HOURS SCHEDULED
Status: DISCONTINUED | OUTPATIENT
Start: 2018-12-25 | End: 2018-12-27

## 2018-12-25 RX ORDER — ASPIRIN 81 MG/1
81 TABLET, CHEWABLE ORAL DAILY
Status: DISCONTINUED | OUTPATIENT
Start: 2018-12-25 | End: 2019-01-01 | Stop reason: HOSPADM

## 2018-12-25 RX ORDER — FUROSEMIDE 10 MG/ML
20 INJECTION INTRAMUSCULAR; INTRAVENOUS ONCE
Status: COMPLETED | OUTPATIENT
Start: 2018-12-25 | End: 2018-12-25

## 2018-12-25 RX ORDER — LORAZEPAM 0.5 MG/1
0.5 TABLET ORAL ONCE
Status: COMPLETED | OUTPATIENT
Start: 2018-12-25 | End: 2018-12-25

## 2018-12-25 RX ORDER — SODIUM CHLORIDE 0.9 % (FLUSH) 0.9 %
20 SYRINGE (ML) INJECTION AS NEEDED
Status: DISCONTINUED | OUTPATIENT
Start: 2018-12-25 | End: 2019-01-01 | Stop reason: HOSPADM

## 2018-12-25 RX ORDER — SODIUM CHLORIDE 0.9 % (FLUSH) 0.9 %
10 SYRINGE (ML) INJECTION AS NEEDED
Status: DISCONTINUED | OUTPATIENT
Start: 2018-12-25 | End: 2019-01-01 | Stop reason: HOSPADM

## 2018-12-25 RX ADMIN — Medication: at 13:14

## 2018-12-25 RX ADMIN — SODIUM CHLORIDE, PRESERVATIVE FREE 10 ML: 5 INJECTION INTRAVENOUS at 08:17

## 2018-12-25 RX ADMIN — SODIUM CHLORIDE, PRESERVATIVE FREE 10 ML: 5 INJECTION INTRAVENOUS at 02:17

## 2018-12-25 RX ADMIN — POTASSIUM CHLORIDE 40 MEQ: 750 CAPSULE, EXTENDED RELEASE ORAL at 08:16

## 2018-12-25 RX ADMIN — METOPROLOL TARTRATE 12.5 MG: 25 TABLET ORAL at 08:17

## 2018-12-25 RX ADMIN — HYDROMORPHONE HYDROCHLORIDE 0.5 MG: 1 INJECTION, SOLUTION INTRAMUSCULAR; INTRAVENOUS; SUBCUTANEOUS at 10:13

## 2018-12-25 RX ADMIN — DAPTOMYCIN 400 MG: 500 INJECTION, POWDER, LYOPHILIZED, FOR SOLUTION INTRAVENOUS at 20:24

## 2018-12-25 RX ADMIN — ESCITALOPRAM 20 MG: 20 TABLET, FILM COATED ORAL at 08:16

## 2018-12-25 RX ADMIN — ATORVASTATIN CALCIUM 40 MG: 20 TABLET, FILM COATED ORAL at 20:24

## 2018-12-25 RX ADMIN — MULTIVITAMIN 15 ML: LIQUID ORAL at 08:16

## 2018-12-25 RX ADMIN — ACETAMINOPHEN 650 MG: 325 TABLET, FILM COATED ORAL at 14:18

## 2018-12-25 RX ADMIN — LORAZEPAM 0.5 MG: 0.5 TABLET ORAL at 17:06

## 2018-12-25 RX ADMIN — PRAMIPEXOLE DIHYDROCHLORIDE 0.25 MG: 0.25 TABLET ORAL at 20:24

## 2018-12-25 RX ADMIN — SODIUM CHLORIDE, PRESERVATIVE FREE 10 ML: 5 INJECTION INTRAVENOUS at 20:31

## 2018-12-25 RX ADMIN — Medication 81 MG: at 14:18

## 2018-12-25 RX ADMIN — BUDESONIDE 0.5 MG: 0.5 INHALANT RESPIRATORY (INHALATION) at 09:05

## 2018-12-25 RX ADMIN — POTASSIUM CHLORIDE 40 MEQ: 750 CAPSULE, EXTENDED RELEASE ORAL at 12:35

## 2018-12-25 RX ADMIN — IPRATROPIUM BROMIDE AND ALBUTEROL SULFATE 3 ML: 2.5; .5 SOLUTION RESPIRATORY (INHALATION) at 09:05

## 2018-12-25 RX ADMIN — PANTOPRAZOLE SODIUM 40 MG: 40 TABLET, DELAYED RELEASE ORAL at 08:16

## 2018-12-25 RX ADMIN — SODIUM CHLORIDE, PRESERVATIVE FREE 3 ML: 5 INJECTION INTRAVENOUS at 20:32

## 2018-12-25 RX ADMIN — MUPIROCIN: 20 OINTMENT TOPICAL at 20:32

## 2018-12-25 RX ADMIN — METOPROLOL TARTRATE 12.5 MG: 25 TABLET ORAL at 20:24

## 2018-12-25 RX ADMIN — FUROSEMIDE 20 MG: 10 INJECTION, SOLUTION INTRAVENOUS at 12:35

## 2018-12-25 RX ADMIN — CLONAZEPAM 1 MG: 1 TABLET ORAL at 16:30

## 2018-12-25 RX ADMIN — HYDROCODONE BITARTRATE AND ACETAMINOPHEN 1 TABLET: 7.5; 325 TABLET ORAL at 16:09

## 2018-12-25 RX ADMIN — HYDROMORPHONE HYDROCHLORIDE 0.5 MG: 1 INJECTION, SOLUTION INTRAMUSCULAR; INTRAVENOUS; SUBCUTANEOUS at 03:05

## 2018-12-25 RX ADMIN — Medication: at 13:12

## 2018-12-25 RX ADMIN — Medication 100 MG: at 08:16

## 2018-12-25 RX ADMIN — POTASSIUM CHLORIDE 40 MEQ: 1.5 POWDER, FOR SOLUTION ORAL at 03:05

## 2018-12-26 ENCOUNTER — READMISSION MANAGEMENT (OUTPATIENT)
Dept: CALL CENTER | Facility: HOSPITAL | Age: 58
End: 2018-12-26

## 2018-12-26 ENCOUNTER — APPOINTMENT (OUTPATIENT)
Dept: CARDIOLOGY | Facility: HOSPITAL | Age: 58
End: 2018-12-26
Attending: HOSPITALIST

## 2018-12-26 PROBLEM — M86.10 ACUTE OSTEOMYELITIS (HCC): Status: ACTIVE | Noted: 2018-12-26

## 2018-12-26 PROBLEM — I50.9 ACUTE CONGESTIVE HEART FAILURE (HCC): Status: RESOLVED | Noted: 2018-12-24 | Resolved: 2018-12-26

## 2018-12-26 PROBLEM — I50.31 ACUTE DIASTOLIC CONGESTIVE HEART FAILURE (HCC): Status: ACTIVE | Noted: 2018-12-24

## 2018-12-26 LAB
ANION GAP SERPL CALCULATED.3IONS-SCNC: 11.5 MMOL/L
BASOPHILS # BLD AUTO: 0.03 10*3/MM3 (ref 0–0.2)
BASOPHILS NFR BLD AUTO: 0.3 % (ref 0–1.5)
BUN BLD-MCNC: 21 MG/DL (ref 6–20)
BUN/CREAT SERPL: 13.2 (ref 7–25)
CALCIUM SPEC-SCNC: 9.8 MG/DL (ref 8.6–10.5)
CHLORIDE SERPL-SCNC: 108 MMOL/L (ref 98–107)
CO2 SERPL-SCNC: 21.5 MMOL/L (ref 22–29)
CREAT BLD-MCNC: 1.59 MG/DL (ref 0.57–1)
DEPRECATED RDW RBC AUTO: 54.9 FL (ref 37–54)
EOSINOPHIL # BLD AUTO: 1.22 10*3/MM3 (ref 0–0.7)
EOSINOPHIL NFR BLD AUTO: 11.8 % (ref 0.3–6.2)
ERYTHROCYTE [DISTWIDTH] IN BLOOD BY AUTOMATED COUNT: 16.7 % (ref 11.7–13)
GFR SERPL CREATININE-BSD FRML MDRD: 33 ML/MIN/1.73
GLUCOSE BLD-MCNC: 110 MG/DL (ref 65–99)
HCT VFR BLD AUTO: 31.7 % (ref 35.6–45.5)
HGB BLD-MCNC: 9.3 G/DL (ref 11.9–15.5)
IMM GRANULOCYTES # BLD AUTO: 0.02 10*3/MM3 (ref 0–0.03)
IMM GRANULOCYTES NFR BLD AUTO: 0.2 % (ref 0–0.5)
LYMPHOCYTES # BLD AUTO: 1.73 10*3/MM3 (ref 0.9–4.8)
LYMPHOCYTES NFR BLD AUTO: 16.7 % (ref 19.6–45.3)
MCH RBC QN AUTO: 26.3 PG (ref 26.9–32)
MCHC RBC AUTO-ENTMCNC: 29.3 G/DL (ref 32.4–36.3)
MCV RBC AUTO: 89.5 FL (ref 80.5–98.2)
MONOCYTES # BLD AUTO: 0.65 10*3/MM3 (ref 0.2–1.2)
MONOCYTES NFR BLD AUTO: 6.3 % (ref 5–12)
NEUTROPHILS # BLD AUTO: 6.71 10*3/MM3 (ref 1.9–8.1)
NEUTROPHILS NFR BLD AUTO: 64.7 % (ref 42.7–76)
PLATELET # BLD AUTO: 287 10*3/MM3 (ref 140–500)
PMV BLD AUTO: 11.1 FL (ref 6–12)
POTASSIUM BLD-SCNC: 3.9 MMOL/L (ref 3.5–5.2)
RBC # BLD AUTO: 3.54 10*6/MM3 (ref 3.9–5.2)
SODIUM BLD-SCNC: 141 MMOL/L (ref 136–145)
WBC NRBC COR # BLD: 10.36 10*3/MM3 (ref 4.5–10.7)

## 2018-12-26 PROCEDURE — 85025 COMPLETE CBC W/AUTO DIFF WBC: CPT | Performed by: HOSPITALIST

## 2018-12-26 PROCEDURE — 94799 UNLISTED PULMONARY SVC/PX: CPT

## 2018-12-26 PROCEDURE — 80048 BASIC METABOLIC PNL TOTAL CA: CPT | Performed by: HOSPITALIST

## 2018-12-26 PROCEDURE — 93306 TTE W/DOPPLER COMPLETE: CPT | Performed by: INTERNAL MEDICINE

## 2018-12-26 PROCEDURE — 93306 TTE W/DOPPLER COMPLETE: CPT

## 2018-12-26 PROCEDURE — 25010000002 DAPTOMYCIN PER 1 MG: Performed by: HOSPITALIST

## 2018-12-26 RX ORDER — CEFDINIR 300 MG/1
300 CAPSULE ORAL EVERY 12 HOURS SCHEDULED
Status: DISCONTINUED | OUTPATIENT
Start: 2018-12-26 | End: 2018-12-27

## 2018-12-26 RX ORDER — FUROSEMIDE 20 MG/1
20 TABLET ORAL DAILY
Status: DISCONTINUED | OUTPATIENT
Start: 2018-12-27 | End: 2018-12-28

## 2018-12-26 RX ADMIN — MUPIROCIN: 20 OINTMENT TOPICAL at 22:09

## 2018-12-26 RX ADMIN — Medication 100 MG: at 08:29

## 2018-12-26 RX ADMIN — SODIUM CHLORIDE, PRESERVATIVE FREE 10 ML: 5 INJECTION INTRAVENOUS at 22:08

## 2018-12-26 RX ADMIN — TRAZODONE HYDROCHLORIDE 50 MG: 50 TABLET ORAL at 23:25

## 2018-12-26 RX ADMIN — HYDROCODONE BITARTRATE AND ACETAMINOPHEN 1 TABLET: 7.5; 325 TABLET ORAL at 15:14

## 2018-12-26 RX ADMIN — IPRATROPIUM BROMIDE AND ALBUTEROL SULFATE 3 ML: 2.5; .5 SOLUTION RESPIRATORY (INHALATION) at 04:29

## 2018-12-26 RX ADMIN — HYDROCODONE BITARTRATE AND ACETAMINOPHEN 1 TABLET: 7.5; 325 TABLET ORAL at 00:29

## 2018-12-26 RX ADMIN — CLONAZEPAM 1 MG: 1 TABLET ORAL at 15:34

## 2018-12-26 RX ADMIN — IPRATROPIUM BROMIDE AND ALBUTEROL SULFATE 3 ML: 2.5; .5 SOLUTION RESPIRATORY (INHALATION) at 15:17

## 2018-12-26 RX ADMIN — Medication 81 MG: at 08:29

## 2018-12-26 RX ADMIN — DAPTOMYCIN 400 MG: 500 INJECTION, POWDER, LYOPHILIZED, FOR SOLUTION INTRAVENOUS at 19:52

## 2018-12-26 RX ADMIN — HYDROCODONE BITARTRATE AND ACETAMINOPHEN 1 TABLET: 7.5; 325 TABLET ORAL at 08:30

## 2018-12-26 RX ADMIN — METOPROLOL TARTRATE 12.5 MG: 25 TABLET ORAL at 22:07

## 2018-12-26 RX ADMIN — METOPROLOL TARTRATE 12.5 MG: 25 TABLET ORAL at 08:30

## 2018-12-26 RX ADMIN — SODIUM CHLORIDE, PRESERVATIVE FREE 10 ML: 5 INJECTION INTRAVENOUS at 08:31

## 2018-12-26 RX ADMIN — CEFDINIR 300 MG: 300 CAPSULE ORAL at 22:08

## 2018-12-26 RX ADMIN — PANTOPRAZOLE SODIUM 40 MG: 40 TABLET, DELAYED RELEASE ORAL at 08:29

## 2018-12-26 RX ADMIN — PRAMIPEXOLE DIHYDROCHLORIDE 0.25 MG: 0.25 TABLET ORAL at 22:08

## 2018-12-26 RX ADMIN — ESCITALOPRAM 20 MG: 20 TABLET, FILM COATED ORAL at 08:29

## 2018-12-26 RX ADMIN — SODIUM CHLORIDE, PRESERVATIVE FREE 3 ML: 5 INJECTION INTRAVENOUS at 22:09

## 2018-12-26 RX ADMIN — HYDROCODONE BITARTRATE AND ACETAMINOPHEN 1 TABLET: 7.5; 325 TABLET ORAL at 22:07

## 2018-12-26 RX ADMIN — ATORVASTATIN CALCIUM 40 MG: 20 TABLET, FILM COATED ORAL at 22:08

## 2018-12-26 RX ADMIN — MUPIROCIN: 20 OINTMENT TOPICAL at 08:30

## 2018-12-26 RX ADMIN — IPRATROPIUM BROMIDE AND ALBUTEROL SULFATE 3 ML: 2.5; .5 SOLUTION RESPIRATORY (INHALATION) at 07:58

## 2018-12-26 RX ADMIN — BUDESONIDE 0.5 MG: 0.5 INHALANT RESPIRATORY (INHALATION) at 07:58

## 2018-12-26 NOTE — PROGRESS NOTES
Case Management Discharge Note    Final Note: Pt discharged home with [facility].  [employer] to provide night time O2 and lightweight wheelchair for pt.  JACQUIE mock RN    Destination      No service has been selected for the patient.      Durable Medical Equipment      No service has been selected for the patient.      Dialysis/Infusion      No service has been selected for the patient.      Home Medical Care - Selection Complete      Service Provider Request Status Selected Services Address Phone Number Fax Number    [facility] Selected Home Health Services [address] [address] [phone]      Community Resources      No service has been selected for the patient.        Other: Other(private auto)    Final Discharge Disposition Code: 06 - home with home health care

## 2018-12-26 NOTE — OUTREACH NOTE
Medical Week 1 Survey      Responses   Facility patient discharged from?  [facility]   Does the patient have one of the following disease processes/diagnoses(primary or secondary)?  Other   Week 1 attempt successful?  No   Revoke  Readmitted          Dinora Crespo RN

## 2018-12-26 NOTE — PROGRESS NOTES
Case Management Discharge Note    Final Note: Pt discharged home.  [facility] to provide night time O2 and lightweight wheelchair for pt.  JACQUIE mock RN    Destination      No service has been selected for the patient.      Durable Medical Equipment      No service has been selected for the patient.      Dialysis/Infusion      No service has been selected for the patient.      Home Medical Care - Selection Complete      Service Provider Request Status Selected Services Address Phone Number Fax Number    [facility] Selected Home Health Services [address] [address] [phone]      Community Resources      No service has been selected for the patient.        Other: Other(private auto)    Final Discharge Disposition Code: 01 - home or self-care

## 2018-12-27 ENCOUNTER — APPOINTMENT (OUTPATIENT)
Dept: GENERAL RADIOLOGY | Facility: HOSPITAL | Age: 58
End: 2018-12-27
Attending: INTERNAL MEDICINE

## 2018-12-27 PROBLEM — B96.29 UTI DUE TO EXTENDED-SPECTRUM BETA LACTAMASE (ESBL) PRODUCING ESCHERICHIA COLI: Status: ACTIVE | Noted: 2018-12-27

## 2018-12-27 PROBLEM — N39.0 UTI DUE TO EXTENDED-SPECTRUM BETA LACTAMASE (ESBL) PRODUCING ESCHERICHIA COLI: Status: ACTIVE | Noted: 2018-12-27

## 2018-12-27 PROBLEM — Z16.12 UTI DUE TO EXTENDED-SPECTRUM BETA LACTAMASE (ESBL) PRODUCING ESCHERICHIA COLI: Status: ACTIVE | Noted: 2018-12-27

## 2018-12-27 LAB
ANION GAP SERPL CALCULATED.3IONS-SCNC: 13.7 MMOL/L
BACTERIA SPEC AEROBE CULT: ABNORMAL
BACTERIA SPEC AEROBE CULT: ABNORMAL
BASOPHILS # BLD AUTO: 0.06 10*3/MM3 (ref 0–0.2)
BASOPHILS NFR BLD AUTO: 0.5 % (ref 0–1.5)
BH CV ECHO MEAS - ACS: 1.6 CM
BH CV ECHO MEAS - AO MEAN PG (FULL): 3 MMHG
BH CV ECHO MEAS - AO MEAN PG: 5 MMHG
BH CV ECHO MEAS - AO ROOT AREA (BSA CORRECTED): 1.8
BH CV ECHO MEAS - AO ROOT AREA: 7.5 CM^2
BH CV ECHO MEAS - AO ROOT DIAM: 3.1 CM
BH CV ECHO MEAS - AO V2 MEAN: 98.8 CM/SEC
BH CV ECHO MEAS - AO V2 VTI: 25.6 CM
BH CV ECHO MEAS - AVA(I,A): 2.4 CM^2
BH CV ECHO MEAS - AVA(I,D): 2.4 CM^2
BH CV ECHO MEAS - BSA(HAYCOCK): 1.7 M^2
BH CV ECHO MEAS - BSA: 1.7 M^2
BH CV ECHO MEAS - BZI_BMI: 27.3 KILOGRAMS/M^2
BH CV ECHO MEAS - BZI_METRIC_HEIGHT: 157.5 CM
BH CV ECHO MEAS - BZI_METRIC_WEIGHT: 67.6 KG
BH CV ECHO MEAS - EDV(CUBED): 91.1 ML
BH CV ECHO MEAS - EDV(MOD-SP2): 77 ML
BH CV ECHO MEAS - EDV(MOD-SP4): 85 ML
BH CV ECHO MEAS - EDV(TEICH): 92.4 ML
BH CV ECHO MEAS - EF(CUBED): 78.4 %
BH CV ECHO MEAS - EF(MOD-BP): 48 %
BH CV ECHO MEAS - EF(MOD-SP2): 46.8 %
BH CV ECHO MEAS - EF(MOD-SP4): 45.9 %
BH CV ECHO MEAS - EF(TEICH): 70.8 %
BH CV ECHO MEAS - ESV(CUBED): 19.7 ML
BH CV ECHO MEAS - ESV(MOD-SP2): 41 ML
BH CV ECHO MEAS - ESV(MOD-SP4): 46 ML
BH CV ECHO MEAS - ESV(TEICH): 27 ML
BH CV ECHO MEAS - FS: 40 %
BH CV ECHO MEAS - IVS/LVPW: 1
BH CV ECHO MEAS - IVSD: 1 CM
BH CV ECHO MEAS - LA A2CS (ATRIAL LENGTH): 5 CM
BH CV ECHO MEAS - LA DIMENSION(2D): 11 CM
BH CV ECHO MEAS - LA DIMENSION: 4 CM
BH CV ECHO MEAS - LAT PEAK E' VEL: 7 CM/SEC
BH CV ECHO MEAS - LV DIASTOLIC VOL/BSA (35-75): 50.4 ML/M^2
BH CV ECHO MEAS - LV MASS(C)D: 153.3 GRAMS
BH CV ECHO MEAS - LV MASS(C)DI: 90.9 GRAMS/M^2
BH CV ECHO MEAS - LV MEAN PG: 2 MMHG
BH CV ECHO MEAS - LV SYSTOLIC VOL/BSA (12-30): 27.3 ML/M^2
BH CV ECHO MEAS - LV V1 MAX: 103 CM/SEC
BH CV ECHO MEAS - LV V1 MEAN: 69.4 CM/SEC
BH CV ECHO MEAS - LV V1 VTI: 19.3 CM
BH CV ECHO MEAS - LVIDD: 4.5 CM
BH CV ECHO MEAS - LVIDS: 2.7 CM
BH CV ECHO MEAS - LVLD AP2: 8.2 CM
BH CV ECHO MEAS - LVLD AP4: 8.6 CM
BH CV ECHO MEAS - LVLS AP2: 7.4 CM
BH CV ECHO MEAS - LVLS AP4: 7.5 CM
BH CV ECHO MEAS - LVOT AREA (M): 3.1 CM^2
BH CV ECHO MEAS - LVOT AREA: 3.1 CM^2
BH CV ECHO MEAS - LVOT DIAM: 2 CM
BH CV ECHO MEAS - LVPWD: 1 CM
BH CV ECHO MEAS - MED PEAK E' VEL: 8 CM/SEC
BH CV ECHO MEAS - MR MAX PG: 3 MMHG
BH CV ECHO MEAS - MR MAX VEL: 86.9 CM/SEC
BH CV ECHO MEAS - MV A DUR: 0.15 SEC
BH CV ECHO MEAS - MV A MAX VEL: 109 CM/SEC
BH CV ECHO MEAS - MV DEC SLOPE: 560 CM/SEC^2
BH CV ECHO MEAS - MV DEC TIME: 0.17 SEC
BH CV ECHO MEAS - MV E MAX VEL: 57.8 CM/SEC
BH CV ECHO MEAS - MV E/A: 0.53
BH CV ECHO MEAS - MV MAX PG: 5 MMHG
BH CV ECHO MEAS - MV MEAN PG: 2 MMHG
BH CV ECHO MEAS - MV P1/2T MAX VEL: 95.6 CM/SEC
BH CV ECHO MEAS - MV P1/2T: 50 MSEC
BH CV ECHO MEAS - MV V2 MEAN: 65.7 CM/SEC
BH CV ECHO MEAS - MV V2 VTI: 23.8 CM
BH CV ECHO MEAS - MVA P1/2T LCG: 2.3 CM^2
BH CV ECHO MEAS - MVA(P1/2T): 4.4 CM^2
BH CV ECHO MEAS - MVA(VTI): 2.5 CM^2
BH CV ECHO MEAS - PA ACC SLOPE: 918 CM/SEC^2
BH CV ECHO MEAS - PA ACC TIME: 0.11 SEC
BH CV ECHO MEAS - PA MAX PG (FULL): 1.4 MMHG
BH CV ECHO MEAS - PA MAX PG: 3.4 MMHG
BH CV ECHO MEAS - PA PR(ACCEL): 31.3 MMHG
BH CV ECHO MEAS - PA V2 MAX: 92.1 CM/SEC
BH CV ECHO MEAS - PI END-D VEL: 120.5 CM/SEC
BH CV ECHO MEAS - PULM A REVS DUR: 0.14 SEC
BH CV ECHO MEAS - PULM A REVS VEL: 44.9 CM/SEC
BH CV ECHO MEAS - PULM DIAS VEL: 23.3 CM/SEC
BH CV ECHO MEAS - PULM S/D: 1.5
BH CV ECHO MEAS - PULM SYS VEL: 33.9 CM/SEC
BH CV ECHO MEAS - PVA(V,A): 2.4 CM^2
BH CV ECHO MEAS - PVA(V,D): 2.4 CM^2
BH CV ECHO MEAS - QP/QS: 0.73
BH CV ECHO MEAS - RAP SYSTOLE: 8 MMHG
BH CV ECHO MEAS - RV MAX PG: 2 MMHG
BH CV ECHO MEAS - RV MEAN PG: 1 MMHG
BH CV ECHO MEAS - RV V1 MAX: 70.7 CM/SEC
BH CV ECHO MEAS - RV V1 MEAN: 45.7 CM/SEC
BH CV ECHO MEAS - RV V1 VTI: 14 CM
BH CV ECHO MEAS - RVOT AREA: 3.1 CM^2
BH CV ECHO MEAS - RVOT DIAM: 2 CM
BH CV ECHO MEAS - SI(AO): 114.6 ML/M^2
BH CV ECHO MEAS - SI(CUBED): 42.4 ML/M^2
BH CV ECHO MEAS - SI(LVOT): 35.9 ML/M^2
BH CV ECHO MEAS - SI(MOD-SP2): 21.3 ML/M^2
BH CV ECHO MEAS - SI(MOD-SP4): 23.1 ML/M^2
BH CV ECHO MEAS - SI(TEICH): 38.8 ML/M^2
BH CV ECHO MEAS - SV(AO): 193.2 ML
BH CV ECHO MEAS - SV(CUBED): 71.4 ML
BH CV ECHO MEAS - SV(LVOT): 60.6 ML
BH CV ECHO MEAS - SV(MOD-SP2): 36 ML
BH CV ECHO MEAS - SV(MOD-SP4): 39 ML
BH CV ECHO MEAS - SV(RVOT): 44 ML
BH CV ECHO MEAS - SV(TEICH): 65.4 ML
BH CV ECHO MEAS - TAPSE (>1.6): 2 CM2
BH CV ECHO MEASUREMENTS AVERAGE E/E' RATIO: 7.71
BH CV VAS BP RIGHT ARM: NORMAL MMHG
BH CV XLRA - RV BASE: 3.6 CM
BH CV XLRA - TDI S': 9 CM/SEC
BUN BLD-MCNC: 19 MG/DL (ref 6–20)
BUN/CREAT SERPL: 13.9 (ref 7–25)
CALCIUM SPEC-SCNC: 9.9 MG/DL (ref 8.6–10.5)
CHLORIDE SERPL-SCNC: 108 MMOL/L (ref 98–107)
CO2 SERPL-SCNC: 22.3 MMOL/L (ref 22–29)
CREAT BLD-MCNC: 1.37 MG/DL (ref 0.57–1)
DEPRECATED RDW RBC AUTO: 54.7 FL (ref 37–54)
EOSINOPHIL # BLD AUTO: 1.2 10*3/MM3 (ref 0–0.7)
EOSINOPHIL NFR BLD AUTO: 9.2 % (ref 0.3–6.2)
ERYTHROCYTE [DISTWIDTH] IN BLOOD BY AUTOMATED COUNT: 16.7 % (ref 11.7–13)
GFR SERPL CREATININE-BSD FRML MDRD: 40 ML/MIN/1.73
GLUCOSE BLD-MCNC: 118 MG/DL (ref 65–99)
HCT VFR BLD AUTO: 33.1 % (ref 35.6–45.5)
HGB BLD-MCNC: 9.9 G/DL (ref 11.9–15.5)
IMM GRANULOCYTES # BLD AUTO: 0.02 10*3/MM3 (ref 0–0.03)
IMM GRANULOCYTES NFR BLD AUTO: 0.2 % (ref 0–0.5)
LEFT ATRIUM VOLUME INDEX: 13 ML/M2
LEFT ATRIUM VOLUME: 22 CM3
LV EF 2D ECHO EST: 50 %
LYMPHOCYTES # BLD AUTO: 1.62 10*3/MM3 (ref 0.9–4.8)
LYMPHOCYTES NFR BLD AUTO: 12.4 % (ref 19.6–45.3)
MAXIMAL PREDICTED HEART RATE: 162 BPM
MCH RBC QN AUTO: 26.5 PG (ref 26.9–32)
MCHC RBC AUTO-ENTMCNC: 29.9 G/DL (ref 32.4–36.3)
MCV RBC AUTO: 88.7 FL (ref 80.5–98.2)
MONOCYTES # BLD AUTO: 0.84 10*3/MM3 (ref 0.2–1.2)
MONOCYTES NFR BLD AUTO: 6.4 % (ref 5–12)
NEUTROPHILS # BLD AUTO: 9.29 10*3/MM3 (ref 1.9–8.1)
NEUTROPHILS NFR BLD AUTO: 71.3 % (ref 42.7–76)
NT-PROBNP SERPL-MCNC: ABNORMAL PG/ML (ref 5–900)
PLATELET # BLD AUTO: 302 10*3/MM3 (ref 140–500)
PMV BLD AUTO: 10.5 FL (ref 6–12)
POTASSIUM BLD-SCNC: 3.7 MMOL/L (ref 3.5–5.2)
RBC # BLD AUTO: 3.73 10*6/MM3 (ref 3.9–5.2)
SODIUM BLD-SCNC: 144 MMOL/L (ref 136–145)
STRESS TARGET HR: 138 BPM
WBC NRBC COR # BLD: 13.03 10*3/MM3 (ref 4.5–10.7)

## 2018-12-27 PROCEDURE — 25010000002 ERTAPENEM PER 500 MG: Performed by: INTERNAL MEDICINE

## 2018-12-27 PROCEDURE — 85025 COMPLETE CBC W/AUTO DIFF WBC: CPT | Performed by: HOSPITALIST

## 2018-12-27 PROCEDURE — 94799 UNLISTED PULMONARY SVC/PX: CPT

## 2018-12-27 PROCEDURE — 80048 BASIC METABOLIC PNL TOTAL CA: CPT | Performed by: HOSPITALIST

## 2018-12-27 PROCEDURE — 83880 ASSAY OF NATRIURETIC PEPTIDE: CPT | Performed by: HOSPITALIST

## 2018-12-27 PROCEDURE — 71046 X-RAY EXAM CHEST 2 VIEWS: CPT

## 2018-12-27 PROCEDURE — 25010000002 DAPTOMYCIN PER 1 MG: Performed by: HOSPITALIST

## 2018-12-27 RX ORDER — GABAPENTIN 300 MG/1
300 CAPSULE ORAL EVERY 8 HOURS SCHEDULED
Status: DISCONTINUED | OUTPATIENT
Start: 2018-12-27 | End: 2019-01-01 | Stop reason: HOSPADM

## 2018-12-27 RX ADMIN — SODIUM CHLORIDE, PRESERVATIVE FREE 10 ML: 5 INJECTION INTRAVENOUS at 09:55

## 2018-12-27 RX ADMIN — ACETAMINOPHEN 650 MG: 325 TABLET, FILM COATED ORAL at 10:11

## 2018-12-27 RX ADMIN — HYDROCODONE BITARTRATE AND ACETAMINOPHEN 1 TABLET: 7.5; 325 TABLET ORAL at 18:11

## 2018-12-27 RX ADMIN — METOPROLOL TARTRATE 12.5 MG: 25 TABLET ORAL at 09:54

## 2018-12-27 RX ADMIN — MUPIROCIN: 20 OINTMENT TOPICAL at 21:27

## 2018-12-27 RX ADMIN — SODIUM CHLORIDE, PRESERVATIVE FREE 3 ML: 5 INJECTION INTRAVENOUS at 21:27

## 2018-12-27 RX ADMIN — HYDROCODONE BITARTRATE AND ACETAMINOPHEN 1 TABLET: 7.5; 325 TABLET ORAL at 04:45

## 2018-12-27 RX ADMIN — HYDROCODONE BITARTRATE AND ACETAMINOPHEN 1 TABLET: 7.5; 325 TABLET ORAL at 12:03

## 2018-12-27 RX ADMIN — ERTAPENEM SODIUM 1 G: 1 INJECTION, POWDER, LYOPHILIZED, FOR SOLUTION INTRAMUSCULAR; INTRAVENOUS at 11:32

## 2018-12-27 RX ADMIN — ESCITALOPRAM 20 MG: 20 TABLET, FILM COATED ORAL at 09:55

## 2018-12-27 RX ADMIN — GABAPENTIN 300 MG: 300 CAPSULE ORAL at 21:26

## 2018-12-27 RX ADMIN — PRAMIPEXOLE DIHYDROCHLORIDE 0.25 MG: 0.25 TABLET ORAL at 21:26

## 2018-12-27 RX ADMIN — Medication 100 MG: at 09:55

## 2018-12-27 RX ADMIN — ATORVASTATIN CALCIUM 40 MG: 20 TABLET, FILM COATED ORAL at 21:26

## 2018-12-27 RX ADMIN — CLONAZEPAM 1 MG: 1 TABLET ORAL at 04:45

## 2018-12-27 RX ADMIN — Medication 81 MG: at 09:54

## 2018-12-27 RX ADMIN — PANTOPRAZOLE SODIUM 40 MG: 40 TABLET, DELAYED RELEASE ORAL at 09:54

## 2018-12-27 RX ADMIN — METOPROLOL TARTRATE 12.5 MG: 25 TABLET ORAL at 21:26

## 2018-12-27 RX ADMIN — BUDESONIDE 0.5 MG: 0.5 INHALANT RESPIRATORY (INHALATION) at 06:30

## 2018-12-27 RX ADMIN — DAPTOMYCIN 400 MG: 500 INJECTION, POWDER, LYOPHILIZED, FOR SOLUTION INTRAVENOUS at 19:23

## 2018-12-27 RX ADMIN — GABAPENTIN 300 MG: 300 CAPSULE ORAL at 15:47

## 2018-12-27 RX ADMIN — FUROSEMIDE 20 MG: 20 TABLET ORAL at 09:55

## 2018-12-28 ENCOUNTER — APPOINTMENT (OUTPATIENT)
Dept: NUCLEAR MEDICINE | Facility: HOSPITAL | Age: 58
End: 2018-12-28

## 2018-12-28 ENCOUNTER — APPOINTMENT (OUTPATIENT)
Dept: GENERAL RADIOLOGY | Facility: HOSPITAL | Age: 58
End: 2018-12-28

## 2018-12-28 ENCOUNTER — APPOINTMENT (OUTPATIENT)
Dept: CARDIOLOGY | Facility: HOSPITAL | Age: 58
End: 2018-12-28
Attending: HOSPITALIST

## 2018-12-28 PROBLEM — B37.0 ORAL CANDIDIASIS: Status: ACTIVE | Noted: 2018-12-28

## 2018-12-28 LAB
ANION GAP SERPL CALCULATED.3IONS-SCNC: 13.4 MMOL/L
BUN BLD-MCNC: 16 MG/DL (ref 6–20)
BUN/CREAT SERPL: 11.5 (ref 7–25)
CALCIUM SPEC-SCNC: 9.8 MG/DL (ref 8.6–10.5)
CHLORIDE SERPL-SCNC: 108 MMOL/L (ref 98–107)
CO2 SERPL-SCNC: 22.6 MMOL/L (ref 22–29)
CREAT BLD-MCNC: 1.39 MG/DL (ref 0.57–1)
DEPRECATED RDW RBC AUTO: 54.4 FL (ref 37–54)
ERYTHROCYTE [DISTWIDTH] IN BLOOD BY AUTOMATED COUNT: 16.7 % (ref 11.7–13)
GFR SERPL CREATININE-BSD FRML MDRD: 39 ML/MIN/1.73
GLUCOSE BLD-MCNC: 124 MG/DL (ref 65–99)
HCT VFR BLD AUTO: 31.9 % (ref 35.6–45.5)
HGB BLD-MCNC: 9.5 G/DL (ref 11.9–15.5)
MCH RBC QN AUTO: 26.7 PG (ref 26.9–32)
MCHC RBC AUTO-ENTMCNC: 29.8 G/DL (ref 32.4–36.3)
MCV RBC AUTO: 89.6 FL (ref 80.5–98.2)
NT-PROBNP SERPL-MCNC: ABNORMAL PG/ML (ref 5–900)
PLATELET # BLD AUTO: 291 10*3/MM3 (ref 140–500)
PMV BLD AUTO: 11 FL (ref 6–12)
POTASSIUM BLD-SCNC: 3.4 MMOL/L (ref 3.5–5.2)
RBC # BLD AUTO: 3.56 10*6/MM3 (ref 3.9–5.2)
SODIUM BLD-SCNC: 144 MMOL/L (ref 136–145)
WBC NRBC COR # BLD: 12.06 10*3/MM3 (ref 4.5–10.7)

## 2018-12-28 PROCEDURE — A9540 TC99M MAA: HCPCS | Performed by: HOSPITALIST

## 2018-12-28 PROCEDURE — 99232 SBSQ HOSP IP/OBS MODERATE 35: CPT | Performed by: INTERNAL MEDICINE

## 2018-12-28 PROCEDURE — 97110 THERAPEUTIC EXERCISES: CPT

## 2018-12-28 PROCEDURE — 73660 X-RAY EXAM OF TOE(S): CPT

## 2018-12-28 PROCEDURE — 97162 PT EVAL MOD COMPLEX 30 MIN: CPT

## 2018-12-28 PROCEDURE — 0 TECHNETIUM ALBUMIN AGGREGATED: Performed by: HOSPITALIST

## 2018-12-28 PROCEDURE — 78582 LUNG VENTILAT&PERFUS IMAGING: CPT

## 2018-12-28 PROCEDURE — 80048 BASIC METABOLIC PNL TOTAL CA: CPT | Performed by: HOSPITALIST

## 2018-12-28 PROCEDURE — 83880 ASSAY OF NATRIURETIC PEPTIDE: CPT | Performed by: HOSPITALIST

## 2018-12-28 PROCEDURE — 25010000002 FUROSEMIDE PER 20 MG: Performed by: INTERNAL MEDICINE

## 2018-12-28 PROCEDURE — 94799 UNLISTED PULMONARY SVC/PX: CPT

## 2018-12-28 PROCEDURE — 25010000002 DAPTOMYCIN PER 1 MG: Performed by: HOSPITALIST

## 2018-12-28 PROCEDURE — A9558 XE133 XENON 10MCI: HCPCS | Performed by: HOSPITALIST

## 2018-12-28 PROCEDURE — 25010000002 ERTAPENEM PER 500 MG: Performed by: INTERNAL MEDICINE

## 2018-12-28 PROCEDURE — 85027 COMPLETE CBC AUTOMATED: CPT | Performed by: HOSPITALIST

## 2018-12-28 PROCEDURE — 93970 EXTREMITY STUDY: CPT

## 2018-12-28 PROCEDURE — 0 XENON XE 133: Performed by: HOSPITALIST

## 2018-12-28 RX ORDER — POTASSIUM CHLORIDE 750 MG/1
40 CAPSULE, EXTENDED RELEASE ORAL ONCE
Status: COMPLETED | OUTPATIENT
Start: 2018-12-28 | End: 2018-12-28

## 2018-12-28 RX ORDER — FLUCONAZOLE 100 MG/1
100 TABLET ORAL EVERY 24 HOURS
Status: DISCONTINUED | OUTPATIENT
Start: 2018-12-28 | End: 2018-12-28

## 2018-12-28 RX ORDER — FUROSEMIDE 10 MG/ML
40 INJECTION INTRAMUSCULAR; INTRAVENOUS EVERY 12 HOURS
Status: DISCONTINUED | OUTPATIENT
Start: 2018-12-28 | End: 2018-12-28

## 2018-12-28 RX ORDER — FUROSEMIDE 20 MG/1
20 TABLET ORAL
Status: DISCONTINUED | OUTPATIENT
Start: 2018-12-28 | End: 2018-12-31

## 2018-12-28 RX ORDER — FLUCONAZOLE 100 MG/1
100 TABLET ORAL EVERY 24 HOURS
Status: COMPLETED | OUTPATIENT
Start: 2018-12-29 | End: 2018-12-29

## 2018-12-28 RX ORDER — POTASSIUM CHLORIDE 750 MG/1
20 CAPSULE, EXTENDED RELEASE ORAL DAILY
Status: DISCONTINUED | OUTPATIENT
Start: 2018-12-29 | End: 2018-12-31

## 2018-12-28 RX ADMIN — METOPROLOL TARTRATE 12.5 MG: 25 TABLET ORAL at 09:41

## 2018-12-28 RX ADMIN — DAPTOMYCIN 400 MG: 500 INJECTION, POWDER, LYOPHILIZED, FOR SOLUTION INTRAVENOUS at 18:29

## 2018-12-28 RX ADMIN — Medication 1 DOSE: at 17:19

## 2018-12-28 RX ADMIN — ERTAPENEM SODIUM 1 G: 1 INJECTION, POWDER, LYOPHILIZED, FOR SOLUTION INTRAMUSCULAR; INTRAVENOUS at 09:48

## 2018-12-28 RX ADMIN — HYDROCODONE BITARTRATE AND ACETAMINOPHEN 1 TABLET: 7.5; 325 TABLET ORAL at 18:29

## 2018-12-28 RX ADMIN — METOPROLOL TARTRATE 12.5 MG: 25 TABLET ORAL at 20:05

## 2018-12-28 RX ADMIN — BUDESONIDE 0.5 MG: 0.5 INHALANT RESPIRATORY (INHALATION) at 07:21

## 2018-12-28 RX ADMIN — PANTOPRAZOLE SODIUM 40 MG: 40 TABLET, DELAYED RELEASE ORAL at 09:46

## 2018-12-28 RX ADMIN — ESCITALOPRAM 20 MG: 20 TABLET, FILM COATED ORAL at 09:41

## 2018-12-28 RX ADMIN — Medication 100 MG: at 09:41

## 2018-12-28 RX ADMIN — SODIUM CHLORIDE, PRESERVATIVE FREE 3 ML: 5 INJECTION INTRAVENOUS at 20:07

## 2018-12-28 RX ADMIN — XENON XE-133 9.9 MILLICURIE: 10 GAS RESPIRATORY (INHALATION) at 17:18

## 2018-12-28 RX ADMIN — MUPIROCIN: 20 OINTMENT TOPICAL at 20:06

## 2018-12-28 RX ADMIN — MUPIROCIN: 20 OINTMENT TOPICAL at 16:23

## 2018-12-28 RX ADMIN — GABAPENTIN 300 MG: 300 CAPSULE ORAL at 21:41

## 2018-12-28 RX ADMIN — HYDROCODONE BITARTRATE AND ACETAMINOPHEN 1 TABLET: 7.5; 325 TABLET ORAL at 09:46

## 2018-12-28 RX ADMIN — FUROSEMIDE 20 MG: 20 TABLET ORAL at 09:41

## 2018-12-28 RX ADMIN — POTASSIUM CHLORIDE 40 MEQ: 750 CAPSULE, EXTENDED RELEASE ORAL at 11:35

## 2018-12-28 RX ADMIN — SODIUM CHLORIDE, PRESERVATIVE FREE 3 ML: 5 INJECTION INTRAVENOUS at 09:41

## 2018-12-28 RX ADMIN — PRAMIPEXOLE DIHYDROCHLORIDE 0.25 MG: 0.25 TABLET ORAL at 20:06

## 2018-12-28 RX ADMIN — Medication 81 MG: at 09:41

## 2018-12-28 RX ADMIN — MUPIROCIN: 20 OINTMENT TOPICAL at 09:44

## 2018-12-28 RX ADMIN — ATORVASTATIN CALCIUM 40 MG: 20 TABLET, FILM COATED ORAL at 20:06

## 2018-12-28 RX ADMIN — NYSTATIN 500000 UNITS: 500000 SUSPENSION ORAL at 20:05

## 2018-12-28 RX ADMIN — GABAPENTIN 300 MG: 300 CAPSULE ORAL at 06:21

## 2018-12-28 RX ADMIN — HYDROCODONE BITARTRATE AND ACETAMINOPHEN 1 TABLET: 7.5; 325 TABLET ORAL at 04:20

## 2018-12-28 RX ADMIN — GABAPENTIN 300 MG: 300 CAPSULE ORAL at 13:43

## 2018-12-28 RX ADMIN — FUROSEMIDE 40 MG: 10 INJECTION, SOLUTION INTRAMUSCULAR; INTRAVENOUS at 19:44

## 2018-12-29 LAB
ANION GAP SERPL CALCULATED.3IONS-SCNC: 14.2 MMOL/L
BACTERIA SPEC AEROBE CULT: NORMAL
BACTERIA SPEC AEROBE CULT: NORMAL
BH CV LOWER VASCULAR LEFT COMMON FEMORAL AUGMENT: NORMAL
BH CV LOWER VASCULAR LEFT COMMON FEMORAL COMPETENT: NORMAL
BH CV LOWER VASCULAR LEFT COMMON FEMORAL COMPRESS: NORMAL
BH CV LOWER VASCULAR LEFT COMMON FEMORAL PHASIC: NORMAL
BH CV LOWER VASCULAR LEFT COMMON FEMORAL SPONT: NORMAL
BH CV LOWER VASCULAR LEFT DISTAL FEMORAL COMPRESS: NORMAL
BH CV LOWER VASCULAR LEFT GASTRONEMIUS COMPRESS: NORMAL
BH CV LOWER VASCULAR LEFT GREATER SAPH AK COMPRESS: NORMAL
BH CV LOWER VASCULAR LEFT GREATER SAPH BK COMPRESS: NORMAL
BH CV LOWER VASCULAR LEFT LESSER SAPH COMPRESS: NORMAL
BH CV LOWER VASCULAR LEFT MID FEMORAL AUGMENT: NORMAL
BH CV LOWER VASCULAR LEFT MID FEMORAL COMPETENT: NORMAL
BH CV LOWER VASCULAR LEFT MID FEMORAL COMPRESS: NORMAL
BH CV LOWER VASCULAR LEFT MID FEMORAL PHASIC: NORMAL
BH CV LOWER VASCULAR LEFT MID FEMORAL SPONT: NORMAL
BH CV LOWER VASCULAR LEFT PERONEAL COMPRESS: NORMAL
BH CV LOWER VASCULAR LEFT POPLITEAL AUGMENT: NORMAL
BH CV LOWER VASCULAR LEFT POPLITEAL COMPETENT: NORMAL
BH CV LOWER VASCULAR LEFT POPLITEAL COMPRESS: NORMAL
BH CV LOWER VASCULAR LEFT POPLITEAL PHASIC: NORMAL
BH CV LOWER VASCULAR LEFT POPLITEAL SPONT: NORMAL
BH CV LOWER VASCULAR LEFT POSTERIOR TIBIAL COMPRESS: NORMAL
BH CV LOWER VASCULAR LEFT PROXIMAL FEMORAL COMPRESS: NORMAL
BH CV LOWER VASCULAR LEFT SAPHENOFEMORAL JUNCTION AUGMENT: NORMAL
BH CV LOWER VASCULAR LEFT SAPHENOFEMORAL JUNCTION COMPETENT: NORMAL
BH CV LOWER VASCULAR LEFT SAPHENOFEMORAL JUNCTION COMPRESS: NORMAL
BH CV LOWER VASCULAR LEFT SAPHENOFEMORAL JUNCTION PHASIC: NORMAL
BH CV LOWER VASCULAR LEFT SAPHENOFEMORAL JUNCTION SPONT: NORMAL
BH CV LOWER VASCULAR RIGHT COMMON FEMORAL AUGMENT: NORMAL
BH CV LOWER VASCULAR RIGHT COMMON FEMORAL COMPETENT: NORMAL
BH CV LOWER VASCULAR RIGHT COMMON FEMORAL COMPRESS: NORMAL
BH CV LOWER VASCULAR RIGHT COMMON FEMORAL PHASIC: NORMAL
BH CV LOWER VASCULAR RIGHT COMMON FEMORAL SPONT: NORMAL
BH CV LOWER VASCULAR RIGHT DISTAL FEMORAL COMPRESS: NORMAL
BH CV LOWER VASCULAR RIGHT GASTRONEMIUS COMPRESS: NORMAL
BH CV LOWER VASCULAR RIGHT GREATER SAPH AK COMPRESS: NORMAL
BH CV LOWER VASCULAR RIGHT GREATER SAPH BK COMPRESS: NORMAL
BH CV LOWER VASCULAR RIGHT LESSER SAPH COMPRESS: NORMAL
BH CV LOWER VASCULAR RIGHT MID FEMORAL AUGMENT: NORMAL
BH CV LOWER VASCULAR RIGHT MID FEMORAL COMPETENT: NORMAL
BH CV LOWER VASCULAR RIGHT MID FEMORAL COMPRESS: NORMAL
BH CV LOWER VASCULAR RIGHT MID FEMORAL PHASIC: NORMAL
BH CV LOWER VASCULAR RIGHT MID FEMORAL SPONT: NORMAL
BH CV LOWER VASCULAR RIGHT PERONEAL COMPRESS: NORMAL
BH CV LOWER VASCULAR RIGHT POPLITEAL AUGMENT: NORMAL
BH CV LOWER VASCULAR RIGHT POPLITEAL COMPETENT: NORMAL
BH CV LOWER VASCULAR RIGHT POPLITEAL COMPRESS: NORMAL
BH CV LOWER VASCULAR RIGHT POPLITEAL PHASIC: NORMAL
BH CV LOWER VASCULAR RIGHT POPLITEAL SPONT: NORMAL
BH CV LOWER VASCULAR RIGHT POSTERIOR TIBIAL COMPRESS: NORMAL
BH CV LOWER VASCULAR RIGHT PROXIMAL FEMORAL COMPRESS: NORMAL
BH CV LOWER VASCULAR RIGHT SAPHENOFEMORAL JUNCTION AUGMENT: NORMAL
BH CV LOWER VASCULAR RIGHT SAPHENOFEMORAL JUNCTION COMPETENT: NORMAL
BH CV LOWER VASCULAR RIGHT SAPHENOFEMORAL JUNCTION COMPRESS: NORMAL
BH CV LOWER VASCULAR RIGHT SAPHENOFEMORAL JUNCTION PHASIC: NORMAL
BH CV LOWER VASCULAR RIGHT SAPHENOFEMORAL JUNCTION SPONT: NORMAL
BILIRUB UR QL STRIP: NEGATIVE
BUN BLD-MCNC: 16 MG/DL (ref 6–20)
BUN/CREAT SERPL: 11.3 (ref 7–25)
CALCIUM SPEC-SCNC: 9.8 MG/DL (ref 8.6–10.5)
CHLORIDE SERPL-SCNC: 103 MMOL/L (ref 98–107)
CLARITY UR: ABNORMAL
CO2 SERPL-SCNC: 23.8 MMOL/L (ref 22–29)
COLOR UR: YELLOW
CREAT BLD-MCNC: 1.42 MG/DL (ref 0.57–1)
DEPRECATED RDW RBC AUTO: 51.3 FL (ref 37–54)
ERYTHROCYTE [DISTWIDTH] IN BLOOD BY AUTOMATED COUNT: 16.6 % (ref 11.7–13)
GFR SERPL CREATININE-BSD FRML MDRD: 38 ML/MIN/1.73
GLUCOSE BLD-MCNC: 158 MG/DL (ref 65–99)
GLUCOSE UR STRIP-MCNC: NEGATIVE MG/DL
HCT VFR BLD AUTO: 34.2 % (ref 35.6–45.5)
HGB BLD-MCNC: 10.7 G/DL (ref 11.9–15.5)
HGB UR QL STRIP.AUTO: NEGATIVE
KETONES UR QL STRIP: NEGATIVE
LEUKOCYTE ESTERASE UR QL STRIP.AUTO: ABNORMAL
MCH RBC QN AUTO: 26.5 PG (ref 26.9–32)
MCHC RBC AUTO-ENTMCNC: 31.3 G/DL (ref 32.4–36.3)
MCV RBC AUTO: 84.7 FL (ref 80.5–98.2)
NITRITE UR QL STRIP: NEGATIVE
PH UR STRIP.AUTO: 6 [PH] (ref 5–8)
PLATELET # BLD AUTO: 292 10*3/MM3 (ref 140–500)
PMV BLD AUTO: 11.3 FL (ref 6–12)
POTASSIUM BLD-SCNC: 3.4 MMOL/L (ref 3.5–5.2)
PROT UR QL STRIP: ABNORMAL
RBC # BLD AUTO: 4.04 10*6/MM3 (ref 3.9–5.2)
SODIUM BLD-SCNC: 141 MMOL/L (ref 136–145)
SP GR UR STRIP: 1.01 (ref 1–1.03)
TROPONIN T SERPL-MCNC: 0.05 NG/ML (ref 0–0.03)
UROBILINOGEN UR QL STRIP: ABNORMAL
WBC NRBC COR # BLD: 13.63 10*3/MM3 (ref 4.5–10.7)

## 2018-12-29 PROCEDURE — 85027 COMPLETE CBC AUTOMATED: CPT | Performed by: HOSPITALIST

## 2018-12-29 PROCEDURE — 25010000002 DAPTOMYCIN PER 1 MG: Performed by: HOSPITALIST

## 2018-12-29 PROCEDURE — 84484 ASSAY OF TROPONIN QUANT: CPT | Performed by: HOSPITALIST

## 2018-12-29 PROCEDURE — 25010000002 ERTAPENEM PER 500 MG: Performed by: INTERNAL MEDICINE

## 2018-12-29 PROCEDURE — 99233 SBSQ HOSP IP/OBS HIGH 50: CPT | Performed by: INTERNAL MEDICINE

## 2018-12-29 PROCEDURE — 94799 UNLISTED PULMONARY SVC/PX: CPT

## 2018-12-29 PROCEDURE — 80048 BASIC METABOLIC PNL TOTAL CA: CPT | Performed by: HOSPITALIST

## 2018-12-29 PROCEDURE — 81003 URINALYSIS AUTO W/O SCOPE: CPT | Performed by: INTERNAL MEDICINE

## 2018-12-29 RX ORDER — POTASSIUM CHLORIDE 750 MG/1
40 CAPSULE, EXTENDED RELEASE ORAL ONCE
Status: COMPLETED | OUTPATIENT
Start: 2018-12-29 | End: 2018-12-29

## 2018-12-29 RX ADMIN — Medication 81 MG: at 09:20

## 2018-12-29 RX ADMIN — FUROSEMIDE 20 MG: 20 TABLET ORAL at 17:27

## 2018-12-29 RX ADMIN — NYSTATIN 500000 UNITS: 500000 SUSPENSION ORAL at 12:02

## 2018-12-29 RX ADMIN — MUPIROCIN: 20 OINTMENT TOPICAL at 09:21

## 2018-12-29 RX ADMIN — ESCITALOPRAM 20 MG: 20 TABLET, FILM COATED ORAL at 09:20

## 2018-12-29 RX ADMIN — BUDESONIDE 0.5 MG: 0.5 INHALANT RESPIRATORY (INHALATION) at 06:44

## 2018-12-29 RX ADMIN — HYDROCODONE BITARTRATE AND ACETAMINOPHEN 1 TABLET: 7.5; 325 TABLET ORAL at 00:42

## 2018-12-29 RX ADMIN — ATORVASTATIN CALCIUM 40 MG: 20 TABLET, FILM COATED ORAL at 21:56

## 2018-12-29 RX ADMIN — SODIUM CHLORIDE, PRESERVATIVE FREE 3 ML: 5 INJECTION INTRAVENOUS at 09:21

## 2018-12-29 RX ADMIN — ERTAPENEM SODIUM 1 G: 1 INJECTION, POWDER, LYOPHILIZED, FOR SOLUTION INTRAMUSCULAR; INTRAVENOUS at 09:22

## 2018-12-29 RX ADMIN — DAPTOMYCIN 400 MG: 500 INJECTION, POWDER, LYOPHILIZED, FOR SOLUTION INTRAVENOUS at 19:27

## 2018-12-29 RX ADMIN — NYSTATIN 500000 UNITS: 500000 SUSPENSION ORAL at 09:21

## 2018-12-29 RX ADMIN — NYSTATIN 500000 UNITS: 500000 SUSPENSION ORAL at 21:57

## 2018-12-29 RX ADMIN — MULTIVITAMIN 15 ML: LIQUID ORAL at 09:21

## 2018-12-29 RX ADMIN — GABAPENTIN 300 MG: 300 CAPSULE ORAL at 14:43

## 2018-12-29 RX ADMIN — GABAPENTIN 300 MG: 300 CAPSULE ORAL at 06:19

## 2018-12-29 RX ADMIN — TRAZODONE HYDROCHLORIDE 50 MG: 50 TABLET ORAL at 01:31

## 2018-12-29 RX ADMIN — HYDROCODONE BITARTRATE AND ACETAMINOPHEN 1 TABLET: 7.5; 325 TABLET ORAL at 18:40

## 2018-12-29 RX ADMIN — IPRATROPIUM BROMIDE AND ALBUTEROL SULFATE 3 ML: 2.5; .5 SOLUTION RESPIRATORY (INHALATION) at 06:44

## 2018-12-29 RX ADMIN — FLUCONAZOLE 100 MG: 100 TABLET ORAL at 16:12

## 2018-12-29 RX ADMIN — TRAZODONE HYDROCHLORIDE 50 MG: 50 TABLET ORAL at 21:57

## 2018-12-29 RX ADMIN — MUPIROCIN: 20 OINTMENT TOPICAL at 21:57

## 2018-12-29 RX ADMIN — MUPIROCIN: 20 OINTMENT TOPICAL at 16:12

## 2018-12-29 RX ADMIN — GABAPENTIN 300 MG: 300 CAPSULE ORAL at 21:56

## 2018-12-29 RX ADMIN — PANTOPRAZOLE SODIUM 40 MG: 40 TABLET, DELAYED RELEASE ORAL at 09:59

## 2018-12-29 RX ADMIN — POTASSIUM CHLORIDE 20 MEQ: 750 CAPSULE, EXTENDED RELEASE ORAL at 09:20

## 2018-12-29 RX ADMIN — PRAMIPEXOLE DIHYDROCHLORIDE 0.25 MG: 0.25 TABLET ORAL at 21:56

## 2018-12-29 RX ADMIN — SODIUM CHLORIDE, PRESERVATIVE FREE 3 ML: 5 INJECTION INTRAVENOUS at 21:58

## 2018-12-29 RX ADMIN — NYSTATIN 500000 UNITS: 500000 SUSPENSION ORAL at 17:27

## 2018-12-29 RX ADMIN — METOPROLOL TARTRATE 25 MG: 25 TABLET ORAL at 09:20

## 2018-12-29 RX ADMIN — POTASSIUM CHLORIDE 40 MEQ: 750 CAPSULE, EXTENDED RELEASE ORAL at 14:42

## 2018-12-29 RX ADMIN — METOPROLOL TARTRATE 25 MG: 25 TABLET ORAL at 21:56

## 2018-12-29 RX ADMIN — HYDROCODONE BITARTRATE AND ACETAMINOPHEN 1 TABLET: 7.5; 325 TABLET ORAL at 09:23

## 2018-12-29 RX ADMIN — FUROSEMIDE 20 MG: 20 TABLET ORAL at 09:20

## 2018-12-30 LAB
DEPRECATED RDW RBC AUTO: 54.1 FL (ref 37–54)
ERYTHROCYTE [DISTWIDTH] IN BLOOD BY AUTOMATED COUNT: 16.5 % (ref 11.7–13)
HCT VFR BLD AUTO: 37.1 % (ref 35.6–45.5)
HGB BLD-MCNC: 11.1 G/DL (ref 11.9–15.5)
MCH RBC QN AUTO: 26.7 PG (ref 26.9–32)
MCHC RBC AUTO-ENTMCNC: 29.9 G/DL (ref 32.4–36.3)
MCV RBC AUTO: 89.4 FL (ref 80.5–98.2)
PLATELET # BLD AUTO: 303 10*3/MM3 (ref 140–500)
PMV BLD AUTO: 10.9 FL (ref 6–12)
RBC # BLD AUTO: 4.15 10*6/MM3 (ref 3.9–5.2)
WBC NRBC COR # BLD: 13.57 10*3/MM3 (ref 4.5–10.7)

## 2018-12-30 PROCEDURE — 25010000002 ERTAPENEM PER 500 MG: Performed by: INTERNAL MEDICINE

## 2018-12-30 PROCEDURE — 94799 UNLISTED PULMONARY SVC/PX: CPT

## 2018-12-30 PROCEDURE — 25010000002 DAPTOMYCIN PER 1 MG: Performed by: HOSPITALIST

## 2018-12-30 PROCEDURE — 85027 COMPLETE CBC AUTOMATED: CPT | Performed by: INTERNAL MEDICINE

## 2018-12-30 RX ORDER — POTASSIUM CHLORIDE 750 MG/1
40 CAPSULE, EXTENDED RELEASE ORAL ONCE
Status: DISCONTINUED | OUTPATIENT
Start: 2018-12-30 | End: 2018-12-30

## 2018-12-30 RX ADMIN — METOPROLOL TARTRATE 25 MG: 25 TABLET ORAL at 08:50

## 2018-12-30 RX ADMIN — ATORVASTATIN CALCIUM 40 MG: 20 TABLET, FILM COATED ORAL at 21:36

## 2018-12-30 RX ADMIN — GABAPENTIN 300 MG: 300 CAPSULE ORAL at 14:31

## 2018-12-30 RX ADMIN — NYSTATIN 500000 UNITS: 500000 SUSPENSION ORAL at 11:27

## 2018-12-30 RX ADMIN — Medication 100 MG: at 08:50

## 2018-12-30 RX ADMIN — ERTAPENEM SODIUM 1 G: 1 INJECTION, POWDER, LYOPHILIZED, FOR SOLUTION INTRAMUSCULAR; INTRAVENOUS at 08:50

## 2018-12-30 RX ADMIN — HYDROCODONE BITARTRATE AND ACETAMINOPHEN 1 TABLET: 5; 325 TABLET ORAL at 04:41

## 2018-12-30 RX ADMIN — PANTOPRAZOLE SODIUM 40 MG: 40 TABLET, DELAYED RELEASE ORAL at 08:50

## 2018-12-30 RX ADMIN — FUROSEMIDE 20 MG: 20 TABLET ORAL at 17:30

## 2018-12-30 RX ADMIN — IPRATROPIUM BROMIDE AND ALBUTEROL SULFATE 3 ML: 2.5; .5 SOLUTION RESPIRATORY (INHALATION) at 07:53

## 2018-12-30 RX ADMIN — POTASSIUM CHLORIDE 20 MEQ: 750 CAPSULE, EXTENDED RELEASE ORAL at 08:50

## 2018-12-30 RX ADMIN — PRAMIPEXOLE DIHYDROCHLORIDE 0.25 MG: 0.25 TABLET ORAL at 21:36

## 2018-12-30 RX ADMIN — SODIUM CHLORIDE, PRESERVATIVE FREE 3 ML: 5 INJECTION INTRAVENOUS at 08:50

## 2018-12-30 RX ADMIN — IPRATROPIUM BROMIDE AND ALBUTEROL SULFATE 3 ML: 2.5; .5 SOLUTION RESPIRATORY (INHALATION) at 21:42

## 2018-12-30 RX ADMIN — DAPTOMYCIN 400 MG: 500 INJECTION, POWDER, LYOPHILIZED, FOR SOLUTION INTRAVENOUS at 19:19

## 2018-12-30 RX ADMIN — BUDESONIDE 0.5 MG: 0.5 INHALANT RESPIRATORY (INHALATION) at 07:50

## 2018-12-30 RX ADMIN — HYDROCODONE BITARTRATE AND ACETAMINOPHEN 1 TABLET: 5; 325 TABLET ORAL at 11:26

## 2018-12-30 RX ADMIN — METOPROLOL TARTRATE 25 MG: 25 TABLET ORAL at 21:36

## 2018-12-30 RX ADMIN — MUPIROCIN: 20 OINTMENT TOPICAL at 22:13

## 2018-12-30 RX ADMIN — FUROSEMIDE 20 MG: 20 TABLET ORAL at 08:50

## 2018-12-30 RX ADMIN — NYSTATIN 500000 UNITS: 500000 SUSPENSION ORAL at 17:30

## 2018-12-30 RX ADMIN — GABAPENTIN 300 MG: 300 CAPSULE ORAL at 21:36

## 2018-12-30 RX ADMIN — TRAZODONE HYDROCHLORIDE 50 MG: 50 TABLET ORAL at 21:36

## 2018-12-30 RX ADMIN — NYSTATIN 500000 UNITS: 500000 SUSPENSION ORAL at 21:37

## 2018-12-30 RX ADMIN — SODIUM CHLORIDE, PRESERVATIVE FREE 3 ML: 5 INJECTION INTRAVENOUS at 21:36

## 2018-12-30 RX ADMIN — CLONAZEPAM 1 MG: 1 TABLET ORAL at 09:49

## 2018-12-30 RX ADMIN — GABAPENTIN 300 MG: 300 CAPSULE ORAL at 06:50

## 2018-12-30 RX ADMIN — MUPIROCIN: 20 OINTMENT TOPICAL at 08:50

## 2018-12-30 RX ADMIN — ESCITALOPRAM 20 MG: 20 TABLET, FILM COATED ORAL at 08:50

## 2018-12-30 RX ADMIN — Medication 81 MG: at 08:50

## 2018-12-31 ENCOUNTER — APPOINTMENT (OUTPATIENT)
Dept: NUCLEAR MEDICINE | Facility: HOSPITAL | Age: 58
End: 2018-12-31

## 2018-12-31 LAB
ALBUMIN SERPL-MCNC: 2.9 G/DL (ref 3.5–5.2)
ANION GAP SERPL CALCULATED.3IONS-SCNC: 14.2 MMOL/L
BH CV STRESS COMMENTS STAGE 1: NORMAL
BH CV STRESS DOSE REGADENOSON STAGE 1: 0.4
BH CV STRESS DURATION MIN STAGE 1: 0
BH CV STRESS DURATION SEC STAGE 1: 10
BH CV STRESS PROTOCOL 1: NORMAL
BH CV STRESS RECOVERY BP: NORMAL MMHG
BH CV STRESS RECOVERY HR: 95 BPM
BH CV STRESS STAGE 1: 1
BUN BLD-MCNC: 28 MG/DL (ref 6–20)
BUN/CREAT SERPL: 17.1 (ref 7–25)
CALCIUM SPEC-SCNC: 10.1 MG/DL (ref 8.6–10.5)
CHLORIDE SERPL-SCNC: 100 MMOL/L (ref 98–107)
CO2 SERPL-SCNC: 24.8 MMOL/L (ref 22–29)
CREAT BLD-MCNC: 1.64 MG/DL (ref 0.57–1)
GFR SERPL CREATININE-BSD FRML MDRD: 32 ML/MIN/1.73
GLUCOSE BLD-MCNC: 115 MG/DL (ref 65–99)
LV EF NUC BP: 69 %
MAGNESIUM SERPL-MCNC: 1.7 MG/DL (ref 1.6–2.6)
MAXIMAL PREDICTED HEART RATE: 162 BPM
PERCENT MAX PREDICTED HR: 61.11 %
PHOSPHATE SERPL-MCNC: 5.7 MG/DL (ref 2.5–4.5)
POTASSIUM BLD-SCNC: 4.2 MMOL/L (ref 3.5–5.2)
SODIUM BLD-SCNC: 139 MMOL/L (ref 136–145)
STRESS BASELINE BP: NORMAL MMHG
STRESS BASELINE HR: 84 BPM
STRESS PERCENT HR: 72 %
STRESS POST PEAK BP: NORMAL MMHG
STRESS POST PEAK HR: 99 BPM
STRESS TARGET HR: 138 BPM

## 2018-12-31 PROCEDURE — 78452 HT MUSCLE IMAGE SPECT MULT: CPT | Performed by: INTERNAL MEDICINE

## 2018-12-31 PROCEDURE — 25010000002 DAPTOMYCIN PER 1 MG: Performed by: HOSPITALIST

## 2018-12-31 PROCEDURE — 94799 UNLISTED PULMONARY SVC/PX: CPT

## 2018-12-31 PROCEDURE — 97110 THERAPEUTIC EXERCISES: CPT

## 2018-12-31 PROCEDURE — 25010000002 REGADENOSON 0.4 MG/5ML SOLUTION: Performed by: HOSPITALIST

## 2018-12-31 PROCEDURE — 93017 CV STRESS TEST TRACING ONLY: CPT

## 2018-12-31 PROCEDURE — 80069 RENAL FUNCTION PANEL: CPT | Performed by: INTERNAL MEDICINE

## 2018-12-31 PROCEDURE — 25010000002 ERTAPENEM PER 500 MG: Performed by: INTERNAL MEDICINE

## 2018-12-31 PROCEDURE — 93016 CV STRESS TEST SUPVJ ONLY: CPT | Performed by: INTERNAL MEDICINE

## 2018-12-31 PROCEDURE — 78452 HT MUSCLE IMAGE SPECT MULT: CPT

## 2018-12-31 PROCEDURE — A9500 TC99M SESTAMIBI: HCPCS | Performed by: HOSPITALIST

## 2018-12-31 PROCEDURE — 99232 SBSQ HOSP IP/OBS MODERATE 35: CPT | Performed by: INTERNAL MEDICINE

## 2018-12-31 PROCEDURE — 93018 CV STRESS TEST I&R ONLY: CPT | Performed by: INTERNAL MEDICINE

## 2018-12-31 PROCEDURE — 0 TECHNETIUM SESTAMIBI: Performed by: HOSPITALIST

## 2018-12-31 PROCEDURE — 83735 ASSAY OF MAGNESIUM: CPT | Performed by: INTERNAL MEDICINE

## 2018-12-31 RX ORDER — FUROSEMIDE 20 MG/1
20 TABLET ORAL DAILY
Status: DISCONTINUED | OUTPATIENT
Start: 2019-01-02 | End: 2019-01-01 | Stop reason: HOSPADM

## 2018-12-31 RX ADMIN — GABAPENTIN 300 MG: 300 CAPSULE ORAL at 13:00

## 2018-12-31 RX ADMIN — IPRATROPIUM BROMIDE AND ALBUTEROL SULFATE 3 ML: 2.5; .5 SOLUTION RESPIRATORY (INHALATION) at 09:06

## 2018-12-31 RX ADMIN — METOPROLOL TARTRATE 25 MG: 25 TABLET ORAL at 20:32

## 2018-12-31 RX ADMIN — NYSTATIN 500000 UNITS: 500000 SUSPENSION ORAL at 17:07

## 2018-12-31 RX ADMIN — TECHNETIUM TC 99M SESTAMIBI 1 DOSE: 1 INJECTION INTRAVENOUS at 12:45

## 2018-12-31 RX ADMIN — HYDROCODONE BITARTRATE AND ACETAMINOPHEN 1 TABLET: 7.5; 325 TABLET ORAL at 01:59

## 2018-12-31 RX ADMIN — ESCITALOPRAM 20 MG: 20 TABLET, FILM COATED ORAL at 12:58

## 2018-12-31 RX ADMIN — GABAPENTIN 300 MG: 300 CAPSULE ORAL at 05:07

## 2018-12-31 RX ADMIN — HYDROCODONE BITARTRATE AND ACETAMINOPHEN 1 TABLET: 7.5; 325 TABLET ORAL at 08:51

## 2018-12-31 RX ADMIN — TECHNETIUM TC 99M SESTAMIBI 1 DOSE: 1 INJECTION INTRAVENOUS at 11:00

## 2018-12-31 RX ADMIN — POTASSIUM CHLORIDE 20 MEQ: 750 CAPSULE, EXTENDED RELEASE ORAL at 12:59

## 2018-12-31 RX ADMIN — NYSTATIN 500000 UNITS: 500000 SUSPENSION ORAL at 20:32

## 2018-12-31 RX ADMIN — HYDROCODONE BITARTRATE AND ACETAMINOPHEN 1 TABLET: 7.5; 325 TABLET ORAL at 20:32

## 2018-12-31 RX ADMIN — ERTAPENEM SODIUM 1 G: 1 INJECTION, POWDER, LYOPHILIZED, FOR SOLUTION INTRAMUSCULAR; INTRAVENOUS at 12:31

## 2018-12-31 RX ADMIN — METOPROLOL TARTRATE 25 MG: 25 TABLET ORAL at 08:51

## 2018-12-31 RX ADMIN — Medication 81 MG: at 12:59

## 2018-12-31 RX ADMIN — TRAZODONE HYDROCHLORIDE 50 MG: 50 TABLET ORAL at 20:32

## 2018-12-31 RX ADMIN — BUDESONIDE 0.5 MG: 0.5 INHALANT RESPIRATORY (INHALATION) at 09:00

## 2018-12-31 RX ADMIN — NYSTATIN 500000 UNITS: 500000 SUSPENSION ORAL at 12:55

## 2018-12-31 RX ADMIN — SODIUM CHLORIDE, PRESERVATIVE FREE 3 ML: 5 INJECTION INTRAVENOUS at 20:33

## 2018-12-31 RX ADMIN — GABAPENTIN 300 MG: 300 CAPSULE ORAL at 20:33

## 2018-12-31 RX ADMIN — SODIUM CHLORIDE, PRESERVATIVE FREE 3 ML: 5 INJECTION INTRAVENOUS at 08:54

## 2018-12-31 RX ADMIN — ACETAMINOPHEN 650 MG: 325 TABLET, FILM COATED ORAL at 12:55

## 2018-12-31 RX ADMIN — Medication 100 MG: at 12:58

## 2018-12-31 RX ADMIN — PANTOPRAZOLE SODIUM 40 MG: 40 TABLET, DELAYED RELEASE ORAL at 12:56

## 2018-12-31 RX ADMIN — ATORVASTATIN CALCIUM 40 MG: 20 TABLET, FILM COATED ORAL at 20:32

## 2018-12-31 RX ADMIN — PRAMIPEXOLE DIHYDROCHLORIDE 0.25 MG: 0.25 TABLET ORAL at 20:32

## 2018-12-31 RX ADMIN — REGADENOSON 0.4 MG: 0.08 INJECTION, SOLUTION INTRAVENOUS at 12:45

## 2018-12-31 RX ADMIN — DAPTOMYCIN 400 MG: 500 INJECTION, POWDER, LYOPHILIZED, FOR SOLUTION INTRAVENOUS at 17:07

## 2019-01-01 VITALS
DIASTOLIC BLOOD PRESSURE: 92 MMHG | BODY MASS INDEX: 27.51 KG/M2 | TEMPERATURE: 98.3 F | HEIGHT: 62 IN | SYSTOLIC BLOOD PRESSURE: 136 MMHG | OXYGEN SATURATION: 93 % | WEIGHT: 149.47 LBS | RESPIRATION RATE: 16 BRPM | HEART RATE: 81 BPM

## 2019-01-01 LAB
ALBUMIN SERPL-MCNC: 2.8 G/DL (ref 3.5–5.2)
ANION GAP SERPL CALCULATED.3IONS-SCNC: 13.1 MMOL/L
BUN BLD-MCNC: 32 MG/DL (ref 6–20)
BUN/CREAT SERPL: 22.1 (ref 7–25)
CALCIUM SPEC-SCNC: 10 MG/DL (ref 8.6–10.5)
CHLORIDE SERPL-SCNC: 103 MMOL/L (ref 98–107)
CO2 SERPL-SCNC: 22.9 MMOL/L (ref 22–29)
CREAT BLD-MCNC: 1.45 MG/DL (ref 0.57–1)
GFR SERPL CREATININE-BSD FRML MDRD: 37 ML/MIN/1.73
GLUCOSE BLD-MCNC: 110 MG/DL (ref 65–99)
PHOSPHATE SERPL-MCNC: 5 MG/DL (ref 2.5–4.5)
POTASSIUM BLD-SCNC: 4.2 MMOL/L (ref 3.5–5.2)
SODIUM BLD-SCNC: 139 MMOL/L (ref 136–145)

## 2019-01-01 PROCEDURE — 25010000002 DAPTOMYCIN PER 1 MG: Performed by: HOSPITALIST

## 2019-01-01 PROCEDURE — 80069 RENAL FUNCTION PANEL: CPT | Performed by: HOSPITALIST

## 2019-01-01 PROCEDURE — 25010000002 ERTAPENEM PER 500 MG: Performed by: INTERNAL MEDICINE

## 2019-01-01 PROCEDURE — 25010000002 ALTEPLASE 2 MG RECONSTITUTED SOLUTION: Performed by: INTERNAL MEDICINE

## 2019-01-01 PROCEDURE — 94799 UNLISTED PULMONARY SVC/PX: CPT

## 2019-01-01 RX ORDER — FUROSEMIDE 20 MG/1
20 TABLET ORAL DAILY
Qty: 30 TABLET | Refills: 0 | Status: SHIPPED | OUTPATIENT
Start: 2019-01-02 | End: 2019-07-12 | Stop reason: HOSPADM

## 2019-01-01 RX ORDER — HYDROCODONE BITARTRATE AND ACETAMINOPHEN 5; 325 MG/1; MG/1
1 TABLET ORAL EVERY 6 HOURS PRN
Qty: 20 TABLET | Refills: 0 | Status: SHIPPED | OUTPATIENT
Start: 2019-01-01 | End: 2019-01-08

## 2019-01-01 RX ORDER — GABAPENTIN 300 MG/1
300 CAPSULE ORAL 3 TIMES DAILY
Qty: 90 CAPSULE | Refills: 0 | Status: ON HOLD | OUTPATIENT
Start: 2019-01-01 | End: 2019-07-11 | Stop reason: SDUPTHER

## 2019-01-01 RX ORDER — ASPIRIN 81 MG/1
81 TABLET, CHEWABLE ORAL DAILY
Start: 2019-01-02

## 2019-01-01 RX ORDER — FUROSEMIDE 20 MG/1
20 TABLET ORAL DAILY
Qty: 30 TABLET | Refills: 0 | Status: SHIPPED | OUTPATIENT
Start: 2019-01-02 | End: 2019-01-01

## 2019-01-01 RX ADMIN — GABAPENTIN 300 MG: 300 CAPSULE ORAL at 14:07

## 2019-01-01 RX ADMIN — METOPROLOL TARTRATE 25 MG: 25 TABLET ORAL at 09:35

## 2019-01-01 RX ADMIN — SODIUM CHLORIDE, PRESERVATIVE FREE 3 ML: 5 INJECTION INTRAVENOUS at 09:36

## 2019-01-01 RX ADMIN — PANTOPRAZOLE SODIUM 40 MG: 40 TABLET, DELAYED RELEASE ORAL at 09:35

## 2019-01-01 RX ADMIN — MUPIROCIN: 20 OINTMENT TOPICAL at 10:27

## 2019-01-01 RX ADMIN — ERTAPENEM SODIUM 1 G: 1 INJECTION, POWDER, LYOPHILIZED, FOR SOLUTION INTRAMUSCULAR; INTRAVENOUS at 10:27

## 2019-01-01 RX ADMIN — ESCITALOPRAM 20 MG: 20 TABLET, FILM COATED ORAL at 09:35

## 2019-01-01 RX ADMIN — BUDESONIDE 0.5 MG: 0.5 INHALANT RESPIRATORY (INHALATION) at 11:02

## 2019-01-01 RX ADMIN — HYDROCODONE BITARTRATE AND ACETAMINOPHEN 1 TABLET: 7.5; 325 TABLET ORAL at 05:02

## 2019-01-01 RX ADMIN — Medication 100 MG: at 09:35

## 2019-01-01 RX ADMIN — ALTEPLASE: 2.2 INJECTION, POWDER, LYOPHILIZED, FOR SOLUTION INTRAVENOUS at 07:06

## 2019-01-01 RX ADMIN — Medication 81 MG: at 09:35

## 2019-01-01 RX ADMIN — NYSTATIN 500000 UNITS: 500000 SUSPENSION ORAL at 11:43

## 2019-01-01 RX ADMIN — HYDROCODONE BITARTRATE AND ACETAMINOPHEN 1 TABLET: 7.5; 325 TABLET ORAL at 11:08

## 2019-01-01 RX ADMIN — NYSTATIN 500000 UNITS: 500000 SUSPENSION ORAL at 17:29

## 2019-01-01 RX ADMIN — DAPTOMYCIN 400 MG: 500 INJECTION, POWDER, LYOPHILIZED, FOR SOLUTION INTRAVENOUS at 16:02

## 2019-01-01 RX ADMIN — GABAPENTIN 300 MG: 300 CAPSULE ORAL at 05:02

## 2019-01-01 RX ADMIN — HYDROCODONE BITARTRATE AND ACETAMINOPHEN 1 TABLET: 7.5; 325 TABLET ORAL at 18:27

## 2019-01-01 NOTE — PROGRESS NOTES
Discharge Planning Assessment  [facility]     Patient Name: Swetha Luis  MRN: [id number]  Today's Date: 1/1/2019    Admit Date: 12/24/2018    Discharge Needs Assessment    No documentation.       Discharge Plan     Row Name 01/01/19 1750       Plan    Plan  Pt to be d/c home with [facility]    Final Discharge Disposition Code  06 - home with home health care    Final Note  Spoke with Malini, on call for [facility], and informed of pts d/c. Face sheet faxed to [facility] per his request.         Destination      No service coordination in this encounter.      Durable Medical Equipment      Service Provider Request Status Selected Services Address Phone Number Fax Number    [facility] Selected Durable Medical Equipment [address], [place] [address] [phone]      Dialysis/Infusion - Selection Complete      Service Provider Request Status Selected Services Address Phone Number Fax Number    [facility] INFUSION Selected Infusion and IV Therapy [address] [facility] [id number] [phone]      Home Medical Care - Selection Complete      Service Provider Request Status Selected Services Address Phone Number Fax Number    [facility] [place] Selected Home Health Services [address] [address] [phone]      Community Resources      No service coordination in this encounter.        Expected Discharge Date and Time     Expected Discharge Date Expected Discharge Time    Jan 1, 2019         Demographic Summary    No documentation.       Functional Status    No documentation.       Psychosocial    No documentation.       Abuse/Neglect    No documentation.       Legal    No documentation.       Substance Abuse    No documentation.       Patient Forms    No documentation.           Virginia Leach, RN

## 2019-01-01 NOTE — PROGRESS NOTES
"   LOS: 8 days   Patient Care Team:  Bettye Suarez MD as PCP - General (Internal Medicine)    Chief Complaint/ Reason for encounter: Acute renal failure, diuretic management        Subjective     Medical history reviewed:  Shortness of Breath   Pertinent negatives include no chest pain or fever.       Subjective:  Symptoms:  Improved.  No shortness of breath, chest pain or weakness.  (No new complaints, she feels well and is anxious for discharge).    Diet:  Adequate intake.    Activity level: Returning to normal.    Pain:  She reports no pain.          History taken from: Patient and chart    Objective     Vital Signs  Temp:  [98.3 °F (36.8 °C)-98.6 °F (37 °C)] 98.3 °F (36.8 °C)  Heart Rate:  [71-88] 88  Resp:  [16-20] 16  BP: (104-137)/(61-87) 137/87       Wt Readings from Last 1 Encounters:   01/01/19 0658 67.8 kg (149 lb 7.6 oz)   12/31/18 0625 65.7 kg (144 lb 12.8 oz)   12/30/18 0500 65.3 kg (144 lb)   12/29/18 0701 65.2 kg (143 lb 12.8 oz)   12/28/18 0648 68.5 kg (151 lb 0.2 oz)   12/26/18 1403 67.6 kg (149 lb)   12/26/18 0500 67.8 kg (149 lb 6.4 oz)   12/25/18 0629 65.8 kg (145 lb)   12/24/18 1700 66.1 kg (145 lb 12.8 oz)   12/24/18 1220 72.1 kg (158 lb 14.4 oz)       Objective:  General Appearance:  Comfortable, well-appearing, in no acute distress and not in pain.    Vital signs: (most recent): Blood pressure 137/87, pulse 88, temperature 98.3 °F (36.8 °C), temperature source Oral, resp. rate 16, height 157.5 cm (62\"), weight 67.8 kg (149 lb 7.6 oz), SpO2 94 %.  Vital signs are normal.  No fever.    Output: Producing urine.    HEENT: Normal HEENT exam.  (Right upper chest incision dressed)    Lungs:  Normal effort and normal respiratory rate.  Breath sounds clear to auscultation.  No rales or rhonchi.    Heart: Normal rate.  Regular rhythm.  S1 normal.    Abdomen: Abdomen is soft.  Bowel sounds are normal.   There is no abdominal tenderness.     Extremities: Normal range of motion.  There is deformity.  " There is no dependent edema.  (Right foot bandaged)  Neurological: Patient is alert and oriented to person, place and time.    Skin:  Warm and dry.  No rash or cyanosis.             Results Review:    Intake/Output:     Intake/Output Summary (Last 24 hours) at 1/1/2019 1357  Last data filed at 1/1/2019 0504  Gross per 24 hour   Intake --   Output 1600 ml   Net -1600 ml         DATA:  Radiology and Labs:  The following labs independently reviewed by me. Additional labs ordered for tomorrow a.m.  Interval notes, chart personally reviewed by me.      Labs:   Recent Results (from the past 24 hour(s))   Renal Function Panel    Collection Time: 01/01/19  5:59 AM   Result Value Ref Range    Glucose 110 (H) 65 - 99 mg/dL    BUN 32 (H) 6 - 20 mg/dL    Creatinine 1.45 (H) 0.57 - 1.00 mg/dL    Sodium 139 136 - 145 mmol/L    Potassium 4.2 3.5 - 5.2 mmol/L    Chloride 103 98 - 107 mmol/L    CO2 22.9 22.0 - 29.0 mmol/L    Calcium 10.0 8.6 - 10.5 mg/dL    Albumin 2.80 (L) 3.50 - 5.20 g/dL    Phosphorus 5.0 (H) 2.5 - 4.5 mg/dL    Anion Gap 13.1 mmol/L    BUN/Creatinine Ratio 22.1 7.0 - 25.0    eGFR Non African Amer 37 (L) >60 mL/min/1.73       Radiology:  Imaging Results (last 24 hours)     ** No results found for the last 24 hours. **             Medications have been reviewed:  Current Facility-Administered Medications   Medication Dose Route Frequency Provider Last Rate Last Dose   • acetaminophen (TYLENOL) tablet 650 mg  650 mg Oral Q4H PRN Jonathan García MD   650 mg at 12/31/18 1255   • alteplase ((CATHFLO/ACTIVASE)) syringe 2 mg  2 mg Intracatheter PRN Montrell Monterroso MD   Stopped at 01/01/19 0708   • aspirin chewable tablet 81 mg  81 mg Oral Daily Los Gabriel MD   81 mg at 01/01/19 0935   • atorvastatin (LIPITOR) tablet 40 mg  40 mg Oral Nightly Jonathan García MD   40 mg at 12/31/18 2032   • budesonide (PULMICORT) nebulizer solution 0.5 mg  0.5 mg Nebulization Daily - RT Jonathan García MD   0.5 mg at 01/01/19  1102   • clonazePAM (KlonoPIN) tablet 1 mg  1 mg Oral BID PRN Jonathan García MD   1 mg at 12/30/18 0949   • DAPTOmycin (CUBICIN) 400 mg in Sodium chloride 0.9 % 50 mL IVPB  6 mg/kg (Adjusted) Intravenous Q24H Jonathan García  mL/hr at 12/31/18 1707 400 mg at 12/31/18 1707   • ertapenem (INVanz) 1 g/100 mL 0.9% NS VTB (mbp)  1 g Intravenous Q24H Elanie Jacob MD   1 g at 01/01/19 1027   • escitalopram (LEXAPRO) tablet 20 mg  20 mg Oral Daily Jonathan García MD   20 mg at 01/01/19 0935   • [START ON 1/2/2019] furosemide (LASIX) tablet 20 mg  20 mg Oral Daily Los Gabriel MD       • gabapentin (NEURONTIN) capsule 300 mg  300 mg Oral Q8H Montrell Monterroso MD   300 mg at 01/01/19 0502   • HYDROcodone-acetaminophen (NORCO) 7.5-325 MG per tablet 1 tablet  1 tablet Oral Q6H PRN Jonathan García MD   1 tablet at 01/01/19 1108   • ipratropium-albuterol (DUO-NEB) nebulizer solution 3 mL  3 mL Nebulization Q4H PRN Jonathan García MD   3 mL at 12/31/18 0906   • metoprolol tartrate (LOPRESSOR) tablet 25 mg  25 mg Oral Q12H Denise Rodrigues MD   25 mg at 01/01/19 0935   • multivitamin and minerals liquid 15 mL  15 mL Oral Daily Jonathan García MD   15 mL at 12/29/18 0921   • mupirocin (BACTROBAN) 2 % ointment   Topical TID Jonathan García MD       • nystatin (MYCOSTATIN) 778554 UNIT/ML suspension 500,000 Units  5 mL Oral 4x Daily Montrell Monterroso MD   500,000 Units at 01/01/19 1143   • pantoprazole (PROTONIX) EC tablet 40 mg  40 mg Oral Daily Jonathan García MD   40 mg at 01/01/19 0935   • povidone-iodine (BETADINE) external solution   Topical PRN Jonathan García MD       • povidone-iodine (BETADINE) external solution   Topical PRN Jonathan García MD       • pramipexole (MIRAPEX) tablet 0.25 mg  0.25 mg Oral Nightly Jonathan García MD   0.25 mg at 12/31/18 2032   • sodium chloride 0.9 % flush 10 mL  10 mL Intravenous PRN Rafa Fowler MD       • sodium chloride 0.9 % flush 20 mL  20 mL Intravenous  PRN Rafa Fowler MD       • sodium chloride 0.9 % flush 3 mL  3 mL Intravenous Q12H Jonathan García MD   3 mL at 01/01/19 0936   • sodium chloride 0.9 % flush 3-10 mL  3-10 mL Intravenous PRN Jonathan García MD       • thiamine (VITAMIN B-1) tablet 100 mg  100 mg Oral Daily Jonathan García MD   100 mg at 01/01/19 0935   • traZODone (DESYREL) tablet 50 mg  50 mg Oral Nightly PRN Jonathan García MD   50 mg at 12/31/18 2032       ASSESSMENT:  acute renal failure, from vancomycin toxicity, creatinine stable around 1.4  Chronic hypertension, controlled  Respiratory failure with recent tracheostomy  Dysphagia with recent G-tube placement  Chronic pain syndrome  Osteomyelitis on IV antibiotics  Tracheostomy complication  Hypercalcemia, better  Gangrene right toes        PLAN:   Renal function remains stable today  Diuretics on hold, euvolemic on exam  IV antibiotics per infectious disease  Discharge planning: Stable for discharge from my standpoint  Resume Lasix 20 mg oral daily at discharge      Eliseo Davis MD   [facility]   Office phone number: [phone]  Answering service phone number: [phone]    01/01/19  1:57 PM      Dictation performed using Dragon dictation software

## 2019-01-01 NOTE — PLAN OF CARE
Problem: Patient Care Overview  Goal: Plan of Care Review  Outcome: Ongoing (interventions implemented as appropriate)   01/01/19 0690   OTHER   Outcome Summary pt picc line not getting blood return cathflo ordered per protocol. awaiting dose from pharmacy.    Coping/Psychosocial   Plan of Care Reviewed With patient   Plan of Care Review   Progress no change     Goal: Individualization and Mutuality  Outcome: Ongoing (interventions implemented as appropriate)    Goal: Discharge Needs Assessment  Outcome: Ongoing (interventions implemented as appropriate)    Goal: Interprofessional Rounds/Family Conf  Outcome: Ongoing (interventions implemented as appropriate)      Problem: Fall Risk (Adult)  Goal: Absence of Fall  Outcome: Ongoing (interventions implemented as appropriate)      Problem: Skin Injury Risk (Adult)  Goal: Skin Health and Integrity  Outcome: Ongoing (interventions implemented as appropriate)      Problem: Cardiac: Heart Failure (Adult)  Goal: Signs and Symptoms of Listed Potential Problems Will be Absent, Minimized or Managed (Cardiac: Heart Failure)  Outcome: Ongoing (interventions implemented as appropriate)      Problem: Pain, Acute (Adult)  Goal: Acceptable Pain Control/Comfort Level  Outcome: Ongoing (interventions implemented as appropriate)

## 2019-01-01 NOTE — PROGRESS NOTES
"  Infectious Diseases Progress Note        [facility]  [place]: 8 days  Patient Identification:  Name: Swetha Luis  Age: 58 y.o.  Sex: female  :  1960  MRN: [id number]         Primary Care Physician: Bettye Suarez MD            Subjective: weak.  Interval History: See consultation note.    Objective:    Scheduled Meds:    aspirin 81 mg Oral Daily   atorvastatin 40 mg Oral Nightly   budesonide 0.5 mg Nebulization Daily - RT   DAPTOmycin (CUBICIN)  IV 6 mg/kg (Adjusted) Intravenous Q24H   ertapenem 1 g Intravenous Q24H   escitalopram 20 mg Oral Daily   [START ON 2019] furosemide 20 mg Oral Daily   gabapentin 300 mg Oral Q8H   metoprolol tartrate 25 mg Oral Q12H   multivitamin and minerals 15 mL Oral Daily   mupirocin  Topical TID   nystatin 5 mL Oral 4x Daily   pantoprazole 40 mg Oral Daily   pramipexole 0.25 mg Oral Nightly   sodium chloride 3 mL Intravenous Q12H   thiamine 100 mg Oral Daily     Continuous Infusions:     Vital signs in last 24 hours:  Temp:  [98.3 °F (36.8 °C)-98.6 °F (37 °C)] 98.3 °F (36.8 °C)  Heart Rate:  [71-88] 81  Resp:  [16-20] 16  BP: (104-137)/() 136/92    Intake/Output:    Intake/Output Summary (Last 24 hours) at 2019 1711  Last data filed at 2019 0504  Gross per 24 hour   Intake --   Output 1600 ml   Net -1600 ml       Exam:  /92 (BP Location: Right arm, Patient Position: Sitting)   Pulse 81   Temp 98.3 °F (36.8 °C) (Oral)   Resp 16   Ht 157.5 cm (62\")   Wt 67.8 kg (149 lb 7.6 oz)   SpO2 93%   BMI 27.34 kg/m²     General Appearance:    Alert, cooperative, no distress, AAOx3                          Head:    Normocephalic, without obvious abnormality, atraumatic                           Eyes:    PERRL, conjunctivae/corneas clear, EOM's intact, both eyes                         Throat:   Lips, tongue, gums normal; oral mucosa pink and moist                           Neck:   Supple, symmetrical, trachea midline, no JVD                       "   Lungs:    Clear to auscultation bilaterally, respirations unlabored                 Chest Wall:    Left upper chest wall surgical site is healing obvious inflammation or cellulitis.                          Heart:    Regular rate and rhythm, S1 and S2 normal, no murmur                  Abdomen:     Soft, non-tender, bowel sounds active,                 Extremities:   Extremities normal, atraumatic, no cyanosis or edema                        Pulses:   Pulses palpable in all extremities                            Skin:   Skin is warm and dry,  no rashes or palpable lesions                    Data Review:    I reviewed the patient's new clinical results.  Results from last 7 days   Lab Units  12/30/18   1456  12/29/18   0409  12/28/18   0503  12/27/18   0451  12/26/18   0422   WBC 10*3/mm3  13.57*  13.63*  12.06*  13.03*  10.36   HEMOGLOBIN g/dL  11.1*  10.7*  9.5*  9.9*  9.3*   PLATELETS 10*3/mm3  303  292  291  302  287     Results from last 7 days   Lab Units  01/01/19   0559  12/31/18   0434  12/29/18   0409  12/28/18   0503  12/27/18   0451  12/26/18   0422   SODIUM mmol/L  139  139  141  144  144  141   POTASSIUM mmol/L  4.2  4.2  3.4*  3.4*  3.7  3.9   CHLORIDE mmol/L  103  100  103  108*  108*  108*   CO2 mmol/L  22.9  24.8  23.8  22.6  22.3  21.5*   BUN mg/dL  32*  28*  16  16  19  21*   CREATININE mg/dL  1.45*  1.64*  1.42*  1.39*  1.37*  1.59*   CALCIUM mg/dL  10.0  10.1  9.8  9.8  9.9  9.8   GLUCOSE mg/dL  110*  115*  158*  124*  118*  110*         Discussion and recommendations;     · Acute respiratory distress with hypoxemia multifactorial-COPD exacerbation along with diastolic congestive heart failure contributing to it.    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 -Continue present antibiotic regimen with close monitoring of toxicity including  periodic assessment of CPK.  · Status post recent acute illness requiring prolonged ventilator dependence resulting in tracheostomy and PEG tube placement with spontaneous dislodgment of tracheostomy with subsequent stable airways.  Patient has been evaluated for removal of feeding tube during previous hospitalization it was recommended that patient would need to see her gastroenterologist after the hospitalization  as this is not considered to be an emergency.  · Acute renal failure multifactorial probably superimposed vancomycin toxicity and chronic kidney disease, renal function is stable since the discontinuation of vancomycin as per recommendation by nephrology service plans to continue daptomycin.  · Leukocytosis is probably reactive due to margination and his rapid improvement within 24 hours without any changes antibiotic regimen argues for noninfectious process causing the elevation of the untreated infection.    UTI   C&S  Noted  ESBL  : IV  Invanz  Till 1/5/19  Repeat UA +        Elaine Jacob MD  1/1/2019  5:11 PM

## 2019-01-01 NOTE — DISCHARGE SUMMARY
Name: Swetha Luis  Age: 58 y.o.  Sex: female  :  1960  MRN: [id number]         Primary Care Physician: Bettye Suarez MD      Date of Admission:  2018  Date of Discharge:  2019      Chief Complaint:  Shortness of Breath      Presenting Problem/History of Present Illness:  Hypokalemia [E87.6]  Elevated troponin [R74.8]  Acute respiratory failure with hypoxia and hypercapnia (CMS/[facility]) [J96.01, J96.02]  Acute congestive heart failure, unspecified heart failure type (CMS/[facility]) [I50.9]     Discharge Diagnosis:  Active Hospital Problems    Diagnosis Date Noted   • Oral candidiasis [B37.0] 2018   • UTI due to extended-spectrum beta lactamase (ESBL) producing Escherichia coli [N39.0, B96.29, Z16.12] 2018   • Acute osteomyelitis (CMS/[facility]) [M86.10] 2018   • Acute diastolic congestive heart failure (CMS/[facility]) [I50.31] 2018   • ARF (acute renal failure) (CMS/[facility]) [N17.9] 2018   • CKD (chronic kidney disease) [N18.9] 2018   • Elevated troponin [R74.8] 2018   • Acute respiratory failure with hypoxemia (CMS/[facility]) [J96.01] 2018   • Gangrene of toe (CMS/[facility]) [I96] 2018   • Tracheostomy complication (CMS/[facility]) [J95.00] 2018      Resolved Hospital Problems   No resolved problems to display.       Secondary Diagnoses:  Past Medical History:   Diagnosis Date   • Acute congestive heart failure (CMS/[facility]) 2018   • Acute osteomyelitis (CMS/[facility])     Right shoulder due to IVDA   • Acute renal failure on dialysis (CMS/[facility])    • Anxiety    • CKD (chronic kidney disease)    • COPD (chronic obstructive pulmonary disease) (CMS/[facility])    • Nontraumatic subarachnoid hemorrhage (CMS/[facility])    • Tracheostomy present (CMS/[facility])          Consults:  Consult Orders (all) (From admission, onward)    Start     Ordered    18 1652  Inpatient Access Center Consult  Once     Provider:  (Not yet assigned)    18 1652    18 1811  Inpatient Nephrology Consult  Once      Specialty:  Nephrology  Provider:  Eliseo Davis MD    12/24/18 1811    12/24/18 1747  Inpatient Pulmonology Consult  Once     Specialty:  Pulmonary Disease  Provider:  Dany Baez MD    12/24/18 1746    12/24/18 1746  Inpatient Infectious Diseases Consult  Once     Specialty:  Infectious Diseases  Provider:  Rafa Fowler MD    12/24/18 1746    12/24/18 1745  Inpatient Cardiology Consult  Once     Specialty:  Cardiology  Provider:  Denise Rodrigues MD    12/24/18 1746          Procedures Performed:  Regadenoson Stress Test With Myocardial Perfusion   · Myocardial perfusion imaging indicates a normal myocardial perfusion study with no evidence of ischemia.  · Left ventricular ejection fraction is normal (Calculated EF = 69%).  · Impressions are consistent with a low risk study    Echocardiogram  · Left ventricular systolic function is low normal. Calculated EF = 48.0%. Estimated EF was in disagreement with the calculated EF. Estimated EF = 50%. Normal left ventricular cavity size and wall thickness noted. Multiple wall segments are not well visualized and cannot rule out wall motion abnormalities in the basal anterolateral anterior and apical anterior walls. Left ventricular diastolic dysfunction is noted (grade I) consistent with impaired relaxation.  · Right ventricular cavity is borderline dilated.  · The aortic valve is abnormal in structure. There is thickening of the aortic valve. There is moderate nodular sclerosis of non-coronary cusp. Cannot rule out possible fibroblastoma..  · Mild mitral valve regurgitation is present.  · Trace tricuspid valve regurgitation is present. Insufficient TR velocity profile to estimate the right ventricular systolic pressure.    Lower extremity Doppler  Normal bilateral lower extremity venous duplex scan.     NM LUNG VENTILATION PERFUSION   Low probability for pulmonary embolism.           Hospital Course:  Ms. Luis is a 58 y.o. female smoker with a history of  recent septic shock and osteomyelitis who presented to [facility] initially complaining of SOA. Please see the admitting history and physical for further details. She was found to have severe hypoxia and severely elevated BNP and was admitted to the hospital for further evaluation and treatment. She was followed by the consulting services outlined above. Echocardiogram showed low normal EF. It was felt she had acute diastolic dysfunction possibly due to her hypoxia. She was supposed to be discharged on oxygen but she evidently refused delivery thinking she did not need it. It was the next day she became short of breath and hypoxic and presented back to the hospital. She was followed by infectious disease who managed her antibiotics in regards to her osteomyelitis. They also diagnosed with UTI due to ESBL organism and started on Invanz.    Please see hospital records for further details and other issues. Her recent prolonged ICU stay and septic shock on severe vasopressors left her with dry gangrene of multiple toes. This causes her some discomfort but she has previously been evaluated by vascular surgery and is not a candidate for amputation was she developed severe unrelenting pain. She has normal ABIs. She has follow-up with a podiatrist.    She also requesting to have her PEG tube removed. This was evaluated by GI service last admission and they defer this to the outpatient setting to the provider who placed the PEG tube. Instructed to call for appointment regarding this. She will follow-up with her primary care provider as well and can assist.    Creatinine has been stable. Last admission she had vancomycin-induced renal failure. She is followed by nephrology will be discharged on Lasix.    During her multiple prolonged hospital stays her Neurontin prescription evidently . She was severely anxious and appeared to maybe having withdrawals on admission. She did much better in general once  Neurontin was restarted. Will provide month worth of prescription at discharge in addition to 20 Norco tablets.        Physical Exam:  Temp:  [98.3 °F (36.8 °C)-98.6 °F (37 °C)] 98.3 °F (36.8 °C)  Heart Rate:  [71-88] 81  Resp:  [16-20] 16  BP: (104-137)/() 136/92  Body mass index is 27.34 kg/m².  Physical Exam  Constitutional: She is oriented to person, place, and time. No distress.   Cardiovascular: Regular rhythm. No murmur heard.  Pulmonary/Chest: Effort normal and normal breath sounds. 02sats Mid 90s on 1liter nasal cannula    Abdominal: Soft. Bowel sounds are normal. She exhibits no distension. There is no tenderness.   Musculoskeletal: Normal range of motion. She exhibits no edema.   Dry gangrene noted first and second toe right foot.   Neurological: She is alert and oriented to person, place, and time.   Skin: Skin is warm and dry. She is not diaphoretic.   Psychiatric: Her mood appears calm         Condition on Discharge:  Stable.    Discharge Disposition:   Home with family    Allergies:   Allergies   Allergen Reactions   • Strawberry Unknown (See Comments)     unk   • Zithromax [Azithromycin] Unknown (See Comments)     unk       Discharge Medications:      Discharge Medications      New Medications      Instructions Start Date   aspirin 81 MG chewable tablet   81 mg, Oral, Daily      ertapenem 1 gm/100ml solution IV  Commonly known as:  INVanz   1 g, Intravenous, Every 24 Hours      furosemide 20 MG tablet  Commonly known as:  LASIX   20 mg, Oral, Daily      gabapentin 300 MG capsule  Commonly known as:  NEURONTIN   300 mg, Oral, 3 Times Daily      Metoprolol Succinate 25 MG capsule extended-release 24 hour sprinkle   25 mg, Oral, Daily      nystatin 229443 UNIT/ML suspension  Commonly known as:  MYCOSTATIN   5 mL, Oral, 4 Times Daily         Continue These Medications      Instructions Start Date   artificial tears 83-15 % ointment ophthalmic ointment   1 application, Every 12 Hours      Ascorbic  Acid 500 MG capsule   Oral      atorvastatin 40 MG tablet  Commonly known as:  LIPITOR   40 mg, Oral, Nightly      budesonide 0.5 MG/2ML nebulizer solution  Commonly known as:  PULMICORT   0.5 mg, Nebulization, Daily - RT, Via trach 2 times a day for SOA       clonazePAM 1 MG tablet  Commonly known as:  KlonoPIN   1 mg, Oral, 2 Times Daily PRN      DAPTOmycin 400 mg in Sodium chloride 0.9 % 50 mL   6 mg/kg, Intravenous, Every 24 Hours      escitalopram 20 MG tablet  Commonly known as:  LEXAPRO   20 mg, Oral, Daily      HYDROcodone-acetaminophen 5-325 MG per tablet  Commonly known as:  NORCO   1 tablet, Oral, Every 6 Hours PRN      ipratropium-albuterol 0.5-2.5 mg/3 ml nebulizer  Commonly known as:  DUO-NEB   3 mL, Nebulization, Every 4 Hours PRN      MULTIPLE VITAMINS-MINERALS PO   Oral      mupirocin 2 % ointment  Commonly known as:  BACTROBAN   Topical, 3 Times Daily, Apply to nose topically two times a day for redness       pantoprazole 40 MG EC tablet  Commonly known as:  PROTONIX   40 mg, Oral, Daily      povidone-iodine 10 % external solution  Commonly known as:  BETADINE   Topical, As Needed      povidone-iodine 10 % external solution  Commonly known as:  BETADINE   Topical, As Needed      pramipexole 0.25 MG tablet  Commonly known as:  MIRAPEX   0.25 mg, Oral, Nightly      thiamine 100 MG tablet  Commonly known as:  VITAMIN B-1   100 mg, Oral, Daily      traZODone 50 MG tablet  Commonly known as:  DESYREL   50 mg, Oral, Nightly PRN         Stop These Medications    metoprolol tartrate 25 MG tablet  Commonly known as:  LOPRESSOR            Discharge Diet:   Diet Instructions     Diet: Regular, Cardiac      Discharge Diet:   Regular  Cardiac             Activity at Discharge:   Activity Instructions     Activity as Tolerated            Follow-up Appointments:  Future Appointments   Date Time Provider Department Center   1/30/2019  7:30 PM [facility] SLEEP ROOMS [facility] SLEEP [facility]      Contact information for  follow-up providers     Bettye Suarez MD. Schedule an appointment as soon as possible for a visit in 1 week(s).    Specialty:  Internal Medicine  Contact information:  [address]  [facility] [id number] [phone]             Elaine Jacob MD Follow up.    Specialty:  Infectious Diseases  Contact information:  [address]  [facility] [address]  [phone]             Eliseo Davis MD Follow up.    Specialty:  Nephrology  Contact information:  [address]  [facility] [address]  [phone]                   Contact information for after-discharge care     Durable Medical Equipment     [facility] .    Service:  Durable Medical Equipment  Contact information:  [address]  [facility] [address]  [phone]                 Dialysis/Infusion     [facility] .    Service:  Infusion and IV Therapy  Contact information:  [address]  [facility] [address]  [phone]                 Home Medical Care     [facility] [place] Follow up.    Service:  Home Health Services  Contact information:  [address]  [facility] [address] [phone]                               Test Results Pending at Discharge:  None     Code status:   Code Status and Medical Interventions:   Ordered at: 12/24/18 1747     Code Status:    CPR     Medical Interventions (Level of Support Prior to Arrest):    Full         Montrell Monterroso MD  [facility]  01/01/19  5:23 PM      Time: greater than 30 minutes.

## 2019-01-01 NOTE — PLAN OF CARE
Problem: Patient Care Overview  Goal: Plan of Care Review  Outcome: Outcome(s) achieved Date Met: 01/01/19 01/01/19 1731   OTHER   Outcome Summary VSS. Being discharged on IV abx    Coping/Psychosocial   Plan of Care Reviewed With patient   Plan of Care Review   Progress improving     Goal: Individualization and Mutuality  Outcome: Outcome(s) achieved Date Met: 01/01/19    Goal: Discharge Needs Assessment  Outcome: Outcome(s) achieved Date Met: 01/01/19    Goal: Interprofessional Rounds/Family Conf  Outcome: Outcome(s) achieved Date Met: 01/01/19      Problem: Fall Risk (Adult)  Goal: Absence of Fall  Outcome: Outcome(s) achieved Date Met: 01/01/19      Problem: Skin Injury Risk (Adult)  Goal: Skin Health and Integrity  Outcome: Outcome(s) achieved Date Met: 01/01/19      Problem: Cardiac: Heart Failure (Adult)  Goal: Signs and Symptoms of Listed Potential Problems Will be Absent, Minimized or Managed (Cardiac: Heart Failure)  Outcome: Outcome(s) achieved Date Met: 01/01/19      Problem: Pain, Acute (Adult)  Goal: Acceptable Pain Control/Comfort Level  Outcome: Outcome(s) achieved Date Met: 01/01/19

## 2019-01-02 ENCOUNTER — READMISSION MANAGEMENT (OUTPATIENT)
Dept: CALL CENTER | Facility: HOSPITAL | Age: 59
End: 2019-01-02

## 2019-01-02 NOTE — OUTREACH NOTE
Prep Survey      Responses   Facility patient discharged from?  [facility]   Is patient eligible?  Yes   Discharge diagnosis  Oral candidiasis, UTI due to extended-spectum beta lactamase producing E. Coli, acute osteomyelitis, acute diastolic CHF, ARF, CKD, Elevated troponini, acute resp. failure with hypoxemia, gangrene of toe, tracheostomy complication   Does the patient have one of the following disease processes/diagnoses(primary or secondary)?  CHF   Does the patient have Home health ordered?  Yes   What is the Home health agency?   [facility] home infusion   Is there a DME ordered?  Yes   What DME was ordered?  [facility]   Comments regarding appointments  Pt. to schedule follow up visits.    Prep survey completed?  Yes          Lena Hung RN

## 2019-01-03 ENCOUNTER — READMISSION MANAGEMENT (OUTPATIENT)
Dept: CALL CENTER | Facility: HOSPITAL | Age: 59
End: 2019-01-03

## 2019-01-03 NOTE — OUTREACH NOTE
CHF Week 1 Survey      Responses   Facility patient discharged from?  [facility]   Does the patient have one of the following disease processes/diagnoses(primary or secondary)?  CHF   Is there a successful TCM telephone encounter documented?  No   CHF Week 1 attempt successful?  No   Unsuccessful attempts  Attempt 1          Darcy Hernandes RN

## 2019-01-04 ENCOUNTER — READMISSION MANAGEMENT (OUTPATIENT)
Dept: CALL CENTER | Facility: HOSPITAL | Age: 59
End: 2019-01-04

## 2019-01-04 NOTE — OUTREACH NOTE
CHF Week 1 Survey      Responses   Facility patient discharged from?  [facility]   Does the patient have one of the following disease processes/diagnoses(primary or secondary)?  CHF   Is there a successful TCM telephone encounter documented?  No   CHF Week 1 attempt successful?  No   Unsuccessful attempts  Attempt 2          Raj Burt RN

## 2019-01-08 ENCOUNTER — LAB REQUISITION (OUTPATIENT)
Dept: LAB | Facility: HOSPITAL | Age: 59
End: 2019-01-08

## 2019-01-08 DIAGNOSIS — Z00.00 ENCOUNTER FOR GENERAL ADULT MEDICAL EXAMINATION WITHOUT ABNORMAL FINDINGS: ICD-10-CM

## 2019-01-08 LAB
ALBUMIN SERPL-MCNC: 3.4 G/DL (ref 3.5–5.2)
ALBUMIN/GLOB SERPL: 0.9 G/DL
ALP SERPL-CCNC: 168 U/L (ref 39–117)
ALT SERPL W P-5'-P-CCNC: 20 U/L (ref 1–33)
ANION GAP SERPL CALCULATED.3IONS-SCNC: 12.2 MMOL/L
AST SERPL-CCNC: 26 U/L (ref 1–32)
BILIRUB SERPL-MCNC: 0.2 MG/DL (ref 0.1–1.2)
BUN BLD-MCNC: 23 MG/DL (ref 6–20)
BUN/CREAT SERPL: 18.1 (ref 7–25)
CALCIUM SPEC-SCNC: 10.2 MG/DL (ref 8.6–10.5)
CHLORIDE SERPL-SCNC: 99 MMOL/L (ref 98–107)
CK SERPL-CCNC: 36 U/L (ref 20–180)
CO2 SERPL-SCNC: 28.8 MMOL/L (ref 22–29)
CREAT BLD-MCNC: 1.27 MG/DL (ref 0.57–1)
CRP SERPL-MCNC: 0.5 MG/DL (ref 0–0.5)
DEPRECATED RDW RBC AUTO: 51.2 FL (ref 37–54)
EOSINOPHIL # BLD MANUAL: 0.21 10*3/MM3 (ref 0–0.7)
EOSINOPHIL NFR BLD MANUAL: 2 % (ref 0.3–6.2)
ERYTHROCYTE [DISTWIDTH] IN BLOOD BY AUTOMATED COUNT: 16 % (ref 11.7–13)
ERYTHROCYTE [SEDIMENTATION RATE] IN BLOOD: 68 MM/HR (ref 0–30)
GFR SERPL CREATININE-BSD FRML MDRD: 43 ML/MIN/1.73
GLOBULIN UR ELPH-MCNC: 3.8 GM/DL
GLUCOSE BLD-MCNC: 200 MG/DL (ref 65–99)
HCT VFR BLD AUTO: 37.3 % (ref 35.6–45.5)
HGB BLD-MCNC: 11.3 G/DL (ref 11.9–15.5)
LYMPHOCYTES # BLD MANUAL: 1.48 10*3/MM3 (ref 0.9–4.8)
LYMPHOCYTES NFR BLD MANUAL: 14 % (ref 19.6–45.3)
LYMPHOCYTES NFR BLD MANUAL: 5 % (ref 5–12)
MCH RBC QN AUTO: 26.8 PG (ref 26.9–32)
MCHC RBC AUTO-ENTMCNC: 30.3 G/DL (ref 32.4–36.3)
MCV RBC AUTO: 88.4 FL (ref 80.5–98.2)
MONOCYTES # BLD AUTO: 0.53 10*3/MM3 (ref 0.2–1.2)
NEUTROPHILS # BLD AUTO: 8.33 10*3/MM3 (ref 1.9–8.1)
NEUTROPHILS NFR BLD MANUAL: 79 % (ref 42.7–76)
PLAT MORPH BLD: NORMAL
PLATELET # BLD AUTO: 364 10*3/MM3 (ref 140–500)
PMV BLD AUTO: 12.4 FL (ref 6–12)
POTASSIUM BLD-SCNC: 3.2 MMOL/L (ref 3.5–5.2)
PROT SERPL-MCNC: 7.2 G/DL (ref 6–8.5)
RBC # BLD AUTO: 4.22 10*6/MM3 (ref 3.9–5.2)
RBC MORPH BLD: NORMAL
SODIUM BLD-SCNC: 140 MMOL/L (ref 136–145)
WBC MORPH BLD: NORMAL
WBC NRBC COR # BLD: 10.54 10*3/MM3 (ref 4.5–10.7)

## 2019-01-08 PROCEDURE — 85007 BL SMEAR W/DIFF WBC COUNT: CPT | Performed by: INTERNAL MEDICINE

## 2019-01-08 PROCEDURE — 86140 C-REACTIVE PROTEIN: CPT | Performed by: INTERNAL MEDICINE

## 2019-01-08 PROCEDURE — 80053 COMPREHEN METABOLIC PANEL: CPT | Performed by: INTERNAL MEDICINE

## 2019-01-08 PROCEDURE — 85652 RBC SED RATE AUTOMATED: CPT | Performed by: INTERNAL MEDICINE

## 2019-01-08 PROCEDURE — 82550 ASSAY OF CK (CPK): CPT | Performed by: INTERNAL MEDICINE

## 2019-01-08 PROCEDURE — 85027 COMPLETE CBC AUTOMATED: CPT | Performed by: INTERNAL MEDICINE

## 2019-01-09 ENCOUNTER — READMISSION MANAGEMENT (OUTPATIENT)
Dept: CALL CENTER | Facility: HOSPITAL | Age: 59
End: 2019-01-09

## 2019-01-09 NOTE — OUTREACH NOTE
CHF Week 2 Survey      Responses   Facility patient discharged from?  [facility]   Does the patient have one of the following disease processes/diagnoses(primary or secondary)?  CHF   Week 2 attempt successful?  Yes   Call start time  1434   Call end time  1458   Discharge diagnosis  Oral candidiasis, UTI due to extended-spectum beta lactamase producing E. Coli, acute osteomyelitis, acute diastolic CHF, ARF, CKD, Elevated troponini, acute resp. failure with hypoxemia, gangrene of toe, tracheostomy complication   Meds reviewed with patient/caregiver?  Yes   Is the patient having any side effects they believe may be caused by any medication additions or changes?  No   Does the patient have all medications ordered at discharge?  Yes   Is the patient taking all medications as directed (includes completed medication regime)?  Yes   Does the patient have a primary care provider?   Yes   Does the patient have an appointment with their PCP within 7 days of discharge?  Greater than 7 days   Has the patient kept scheduled appointments due by today?  N/A   What is the Home health agency?   [facility], [facility]   Has home health visited the patient within 72 hours of discharge?  Yes   Psychosocial issues?  No   Did the patient receive a copy of their discharge instructions?  Yes   Nursing interventions  Reviewed instructions with patient   What is the patient's perception of their health status since discharge?  Improving   Nursing interventions  Nurse provided patient education   Is the patient weighing daily?  No   Does the patient have scales?  Yes   Daily weight interventions  Education provided on importance of daily weight   Is the patient able to teach back signs and symptoms of worsening condition? (i.e. weight gain, shortness of air, etc.)  Yes   Additional teach back comments  Pt says she is having a lot of pain in foot. Problems walking. Using walker. Foot is getting a little better on IV antibodics. SHe has  trouble dressing self. Has not made some Dr apt because of transportation problems. Has apt on 1/11 and does not know how she will get to appt. Advised to look on line for transportation. Message sent to Case Management for help with transportation. Pt has recently moved and cannot unpack.  Says she is having no SOB. Swelling around toes only   CHF Week 2 call completed?  Yes          Doris Villa RN

## 2019-01-10 ENCOUNTER — TELEPHONE (OUTPATIENT)
Dept: SOCIAL WORK | Facility: HOSPITAL | Age: 59
End: 2019-01-10

## 2019-01-10 NOTE — TELEPHONE ENCOUNTER
Received a message from the call center that Ms. Swetha Luis is calling requesting information about transportation services.  I spoke with pt by phone at [phone].  She states that she does not have any appts currently set up but will set them up once she gets transportation arranged.  Explained that we can set up transportation to a [facility] and that I can mail her a list of companies that she can arrange for herself.  She states that she will wait to get the list in the mail.  Gave her contact #s for [employer], [employer], and [employer].  Mailed her our list.  Asked her to call back if she needs us to arrange transportation to a MD appt.

## 2019-01-14 ENCOUNTER — LAB REQUISITION (OUTPATIENT)
Dept: LAB | Facility: HOSPITAL | Age: 59
End: 2019-01-14

## 2019-01-14 DIAGNOSIS — Z00.00 ENCOUNTER FOR GENERAL ADULT MEDICAL EXAMINATION WITHOUT ABNORMAL FINDINGS: ICD-10-CM

## 2019-01-14 LAB
ALBUMIN SERPL-MCNC: 3.6 G/DL (ref 3.5–5.2)
ALBUMIN/GLOB SERPL: 1.1 G/DL
ALP SERPL-CCNC: 150 U/L (ref 39–117)
ALT SERPL W P-5'-P-CCNC: 27 U/L (ref 1–33)
ANION GAP SERPL CALCULATED.3IONS-SCNC: 11.4 MMOL/L
AST SERPL-CCNC: 16 U/L (ref 1–32)
BILIRUB SERPL-MCNC: 0.2 MG/DL (ref 0.1–1.2)
BUN BLD-MCNC: 24 MG/DL (ref 6–20)
BUN/CREAT SERPL: 17.6 (ref 7–25)
CALCIUM SPEC-SCNC: 9.5 MG/DL (ref 8.6–10.5)
CHLORIDE SERPL-SCNC: 101 MMOL/L (ref 98–107)
CK SERPL-CCNC: 165 U/L (ref 20–180)
CO2 SERPL-SCNC: 29.6 MMOL/L (ref 22–29)
CREAT BLD-MCNC: 1.36 MG/DL (ref 0.57–1)
CRP SERPL-MCNC: 0.78 MG/DL (ref 0–0.5)
DEPRECATED RDW RBC AUTO: 56.9 FL (ref 37–54)
EOSINOPHIL # BLD MANUAL: 0.23 10*3/MM3 (ref 0–0.7)
EOSINOPHIL NFR BLD MANUAL: 2 % (ref 0.3–6.2)
ERYTHROCYTE [DISTWIDTH] IN BLOOD BY AUTOMATED COUNT: 17.1 % (ref 11.7–13)
ERYTHROCYTE [SEDIMENTATION RATE] IN BLOOD: 54 MM/HR (ref 0–30)
GFR SERPL CREATININE-BSD FRML MDRD: 40 ML/MIN/1.73
GLOBULIN UR ELPH-MCNC: 3.2 GM/DL
GLUCOSE BLD-MCNC: 127 MG/DL (ref 65–99)
HCT VFR BLD AUTO: 35.2 % (ref 35.6–45.5)
HGB BLD-MCNC: 10.6 G/DL (ref 11.9–15.5)
LYMPHOCYTES # BLD MANUAL: 2.08 10*3/MM3 (ref 0.9–4.8)
LYMPHOCYTES NFR BLD MANUAL: 18 % (ref 19.6–45.3)
LYMPHOCYTES NFR BLD MANUAL: 7 % (ref 5–12)
MCH RBC QN AUTO: 27.3 PG (ref 26.9–32)
MCHC RBC AUTO-ENTMCNC: 30.1 G/DL (ref 32.4–36.3)
MCV RBC AUTO: 90.7 FL (ref 80.5–98.2)
MONOCYTES # BLD AUTO: 0.81 10*3/MM3 (ref 0.2–1.2)
NEUTROPHILS # BLD AUTO: 8.42 10*3/MM3 (ref 1.9–8.1)
NEUTROPHILS NFR BLD MANUAL: 73 % (ref 42.7–76)
PLAT MORPH BLD: NORMAL
PLATELET # BLD AUTO: 260 10*3/MM3 (ref 140–500)
PMV BLD AUTO: 11.9 FL (ref 6–12)
POTASSIUM BLD-SCNC: 2.9 MMOL/L (ref 3.5–5.2)
PROT SERPL-MCNC: 6.8 G/DL (ref 6–8.5)
RBC # BLD AUTO: 3.88 10*6/MM3 (ref 3.9–5.2)
RBC MORPH BLD: NORMAL
SODIUM BLD-SCNC: 142 MMOL/L (ref 136–145)
WBC MORPH BLD: NORMAL
WBC NRBC COR # BLD: 11.54 10*3/MM3 (ref 4.5–10.7)

## 2019-01-14 PROCEDURE — 86140 C-REACTIVE PROTEIN: CPT | Performed by: INTERNAL MEDICINE

## 2019-01-14 PROCEDURE — 85007 BL SMEAR W/DIFF WBC COUNT: CPT | Performed by: INTERNAL MEDICINE

## 2019-01-14 PROCEDURE — 85027 COMPLETE CBC AUTOMATED: CPT | Performed by: INTERNAL MEDICINE

## 2019-01-14 PROCEDURE — 80053 COMPREHEN METABOLIC PANEL: CPT | Performed by: INTERNAL MEDICINE

## 2019-01-14 PROCEDURE — 85652 RBC SED RATE AUTOMATED: CPT | Performed by: INTERNAL MEDICINE

## 2019-01-14 PROCEDURE — 82550 ASSAY OF CK (CPK): CPT | Performed by: INTERNAL MEDICINE

## 2019-01-16 ENCOUNTER — READMISSION MANAGEMENT (OUTPATIENT)
Dept: CALL CENTER | Facility: HOSPITAL | Age: 59
End: 2019-01-16

## 2019-01-16 NOTE — OUTREACH NOTE
CHF Week 3 Survey      Responses   Facility patient discharged from?  [facility]   Does the patient have one of the following disease processes/diagnoses(primary or secondary)?  CHF   Week 3 attempt successful?  No   Unsuccessful attempts  Attempt 1          Raj Burt RN

## 2019-01-17 ENCOUNTER — READMISSION MANAGEMENT (OUTPATIENT)
Dept: CALL CENTER | Facility: HOSPITAL | Age: 59
End: 2019-01-17

## 2019-01-17 NOTE — OUTREACH NOTE
CHF Week 3 Survey      Responses   Facility patient discharged from?  [facility]   Does the patient have one of the following disease processes/diagnoses(primary or secondary)?  CHF   Week 3 attempt successful?  Yes   Rescheduled  Rescheduled-pt requested [Spouse was at work. ]          Andrea Frazier RN

## 2019-01-30 ENCOUNTER — HOSPITAL ENCOUNTER (OUTPATIENT)
Dept: SLEEP MEDICINE | Facility: HOSPITAL | Age: 59
End: 2019-01-30
Attending: INTERNAL MEDICINE

## 2019-06-19 ENCOUNTER — INPATIENT HOSPITAL (OUTPATIENT)
Dept: URBAN - METROPOLITAN AREA HOSPITAL 107 | Facility: HOSPITAL | Age: 59
End: 2019-06-19
Payer: COMMERCIAL

## 2019-06-19 DIAGNOSIS — A04.72 ENTEROCOLITIS DUE TO CLOSTRIDIUM DIFFICILE, NOT SPECIFIED AS: ICD-10-CM

## 2019-06-19 DIAGNOSIS — R11.2 NAUSEA WITH VOMITING, UNSPECIFIED: ICD-10-CM

## 2019-06-19 PROCEDURE — 99222 1ST HOSP IP/OBS MODERATE 55: CPT | Performed by: INTERNAL MEDICINE

## 2019-06-20 ENCOUNTER — INPATIENT HOSPITAL (OUTPATIENT)
Dept: URBAN - METROPOLITAN AREA HOSPITAL 107 | Facility: HOSPITAL | Age: 59
End: 2019-06-20
Payer: COMMERCIAL

## 2019-06-20 DIAGNOSIS — R11.2 NAUSEA WITH VOMITING, UNSPECIFIED: ICD-10-CM

## 2019-06-20 DIAGNOSIS — A04.72 ENTEROCOLITIS DUE TO CLOSTRIDIUM DIFFICILE, NOT SPECIFIED AS: ICD-10-CM

## 2019-06-20 DIAGNOSIS — R79.89 OTHER SPECIFIED ABNORMAL FINDINGS OF BLOOD CHEMISTRY: ICD-10-CM

## 2019-06-20 PROCEDURE — 99232 SBSQ HOSP IP/OBS MODERATE 35: CPT | Performed by: PHYSICIAN ASSISTANT

## 2019-06-21 PROCEDURE — 99232 SBSQ HOSP IP/OBS MODERATE 35: CPT | Performed by: PHYSICIAN ASSISTANT

## 2019-06-22 ENCOUNTER — INPATIENT HOSPITAL (OUTPATIENT)
Dept: URBAN - METROPOLITAN AREA HOSPITAL 107 | Facility: HOSPITAL | Age: 59
End: 2019-06-22
Payer: COMMERCIAL

## 2019-06-22 DIAGNOSIS — R79.89 OTHER SPECIFIED ABNORMAL FINDINGS OF BLOOD CHEMISTRY: ICD-10-CM

## 2019-06-22 DIAGNOSIS — R11.2 NAUSEA WITH VOMITING, UNSPECIFIED: ICD-10-CM

## 2019-06-22 DIAGNOSIS — A04.72 ENTEROCOLITIS DUE TO CLOSTRIDIUM DIFFICILE, NOT SPECIFIED AS: ICD-10-CM

## 2019-06-22 PROCEDURE — 99232 SBSQ HOSP IP/OBS MODERATE 35: CPT | Performed by: INTERNAL MEDICINE

## 2019-06-23 ENCOUNTER — INPATIENT HOSPITAL (OUTPATIENT)
Dept: URBAN - METROPOLITAN AREA HOSPITAL 107 | Facility: HOSPITAL | Age: 59
End: 2019-06-23
Payer: COMMERCIAL

## 2019-06-23 DIAGNOSIS — R11.2 NAUSEA WITH VOMITING, UNSPECIFIED: ICD-10-CM

## 2019-06-23 DIAGNOSIS — A04.72 ENTEROCOLITIS DUE TO CLOSTRIDIUM DIFFICILE, NOT SPECIFIED AS: ICD-10-CM

## 2019-06-23 DIAGNOSIS — R79.89 OTHER SPECIFIED ABNORMAL FINDINGS OF BLOOD CHEMISTRY: ICD-10-CM

## 2019-06-23 PROCEDURE — 99232 SBSQ HOSP IP/OBS MODERATE 35: CPT | Performed by: INTERNAL MEDICINE

## 2019-06-24 ENCOUNTER — INPATIENT HOSPITAL (OUTPATIENT)
Dept: URBAN - METROPOLITAN AREA HOSPITAL 107 | Facility: HOSPITAL | Age: 59
End: 2019-06-24

## 2019-06-24 DIAGNOSIS — R74.8 ABNORMAL LEVELS OF OTHER SERUM ENZYMES: ICD-10-CM

## 2019-06-24 DIAGNOSIS — A04.72 ENTEROCOLITIS DUE TO CLOSTRIDIUM DIFFICILE, NOT SPECIFIED AS: ICD-10-CM

## 2019-06-24 DIAGNOSIS — R11.2 NAUSEA WITH VOMITING, UNSPECIFIED: ICD-10-CM

## 2019-06-24 PROCEDURE — 99231 SBSQ HOSP IP/OBS SF/LOW 25: CPT | Performed by: PHYSICIAN ASSISTANT

## 2019-07-01 ENCOUNTER — INPATIENT HOSPITAL (OUTPATIENT)
Dept: URBAN - METROPOLITAN AREA HOSPITAL 107 | Facility: HOSPITAL | Age: 59
End: 2019-07-01
Payer: COMMERCIAL

## 2019-07-01 DIAGNOSIS — K21.9 GASTRO-ESOPHAGEAL REFLUX DISEASE WITHOUT ESOPHAGITIS: ICD-10-CM

## 2019-07-01 DIAGNOSIS — R63.3 FEEDING DIFFICULTIES: ICD-10-CM

## 2019-07-01 PROCEDURE — 99222 1ST HOSP IP/OBS MODERATE 55: CPT | Performed by: INTERNAL MEDICINE

## 2019-07-02 ENCOUNTER — INPATIENT HOSPITAL (OUTPATIENT)
Dept: URBAN - METROPOLITAN AREA HOSPITAL 107 | Facility: HOSPITAL | Age: 59
End: 2019-07-02

## 2019-07-02 DIAGNOSIS — R63.3 FEEDING DIFFICULTIES: ICD-10-CM

## 2019-07-02 PROCEDURE — 99232 SBSQ HOSP IP/OBS MODERATE 35: CPT | Performed by: PHYSICIAN ASSISTANT

## 2019-07-03 PROCEDURE — 99231 SBSQ HOSP IP/OBS SF/LOW 25: CPT | Performed by: PHYSICIAN ASSISTANT

## 2019-07-10 ENCOUNTER — HOSPITAL ENCOUNTER (OUTPATIENT)
Facility: HOSPITAL | Age: 59
Setting detail: OBSERVATION
Discharge: NURSING, REHAB OR LONG-TERM CARE | End: 2019-07-11
Attending: EMERGENCY MEDICINE | Admitting: INTERNAL MEDICINE

## 2019-07-10 ENCOUNTER — APPOINTMENT (OUTPATIENT)
Dept: GENERAL RADIOLOGY | Facility: HOSPITAL | Age: 59
End: 2019-07-10

## 2019-07-10 DIAGNOSIS — Z87.01 HX OF RECURRENT PNEUMONIA: ICD-10-CM

## 2019-07-10 DIAGNOSIS — R09.02 HYPOXIA: ICD-10-CM

## 2019-07-10 DIAGNOSIS — J95.03 TRACHEOSTOMY MALFUNCTION (HCC): Primary | ICD-10-CM

## 2019-07-10 PROBLEM — Z93.1 PEG (PERCUTANEOUS ENDOSCOPIC GASTROSTOMY) STATUS (HCC): Status: ACTIVE | Noted: 2019-07-10

## 2019-07-10 PROBLEM — Z87.39 HISTORY OF OSTEOMYELITIS: Status: ACTIVE | Noted: 2019-07-10

## 2019-07-10 PROBLEM — J44.9 COPD (CHRONIC OBSTRUCTIVE PULMONARY DISEASE) (HCC): Status: ACTIVE | Noted: 2019-07-10

## 2019-07-10 PROBLEM — G47.33 OSA AND COPD OVERLAP SYNDROME (HCC): Status: ACTIVE | Noted: 2019-07-10

## 2019-07-10 PROBLEM — Z91.199 MEDICALLY NONCOMPLIANT: Status: ACTIVE | Noted: 2019-07-10

## 2019-07-10 PROBLEM — Z87.09 HISTORY OF TRACHEAL STENOSIS: Status: ACTIVE | Noted: 2019-07-10

## 2019-07-10 PROBLEM — F19.10 POLYSUBSTANCE ABUSE (HCC): Status: ACTIVE | Noted: 2019-07-10

## 2019-07-10 PROBLEM — J96.10 RESPIRATORY FAILURE, CHRONIC (HCC): Status: ACTIVE | Noted: 2019-07-10

## 2019-07-10 PROBLEM — J44.9 OSA AND COPD OVERLAP SYNDROME (HCC): Status: ACTIVE | Noted: 2019-07-10

## 2019-07-10 PROBLEM — I50.32 CHRONIC DIASTOLIC CHF (CONGESTIVE HEART FAILURE) (HCC): Status: ACTIVE | Noted: 2019-07-10

## 2019-07-10 PROBLEM — I73.9 PERIPHERAL ARTERY DISEASE (HCC): Status: ACTIVE | Noted: 2019-07-10

## 2019-07-10 LAB
ALBUMIN SERPL-MCNC: 3.3 G/DL (ref 3.5–5.2)
ALBUMIN/GLOB SERPL: 0.9 G/DL
ALP SERPL-CCNC: 121 U/L (ref 39–117)
ALT SERPL W P-5'-P-CCNC: 24 U/L (ref 1–33)
ANION GAP SERPL CALCULATED.3IONS-SCNC: 10.2 MMOL/L (ref 5–15)
ARTERIAL PATENCY WRIST A: POSITIVE
AST SERPL-CCNC: 21 U/L (ref 1–32)
ATMOSPHERIC PRESS: 744 MMHG
BASE EXCESS BLDA CALC-SCNC: 2 MMOL/L (ref 0–2)
BASOPHILS # BLD AUTO: 0.03 10*3/MM3 (ref 0–0.2)
BASOPHILS NFR BLD AUTO: 0.4 % (ref 0–1.5)
BDY SITE: NORMAL
BILIRUB SERPL-MCNC: 0.2 MG/DL (ref 0.2–1.2)
BUN BLD-MCNC: 9 MG/DL (ref 6–20)
BUN/CREAT SERPL: 9.9 (ref 7–25)
CALCIUM SPEC-SCNC: 9.8 MG/DL (ref 8.6–10.5)
CHLORIDE SERPL-SCNC: 105 MMOL/L (ref 98–107)
CO2 SERPL-SCNC: 28.8 MMOL/L (ref 22–29)
CREAT BLD-MCNC: 0.91 MG/DL (ref 0.57–1)
DEPRECATED RDW RBC AUTO: 48.4 FL (ref 37–54)
EOSINOPHIL # BLD AUTO: 0.22 10*3/MM3 (ref 0–0.4)
EOSINOPHIL NFR BLD AUTO: 3.3 % (ref 0.3–6.2)
ERYTHROCYTE [DISTWIDTH] IN BLOOD BY AUTOMATED COUNT: 15.1 % (ref 12.3–15.4)
GFR SERPL CREATININE-BSD FRML MDRD: 63 ML/MIN/1.73
GLOBULIN UR ELPH-MCNC: 3.6 GM/DL
GLUCOSE BLD-MCNC: 88 MG/DL (ref 65–99)
HCO3 BLDA-SCNC: 27.2 MMOL/L (ref 22–28)
HCT VFR BLD AUTO: 37.3 % (ref 34–46.6)
HGB BLD-MCNC: 11.6 G/DL (ref 12–15.9)
HOLD SPECIMEN: NORMAL
HOROWITZ INDEX BLD+IHG-RTO: 40 %
IMM GRANULOCYTES # BLD AUTO: 0.02 10*3/MM3 (ref 0–0.05)
IMM GRANULOCYTES NFR BLD AUTO: 0.3 % (ref 0–0.5)
LYMPHOCYTES # BLD AUTO: 1.56 10*3/MM3 (ref 0.7–3.1)
LYMPHOCYTES NFR BLD AUTO: 23.2 % (ref 19.6–45.3)
MCH RBC QN AUTO: 27.8 PG (ref 26.6–33)
MCHC RBC AUTO-ENTMCNC: 31.1 G/DL (ref 31.5–35.7)
MCV RBC AUTO: 89.4 FL (ref 79–97)
MODALITY: NORMAL
MONOCYTES # BLD AUTO: 0.73 10*3/MM3 (ref 0.1–0.9)
MONOCYTES NFR BLD AUTO: 10.8 % (ref 5–12)
NEUTROPHILS # BLD AUTO: 4.17 10*3/MM3 (ref 1.7–7)
NEUTROPHILS NFR BLD AUTO: 62 % (ref 42.7–76)
NRBC BLD AUTO-RTO: 0 /100 WBC (ref 0–0.2)
O2 A-A PPRESDIFF RESPIRATORY: 0.3 MMHG
PCO2 BLDA: 44.2 MM HG (ref 35–45)
PH BLDA: 7.4 PH UNITS (ref 7.35–7.45)
PLATELET # BLD AUTO: 212 10*3/MM3 (ref 140–450)
PMV BLD AUTO: 11.9 FL (ref 6–12)
PO2 BLDA: 83.1 MM HG (ref 80–100)
POTASSIUM BLD-SCNC: 3.6 MMOL/L (ref 3.5–5.2)
PROT SERPL-MCNC: 6.9 G/DL (ref 6–8.5)
RBC # BLD AUTO: 4.17 10*6/MM3 (ref 3.77–5.28)
SAO2 % BLDCOA: 96.1 % (ref 92–99)
SET MECH RESP RATE: 16
SODIUM BLD-SCNC: 144 MMOL/L (ref 136–145)
WBC NRBC COR # BLD: 6.73 10*3/MM3 (ref 3.4–10.8)
WHOLE BLOOD HOLD SPECIMEN: NORMAL

## 2019-07-10 PROCEDURE — G0378 HOSPITAL OBSERVATION PER HR: HCPCS

## 2019-07-10 PROCEDURE — 80053 COMPREHEN METABOLIC PANEL: CPT | Performed by: NURSE PRACTITIONER

## 2019-07-10 PROCEDURE — 36600 WITHDRAWAL OF ARTERIAL BLOOD: CPT

## 2019-07-10 PROCEDURE — 96376 TX/PRO/DX INJ SAME DRUG ADON: CPT

## 2019-07-10 PROCEDURE — 85025 COMPLETE CBC W/AUTO DIFF WBC: CPT | Performed by: NURSE PRACTITIONER

## 2019-07-10 PROCEDURE — 82803 BLOOD GASES ANY COMBINATION: CPT

## 2019-07-10 PROCEDURE — 99285 EMERGENCY DEPT VISIT HI MDM: CPT

## 2019-07-10 PROCEDURE — 71045 X-RAY EXAM CHEST 1 VIEW: CPT

## 2019-07-10 PROCEDURE — 96374 THER/PROPH/DIAG INJ IV PUSH: CPT

## 2019-07-10 PROCEDURE — 25010000002 MORPHINE PER 10 MG: Performed by: INTERNAL MEDICINE

## 2019-07-10 RX ORDER — THIAMINE MONONITRATE (VIT B1) 100 MG
100 TABLET ORAL DAILY
Status: DISCONTINUED | OUTPATIENT
Start: 2019-07-11 | End: 2019-07-12 | Stop reason: HOSPADM

## 2019-07-10 RX ORDER — METOPROLOL SUCCINATE 25 MG/1
25 TABLET, EXTENDED RELEASE ORAL
Status: DISCONTINUED | OUTPATIENT
Start: 2019-07-11 | End: 2019-07-12 | Stop reason: HOSPADM

## 2019-07-10 RX ORDER — ASPIRIN 81 MG/1
81 TABLET, CHEWABLE ORAL DAILY
Status: DISCONTINUED | OUTPATIENT
Start: 2019-07-11 | End: 2019-07-12 | Stop reason: HOSPADM

## 2019-07-10 RX ORDER — TRAZODONE HYDROCHLORIDE 50 MG/1
50 TABLET ORAL NIGHTLY PRN
Status: DISCONTINUED | OUTPATIENT
Start: 2019-07-10 | End: 2019-07-12 | Stop reason: HOSPADM

## 2019-07-10 RX ORDER — IPRATROPIUM BROMIDE AND ALBUTEROL SULFATE 2.5; .5 MG/3ML; MG/3ML
3 SOLUTION RESPIRATORY (INHALATION) EVERY 4 HOURS PRN
Status: DISCONTINUED | OUTPATIENT
Start: 2019-07-10 | End: 2019-07-12 | Stop reason: HOSPADM

## 2019-07-10 RX ORDER — ACETAMINOPHEN 650 MG
TABLET, EXTENDED RELEASE ORAL AS NEEDED
Status: DISCONTINUED | OUTPATIENT
Start: 2019-07-10 | End: 2019-07-12 | Stop reason: HOSPADM

## 2019-07-10 RX ORDER — PANTOPRAZOLE SODIUM 40 MG/1
40 TABLET, DELAYED RELEASE ORAL DAILY
Status: DISCONTINUED | OUTPATIENT
Start: 2019-07-11 | End: 2019-07-12 | Stop reason: HOSPADM

## 2019-07-10 RX ORDER — MORPHINE SULFATE 2 MG/ML
1 INJECTION, SOLUTION INTRAMUSCULAR; INTRAVENOUS EVERY 4 HOURS PRN
Status: DISCONTINUED | OUTPATIENT
Start: 2019-07-10 | End: 2019-07-10

## 2019-07-10 RX ORDER — ESCITALOPRAM OXALATE 20 MG/1
20 TABLET ORAL DAILY
Status: DISCONTINUED | OUTPATIENT
Start: 2019-07-11 | End: 2019-07-12 | Stop reason: HOSPADM

## 2019-07-10 RX ORDER — SODIUM CHLORIDE 0.9 % (FLUSH) 0.9 %
3 SYRINGE (ML) INJECTION EVERY 12 HOURS SCHEDULED
Status: DISCONTINUED | OUTPATIENT
Start: 2019-07-10 | End: 2019-07-12 | Stop reason: HOSPADM

## 2019-07-10 RX ORDER — CLONAZEPAM 1 MG/1
1 TABLET ORAL 2 TIMES DAILY PRN
Status: DISCONTINUED | OUTPATIENT
Start: 2019-07-10 | End: 2019-07-12 | Stop reason: HOSPADM

## 2019-07-10 RX ORDER — MORPHINE SULFATE 2 MG/ML
0.5 INJECTION, SOLUTION INTRAMUSCULAR; INTRAVENOUS EVERY 4 HOURS PRN
Status: DISCONTINUED | OUTPATIENT
Start: 2019-07-10 | End: 2019-07-12 | Stop reason: HOSPADM

## 2019-07-10 RX ORDER — GABAPENTIN 300 MG/1
300 CAPSULE ORAL 3 TIMES DAILY
Status: DISCONTINUED | OUTPATIENT
Start: 2019-07-10 | End: 2019-07-12 | Stop reason: HOSPADM

## 2019-07-10 RX ORDER — BUDESONIDE 0.5 MG/2ML
0.5 INHALANT ORAL
Status: DISCONTINUED | OUTPATIENT
Start: 2019-07-11 | End: 2019-07-12 | Stop reason: HOSPADM

## 2019-07-10 RX ORDER — SODIUM CHLORIDE 0.9 % (FLUSH) 0.9 %
3-10 SYRINGE (ML) INJECTION AS NEEDED
Status: DISCONTINUED | OUTPATIENT
Start: 2019-07-10 | End: 2019-07-12 | Stop reason: HOSPADM

## 2019-07-10 RX ORDER — PRAMIPEXOLE DIHYDROCHLORIDE 0.25 MG/1
0.25 TABLET ORAL NIGHTLY
Status: DISCONTINUED | OUTPATIENT
Start: 2019-07-10 | End: 2019-07-12 | Stop reason: HOSPADM

## 2019-07-10 RX ADMIN — PRAMIPEXOLE DIHYDROCHLORIDE 0.25 MG: 0.25 TABLET ORAL at 23:28

## 2019-07-10 RX ADMIN — MORPHINE SULFATE 1 MG: 2 INJECTION, SOLUTION INTRAMUSCULAR; INTRAVENOUS at 12:26

## 2019-07-10 RX ADMIN — MORPHINE SULFATE 1 MG: 2 INJECTION, SOLUTION INTRAMUSCULAR; INTRAVENOUS at 16:17

## 2019-07-10 RX ADMIN — GABAPENTIN 300 MG: 300 CAPSULE ORAL at 23:28

## 2019-07-10 RX ADMIN — SODIUM CHLORIDE, PRESERVATIVE FREE 3 ML: 5 INJECTION INTRAVENOUS at 23:26

## 2019-07-10 RX ADMIN — NYSTATIN 500000 UNITS: 100000 SUSPENSION ORAL at 17:48

## 2019-07-10 RX ADMIN — NYSTATIN 500000 UNITS: 100000 SUSPENSION ORAL at 23:28

## 2019-07-10 NOTE — H&P
Patient Name:  Swetha Luis  YOB: 1960  MRN:  [id number]  Admit Date:  7/10/2019  Patient Care Team:  Lencho Connelly MD as PCP - General (Internal Medicine)      Subjective   History Present Illness     Chief Complaint   Patient presents with   • trach replacement       History of Present Illness   Ms. Luis is a 59 y.o. former smoker with a history of respiratory failure with tracheostomy, WENDI, COPD, osteomyelitis, PAD, DVT, CHF, HTN and polysubstance abuse that presents to [facility] for her facility's report of pulling out her tracheostomy tube. Details of the patient and her history are obtained from prior medical records and ED notes as the patient is nonverbal related to her trach.  is at bedside and able to provide some information. According to the notes, she was sent over from [facility] for pulling out her trach tube after only being there one day. A nurse practitioner in the ED attempted to replace the tube but was unsuccessful as her stoma was reported to be partially closed. Dr. Sampson with ENT was consulted and able to replace the tube at bedside.    She has had numerous admissions to the hospital with the last several having been at [facility].  From 4/30/19 to 5/15/19, she was hospitalized for acute respiratory failure and intubated at that time. An attempt was made to extubate her, but she developed stridor and respiratory distress requiring continued intubation. She had a bronchoscopy on 4/25/19 showing tracheal stenosis. An attempt was made to dilate and stent the area, but was unsuccessful. As a result, tracheostomy placement was made on 5/7/19. Also during that stay, she was being treated for discitis and osteomyelitis. She was informed to stay on IV antibiotics until 5/17/19, but refused and ending up leaving against medical advice. She has a known history of medical non-compliance.   Since trach placement, she had at least four other  admissions to [facility]. She was treated twice for pneumonia and COPD. On a 6/12/19 admission, she was found down by her  and found to have had a stroke at that time. She was also treated for C.DIFF at that time. On 6/28/19, she presented back to the hospital and again treated for pneumonia. She has been at a nursing facility since then. Her  is at bedside and provides some broken information regarding her history. He states patient's respiratory problems began about two years ago when she was in a coma following an operation. He reports she had a cyst removed from her chest wall.  She required a tracheostomy at that time, but eventually was able to have it removed. Her problems escalated secondary to back pain when she would periodically use cocaine to help manage her symptoms. She was using cocaine up until about a year ago. She was living at home with her  until having her stroke in June. Since that time, she has been immobile and unable to speak. She is able to communicate with nods and  reports she is fully aware of what is going on. However, there is noticeable lag in her ability to understand and respond quickly.    Based on exam, she is complaining of lower extremity discomfort. She cannot say yes or no to having diarrhea, but denies abdominal pain or nausea. She denies being short of air, but has a significant amount of clear oral secretions. She was recently suctioned by staff and appears to have improved after this. She denies fevers and has been afebrile since admit. Her  is upset that her swallow has not been assessed and feels as if they prematurely took away medications to help with her anxiety when she was discharged from the hospital. He wants her back on her Lexapro that she has been on for years. He does not seem to have a clear understanding of her medical state.    Review of Systems   Unable to perform ROS: Patient nonverbal (limited ROS r/t communication  difficulty)   Constitutional: Negative for chills and fever.   Respiratory: Negative for cough and shortness of breath.    Cardiovascular: Negative for chest pain and leg swelling.   Gastrointestinal: Negative for abdominal pain, nausea and vomiting.   Genitourinary: Negative for difficulty urinating and dysuria.   Musculoskeletal: Positive for back pain and myalgias.   Skin: Negative for color change and pallor.   Neurological: Positive for speech difficulty (trach and recent CVA). Negative for dizziness, light-headedness and headaches.      Personal History     Past Medical History:   Diagnosis Date   • Acute congestive heart failure (CMS/HCC) 2018   • Acute osteomyelitis (CMS/[facility])     Right shoulder due to IVDA   • Acute renal failure on dialysis (CMS/[facility])    • Anxiety    • CKD (chronic kidney disease)    • COPD (chronic obstructive pulmonary disease) (CMS/[facility])    • Nontraumatic subarachnoid hemorrhage (CMS/[facility])    • Tracheostomy present (CMS/[facility])      Past Surgical History:   Procedure Laterality Date   • TRACHEOSTOMY       History reviewed. No pertinent family history.  Social History     Tobacco Use   • Smoking status: Former Smoker     Packs/day: 1.00     Types: Cigarettes     Last attempt to quit: 2018     Years since quittin.9   • Smokeless tobacco: Never Used   Substance Use Topics   • Alcohol use: No     Frequency: Never   • Drug use: Yes     Types: Cocaine     Comment: 20 years ago occasional     Medications Prior to Admission   Medication Sig Dispense Refill Last Dose   • Ascorbic Acid 500 MG capsule Take  by mouth.   2018 at Unknown time   • aspirin 81 MG chewable tablet Chew 1 tablet Daily.      • budesonide (PULMICORT) 0.5 MG/2ML nebulizer solution Take 0.5 mg by nebulization Daily. Via trach 2 times a day for SOA   2018 at Unknown time   • clonazePAM (KlonoPIN) 1 MG tablet Take 1 mg by mouth 2 (Two) Times a Day As Needed for Seizures.   2018 at Unknown time   •  escitalopram (LEXAPRO) 20 MG tablet Take 20 mg by mouth Daily.   12/17/2018 at Unknown time   • furosemide (LASIX) 20 MG tablet Take 1 tablet by mouth Daily. 30 tablet 0    • gabapentin (NEURONTIN) 300 MG capsule Take 1 capsule by mouth 3 (Three) Times a Day. 90 capsule 0    • ipratropium-albuterol (DUO-NEB) 0.5-2.5 mg/3 ml nebulizer Take 3 mL by nebulization Every 4 (Four) Hours As Needed for Wheezing.   Past Week at Unknown time   • Metoprolol Succinate 25 MG capsule extended-release 24 hour sprinkle Take 25 mg by mouth Daily. 30 capsule 0    • MULTIPLE VITAMINS-MINERALS PO Take  by mouth.   12/17/2018 at Unknown time   • mupirocin (BACTROBAN) 2 % ointment Apply  topically to the appropriate area as directed 3 (Three) Times a Day. Apply to nose topically two times a day for redness   12/17/2018 at Unknown time   • pantoprazole (PROTONIX) 40 MG EC tablet Take 40 mg by mouth Daily.   12/17/2018 at Unknown time   • povidone-iodine (BETADINE) 10 % external solution Apply  topically to the appropriate area as directed As Needed for Wound Care (apply to left 2nd digit topically every day shift for unstageable.).   Past Week at Unknown time   • povidone-iodine (BETADINE) 10 % external solution Apply  topically to the appropriate area as directed As Needed for Wound Care (apply to right great toe, 2nd, 3rd topically every digit with day shift for unstageable).   Past Week at Unknown time   • pramipexole (MIRAPEX) 0.25 MG tablet Take 0.25 mg by mouth Every Night.   12/17/2018 at Unknown time   • thiamine (VITAMIN B-1) 100 MG tablet Take 100 mg by mouth Daily.   Past Week at Unknown time   • traZODone (DESYREL) 50 MG tablet Take 50 mg by mouth At Night As Needed.   12/17/2018 at Unknown time   • White Petrolatum-Mineral Oil (ARTIFICIAL TEARS) 83-15 % ointment ophthalmic ointment 1 application Every 12 (Twelve) Hours.   Past Week at Unknown time     Allergies:    Allergies   Allergen Reactions   • Cephalexin Unknown (See  Comments)     unk     • Hydrocodone Unknown (See Comments)     unk   • Riparius Unknown (See Comments)     unk   • Zithromax [Azithromycin] Unknown (See Comments)     unk       Objective    Objective     Vital Signs  Temp:  [99.4 °F (37.4 °C)] 99.4 °F (37.4 °C)  Heart Rate:  [] 96  Resp:  [16-20] 18  BP: (120-154)/() 130/82  SpO2:  [92 %-100 %] 99 %  on  Flow (L/min):  [2-8] 8;   Device (Oxygen Therapy): tracheostomy collar  Body mass index is 27.2 kg/m².    Physical Exam   Constitutional: She appears well-developed. No distress.   HENT:   Head: Normocephalic and atraumatic.   Nose: Nose normal.   White-coating on tongue.   Eyes: Conjunctivae are normal. Right eye exhibits no discharge. Left eye exhibits no discharge.   Neck: Normal range of motion. Neck supple.   Cardiovascular: Normal rate, regular rhythm and normal heart sounds.   No murmur heard.  Pulmonary/Chest: She is in respiratory distress (mild d/t suctioning). She has no wheezes. She has rhonchi in the right upper field and the left upper field.   Abdominal: Soft. Bowel sounds are normal. She exhibits no distension. There is no tenderness.   Musculoskeletal: She exhibits no edema.   Amputated toes to right foot.   Neurological: She is alert.   Unable to assess orientation but can nod yes or no.  almost equal but slight weakness noted in left. Able to moved right leg. Weakness noted in left leg.    Skin: Skin is warm and dry. She is not diaphoretic. There is pallor.   Psychiatric: She has a normal mood and affect. Her behavior is normal.   Nursing note and vitals reviewed.     Results Review:  I reviewed the patient's new clinical results.  I reviewed the patient's new imaging results and agree with the interpretation.  I reviewed the patient's other test results and agree with the interpretation    Lab Results (last 24 hours)     Procedure Component Value Units Date/Time    Comprehensive Metabolic Panel [[id number]]  (Abnormal)  Collected:  07/10/19 0635    Specimen:  Blood Updated:  07/10/19 0717     Glucose 88 mg/dL      BUN 9 mg/dL      Creatinine 0.91 mg/dL      Sodium 144 mmol/L      Potassium 3.6 mmol/L      Chloride 105 mmol/L      CO2 28.8 mmol/L      Calcium 9.8 mg/dL      Total Protein 6.9 g/dL      Albumin 3.30 g/dL      ALT (SGPT) 24 U/L      AST (SGOT) 21 U/L      Alkaline Phosphatase 121 U/L      Total Bilirubin 0.2 mg/dL      eGFR Non African Amer 63 mL/min/1.73      Globulin 3.6 gm/dL      A/G Ratio 0.9 g/dL      BUN/Creatinine Ratio 9.9     Anion Gap 10.2 mmol/L     Narrative:       GFR Normal >60  Chronic Kidney Disease <60  Kidney Failure <15    CBC & Differential [[id number]] Collected:  07/10/19 0635    Specimen:  Blood Updated:  07/10/19 0644    Narrative:       The following orders were created for panel order CBC & Differential.  Procedure                               Abnormality         Status                     ---------                               -----------         ------                     CBC Auto Differential[[id number]]        Abnormal            Final result                 Please view results for these tests on the individual orders.    CBC Auto Differential [[id number]]  (Abnormal) Collected:  07/10/19 0635    Specimen:  Blood Updated:  07/10/19 0644     WBC 6.73 10*3/mm3      RBC 4.17 10*6/mm3      Hemoglobin 11.6 g/dL      Hematocrit 37.3 %      MCV 89.4 fL      MCH 27.8 pg      MCHC 31.1 g/dL      RDW 15.1 %      RDW-SD 48.4 fl      MPV 11.9 fL      Platelets 212 10*3/mm3      Neutrophil % 62.0 %      Lymphocyte % 23.2 %      Monocyte % 10.8 %      Eosinophil % 3.3 %      Basophil % 0.4 %      Immature Grans % 0.3 %      Neutrophils, Absolute 4.17 10*3/mm3      Lymphocytes, Absolute 1.56 10*3/mm3      Monocytes, Absolute 0.73 10*3/mm3      Eosinophils, Absolute 0.22 10*3/mm3      Basophils, Absolute 0.03 10*3/mm3      Immature Grans, Absolute 0.02 10*3/mm3      nRBC 0.0 /100 WBC           Imaging  Results (last 24 hours)     Procedure Component Value Units Date/Time    XR Chest 1 View [[id number]] Collected:  07/10/19 0734     Updated:  07/10/19 0740    Narrative:       Portable chest radiograph     HISTORY:Trach dependent, pulmonary with recent pneumonia     TECHNIQUE: Single AP portable radiograph of the chest     COMPARISON:Chest radiograph 12/27/2019       Impression:       FINDINGS AND IMPRESSION:  there is no pulmonary consolidation. No pneumothorax or pleural effusion  is seen. Background interstitial thickening is present and has improved  since 12/27/2018 now presents with a chronic background interstitial  lung disease versus a milder form of acute atypical pneumonia and  correlation with patient history is recommended.. Heart size is  accentuated by low lung volumes. Medial aspect of the right clavicle is  been removed.     This report was finalized on 7/10/2019 7:36 AM by Dr. Alfred Zimmer M.D.             Results for orders placed during the hospital encounter of 12/24/18   Adult Transthoracic Echo Complete W/ Cont if Necessary Per Protocol    Narrative · Left ventricular systolic function is low normal. Calculated EF = 48.0%.   Estimated EF was in disagreement with the calculated EF. Estimated EF =   50%. Normal left ventricular cavity size and wall thickness noted.   Multiple wall segments are not well visualized and cannot rule out wall   motion abnormalities in the basal anterolateral anterior and apical   anterior walls. Left ventricular diastolic dysfunction is noted (grade I)   consistent with impaired relaxation.  · Right ventricular cavity is borderline dilated.  · The aortic valve is abnormal in structure. There is thickening of the   aortic valve. There is moderate nodular sclerosis of non-coronary cusp.   Cannot rule out possible fibroblastoma..  · Mild mitral valve regurgitation is present.  · Trace tricuspid valve regurgitation is present. Insufficient TR velocity   profile to  estimate the right ventricular systolic pressure.          No orders to display        Assessment/Plan     Active Hospital Problems    Diagnosis POA   • Tracheostomy malfunction (CMS/HCC) [J95.03] Yes   • COPD (chronic obstructive pulmonary disease) (CMS/HCC) [J44.9] Yes   • Respiratory failure, chronic (CMS/HCC) [J96.10] Yes   • PEG (percutaneous endoscopic gastrostomy) status (CMS/HCC) [Z93.1] Not Applicable   • History of osteomyelitis [Z87.39] Not Applicable   • Polysubstance abuse (CMS/HCC) [F19.10] Yes   • History of tracheal stenosis [Z87.09] Not Applicable   • Chronic diastolic CHF (congestive heart failure) (CMS/HCC) [I50.32] Yes   • Peripheral artery disease (CMS/HCC) [I73.9] Yes   • Medically noncompliant [Z91.19] Not Applicable   • WENDI and COPD overlap syndrome (CMS/HCC) [G47.33, J44.9] Yes   • Thrush, oral [B37.0] Yes   • CKD (chronic kidney disease) [N18.9] Yes     Tracheostomy malfunction/COPD/chronic respiratory failure  · New trach placed in ED with ENT, Dr. Sampson.  · Monitor for malfunction of tracheostomy  · Consult pulmonology.  · Currently on 8L via trach collar. Was on 4L at facility. Defer to pulm.  · Continue to suction as needed. Trach care.   · Will order breathing treatments as using at facility.  · Monitor for acute changes in symptoms. Currently WBC normal. Afebrile. No consolidation on chest x-ray. Recent antibiotics for pneumonia.    PEG in place  · Aspiration precautions.  · Continue tube feedings. RD to evaluate. Was on glucerna 1.5 at 25ml/hr at NH.  · G-tube site care as needed.    CHF/CKD/CVA  · Appears stable. Renal function ok. Monitor labs.  · No sign fluid overload on exam. Monitor.  · Electrolytes ok.  · Continue home medications including ASA, statin, and Eliquis.    Thrush  · Will order nystatin swish and spit.    I discussed the patients findings and my recommendations with patient and family.    VTE Prophylaxis - Eliquis.  Code Status - Full code.       Tasia Lemon,  AMINATA  [facility]  07/10/19  3:52 PM    Addendum:   I, Elena Pagan MD, personally performed the services described in this documentation as scribed by the above named individual in my presence, and it is both accurate and complete.     I personally interviewed and examined the patient.  I agree with above in addition to the following:    Very confusing admission to our service after having been in the emergency room for 6 hours.  She was going to be discharged back to the nursing home but then it was decided she needed to stay.  There was confusion regarding whether she was or was not on BiPAP at the nursing home.  I spoke with the nursing home physician and he could not recall if she was.  Her  says she was not.    She looks much older than her stated age.  She is on 8 L of oxygen with sats of 99%.  She has diffuse rhonchi and poor air entry.  No wheezing.  Heart is regular.  I do not hear a murmur.  Extremities no edema.  Abdomen soft.    Check blood gas to rule out CO2 retention.  I turned down her oxygen to 4 L.  Maintain sats between 88 and 92%.  Further management by pulmonary    I started tube feeds for tonight.  She was on Glucerna 1.5 at 25 an hour.  Nutrition to see and adjust in a.m.    Elena Pagan MD   7/10/2019  8:56 PM

## 2019-07-10 NOTE — CONSULTS
Asked to see patient regarding extruded tracheostomy tube.    HPI: 59-year-old with multiple medical problems including prior tracheostomy tube placement.  She was at a nursing home and reportedly pulled out her tracheostomy tube at some point.  It does not sound like she is on any mechanical ventilation as far as we can tell.  She is nonverbal so we cannot obtain any significant pertinent information from her.  Her trach was not placed at this facility.  There is some report of tracheal scarring of some significance.  She came in with a #4 cuffed Shiley tracheostomy tube.  I reviewed her electronic medical record for pertinent PMH, PSH, family history, social history and review of systems.    PE: Nonverbal female in no apparent cardiopulmonary distress.  She has nasal cannula oxygen in place and her vital signs are stable.  She is not in any respiratory distress.  She can make audible noises.  She does have significant granulation tissue at her tracheostomy site.  I was able to have air produced from the tracheocutaneous fistula.  After placing her in an appropriate position with her neck reclined, I was able to place a #4 uncuffed Shiley tracheostomy tube through the tracheocutaneous fistula.  She had some secretions and coughing and a minimal amount of oozing around the skin from the granulation tissue.  I verified the positioning of the tracheostomy tube with the flexible scope through the tracheostomy tube.  I did not see any significant scarring below the level of the tip of the tracheostomy tube.    A/P: 59-year-old female with multiple medical problems.  She has a #4 Shiley uncuffed tracheostomy tube in place and in proper position.  This is secured with a trach tie.  Given her past reported problems with the tracheostomy tube, she may require more advanced level of care for any problems with this as we do not have the facilities at [facility] to take care of tracheal or subglottic stenosis.

## 2019-07-10 NOTE — DISCHARGE PLACEMENT REQUEST
"Neo Spencer (59 y.o. 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)   Code Status:  CPR    Ht:  157.5 cm (62\")   Wt:  67.4 kg (148 lb 11.2 oz)    Admission Cmt:  None   Principal Problem:  None                Active Insurance as of 7/10/2019     Primary Coverage     Payor Plan Insurance Group Employer/Plan Group    [employer] [facility] [id number]     Payor Plan Address Payor Plan Phone Number Payor Plan Fax Number Effective Dates    [address] [phone]  11/1/1997 - None Entered    [facility] [id number]       Subscriber Name Subscriber Birth Date Member ID       NEO SPENCER 1960 [id number]                 Emergency Contacts      (Rel.) Home Phone Work Phone Mobile Phone    MATT SPENCER (Spouse) [phone] -- --    Shazia Spencer (Daughter) [phone] -- [phone]    Sister, \"Hattie\" (Sister) [phone] -- [phone]    Monserrat Taylor (Sister) [phone] -- [phone]              "

## 2019-07-10 NOTE — ED NOTES
ANNIA Robles at bed side. Attempted replacement of trach, but hole was partially closed. S/W Nenita in respiratory and she is going to come and assess patient's trach.      Rosa Bennett RN  07/10/19 0718

## 2019-07-10 NOTE — CONSULTS
Group: [facility]         CONSULT NOTE    Patient Identification:  Swetha Luis  59 y.o.  female  1960  [id number]            Requesting physician: Hospitalist    Reason for Consultation: Trach management    CC:     History of Present Illness:  Very pleasant 59-year-old female multiple medical problems issues including tracheostomy sleep apnea COPD polysubstance abuse.  Incidentally she was at the nursing home she pulled out her trach and presented to the emergency room.  ED was unable to replace the trach and Dr. Sampson had to replace the trach at bedside.  Currently no family at bedside and patient with poor mental status unable to give me any history.  She is currently on humidified trach collar saturating well.  No secretions as such reported.   She has had numerous admissions to the hospital with the last several having been at [facility].  From 4/30/19 to 5/15/19, she was hospitalized for acute respiratory failure and intubated at that time. An attempt was made to extubate her, but she developed stridor and respiratory distress requiring continued intubation. She had a bronchoscopy on 4/25/19 showing tracheal stenosis. An attempt was made to dilate and stent the area, but was unsuccessful. As a result, tracheostomy placement was made on 5/7/19. Also during that stay, she was being treated for discitis and osteomyelitis. She was informed to stay on IV antibiotics until 5/17/19, but refused and ending up leaving against medical advice. She has a known history of medical non-compliance.              Since trach placement, she had at least four other admissions to [place]. She was treated twice for pneumonia and COPD. On a 6/12/19 admission, she was found down by her  and found to have had a stroke at that time. She was also treated for C.DIFF at that time. On 6/28/19, she presented back to the hospital and again treated for pneumonia. She has been at a nursing facility since  then.          Review of Systems  Unable to perform ROS: Patient nonverbal (limited ROS r/t communication difficulty)   Constitutional: Negative for chills and fever.   Respiratory: Negative for cough and shortness of breath.    Cardiovascular: Negative for chest pain and leg swelling.   Gastrointestinal: Negative for abdominal pain, nausea and vomiting.   Genitourinary: Negative for difficulty urinating and dysuria.   Musculoskeletal: Positive for back pain and myalgias.   Skin: Negative for color change and pallor.   Neurological: Positive for speech difficulty (trach and recent CVA). Negative for dizziness, light-headedness and headaches.        Past Medical History:  Past Medical History:   Diagnosis Date   • Acute congestive heart failure (CMS/HCC) 12/24/2018   • Acute osteomyelitis (CMS/[facility])     Right shoulder due to IVDA   • Acute renal failure on dialysis (CMS/[facility])    • Anxiety    • CKD (chronic kidney disease)    • COPD (chronic obstructive pulmonary disease) (CMS/[facility])    • Nontraumatic subarachnoid hemorrhage (CMS/[facility])    • Tracheostomy present (CMS/[facility])        Past Surgical History:  Past Surgical History:   Procedure Laterality Date   • TRACHEOSTOMY          Home Meds:  Medications Prior to Admission   Medication Sig Dispense Refill Last Dose   • Ascorbic Acid 500 MG capsule Take  by mouth.   12/17/2018 at Unknown time   • aspirin 81 MG chewable tablet Chew 1 tablet Daily.      • budesonide (PULMICORT) 0.5 MG/2ML nebulizer solution Take 0.5 mg by nebulization Daily. Via trach 2 times a day for SOA   12/17/2018 at Unknown time   • clonazePAM (KlonoPIN) 1 MG tablet Take 1 mg by mouth 2 (Two) Times a Day As Needed for Seizures.   12/17/2018 at Unknown time   • escitalopram (LEXAPRO) 20 MG tablet Take 20 mg by mouth Daily.   12/17/2018 at Unknown time   • furosemide (LASIX) 20 MG tablet Take 1 tablet by mouth Daily. 30 tablet 0    • gabapentin (NEURONTIN) 300 MG capsule Take 1 capsule by mouth 3 (Three)  Times a Day. 90 capsule 0    • ipratropium-albuterol (DUO-NEB) 0.5-2.5 mg/3 ml nebulizer Take 3 mL by nebulization Every 4 (Four) Hours As Needed for Wheezing.   Past Week at Unknown time   • Metoprolol Succinate 25 MG capsule extended-release 24 hour sprinkle Take 25 mg by mouth Daily. 30 capsule 0    • MULTIPLE VITAMINS-MINERALS PO Take  by mouth.   12/17/2018 at Unknown time   • mupirocin (BACTROBAN) 2 % ointment Apply  topically to the appropriate area as directed 3 (Three) Times a Day. Apply to nose topically two times a day for redness   12/17/2018 at Unknown time   • pantoprazole (PROTONIX) 40 MG EC tablet Take 40 mg by mouth Daily.   12/17/2018 at Unknown time   • povidone-iodine (BETADINE) 10 % external solution Apply  topically to the appropriate area as directed As Needed for Wound Care (apply to left 2nd digit topically every day shift for unstageable.).   Past Week at Unknown time   • povidone-iodine (BETADINE) 10 % external solution Apply  topically to the appropriate area as directed As Needed for Wound Care (apply to right great toe, 2nd, 3rd topically every digit with day shift for unstageable).   Past Week at Unknown time   • pramipexole (MIRAPEX) 0.25 MG tablet Take 0.25 mg by mouth Every Night.   12/17/2018 at Unknown time   • thiamine (VITAMIN B-1) 100 MG tablet Take 100 mg by mouth Daily.   Past Week at Unknown time   • traZODone (DESYREL) 50 MG tablet Take 50 mg by mouth At Night As Needed.   12/17/2018 at Unknown time   • White Petrolatum-Mineral Oil (ARTIFICIAL TEARS) 83-15 % ointment ophthalmic ointment 1 application Every 12 (Twelve) Hours.   Past Week at Unknown time       Allergies:  Allergies   Allergen Reactions   • Cephalexin Unknown (See Comments)     unk     • Hydrocodone Unknown (See Comments)     unk   • Cruger Unknown (See Comments)     unk   • Zithromax [Azithromycin] Unknown (See Comments)     unk       Social History:   Social History     Socioeconomic History   • Marital  "status:      Spouse name: Not on file   • Number of children: Not on file   • Years of education: Not on file   • Highest education level: Not on file   Tobacco Use   • Smoking status: Former Smoker     Packs/day: 1.00     Types: Cigarettes     Last attempt to quit: 2018     Years since quittin.9   • Smokeless tobacco: Never Used   Substance and Sexual Activity   • Alcohol use: No     Frequency: Never   • Drug use: Yes     Types: Cocaine     Comment: 20 years ago occasional   • Sexual activity: Defer       Family History:  History reviewed. No pertinent family history.    Physical Exam:  /82   Pulse 96   Temp 99.4 °F (37.4 °C) (Tympanic)   Resp 18   Ht 157.5 cm (62\")   Wt 67.4 kg (148 lb 11.2 oz)   SpO2 99%   BMI 27.20 kg/m²  Body mass index is 27.2 kg/m². 99% 67.4 kg (148 lb 11.2 oz)  Physical Exam  Constitutional: She appears well-developed. No distress.   HENT: Trach site appears to be clean with Shiley in place.  Head: Normocephalic and atraumatic.   Nose: Nose normal.   White-coating on tongue.   Eyes: Conjunctivae are normal. Right eye exhibits no discharge. Left eye exhibits no discharge.   Neck: Normal range of motion. Neck supple.   Cardiovascular: Normal rate, regular rhythm and normal heart sounds.   No murmur heard.  Pulmonary/Chest: Diminished breath sounds with rhonchi bilaterally on the bases.  No labored breathing at this time.  No respiratory distress at this time.  Abdominal: Soft. Bowel sounds are normal. She exhibits no distension. There is no tenderness.   Musculoskeletal: She exhibits no edema.   Amputated toes to right foot.   Neurological: She is alert.   Unable to assess orientation but can nod yes or no.  almost equal but slight weakness noted in left. Able to moved right leg. Weakness noted in left leg.    Skin: Skin is warm and dry. She is not diaphoretic. There is pallor.   Psychiatric: She has a normal mood and affect. Her behavior is " normal.        LABS:  Lab Results   Component Value Date    CALCIUM 9.8 07/10/2019    PHOS 5.0 (H) 01/01/2019     Results from last 7 days   Lab Units 07/10/19  0635 07/08/19  0438 07/07/19  0350   SODIUM mmol/L 144  --   --    POTASSIUM mmol/L 3.6  --   --    CHLORIDE mmol/L 105  --   --    CO2 mmol/L 28.8  --   --    BUN mg/dL 9  --   --    CREATININE mg/dL 0.91  --   --    GLUCOSE mg/dL 88  --   --    CALCIUM mg/dL 9.8  --   --    WBC 10*3/mm3 6.73 5.10 5.29   HEMOGLOBIN g/dL 11.6* 10.2* 9.6*   PLATELETS 10*3/mm3 212 167 179   ALT (SGPT) U/L 24  --   --    AST (SGOT) U/L 21  --   --      Lab Results   Component Value Date    CKTOTAL 165 01/14/2019    TROPONINI <0.012 06/28/2019    TROPONINT 0.050 (H) 12/29/2018                                 Lab Results   Component Value Date    TSH 3.210 12/20/2018     Estimated Creatinine Clearance: 60 mL/min (by C-G formula based on SCr of 0.91 mg/dL).         Imaging: I personally visualized the images of scans/x-rays performed within last 3 days.      Assessment:  Tracheostomy malfunction  Chronic respiratory failure  COPD  History of CVA  Altered mental status  PEG in place  Multiple medical problems      Recommendations:  At this time she remains on 40% trach collar humidified and maintaining sats adequately  She appears to be trach dependent and should return to her primary pulmonologist  Continue aggressive pulmonary toilet  Does not appear to have COPD exacerbation  No evidence to suggest pneumonia as such  Physical therapy and tube feeds per hospitalist            Colleen Camacho MD  7/10/2019  4:38 PM      Much of this encounter note is an electronic transcription/translation of spoken language to printed text using Dragon Software.

## 2019-07-10 NOTE — ED TRIAGE NOTES
To ER via EMS from [facility].  Pt pulled trach out.  Trach placed 4/2019.  Pt has had a recent CVA.      Facility requests that we place IV access as well.  Pt pulled IV out at facility.  Pt is on IV antibiotics.

## 2019-07-10 NOTE — ED PROVIDER NOTES
The JUSTYNA and I have discussed this patient's history, physical exam, and treatment plan. I have reviewed the documentation and personally had a face to face interaction with the patient  I affirm the documentation and agree with the treatment and plan.  The following describes my personal findings.     The patient presents to the ED from [facility] with complaint of tracheostomy tube replacement. The NH staff reported the patient displaced  her tracheostomy tube during the night. She also complains of left anterior chest pain with cough. The patient denies abdominal pain, SOA or fever. The patient has a hx of  CVA.     Limited physical exam:  Patient is nontoxic appearing, awake, alert, nods yes/no appropriately and follows commands moving all ext  Neck: tracheostomy site with os closed  Lungs/cardiovascular: oxygen saturation is 95% on 2 L of oxygen, rhonchi at bilateral bases, Cardio is RRR  Neck: tracheostomy site without patent lumen  Back/extremities: moves BUE and BLE, no lower extremity edema, DP pulses are intact bilaterally      Progress Notes:   0740 Reviewed the patient's records in depth. She was admitted at [facility] from 04/30/19 to 5/15/19 for COPD exacerbation, respiratory failure, tracheal stenosis, pneumonia, and thoracic osteomyelitis. She had a repeat tracheostomy on 05/07/19 by Dr. Robbie Agudelo at [facility]. Also reviewed labs. WBC is 6.73.    0929 Received a call from Dr. Roger Sampson (ENT) and discussed pt's case. Dr. Roger Sampson stated he will come to the ER to replace the tracheostomy tube. He stated his estimated time of arrival is 5 minutes.    0931 Spoke with Charge JEMIMA Eubanks who will gather the necessary supplies for Dr. Roger Sampson (ENT) and place them bedside.    0932 Informed AMINATA Werner, that Dr. Roger Sampson (ENT) is on his way to the ER to replace the tracheostomy tube.    0947 Dr. Roger Sampson (ENT) is bedside. I was present while Dr. Sampson successfully performed the  tracheostomy tube replacement with some blood and copious secretions needing suction. Respiratory Tech is bedside and currently suctioning.  Dr Sampson did scope of trach at bedside and reports trachea and lumen of trach 4.0 shiley uncuffed he placed at bedside is clear.  He does report that if patient needs nonemergent surgical intervention, he recommends she follow with [facility], given her complicated history.    Per my discussion with Ms Travis COATES:  pt is requiring oxygen to maintain her saturations, NH staff at the patients current facility, nor her attending physician there can state whether the patient needs vent maintenance at night, given she previously had a cuffed shiley in place.   Dr Silva Crowe, who has followed the patient in the past, cannot give this hisotry either, does not wish to accept her in transfer and reports she does not wish to follow with the patient.  She discussed with hospitalist Dr Pagan and Dr Paula regarding admission for observation for need for ventilator support as her uncuffed trach would need replacement with cuffed tube if that were the case.  And ultimately Dr Pagan accepted the patients care    I agree with the plan to admit the patient for further management.    Documentation assistance provided by emilia Box.  Information recorded by the scribe was done at my direction and has been verified and validated by me.       Irina Box  07/10/19 7853       Tiffanie Trujillo MD  07/12/19 2479

## 2019-07-10 NOTE — PLAN OF CARE
Problem: Skin Injury Risk (Adult)  Goal: Identify Related Risk Factors and Signs and Symptoms  Outcome: Outcome(s) achieved Date Met: 07/10/19    Goal: Skin Health and Integrity  Outcome: Ongoing (interventions implemented as appropriate)      Problem: Fall Risk (Adult)  Goal: Identify Related Risk Factors and Signs and Symptoms  Outcome: Outcome(s) achieved Date Met: 07/10/19    Goal: Absence of Fall  Outcome: Ongoing (interventions implemented as appropriate)      Problem: Patient Care Overview  Goal: Plan of Care Review  Outcome: Ongoing (interventions implemented as appropriate)   07/10/19 1547   Coping/Psychosocial   Plan of Care Reviewed With patient   Plan of Care Review   Progress no change   OTHER   Outcome Summary Patient admitted from ER after trach pulled out, replaced by Dr. Sampson. Oral and trach suction performed, 8L O2. Excoriation and non-blanchable redness to julieta-area and coccyx. NPO. VSS. Saline locked. Contact precautions initiated. Pulmonology consulted.     Goal: Individualization and Mutuality  Outcome: Ongoing (interventions implemented as appropriate)    Goal: Discharge Needs Assessment  Outcome: Ongoing (interventions implemented as appropriate)    Goal: Interprofessional Rounds/Family Conf  Outcome: Ongoing (interventions implemented as appropriate)

## 2019-07-10 NOTE — PROGRESS NOTES
[facility]    Physicians Statement of Medical Necessity for Ambulance Transportation    It is medically necessary for: Non emergent transport to SNF.    Patient Name: Swetha Luis    Insurance Information:  [employer] PLAN # [id number]    From: [facility]    To: [facility]    Date of Service: _____________________    For dialysis patients state date dialysis began: N/A    Diagnosis: Immobilization syndrome  Requires oxygen    Past Medical/Surgical History:  Past Medical History:   Diagnosis Date   • Acute congestive heart failure (CMS/HCC) 12/24/2018   • Acute osteomyelitis (CMS/HCC)     Right shoulder due to IVDA   • Acute renal failure on dialysis (CMS/HCC)    • Anxiety    • CKD (chronic kidney disease)    • COPD (chronic obstructive pulmonary disease) (CMS/HCC)    • Nontraumatic subarachnoid hemorrhage (CMS/HCC)    • Tracheostomy present (CMS/HCC)       Past Surgical History:   Procedure Laterality Date   • TRACHEOSTOMY          Current Objective Medical Evidence(including physical exam finding to support reason for limitations):    Other: Weight: 147 lbs, 61 inches, oxygen via trach collar, contact isolation due to hx of MRSA    RN, Discharge Planner Date/Time: 7/10/2019 @ 1610     Printed Name: Lina Caruso RN    [facility]   Phone: [phone] Phone: [phone]   Fax: [phone] Fax: [phone]

## 2019-07-10 NOTE — ED NOTES
Assessed site of trach @ bedside.  Appears that airway through trach has become occluded through inflammation..  Patient in no distress, able to vocalize.  Placed on 2lpm nasal cannula.  Will speak w/ ER physician about consulting ENT to see.

## 2019-07-10 NOTE — ED NOTES
Patient brief changed after bowel movement. Skin to bilateral labia and coccyx region. Barrier cream applied. Non-blanchable redness to the decubitus area, mepilex placed, pt was turned to her left side with pillow support to removed pressure. Will continue to monitor.      Rosa Bennett, RN  07/10/19 1205       Rosa Bennett RN  07/10/19 1203

## 2019-07-10 NOTE — ED NOTES
Patient placed on Contact precautions. Isolation cart outside of patient's room.      Rosa Bennett RN  07/10/19 0853

## 2019-07-10 NOTE — PROGRESS NOTES
Discharge Planning Assessment  [facility]     Patient Name: Swetha Luis  MRN: [id number]  Today's Date: 7/10/2019    Admit Date: 7/10/2019    Discharge Needs Assessment     Row Name 07/10/19 1601       Living Environment    Lives With  spouse    Name(s) of Who Lives With Patient  -- : Cristopher    Current Living Arrangements  other (see comments) [facility] / [facility]    Primary Care Provided by  other (see comments) currently staff at [facility]     Provides Primary Care For  no one, unable/limited ability to care for self    Family Caregiver if Needed  spouse    Family Caregiver Names  -- : Cristopher    Quality of Family Relationships  supportive    Able to Return to Prior Arrangements  other (see comments) waiting on return call from Angela at [facility] / [facility]       Resource/Environmental Concerns    Resource/Environmental Concerns  none       Transition Planning    Patient/Family Anticipates Transition to  inpatient rehabilitation facility [facility] / [facility]    Transportation Anticipated  other (see comments) ambulance       Discharge Needs Assessment    Readmission Within the Last 30 Days  previous discharge plan unsuccessful Pt has been at [facility], discharged 2 days ago per pt's     Concerns to be Addressed  adjustment to diagnosis/illness;home safety;basic needs;discharge planning    Equipment Currently Used at Home  oxygen    Anticipated Changes Related to Illness  inability to care for self    Equipment Needed After Discharge  none    Outpatient/Agency/Support Group Needs  skilled nursing facility    Row Name 07/10/19 1600       Living Environment    Lives With  alone        Discharge Plan     Row Name 07/10/19 2272       Plan    Plan Comments  I met briefly with pt, due to trache she did not speak.  I spoke with her  Cristopher Luis [phone], he confirmed his wife came from [facility] where she has been for 2 days, he said prior to [facility] she was at [facility] for 2 weeks.  He said a  "month ago she was up cooking and cleaning \"as well as anybody\", he said the only DME she uses at home is oxygen portable and concentrator from [place], he said his plan at discharge \"and they told me that is it planned for tomorrow\" is for his wife to return to [facility] for rehab then when she is stronger for her to return home, he could not recall the home health agency she has had previously.  I triend to notify Renee with [facility] of admit (voice mail full), voice mail left for Angela with [facility], I advised of observation status and request call back to confirm there will be a bed available for pt.  Due to anticipated discharge tomorrow, I spoke with Renee with [facility] and scheduled a bariatric transport for tomorrow at 3:30PM.        Lina Caruso RN    Row Name 07/10/19 7696       Plan    Plan  Return to [facility] for short term rehab (pending acceptance) then home with unknown home health        Destination      Service Provider Request Status Selected Services Address Phone Number Fax Number    [facility] Pending - Request Sent N/A [address] [address] [phone]     Demographic Summary     Row Name 07/10/19 6235       General Information    Admission Type  observation    Arrived From  other (see comments) [facility] / [facility]    Reason for Consult  discharge planning    Preferred Language  English due to trache pt did not speak, I spoke with pt's      Used During This Interaction  no       Contact Information    Permission Granted to Share Info With  family/designee;facility         Functional Status     Row Name 07/10/19 4515       Functional Status, IADL    Medications  completely dependent    Meal Preparation  completely dependent    IADL Comments  -- Pt's  said one month ago pt was IADL, currently she comes to [facility] from a rehab facility where she has been for 2 days     Patient Forms     Row Name 07/10/19 8759    "    Patient Forms    Provider Choice List  Delivered left in pt's room at bedside    Delivered to  Patient    Method of delivery  In person            iLna Caruso RN

## 2019-07-10 NOTE — ED PROVIDER NOTES
"EMERGENCY DEPARTMENT ENCOUNTER    Room Number:  P678/1  Date seen:  7/10/2019  Time seen: 6:04 AM  PCP: Lencho Connelly MD    HPI:  Chief complaint: Tracheostomy tube displacement  Context:Swetha Luis is a 59 y.o. female who presents to the ED with c/o a tracheostomy tube displacement that occurred this morning at 0500. Pt was brought to the ED via EMS from [facility] because she reportedly \"pulled out her tracheostomy tube\" and needs it replaced. Pt also receives peripherall IV anabiotics, which she pulled out at [facility] as well. Hx of CVA and COPD. Pt hx limited due to pt being nonverbal.     Onset: sudden  Location:neck  Radiation: n/a  Duration: this morning at 0500  Timing: constant  Character:displacement  Aggravating Factors: none stated  Alleviating Factors: none stated  Severity: moderate    MEDICAL RECORD REVIEW   Pt had a tracheostomy on 8/1/2018 by Dr. Kincaid.     ALLERGIES  Cephalexin; Hydrocodone; Strawberry; and Zithromax [azithromycin]    PAST MEDICAL HISTORY  Active Ambulatory Problems     Diagnosis Date Noted   • Tracheostomy complication (CMS/HCC) 12/18/2018   • Gangrene of toe (CMS/HCC) 12/20/2018   • Acute diastolic congestive heart failure (CMS/HCC) 12/24/2018   • ARF (acute renal failure) (CMS/HCC) 12/24/2018   • CKD (chronic kidney disease) 12/24/2018   • Elevated troponin 12/24/2018   • Acute respiratory failure with hypoxemia (CMS/HCC) 12/24/2018   • Acute osteomyelitis (CMS/HCC) 12/26/2018   • UTI due to extended-spectrum beta lactamase (ESBL) producing Escherichia coli 12/27/2018   • Oral candidiasis 12/28/2018     Resolved Ambulatory Problems     Diagnosis Date Noted   • No Resolved Ambulatory Problems     Past Medical History:   Diagnosis Date   • Acute congestive heart failure (CMS/HCC) 12/24/2018   • Acute osteomyelitis (CMS/HCC)    • Acute renal failure on dialysis (CMS/[facility])    • Anxiety    • CKD (chronic kidney disease)    • COPD (chronic obstructive pulmonary disease) " (CMS/HCC)    • Nontraumatic subarachnoid hemorrhage (CMS/HCC)    • Tracheostomy present (CMS/HCC)        PAST SURGICAL HISTORY  Past Surgical History:   Procedure Laterality Date   • TRACHEOSTOMY         FAMILY HISTORY  History reviewed. No pertinent family history.    SOCIAL HISTORY  Social History     Socioeconomic History   • Marital status:      Spouse name: Not on file   • Number of children: Not on file   • Years of education: Not on file   • Highest education level: Not on file   Tobacco Use   • Smoking status: Former Smoker     Packs/day: 1.00     Types: Cigarettes     Last attempt to quit: 2018     Years since quittin.9   • Smokeless tobacco: Never Used   Substance and Sexual Activity   • Alcohol use: No     Frequency: Never   • Drug use: Yes     Types: Cocaine     Comment: 20 years ago occasional   • Sexual activity: Defer       REVIEW OF SYSTEMS  Review of Systems   Unable to perform ROS: Patient nonverbal   Musculoskeletal:        (+) Displaced tracheostomy tube       PHYSICAL EXAM  ED Triage Vitals [07/10/19 0557]   Temp Heart Rate Resp BP SpO2   99.4 °F (37.4 °C) 99 20 120/85 99 %      Temp src Heart Rate Source Patient Position BP Location FiO2 (%)   Tympanic Monitor -- -- --     Physical Exam   Constitutional: She is oriented to person, place, and time and well-developed, well-nourished, and in no distress. No distress.   HENT:   Head: Normocephalic and atraumatic.   Nose: Nose normal.   Mouth/Throat: Uvula is midline, oropharynx is clear and moist and mucous membranes are normal.   Eyes: EOM are normal. Pupils are equal, round, and reactive to light.   Neck: Normal range of motion. Neck supple.   Stoma present over the trachea with dried blood   Cardiovascular: Normal rate, regular rhythm and normal heart sounds.   Pulmonary/Chest: Effort normal and breath sounds normal. No respiratory distress.   Abdominal: Soft. Normal appearance and bowel sounds are normal. There is no tenderness.  There is no rebound and no guarding.   Musculoskeletal: Normal range of motion. She exhibits no edema.        Right lower leg: She exhibits no edema.        Left lower leg: She exhibits no edema.   Moves all extremities    Neurological: She is alert and oriented to person, place, and time. She has normal sensation. She displays weakness (L sided).   Pt is nonverbal     Skin: Skin is warm, dry and intact. No rash noted.   Psychiatric: Mood and affect normal.   Nursing note and vitals reviewed.      LAB RESULTS  Recent Results (from the past 24 hour(s))   Comprehensive Metabolic Panel    Collection Time: 07/10/19  6:35 AM   Result Value Ref Range    Glucose 88 65 - 99 mg/dL    BUN 9 6 - 20 mg/dL    Creatinine 0.91 0.57 - 1.00 mg/dL    Sodium 144 136 - 145 mmol/L    Potassium 3.6 3.5 - 5.2 mmol/L    Chloride 105 98 - 107 mmol/L    CO2 28.8 22.0 - 29.0 mmol/L    Calcium 9.8 8.6 - 10.5 mg/dL    Total Protein 6.9 6.0 - 8.5 g/dL    Albumin 3.30 (L) 3.50 - 5.20 g/dL    ALT (SGPT) 24 1 - 33 U/L    AST (SGOT) 21 1 - 32 U/L    Alkaline Phosphatase 121 (H) 39 - 117 U/L    Total Bilirubin 0.2 0.2 - 1.2 mg/dL    eGFR Non African Amer 63 >60 mL/min/1.73    Globulin 3.6 gm/dL    A/G Ratio 0.9 g/dL    BUN/Creatinine Ratio 9.9 7.0 - 25.0    Anion Gap 10.2 5.0 - 15.0 mmol/L   CBC Auto Differential    Collection Time: 07/10/19  6:35 AM   Result Value Ref Range    WBC 6.73 3.40 - 10.80 10*3/mm3    RBC 4.17 3.77 - 5.28 10*6/mm3    Hemoglobin 11.6 (L) 12.0 - 15.9 g/dL    Hematocrit 37.3 34.0 - 46.6 %    MCV 89.4 79.0 - 97.0 fL    MCH 27.8 26.6 - 33.0 pg    MCHC 31.1 (L) 31.5 - 35.7 g/dL    RDW 15.1 12.3 - 15.4 %    RDW-SD 48.4 37.0 - 54.0 fl    MPV 11.9 6.0 - 12.0 fL    Platelets 212 140 - 450 10*3/mm3    Neutrophil % 62.0 42.7 - 76.0 %    Lymphocyte % 23.2 19.6 - 45.3 %    Monocyte % 10.8 5.0 - 12.0 %    Eosinophil % 3.3 0.3 - 6.2 %    Basophil % 0.4 0.0 - 1.5 %    Immature Grans % 0.3 0.0 - 0.5 %    Neutrophils, Absolute 4.17 1.70 -  7.00 10*3/mm3    Lymphocytes, Absolute 1.56 0.70 - 3.10 10*3/mm3    Monocytes, Absolute 0.73 0.10 - 0.90 10*3/mm3    Eosinophils, Absolute 0.22 0.00 - 0.40 10*3/mm3    Basophils, Absolute 0.03 0.00 - 0.20 10*3/mm3    Immature Grans, Absolute 0.02 0.00 - 0.05 10*3/mm3    nRBC 0.0 0.0 - 0.2 /100 WBC   Green Top (Gel)    Collection Time: 07/10/19  6:35 AM   Result Value Ref Range    Extra Tube Hold for add-ons.    Lavender Top    Collection Time: 07/10/19  6:35 AM   Result Value Ref Range    Extra Tube hold for add-on        I ordered the above labs and reviewed the results    RADIOLOGY  XR Chest 1 View   Final Result   FINDINGS AND IMPRESSION:   there is no pulmonary consolidation. No pneumothorax or pleural effusion   is seen. Background interstitial thickening is present and has improved   since 12/27/2018 now presents with a chronic background interstitial   lung disease versus a milder form of acute atypical pneumonia and   correlation with patient history is recommended.. Heart size is   accentuated by low lung volumes. Medial aspect of the right clavicle is   been removed.       This report was finalized on 7/10/2019 7:36 AM by Dr. Alfred Zimmer M.D.              I ordered the above noted radiological studies and reviewed the images on the PACS system.      MEDICATIONS GIVEN IN ER  Medications   lidocaine (XYLOCAINE) 2 % jelly ( Topical Not Given 7/10/19 1251)   morphine injection 1 mg (1 mg Intravenous Given 7/10/19 1226)   sodium chloride 0.9 % flush 3 mL (not administered)   sodium chloride 0.9 % flush 3-10 mL (not administered)   nystatin (MYCOSTATIN) 995760 UNIT/ML suspension 500,000 Units (not administered)         PROCEDURES  Critical Care  Performed by: Darcy Robles APRN  Authorized by: Tiffanie Trujillo MD     Critical care provider statement:     Critical care time (minutes):  45    Critical care time was exclusive of:  Separately billable procedures and treating other patients    Critical  care was necessary to treat or prevent imminent or life-threatening deterioration of the following conditions:  Respiratory failure (Airway compromise threat)    Critical care was time spent personally by me on the following activities:  Development of treatment plan with patient or surrogate, discussions with consultants, discussions with primary provider, evaluation of patient's response to treatment, examination of patient, ordering and performing treatments and interventions, ordering and review of laboratory studies, ordering and review of radiographic studies, pulse oximetry, re-evaluation of patient's condition and review of old charts            PROGRESS AND CONSULTS    Progress Notes:       0629 Discussed pt w/Surendra Argueta RN who states pt pulled her Tracheostomy tube out this morning at 0500. RN is unsure if pt is at baseline as she has only been at the facility one day. Pt was sent to them for tracheostomy stenosis.      0634 CXR ordered. Labs ordered.    0717 Rechecked pt. Pt is resting comfortably and in NAD with BP of 154/107 and HR of 90. Attempted placement of 4-0 tracheostomy tube with 10 cc of air however the stoma is occluded.     0732 Reviewed pt's history and workup with Dr. Trujillo.  After a bedside evaluation; Dr Trujillo agrees with the plan of care     0745 Call made to IP General.     0844 Call made to ENT. Call made to Pulmonology    0857 Discussed pt w/Dr. Garcia (Pulmonology) who would like pt to be consulted by Dr. Agudelo.    0902 Discussed pt w/Dr. Soto (Pulmonology) who states she is not currently involved in pt's care, she saw pt once for Bronchostomy and would not recommend transfer without trach in place. Due to Dr. Agudelo placing the tracheostomy and     0908 Dicsussed pt w/Dr. Sampson (Pulmonology) who states he will not consult pt, as he did not place the newest tracheostomy tube.     0910 Rechecked pt. Pt is resting comfortably and in NAD. Discussed w/pt plan is to admit for  "replacement of tracheostomy tube.     0917 Discussed pt w/ Dr. Lopez who agrees to consult pt.     0933 Xylocaine ordered.     1047 Rechecked pt. Pt is resting comfortably and in NAD. Pt is at baseline. Respiratory is at bedside and placed pt on 8 L oxygen.    1048 Call made to  General ordered.     1135 Discussed pt w/Dr. Connelly who agrees with the plan of care to admit pt for observation.     1136 Call made to [facility].     1205 Discussed pt w/Dr. Pagan ([facility]) who would like to consult with Dr. Lopez for further plan of care.    1304 Discussed pt w/Dr. Pagan ([facility]) who agrees to admit pt to Med Surg.     Disposition vitals:  /82   Pulse 96   Temp 99.4 °F (37.4 °C) (Tympanic)   Resp 18   Ht 157.5 cm (62\")   Wt 67.4 kg (148 lb 11.2 oz)   SpO2 99%   BMI 27.20 kg/m²       DIAGNOSIS  Final diagnoses:   Tracheostomy malfunction (CMS/HCC)   Hypoxia   Hx of recurrent pneumonia     ADMISSION    Discussed treatment plan and reason for admission with pt/family and admitting physician.  Pt/family voiced understanding of the plan for admission for further testing/treatment as needed.         Documentation assistance provided by emilia Atkins for AMINATA Canchola.  Information recorded by the emilia was done at my direction and has been verified and validated by me.           Wilbert Box  07/10/19 1317       Darcy Robles APRN  07/10/19 1545    "

## 2019-07-11 VITALS
HEIGHT: 62 IN | OXYGEN SATURATION: 97 % | RESPIRATION RATE: 18 BRPM | DIASTOLIC BLOOD PRESSURE: 74 MMHG | SYSTOLIC BLOOD PRESSURE: 126 MMHG | TEMPERATURE: 98.6 F | HEART RATE: 98 BPM | BODY MASS INDEX: 27.36 KG/M2 | WEIGHT: 148.7 LBS

## 2019-07-11 LAB
ALBUMIN SERPL-MCNC: 3.3 G/DL (ref 3.5–5.2)
ALBUMIN/GLOB SERPL: 1 G/DL
ALP SERPL-CCNC: 117 U/L (ref 39–117)
ALT SERPL W P-5'-P-CCNC: 24 U/L (ref 1–33)
ANION GAP SERPL CALCULATED.3IONS-SCNC: 11.8 MMOL/L (ref 5–15)
AST SERPL-CCNC: 22 U/L (ref 1–32)
BASOPHILS # BLD AUTO: 0.03 10*3/MM3 (ref 0–0.2)
BASOPHILS NFR BLD AUTO: 0.5 % (ref 0–1.5)
BILIRUB SERPL-MCNC: 0.2 MG/DL (ref 0.2–1.2)
BUN BLD-MCNC: 11 MG/DL (ref 6–20)
BUN/CREAT SERPL: 14.1 (ref 7–25)
CALCIUM SPEC-SCNC: 9.9 MG/DL (ref 8.6–10.5)
CHLORIDE SERPL-SCNC: 106 MMOL/L (ref 98–107)
CO2 SERPL-SCNC: 27.2 MMOL/L (ref 22–29)
CREAT BLD-MCNC: 0.78 MG/DL (ref 0.57–1)
DEPRECATED RDW RBC AUTO: 50.4 FL (ref 37–54)
EOSINOPHIL # BLD AUTO: 0.26 10*3/MM3 (ref 0–0.4)
EOSINOPHIL NFR BLD AUTO: 4.1 % (ref 0.3–6.2)
ERYTHROCYTE [DISTWIDTH] IN BLOOD BY AUTOMATED COUNT: 15.4 % (ref 12.3–15.4)
GFR SERPL CREATININE-BSD FRML MDRD: 76 ML/MIN/1.73
GLOBULIN UR ELPH-MCNC: 3.3 GM/DL
GLUCOSE BLD-MCNC: 79 MG/DL (ref 65–99)
HCT VFR BLD AUTO: 37.8 % (ref 34–46.6)
HGB BLD-MCNC: 11.3 G/DL (ref 12–15.9)
IMM GRANULOCYTES # BLD AUTO: 0.03 10*3/MM3 (ref 0–0.05)
IMM GRANULOCYTES NFR BLD AUTO: 0.5 % (ref 0–0.5)
LYMPHOCYTES # BLD AUTO: 1.95 10*3/MM3 (ref 0.7–3.1)
LYMPHOCYTES NFR BLD AUTO: 30.6 % (ref 19.6–45.3)
MCH RBC QN AUTO: 27 PG (ref 26.6–33)
MCHC RBC AUTO-ENTMCNC: 29.9 G/DL (ref 31.5–35.7)
MCV RBC AUTO: 90.4 FL (ref 79–97)
MONOCYTES # BLD AUTO: 0.66 10*3/MM3 (ref 0.1–0.9)
MONOCYTES NFR BLD AUTO: 10.4 % (ref 5–12)
NEUTROPHILS # BLD AUTO: 3.44 10*3/MM3 (ref 1.7–7)
NEUTROPHILS NFR BLD AUTO: 53.9 % (ref 42.7–76)
NRBC BLD AUTO-RTO: 0 /100 WBC (ref 0–0.2)
PLATELET # BLD AUTO: 224 10*3/MM3 (ref 140–450)
PMV BLD AUTO: 12 FL (ref 6–12)
POTASSIUM BLD-SCNC: 3.6 MMOL/L (ref 3.5–5.2)
PROT SERPL-MCNC: 6.6 G/DL (ref 6–8.5)
RBC # BLD AUTO: 4.18 10*6/MM3 (ref 3.77–5.28)
SODIUM BLD-SCNC: 145 MMOL/L (ref 136–145)
WBC NRBC COR # BLD: 6.37 10*3/MM3 (ref 3.4–10.8)

## 2019-07-11 PROCEDURE — G0378 HOSPITAL OBSERVATION PER HR: HCPCS

## 2019-07-11 PROCEDURE — 94799 UNLISTED PULMONARY SVC/PX: CPT

## 2019-07-11 PROCEDURE — 85025 COMPLETE CBC W/AUTO DIFF WBC: CPT | Performed by: NURSE PRACTITIONER

## 2019-07-11 PROCEDURE — 25010000002 MORPHINE PER 10 MG: Performed by: INTERNAL MEDICINE

## 2019-07-11 PROCEDURE — 96376 TX/PRO/DX INJ SAME DRUG ADON: CPT

## 2019-07-11 PROCEDURE — 80053 COMPREHEN METABOLIC PANEL: CPT | Performed by: NURSE PRACTITIONER

## 2019-07-11 RX ORDER — GABAPENTIN 300 MG/1
300 CAPSULE ORAL 3 TIMES DAILY
Qty: 9 CAPSULE | Refills: 0 | Status: ON HOLD | OUTPATIENT
Start: 2019-07-11 | End: 2019-09-25 | Stop reason: SDUPTHER

## 2019-07-11 RX ORDER — NYSTATIN 100000 [USP'U]/G
POWDER TOPICAL 3 TIMES DAILY
Status: ON HOLD
Start: 2019-07-11 | End: 2019-09-25 | Stop reason: SDUPTHER

## 2019-07-11 RX ADMIN — SODIUM CHLORIDE, PRESERVATIVE FREE 3 ML: 5 INJECTION INTRAVENOUS at 09:58

## 2019-07-11 RX ADMIN — ASPIRIN 81 MG: 81 TABLET, CHEWABLE ORAL at 09:48

## 2019-07-11 RX ADMIN — Medication 100 MG: at 09:47

## 2019-07-11 RX ADMIN — METOPROLOL SUCCINATE 25 MG: 25 TABLET, FILM COATED, EXTENDED RELEASE ORAL at 09:47

## 2019-07-11 RX ADMIN — GABAPENTIN 300 MG: 300 CAPSULE ORAL at 22:00

## 2019-07-11 RX ADMIN — MORPHINE SULFATE 0.5 MG: 2 INJECTION, SOLUTION INTRAMUSCULAR; INTRAVENOUS at 06:20

## 2019-07-11 RX ADMIN — PANTOPRAZOLE SODIUM 40 MG: 40 TABLET, DELAYED RELEASE ORAL at 09:48

## 2019-07-11 RX ADMIN — GABAPENTIN 300 MG: 300 CAPSULE ORAL at 14:09

## 2019-07-11 RX ADMIN — NYSTATIN 500000 UNITS: 100000 SUSPENSION ORAL at 12:30

## 2019-07-11 RX ADMIN — BUDESONIDE 0.5 MG: 0.5 INHALANT RESPIRATORY (INHALATION) at 08:04

## 2019-07-11 RX ADMIN — NYSTATIN 500000 UNITS: 100000 SUSPENSION ORAL at 22:43

## 2019-07-11 RX ADMIN — ESCITALOPRAM OXALATE 20 MG: 20 TABLET, FILM COATED ORAL at 09:47

## 2019-07-11 RX ADMIN — NYSTATIN 500000 UNITS: 100000 SUSPENSION ORAL at 17:35

## 2019-07-11 RX ADMIN — PRAMIPEXOLE DIHYDROCHLORIDE 0.25 MG: 0.25 TABLET ORAL at 22:00

## 2019-07-11 RX ADMIN — NYSTATIN 500000 UNITS: 100000 SUSPENSION ORAL at 09:48

## 2019-07-11 RX ADMIN — MORPHINE SULFATE 0.5 MG: 2 INJECTION, SOLUTION INTRAMUSCULAR; INTRAVENOUS at 09:58

## 2019-07-11 RX ADMIN — GABAPENTIN 300 MG: 300 CAPSULE ORAL at 06:20

## 2019-07-11 NOTE — PLAN OF CARE
Problem: Skin Injury Risk (Adult)  Goal: Skin Health and Integrity  Outcome: Ongoing (interventions implemented as appropriate)      Problem: Fall Risk (Adult)  Goal: Absence of Fall  Outcome: Ongoing (interventions implemented as appropriate)      Problem: Patient Care Overview  Goal: Plan of Care Review  Outcome: Ongoing (interventions implemented as appropriate)   07/11/19 0637   Plan of Care Review   Progress improving   OTHER   Outcome Summary #4 shiley intact, suctioned via trach and po prn, med for pain, trach care done,  at bs, probable d/c today     Goal: Discharge Needs Assessment  Outcome: Ongoing (interventions implemented as appropriate)    Goal: Interprofessional Rounds/Family Conf  Outcome: Ongoing (interventions implemented as appropriate)

## 2019-07-11 NOTE — PROGRESS NOTES
Discharge Planning Assessment  [facility]     Patient Name: Swetha Luis  MRN: [id number]  Today's Date: 7/11/2019    Admit Date: 7/10/2019    Discharge Needs Assessment    No documentation.       Discharge Plan     Row Name 07/11/19 0846       Plan    Plan  Return to [facility] for short term rehab (pending acceptance) then home with unknown home health    Plan Comments  Discharge is planned for today. Per pt's nurse someone from [facility] called last evening and said they could not transport at 1530 today and run was placed on will call.  I called [facility] this morning and spoke with Wanda, she said the person who booked the run at 1530 should not have booked in that slot due to runs between 1500 to 1700 are blocked for HD runs.  Wanda rescheduled the transport for today at 1830, I advised of pt's weight and that she will not need bariatric transport.  Pt's nurse requested sooner run, per Daniela at [facility] they could transport this morning but due to pt's incurance she would be a private pay or facility pay.  No return call received yesterday from Rosina with [facility], I tried to call Renee ([phone]) again this morning and her voice mail remains full and I was unable to leave her a message, I left a voice mail for Angela [phone]) and advised of planned discharge today and transport arranged for 1830, I request a return call.     Lina Caruso RN        Destination      Service Provider Request Status Selected Services Address Phone Number Fax Number    [facility] Pending - Request Sent N/A [id number] [facility] [address] [phone]        Expected Discharge Date and Time     Expected Discharge Date Expected Discharge Time    Jul 11, 2019      Lina Caruso, RN

## 2019-07-11 NOTE — PROGRESS NOTES
Discharge Planning Assessment  [facility]     Patient Name: Swetha Luis  MRN: [id number]  Today's Date: 7/11/2019    Admit Date: 7/10/2019    Discharge Needs Assessment    No documentation.       Discharge Plan     Row Name 07/11/19 0915       Plan    Plan Comments  Pt with discharge order, discharge summary faxed to [facility] with notification of transport time of 1830, on the fax I request a return call to confirm pt will be admitted back.  I also called [facility] [phone] and left a voice mail on the social service line x-125 advising of discharge and request a return call.  I tried twice to notify pt's  corrina Luis ([phone]) however the voice mail was answered and the recording was in Chinese, I did not leave a message.  I called pt's dtr Suzie Luis [phone] and left a HIPPA compliant voice mail that pt has been discharged and advised of transport time back to [facility], I request she relay this message to pt's  and ask that he return my call.    Lina Caruso RN    Final Discharge Disposition Code  03 - skilled nursing facility (SNF)    Row Name 07/11/19 0846       Plan    Plan  Return to [facility] for short term rehab (pending acceptance) then home with unknown home health    Plan Comments  Discharge is planned for today. Per pt's nurse someone from [facility] called last evening and said they could not transport at 1530 today and run was placed on will call.  I called [facility] this morning and spoke with Wanda, she said the person who booked the run at 1530 should not have booked in that slot due to runs between 1500 to 1700 are blocked for HD runs.  Wanda rescheduled the transport for today at 1830, I advised of pt's weight and that she will not need bariatric transport.  Pt's nurse requested sooner run, per Daniela at [facility] they could transport this morning but due to pt's incurance she would be a private pay or facility pay.  No return call received  yesterday from Renee and Angela with [facility], I tried to call Renee ([phone]) again this morning and her voice mail remains full and I was unable to leave her a message, I left a voice mail for Angela ([phone]) and advised of planned discharge today and transport arranged for 1830, I request a return call.     Lina Caruso RN        Destination      Service Provider Request Status Selected Services Address Phone Number Fax Number    [facility] - [facility] N/A [id number] [facility] [address] [phone]     Expected Discharge Date and Time     Expected Discharge Date Expected Discharge Time    Jul 11, 2019      Lina Caruso, RN

## 2019-07-11 NOTE — PROGRESS NOTES
"Discharge Planning Assessment  [facility]     Patient Name: Swetha Luis  MRN: [id number]  Today's Date: 7/11/2019    Admit Date: 7/10/2019    Discharge Needs Assessment    No documentation.       Discharge Plan     Row Name 07/11/19 1019       Plan    Plan Comments  Per Niles with [facility] [phone], pt can return \"no problem\", I advised of 6:30PM transport time. Pt's  is in pt's room, pt's nurse said he is asleep. Discharge summary faxed to [facility].      Lina Caruso RN    Row Name 07/11/19 6207       Plan    Plan Comments  Pt with discharge order, discharge summary faxed to [facility] with notification of transport time of 1830, on the fax I request a return call to confirm pt will be admitted back.  I also called [facility] [phone] and left a voice mail on the social service line x-125 advising of discharge and request a return call.  I tried twice to notify pt's  corrina Luis ([phone]) however the voice mail was answered and the recording was in Monegasque, I did not leave a message.  I called pt's dtr Suzie Luis [phone] and left a HIPPA compliant voice mail that pt has been discharged and advised of transport time back to [facility], I request she relay this message to pt's  and ask that he return my call.    Lina Caruso RN    Final Discharge Disposition Code  03 - skilled nursing facility (SNF)    Row Name 07/11/19 0846       Plan    Plan  Return to [facility] for short term rehab (pending acceptance) then home with unknown home health    Plan Comments  Discharge is planned for today. Per pt's nurse someone from [facility] called last evening and said they could not transport at 1530 today and run was placed on will call.  I called [facility] this morning and spoke with Wanda, she said the person who booked the run at 1530 should not have booked in that slot due to runs between 1500 to 1700 are blocked for HD runs.  Wanda " rescheduled the transport for today at 1830, I advised of pt's weight and that she will not need bariatric transport.  Pt's nurse requested sooner run, per Daniela at [facility] they could transport this morning but due to pt's incurance she would be a private pay or facility pay.  No return call received yesterday from Renee and Angela with [facility], I tried to call Renee ([phone]) again this morning and her voice mail remains full and I was unable to leave her a message, I left a voice mail for Angela [phone]) and advised of planned discharge today and transport arranged for 1830, I request a return call.     Lina Caruso RN        Destination - Selection Complete      Service Provider Request Status Selected Services Address Phone Number Fax Number    [facility] Skilled Nursing [id number] [facility] [address] [phone]        Expected Discharge Date and Time     Expected Discharge Date Expected Discharge Time    Jul 11, 2019      Lina Caruso, RN

## 2019-07-11 NOTE — DISCHARGE SUMMARY
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       Presenting Problem/History of Present Illness:  Hypoxia [R09.02]  Tracheostomy malfunction (CMS/HCC) [J95.03]  Hx of recurrent pneumonia [Z87.01]     Hospital Course:  The patient is a 59 y.o. woman admitted to our service from the emergency room last night after her trach was replaced.  She came from the nursing home; her physician there requested observation overnight.  There was some question of whether she was supposed to be on BiPAP at the nursing home.  She had not been on it and did not require it last night.  There have been no issues overnight.  She was seen by pulmonary.  Continue pulmonary hygiene.  She is ready for transfer back to the nursing home.  Follow-up as already arranged with her previous MDs.  She was just discharged from [facility] 7/5/2019.  She was treated there for pneumonia.     Continue her tube feeds with further adjustments by nutritionist at nursing home facility.    Stable condition; Good  prognosis    Exam Today: Awake and alert.  Nonverbal (baseline per old record…).  Heart is regular, no murmur.  Lungs with decreased breath sounds but fairly clear.  On 35% O2.  Vital signs noted.  No distress.  Abdomen is soft and nontender.  G-tube site intact.  Extremities no edema.  No open lesions on her feet.  She has thrush on her tongue    Procedures Performed:  Xr Chest 1 View    Result Date: 7/10/2019  FINDINGS AND IMPRESSION: there is no pulmonary consolidation. No pneumothorax or pleural effusion is seen. Background interstitial thickening is present and has improved since 12/27/2018 now presents with a chronic background interstitial lung disease versus a milder form of acute atypical pneumonia and correlation with patient history is recommended.. Heart size is accentuated by low lung volumes. Medial aspect of the right clavicle is been removed.  This report was finalized on 7/10/2019 7:36 AM by Dr. Alfred Zimmer M.D.           Labs:  Lab Results   Component Value Date    WBC 6.37 07/11/2019    HGB 11.3 (L) 07/11/2019    HCT 37.8 07/11/2019     07/11/2019     Lab Results   Component Value Date     07/11/2019    K 3.6 07/11/2019     07/11/2019    CO2 27.2 07/11/2019    BUN 11 07/11/2019    CREATININE 0.78 07/11/2019    GLUCOSE 79 07/11/2019           Consults:   Dr. Adrián Camacho     Discharge Disposition:  Skilled Nursing Facility (DC - External)    Discharge Medications:     Discharge Medications      New Medications      Instructions Start Date   nystatin 379676 UNIT/GM powder  Commonly known as:  MYCOSTATIN   Topical, 3 Times Daily, Skin folds in the groin      nystatin 516692 UNIT/ML suspension  Commonly known as:  MYCOSTATIN   Place on swab and swab mouth 4 times daily for 4days         Changes to Medications      Instructions Start Date   gabapentin 300 MG capsule  Commonly known as:  NEURONTIN  What changed:  when to take this   300 mg, Oral, 3 Times Daily         Continue  These Medications      Instructions Start Date   Ascorbic Acid 500 MG capsule   Oral      aspirin 81 MG chewable tablet   81 mg, Oral, Daily      budesonide 0.5 MG/2ML nebulizer solution  Commonly known as:  PULMICORT   0.5 mg, Nebulization, Daily - RT, Via trach 2 times a day for SOA       escitalopram 20 MG tablet  Commonly known as:  LEXAPRO   20 mg, Oral, Daily      ipratropium-albuterol 0.5-2.5 mg/3 ml nebulizer  Commonly known as:  DUO-NEB   3 mL, Nebulization, Every 4 Hours PRN      Metoprolol Succinate 25 MG capsule extended-release 24 hour sprinkle   25 mg, Oral, Daily      MULTIPLE VITAMINS-MINERALS PO   Oral      pantoprazole 40 MG EC tablet  Commonly known as:  PROTONIX   40 mg, Oral, Daily      pramipexole 0.25 MG tablet  Commonly known as:  MIRAPEX   0.25 mg, Oral, Nightly      thiamine 100 MG tablet  Commonly known as:  VITAMIN B-1   100 mg, Oral, Daily      traZODone 50 MG tablet  Commonly known as:  DESYREL   50 mg, Oral, Nightly PRN         Stop These Medications    artificial tears 83-15 % ointment ophthalmic ointment     clonazePAM 1 MG tablet  Commonly known as:  KlonoPIN     furosemide 20 MG tablet  Commonly known as:  LASIX     mupirocin 2 % ointment  Commonly known as:  BACTROBAN     povidone-iodine 10 % external solution  Commonly known as:  BETADINE            Discharge Diet:   Diet Instructions     Diet: Tube Feeding; Continuous; glucerna 1.5 at 25cc/hr. advance her nutrition's recommendation.  Free water 300 cc every shift      Discharge Diet:  Tube Feeding    Feeding Type:  Continuous    Formula & Rate:  glucerna 1.5 at 25cc/hr. advance her nutrition's recommendation.  Free water 300 cc every shift          Activity at Discharge:   Activity Instructions     Other Activity Instructions      Activity Instructions: turn q 2 hrs. Cont Pt and OT.  Routine skin care.  Routine trach care.  Trach mask 35% with humidified air          Follow-up Appointments:  No future appointments.  Follow-up  Information     Lencho Connelly MD .    Specialty:  Internal Medicine  Contact information:  [address] [facility] [place] [address] [phone]                     Test Results Pending at Discharge: none     Elena Pagan MD  07/11/19  8:45 AM    Time Spent on Discharge Activities: 35 minutes.  Discussed with night nurse.  [facility] records reviewed

## 2019-07-11 NOTE — CONSULTS
"Adult Nutrition  Assessment/PES    Patient Name:  Swetha Luis  YOB: 1960  MRN: [id number]  Admit Date:  7/10/2019    Assessment Date:  7/11/2019    Comments:  TF consult    Pt from NH, tracheostomy malfunction - now resolved and plans to dc back to NH today. Receives nutrition via PEG with Glucerna 1.5 @ 25 cc/hr, 300 cc water flush \"q shift\" at NH. We are substituting Diabetisource AC (1.2 charlie/cc), goal rate of 50 cc/hr  - meets 85% assessed kcal needs. She was only getting 900 kcals via NH TF regimen - wt hx stable, however.     MD notes pt to resume NH enteral regimen once dc back there. No recommendations for change since being dc today.     Reason for Assessment     Row Name 07/11/19 1133          Reason for Assessment    Reason For Assessment  physician consult;TF/PN     Diagnosis  pulmonary disease;renal disease Tracheostomy malfunction; CKD, polysub abuse, heart failure, non-compliance, WENDI/COPD     Identified At Risk by Screening Criteria  tube feeding or parenteral nutrition         Nutrition/Diet History     Row Name 07/11/19 1136          Nutrition/Diet History    Typical Food/Fluid Intake  NPO, PEG feeds @ NH with Glucerna 1.5 @ 25 cc/hr, 300 cc water flush Q shift. Plans to return there today.          Anthropometrics     Row Name 07/11/19 1136          Admit Weight    Admit Weight  67.1 kg (148 lb)        Body Mass Index (BMI)    BMI Assessment  BMI 25-29.9: overweight         Labs/Tests/Procedures/Meds     Row Name 07/11/19 1136          Labs/Procedures/Meds    Lab Results Reviewed  reviewed        Diagnostic Tests/Procedures    Diagnostic Test/Procedure Reviewed  reviewed     Diagnostic Test/Procedures Comments  ENT place a #4 uncuffed Shiley tracheostomy tube through the tracheocutaneous fistula 7/10/19        Medications    Pertinent Medications Reviewed  reviewed         Physical Findings     Row Name 07/11/19 1142          Physical Findings    Overall Physical Appearance  on " oxygen therapy     Gastrointestinal  feeding tube     Tubes  PEG         Estimated/Assessed Needs     Row Name 07/11/19 1138          Calculation Measurements    Weight Used For Calculations  67.4 kg (148 lb 9.4 oz)        Estimated/Assessed Needs    Additional Documentation  Protein Requirements (Group);Fluid Requirements (Group);KCAL/KG (Group)        KCAL/KG    KCAL/KG  25 Kcal/Kg (kcal)     25 Kcal/Kg (kcal)  1685        Protein Requirements    Est Protein Requirement Amount (gms/kg)  1.0 gm protein 54-67 g/day     Estimated Protein Requirements (gms/day)  67.4        Fluid Requirements    Estimated Fluid Requirements (mL/day)  2010 30 cc/kg     RDA Method (mL)  2010               Nutrition Prescription Ordered     Row Name 07/11/19 1139          Nutrition Prescription PO    Current PO Diet  NPO        Nutrition Prescription EN    Enteral Route  PEG     Product  Diabetisource AC (Glucerna 1.2)     TF Delivery Method  Continuous     Continuous TF Goal Rate (mL/hr)  50 mL/hr     Continuous TF Current Rate (mL/hr)  40 mL/hr     Water flush (mL)   250 mL     Water Flush Frequency  Every 4 hours         Evaluation of Received Nutrient/Fluid Intake     Row Name 07/11/19 1138          Calculation Measurements    Weight Used For Calculations  67.4 kg (148 lb 9.4 oz)        Calories Evaluation    Enteral Calories (kcal)  1440     % of Kcal Needs  85%        Protein Evaluation    Enteral Protein (gm)  72     % of Protein Needs  107%        Intake Assessment    Energy/Calorie Requirement Assessment  not meeting needs     Protein Requirement Assessment  meeting needs     Fluid Requirement Assessment  exceeds needs     Tolerance  tolerating        Fluid Intake Evaluation    Enteral (Free Water) Fluid (mL)  984     Free Water Flush Fluid (mL)  1500     Total Free Water Intake (mL)  2484 mL         Problem/Interventions:  Problem 1     Row Name 07/11/19 1143          Nutrition Diagnoses Problem 1    Problem 1  Needs Alternate  Route     Etiology (related to)  Medical Diagnosis;Functional Diagnosis     Pulmonary/Critical Care  Chronic respiratory failure;Other (comment) trach/peg     Functional Diagnosis  Dysphagia     Signs/Symptoms (evidenced by)  EN Intake Delivery                 Intervention Goal     Row Name 07/11/19 1144          Intervention Goal    General  Nutrition support treatment     TF/PN  Maintain TF/PN     Weight  Maintain weight         Nutrition Intervention     Row Name 07/11/19 1144          Nutrition Intervention    RD/Tech Action  Follow Tx progress;Care plan reviewd         Nutrition Prescription     Row Name 07/11/19 1144          Nutrition Prescription EN    Enteral Prescription  Continue same protocol         Education/Evaluation     Row Name 07/11/19 1144          Education    Education  No discharge needs identified at this time        Monitor/Evaluation    Monitor  Per protocol           Electronically signed by:  Sophia Escobedo RD  07/11/19 11:44 AM

## 2019-07-11 NOTE — PLAN OF CARE
Problem: Nutrition, Enteral (Adult)  Intervention: Monitor/Manage Nutrition Support   07/11/19 1149   Nutrition Interventions   Nutrition Support Management tube feeding initiated;tube feeding rate increased;other (see comments)  (Diabetisource @ goal rate 50 cc/hr)

## 2019-07-12 NOTE — NURSING NOTE
"Patient's spouse called nurse on telephone from home around 2145 and requested that IV be reinserted and patient be given Morphine pain medicine prior to transport to nursing home.  Explained that patient sleeping at present and appears comfortable.  Spouse very insistent that pt be given IV pain medicine.      Upon assessment of patient, nurse discussed with patient 's request.  Asked patient if she was having any pain or if she needed pain med.   Patient responded by mouthing the work \"no\" and shaking her head from side to side indicating no.      Again asked patient if she was experiencing any discomfort prior to ambulance transport to nursing home.  She again shook her head side to side indicating no.   Patient was smiling and waved bye to staff as she left the unit with EMS staff.     JACQUIE Rivera RN  "

## 2019-09-21 ENCOUNTER — HOSPITAL ENCOUNTER (OUTPATIENT)
Facility: HOSPITAL | Age: 59
Setting detail: OBSERVATION
Discharge: HOME HEALTH | End: 2019-09-25
Attending: EMERGENCY MEDICINE | Admitting: INTERNAL MEDICINE

## 2019-09-21 ENCOUNTER — APPOINTMENT (OUTPATIENT)
Dept: GENERAL RADIOLOGY | Facility: HOSPITAL | Age: 59
End: 2019-09-21

## 2019-09-21 DIAGNOSIS — R06.00 DYSPNEA, UNSPECIFIED TYPE: Primary | ICD-10-CM

## 2019-09-21 DIAGNOSIS — J96.11 CHRONIC RESPIRATORY FAILURE WITH HYPOXIA (HCC): ICD-10-CM

## 2019-09-21 DIAGNOSIS — Z93.0 TRACHEOSTOMY PRESENT (HCC): ICD-10-CM

## 2019-09-21 DIAGNOSIS — R77.8 ELEVATED TROPONIN: ICD-10-CM

## 2019-09-21 DIAGNOSIS — J44.9 CHRONIC OBSTRUCTIVE PULMONARY DISEASE, UNSPECIFIED COPD TYPE (HCC): ICD-10-CM

## 2019-09-21 LAB
ALBUMIN SERPL-MCNC: 4 G/DL (ref 3.5–5.2)
ALBUMIN/GLOB SERPL: 1.2 G/DL
ALP SERPL-CCNC: 183 U/L (ref 39–117)
ALT SERPL W P-5'-P-CCNC: 61 U/L (ref 1–33)
ANION GAP SERPL CALCULATED.3IONS-SCNC: 11.6 MMOL/L (ref 5–15)
AST SERPL-CCNC: 40 U/L (ref 1–32)
BASOPHILS # BLD AUTO: 0.04 10*3/MM3 (ref 0–0.2)
BASOPHILS NFR BLD AUTO: 0.5 % (ref 0–1.5)
BILIRUB SERPL-MCNC: <0.2 MG/DL (ref 0.2–1.2)
BUN BLD-MCNC: 23 MG/DL (ref 6–20)
BUN/CREAT SERPL: 20.7 (ref 7–25)
CALCIUM SPEC-SCNC: 9.6 MG/DL (ref 8.6–10.5)
CHLORIDE SERPL-SCNC: 97 MMOL/L (ref 98–107)
CO2 SERPL-SCNC: 27.4 MMOL/L (ref 22–29)
CREAT BLD-MCNC: 1.11 MG/DL (ref 0.57–1)
DEPRECATED RDW RBC AUTO: 45.2 FL (ref 37–54)
EOSINOPHIL # BLD AUTO: 0.08 10*3/MM3 (ref 0–0.4)
EOSINOPHIL NFR BLD AUTO: 0.9 % (ref 0.3–6.2)
ERYTHROCYTE [DISTWIDTH] IN BLOOD BY AUTOMATED COUNT: 14.2 % (ref 12.3–15.4)
GFR SERPL CREATININE-BSD FRML MDRD: 50 ML/MIN/1.73
GLOBULIN UR ELPH-MCNC: 3.4 GM/DL
GLUCOSE BLD-MCNC: 127 MG/DL (ref 65–99)
HCT VFR BLD AUTO: 45.2 % (ref 34–46.6)
HGB BLD-MCNC: 13.8 G/DL (ref 12–15.9)
IMM GRANULOCYTES # BLD AUTO: 0.04 10*3/MM3 (ref 0–0.05)
IMM GRANULOCYTES NFR BLD AUTO: 0.5 % (ref 0–0.5)
LYMPHOCYTES # BLD AUTO: 1.11 10*3/MM3 (ref 0.7–3.1)
LYMPHOCYTES NFR BLD AUTO: 12.8 % (ref 19.6–45.3)
MCH RBC QN AUTO: 26.4 PG (ref 26.6–33)
MCHC RBC AUTO-ENTMCNC: 30.5 G/DL (ref 31.5–35.7)
MCV RBC AUTO: 86.4 FL (ref 79–97)
MONOCYTES # BLD AUTO: 0.3 10*3/MM3 (ref 0.1–0.9)
MONOCYTES NFR BLD AUTO: 3.5 % (ref 5–12)
NEUTROPHILS # BLD AUTO: 7.07 10*3/MM3 (ref 1.7–7)
NEUTROPHILS NFR BLD AUTO: 81.8 % (ref 42.7–76)
NRBC BLD AUTO-RTO: 0 /100 WBC (ref 0–0.2)
NT-PROBNP SERPL-MCNC: 269.1 PG/ML (ref 5–900)
PLATELET # BLD AUTO: 237 10*3/MM3 (ref 140–450)
PMV BLD AUTO: 11.8 FL (ref 6–12)
POTASSIUM BLD-SCNC: 4.6 MMOL/L (ref 3.5–5.2)
PROT SERPL-MCNC: 7.4 G/DL (ref 6–8.5)
RBC # BLD AUTO: 5.23 10*6/MM3 (ref 3.77–5.28)
SODIUM BLD-SCNC: 136 MMOL/L (ref 136–145)
TROPONIN T SERPL-MCNC: 0.04 NG/ML (ref 0–0.03)
WBC NRBC COR # BLD: 8.64 10*3/MM3 (ref 3.4–10.8)

## 2019-09-21 PROCEDURE — 93010 ELECTROCARDIOGRAM REPORT: CPT | Performed by: INTERNAL MEDICINE

## 2019-09-21 PROCEDURE — G0378 HOSPITAL OBSERVATION PER HR: HCPCS

## 2019-09-21 PROCEDURE — 99285 EMERGENCY DEPT VISIT HI MDM: CPT

## 2019-09-21 PROCEDURE — 83880 ASSAY OF NATRIURETIC PEPTIDE: CPT | Performed by: EMERGENCY MEDICINE

## 2019-09-21 PROCEDURE — 71045 X-RAY EXAM CHEST 1 VIEW: CPT

## 2019-09-21 PROCEDURE — 80053 COMPREHEN METABOLIC PANEL: CPT | Performed by: EMERGENCY MEDICINE

## 2019-09-21 PROCEDURE — 93005 ELECTROCARDIOGRAM TRACING: CPT

## 2019-09-21 PROCEDURE — 84484 ASSAY OF TROPONIN QUANT: CPT | Performed by: EMERGENCY MEDICINE

## 2019-09-21 PROCEDURE — 85025 COMPLETE CBC W/AUTO DIFF WBC: CPT

## 2019-09-21 PROCEDURE — 93005 ELECTROCARDIOGRAM TRACING: CPT | Performed by: EMERGENCY MEDICINE

## 2019-09-21 RX ORDER — BUSPIRONE HYDROCHLORIDE 5 MG/1
5 TABLET ORAL 3 TIMES DAILY
Status: ON HOLD | COMMUNITY
End: 2019-09-25 | Stop reason: SDUPTHER

## 2019-09-21 RX ORDER — OMEPRAZOLE 40 MG/1
40 CAPSULE, DELAYED RELEASE ORAL DAILY
Status: ON HOLD | COMMUNITY
End: 2020-12-08

## 2019-09-21 RX ORDER — SODIUM CHLORIDE 0.9 % (FLUSH) 0.9 %
10 SYRINGE (ML) INJECTION AS NEEDED
Status: DISCONTINUED | OUTPATIENT
Start: 2019-09-21 | End: 2019-09-25 | Stop reason: HOSPADM

## 2019-09-21 RX ORDER — PREDNISONE 10 MG/1
10 TABLET ORAL DAILY
Status: ON HOLD | COMMUNITY
End: 2019-09-25 | Stop reason: SDUPTHER

## 2019-09-21 RX ORDER — GUAIFENESIN AND DEXTROMETHORPHAN HYDROBROMIDE 100; 10 MG/5ML; MG/5ML
5 SOLUTION ORAL 4 TIMES DAILY
Status: ON HOLD | COMMUNITY
End: 2019-09-25 | Stop reason: SDUPTHER

## 2019-09-21 RX ORDER — IPRATROPIUM BROMIDE AND ALBUTEROL SULFATE 2.5; .5 MG/3ML; MG/3ML
3 SOLUTION RESPIRATORY (INHALATION) ONCE
Status: COMPLETED | OUTPATIENT
Start: 2019-09-21 | End: 2019-09-22

## 2019-09-21 RX ORDER — OXYCODONE HYDROCHLORIDE AND ACETAMINOPHEN 5; 325 MG/1; MG/1
1 TABLET ORAL EVERY 6 HOURS PRN
Status: ON HOLD | COMMUNITY
End: 2019-09-25 | Stop reason: SDUPTHER

## 2019-09-22 PROBLEM — I10 ESSENTIAL HYPERTENSION: Status: ACTIVE | Noted: 2019-09-22

## 2019-09-22 PROBLEM — R13.10 DYSPHAGIA: Status: ACTIVE | Noted: 2019-09-22

## 2019-09-22 PROBLEM — R77.8 ELEVATED TROPONIN: Status: ACTIVE | Noted: 2019-09-22

## 2019-09-22 PROBLEM — R06.00 DYSPNEA: Status: ACTIVE | Noted: 2019-09-22

## 2019-09-22 PROBLEM — Z93.0 TRACHEOSTOMY PRESENT (HCC): Status: ACTIVE | Noted: 2019-09-22

## 2019-09-22 PROBLEM — R79.89 ELEVATED LFTS: Status: ACTIVE | Noted: 2019-09-22

## 2019-09-22 LAB
ANION GAP SERPL CALCULATED.3IONS-SCNC: 11.5 MMOL/L (ref 5–15)
BUN BLD-MCNC: 22 MG/DL (ref 6–20)
BUN/CREAT SERPL: 22 (ref 7–25)
CALCIUM SPEC-SCNC: 9.6 MG/DL (ref 8.6–10.5)
CHLORIDE SERPL-SCNC: 101 MMOL/L (ref 98–107)
CO2 SERPL-SCNC: 28.5 MMOL/L (ref 22–29)
CREAT BLD-MCNC: 1 MG/DL (ref 0.57–1)
DEPRECATED RDW RBC AUTO: 42.3 FL (ref 37–54)
ERYTHROCYTE [DISTWIDTH] IN BLOOD BY AUTOMATED COUNT: 14.1 % (ref 12.3–15.4)
GFR SERPL CREATININE-BSD FRML MDRD: 57 ML/MIN/1.73
GLUCOSE BLD-MCNC: 106 MG/DL (ref 65–99)
HCT VFR BLD AUTO: 42.1 % (ref 34–46.6)
HGB BLD-MCNC: 13 G/DL (ref 12–15.9)
HOLD SPECIMEN: NORMAL
HOLD SPECIMEN: NORMAL
MCH RBC QN AUTO: 25.9 PG (ref 26.6–33)
MCHC RBC AUTO-ENTMCNC: 30.9 G/DL (ref 31.5–35.7)
MCV RBC AUTO: 84 FL (ref 79–97)
PLATELET # BLD AUTO: 215 10*3/MM3 (ref 140–450)
PMV BLD AUTO: 11.7 FL (ref 6–12)
POTASSIUM BLD-SCNC: 4.4 MMOL/L (ref 3.5–5.2)
RBC # BLD AUTO: 5.01 10*6/MM3 (ref 3.77–5.28)
SODIUM BLD-SCNC: 141 MMOL/L (ref 136–145)
TROPONIN T SERPL-MCNC: 0.03 NG/ML (ref 0–0.03)
TROPONIN T SERPL-MCNC: 0.03 NG/ML (ref 0–0.03)
TROPONIN T SERPL-MCNC: 0.04 NG/ML (ref 0–0.03)
WBC NRBC COR # BLD: 7.53 10*3/MM3 (ref 3.4–10.8)
WHOLE BLOOD HOLD SPECIMEN: NORMAL
WHOLE BLOOD HOLD SPECIMEN: NORMAL

## 2019-09-22 PROCEDURE — 80048 BASIC METABOLIC PNL TOTAL CA: CPT | Performed by: NURSE PRACTITIONER

## 2019-09-22 PROCEDURE — 93005 ELECTROCARDIOGRAM TRACING: CPT | Performed by: NURSE PRACTITIONER

## 2019-09-22 PROCEDURE — 85027 COMPLETE CBC AUTOMATED: CPT | Performed by: NURSE PRACTITIONER

## 2019-09-22 PROCEDURE — 94799 UNLISTED PULMONARY SVC/PX: CPT

## 2019-09-22 PROCEDURE — 63710000001 PREDNISONE PER 5 MG: Performed by: NURSE PRACTITIONER

## 2019-09-22 PROCEDURE — 93010 ELECTROCARDIOGRAM REPORT: CPT | Performed by: INTERNAL MEDICINE

## 2019-09-22 PROCEDURE — 96361 HYDRATE IV INFUSION ADD-ON: CPT

## 2019-09-22 PROCEDURE — 99254 IP/OBS CNSLTJ NEW/EST MOD 60: CPT | Performed by: INTERNAL MEDICINE

## 2019-09-22 PROCEDURE — 84484 ASSAY OF TROPONIN QUANT: CPT | Performed by: NURSE PRACTITIONER

## 2019-09-22 PROCEDURE — 36415 COLL VENOUS BLD VENIPUNCTURE: CPT | Performed by: NURSE PRACTITIONER

## 2019-09-22 PROCEDURE — 96374 THER/PROPH/DIAG INJ IV PUSH: CPT

## 2019-09-22 PROCEDURE — 94640 AIRWAY INHALATION TREATMENT: CPT

## 2019-09-22 PROCEDURE — 25010000002 LORAZEPAM PER 2 MG: Performed by: EMERGENCY MEDICINE

## 2019-09-22 PROCEDURE — G0378 HOSPITAL OBSERVATION PER HR: HCPCS

## 2019-09-22 RX ORDER — ONDANSETRON 4 MG/1
4 TABLET, FILM COATED ORAL EVERY 6 HOURS PRN
Status: DISCONTINUED | OUTPATIENT
Start: 2019-09-22 | End: 2019-09-25 | Stop reason: HOSPADM

## 2019-09-22 RX ORDER — PREDNISONE 10 MG/1
10 TABLET ORAL DAILY
Status: DISCONTINUED | OUTPATIENT
Start: 2019-09-22 | End: 2019-09-25 | Stop reason: HOSPADM

## 2019-09-22 RX ORDER — OXYCODONE HYDROCHLORIDE AND ACETAMINOPHEN 5; 325 MG/1; MG/1
1 TABLET ORAL ONCE
Status: COMPLETED | OUTPATIENT
Start: 2019-09-22 | End: 2019-09-22

## 2019-09-22 RX ORDER — SODIUM CHLORIDE 9 MG/ML
75 INJECTION, SOLUTION INTRAVENOUS CONTINUOUS
Status: DISCONTINUED | OUTPATIENT
Start: 2019-09-22 | End: 2019-09-22

## 2019-09-22 RX ORDER — ACETAMINOPHEN 325 MG/1
650 TABLET ORAL EVERY 4 HOURS PRN
Status: DISCONTINUED | OUTPATIENT
Start: 2019-09-22 | End: 2019-09-25 | Stop reason: HOSPADM

## 2019-09-22 RX ORDER — ASPIRIN 81 MG/1
81 TABLET, CHEWABLE ORAL DAILY
Status: DISCONTINUED | OUTPATIENT
Start: 2019-09-22 | End: 2019-09-25 | Stop reason: HOSPADM

## 2019-09-22 RX ORDER — IPRATROPIUM BROMIDE AND ALBUTEROL SULFATE 2.5; .5 MG/3ML; MG/3ML
3 SOLUTION RESPIRATORY (INHALATION) EVERY 4 HOURS PRN
Status: DISCONTINUED | OUTPATIENT
Start: 2019-09-22 | End: 2019-09-25 | Stop reason: HOSPADM

## 2019-09-22 RX ORDER — ACETAMINOPHEN 160 MG/5ML
650 SOLUTION ORAL EVERY 4 HOURS PRN
Status: DISCONTINUED | OUTPATIENT
Start: 2019-09-22 | End: 2019-09-25 | Stop reason: HOSPADM

## 2019-09-22 RX ORDER — GABAPENTIN 300 MG/1
300 CAPSULE ORAL 3 TIMES DAILY
Status: DISCONTINUED | OUTPATIENT
Start: 2019-09-22 | End: 2019-09-25 | Stop reason: HOSPADM

## 2019-09-22 RX ORDER — THIAMINE MONONITRATE (VIT B1) 100 MG
100 TABLET ORAL DAILY
Status: DISCONTINUED | OUTPATIENT
Start: 2019-09-22 | End: 2019-09-25 | Stop reason: HOSPADM

## 2019-09-22 RX ORDER — ACETAMINOPHEN 650 MG/1
650 SUPPOSITORY RECTAL EVERY 4 HOURS PRN
Status: DISCONTINUED | OUTPATIENT
Start: 2019-09-22 | End: 2019-09-25 | Stop reason: HOSPADM

## 2019-09-22 RX ORDER — ESCITALOPRAM OXALATE 20 MG/1
20 TABLET ORAL DAILY
Status: DISCONTINUED | OUTPATIENT
Start: 2019-09-22 | End: 2019-09-25 | Stop reason: HOSPADM

## 2019-09-22 RX ORDER — SODIUM CHLORIDE 0.9 % (FLUSH) 0.9 %
10 SYRINGE (ML) INJECTION EVERY 12 HOURS SCHEDULED
Status: DISCONTINUED | OUTPATIENT
Start: 2019-09-22 | End: 2019-09-25 | Stop reason: HOSPADM

## 2019-09-22 RX ORDER — OXYCODONE HYDROCHLORIDE AND ACETAMINOPHEN 5; 325 MG/1; MG/1
1 TABLET ORAL EVERY 6 HOURS PRN
Status: DISCONTINUED | OUTPATIENT
Start: 2019-09-22 | End: 2019-09-25 | Stop reason: HOSPADM

## 2019-09-22 RX ORDER — GUAIFENESIN/DEXTROMETHORPHAN 100-10MG/5
5 SYRUP ORAL 4 TIMES DAILY
Status: DISCONTINUED | OUTPATIENT
Start: 2019-09-22 | End: 2019-09-25 | Stop reason: HOSPADM

## 2019-09-22 RX ORDER — CALCIUM CARBONATE 200(500)MG
2 TABLET,CHEWABLE ORAL 2 TIMES DAILY PRN
Status: DISCONTINUED | OUTPATIENT
Start: 2019-09-22 | End: 2019-09-25 | Stop reason: HOSPADM

## 2019-09-22 RX ORDER — SODIUM CHLORIDE 0.9 % (FLUSH) 0.9 %
10 SYRINGE (ML) INJECTION AS NEEDED
Status: DISCONTINUED | OUTPATIENT
Start: 2019-09-22 | End: 2019-09-25 | Stop reason: HOSPADM

## 2019-09-22 RX ORDER — BISACODYL 5 MG/1
5 TABLET, DELAYED RELEASE ORAL DAILY PRN
Status: DISCONTINUED | OUTPATIENT
Start: 2019-09-22 | End: 2019-09-25 | Stop reason: HOSPADM

## 2019-09-22 RX ORDER — ONDANSETRON 2 MG/ML
4 INJECTION INTRAMUSCULAR; INTRAVENOUS EVERY 6 HOURS PRN
Status: DISCONTINUED | OUTPATIENT
Start: 2019-09-22 | End: 2019-09-25 | Stop reason: HOSPADM

## 2019-09-22 RX ORDER — BUDESONIDE 0.5 MG/2ML
0.5 INHALANT ORAL
Status: DISCONTINUED | OUTPATIENT
Start: 2019-09-22 | End: 2019-09-25 | Stop reason: HOSPADM

## 2019-09-22 RX ORDER — BUSPIRONE HYDROCHLORIDE 5 MG/1
5 TABLET ORAL 3 TIMES DAILY
Status: DISCONTINUED | OUTPATIENT
Start: 2019-09-22 | End: 2019-09-25 | Stop reason: HOSPADM

## 2019-09-22 RX ORDER — LORAZEPAM 2 MG/ML
0.25 INJECTION INTRAMUSCULAR ONCE
Status: COMPLETED | OUTPATIENT
Start: 2019-09-22 | End: 2019-09-22

## 2019-09-22 RX ORDER — LANSOPRAZOLE
30 KIT EVERY MORNING
Status: DISCONTINUED | OUTPATIENT
Start: 2019-09-22 | End: 2019-09-25 | Stop reason: HOSPADM

## 2019-09-22 RX ORDER — METOPROLOL SUCCINATE 25 MG/1
25 TABLET, EXTENDED RELEASE ORAL
Status: DISCONTINUED | OUTPATIENT
Start: 2019-09-22 | End: 2019-09-25 | Stop reason: HOSPADM

## 2019-09-22 RX ORDER — NYSTATIN 100000 [USP'U]/G
POWDER TOPICAL 3 TIMES DAILY
Status: DISCONTINUED | OUTPATIENT
Start: 2019-09-22 | End: 2019-09-25 | Stop reason: HOSPADM

## 2019-09-22 RX ORDER — PRAMIPEXOLE DIHYDROCHLORIDE 0.25 MG/1
0.25 TABLET ORAL NIGHTLY
Status: DISCONTINUED | OUTPATIENT
Start: 2019-09-22 | End: 2019-09-25 | Stop reason: HOSPADM

## 2019-09-22 RX ADMIN — METOPROLOL SUCCINATE 25 MG: 25 TABLET, FILM COATED, EXTENDED RELEASE ORAL at 10:22

## 2019-09-22 RX ADMIN — NYSTATIN 500000 UNITS: 100000 SUSPENSION ORAL at 17:28

## 2019-09-22 RX ADMIN — ESCITALOPRAM 20 MG: 20 TABLET, FILM COATED ORAL at 10:23

## 2019-09-22 RX ADMIN — GABAPENTIN 300 MG: 300 CAPSULE ORAL at 20:00

## 2019-09-22 RX ADMIN — OXYCODONE HYDROCHLORIDE AND ACETAMINOPHEN 1 TABLET: 5; 325 TABLET ORAL at 20:00

## 2019-09-22 RX ADMIN — ASPIRIN 81 MG: 81 TABLET, CHEWABLE ORAL at 10:23

## 2019-09-22 RX ADMIN — LANSOPRAZOLE 30 MG: KIT at 10:23

## 2019-09-22 RX ADMIN — OXYCODONE HYDROCHLORIDE AND ACETAMINOPHEN 1 TABLET: 5; 325 TABLET ORAL at 00:17

## 2019-09-22 RX ADMIN — GUAIFENESIN AND DEXTROMETHORPHAN 5 ML: 100; 10 SYRUP ORAL at 14:19

## 2019-09-22 RX ADMIN — SODIUM CHLORIDE, PRESERVATIVE FREE 10 ML: 5 INJECTION INTRAVENOUS at 09:50

## 2019-09-22 RX ADMIN — GUAIFENESIN AND DEXTROMETHORPHAN 5 ML: 100; 10 SYRUP ORAL at 17:28

## 2019-09-22 RX ADMIN — LORAZEPAM 0.25 MG: 2 INJECTION INTRAMUSCULAR; INTRAVENOUS at 01:30

## 2019-09-22 RX ADMIN — BUSPIRONE HYDROCHLORIDE 5 MG: 5 TABLET ORAL at 10:25

## 2019-09-22 RX ADMIN — NYSTATIN: 100000 POWDER TOPICAL at 17:31

## 2019-09-22 RX ADMIN — GUAIFENESIN AND DEXTROMETHORPHAN 5 ML: 100; 10 SYRUP ORAL at 10:24

## 2019-09-22 RX ADMIN — BUSPIRONE HYDROCHLORIDE 5 MG: 5 TABLET ORAL at 20:00

## 2019-09-22 RX ADMIN — NYSTATIN 500000 UNITS: 100000 SUSPENSION ORAL at 10:24

## 2019-09-22 RX ADMIN — SODIUM CHLORIDE, PRESERVATIVE FREE 10 ML: 5 INJECTION INTRAVENOUS at 20:00

## 2019-09-22 RX ADMIN — GABAPENTIN 300 MG: 300 CAPSULE ORAL at 17:28

## 2019-09-22 RX ADMIN — GABAPENTIN 300 MG: 300 CAPSULE ORAL at 11:00

## 2019-09-22 RX ADMIN — PREDNISONE 10 MG: 10 TABLET ORAL at 10:22

## 2019-09-22 RX ADMIN — BUDESONIDE 0.5 MG: 0.5 SUSPENSION RESPIRATORY (INHALATION) at 09:25

## 2019-09-22 RX ADMIN — IPRATROPIUM BROMIDE AND ALBUTEROL SULFATE 3 ML: 2.5; .5 SOLUTION RESPIRATORY (INHALATION) at 00:28

## 2019-09-22 RX ADMIN — OXYCODONE HYDROCHLORIDE AND ACETAMINOPHEN 1 TABLET: 5; 325 TABLET ORAL at 10:25

## 2019-09-22 RX ADMIN — SODIUM CHLORIDE, PRESERVATIVE FREE 10 ML: 5 INJECTION INTRAVENOUS at 03:36

## 2019-09-22 RX ADMIN — NYSTATIN 500000 UNITS: 100000 SUSPENSION ORAL at 14:19

## 2019-09-22 RX ADMIN — PRAMIPEXOLE DIHYDROCHLORIDE 0.25 MG: 0.25 TABLET ORAL at 20:00

## 2019-09-22 RX ADMIN — SODIUM CHLORIDE 75 ML/HR: 9 INJECTION, SOLUTION INTRAVENOUS at 03:35

## 2019-09-22 RX ADMIN — NYSTATIN 500000 UNITS: 100000 SUSPENSION ORAL at 20:00

## 2019-09-22 RX ADMIN — NYSTATIN: 100000 POWDER TOPICAL at 20:00

## 2019-09-22 RX ADMIN — GUAIFENESIN AND DEXTROMETHORPHAN 5 ML: 100; 10 SYRUP ORAL at 20:00

## 2019-09-22 RX ADMIN — Medication 100 MG: at 10:25

## 2019-09-22 RX ADMIN — BUSPIRONE HYDROCHLORIDE 5 MG: 5 TABLET ORAL at 17:28

## 2019-09-22 RX ADMIN — NYSTATIN: 100000 POWDER TOPICAL at 10:25

## 2019-09-22 NOTE — ED TRIAGE NOTES
Pt a resident at [facility]. Pt c/o shortness of breath and chest pain upon inspiration starting today. EMS called per pt daughter due to concern of worsening throughout the night.

## 2019-09-22 NOTE — ED PROVIDER NOTES
EMERGENCY DEPARTMENT ENCOUNTER    CHIEF COMPLAINT  Chief Complaint: SOA  History given by: pt  History limited by: none  Room Number: N624/1  PMD: Lencho Connelly MD      HPI:  Pt is a 59 y.o. female who presents complaining of gradual moderate SOA that started yesterday and has gotten progressively worse. Pt admits to wheezing, chest pain with a deep breath, dry cough, and left hand pain but denies fever.     Duration:  yesterday  Onset: gradual  Timing: constant  Quality: SOA  Intensity/Severity: moderate  Progression: progressively worse  Associated Symptoms: wheezing, chest pain with a deep breath, dry cough, and left hand pain  Aggravating Factors: none  Alleviating Factors: none  Treatment before arrival: none    PAST MEDICAL HISTORY  Active Ambulatory Problems     Diagnosis Date Noted   • Tracheostomy complication (CMS/HCC) 12/18/2018   • Gangrene of toe (CMS/HCC) 12/20/2018   • Acute diastolic congestive heart failure (CMS/HCC) 12/24/2018   • ARF (acute renal failure) (CMS/HCC) 12/24/2018   • CKD (chronic kidney disease) 12/24/2018   • Elevated troponin 12/24/2018   • Acute respiratory failure with hypoxemia (CMS/HCC) 12/24/2018   • Acute osteomyelitis (CMS/HCC) 12/26/2018   • UTI due to extended-spectrum beta lactamase (ESBL) producing Escherichia coli 12/27/2018   • Thrush, oral 12/28/2018   • Tracheostomy malfunction (CMS/HCC) 07/10/2019   • COPD (chronic obstructive pulmonary disease) (CMS/HCC) 07/10/2019   • Respiratory failure, chronic (CMS/HCC) 07/10/2019   • PEG (percutaneous endoscopic gastrostomy) status (CMS/HCC) 07/10/2019   • History of osteomyelitis 07/10/2019   • Polysubstance abuse (CMS/HCC) 07/10/2019   • History of tracheal stenosis 07/10/2019   • Chronic diastolic CHF (congestive heart failure) (CMS/HCC) 07/10/2019   • Peripheral artery disease (CMS/HCC) 07/10/2019   • Medically noncompliant 07/10/2019   • WENDI and COPD overlap syndrome (CMS/HCC) 07/10/2019     Resolved Ambulatory  Problems     Diagnosis Date Noted   • No Resolved Ambulatory Problems     Past Medical History:   Diagnosis Date   • Acute congestive heart failure (CMS/HCC) 2018   • Acute osteomyelitis (CMS/[facility])    • Acute renal failure on dialysis (CMS/[facility])    • Anxiety    • CKD (chronic kidney disease)    • COPD (chronic obstructive pulmonary disease) (CMS/[facility])    • Nontraumatic subarachnoid hemorrhage (CMS/[facility])    • Tracheostomy present (CMS/[facility])        PAST SURGICAL HISTORY  Past Surgical History:   Procedure Laterality Date   • TRACHEOSTOMY         FAMILY HISTORY  History reviewed. No pertinent family history.    SOCIAL HISTORY  Social History     Socioeconomic History   • Marital status:      Spouse name: Not on file   • Number of children: Not on file   • Years of education: Not on file   • Highest education level: Not on file   Tobacco Use   • Smoking status: Former Smoker     Packs/day: 1.00     Types: Cigarettes     Last attempt to quit: 2018     Years since quittin.1   • Smokeless tobacco: Never Used   Substance and Sexual Activity   • Alcohol use: No     Frequency: Never   • Drug use: Yes     Types: Cocaine     Comment: 20 years ago occasional   • Sexual activity: Defer       ALLERGIES  Cephalexin; Hydrocodone; Strawberry; and Zithromax [azithromycin]    REVIEW OF SYSTEMS  Review of Systems   Constitutional: Negative for fever.   HENT: Negative for sore throat.    Eyes: Negative.    Respiratory: Positive for cough (dry), shortness of breath and wheezing.    Cardiovascular: Positive for chest pain (with a deep breath).   Gastrointestinal: Negative for abdominal pain, diarrhea and vomiting.   Genitourinary: Negative for dysuria.   Musculoskeletal: Positive for myalgias (left hand). Negative for neck pain.   Skin: Negative for rash.   Allergic/Immunologic: Negative.    Neurological: Negative for weakness, numbness and headaches.   Hematological: Negative.    Psychiatric/Behavioral: Negative.    All  other systems reviewed and are negative.      PHYSICAL EXAM  ED Triage Vitals [09/21/19 2224]   Temp Heart Rate Resp BP SpO2   98.9 °F (37.2 °C) 77 22 140/73 99 %      Temp src Heart Rate Source Patient Position BP Location FiO2 (%)   Tympanic Monitor Sitting -- --       Physical Exam   Constitutional: She is oriented to person, place, and time. No distress.   HENT:   Head: Normocephalic and atraumatic.   Eyes: EOM are normal. Pupils are equal, round, and reactive to light.   Neck: Normal range of motion. Neck supple.   Cardiovascular: Normal rate, regular rhythm and normal heart sounds.   Pulmonary/Chest: Effort normal and breath sounds normal. No respiratory distress.   100% on room air. Trach collar in place   Abdominal: Soft. There is no tenderness. There is no rebound and no guarding.   Musculoskeletal: Normal range of motion. She exhibits no edema.   Neurological: She is alert and oriented to person, place, and time. She has normal sensation and normal strength.   Skin: Skin is warm and dry. No rash noted.   Psychiatric: Mood and affect normal.   Nursing note and vitals reviewed.      LAB RESULTS  Lab Results (last 24 hours)     Procedure Component Value Units Date/Time    CBC & Differential [[id number]] Collected:  09/21/19 2248    Specimen:  Blood Updated:  09/21/19 2302    Narrative:       The following orders were created for panel order CBC & Differential.  Procedure                               Abnormality         Status                     ---------                               -----------         ------                     CBC Auto Differential[[id number]]        Abnormal            Final result                 Please view results for these tests on the individual orders.    Comprehensive Metabolic Panel [[id number]]  (Abnormal) Collected:  09/21/19 2248    Specimen:  Blood Updated:  09/21/19 2320     Glucose 127 mg/dL      BUN 23 mg/dL      Creatinine 1.11 mg/dL      Sodium 136 mmol/L      Potassium  4.6 mmol/L      Chloride 97 mmol/L      CO2 27.4 mmol/L      Calcium 9.6 mg/dL      Total Protein 7.4 g/dL      Albumin 4.00 g/dL      ALT (SGPT) 61 U/L      AST (SGOT) 40 U/L      Alkaline Phosphatase 183 U/L      Total Bilirubin <0.2 mg/dL      eGFR Non African Amer 50 mL/min/1.73      Globulin 3.4 gm/dL      A/G Ratio 1.2 g/dL      BUN/Creatinine Ratio 20.7     Anion Gap 11.6 mmol/L     Narrative:       GFR Normal >60  Chronic Kidney Disease <60  Kidney Failure <15    BNP [[id number]]  (Normal) Collected:  09/21/19 2248    Specimen:  Blood Updated:  09/21/19 2319     proBNP 269.1 pg/mL     Narrative:       Among patients with dyspnea, NT-proBNP is highly sensitive for the detection of acute congestive heart failure. In addition NT-proBNP of <300 pg/ml effectively rules out acute congestive heart failure with 99% negative predictive value.    Troponin [[id number]]  (Abnormal) Collected:  09/21/19 2248    Specimen:  Blood Updated:  09/21/19 2340     Troponin T 0.036 ng/mL     Narrative:       Troponin T Reference Range:  <= 0.03 ng/mL-   Negative for AMI  >0.03 ng/mL-     Abnormal for myocardial necrosis.  Clinicians would have to utilize clinical acumen, EKG, Troponin and serial changes to determine if it is an Acute Myocardial Infarction or myocardial injury due to an underlying chronic condition.     CBC Auto Differential [[id number]]  (Abnormal) Collected:  09/21/19 2248    Specimen:  Blood Updated:  09/21/19 2302     WBC 8.64 10*3/mm3      RBC 5.23 10*6/mm3      Hemoglobin 13.8 g/dL      Hematocrit 45.2 %      MCV 86.4 fL      MCH 26.4 pg      MCHC 30.5 g/dL      RDW 14.2 %      RDW-SD 45.2 fl      MPV 11.8 fL      Platelets 237 10*3/mm3      Neutrophil % 81.8 %      Lymphocyte % 12.8 %      Monocyte % 3.5 %      Eosinophil % 0.9 %      Basophil % 0.5 %      Immature Grans % 0.5 %      Neutrophils, Absolute 7.07 10*3/mm3      Lymphocytes, Absolute 1.11 10*3/mm3      Monocytes, Absolute 0.30 10*3/mm3       Eosinophils, Absolute 0.08 10*3/mm3      Basophils, Absolute 0.04 10*3/mm3      Immature Grans, Absolute 0.04 10*3/mm3      nRBC 0.0 /100 WBC     Troponin [[id number]]  (Abnormal) Collected:  09/22/19 0130    Specimen:  Blood Updated:  09/22/19 0225     Troponin T 0.031 ng/mL     Narrative:       Troponin T Reference Range:  <= 0.03 ng/mL-   Negative for AMI  >0.03 ng/mL-     Abnormal for myocardial necrosis.  Clinicians would have to utilize clinical acumen, EKG, Troponin and serial changes to determine if it is an Acute Myocardial Infarction or myocardial injury due to an underlying chronic condition.     Basic Metabolic Panel [[id number]]  (Abnormal) Collected:  09/22/19 0604    Specimen:  Blood Updated:  09/22/19 0645     Glucose 106 mg/dL      BUN 22 mg/dL      Creatinine 1.00 mg/dL      Sodium 141 mmol/L      Potassium 4.4 mmol/L      Chloride 101 mmol/L      CO2 28.5 mmol/L      Calcium 9.6 mg/dL      eGFR Non African Amer 57 mL/min/1.73      BUN/Creatinine Ratio 22.0     Anion Gap 11.5 mmol/L     Narrative:       GFR Normal >60  Chronic Kidney Disease <60  Kidney Failure <15    CBC (No Diff) [[id number]]  (Abnormal) Collected:  09/22/19 0604    Specimen:  Blood Updated:  09/22/19 0617     WBC 7.53 10*3/mm3      RBC 5.01 10*6/mm3      Hemoglobin 13.0 g/dL      Hematocrit 42.1 %      MCV 84.0 fL      MCH 25.9 pg      MCHC 30.9 g/dL      RDW 14.1 %      RDW-SD 42.3 fl      MPV 11.7 fL      Platelets 215 10*3/mm3     Troponin [[id number]]  (Abnormal) Collected:  09/22/19 0604    Specimen:  Blood Updated:  09/22/19 0652     Troponin T 0.034 ng/mL     Narrative:       Troponin T Reference Range:  <= 0.03 ng/mL-   Negative for AMI  >0.03 ng/mL-     Abnormal for myocardial necrosis.  Clinicians would have to utilize clinical acumen, EKG, Troponin and serial changes to determine if it is an Acute Myocardial Infarction or myocardial injury due to an underlying chronic condition.           I ordered the above labs and  "reviewed the results    RADIOLOGY  XR Chest 1 View   Final Result   Poor inspiration without active airspace disease                   This report was finalized on 9/21/2019 11:06 PM by Andrei Beatty M.D.               I ordered the above noted radiological studies. Interpreted by radiologist. Reviewed by me in PACS.       PROCEDURES  Procedures    EKG          EKG time: 2259  Rhythm/Rate: 71 sinus  P waves and ND: normal  QRS, axis: normal   ST and T waves: normal     Interpreted Contemporaneously by me, independently viewed  Improved compared to prior 12/24/18      PROGRESS AND CONSULTS       0010  Rechecked patient who is resting comfortably. HR - 83, O2 - 99%.  Discussed all lab and test results. Discussed plan to admit the pt. Pt understands and agrees with the plan. All questions have been answered.    0022  Discussed case with AMINATA Hidalgo for A  Reviewed history, exam, results and treatments.  Discussed concerns and plan of care. Darcy Mauro states Dr Monterroso accepts pt to be admitted to telemetry.    1303  Pt family states pt is becoming very anxious and \"emotional\" and has not had nighttime antidepressant medications. Ordered ativan for anxiety.     MEDICAL DECISION MAKING  Results were reviewed/discussed with the patient and they were also made aware of online access. Pt also made aware that some labs, such as cultures, will not be resulted during ER visit and follow up with PMD is necessary.     MDM  Number of Diagnoses or Management Options  Dyspnea, unspecified type:   Elevated troponin:      Amount and/or Complexity of Data Reviewed  Clinical lab tests: ordered and reviewed (Troponin 0.036)  Tests in the radiology section of CPT®: reviewed and ordered (Chest XR - Poor inspiration without active airspace disease)  Tests in the medicine section of CPT®: ordered and reviewed (Refer to the procedure section of the note for EKG results)  Decide to obtain previous medical records or to obtain " history from someone other than the patient: yes (Epic)  Review and summarize past medical records: yes (Hx of multiple admissions for pneumonia.  Pt had normal negative troponin on 6/28/19)  Discuss the patient with other providers: yes (Darcy Mauro)           DIAGNOSIS  Final diagnoses:   Dyspnea, unspecified type   Elevated troponin       DISPOSITION  ADMISSION    Discussed treatment plan and reason for admission with pt/family and admitting physician.  Pt/family voiced understanding of the plan for admission for further testing/treatment as needed.         Latest Documented Vital Signs:  As of 6:59 AM  BP- 121/77 HR- 75 Temp- 97.5 °F (36.4 °C) (Oral) O2 sat- 98%    --  Documentation assistance provided by emilia Tineo for Dr Patrick.  Information recorded by the scribe was done at my direction and has been verified and validated by me.              Latoya Tineo  09/22/19 0104       Sid Patrick MD  09/22/19 6086

## 2019-09-22 NOTE — PLAN OF CARE
"Problem: Patient Care Overview  Goal: Plan of Care Review  Outcome: Ongoing (interventions implemented as appropriate)   09/22/19 0230 09/22/19 0405   Plan of Care Review   Progress --  improving   OTHER   Outcome Summary --  pt admitted from ED with elevated troponin-c/o pain with inspiration & dyspnea;pt resides at [facility] [place] x3-4mo & plans to not return, CCP consulted; pt AAOx3, son reports forgetful at times, moves independently in bed, enc frequent repositioning due to risk of impaired skin integrity as coccyx thin bony prominence; accumax applied to bed; reviewed POC with pt & son; #4 uncuffed shiley with PMV upon arrival with sats maintaining on RA mid 90's; NSR on monitor; no current skin breakdown; contact iso precautions initiated due to hx mrsa urine less 1yr ago; ivf's started; resumed current diet from [facility] & lengthy d/w pt & son regarding risks & rationale of puree diet with HTL; EMS had reported suctioning in route finding food particles; pt admitted to taking top & bottom of hamburger bun left in chair in room at [facility] from spouse, pt reports \"I took the bun, no one gave it to me, & i put ketchup on it\" no c/o pain,chest discomfort, pain with inspiration, nor SOA; trach supplies at , purewick in place upon arrival & connected to sx;    Coping/Psychosocial   Plan of Care Reviewed With patient;son --        Problem: Fall Risk (Adult)  Goal: Identify Related Risk Factors and Signs and Symptoms  Outcome: Outcome(s) achieved Date Met: 09/22/19    Goal: Absence of Fall  Outcome: Ongoing (interventions implemented as appropriate)      Problem: Skin Injury Risk (Adult)  Goal: Identify Related Risk Factors and Signs and Symptoms  Outcome: Outcome(s) achieved Date Met: 09/22/19    Goal: Skin Health and Integrity  Outcome: Ongoing (interventions implemented as appropriate)      Problem: Airway, Artificial (Adult)  Goal: Signs and Symptoms of Listed Potential Problems Will be Absent, Minimized or Managed " (Airway, Artificial)  Outcome: Ongoing (interventions implemented as appropriate)      Problem: Breathing Pattern Ineffective (Adult)  Goal: Identify Related Risk Factors and Signs and Symptoms  Outcome: Outcome(s) achieved Date Met: 09/22/19    Goal: Effective Oxygenation/Ventilation  Outcome: Ongoing (interventions implemented as appropriate)    Goal: Anxiety/Fear Reduction  Outcome: Ongoing (interventions implemented as appropriate)      Problem: Pain, Chronic (Adult)  Goal: Identify Related Risk Factors and Signs and Symptoms  Outcome: Outcome(s) achieved Date Met: 09/22/19    Goal: Acceptable Pain/Comfort Level and Functional Ability  Outcome: Ongoing (interventions implemented as appropriate)

## 2019-09-22 NOTE — H&P
History and Physical    Patient Name: Swetha Luis  Age/Sex: 59 y.o. female  : 1960  MRN: [id number]    Date of Admission: 2019  Date of Encounter Visit: 19  Encounter Provider: AMINATA Lopez  Place of Service: [facility]  Patient Care Team:  Lencho Connelly MD as PCP - General (Internal Medicine)    Subjective:     Chief Complaint:   Chief Complaint   Patient presents with   • Shortness of Breath       History of Present Illness  Swetha Luis is a 59 y.o. female with a history of systolic CHF, chronic hypoxic respiratory failure status post tracheostomy, tracheal stenosis, osteomyelitis, COPD, CKD, subarachnoid hemorrhage and history of substance abuse. Patient presented with complaints of shortness of breath and chest pain that started yesterday.  Patient is a poor historian with vague responses.  According to the records patient was sent over from the nursing facility due to complaints of shortness of breath after eating a hamburger bun and required increased suctioning.  Patient has chronic dysphasia and is supposed to be on mechanical soft nectar thick liquid diet, but is occasionally noncompliant with diet and has a history of aspiration pneumonia.    She reports having intermittent chest pressure with no exacerbating or relieving factors.  Reports no further chest pressure this morning.  Denies any fever, chills, wheezing, abdominal pain, vomiting or sick contact.  She did have some diarrhea and nausea yesterday.  She reports that she does not want to go back to [facility] as she feels she was not being well taken care of, but would not elaborate further. Denies any recent alcohol or tobacco use. Denies any prior hx of CAD and had a normal stress test in 2018.     Labs in the ER showed elevated troponin 0.036 (prior 2018 was normal), creatinine 1.11, AST 40 ALT 61, ALK PO4 183, normal WBC and unremarkable BNP.  EKG showed subtle ST changes in inferior leads.   Repeat troponins have still been stable near 0.03.  Chest x-ray showed poor inspiration, but otherwise no acute disease process.     Stress test: 12/31/18  · Myocardial perfusion imaging indicates a normal myocardial perfusion study with no evidence of ischemia.  · Left ventricular ejection fraction is normal (Calculated EF = 69%).  · Impressions are consistent with a low risk study.    Echocardiogram: 12/27/18  · Left ventricular systolic function is low normal. Calculated EF = 48.0%. Estimated EF was in disagreement with the calculated EF. Estimated EF = 50%. Normal left ventricular cavity size and wall thickness noted. Multiple wall segments are not well visualized and cannot rule out wall motion abnormalities in the basal anterolateral anterior and apical anterior walls. Left ventricular diastolic dysfunction is noted (grade I) consistent with impaired relaxation.  · Right ventricular cavity is borderline dilated.  · The aortic valve is abnormal in structure. There is thickening of the aortic valve. There is moderate nodular sclerosis of non-coronary cusp. Cannot rule out possible fibroblastoma..  · Mild mitral valve regurgitation is present.  · Trace tricuspid valve regurgitation is present. Insufficient TR velocity profile to estimate the right ventricular systolic pressure.    Review of Systems   Constitutional: Negative for chills, fatigue and fever.   HENT: Negative for congestion and trouble swallowing.    Eyes: Negative for visual disturbance.   Respiratory: Positive for cough and shortness of breath. Negative for wheezing.    Cardiovascular: Positive for chest pain. Negative for palpitations and leg swelling.   Gastrointestinal: Positive for diarrhea and nausea. Negative for abdominal pain and vomiting.   Genitourinary: Negative for difficulty urinating and dysuria.   Musculoskeletal: Negative for back pain.   Neurological: Negative for dizziness, syncope and weakness.   Psychiatric/Behavioral: Negative  for confusion. The patient is not nervous/anxious.    All other systems reviewed and are negative.      Past Medical and Surgical History:  Past Medical History:   Diagnosis Date   • Acute congestive heart failure (CMS/HCC) 12/24/2018   • Acute osteomyelitis (CMS/[facility])     Right shoulder due to IVDA   • Acute renal failure on dialysis (CMS/[facility])    • Anxiety    • CKD (chronic kidney disease)    • COPD (chronic obstructive pulmonary disease) (CMS/[facility])    • Nontraumatic subarachnoid hemorrhage (CMS/[facility])    • Tracheostomy present (CMS/[facility])        Past Surgical History:   Procedure Laterality Date   • TRACHEOSTOMY         Home Medications:   Medications Prior to Admission   Medication Sig Dispense Refill Last Dose   • Ascorbic Acid 500 MG capsule Take  by mouth.   9/21/2019 at Unknown time   • aspirin 81 MG chewable tablet Chew 1 tablet Daily.   9/21/2019 at Unknown time   • budesonide (PULMICORT) 0.5 MG/2ML nebulizer solution Take 0.5 mg by nebulization Daily. Via trach 2 times a day for SOA   9/21/2019 at Unknown time   • busPIRone (BUSPAR) 5 MG tablet Take 5 mg by mouth 3 (Three) Times a Day.   9/21/2019 at Unknown time   • dextromethorphan-guaifenesin (ROBITUSSIN-DM)  MG/5ML syrup 5 mL by Per G Tube route 4 (Four) Times a Day.   9/21/2019 at Unknown time   • escitalopram (LEXAPRO) 20 MG tablet Take 20 mg by mouth Daily.   9/21/2019 at Unknown time   • gabapentin (NEURONTIN) 300 MG capsule Take 1 capsule by mouth 3 (Three) Times a Day. 9 capsule 0 9/21/2019 at Unknown time   • ipratropium-albuterol (DUO-NEB) 0.5-2.5 mg/3 ml nebulizer Take 3 mL by nebulization Every 4 (Four) Hours As Needed for Wheezing.   9/21/2019 at Unknown time   • Metoprolol Succinate 25 MG capsule extended-release 24 hour sprinkle Take 25 mg by mouth Daily. 30 capsule 0 9/21/2019 at Unknown time   • MULTIPLE VITAMINS-MINERALS PO Take  by mouth.   9/21/2019 at Unknown time   • nystatin (MYCOSTATIN) 059572 UNIT/GM powder Apply  topically to  the appropriate area as directed 3 (Three) Times a Day. Skin folds in the groin   9/21/2019 at Unknown time   • omeprazole (priLOSEC) 40 MG capsule Take 40 mg by mouth Daily.   9/21/2019 at Unknown time   • oxyCODONE-acetaminophen (PERCOCET) 5-325 MG per tablet Take 1 tablet by mouth Every 6 (Six) Hours As Needed.   9/21/2019 at Unknown time   • pramipexole (MIRAPEX) 0.25 MG tablet Take 0.25 mg by mouth Every Night.   9/21/2019 at Unknown time   • predniSONE (DELTASONE) 10 MG tablet 10 mg by Per G Tube route Daily.   9/21/2019 at Unknown time   • thiamine (VITAMIN B-1) 100 MG tablet Take 100 mg by mouth Daily.   9/21/2019 at Unknown time   • nystatin (MYCOSTATIN) 320215 UNIT/ML suspension Place on swab and swab mouth 4 times daily for 4days   Unknown at Unknown time       Inpatient Medications:  Scheduled Meds:  aspirin 81 mg Oral Daily   budesonide 0.5 mg Nebulization Daily - RT   busPIRone 5 mg Oral TID   escitalopram 20 mg Oral Daily   guaifenesin-dextromethorphan 5 mL Per G Tube 4x Daily   [START ON 9/23/2019] influenza vaccine 0.5 mL Intramuscular Once   lansoprazole 30 mg Per G Tube QAM   metoprolol succinate XL 25 mg Oral Q24H   nystatin 5 mL Swish & Swallow 4x Daily   nystatin  Topical TID   pramipexole 0.25 mg Oral Nightly   predniSONE 10 mg Per G Tube Daily   sodium chloride 10 mL Intravenous Q12H   thiamine 100 mg Oral Daily     Continuous Infusions:  sodium chloride 75 mL/hr Last Rate: 75 mL/hr (09/22/19 0335)     PRN Meds:.•  acetaminophen **OR** acetaminophen **OR** acetaminophen  •  bisacodyl  •  calcium carbonate  •  ipratropium-albuterol  •  ondansetron **OR** ondansetron  •  sodium chloride  •  sodium chloride    Allergies:  Allergies   Allergen Reactions   • Cephalexin Unknown (See Comments)     unk     • Hydrocodone Unknown (See Comments)     unk   • [place] Unknown (See Comments)     unk   • Zithromax [Azithromycin] Unknown (See Comments)     unk       Past Social History:  Social History      Socioeconomic History   • Marital status:      Spouse name: Not on file   • Number of children: Not on file   • Years of education: Not on file   • Highest education level: Not on file   Tobacco Use   • Smoking status: Former Smoker     Packs/day: 1.00     Types: Cigarettes     Last attempt to quit: 2018     Years since quittin.1   • Smokeless tobacco: Never Used   Substance and Sexual Activity   • Alcohol use: No     Frequency: Never   • Drug use: Yes     Types: Cocaine     Comment: 20 years ago occasional   • Sexual activity: Defer       Past Family History:  History reviewed. No pertinent family history.    Objective:   Temp:  [97 °F (36.1 °C)-98.9 °F (37.2 °C)] 97 °F (36.1 °C)  Heart Rate:  [64-77] 64  Resp:  [18-22] 20  BP: (115-140)/(73-94) 134/92   SpO2:  [94 %-99 %] 94 %  on  Flow (L/min):  [6-8] 8 Device (Oxygen Therapy): tracheostomy collar    Intake/Output Summary (Last 24 hours) at 2019 0848  Last data filed at 2019 0611  Gross per 24 hour   Intake 303 ml   Output --   Net 303 ml     Body mass index is 25.18 kg/m².      19  2239 19  0211   Weight: 74.3 kg (163 lb 14.4 oz) 70.8 kg (156 lb)     Weight change:     Physical Exam   Constitutional: She is oriented to person, place, and time. She appears well-developed and well-nourished.   HENT:   Head: Normocephalic and atraumatic.   Eyes: Conjunctivae are normal. No scleral icterus.   Neck: Neck supple. No tracheal deviation present.   Tracheostomy present.  Did have some thick milky secretions after deep inspiration and cough   Cardiovascular: Normal rate, regular rhythm, normal heart sounds and intact distal pulses.   No murmur heard.  Pulmonary/Chest: Effort normal. She has no wheezes. She has no rales.   Scattered rhonchi in upper lobes   Abdominal: Soft. Bowel sounds are normal. She exhibits no distension. There is no tenderness.   LUQ PEG present    Musculoskeletal: Normal range of motion. She exhibits no  edema.   Neurological: She is alert and oriented to person, place, and time.   Skin: Skin is warm and dry.   Psychiatric: She has a normal mood and affect. Her behavior is normal.         Lab Review:     Results from last 7 days   Lab Units 09/22/19  0604 09/21/19  2248   SODIUM mmol/L 141 136   POTASSIUM mmol/L 4.4 4.6   CHLORIDE mmol/L 101 97*   CO2 mmol/L 28.5 27.4   BUN mg/dL 22* 23*   CREATININE mg/dL 1.00 1.11*   GLUCOSE mg/dL 106* 127*   CALCIUM mg/dL 9.6 9.6   AST (SGOT) U/L  --  40*   ALT (SGPT) U/L  --  61*     Estimated Creatinine Clearance: 67.7 mL/min (by C-G formula based on SCr of 1 mg/dL).          Results from last 7 days   Lab Units 09/22/19  0604 09/22/19  0130 09/21/19  2248   TROPONIN T ng/mL 0.034* 0.031* 0.036*     Results from last 7 days   Lab Units 09/21/19  2248   PROBNP pg/mL 269.1               Invalid input(s):  PHOS      Results from last 7 days   Lab Units 09/22/19  0604 09/21/19  2248   WBC 10*3/mm3 7.53 8.64   HEMOGLOBIN g/dL 13.0 13.8   HEMATOCRIT % 42.1 45.2   PLATELETS 10*3/mm3 215 237   MCV fL 84.0 86.4   MCH pg 25.9* 26.4*   MCHC g/dL 30.9* 30.5*   RDW % 14.1 14.2   RDW-SD fl 42.3 45.2   MPV fL 11.7 11.8   NEUTROPHIL % %  --  81.8*   LYMPHOCYTE % %  --  12.8*   MONOCYTES % %  --  3.5*   EOSINOPHIL % %  --  0.9   BASOPHIL % %  --  0.5   IMM GRAN % %  --  0.5   NEUTROS ABS 10*3/mm3  --  7.07*   LYMPHS ABS 10*3/mm3  --  1.11   MONOS ABS 10*3/mm3  --  0.30   EOS ABS 10*3/mm3  --  0.08   BASOS ABS 10*3/mm3  --  0.04   IMMATURE GRANS (ABS) 10*3/mm3  --  0.04   NRBC /100 WBC  --  0.0                                         Imaging:  Imaging Results (last 24 hours)     Procedure Component Value Units Date/Time    XR Chest 1 View [[id number]] Collected:  09/21/19 2305     Updated:  09/21/19 2309    Narrative:       PORTABLE CHEST X-RAY     CLINICAL HISTORY: SOA Triage Protocol     COMPARISON: 07/10/2019.     FINDINGS: Portable AP view of the chest was obtained with overlying  monitor  leads in place. A tracheostomy has been placed in the interim  and terminates near the level of the thoracic inlet. Lungs are poorly  aerated but clear. No edema or pleural fluid. Mild cardiomegaly and  aortic ectasia.             Impression:       Poor inspiration without active airspace disease              This report was finalized on 9/21/2019 11:06 PM by Andrei Beatty M.D.             EKG:  ECG 12 Lead   Preliminary Result   HEART RATE= 71  bpm   RR Interval= 840  ms   ND Interval= 139  ms   P Horizontal Axis= 15  deg   P Front Axis= 6  deg   QRSD Interval= 74  ms   QT Interval= 421  ms   QRS Axis= 0  deg   T Wave Axis= 46  deg   - NORMAL ECG -   Sinus rhythm   Electronically Signed By:    Date and Time of Study: 2019-09-21 22:59:02          I reviewed the patient's new clinical results and medications.  I reviewed the patient's new imaging results and agree with the interpretation.  I personally viewed and interpreted the patient's EKG/Telemetry data.      Problem List:     Active Hospital Problems    Diagnosis  POA   • **Dyspnea [R06.00]  Yes   • Elevated LFTs [R94.5]  Unknown   • Elevated troponin [R74.8]  Unknown   • Tracheostomy present (CMS/[facility]) [Z93.0]  Not Applicable   • Respiratory failure, chronic (CMS/[facility]) [J96.10]  Yes   • COPD (chronic obstructive pulmonary disease) (CMS/[facility]) [J44.9]  Yes   • Chronic diastolic CHF (congestive heart failure) (CMS/[facility]) [I50.32]  Yes   • CKD (chronic kidney disease) [N18.9]  Yes      Resolved Hospital Problems   No resolved problems to display.       Assessment and Plan:       Dyspnea/ chronic respiratory failure/COPD/tracheostomy  Possibly from aspiration. On home oxygen at 5 LPM. CXR showed no acute process, no fever or leukocytosis and not likely pneumonia so will hold on antibiotics. Does have some rhonchi, but no wheezing to think acute exacerbation of COPD.   -continue trach collar with 5 LPM   -continue aggressive pulmonary hygiene measures and PRN suctioning  and trach care  -continue home pulmicort and PRN Duonebs      Elevated troponin  Normal trop in 2018 with normal stress test. Now with persistent troponin 0.03 and some inferior changes on EKG  - repeat EKG  - consult cardiology      CKD (chronic kidney disease)  Creatinine near baseline  - will monitor      Chronic diastolic CHF (congestive heart failure) (CMS/HCC)/ HTN  Last echo showed EF 48%. No sign of volume overload and BNP wnl. BP stable  -continue home BP meds       Elevated LFTs  Minimally elevated. No abdominal pain and likely reactive from possible hypoxic event.   -CMP in am     Dysphagia  -discussed importance of adherence to diet modification  - NTL and aspiration precautions    Home medications addressed.   Stop IVF    DVT prophylaxis: SCD's  Code status addressed: full code  Diet: mechanical soft with NTL  Consult CCP to help with DC planning- pt does not want to return to [facility], but may have difficulty finding placement given tracheostomy care    I discussed the patients findings and my recommendations with patient.        AMINATA Lopez  [facility]  09/22/19  8:48 AM    Dictated utilizing Dragon dictation    Addendum:   I, Elena Pagan MD, personally performed the services described in this documentation as scribed by the above named individual in my presence, and it is both accurate and complete.     I personally interviewed and examined the patient.  I agree with above assessment by Liliane Muñoz.  Additional comments as follows:   Sleeping but awakened easily.  Said she felt good.  Chronically ill-appearing.  Looks older than age.  Heart is regular.  Light rhonchi bilaterally.  Heart is regular without murmur.  Abdomen nontender.  Surgically absent right big toe and right second toe.  --Acute COPD exacerbation likely secondary to dysphagia.  Continue altered diet and swallow precautions.  Continue duo nebs and pulmonary hygiene  --Elevated troponin.   Cardiology recommends echo  --Congestive heart failure, stable/compensated    Elena Pagan MD   9/22/2019  7:30 PM

## 2019-09-22 NOTE — PROGRESS NOTES
Continued Stay Note  [facility]     Patient Name: Swetha Luis  MRN: [id number]  Today's Date: 9/22/2019    Admit Date: 9/21/2019    Discharge Plan     Row Name 09/22/19 1305       Plan    Plan  [facility]family [facility], await eval...........Lisa ONOFRE     Patient/Family in Agreement with Plan  yes    Plan Comments  Call from [place] stating family contacted her regarding patient discharging to [facility] for skilled rehab after hospital stay. Call to patient's sister Monserrat, introduced self and role. Monserrat verified patient wishes to DC to [facility] DC and ok to make referral in [facility] for rep to follow. [facility]..........Lisa ONOFRE         Discharge Codes    No documentation.             Yasemin Sandoval, RN

## 2019-09-22 NOTE — CONSULTS
Adult Nutrition  Assessment/PES    Patient Name:  Swetha Luis  YOB: 1960  MRN: [id number]  Admit Date:  9/21/2019    Assessment Date:  9/22/2019    Nutrition assessment triggered by TF consult. Spoke with RN, per [facility]-TF order Diabetisource @ 60 x 8 hours.  Recommend changing to bolus feeds to better meet nutritional needs. If po intake per meal 100%, hold bolus. If 75% of meal, provide 120 mL bolus. 50% of meal, provide 240 mL bolus. 25% of meal, provide 360 mL bolus. 0% of meal, provide 480 mL bolus of Diabetisource.   RD to follow closely for po intake and nutritional needs.  Reason for Assessment     Row Name 09/22/19 1052          Reason for Assessment    Reason For Assessment  physician consult;TF/PN     Diagnosis   Primary Problem:  Dyspnea; CKD, HF, HTN, COPD, +trach, +PEG     Identified At Risk by Screening Criteria  tube feeding or parenteral nutrition         Nutrition/Diet History     Row Name 09/22/19 1052          Nutrition/Diet History    Typical Food/Fluid Intake  po intake-Pureed+HTL; from chart noncompliant with diet-reason for aspiration         Anthropometrics     Row Name 09/22/19 1052          Body Mass Index (BMI)    BMI Assessment  BMI 25-29.9: overweight         Labs/Tests/Procedures/Meds     Row Name 09/22/19 1052          Labs/Procedures/Meds    Lab Results Reviewed  reviewed, pertinent        Diagnostic Tests/Procedures    Diagnostic Test/Procedure Reviewed  reviewed, pertinent        Medications    Pertinent Medications Reviewed  reviewed, pertinent         Physical Findings     Row Name 09/22/19 1053          Physical Findings    Overall Physical Appearance  -- B=17; +trach, +PEG         Estimated/Assessed Needs     Row Name 09/22/19 1053          Calculation Measurements    Weight Used For Calculations  70.8 kg (156 lb 1.4 oz)        Estimated/Assessed Needs    Additional Documentation  KCAL/KG (Group);Protein Requirements (Group);Fluid Requirements (Group)        KCAL/KG     KCAL/KG  20 Kcal/Kg (kcal);25 Kcal/Kg (kcal)     20 Kcal/Kg (kcal)  1416     25 Kcal/Kg (kcal)  1770        Protein Requirements    Est Protein Requirement Amount (gms/kg)  1.2 gm protein     Estimated Protein Requirements (gms/day)  84.96        Fluid Requirements    Estimated Fluid Requirements (mL/day)  1800     RDA Method (mL)  1800     [place]-Segar Method (over 20 kg)  2916         Nutrition Prescription Ordered     Row Name 09/22/19 1053          Nutrition Prescription PO    Current PO Diet  Dysphagia     Dysphagia Level  2  Pureed     Fluid Consistency  Honey thick     Common Modifiers  Cardiac        Nutrition Prescription EN    Enteral Route  PEG     Product  Diabetisource AC (Glucerna 1.2)     TF Delivery Method  Cyclic     Cyclic TF Goal Rate (mL/hr)  60 mL/hr     Cyclic TF Cycle Over (hrs)   8     Water flush (mL)   300 mL     Water Flush Frequency  -- every 6 hours         Evaluation of Received Nutrient/Fluid Intake     Row Name 09/22/19 1054 09/22/19 1053       Calories Evaluation    Enteral Calories (kcal)  576  --    % of Kcal Needs  41  --       Protein Evaluation    Enteral Protein (gm)  28.8  --    % of Protein Needs  40  --       Fluid Intake Evaluation    Enteral (Free Water) Fluid (mL)  393  --       PO Evaluation    Number of Meals  --  1    % PO Intake  --  75              Problem/Interventions:  Problem 1     Row Name 09/22/19 1054          Nutrition Diagnoses Problem 1    Problem 1  Needs Alternate Route     Etiology (related to)  MNT for Treatment/Condition     Signs/Symptoms (evidenced by)  EN Intake Delivery     Percent (%) of EN goal  40 %                 Intervention Goal     Row Name 09/22/19 1055          Intervention Goal    General  Maintain nutrition;Meet nutritional needs for age/condition     PO  Tolerate PO;Increase intake     Weight  Maintain weight         Nutrition Intervention     Row Name 09/22/19 1055          Nutrition Intervention    RD/Tech Action  Follow Tx  progress;Care plan reviewd spoke with RN\         Nutrition Prescription     Row Name 09/22/19 1055          Nutrition Prescription EN    Enteral Prescription  Enteral begin/change;Enteral to supply     Enteral Route  PEG     Product  Diabetisource     TF Delivery Method  Bolus     TF Bolus Goal Volume (mL)  240 mL     TF Bolus Frequency  5 times a day     Water flush (mL)   100 mL     Water Flush Frequency  Every feeding     New EN Prescription Ordered?  No, recommended        EN to Supply    Kcal/Day  1440 Kcal/Day     Protein (gm/day)  72 gm/day     TF Free H2O (mL)  984 mL         Education/Evaluation     Row Name 09/22/19 1056          Education    Education  Will Instruct as appropriate        Monitor/Evaluation    Monitor  Per protocol           Electronically signed by:  Judie Hunter RD  09/22/19 10:57 AM

## 2019-09-22 NOTE — DISCHARGE PLACEMENT REQUEST
"Tj, Neo (59 y.o. Female)     Date of Birth Social Security Number Address Home Phone MRN    1960  [address]  [facility]  [place] [address] [phone] [id number]    Jehovah's witness Marital Status          None        Admission Date Admission Type Admitting Provider Attending Provider Department, Room/Bed    9/21/19 Emergency RayMontrell MD Hayden, Juliana, MD [address], [address]    Discharge Date Discharge Disposition Discharge Destination                       Attending Provider:  Elena Pagan MD    Allergies:  Cephalexin, Hydrocodone, Strawberry, Zithromax [Azithromycin]    Isolation:  Contact   Infection:  MRSA (12/19/18)   Code Status:  CPR    Ht:  167.6 cm (66\")   Wt:  70.8 kg (156 lb)    Admission Cmt:  None   Principal Problem:  Dyspnea [R06.00]                 Active Insurance as of 9/21/2019     Primary Coverage     Payor Plan Insurance Group Employer/Plan Group    PASSPORT MEDICARE ADVANTAGE PASSPORT ADVANTAGE      Payor Plan Address Payor Plan Phone Number Payor Plan Fax Number Effective Dates    [address]   1/1/2018 - None Entered    [place] [id number]       Subscriber Name Subscriber Birth Date Member ID       NEO SPENCER 1960 [id number]           Secondary Coverage     Payor Plan Insurance Group Employer/Plan Group    PASSPORT HEALTH PLAN PASSPORT [id number]     Payor Plan Address Payor Plan Phone Number Payor Plan Fax Number Effective Dates    [address] [phone]  11/1/1997 - None Entered    [facility] [address]       Subscriber Name Subscriber Birth Date Member ID       NEO SPENCER 1960 [id number]                 Emergency Contacts      (Rel.) Home Phone Work Phone Mobile Phone    TJMATT CARRILLO (Spouse) [phone] -- --    TjShazia (Daughter) [phone] -- [phone]    Sister, \"Hattie\" (Sister) [phone] -- [phone]    Monserrat Taylor (Sister) [phone] -- [phone]    " Ken Luis (Son) -- -- [phone]

## 2019-09-22 NOTE — NURSING NOTE
Received call from KASSIDY Ng at  [facility] requesting update of pt condition, LPN explained course of events leading to EMS arrival as follows: pt found with [employer] wrappers in bed, denied regular texture intake though after persistence by SNF staff pt did admit to eating hamburger bun as pt has admitted tonight after persistence by this nurse; nurse reported food particles found in trach; nurse received call from pt dtr requesting ED transfer due to pt calling & c/o SOA; the LPN reported negative assessment though [facility] policy was followed to send pt to ED per family request via phone; reported sending pt to ED approx 1hr after ingestion of regular textured food; LPN explained pt spouse noncompliant & brings in regular fast food often despite efforts by management staff & MD regarding health risks of ingestion of regular liquids & food; pt does not have a POA & frequently sent out to hospital after consumption of food texture due to pt & family not following medical advice; informed nurse of pt & son expressing wishes to not return to [facility] with unknown specific reason; direct number to unit given to facility nurse to f/u as needed;

## 2019-09-22 NOTE — CONSULTS
Date of Hospital Visit: [unfilled]ENC@  Encounter Provider: Nate Rae Jr, MD  Place of Service: [facility] CARDIOLOGY  Patient Name: Swetha Luis  :1960  Referral Provider: Montrell Monterroso MD    Chief complaint - chest pain    History of Present Illness - Ms Luis is a 59 year old woman with significant PMH as outlined below.  Most of her care has been at [place].  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  The records from [facility] state this was due to IVDA but the patient states this is not true.  She required numerous surgeries, and was on pressors which caused toe necrosis.  She was on HD for ROSALVA.  She was minimally responsive when she was discharged to an LTAC with a trach/PEG.     She was seen by my partner Dr. Gabriel in 2018 for a near identical clinical situation where she was hypoxic had tachycardia and mildly elevated troponin.  She had complaints of chest pain at that point and underwent a stress test which showed normal LV function and no infarct or ischemia.  She also had an echocardiogram which showed mild LV dysfunction EF of 48% with no significant valvular heart disease.     Unfortunately she is an extremely poor historian but her complaints are of an upper thoracic sharp pain.  When asked if this is worse with deep breathing she states no.  When asked if she has this chronically she also states no.  When asked if she is short of breath she will same now as well and then towards the end of the conversation say that she is short of breath.  She quit smoking a few months ago.  Peak troponin is 0.045 and serial EKG show no ischemic changes.  All other historical details are taken from electronic medical records.      Past Medical History:   Diagnosis Date   • Acute congestive heart failure (CMS/HCC) 2018   • Acute osteomyelitis (CMS/HCC)     Right shoulder due to IVDA   • Acute  renal failure on dialysis (CMS/[facility])    • Anxiety    • Pugh esophagus    • CKD (chronic kidney disease)    • COPD (chronic obstructive pulmonary disease) (CMS/Formerly [facility])    • MRSA infection    • Nontraumatic subarachnoid hemorrhage (CMS/[facility]ly [facility])    • Seizures (CMS/[facility]ly [facility])    • Tracheostomy present (CMS/Formerly [facility])        Past Surgical History:   Procedure Laterality Date   • TRACHEOSTOMY         Medications Prior to Admission   Medication Sig Dispense Refill Last Dose   • Ascorbic Acid 500 MG capsule Take  by mouth.   9/21/2019 at Unknown time   • aspirin 81 MG chewable tablet Chew 1 tablet Daily.   9/21/2019 at Unknown time   • budesonide (PULMICORT) 0.5 MG/2ML nebulizer solution Take 0.5 mg by nebulization Daily. Via trach 2 times a day for SOA   9/21/2019 at Unknown time   • busPIRone (BUSPAR) 5 MG tablet Take 5 mg by mouth 3 (Three) Times a Day.   9/21/2019 at Unknown time   • dextromethorphan-guaifenesin (ROBITUSSIN-DM)  MG/5ML syrup 5 mL by Per G Tube route 4 (Four) Times a Day.   9/21/2019 at Unknown time   • escitalopram (LEXAPRO) 20 MG tablet Take 20 mg by mouth Daily.   9/21/2019 at Unknown time   • gabapentin (NEURONTIN) 300 MG capsule Take 1 capsule by mouth 3 (Three) Times a Day. 9 capsule 0 9/21/2019 at Unknown time   • ipratropium-albuterol (DUO-NEB) 0.5-2.5 mg/3 ml nebulizer Take 3 mL by nebulization Every 4 (Four) Hours As Needed for Wheezing.   9/21/2019 at Unknown time   • Metoprolol Succinate 25 MG capsule extended-release 24 hour sprinkle Take 25 mg by mouth Daily. 30 capsule 0 9/21/2019 at Unknown time   • MULTIPLE VITAMINS-MINERALS PO Take  by mouth.   9/21/2019 at Unknown time   • nystatin (MYCOSTATIN) 768315 UNIT/GM powder Apply  topically to the appropriate area as directed 3 (Three) Times a Day. Skin folds in the groin   9/21/2019 at Unknown time   • omeprazole (priLOSEC) 40 MG capsule Take 40 mg by mouth Daily.   9/21/2019 at Unknown time   • oxyCODONE-acetaminophen (PERCOCET) 5-325 MG per tablet  Take 1 tablet by mouth Every 6 (Six) Hours As Needed.   9/21/2019 at Unknown time   • pramipexole (MIRAPEX) 0.25 MG tablet Take 0.25 mg by mouth Every Night.   9/21/2019 at Unknown time   • predniSONE (DELTASONE) 10 MG tablet 10 mg by Per G Tube route Daily.   9/21/2019 at Unknown time   • thiamine (VITAMIN B-1) 100 MG tablet Take 100 mg by mouth Daily.   9/21/2019 at Unknown time   • nystatin (MYCOSTATIN) 950951 UNIT/ML suspension Place on swab and swab mouth 4 times daily for 4days   Unknown at Unknown time       Current Meds  Scheduled Meds:  aspirin 81 mg Oral Daily   budesonide 0.5 mg Nebulization Daily - RT   busPIRone 5 mg Oral TID   escitalopram 20 mg Oral Daily   gabapentin 300 mg Oral TID   guaifenesin-dextromethorphan 5 mL Per G Tube 4x Daily   [START ON 9/23/2019] influenza vaccine 0.5 mL Intramuscular Once   lansoprazole 30 mg Per G Tube QAM   metoprolol succinate XL 25 mg Oral Q24H   nystatin 5 mL Swish & Swallow 4x Daily   nystatin  Topical TID   pramipexole 0.25 mg Oral Nightly   predniSONE 10 mg Per G Tube Daily   sodium chloride 10 mL Intravenous Q12H   thiamine 100 mg Oral Daily     Continuous Infusions:   PRN Meds:.•  acetaminophen **OR** acetaminophen **OR** acetaminophen  •  bisacodyl  •  calcium carbonate  •  ipratropium-albuterol  •  ondansetron **OR** ondansetron  •  oxyCODONE-acetaminophen  •  sodium chloride  •  sodium chloride    Allergies as of 09/21/2019 - Reviewed 09/21/2019   Allergen Reaction Noted   • Cephalexin Unknown (See Comments) 07/10/2019   • Hydrocodone Unknown (See Comments) 12/18/2018   • [place] Unknown (See Comments) 12/18/2018   • Zithromax [azithromycin] Unknown (See Comments) 12/18/2018       Social History     Socioeconomic History   • Marital status:      Spouse name: Not on file   • Number of children: Not on file   • Years of education: Not on file   • Highest education level: Not on file   Tobacco Use   • Smoking status: Former Smoker     Packs/day:  "1.00     Types: Cigarettes     Last attempt to quit: 2018     Years since quittin.1   • Smokeless tobacco: Never Used   Substance and Sexual Activity   • Alcohol use: No     Frequency: Never   • Drug use: Yes     Types: Cocaine     Comment: 20 years ago occasional   • Sexual activity: Defer       Family History   Problem Relation Age of Onset   • Diabetes Mother    • Hypertension Mother        REVIEW OF SYSTEMS:   12 point ROS was performed and is negative except as outlined in HPI        Objective:   Temp:  [97 °F (36.1 °C)-98.9 °F (37.2 °C)] 97 °F (36.1 °C)  Heart Rate:  [64-77] 72  Resp:  [16-22] 16  BP: (115-140)/(73-94) 134/92  Body mass index is 25.18 kg/m².  Flowsheet Rows      First Filed Value   Admission Height  167.6 cm (66\") Documented at 2019 2239   Admission Weight  74.3 kg (163 lb 14.4 oz) Documented at 2019 2239        Vitals:    19 0925   BP:    Pulse: 72   Resp: 16   Temp:    SpO2: 93%       General Appearance:   Confused appearing much older than stated age   Head:    Normocephalic, without obvious abnormality, atraumatic   Eyes:            Lids and lashes normal, conjunctivae and sclerae normal, no   icterus, no pallor, corneas clear, PERRLA   Ears:    Ears appear intact with no abnormalities noted   Throat:   No oral lesions, no thrush, oral mucosa moist   Neck:  No significant JVD tracheostomy tube is midline       Lungs:    Diffuse rhonchi no wheeze or crackles, mild respiratory distress and tachypnea    Heart:    Regular rhythm and normal rate, normal S1 and S2, no murmur, no gallop, no rub, no click       Abdomen:     Normal bowel sounds, no masses, no organomegaly, soft        non-tender, non-distended, no guarding, no rebound  tenderness   Extremities:   Moves all extremities well, no edema, toe amputation right lower extremity   Pulses:  Thready lower extremity pulses   Skin:  Psychiatric:   No bleeding, bruising or rash  Confused but pleasant               "   Lab Review:    Results from last 7 days   Lab Units 09/22/19  0604 09/21/19  2248   SODIUM mmol/L 141 136   POTASSIUM mmol/L 4.4 4.6   CHLORIDE mmol/L 101 97*   CO2 mmol/L 28.5 27.4   BUN mg/dL 22* 23*   CREATININE mg/dL 1.00 1.11*   CALCIUM mg/dL 9.6 9.6   BILIRUBIN mg/dL  --  <0.2*   ALK PHOS U/L  --  183*   ALT (SGPT) U/L  --  61*   AST (SGOT) U/L  --  40*   GLUCOSE mg/dL 106* 127*     Results from last 7 days   Lab Units 09/22/19  1211 09/22/19  0604 09/22/19  0130   TROPONIN T ng/mL 0.045* 0.034* 0.031*     @LABRCNTbnp@  Results from last 7 days   Lab Units 09/22/19  0604 09/21/19  2248   WBC 10*3/mm3 7.53 8.64   HEMOGLOBIN g/dL 13.0 13.8   HEMATOCRIT % 42.1 45.2   PLATELETS 10*3/mm3 215 237             @LABRCNTIP(chol,trig,hdl,ldl)    I personally viewed and interpreted the patient's EKG/Telemetry data  )  Patient Active Problem List   Diagnosis   • Tracheostomy complication (CMS/[facility])   • Gangrene of toe (CMS/[facility])   • Acute diastolic congestive heart failure (CMS/[facility])   • ARF (acute renal failure) (CMS/Formerly [facility])   • CKD (chronic kidney disease)   • Elevated troponin   • Acute respiratory failure with hypoxemia (CMS/[facility])   • Acute osteomyelitis (CMS/[facility])   • UTI due to extended-spectrum beta lactamase (ESBL) producing Escherichia coli   • Thrush, oral   • Tracheostomy malfunction (CMS/[facility])   • COPD (chronic obstructive pulmonary disease) (CMS/[facility])   • Respiratory failure, chronic (CMS/[facility])   • PEG (percutaneous endoscopic gastrostomy) status (CMS/[facility]ly [facility])   • History of osteomyelitis   • Polysubstance abuse (CMS/[facility]ly [facility])   • History of tracheal stenosis   • Chronic diastolic CHF (congestive heart failure) (CMS/[facility])   • Peripheral artery disease (CMS/[facility])   • Medically noncompliant   • WENDI and COPD overlap syndrome (CMS/[facility])   • Dyspnea   • Elevated LFTs   • Elevated troponin   • Tracheostomy present (CMS/HCC)   • Essential hypertension   • Dysphagia     Assessment and Plan:    Minimal elevation of troponin which is  multifactorial.  At this point I would stop checking serial values.  We will repeat an echocardiogram and if no change from previous will not consider further work-up.  Continue aspirin beta-blocker and statin.  Follow clinical progress for further treatment recommendations.    Nate Rae Jr, MD  09/22/19  1:44 PM.  Time spent in reviewing chart, discussion and examination:

## 2019-09-22 NOTE — PLAN OF CARE
Problem: Patient Care Overview  Goal: Plan of Care Review  Outcome: Ongoing (interventions implemented as appropriate)   09/22/19 4132   Plan of Care Review   Progress improving   OTHER   Outcome Summary Pt. getting breathing treatments. Troponin @ 0.045 today. AxOx4. Pt. on 8 L most of the day and switched down to 7 L on trach collar and maintaining O2 sats. Chest tightness and pain reported with coughing. Contact precautions. Possible discharge, placement in nursing care of choice family aware. Talked to granddaughter Shazia and Sister Monserrat. Cardiology consulted Dr. Rae. No N/V/D noted. No S/S of acute distress noted. Will continue to monitor.    Coping/Psychosocial   Plan of Care Reviewed With patient

## 2019-09-23 PROBLEM — N18.30 STAGE 3 CHRONIC KIDNEY DISEASE (HCC): Status: ACTIVE | Noted: 2018-12-24

## 2019-09-23 LAB
ALBUMIN SERPL-MCNC: 3.5 G/DL (ref 3.5–5.2)
ALBUMIN/GLOB SERPL: 1.3 G/DL
ALP SERPL-CCNC: 157 U/L (ref 39–117)
ALT SERPL W P-5'-P-CCNC: 44 U/L (ref 1–33)
ANION GAP SERPL CALCULATED.3IONS-SCNC: 8.7 MMOL/L (ref 5–15)
AST SERPL-CCNC: 28 U/L (ref 1–32)
BILIRUB SERPL-MCNC: <0.2 MG/DL (ref 0.2–1.2)
BUN BLD-MCNC: 23 MG/DL (ref 6–20)
BUN/CREAT SERPL: 24.2 (ref 7–25)
CALCIUM SPEC-SCNC: 9.1 MG/DL (ref 8.6–10.5)
CHLORIDE SERPL-SCNC: 98 MMOL/L (ref 98–107)
CO2 SERPL-SCNC: 29.3 MMOL/L (ref 22–29)
CREAT BLD-MCNC: 0.95 MG/DL (ref 0.57–1)
DEPRECATED RDW RBC AUTO: 43.9 FL (ref 37–54)
ERYTHROCYTE [DISTWIDTH] IN BLOOD BY AUTOMATED COUNT: 14.1 % (ref 12.3–15.4)
GFR SERPL CREATININE-BSD FRML MDRD: 60 ML/MIN/1.73
GLOBULIN UR ELPH-MCNC: 2.8 GM/DL
GLUCOSE BLD-MCNC: 95 MG/DL (ref 65–99)
HCT VFR BLD AUTO: 39.9 % (ref 34–46.6)
HGB BLD-MCNC: 12.4 G/DL (ref 12–15.9)
MCH RBC QN AUTO: 26.2 PG (ref 26.6–33)
MCHC RBC AUTO-ENTMCNC: 31.1 G/DL (ref 31.5–35.7)
MCV RBC AUTO: 84.4 FL (ref 79–97)
PLATELET # BLD AUTO: 201 10*3/MM3 (ref 140–450)
PMV BLD AUTO: 12 FL (ref 6–12)
POTASSIUM BLD-SCNC: 3.7 MMOL/L (ref 3.5–5.2)
PROT SERPL-MCNC: 6.3 G/DL (ref 6–8.5)
RBC # BLD AUTO: 4.73 10*6/MM3 (ref 3.77–5.28)
SODIUM BLD-SCNC: 136 MMOL/L (ref 136–145)
WBC NRBC COR # BLD: 6.74 10*3/MM3 (ref 3.4–10.8)

## 2019-09-23 PROCEDURE — G0378 HOSPITAL OBSERVATION PER HR: HCPCS

## 2019-09-23 PROCEDURE — 94799 UNLISTED PULMONARY SVC/PX: CPT

## 2019-09-23 PROCEDURE — 80053 COMPREHEN METABOLIC PANEL: CPT | Performed by: NURSE PRACTITIONER

## 2019-09-23 PROCEDURE — 63710000001 PREDNISONE PER 5 MG: Performed by: NURSE PRACTITIONER

## 2019-09-23 PROCEDURE — 99214 OFFICE O/P EST MOD 30 MIN: CPT | Performed by: NURSE PRACTITIONER

## 2019-09-23 PROCEDURE — G0008 ADMIN INFLUENZA VIRUS VAC: HCPCS | Performed by: HOSPITALIST

## 2019-09-23 PROCEDURE — 90686 IIV4 VACC NO PRSV 0.5 ML IM: CPT | Performed by: HOSPITALIST

## 2019-09-23 PROCEDURE — 85027 COMPLETE CBC AUTOMATED: CPT | Performed by: NURSE PRACTITIONER

## 2019-09-23 PROCEDURE — 25010000002 INFLUENZA VAC SPLIT QUAD 0.5 ML SUSPENSION PREFILLED SYRINGE: Performed by: HOSPITALIST

## 2019-09-23 RX ORDER — NITROGLYCERIN 0.4 MG/1
0.4 TABLET SUBLINGUAL
Status: DISCONTINUED | OUTPATIENT
Start: 2019-09-23 | End: 2019-09-25 | Stop reason: HOSPADM

## 2019-09-23 RX ADMIN — NYSTATIN 500000 UNITS: 100000 SUSPENSION ORAL at 09:08

## 2019-09-23 RX ADMIN — ASPIRIN 81 MG: 81 TABLET, CHEWABLE ORAL at 09:07

## 2019-09-23 RX ADMIN — LANSOPRAZOLE 30 MG: KIT at 06:45

## 2019-09-23 RX ADMIN — GUAIFENESIN AND DEXTROMETHORPHAN 5 ML: 100; 10 SYRUP ORAL at 18:27

## 2019-09-23 RX ADMIN — GUAIFENESIN AND DEXTROMETHORPHAN 5 ML: 100; 10 SYRUP ORAL at 09:08

## 2019-09-23 RX ADMIN — ESCITALOPRAM 20 MG: 20 TABLET, FILM COATED ORAL at 09:07

## 2019-09-23 RX ADMIN — GABAPENTIN 300 MG: 300 CAPSULE ORAL at 15:26

## 2019-09-23 RX ADMIN — METOPROLOL SUCCINATE 25 MG: 25 TABLET, FILM COATED, EXTENDED RELEASE ORAL at 09:07

## 2019-09-23 RX ADMIN — BUDESONIDE 0.5 MG: 0.5 SUSPENSION RESPIRATORY (INHALATION) at 06:37

## 2019-09-23 RX ADMIN — NYSTATIN: 100000 POWDER TOPICAL at 09:08

## 2019-09-23 RX ADMIN — BUSPIRONE HYDROCHLORIDE 5 MG: 5 TABLET ORAL at 15:26

## 2019-09-23 RX ADMIN — SODIUM CHLORIDE, PRESERVATIVE FREE 10 ML: 5 INJECTION INTRAVENOUS at 09:08

## 2019-09-23 RX ADMIN — GABAPENTIN 300 MG: 300 CAPSULE ORAL at 09:07

## 2019-09-23 RX ADMIN — NYSTATIN: 100000 POWDER TOPICAL at 15:28

## 2019-09-23 RX ADMIN — Medication 100 MG: at 09:07

## 2019-09-23 RX ADMIN — BUSPIRONE HYDROCHLORIDE 5 MG: 5 TABLET ORAL at 09:07

## 2019-09-23 RX ADMIN — NYSTATIN 500000 UNITS: 100000 SUSPENSION ORAL at 18:27

## 2019-09-23 RX ADMIN — NYSTATIN: 100000 POWDER TOPICAL at 20:18

## 2019-09-23 RX ADMIN — NYSTATIN 500000 UNITS: 100000 SUSPENSION ORAL at 20:17

## 2019-09-23 RX ADMIN — BUSPIRONE HYDROCHLORIDE 5 MG: 5 TABLET ORAL at 20:17

## 2019-09-23 RX ADMIN — INFLUENZA A VIRUS A/BRISBANE/02/2018 IVR-190 (H1N1) ANTIGEN (PROPIOLACTONE INACTIVATED), INFLUENZA A VIRUS A/KANSAS/14/2017 X-327 (H3N2) ANTIGEN (PROPIOLACTONE INACTIVATED), INFLUENZA B VIRUS B/MARYLAND/15/2016 ANTIGEN (PROPIOLACTONE INACTIVATED), INFLUENZA B VIRUS B/PHUKET/3073/2013 BVR-1B ANTIGEN (PROPIOLACTONE INACTIVATED) 0.5 ML: 15; 15; 15; 15 INJECTION, SUSPENSION INTRAMUSCULAR at 15:27

## 2019-09-23 RX ADMIN — GUAIFENESIN AND DEXTROMETHORPHAN 5 ML: 100; 10 SYRUP ORAL at 20:17

## 2019-09-23 RX ADMIN — GABAPENTIN 300 MG: 300 CAPSULE ORAL at 20:17

## 2019-09-23 RX ADMIN — PRAMIPEXOLE DIHYDROCHLORIDE 0.25 MG: 0.25 TABLET ORAL at 20:17

## 2019-09-23 RX ADMIN — OXYCODONE HYDROCHLORIDE AND ACETAMINOPHEN 1 TABLET: 5; 325 TABLET ORAL at 22:41

## 2019-09-23 RX ADMIN — PREDNISONE 10 MG: 10 TABLET ORAL at 09:07

## 2019-09-23 RX ADMIN — SODIUM CHLORIDE, PRESERVATIVE FREE 10 ML: 5 INJECTION INTRAVENOUS at 20:17

## 2019-09-23 RX ADMIN — OXYCODONE HYDROCHLORIDE AND ACETAMINOPHEN 1 TABLET: 5; 325 TABLET ORAL at 09:07

## 2019-09-23 RX ADMIN — OXYCODONE HYDROCHLORIDE AND ACETAMINOPHEN 1 TABLET: 5; 325 TABLET ORAL at 15:27

## 2019-09-23 NOTE — PROGRESS NOTES
Continued Stay Note  [facility]     Patient Name: Swetha Luis  MRN: [id number]  Today's Date: 9/23/2019    Admit Date: 9/21/2019    Discharge Plan     Row Name 09/23/19 1607       Plan    Plan  [facility], referral pending     Plan Comments  Spoke with [facility], they are unable to accept at this time. [facility] does not have any medicaid beds available at this time. Cristopher updated at bedside and given a list of other [facility]'s that are in network with [facility]. Per Cristopher he would like for the pt to return to [facility], left message for Isadora/Darshana. KATHLEEN to follow. JChasteenRN/CCP     Discharge Codes    No documentation.             Darcy Monet RN

## 2019-09-23 NOTE — PROGRESS NOTES
Discharge Planning Assessment  [facility]     Patient Name: Swetha Luis  MRN: [id number]  Today's Date: 9/23/2019    Admit Date: 9/21/2019    Discharge Needs Assessment     Row Name 09/23/19 1548       Living Environment    Lives With  spouse    Current Living Arrangements  extended care facility    Primary Care Provided by  spouse/significant other    Family Caregiver if Needed  spouse    Quality of Family Relationships  helpful;involved       Resource/Environmental Concerns    Transportation Concerns  car, none       Transition Planning    Patient/Family Anticipates Transition to  long term care facility    Transportation Anticipated  health plan transportation       Discharge Needs Assessment    Readmission Within the Last 30 Days  no previous admission in last 30 days    Concerns to be Addressed  basic needs    Equipment Currently Used at Home  oxygen    Equipment Needed After Discharge  none    Discharge Facility/Level of Care Needs  nursing facility, skilled        Discharge Plan     Row Name 09/23/19 1553       Plan    Plan  To be determined    Plan Comments  Called and spoke with pts , Cristopher [phone]. Role of CCP explained. Facesheet info verified. Pts pharmacy verified. Pt does not have a living will or POA. Cristopher denies the pt has any history HH. Pt has been to [facility], [facility] and [facility]. Pt was sent here from [facility]. Per Cristopher pt was at [facility] for [facility] and her plan for dc is to eventually return home after rehab. Order for PT received. Pt will need an ambulance to transport. Per Dr Dutton pt will likely be ready for dc tomorrow. CCP to follow. JCmamtaRGEOVANI/CCP        Destination      Service Provider Request Status Selected Services Address Phone Number Fax Number    [facility] Declined N/A [address] [address] [phone]      Durable Medical Equipment      No service coordination in this encounter.       Dialysis/Infusion      No service coordination in this encounter.      Home Medical Care      No service coordination in this encounter.      Therapy      No service coordination in this encounter.      Community Resources      No service coordination in this encounter.          Demographic Summary    No documentation.       Functional Status    No documentation.       Psychosocial    No documentation.       Abuse/Neglect    No documentation.       Legal    No documentation.       Substance Abuse    No documentation.       Patient Forms    No documentation.           Darcy Monet RN

## 2019-09-23 NOTE — PROGRESS NOTES
Name: Swetha Luis ADMIT: 2019   : 1960  PCP: Lencho Connelly MD    MRN: [id number] LOS: 1 days   AGE/SEX: 59 y.o. female  ROOM: [facility]     Subjective   Subjective   CC: dyspnea  No acute events.  Dyspnea has improved.  Had some chest tightness this AM which has now resolved.  No f/c/n/v/d.  Taking PO.   Objective   Objective   Vital Signs  Temp:  [97 °F (36.1 °C)-98.7 °F (37.1 °C)] 97.5 °F (36.4 °C)  Heart Rate:  [56-70] 56  Resp:  [16-20] 18  BP: (120-141)/(74-85) 120/83  SpO2:  [95 %-99 %] 98 %  on  Flow (L/min):  [5-7] 5;   Device (Oxygen Therapy): tracheostomy collar  Body mass index is 25.18 kg/m².  Physical Exam   Constitutional: No distress.   HENT:   Head: Normocephalic and atraumatic.   Mouth/Throat: Oropharynx is clear and moist.   Tracheostomy in place   Eyes: Conjunctivae and EOM are normal. Pupils are equal, round, and reactive to light.   Neck: Normal range of motion. Neck supple.   Cardiovascular: Normal rate, regular rhythm and intact distal pulses.   Pulmonary/Chest: Effort normal. She has decreased breath sounds in the right lower field and the left lower field.   Abdominal: Soft. Bowel sounds are normal.   Musculoskeletal: She exhibits no edema or tenderness.   Neurological: She is alert.   Skin: Skin is warm and dry. She is not diaphoretic.   Psychiatric: She has a normal mood and affect. Her behavior is normal.   Nursing note and vitals reviewed.    Results Review:       I reviewed the patient's new clinical results.  Results from last 7 days   Lab Units 19  0723 19  0604 19  2248   WBC 10*3/mm3 6.74 7.53 8.64   HEMOGLOBIN g/dL 12.4 13.0 13.8   PLATELETS 10*3/mm3 201 215 237     Results from last 7 days   Lab Units 19  0723 19  0604 19  2248   SODIUM mmol/L 136 141 136   POTASSIUM mmol/L 3.7 4.4 4.6   CHLORIDE mmol/L 98 101 97*   CO2 mmol/L 29.3* 28.5 27.4   BUN mg/dL 23* 22* 23*   CREATININE mg/dL 0.95 1.00 1.11*   GLUCOSE mg/dL 95 106* 127*    Estimated Creatinine Clearance: 71.3 mL/min (by C-G formula based on SCr of 0.95 mg/dL).  Results from last 7 days   Lab Units 09/23/19  0723 09/21/19  2248   ALBUMIN g/dL 3.50 4.00   BILIRUBIN mg/dL <0.2* <0.2*   ALK PHOS U/L 157* 183*   AST (SGOT) U/L 28 40*   ALT (SGPT) U/L 44* 61*     Results from last 7 days   Lab Units 09/23/19  0723 09/22/19  0604 09/21/19  2248   CALCIUM mg/dL 9.1 9.6 9.6   ALBUMIN g/dL 3.50  --  4.00       No results found for: HGBA1C, POCGLU      aspirin 81 mg Oral Daily   budesonide 0.5 mg Nebulization Daily - RT   busPIRone 5 mg Oral TID   escitalopram 20 mg Oral Daily   gabapentin 300 mg Oral TID   guaifenesin-dextromethorphan 5 mL Per G Tube 4x Daily   influenza vaccine 0.5 mL Intramuscular Once   lansoprazole 30 mg Per G Tube QAM   metoprolol succinate XL 25 mg Oral Q24H   nystatin 5 mL Swish & Swallow 4x Daily   nystatin  Topical TID   pramipexole 0.25 mg Oral Nightly   predniSONE 10 mg Per G Tube Daily   sodium chloride 10 mL Intravenous Q12H   thiamine 100 mg Oral Daily      Diet Pureed; Honey Thick; Cardiac       Assessment/Plan     Active Hospital Problems    Diagnosis  POA   • **Dyspnea [R06.00]  Yes   • Elevated LFTs [R94.5]  Yes   • Elevated troponin [R74.8]  Yes   • Tracheostomy present (CMS/[facility]) [Z93.0]  Not Applicable   • Essential hypertension [I10]  Yes   • Dysphagia [R13.10]  Yes   • Respiratory failure, chronic (CMS/[facility]) [J96.10]  Yes   • COPD (chronic obstructive pulmonary disease) (CMS/[facility]) [J44.9]  Yes   • Chronic diastolic CHF (congestive heart failure) (CMS/[facility]) [I50.32]  Yes   • Stage 3 chronic kidney disease (CMS/[facility]) [N18.3]  Yes      Resolved Hospital Problems   No resolved problems to display.   Dyspnea  - question of aspiration event but no evidence of pna  - on home dose of oxygen at 5LPM trach mask  - continue aggressive pulmonary toilet and home meds  - no wheezing to suggest COPD exacerbation    Elevated Troponin  - echocardiogram is pending  -  appreciate cardiology recs    CKD stage 3  - creatinine is close to baseline    Elevated LFTs  - minimal, improving    Chronic Diastolic CHF  - no evidence of exacerbation  - echocardiogram pending as above      VTE Prophylaxis - SCDs  Code Status - Full code  Disposition - Anticipate discharge to SNU facility tomorrow.      Andrei Dutton MD  [facility]  09/23/19  2:25 PM

## 2019-09-23 NOTE — PROGRESS NOTES
HOSPITAL PROGRESS NOTE    Date of Service: 2019  LOS:  LOS: 1 day   Patient Name: Swetha Luis  Age/Sex: 59 y.o. female  : 1960  MRN: [id number]   Primary Cardiologist: Dr. Los Gabriel     Subjective:     Chief Complaint/Follow up:  Chest Pain, Dyspnea, Elevated Troponin    Interval History: New patient to me   She is resting in bed this morning.  Reports moderate midsternal chest pressure that occurs on occasion over the past 2 weeks at rest.  She is currently having active chest pressure.  She denies shortness of breath.    Objective:     Objective:  Temp:  [97 °F (36.1 °C)-98.7 °F (37.1 °C)] 97 °F (36.1 °C)  Heart Rate:  [56-81] 56  Resp:  [16-20] 16  BP: (121-141)/(74-85) 141/83  Body mass index is 25.18 kg/m².    Intake/Output Summary (Last 24 hours) at 2019 0855  Last data filed at 2019 0652  Gross per 24 hour   Intake 1165 ml   Output 900 ml   Net 265 ml         19  2239 19  0211   Weight: 74.3 kg (163 lb 14.4 oz) 70.8 kg (156 lb)     Weight change:     Physical Exam:   General Appearance: Alert, cooperative, in no acute distress. AAOx4.  HEENT: Normocephalic. Tracheostomy present.   Neck: Supple. No JVD. No Carotid bruit. No thyromegaly  Lungs: Clear. Normal respiratory effort and rate.  Heart:: Regular rate and rhythm, normal S1 and S2, no murmurs, gallops or rubs.  Abdomen: Soft, nontender, non-distended. positive bowel sounds. LUQ peg tube.   Extremities: Warm, no cyanosis, or clubbing. No edema.     Lab Review:   Results from last 7 days   Lab Units 19  0723 19  0604 19  2248   SODIUM mmol/L 136 141 136   POTASSIUM mmol/L 3.7 4.4 4.6   CHLORIDE mmol/L 98 101 97*   CO2 mmol/L 29.3* 28.5 27.4   BUN mg/dL 23* 22* 23*   CREATININE mg/dL 0.95 1.00 1.11*   GLUCOSE mg/dL 95 106* 127*   CALCIUM mg/dL 9.1 9.6 9.6   AST (SGOT) U/L 28  --  40*   ALT (SGPT) U/L 44*  --  61*     Results from last 7 days   Lab Units 19  1211 19  0604 19  013  09/21/19  2248   TROPONIN T ng/mL 0.045* 0.034* 0.031* 0.036*     Results from last 7 days   Lab Units 09/23/19  0723 09/22/19  0604   WBC 10*3/mm3 6.74 7.53   HEMOGLOBIN g/dL 12.4 13.0   HEMATOCRIT % 39.9 42.1   PLATELETS 10*3/mm3 201 215                 Results from last 7 days   Lab Units 09/21/19  2248   PROBNP pg/mL 269.1           Results for orders placed during the hospital encounter of 12/24/18   Adult Transthoracic Echo Complete W/ Cont if Necessary Per Protocol    Narrative · Left ventricular systolic function is low normal. Calculated EF = 48.0%.   Estimated EF was in disagreement with the calculated EF. Estimated EF =   50%. Normal left ventricular cavity size and wall thickness noted.   Multiple wall segments are not well visualized and cannot rule out wall   motion abnormalities in the basal anterolateral anterior and apical   anterior walls. Left ventricular diastolic dysfunction is noted (grade I)   consistent with impaired relaxation.  · Right ventricular cavity is borderline dilated.  · The aortic valve is abnormal in structure. There is thickening of the   aortic valve. There is moderate nodular sclerosis of non-coronary cusp.   Cannot rule out possible fibroblastoma..  · Mild mitral valve regurgitation is present.  · Trace tricuspid valve regurgitation is present. Insufficient TR velocity   profile to estimate the right ventricular systolic pressure.        I reviewed the patient's new clinical results.  I personally viewed and interpreted the patient's EKG/Telemetry data/Labs/Test Results.     Current Medications:   Scheduled Meds:  aspirin 81 mg Oral Daily   budesonide 0.5 mg Nebulization Daily - RT   busPIRone 5 mg Oral TID   escitalopram 20 mg Oral Daily   gabapentin 300 mg Oral TID   guaifenesin-dextromethorphan 5 mL Per G Tube 4x Daily   influenza vaccine 0.5 mL Intramuscular Once   lansoprazole 30 mg Per G Tube QAM   metoprolol succinate XL 25 mg Oral Q24H   nystatin 5 mL Swish & Swallow  4x Daily   nystatin  Topical TID   pramipexole 0.25 mg Oral Nightly   predniSONE 10 mg Per G Tube Daily   sodium chloride 10 mL Intravenous Q12H   thiamine 100 mg Oral Daily       Allergies:  Allergies   Allergen Reactions   • Cephalexin Unknown (See Comments)     unk     • Hydrocodone Unknown (See Comments)     unk   • [place] Unknown (See Comments)     unk   • Zithromax [Azithromycin] Unknown (See Comments)     unk       Assessment:       Dyspnea    CKD (chronic kidney disease)    COPD (chronic obstructive pulmonary disease) (CMS/[facility])    Respiratory failure, chronic (CMS/[facility])    Chronic diastolic CHF (congestive heart failure) (CMS/[facility])    Elevated LFTs    Elevated troponin    Tracheostomy present (CMS/[facility])    Essential hypertension    Dysphagia        Plan:   Assessment/Plan       1. Chest Pain/Mildy Elevated Troponin Level: I have asked the nurse to try nitroglycerin sublingual for her active chest pressure.  We will repeat a 2D echocardiogram and look for any wall motion abnormalities.  Her troponin may be chronically elevated due to hypoxia and respiratory disease.  2. Chronic Diastolic Heart Failure: Denies shortness of breath.  Repeat echocardiogram.  3. Sinus Bradycardia: Stable.  4. Chronic Kidney Disease: BUN/Creatinine normal.     The plan of care was discussed with Los Gabriel and she agrees.    AMINATA Jackson  [facility]   09/23/19  8:55 AM

## 2019-09-23 NOTE — PLAN OF CARE
Problem: Patient Care Overview  Goal: Plan of Care Review  Outcome: Ongoing (interventions implemented as appropriate)   09/23/19 0326   Plan of Care Review   Progress improving   OTHER   Outcome Summary TF running 11p-7a per Gtube. Requesting PO items that are not Pureed HTL. Purewick to wall suction. Trach care done, minimal sputum production.   Coping/Psychosocial   Plan of Care Reviewed With patient       Problem: Fall Risk (Adult)  Goal: Absence of Fall  Outcome: Ongoing (interventions implemented as appropriate)      Problem: Skin Injury Risk (Adult)  Goal: Skin Health and Integrity  Outcome: Ongoing (interventions implemented as appropriate)      Problem: Airway, Artificial (Adult)  Goal: Signs and Symptoms of Listed Potential Problems Will be Absent, Minimized or Managed (Airway, Artificial)  Outcome: Ongoing (interventions implemented as appropriate)      Problem: Breathing Pattern Ineffective (Adult)  Goal: Effective Oxygenation/Ventilation  Outcome: Ongoing (interventions implemented as appropriate)      Problem: Pain, Chronic (Adult)  Goal: Acceptable Pain/Comfort Level and Functional Ability  Outcome: Ongoing (interventions implemented as appropriate)      Problem: Nutrition, Enteral (Adult)  Goal: Signs and Symptoms of Listed Potential Problems Will be Absent, Minimized or Managed (Nutrition, Enteral)  Outcome: Ongoing (interventions implemented as appropriate)

## 2019-09-23 NOTE — PLAN OF CARE
Problem: Patient Care Overview  Goal: Plan of Care Review  Outcome: Ongoing (interventions implemented as appropriate)   09/23/19 5336   Plan of Care Review   Progress no change   OTHER   Outcome Summary pt medicated for left hand pain with good relief. No c/o chest pain. no soa, n/v vss. no s/s of distress will continue to monitor   Coping/Psychosocial   Plan of Care Reviewed With patient

## 2019-09-24 ENCOUNTER — APPOINTMENT (OUTPATIENT)
Dept: CARDIOLOGY | Facility: HOSPITAL | Age: 59
End: 2019-09-24

## 2019-09-24 LAB
ANION GAP SERPL CALCULATED.3IONS-SCNC: 10.5 MMOL/L (ref 5–15)
AORTIC DIMENSIONLESS INDEX: 0.6 (DI)
ASCENDING AORTA: 11 CM
BH CV ECHO MEAS - ACS: 1.9 CM
BH CV ECHO MEAS - AO MAX PG (FULL): 4.7 MMHG
BH CV ECHO MEAS - AO MAX PG: 7.4 MMHG
BH CV ECHO MEAS - AO MEAN PG (FULL): 2 MMHG
BH CV ECHO MEAS - AO MEAN PG: 4 MMHG
BH CV ECHO MEAS - AO ROOT AREA (BSA CORRECTED): 1.6
BH CV ECHO MEAS - AO ROOT AREA: 6.2 CM^2
BH CV ECHO MEAS - AO ROOT DIAM: 2.8 CM
BH CV ECHO MEAS - AO V2 MAX: 136 CM/SEC
BH CV ECHO MEAS - AO V2 MEAN: 92.8 CM/SEC
BH CV ECHO MEAS - AO V2 VTI: 30.3 CM
BH CV ECHO MEAS - AVA(I,A): 2 CM^2
BH CV ECHO MEAS - AVA(I,D): 2 CM^2
BH CV ECHO MEAS - AVA(V,A): 1.9 CM^2
BH CV ECHO MEAS - AVA(V,D): 1.9 CM^2
BH CV ECHO MEAS - BSA(HAYCOCK): 1.8 M^2
BH CV ECHO MEAS - BSA: 1.8 M^2
BH CV ECHO MEAS - BZI_BMI: 25.2 KILOGRAMS/M^2
BH CV ECHO MEAS - BZI_METRIC_HEIGHT: 167.6 CM
BH CV ECHO MEAS - BZI_METRIC_WEIGHT: 70.8 KG
BH CV ECHO MEAS - EDV(MOD-SP2): 60 ML
BH CV ECHO MEAS - EDV(MOD-SP4): 69 ML
BH CV ECHO MEAS - EDV(TEICH): 94.9 ML
BH CV ECHO MEAS - EF(CUBED): 64.2 %
BH CV ECHO MEAS - EF(MOD-BP): 64 %
BH CV ECHO MEAS - EF(MOD-SP2): 66.7 %
BH CV ECHO MEAS - EF(MOD-SP4): 62.3 %
BH CV ECHO MEAS - EF(TEICH): 55.8 %
BH CV ECHO MEAS - ESV(MOD-SP2): 20 ML
BH CV ECHO MEAS - ESV(MOD-SP4): 26 ML
BH CV ECHO MEAS - ESV(TEICH): 41.9 ML
BH CV ECHO MEAS - FS: 29 %
BH CV ECHO MEAS - IVS/LVPW: 1.1
BH CV ECHO MEAS - IVSD: 0.89 CM
BH CV ECHO MEAS - LAT PEAK E' VEL: 6 CM/SEC
BH CV ECHO MEAS - LV DIASTOLIC VOL/BSA (35-75): 38.3 ML/M^2
BH CV ECHO MEAS - LV MASS(C)D: 124.4 GRAMS
BH CV ECHO MEAS - LV MASS(C)DI: 69.1 GRAMS/M^2
BH CV ECHO MEAS - LV MAX PG: 2.7 MMHG
BH CV ECHO MEAS - LV MEAN PG: 2 MMHG
BH CV ECHO MEAS - LV SYSTOLIC VOL/BSA (12-30): 14.4 ML/M^2
BH CV ECHO MEAS - LV V1 MAX: 82.8 CM/SEC
BH CV ECHO MEAS - LV V1 MEAN: 57.7 CM/SEC
BH CV ECHO MEAS - LV V1 VTI: 19.1 CM
BH CV ECHO MEAS - LVIDD: 4.6 CM
BH CV ECHO MEAS - LVIDS: 3.2 CM
BH CV ECHO MEAS - LVLD AP2: 7.3 CM
BH CV ECHO MEAS - LVLD AP4: 7.2 CM
BH CV ECHO MEAS - LVLS AP2: 5.7 CM
BH CV ECHO MEAS - LVLS AP4: 6 CM
BH CV ECHO MEAS - LVOT AREA (M): 3.1 CM^2
BH CV ECHO MEAS - LVOT AREA: 3.1 CM^2
BH CV ECHO MEAS - LVOT DIAM: 2 CM
BH CV ECHO MEAS - LVPWD: 0.8 CM
BH CV ECHO MEAS - MED PEAK E' VEL: 6 CM/SEC
BH CV ECHO MEAS - MV A DUR: 0.14 SEC
BH CV ECHO MEAS - MV A MAX VEL: 74.2 CM/SEC
BH CV ECHO MEAS - MV DEC SLOPE: 261.5 CM/SEC^2
BH CV ECHO MEAS - MV DEC TIME: 0.29 SEC
BH CV ECHO MEAS - MV E MAX VEL: 59.7 CM/SEC
BH CV ECHO MEAS - MV E/A: 0.8
BH CV ECHO MEAS - MV MAX PG: 2.4 MMHG
BH CV ECHO MEAS - MV MEAN PG: 1 MMHG
BH CV ECHO MEAS - MV P1/2T MAX VEL: 71 CM/SEC
BH CV ECHO MEAS - MV P1/2T: 79.5 MSEC
BH CV ECHO MEAS - MV V2 MAX: 77.7 CM/SEC
BH CV ECHO MEAS - MV V2 MEAN: 47.2 CM/SEC
BH CV ECHO MEAS - MV V2 VTI: 26.5 CM
BH CV ECHO MEAS - MVA P1/2T LCG: 3.1 CM^2
BH CV ECHO MEAS - MVA(P1/2T): 2.8 CM^2
BH CV ECHO MEAS - MVA(VTI): 2.3 CM^2
BH CV ECHO MEAS - PA ACC TIME: 0.13 SEC
BH CV ECHO MEAS - PA MAX PG (FULL): 1.2 MMHG
BH CV ECHO MEAS - PA MAX PG: 2.7 MMHG
BH CV ECHO MEAS - PA PR(ACCEL): 18.7 MMHG
BH CV ECHO MEAS - PA V2 MAX: 81.9 CM/SEC
BH CV ECHO MEAS - PULM A REVS DUR: 0.1 SEC
BH CV ECHO MEAS - PULM A REVS VEL: 24.3 CM/SEC
BH CV ECHO MEAS - PULM DIAS VEL: 24.7 CM/SEC
BH CV ECHO MEAS - PULM S/D: 1.4
BH CV ECHO MEAS - PULM SYS VEL: 34.9 CM/SEC
BH CV ECHO MEAS - PVA(V,A): 1.7 CM^2
BH CV ECHO MEAS - PVA(V,D): 1.7 CM^2
BH CV ECHO MEAS - QP/QS: 0.6
BH CV ECHO MEAS - RAP SYSTOLE: 3 MMHG
BH CV ECHO MEAS - RV MAX PG: 1.5 MMHG
BH CV ECHO MEAS - RV MEAN PG: 1 MMHG
BH CV ECHO MEAS - RV V1 MAX: 60.6 CM/SEC
BH CV ECHO MEAS - RV V1 MEAN: 45.1 CM/SEC
BH CV ECHO MEAS - RV V1 VTI: 15.8 CM
BH CV ECHO MEAS - RVOT AREA: 2.3 CM^2
BH CV ECHO MEAS - RVOT DIAM: 1.7 CM
BH CV ECHO MEAS - RVSP: 19 MMHG
BH CV ECHO MEAS - SI(AO): 103.7 ML/M^2
BH CV ECHO MEAS - SI(CUBED): 33.6 ML/M^2
BH CV ECHO MEAS - SI(LVOT): 33.3 ML/M^2
BH CV ECHO MEAS - SI(MOD-SP2): 22.2 ML/M^2
BH CV ECHO MEAS - SI(MOD-SP4): 23.9 ML/M^2
BH CV ECHO MEAS - SI(TEICH): 29.4 ML/M^2
BH CV ECHO MEAS - SV(AO): 186.6 ML
BH CV ECHO MEAS - SV(CUBED): 60.5 ML
BH CV ECHO MEAS - SV(LVOT): 60 ML
BH CV ECHO MEAS - SV(MOD-SP2): 40 ML
BH CV ECHO MEAS - SV(MOD-SP4): 43 ML
BH CV ECHO MEAS - SV(RVOT): 35.9 ML
BH CV ECHO MEAS - SV(TEICH): 53 ML
BH CV ECHO MEAS - TAPSE (>1.6): 2.4 CM2
BH CV ECHO MEAS - TR MAX VEL: 202 CM/SEC
BH CV ECHO MEASUREMENTS AVERAGE E/E' RATIO: 9.95
BH CV XLRA - RV BASE: 2.8 CM
BH CV XLRA - TDI S': 11 CM/SEC
BUN BLD-MCNC: 27 MG/DL (ref 6–20)
BUN/CREAT SERPL: 28.7 (ref 7–25)
CALCIUM SPEC-SCNC: 9.1 MG/DL (ref 8.6–10.5)
CHLORIDE SERPL-SCNC: 101 MMOL/L (ref 98–107)
CO2 SERPL-SCNC: 30.5 MMOL/L (ref 22–29)
CREAT BLD-MCNC: 0.94 MG/DL (ref 0.57–1)
DEPRECATED RDW RBC AUTO: 42.2 FL (ref 37–54)
ERYTHROCYTE [DISTWIDTH] IN BLOOD BY AUTOMATED COUNT: 13.9 % (ref 12.3–15.4)
GFR SERPL CREATININE-BSD FRML MDRD: 61 ML/MIN/1.73
GLUCOSE BLD-MCNC: 86 MG/DL (ref 65–99)
HCT VFR BLD AUTO: 41.6 % (ref 34–46.6)
HGB BLD-MCNC: 12.8 G/DL (ref 12–15.9)
LEFT ATRIUM VOLUME INDEX: 20 ML/M2
LV EF 2D ECHO EST: 55 %
MAXIMAL PREDICTED HEART RATE: 161 BPM
MCH RBC QN AUTO: 26.2 PG (ref 26.6–33)
MCHC RBC AUTO-ENTMCNC: 30.8 G/DL (ref 31.5–35.7)
MCV RBC AUTO: 85.1 FL (ref 79–97)
PLATELET # BLD AUTO: 211 10*3/MM3 (ref 140–450)
PMV BLD AUTO: 12.1 FL (ref 6–12)
POTASSIUM BLD-SCNC: 4.3 MMOL/L (ref 3.5–5.2)
RBC # BLD AUTO: 4.89 10*6/MM3 (ref 3.77–5.28)
SINUS: 2.4 CM
SODIUM BLD-SCNC: 142 MMOL/L (ref 136–145)
STJ: 2.8 CM
STRESS TARGET HR: 137 BPM
WBC NRBC COR # BLD: 7.82 10*3/MM3 (ref 3.4–10.8)

## 2019-09-24 PROCEDURE — 97161 PT EVAL LOW COMPLEX 20 MIN: CPT

## 2019-09-24 PROCEDURE — G0378 HOSPITAL OBSERVATION PER HR: HCPCS

## 2019-09-24 PROCEDURE — 63710000001 PREDNISONE PER 5 MG: Performed by: NURSE PRACTITIONER

## 2019-09-24 PROCEDURE — 97110 THERAPEUTIC EXERCISES: CPT

## 2019-09-24 PROCEDURE — 85027 COMPLETE CBC AUTOMATED: CPT | Performed by: INTERNAL MEDICINE

## 2019-09-24 PROCEDURE — 93306 TTE W/DOPPLER COMPLETE: CPT

## 2019-09-24 PROCEDURE — 94799 UNLISTED PULMONARY SVC/PX: CPT

## 2019-09-24 PROCEDURE — 93306 TTE W/DOPPLER COMPLETE: CPT | Performed by: INTERNAL MEDICINE

## 2019-09-24 PROCEDURE — 80048 BASIC METABOLIC PNL TOTAL CA: CPT | Performed by: INTERNAL MEDICINE

## 2019-09-24 PROCEDURE — 99213 OFFICE O/P EST LOW 20 MIN: CPT | Performed by: NURSE PRACTITIONER

## 2019-09-24 RX ADMIN — OXYCODONE HYDROCHLORIDE AND ACETAMINOPHEN 1 TABLET: 5; 325 TABLET ORAL at 16:16

## 2019-09-24 RX ADMIN — OXYCODONE HYDROCHLORIDE AND ACETAMINOPHEN 1 TABLET: 5; 325 TABLET ORAL at 10:47

## 2019-09-24 RX ADMIN — GABAPENTIN 300 MG: 300 CAPSULE ORAL at 16:16

## 2019-09-24 RX ADMIN — NYSTATIN: 100000 POWDER TOPICAL at 20:23

## 2019-09-24 RX ADMIN — PRAMIPEXOLE DIHYDROCHLORIDE 0.25 MG: 0.25 TABLET ORAL at 20:22

## 2019-09-24 RX ADMIN — NYSTATIN: 100000 POWDER TOPICAL at 16:16

## 2019-09-24 RX ADMIN — SODIUM CHLORIDE, PRESERVATIVE FREE 10 ML: 5 INJECTION INTRAVENOUS at 20:23

## 2019-09-24 RX ADMIN — GABAPENTIN 300 MG: 300 CAPSULE ORAL at 10:47

## 2019-09-24 RX ADMIN — GUAIFENESIN AND DEXTROMETHORPHAN 5 ML: 100; 10 SYRUP ORAL at 20:22

## 2019-09-24 RX ADMIN — SODIUM CHLORIDE, PRESERVATIVE FREE 10 ML: 5 INJECTION INTRAVENOUS at 10:48

## 2019-09-24 RX ADMIN — PREDNISONE 10 MG: 10 TABLET ORAL at 10:48

## 2019-09-24 RX ADMIN — BUDESONIDE 0.5 MG: 0.5 SUSPENSION RESPIRATORY (INHALATION) at 11:40

## 2019-09-24 RX ADMIN — BUSPIRONE HYDROCHLORIDE 5 MG: 5 TABLET ORAL at 16:15

## 2019-09-24 RX ADMIN — Medication 100 MG: at 10:48

## 2019-09-24 RX ADMIN — NYSTATIN 500000 UNITS: 100000 SUSPENSION ORAL at 18:32

## 2019-09-24 RX ADMIN — GUAIFENESIN AND DEXTROMETHORPHAN 5 ML: 100; 10 SYRUP ORAL at 10:46

## 2019-09-24 RX ADMIN — GUAIFENESIN AND DEXTROMETHORPHAN 5 ML: 100; 10 SYRUP ORAL at 18:32

## 2019-09-24 RX ADMIN — BUSPIRONE HYDROCHLORIDE 5 MG: 5 TABLET ORAL at 20:22

## 2019-09-24 RX ADMIN — ASPIRIN 81 MG: 81 TABLET, CHEWABLE ORAL at 10:47

## 2019-09-24 RX ADMIN — BUSPIRONE HYDROCHLORIDE 5 MG: 5 TABLET ORAL at 10:48

## 2019-09-24 RX ADMIN — NYSTATIN 500000 UNITS: 100000 SUSPENSION ORAL at 20:22

## 2019-09-24 RX ADMIN — ESCITALOPRAM 20 MG: 20 TABLET, FILM COATED ORAL at 10:47

## 2019-09-24 RX ADMIN — GABAPENTIN 300 MG: 300 CAPSULE ORAL at 20:22

## 2019-09-24 RX ADMIN — METOPROLOL SUCCINATE 25 MG: 25 TABLET, FILM COATED, EXTENDED RELEASE ORAL at 10:48

## 2019-09-24 RX ADMIN — NYSTATIN: 100000 POWDER TOPICAL at 10:46

## 2019-09-24 RX ADMIN — NYSTATIN 500000 UNITS: 100000 SUSPENSION ORAL at 10:46

## 2019-09-24 RX ADMIN — OXYCODONE HYDROCHLORIDE AND ACETAMINOPHEN 1 TABLET: 5; 325 TABLET ORAL at 22:46

## 2019-09-24 RX ADMIN — LANSOPRAZOLE 30 MG: KIT at 06:29

## 2019-09-24 NOTE — PROGRESS NOTES
Continued Stay Note  [facility]     Patient Name: Swetha Luis  MRN: [id number]  Today's Date: 9/24/2019    Admit Date: 9/21/2019    Discharge Plan     Row Name 09/24/19 1449       Plan    Plan  Return home with spouse and [facility]     Plan Comments  Spoke with Shazia, she would like for the pt to go home with HH. List of HH agencies reviewed with Shazia. She would like for the pt to use [facility], referral made to Natalia. Shazia would like for the pt to have a hospital bed at home and would like to use [facility]. Pt has O2 tanks and a concentrator at home. Pt will be discharging home with her  [facility] [address]. CCP to follow.         Discharge Codes    No documentation.             Darcy Monet RN

## 2019-09-24 NOTE — PROGRESS NOTES
"Adult Nutrition  Assessment/PES    Patient Name:  Swetha Luis  YOB: 1960  MRN: [id number]  Admit Date:  9/21/2019    Assessment Date:  9/24/2019    Comments:  Nutrition follow up.  Patient now going home with [facility] tomorrow per CCP and RN.  Currently on nocturnal TFs.  Eating well per chart PO data, 100% x 1 meal from yesterday's report.    Recommend starting with bolus TFs of Diabetisource.  Recommend basing bolus amount upon amount eaten at each meal.  Recommendations for this as follows:    -If patient eats 100% at a meal, hold bolus  -If patient eats 75% at a meal, give 120 ml (1/2 can) bolus  -If patient eats 50% at a meal, give 240 ml (1 can) bolus  -If patient eats 25% at a meal, give 360 ml (1.5 can) bolus  -If patient eats 0% at a meal, give 480 ml (2 cans) bolus    If bolus is given, recommend flushing with 50 ml before and after bolus.    Spoke with RN about regimen.    RD to continue to follow.    Reason for Assessment     Row Name 09/24/19 1532          Reason for Assessment    Reason For Assessment  TF/PN;follow-up protocol         Nutrition/Diet History     Row Name 09/24/19 1532          Nutrition/Diet History    Typical Food/Fluid Intake  eating well per chart PO data; changing to bolus regimen today as patient is going home tomorrow with          Anthropometrics     Row Name 09/24/19 1533 09/24/19 0909       Anthropometrics    Height  --  167.6 cm (66\")    Weight  --  70.8 kg (156 lb)       Admit Weight    Admit Weight  -- 156# 9/24  --       Ideal Body Weight (IBW)    Ideal Body Weight (IBW) (kg)  --  59.58    % Ideal Body Weight  --  118.77       Body Mass Index (BMI)    BMI (kg/m2)  --  25.23    BMI Assessment  BMI 25-29.9: overweight  --        Labs/Tests/Procedures/Meds     Row Name 09/24/19 1533          Labs/Procedures/Meds    Lab Results Reviewed  reviewed, pertinent     Lab Results Comments  BUN        Diagnostic Tests/Procedures    Diagnostic Test/Procedure " "Reviewed  reviewed, pertinent        Medications    Pertinent Medications Reviewed  reviewed, pertinent     Pertinent Medications Comments  prednisone, thiamine         Physical Findings     Row Name 09/24/19 1530          Physical Findings    Overall Physical Appearance  overweight;on oxygen therapy trach     Gastrointestinal  feeding tube     Tubes  PEG         Estimated/Assessed Needs     Row Name 09/24/19 0958          Calculation Measurements    Height  167.6 cm (66\")         Nutrition Prescription Ordered     Row Name 09/24/19 1533          Nutrition Prescription PO    Current PO Diet  Dysphagia     Dysphagia Level  2  Pureed     Fluid Consistency  Honey thick     Common Modifiers  Cardiac        Nutrition Prescription EN    Enteral Route  PEG     Product  Diabetisource AC (Glucerna 1.2)     TF Delivery Method  Cyclic     Cyclic TF Goal Rate (mL/hr)  60 mL/hr     Cyclic TF Cycle Over (hrs)   11 pm to 7 am     Water flush (mL)   300 mL     Water Flush Frequency  Other (comment) q 6 hours         Evaluation of Received Nutrient/Fluid Intake     Row Name 09/24/19 1531          Nutrient/Fluid Evaluation    Additional Documentation  Calories Evaluation (Group);Protein Evaluation (Group)        Calories Evaluation    Enteral Calories (kcal)  576     % of Kcal Needs  41        Protein Evaluation    Enteral Protein (gm)  28.8     % of Protein Needs  40        Fluid Intake Evaluation    Enteral (Free Water) Fluid (mL)  393        PO Evaluation    Number of Meals  1     % PO Intake  100         Problem/Interventions:    Intervention Goal     Row Name 09/24/19 1535          Intervention Goal    General  Maintain nutrition;Reduce/improve symptoms;Disease management/therapy;Nutrition support treatment     PO  Maintain intake     TF/PN  Adjust TF/PN     Weight  Maintain weight         Nutrition Intervention     Row Name 09/24/19 153          Nutrition Intervention    RD/Tech Action  Follow Tx progress;Care plan " reviewd;Recommend/ordered     Recommended/Ordered  EN         Nutrition Prescription     Row Name 09/24/19 1535          Nutrition Prescription EN    Enteral Prescription  Enteral begin/change;Enteral to supply     Enteral Route  PEG     Product  Diabetisource     TF Delivery Method  Bolus     TF Bolus Goal Volume (mL)  240 mL     TF Bolus Frequency  5 times a day     Water flush (mL)   100 mL     Water Flush Frequency  Every feeding     New EN Prescription Ordered?  Yes        EN to Supply    Kcal/Day  1440 Kcal/Day     Protein (gm/day)  72 gm/day     TF Free H2O (mL)  984 mL         Education/Evaluation     Row Name 09/24/19 1537          Education    Education  Will Instruct as appropriate        Monitor/Evaluation    Monitor  Per protocol;PO intake;Pertinent labs;TF delivery/tolerance;Weight;Symptoms     Education Follow-up  Reinforce PRN           Electronically signed by:  Karmen Wesley RD  09/24/19 3:36 PM

## 2019-09-24 NOTE — PLAN OF CARE
Problem: Patient Care Overview  Goal: Plan of Care Review  Outcome: Ongoing (interventions implemented as appropriate)   09/24/19 0536   Plan of Care Review   Progress no change   OTHER   Outcome Summary VSS; c/o soa w/any exertion; c/o pain in left arm - baseline tingles & numbness; trach care complete, O2 - trach collar; purewick in place; PEG - skin dry/intact; flushed w/sterile water; no acute distress noted; will cont to monitor   Coping/Psychosocial   Plan of Care Reviewed With patient       Problem: Fall Risk (Adult)  Goal: Absence of Fall  Outcome: Ongoing (interventions implemented as appropriate)      Problem: Skin Injury Risk (Adult)  Goal: Skin Health and Integrity  Outcome: Ongoing (interventions implemented as appropriate)      Problem: Breathing Pattern Ineffective (Adult)  Goal: Effective Oxygenation/Ventilation  Outcome: Ongoing (interventions implemented as appropriate)    Goal: Anxiety/Fear Reduction  Outcome: Ongoing (interventions implemented as appropriate)      Problem: Pain, Chronic (Adult)  Goal: Acceptable Pain/Comfort Level and Functional Ability  Outcome: Ongoing (interventions implemented as appropriate)

## 2019-09-24 NOTE — PROGRESS NOTES
Name: Swetha Luis ADMIT: 2019   : 1960  PCP: Lencho Connelly MD    MRN: [id number] LOS: 1 days   AGE/SEX: 59 y.o. female  ROOM: [facility]     Subjective   Subjective   CC: dyspnea  No acute events.  Dyspnea has improved.  No CP/dyspnea/f/c/n/v/d.  Taking PO.   Objective   Objective   Vital Signs  Temp:  [94.8 °F (34.9 °C)-98.1 °F (36.7 °C)] 98.1 °F (36.7 °C)  Heart Rate:  [58-74] 74  Resp:  [18] 18  BP: (133-150)/(54-97) 136/84  SpO2:  [95 %-98 %] 97 %  on  Flow (L/min):  [5-6] 6;   Device (Oxygen Therapy): tracheostomy collar  Body mass index is 25.18 kg/m².  Physical Exam   Constitutional: No distress.   HENT:   Head: Normocephalic and atraumatic.   Mouth/Throat: Oropharynx is clear and moist.   Tracheostomy in place   Eyes: Conjunctivae and EOM are normal. Pupils are equal, round, and reactive to light.   Neck: Normal range of motion. Neck supple.   Cardiovascular: Normal rate, regular rhythm and intact distal pulses.   Pulmonary/Chest: Effort normal. She has decreased breath sounds in the right lower field and the left lower field.   Abdominal: Soft. Bowel sounds are normal.   Musculoskeletal: She exhibits no edema or tenderness.   Neurological: She is alert.   Skin: Skin is warm and dry. She is not diaphoretic.   Psychiatric: She has a normal mood and affect. Her behavior is normal.   Nursing note and vitals reviewed.    Results Review:       I reviewed the patient's new clinical results.  Results from last 7 days   Lab Units 19  0521 19  0723 19  0604 19  2248   WBC 10*3/mm3 7.82 6.74 7.53 8.64   HEMOGLOBIN g/dL 12.8 12.4 13.0 13.8   PLATELETS 10*3/mm3 211 201 215 237     Results from last 7 days   Lab Units 19  0521 19  0723 19  0604 19  2248   SODIUM mmol/L 142 136 141 136   POTASSIUM mmol/L 4.3 3.7 4.4 4.6   CHLORIDE mmol/L 101 98 101 97*   CO2 mmol/L 30.5* 29.3* 28.5 27.4   BUN mg/dL 27* 23* 22* 23*   CREATININE mg/dL 0.94 0.95 1.00 1.11*    GLUCOSE mg/dL 86 95 106* 127*   Estimated Creatinine Clearance: 72 mL/min (by C-G formula based on SCr of 0.94 mg/dL).  Results from last 7 days   Lab Units 09/23/19  0723 09/21/19  2248   ALBUMIN g/dL 3.50 4.00   BILIRUBIN mg/dL <0.2* <0.2*   ALK PHOS U/L 157* 183*   AST (SGOT) U/L 28 40*   ALT (SGPT) U/L 44* 61*     Results from last 7 days   Lab Units 09/24/19  0521 09/23/19  0723 09/22/19  0604 09/21/19  2248   CALCIUM mg/dL 9.1 9.1 9.6 9.6   ALBUMIN g/dL  --  3.50  --  4.00       No results found for: HGBA1C, POCGLU      aspirin 81 mg Oral Daily   budesonide 0.5 mg Nebulization Daily - RT   busPIRone 5 mg Oral TID   escitalopram 20 mg Oral Daily   gabapentin 300 mg Oral TID   guaiFENesin-dextromethorphan 5 mL Per G Tube 4x Daily   lansoprazole 30 mg Per G Tube QAM   metoprolol succinate XL 25 mg Oral Q24H   nystatin 5 mL Swish & Swallow 4x Daily   nystatin  Topical TID   pramipexole 0.25 mg Oral Nightly   predniSONE 10 mg Per G Tube Daily   sodium chloride 10 mL Intravenous Q12H   thiamine 100 mg Oral Daily      Diet Pureed; Honey Thick; Cardiac       Assessment/Plan     Active Hospital Problems    Diagnosis  POA   • **Dyspnea [R06.00]  Yes   • Elevated LFTs [R94.5]  Yes   • Elevated troponin [R74.8]  Yes   • Tracheostomy present (CMS/[facility]) [Z93.0]  Not Applicable   • Essential hypertension [I10]  Yes   • Dysphagia [R13.10]  Yes   • Respiratory failure, chronic (CMS/[facility]) [J96.10]  Yes   • COPD (chronic obstructive pulmonary disease) (CMS/[facility]) [J44.9]  Yes   • Chronic diastolic CHF (congestive heart failure) (CMS/[facility]) [I50.32]  Yes   • Stage 3 chronic kidney disease (CMS/[facility]) [N18.3]  Yes      Resolved Hospital Problems   No resolved problems to display.   Dyspnea  - question of aspiration event but no evidence of pna  - on home dose of oxygen at 5LPM trach mask  - continue aggressive pulmonary toilet and home meds  - no wheezing to suggest COPD exacerbation    Elevated Troponin  - echocardiogram result  noted-no wall motion abnormalities  - appreciate cardiology recs    CKD stage 3  - creatinine is close to baseline    Elevated LFTs  - minimal, improved    Chronic Diastolic CHF  - no evidence of exacerbation      VTE Prophylaxis - SCDs  Code Status - Full code  Disposition - Original plan was for SNF today but family has decided that they want to take her home.  I spoke with CCP and it will take until tomorrow to get home health set up with tube feeds and tracheostomy care.  Will plan on home with family and home health tomorrow.      Andrei Dutton MD  [facility]  09/24/19  4:39 PM

## 2019-09-24 NOTE — PLAN OF CARE
Problem: Patient Care Overview  Goal: Plan of Care Review   09/24/19 1611   OTHER   Outcome Summary Pt. will benefit from skilled inpt. P.T. to address her functional deficits and to assist pt. in regaining her maximum level of independence with functional mobility.    Coping/Psychosocial   Plan of Care Reviewed With patient

## 2019-09-24 NOTE — PROGRESS NOTES
Continued Stay Note  [facility]     Patient Name: Swetha Luis  MRN: [id number]  Today's Date: 9/24/2019    Admit Date: 9/21/2019    Discharge Plan     Row Name 09/24/19 0828       Plan    Plan  [facility], referral pending     Plan Comments  Spoke to Benitez yesterday and she stated she would call back with pts bed status. Awaiting a return call. Left message for Benitez this morning to confirm bed status and if pt needs a pre-cert to return to [facility]. CCP to follow. JChastshirinRN/CCP         Discharge Codes    No documentation.             Darcy Monet RN

## 2019-09-24 NOTE — PROGRESS NOTES
Continued Stay Note  [facility]     Patient Name: Swetha Luis  MRN: [id number]  Today's Date: 9/24/2019    Admit Date: 9/21/2019    Discharge Plan     Row Name 09/24/19 0840       Plan    Plan  [facility], accepted and can return today    Plan Comments  Spoke with Benitez, pt can be accepted back, pt does not need a pre-cert. [facility] ambulance set up for 6pm today. JCmamtaRN/CCP                               Discharge Codes    No documentation.             Darcy Monet RN

## 2019-09-24 NOTE — THERAPY EVALUATION
Patient Name: Swetha Luis  : 1960    MRN: [id number]                              Today's Date: 2019       Admit Date: 2019    Visit Dx:     ICD-10-CM ICD-9-CM   1. Dyspnea, unspecified type R06.00 786.09   2. Elevated troponin R74.8 790.6     Patient Active Problem List   Diagnosis   • Tracheostomy complication (CMS/[facility])   • Gangrene of toe (CMS/[facility])   • Acute diastolic congestive heart failure (CMS/[facility])   • ARF (acute renal failure) (CMS/[facility])   • Stage 3 chronic kidney disease (CMS/[facility])   • Elevated troponin   • Acute respiratory failure with hypoxemia (CMS/[facility])   • Acute osteomyelitis (CMS/[facility])   • UTI due to extended-spectrum beta lactamase (ESBL) producing Escherichia coli   • Thrush, oral   • Tracheostomy malfunction (CMS/[facility])   • COPD (chronic obstructive pulmonary disease) (CMS/[facility])   • Respiratory failure, chronic (CMS/[facility])   • PEG (percutaneous endoscopic gastrostomy) status (CMS/[facility])   • History of osteomyelitis   • Polysubstance abuse (CMS/[facility])   • History of tracheal stenosis   • Chronic diastolic CHF (congestive heart failure) (CMS/[facility])   • Peripheral artery disease (CMS/[facility])   • Medically noncompliant   • WENDI and COPD overlap syndrome (CMS/[facility])   • Dyspnea   • Elevated LFTs   • Elevated troponin   • Tracheostomy present (CMS/[facility])   • Essential hypertension   • Dysphagia     Past Medical History:   Diagnosis Date   • Acute congestive heart failure (CMS/[facility]) 2018   • Acute osteomyelitis (CMS/[facility])     Right shoulder due to IVDA   • Acute renal failure on dialysis (CMS/[facility])    • Anxiety    • Pugh esophagus    • CKD (chronic kidney disease)    • COPD (chronic obstructive pulmonary disease) (CMS/[facility])    • MRSA infection    • Nontraumatic subarachnoid hemorrhage (CMS/[facility])    • Seizures (CMS/[facility])    • Tracheostomy present (CMS/[facility])      Past Surgical History:   Procedure Laterality Date   • TRACHEOSTOMY       General Information     Row Name 19 1607          PT Evaluation  Time/Intention    Document Type  evaluation Pt. admitted with SOA.   -MS     Mode of Treatment  physical therapy;individual therapy  -MS     Row Name 09/24/19 1607          General Information    Patient Profile Reviewed?  yes  -MS     Prior Level of Function  -- Pt. reports working on ambulation/transfers at rehab just prior to admission.   -MS     Existing Precautions/Restrictions  fall  (Significant)  Trach (6 liters oxygen).   -MS     Barriers to Rehab  none identified  -MS     Row Name 09/24/19 1607          Cognitive Assessment/Intervention- PT/OT    Orientation Status (Cognition)  oriented to;person  -MS     Row Name 09/24/19 1607          Safety Issues, Functional Mobility    Comment, Safety Issues/Impairments (Mobility)  Gait belt used for safety.  -MS       User Key  (r) = Recorded By, (t) = Taken By, (c) = Cosigned By    Initials Name Provider Type    Andrei Castro, PT Physical Therapist        Mobility     Row Name 09/24/19 1608          Bed Mobility Assessment/Treatment    Bed Mobility Assessment/Treatment  supine-sit;sit-supine  -MS     Supine-Sit [place] (Bed Mobility)  contact guard  -MS     Sit-Supine [place] (Bed Mobility)  contact guard  -MS     Assistive Device (Bed Mobility)  bed rails  -MS     Row Name 09/24/19 1608          Sit-Stand Transfer    Sit-Stand [place] (Transfers)  minimum assist (75% patient effort);2 person assist  -MS     Assistive Device (Sit-Stand Transfers)  -- HHA x 2  -MS     Row Name 09/24/19 1608          Gait/Stairs Assessment/Training    [place] Level (Gait)  minimum assist (75% patient effort);2 person assist  -MS     Assistive Device (Gait)  -- HHA x 2  -MS     Distance in Feet (Gait)  Pt. able to take 2-3 sidesteps up toward the head of the bed.   -MS       User Key  (r) = Recorded By, (t) = Taken By, (c) = Cosigned By    Initials Name Provider Type    Andrei Castro, PT Physical Therapist        Obj/Interventions     Row Name 09/24/19  1609          General ROM    GENERAL ROM COMMENTS  BUE/LE (WFL's)  -MS     Row Name 09/24/19 1609          MMT (Manual Muscle Testing)    General MMT Comments  BUE/LE (3+/5)  -MS     Row Name 09/24/19 1609          Therapeutic Exercise    Comment (Therapeutic Exercise)  BLE ther. ex. program x 10 reps completed (Ankle Pumps, LAQ's, Hip Flexion)  -MS       User Key  (r) = Recorded By, (t) = Taken By, (c) = Cosigned By    Initials Name Provider Type    Andrei Castro ANAI, PT Physical Therapist        Goals/Plan     Row Name 09/24/19 1610          Bed Mobility Goal 1 (PT)    Activity/Assistive Device (Bed Mobility Goal 1, PT)  bed mobility activities, all  -MS     [place] Level/Cues Needed (Bed Mobility Goal 1, PT)  independent  -MS     Time Frame (Bed Mobility Goal 1, PT)  long term goal (LTG);1 week  -MS     [facility] Name 09/24/19 1610          Transfer Goal 1 (PT)    Activity/Assistive Device (Transfer Goal 1, PT)  transfers, all With AAD  -MS     [place] Level/Cues Needed (Transfer Goal 1, PT)  independent  -MS     Time Frame (Transfer Goal 1, PT)  long term goal (LTG);1 week  -MS     [facility] Name 09/24/19 1610          Gait Training Goal 1 (PT)    Activity/Assistive Device (Gait Training Goal 1, PT)  gait (walking locomotion) With AAD  -MS     [place] Level (Gait Training Goal 1, PT)  standby assist  -MS     Distance (Gait Goal 1, PT)  50 feet  -MS     Time Frame (Gait Training Goal 1, PT)  long term goal (LTG);1 week  -MS       User Key  (r) = Recorded By, (t) = Taken By, (c) = Cosigned By    Initials Name Provider Type    Andrei Castro L, PT Physical Therapist        Clinical Impression     Row Name 09/24/19 1609          Pain Assessment    Additional Documentation  Pain Scale: Numbers Pre/Post-Treatment (Group)  -MS     [facility] Name 09/24/19 1609          Pain Scale: Numbers Pre/Post-Treatment    Pain Scale: Numbers, Pretreatment  0/10 - no pain  -MS     Pain Scale: Numbers, Post-Treatment  0/10 - no  pain  -MS     Row Name 09/24/19 1609          Plan of Care Review    Plan of Care Reviewed With  patient  -MS     Row Name 09/24/19 1609          Physical Therapy Clinical Impression    Criteria for Skilled Interventions Met (PT Clinical Impression)  yes  -MS     Rehab Potential (PT Clinical Summary)  good, to achieve stated therapy goals  -MS     Row Name 09/24/19 1609          Positioning and Restraints    Pre-Treatment Position  in bed  -MS     Post Treatment Position  bed  -MS     In Bed  notified nsg;supine;call light within reach;encouraged to call for assist;with nsg All lines intact.  -MS       User Key  (r) = Recorded By, (t) = Taken By, (c) = Cosigned By    Initials Name Provider Type    Andrei Castro, PT Physical Therapist        Outcome Measures     Row Name 09/24/19 1612          How much help from another person do you currently need...    Turning from your back to your side while in flat bed without using bedrails?  3  -MS     Moving from lying on back to sitting on the side of a flat bed without bedrails?  3  -MS     Moving to and from a bed to a chair (including a wheelchair)?  3  -MS     Standing up from a chair using your arms (e.g., wheelchair, bedside chair)?  3  -MS     Climbing 3-5 steps with a railing?  2  -MS     To walk in hospital room?  3  -MS     AM-PAC 6 Clicks Score (PT)  17  -MS     Row Name 09/24/19 1612          Functional Assessment    Outcome Measure Options  AM-PAC 6 Clicks Basic Mobility (PT)  -MS       User Key  (r) = Recorded By, (t) = Taken By, (c) = Cosigned By    Initials Name Provider Type    Andrei Castro, PT Physical Therapist        Physical Therapy Education     Title: PT OT SLP Therapies (Done)     Topic: Physical Therapy (Done)     Point: Mobility training (Done)     Learning Progress Summary           Patient Acceptance, E,D, VU,NR by MS at 9/24/2019  4:11 PM                   Point: Home exercise program (Done)     Learning Progress Summary            Patient Acceptance, E,D, VU,NR by MS at 9/24/2019  4:11 PM                   Point: Body mechanics (Done)     Learning Progress Summary           Patient Acceptance, E,D, VU,NR by MS at 9/24/2019  4:11 PM                   Point: Precautions (Done)     Learning Progress Summary           Patient Acceptance, E,D, VU,NR by MS at 9/24/2019  4:11 PM                               User Key     Initials Effective Dates Name Provider Type Discipline    MS 04/03/18 -  Andrei Lee, PT Physical Therapist PT              PT Recommendation and Plan  Planned Therapy Interventions (PT Eval): balance training, bed mobility training, gait training, home exercise program, patient/family education, postural re-education, strengthening, transfer training  Outcome Summary/Treatment Plan (PT)  Anticipated Discharge Disposition (PT): skilled nursing facility  Plan of Care Reviewed With: patient  Outcome Summary: Pt. will benefit from skilled inpt. P.T. to address her functional deficits and to assist pt. in regaining her maximum level of independence with functional mobility.      Time Calculation:   PT Charges     Row Name 09/24/19 1612 09/24/19 0933          Time Calculation    Start Time  1525  -MS  --     Stop Time  1540  -MS  --     Time Calculation (min)  15 min  -MS  --     PT Received On  09/24/19  -MS  --     PT - Next Appointment  09/25/19  -MS  09/25/19  -     PT Goal Re-Cert Due Date  10/01/19  -MS  --        Time Calculation- PT    Total Timed Code Minutes- PT  13 minute(s)  -MS  --       User Key  (r) = Recorded By, (t) = Taken By, (c) = Cosigned By    Initials Name Provider Type    Ct Urias, PT Physical Therapist    MS Andrei Lee, PT Physical Therapist        Therapy Charges for Today     Code Description Service Date Service Provider Modifiers Qty    28131122082 HC PT EVAL LOW COMPLEXITY 1 9/24/2019 Andrei Lee, PT GP 1    15764560020 HC PT THER PROC EA 15 MIN 9/24/2019 Andrei Lee  ANAI, PT GP 1    [id number]  PT THER SUPP EA 15 MIN 9/24/2019 Andrei Lee, PT GP 1          PT G-Codes  Outcome Measure Options: AM-PAC 6 Clicks Basic Mobility (PT)  AM-PAC 6 Clicks Score (PT): 17    Andrei Lee, PT  9/24/2019

## 2019-09-24 NOTE — PROGRESS NOTES
Continued Stay Note  [facility]     Patient Name: Swetha Luis  MRN: [id number]  Today's Date: 9/24/2019    Admit Date: 9/21/2019    Discharge Plan     Row Name 09/24/19 1535       Plan    Plan  Return home with spouse and [place]      Plan Comments  Called and updated Isadora/Darshana, pt will not be discharging to [facility]. [facility] ambulance cancelled. Ophelia ONOFRE updated. Called and updated Shazia they will need to take empty O2 tanks to [facility] to have them refilled before picking the pt up tomorrow. Spoke with Dr Dutton, anticipate dc tomorrow. Spoke with Rose Marie/Dietician, she will re-evaluate the pt to give final rec's for Tube Feeds. Ophelia ONOFRE to consult speech therapy to darrin Mccray has questions as to when the pts diet will be advanced. CCP to follow. JChasteenRN/CCP                               Discharge Codes    No documentation.             Darcy Monet RN

## 2019-09-25 VITALS
SYSTOLIC BLOOD PRESSURE: 113 MMHG | HEIGHT: 66 IN | HEART RATE: 63 BPM | DIASTOLIC BLOOD PRESSURE: 78 MMHG | RESPIRATION RATE: 18 BRPM | WEIGHT: 156.3 LBS | BODY MASS INDEX: 25.12 KG/M2 | OXYGEN SATURATION: 100 % | TEMPERATURE: 97.8 F

## 2019-09-25 PROCEDURE — G0378 HOSPITAL OBSERVATION PER HR: HCPCS

## 2019-09-25 PROCEDURE — 63710000001 PREDNISONE PER 5 MG: Performed by: NURSE PRACTITIONER

## 2019-09-25 PROCEDURE — 97110 THERAPEUTIC EXERCISES: CPT

## 2019-09-25 PROCEDURE — 94799 UNLISTED PULMONARY SVC/PX: CPT

## 2019-09-25 RX ORDER — ESCITALOPRAM OXALATE 20 MG/1
20 TABLET ORAL DAILY
Qty: 30 TABLET | Refills: 0 | Status: ON HOLD | OUTPATIENT
Start: 2019-09-25 | End: 2020-12-08 | Stop reason: SDDI

## 2019-09-25 RX ORDER — THIAMINE MONONITRATE (VIT B1) 100 MG
100 TABLET ORAL DAILY
Qty: 30 TABLET | Refills: 0 | Status: ON HOLD | OUTPATIENT
Start: 2019-09-25 | End: 2020-12-08 | Stop reason: SDDI

## 2019-09-25 RX ORDER — GUAIFENESIN AND DEXTROMETHORPHAN HYDROBROMIDE 100; 10 MG/5ML; MG/5ML
5 SOLUTION ORAL 4 TIMES DAILY
Qty: 600 ML | Refills: 0 | Status: SHIPPED | OUTPATIENT
Start: 2019-09-25 | End: 2019-10-14 | Stop reason: HOSPADM

## 2019-09-25 RX ORDER — PRAMIPEXOLE DIHYDROCHLORIDE 0.25 MG/1
0.25 TABLET ORAL NIGHTLY
Qty: 30 TABLET | Refills: 0 | Status: SHIPPED | OUTPATIENT
Start: 2019-09-25

## 2019-09-25 RX ORDER — BUSPIRONE HYDROCHLORIDE 5 MG/1
5 TABLET ORAL 3 TIMES DAILY
Qty: 90 TABLET | Refills: 0 | Status: ON HOLD | OUTPATIENT
Start: 2019-09-25 | End: 2020-12-08 | Stop reason: SDDI

## 2019-09-25 RX ORDER — OXYCODONE HYDROCHLORIDE AND ACETAMINOPHEN 5; 325 MG/1; MG/1
1 TABLET ORAL EVERY 6 HOURS PRN
Qty: 12 TABLET | Refills: 0 | Status: SHIPPED | OUTPATIENT
Start: 2019-09-25 | End: 2020-11-21

## 2019-09-25 RX ORDER — GABAPENTIN 300 MG/1
300 CAPSULE ORAL 3 TIMES DAILY
Qty: 9 CAPSULE | Refills: 0 | Status: ON HOLD | OUTPATIENT
Start: 2019-09-25 | End: 2020-12-08 | Stop reason: SDDI

## 2019-09-25 RX ORDER — METOPROLOL SUCCINATE 25 MG/1
25 TABLET, EXTENDED RELEASE ORAL DAILY
Qty: 30 TABLET | Refills: 0 | Status: SHIPPED | OUTPATIENT
Start: 2019-09-25 | End: 2019-12-29 | Stop reason: SDUPTHER

## 2019-09-25 RX ORDER — BUDESONIDE 0.5 MG/2ML
0.5 INHALANT ORAL 2 TIMES DAILY
Qty: 120 ML | Refills: 0 | Status: SHIPPED | OUTPATIENT
Start: 2019-09-25 | End: 2020-11-18 | Stop reason: HOSPADM

## 2019-09-25 RX ORDER — IPRATROPIUM BROMIDE AND ALBUTEROL SULFATE 2.5; .5 MG/3ML; MG/3ML
3 SOLUTION RESPIRATORY (INHALATION) EVERY 4 HOURS PRN
Qty: 360 ML | Refills: 0 | Status: SHIPPED | OUTPATIENT
Start: 2019-09-25

## 2019-09-25 RX ORDER — NYSTATIN 100000 [USP'U]/G
POWDER TOPICAL 3 TIMES DAILY
Qty: 60 G | Refills: 0
Start: 2019-09-25 | End: 2019-10-14 | Stop reason: HOSPADM

## 2019-09-25 RX ORDER — PREDNISONE 10 MG/1
10 TABLET ORAL DAILY
Qty: 30 TABLET | Refills: 0 | Status: SHIPPED | OUTPATIENT
Start: 2019-09-25 | End: 2019-10-14 | Stop reason: HOSPADM

## 2019-09-25 RX ADMIN — GABAPENTIN 300 MG: 300 CAPSULE ORAL at 18:13

## 2019-09-25 RX ADMIN — GUAIFENESIN AND DEXTROMETHORPHAN 5 ML: 100; 10 SYRUP ORAL at 12:28

## 2019-09-25 RX ADMIN — OXYCODONE HYDROCHLORIDE AND ACETAMINOPHEN 1 TABLET: 5; 325 TABLET ORAL at 14:51

## 2019-09-25 RX ADMIN — NYSTATIN 500000 UNITS: 100000 SUSPENSION ORAL at 08:47

## 2019-09-25 RX ADMIN — NYSTATIN: 100000 POWDER TOPICAL at 18:13

## 2019-09-25 RX ADMIN — GUAIFENESIN AND DEXTROMETHORPHAN 5 ML: 100; 10 SYRUP ORAL at 08:47

## 2019-09-25 RX ADMIN — Medication 100 MG: at 08:47

## 2019-09-25 RX ADMIN — NYSTATIN: 100000 POWDER TOPICAL at 08:49

## 2019-09-25 RX ADMIN — GUAIFENESIN AND DEXTROMETHORPHAN 5 ML: 100; 10 SYRUP ORAL at 18:12

## 2019-09-25 RX ADMIN — BUDESONIDE 0.5 MG: 0.5 SUSPENSION RESPIRATORY (INHALATION) at 08:37

## 2019-09-25 RX ADMIN — GABAPENTIN 300 MG: 300 CAPSULE ORAL at 08:47

## 2019-09-25 RX ADMIN — PREDNISONE 10 MG: 10 TABLET ORAL at 08:47

## 2019-09-25 RX ADMIN — NYSTATIN 500000 UNITS: 100000 SUSPENSION ORAL at 18:13

## 2019-09-25 RX ADMIN — BUSPIRONE HYDROCHLORIDE 5 MG: 5 TABLET ORAL at 18:12

## 2019-09-25 RX ADMIN — ASPIRIN 81 MG: 81 TABLET, CHEWABLE ORAL at 08:47

## 2019-09-25 RX ADMIN — ESCITALOPRAM 20 MG: 20 TABLET, FILM COATED ORAL at 08:47

## 2019-09-25 RX ADMIN — SODIUM CHLORIDE, PRESERVATIVE FREE 10 ML: 5 INJECTION INTRAVENOUS at 08:47

## 2019-09-25 RX ADMIN — BUSPIRONE HYDROCHLORIDE 5 MG: 5 TABLET ORAL at 08:47

## 2019-09-25 RX ADMIN — METOPROLOL SUCCINATE 25 MG: 25 TABLET, FILM COATED, EXTENDED RELEASE ORAL at 08:47

## 2019-09-25 RX ADMIN — NYSTATIN 500000 UNITS: 100000 SUSPENSION ORAL at 12:28

## 2019-09-25 RX ADMIN — OXYCODONE HYDROCHLORIDE AND ACETAMINOPHEN 1 TABLET: 5; 325 TABLET ORAL at 08:47

## 2019-09-25 RX ADMIN — LANSOPRAZOLE 30 MG: KIT at 07:06

## 2019-09-25 NOTE — THERAPY TREATMENT NOTE
Patient Name: Swetha Luis  : 1960    MRN: [id number]                              Today's Date: 2019       Admit Date: 2019    Visit Dx:     ICD-10-CM ICD-9-CM   1. Dyspnea, unspecified type R06.00 786.09   2. Elevated troponin R74.8 790.6     Patient Active Problem List   Diagnosis   • Tracheostomy complication (CMS/[facility])   • Gangrene of toe (CMS/[facility])   • Acute diastolic congestive heart failure (CMS/[facility])   • ARF (acute renal failure) (CMS/[facility])   • Stage 3 chronic kidney disease (CMS/[facility])   • Elevated troponin   • Acute respiratory failure with hypoxemia (CMS/[facility])   • Acute osteomyelitis (CMS/[facility])   • UTI due to extended-spectrum beta lactamase (ESBL) producing Escherichia coli   • Thrush, oral   • Tracheostomy malfunction (CMS/[facility])   • COPD (chronic obstructive pulmonary disease) (CMS/[facility])   • Respiratory failure, chronic (CMS/[facility])   • PEG (percutaneous endoscopic gastrostomy) status (CMS/[facility])   • History of osteomyelitis   • Polysubstance abuse (CMS/[facility])   • History of tracheal stenosis   • Chronic diastolic CHF (congestive heart failure) (CMS/[facility])   • Peripheral artery disease (CMS/[facility])   • Medically noncompliant   • WENDI and COPD overlap syndrome (CMS/[facility])   • Dyspnea   • Elevated LFTs   • Elevated troponin   • Tracheostomy present (CMS/[facility])   • Essential hypertension   • Dysphagia     Past Medical History:   Diagnosis Date   • Acute congestive heart failure (CMS/[facility]) 2018   • Acute osteomyelitis (CMS/[facility])     Right shoulder due to IVDA   • Acute renal failure on dialysis (CMS/[facility])    • Anxiety    • Pugh esophagus    • CKD (chronic kidney disease)    • COPD (chronic obstructive pulmonary disease) (CMS/[facility])    • MRSA infection    • Nontraumatic subarachnoid hemorrhage (CMS/[facility])    • Seizures (CMS/[facility])    • Tracheostomy present (CMS/[facility])      Past Surgical History:   Procedure Laterality Date   • TRACHEOSTOMY       General Information     Row Name 19 1024          PT Evaluation  Time/Intention    Document Type  therapy note (daily note)  -     Row Name 09/25/19 1024          Cognitive Assessment/Intervention- PT/OT    Orientation Status (Cognition)  oriented to;person  -     Row Name 09/25/19 1024          Safety Issues, Functional Mobility    Safety Issues Affecting Function (Mobility)  ability to follow commands;insight into deficits/self awareness;impulsivity;judgment  -PH     Impairments Affecting Function (Mobility)  endurance/activity tolerance;shortness of breath;range of motion (ROM)  -     Comment, Safety Issues/Impairments (Mobility)  gait belt used  -       User Key  (r) = Recorded By, (t) = Taken By, (c) = Cosigned By    Initials Name Provider Type     Jahaira Muhammad PTA Physical Therapy Assistant        Mobility     Row Name 09/25/19 1025          Bed Mobility Assessment/Treatment    Bed Mobility Assessment/Treatment  scooting/bridging;supine-sit;sit-supine  -PH     Scooting/Bridging [place] (Bed Mobility)  verbal cues;supervision  -     Supine-Sit [place] (Bed Mobility)  contact guard  -     Sit-Supine [place] (Bed Mobility)  supervision;verbal cues  -     Assistive Device (Bed Mobility)  bed rails;head of bed elevated  -     Row Name 09/25/19 1025          Sit-Stand Transfer    Sit-Stand [place] (Transfers)  contact guard;2 person assist  -     Row Name 09/25/19 1025          Gait/Stairs Assessment/Training    Distance in Feet (Gait)  no gait today  -       User Key  (r) = Recorded By, (t) = Taken By, (c) = Cosigned By    Initials Name Provider Type     Jahaira Muhammad PTA Physical Therapy Assistant        Obj/Interventions     Row Name 09/25/19 1027          Therapeutic Exercise    Exercise Type (Therapeutic Exercise)  AROM (active range of motion)  -     Position (Therapeutic Exercise)  seated;standing  -     Sets/Reps (Therapeutic Exercise)  Standing w/ FWW: [place] 2 x 10 reps - seated rest after each set,  Heel raises x 10, ABD/ADD x10, Seated: g sets, LAQs x 10  -PH       User Key  (r) = Recorded By, (t) = Taken By, (c) = Cosigned By    Initials Name Provider Type     Jahaira Muhammad PTA Physical Therapy Assistant        Goals/Plan    No documentation.       Clinical Impression     Row Name 09/25/19 1029          Pain Assessment    Additional Documentation  Pain Scale: FACES Pre/Post-Treatment (Group)  -PH     Row Name 09/25/19 1029          Pain Scale: Numbers Pre/Post-Treatment    Pain Location - Side  Left  -PH     Pain Location  hand  -PH     Pain Intervention(s)  Repositioned;Ambulation/increased activity;Rest  -PH     Row Name 09/25/19 1029          Pain Scale: FACES Pre/Post-Treatment    Pain: FACES Scale, Pretreatment  2-->hurts little bit  -PH     Pain: FACES Scale, Post-Treatment  2-->hurts little bit  -PH     Row Name 09/25/19 1029          Vital Signs    O2 Delivery Pre Treatment  trach collar  -PH     O2 Delivery Post Treatment  trach collar  -PH     Row Name 09/25/19 1029          Positioning and Restraints    Pre-Treatment Position  in bed  -PH     Post Treatment Position  bed  -PH     In Bed  call light within reach;supine;encouraged to call for assist;exit alarm on;with family/caregiver  -PH       User Key  (r) = Recorded By, (t) = Taken By, (c) = Cosigned By    Initials Name Provider Type     Jahaira Muhammad PTA Physical Therapy Assistant        Outcome Measures     Row Name 09/25/19 1032          How much help from another person do you currently need...    Turning from your back to your side while in flat bed without using bedrails?  3  -PH     Moving from lying on back to sitting on the side of a flat bed without bedrails?  3  -PH     Moving to and from a bed to a chair (including a wheelchair)?  3  -PH     Standing up from a chair using your arms (e.g., wheelchair, bedside chair)?  3  -PH     Climbing 3-5 steps with a railing?  2  -PH     To walk in hospital room?  3  -PH      AM-PAC 6 Clicks Score (PT)  17  -PH       User Key  (r) = Recorded By, (t) = Taken By, (c) = Cosigned By    Initials Name Provider Type     Jahaira Muhammad PTA Physical Therapy Assistant        Physical Therapy Education     Title: PT OT SLP Therapies (Done)     Topic: Physical Therapy (Done)     Point: Mobility training (Done)     Learning Progress Summary           Patient Acceptance, E,TB,D, DU,NR by  at 9/25/2019 10:30 AM    Acceptance, E,D, VU,NR by MS at 9/24/2019  4:11 PM                   Point: Home exercise program (Done)     Learning Progress Summary           Patient Acceptance, E,TB,D, DU,NR by  at 9/25/2019 10:30 AM    Acceptance, E,D, VU,NR by MS at 9/24/2019  4:11 PM                   Point: Body mechanics (Done)     Learning Progress Summary           Patient Acceptance, E,TB,D, DU,NR by  at 9/25/2019 10:30 AM    Acceptance, E,D, VU,NR by MS at 9/24/2019  4:11 PM                   Point: Precautions (Done)     Learning Progress Summary           Patient Acceptance, E,TB,D, DU,NR by  at 9/25/2019 10:30 AM    Acceptance, E,D, VU,NR by MS at 9/24/2019  4:11 PM                               User Key     Initials Effective Dates Name Provider Type Discipline    MS 04/03/18 -  Andrei Lee, PT Physical Therapist PT     08/20/19 -  Jahaira Mhuammad PTA Physical Therapy Assistant PT              PT Recommendation and Plan     Plan of Care Reviewed With: patient  Outcome Summary: Pt exhibits impulsivity. Pt able to perform standing ther ex w/ no complaint of fatigue when asked. Pt may benefit from PT to improve mobility, strength, and endurance.     Time Calculation:   PT Charges     Row Name 09/25/19 1032             Time Calculation    Start Time  1009  -PH      Stop Time  1028  -PH      Time Calculation (min)  19 min  -      PT Received On  09/25/19  -      PT - Next Appointment  09/26/19  -        User Key  (r) = Recorded By, (t) = Taken By, (c) = Cosigned By     Initials Name Provider Type     Jahaira Muhammad PTA Physical Therapy Assistant        Therapy Charges for Today     Code Description Service Date Service Provider Modifiers Qty    10687383330 HC PT THER PROC EA 15 MIN 9/25/2019 Jahaira Muhammad PTA GP 1    57464418097 HC PT THER SUPP EA 15 MIN 9/25/2019 Jahaira Muhammad PTA GP 1          PT G-Codes  Outcome Measure Options: AM-PAC 6 Clicks Basic Mobility (PT)  AM-PAC 6 Clicks Score (PT): 17    Jahaira Muhammad PTA  9/25/2019

## 2019-09-25 NOTE — PROGRESS NOTES
Continued Stay Note  [facility]     Patient Name: Swetha Luis  MRN: [id number]  Today's Date: 9/25/2019    Admit Date: 9/21/2019 9/25/19 at 1632  Received call from [facility] who noted that patient prescription coverage has termed.  Noted patient had passport advantage/termed 8/31/19.  Noted [place] Medicaid in system- spoke with Johnnie in [employer] [id number] who noted that patient has comprehensive choice and not long term medicaid it should cover her medications- Medicaid ID info given to Michelle pharmacist with [facility] -noted that after running- medications not due to be filled until 9/30, Mirapex not due for refill until 10/7, and prednisone not due for refill until 10/14.  thiamine B1 is over counter.   Pulmicort nebulizer solution was last filled on 9/17/2019.  Spoke with nurse Arias at [facility] [address] wing- she noted that medications are returned back to their pharmacy for credit when patients don't return back, solutions discarded and narcotics wasted.  They would only be able to give medications to patient if they were returning to home from [facility].  Per Ashlyn/pharmacy patient unable to cover cost of medications at Retail cost. [facility] to provide meds covered by [facility].  Suzanne ONOFRE, CCP    Discharge Plan     Row Name 09/25/19 1632       Plan    Plan  Return home with spouse and [facility]     Plan Comments  Pts spouse requesting a new nebulizer and BSC. Order obtained from Dr Dutton. [facility] to deliver a BSC and Nebulizer to home per Corin. Pt needs new scripts for all her medications once she returns. Attempted to all and notify [facility], they are gone for today. CCP to pay for pts medication refills from [facility] [place] per Darcy LEVY CCP to follow up tomorrow. Rossy/KATHLEEN  Simultaneous filing. User may be unaware of other data.    Row Name 09/25/19 9227       Plan    Plan  Return home with spouse and [facility]     Plan  Comments  Pt is discharging today. Pt plans to return home with help from her spouse and [facility] , Natalia aware of dc today. Per Natalia/[facility], a  nurse will visit the pt tonight at her home. [facility] delivered a portable O2 tank that will accomodate the pt being on 6L to bedside for the  to transport the pt home with. A hospital bed, oxygen concentrator and suction will delivered to pts home by [facility]. They will call pts spouse when they are on the way so he can leave the hospital to meet them there. Pt will then return to the hospital to transport the pt home. Spoke with pt at bedside, pt states she has a nebulizer at home. Tube feeds to be delivered to bedside before pt leaves. Pt to initiate care with [facility], pt to be seen friday. Stephanie ONOFRE aware. JChasteenRN/CCP         Discharge Codes    No documentation.       Expected Discharge Date and Time     Expected Discharge Date Expected Discharge Time    Sep 25, 2019             Darcy Robertson, RN

## 2019-09-25 NOTE — DISCHARGE SUMMARY
Date of Admission: 9/21/2019  Date of Discharge:  9/25/2019  Primary Care Physician: Lencho Connelly MD     Discharge Diagnosis:  Active Hospital Problems    Diagnosis  POA   • **Dyspnea [R06.00]  Yes   • Elevated LFTs [R94.5]  Yes   • Elevated troponin [R74.8]  Yes   • Tracheostomy present (CMS/[facility]) [Z93.0]  Not Applicable   • Essential hypertension [I10]  Yes   • Dysphagia [R13.10]  Yes   • Respiratory failure, chronic (CMS/[facility]) [J96.10]  Yes   • COPD (chronic obstructive pulmonary disease) (CMS/[facility]) [J44.9]  Yes   • Chronic diastolic CHF (congestive heart failure) (CMS/[facility]) [I50.32]  Yes   • Stage 3 chronic kidney disease (CMS/[facility]) [N18.3]  Yes      Resolved Hospital Problems   No resolved problems to display.       Presenting Problem/History of Present Illness:  Elevated troponin [R74.8]  Dyspnea, unspecified type [R06.00]  Dyspnea, unspecified type [R06.00]     Hospital Course:  The patient is a 59 y.o. female with a history of systolic CHF, chronic hypoxic respiratory failure status post tracheostomy, tracheal stenosis, osteomyelitis, COPD, CKD, subarachnoid hemorrhage, and substance abuse who presented from her nursing home with chest tightness, increased cough, and shortness of breath.  Please see admission H&P from 9/22/19 for further details.  She had apparently just eaten a hamburger bun and required increased suctioning following that-she has occasionally been non-compliant with her soft, nectar thick diet. Her troponins were borderline elevated and stable.  She had no evidence of CHF exacerbation or pneumonia.  Cardiology evaluated the patient, and an echocardiogram showed no LV wall motion abnormalities.  She was monitored with tracheostomy care and suctioning.  Speech therapy did evaluate the patient and recommended a pureed, nectar thick diet which she tolerated well.  Her oxygen was weaned to baseline of 6-8L trach mask.  WBC and temperature were followed and were normal.    We were prepared to  "send the patient back to her nursing home but her  elected to take her home.  He stated that he has taken care of her before and is comfortable with this. Home health will be set up for tube feeds, tracheostomy care instruction, and physical therapy.    She will need to keep scheduled follow up with her outpatient physicians.    Exam Today:  Blood pressure 113/78, pulse 63, temperature 97.8 °F (36.6 °C), temperature source Oral, resp. rate 18, height 167.6 cm (66\"), weight 70.9 kg (156 lb 4.8 oz), SpO2 100 %.  Constitutional: No distress.   Head: Normocephalic and atraumatic.   Mouth/Throat: Oropharynx is clear and moist.   Tracheostomy in place   Eyes: Conjunctivae and EOM are normal. Pupils are equal, round, and reactive to light.   Neck: Normal range of motion. Neck supple.   Cardiovascular: Normal rate, regular rhythm and intact distal pulses.   Pulmonary/Chest: Effort normal. She has decreased bibasilar breath sounds.  Abdominal: Soft. Bowel sounds are normal.   Musculoskeletal: She exhibits no edema or tenderness.   Neurological: She is alert.   Skin: Skin is warm and dry. She is not diaphoretic.   Psychiatric: She has a normal mood and affect. Her behavior is normal.   Nursing note and vitals reviewed.    Pertinent Results:  Swetha Luis   Echocardiogram   Order# [id number]   Reading physician: Los Garbiel MD Ordering physician: Tiffanie Potter APRN Study date: 19   Patient Information     Patient Name  Swetha Luis MRN  [id number] Sex  Female  (Age)  1960 (59 y.o.)   Admission Information     Admission Date/Time Discharge Date/Time Room/Bed   19  2225  N624/1   Sedation Narrator Report     Sedation Narrator Report   Interpretation Summary     · Left ventricular systolic function is normal. Calculated EF = 64%. Estimated EF was in disagreement with the calculated EF. Estimated EF = 55%. Normal left ventricular cavity size and wall thickness noted. All left ventricular wall segments " contract normally. Left ventricular diastolic function is normal.     Left Ventricle Left ventricular systolic function is normal. Calculated EF = 64%. Estimated EF was in disagreement with the calculated EF. Estimated EF = 55%. Normal left ventricular cavity size and wall thickness noted. All left ventricular wall segments contract normally. Left ventricular diastolic function is normal.   Right Ventricle Normal right ventricular cavity size and systolic function noted.   Left Atrium Normal left atrial size noted.   Right Atrium Normal right atrial size noted. The inferior vena cava is normally sized. Normal IVC inspiratory collapse of greater than 50% noted.   Aortic Valve The valve appears trileaflet. The aortic valve is abnormal in structure. The valve exhibits sclerosis. Trace aortic valve regurgitation is present. No aortic valve stenosis is present. Aortic valve dimensionless index is 0.6.   Tricuspid Valve The tricuspid valve is grossly normal. No evidence of tricuspid valve stenosis is present. Physiologic tricuspid valve regurgitation is present. Estimated right ventricular systolic pressure from tricuspid regurgitation is normal (<35 mmHg). Calculated right ventricular systolic pressure from tricuspid regurgitation is 19 mmHg.   Pulmonic Valve The pulmonic valve is grossly normal in structure.   Greater Vessels No dilation of the aortic root is present.   Pericardium There is no evidence of pericardial effusion.         Consults:   Consults     Date and Time Order Name Status Description    9/22/2019 0849 Inpatient Cardiology Consult Completed     9/21/2019 2344 [facility] (on-call MD unless specified) Details Completed     9/21/2019 2343 [facility] (on-call MD unless specified) Details Completed            Discharge Disposition:  Home or Self Care    Discharge Medications:     Discharge Medications      New Medications      Instructions Start Date   metoprolol succinate XL 25 MG 24 hr tablet  Commonly known as:   TOPROL-XL  Replaces:  Metoprolol Succinate 25 MG capsule extended-release 24 hour sprinkle   25 mg, Oral, Daily         Changes to Medications      Instructions Start Date   budesonide 0.5 MG/2ML nebulizer solution  Commonly known as:  PULMICORT  What changed:  additional instructions   0.5 mg, Nebulization, Daily - RT      oxyCODONE-acetaminophen 5-325 MG per tablet  Commonly known as:  PERCOCET  What changed:  reasons to take this   1 tablet, Oral, Every 6 Hours PRN         Continue These Medications      Instructions Start Date   Ascorbic Acid 500 MG capsule   Oral      aspirin 81 MG chewable tablet   81 mg, Oral, Daily      busPIRone 5 MG tablet  Commonly known as:  BUSPAR   5 mg, Oral, 3 Times Daily      dextromethorphan-guaifenesin  MG/5ML syrup  Commonly known as:  ROBITUSSIN-DM   5 mL, Per G Tube, 4 Times Daily      escitalopram 20 MG tablet  Commonly known as:  LEXAPRO   20 mg, Oral, Daily      gabapentin 300 MG capsule  Commonly known as:  NEURONTIN   300 mg, Oral, 3 Times Daily      ipratropium-albuterol 0.5-2.5 mg/3 ml nebulizer  Commonly known as:  DUO-NEB   3 mL, Nebulization, Every 4 Hours PRN      MULTIPLE VITAMINS-MINERALS PO   Oral      nystatin 664870 UNIT/GM powder  Commonly known as:  MYCOSTATIN   Topical, 3 Times Daily, Skin folds in the groin      omeprazole 40 MG capsule  Commonly known as:  priLOSEC   40 mg, Oral, Daily      pramipexole 0.25 MG tablet  Commonly known as:  MIRAPEX   0.25 mg, Oral, Nightly      predniSONE 10 MG tablet  Commonly known as:  DELTASONE   10 mg, Per G Tube, Daily      thiamine 100 MG tablet  Commonly known as:  VITAMIN B-1   100 mg, Oral, Daily         Stop These Medications    Metoprolol Succinate 25 MG capsule extended-release 24 hour sprinkle  Replaced by:  metoprolol succinate XL 25 MG 24 hr tablet     nystatin [id number] UNIT/ML suspension  Commonly known as:  MYCOSTATIN            Discharge Diet:   Diet Instructions     Diet: Cardiac, Soft Texture; Honey  Thick Liquids; Ground      Discharge Diet:   Cardiac  Soft Texture       Fluid Consistency:  Honey Thick Liquids    Soft Options:  Ground    Diet: Tube Feeding; Bolus; Diabetasource-after meals based on amount consumed (see comments)      Discharge Diet:  Tube Feeding    Feeding Type:  Bolus    Formula, Amount & Frequency:  Diabetasource-after meals based on amount consumed (see comments)    -If patient eats 100% at a meal, hold bolus  -If patient eats 75% at a meal, give 120 ml (1/2 can) bolus  -If patient eats 50% at a meal, give 240 ml (1 can) bolus  -If patient eats 25% at a meal, give 360 ml (1.5 can) bolus  -If patient eats 0% at a meal, give 480 ml (2 cans) bolus  If bolus is given, recommend flushing with 50 ml before and after bolus.          Activity at Discharge:   Activity Instructions     Activity as Tolerated            Follow-up Appointments:  No future appointments.  Additional Instructions for the Follow-ups that You Need to Schedule     Discharge Follow-up with PCP   As directed       Currently Documented PCP:    Ravin Connelly MD    PCP Phone Number:    [phone]     Follow Up Details:  1 week         Referral to Home Health   As directed      Tracheostomy suction twice daily    Order Comments:  Tracheostomy suction twice daily     Face to Face Visit Date:  9/25/2019    Follow-up provider for Plan of Care?:  I treated the patient in an acute care facility and will not continue treatment after discharge.    Follow-up provider:  RAVIN CONNELLY [[id number]]    Reason/Clinical Findings:  generalized weakness, tube feeds, tracheostomy    Describe mobility limitations that make leaving home difficult:  generalized weakness, tube feeds, tracheostomy    Nursing/Therapeutic Services Requested:  Skilled Nursing Physical Therapy    Skilled nursing orders:  COPD management (tueb feeds, tracheostomy care) Other    PT orders:  Strengthening    Frequency:  1 Week 1                Andrei Dutton,  MD  09/25/19  3:05 PM    Time Spent on Discharge Activities: Greater than 30 minutes.

## 2019-09-25 NOTE — PLAN OF CARE
Problem: Patient Care Overview  Goal: Plan of Care Review  Outcome: Ongoing (interventions implemented as appropriate)   09/25/19 1030   OTHER   Outcome Summary Pt exhibits impulsivity. Pt able to perform standing ther ex w/ no complaint of fatigue when asked. Pt may benefit from PT to improve mobility, strength, and endurance.   Coping/Psychosocial   Plan of Care Reviewed With patient

## 2019-09-25 NOTE — PLAN OF CARE
Problem: Patient Care Overview  Goal: Plan of Care Review  Outcome: Ongoing (interventions implemented as appropriate)   09/25/19 1320   Plan of Care Review   Progress improving   OTHER   Outcome Summary pt tolerated trach suction/care. medicated for pain with good relief. no c/o soa or n/v vss will continue monitor   Coping/Psychosocial   Plan of Care Reviewed With patient

## 2019-09-25 NOTE — NURSING NOTE
Patient is not returning to [facility], so she will be using [facility] care [facility] as her primary care provider.

## 2019-09-25 NOTE — PROGRESS NOTES
Continued Stay Note  [facility]     Patient Name: Swetha Luis  MRN: [id number]  Today's Date: 9/25/2019    Admit Date: 9/21/2019    Discharge Plan     Row Name 09/25/19 1517       Plan    Plan  Return home with spouse and [employer]      Plan Comments  Pt is discharging today. Pt plans to return home with help from her spouse and [facility] HH, Natalia aware of dc today. Per Natalia/[facility], a  nurse will visit the pt tonight at her home. Corin delivered a portable O2 tank that will accomodate the pt being on 6L to bedside for the  to transport the pt home with. A hospital bed, oxygen concentrator and suction will delivered to pts home by [facility]. They will call pts spouse when they are on the way so he can leave the hospital to meet them there. Pt will then return to the hospital to transport the pt home. Spoke with pt at bedside, pt states she has a nebulizer at home. Tube feeds to be delivered to bedside before pt leaves. Pt to initiate care with [facility], pt to be seen friday. Stephanie ONOFRE aware. Rossy/CCP         Discharge Codes    No documentation.       Expected Discharge Date and Time     Expected Discharge Date Expected Discharge Time    Sep 25, 2019             Darcy Monet RN

## 2019-09-25 NOTE — PLAN OF CARE
Problem: Patient Care Overview  Goal: Plan of Care Review  Outcome: Ongoing (interventions implemented as appropriate)   09/25/19 0640   Plan of Care Review   Progress no change   OTHER   Outcome Summary Possible DC today to home.  at bedside this shift. Trach care done, minimal secretions.    Coping/Psychosocial   Plan of Care Reviewed With patient

## 2019-09-26 ENCOUNTER — READMISSION MANAGEMENT (OUTPATIENT)
Dept: CALL CENTER | Facility: HOSPITAL | Age: 59
End: 2019-09-26

## 2019-09-26 ENCOUNTER — DOCUMENTATION (OUTPATIENT)
Dept: CARDIOLOGY | Facility: CLINIC | Age: 59
End: 2019-09-26

## 2019-09-26 NOTE — OUTREACH NOTE
Prep Survey      Responses   Facility patient discharged from?  [facility]   Is patient eligible?  Yes   Discharge diagnosis  Dyspnea   Does the patient have one of the following disease processes/diagnoses(primary or secondary)?  CHF   Does the patient have Home health ordered?  Yes   What is the Home health agency?   [facility]   Is there a DME ordered?  Yes   What DME was ordered?  BSC, Nebulizer   General alerts for this patient  Pt with Trach,    Prep survey completed?  Yes          Izabel Ch RN

## 2019-09-26 NOTE — PROGRESS NOTES
Case Management Discharge Note    Final Note: Pt dc'd home with [facility] [place] operated by [facility]     Destination - Selection Complete      Service Provider Request Status Selected Services Address Phone Number Fax Number    [facility] Selected Intermediate Care [address] [facility] [address] [phone]      Durable Medical Equipment      No service has been selected for the patient.      Dialysis/Infusion - Selection Complete      Service Provider Request Status Selected Services Address Phone Number Fax Number    [facility] [address] [facility] [id number] [phone]      Home Medical Care - Selection Complete      Service Provider Request Status Selected Services Address Phone Number Fax Number    [facility] [place] Selected Home Health Services [address] [address] [phone]      Therapy      No service has been selected for the patient.      Community Resources      No service has been selected for the patient.        Transportation Services  Private: Car    Final Discharge Disposition Code: 06 - home with home health care

## 2019-09-26 NOTE — PROGRESS NOTES
I discussed patient's plan of care with [place] Gabriel.  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  She had a repeat echocardiogram which did not show this finding and we think her echocardiogram is stable.  Her elevated troponins have been driven from hypoxia.  She does not need a follow-up appointment in our office.  She was discharged from the hospital on 9/25/2019.

## 2019-09-26 NOTE — PROGRESS NOTES
Continued Stay Note  [facility]     Patient Name: Swetha Luis  MRN: [id number]  Today's Date: 9/26/2019    Admit Date: 9/21/2019    Discharge Plan     Row Name 09/26/19 0908       Plan    Plan Comments  Notified [facility] that pt needs new prescriptions for all her medications. Pt will be visited friday and they will write new prescriptions for the pt at that time. JChastshirinRN/CCP      Discharge Codes    No documentation.       Expected Discharge Date and Time     Expected Discharge Date Expected Discharge Time    Sep 25, 2019             Darcy Monet RN

## 2019-09-27 ENCOUNTER — READMISSION MANAGEMENT (OUTPATIENT)
Dept: CALL CENTER | Facility: HOSPITAL | Age: 59
End: 2019-09-27

## 2019-09-27 NOTE — OUTREACH NOTE
CHF Week 1 Survey      Responses   Facility patient discharged from?  [facility]   Does the patient have one of the following disease processes/diagnoses(primary or secondary)?  CHF   Is there a successful TCM telephone encounter documented?  No   CHF Week 1 attempt successful?  Yes   Call start time  1553   Call end time  1602   General alerts for this patient  Pt with Trach   Is patient permission given to speak with other caregiver?  Yes   List who call center can speak with  Jimbo,    Meds reviewed with patient/caregiver?  Yes   Is the patient having any side effects they believe may be caused by any medication additions or changes?  No   Does the patient have all medications ordered at discharge?  Yes   Is the patient taking all medications as directed (includes completed medication regime)?  Yes   Does the patient have a primary care provider?   Yes   Does the patient have an appointment with their PCP within 7 days of discharge?  Yes   Has the patient kept scheduled appointments due by today?  Yes   Comments  Home APRN came yesterday Has HH coming 3xweek   What is the Home health agency?   [facility]   Has home health visited the patient within 72 hours of discharge?  Yes   What DME was ordered?  BSC, Nebulizer oxygen    Has all DME been delivered?  Yes   DME comments   says they have allthe equipment they need   Psychosocial issues?  Yes [Trach needs care around the clock]   Did the patient receive a copy of their discharge instructions?  Yes   Nursing interventions  Reviewed instructions with patient   What is the patient's perception of their health status since discharge?  Improving   Nursing interventions  Nurse provided patient education   Is the patient weighing daily?  No [Patient not able to stand]   Is the patient/caregiver able to teach back the hierarchy of who to call/visit for symptoms/problems? PCP, Specialist, Home health nurse, Urgent Care, ED, 911  Yes   Additional teach back  comments  Spoke with spouse, rushed call he was needed to tend to wife. He has Nurse Call Center number and the  phone number for questions.    CHF Week 1 call completed?  Yes          Patsy Mackenzie RN

## 2019-10-04 ENCOUNTER — APPOINTMENT (OUTPATIENT)
Dept: GENERAL RADIOLOGY | Facility: HOSPITAL | Age: 59
End: 2019-10-04

## 2019-10-04 ENCOUNTER — HOSPITAL ENCOUNTER (INPATIENT)
Facility: HOSPITAL | Age: 59
LOS: 10 days | Discharge: HOME | End: 2019-10-14
Attending: EMERGENCY MEDICINE | Admitting: UROLOGY

## 2019-10-04 ENCOUNTER — READMISSION MANAGEMENT (OUTPATIENT)
Dept: CALL CENTER | Facility: HOSPITAL | Age: 59
End: 2019-10-04

## 2019-10-04 DIAGNOSIS — J15.9 HEALTHCARE ASSOCIATED BACTERIAL PNEUMONIA: Primary | ICD-10-CM

## 2019-10-04 DIAGNOSIS — R13.12 OROPHARYNGEAL DYSPHAGIA: ICD-10-CM

## 2019-10-04 LAB
ALBUMIN SERPL-MCNC: 3.8 G/DL (ref 3.5–5.2)
ALBUMIN/GLOB SERPL: 1.2 G/DL
ALP SERPL-CCNC: 194 U/L (ref 39–117)
ALT SERPL W P-5'-P-CCNC: 136 U/L (ref 1–33)
ANION GAP SERPL CALCULATED.3IONS-SCNC: 11.1 MMOL/L (ref 5–15)
AST SERPL-CCNC: 71 U/L (ref 1–32)
B PARAPERT DNA SPEC QL NAA+PROBE: NOT DETECTED
B PERT DNA SPEC QL NAA+PROBE: NOT DETECTED
BASOPHILS # BLD AUTO: 0.04 10*3/MM3 (ref 0–0.2)
BASOPHILS NFR BLD AUTO: 0.3 % (ref 0–1.5)
BILIRUB SERPL-MCNC: 0.3 MG/DL (ref 0.2–1.2)
BUN BLD-MCNC: 19 MG/DL (ref 6–20)
BUN/CREAT SERPL: 15.7 (ref 7–25)
C PNEUM DNA NPH QL NAA+NON-PROBE: NOT DETECTED
CALCIUM SPEC-SCNC: 8.8 MG/DL (ref 8.6–10.5)
CHLORIDE SERPL-SCNC: 103 MMOL/L (ref 98–107)
CO2 SERPL-SCNC: 27.9 MMOL/L (ref 22–29)
CREAT BLD-MCNC: 1.21 MG/DL (ref 0.57–1)
D-LACTATE SERPL-SCNC: 1.4 MMOL/L (ref 0.5–2)
DEPRECATED RDW RBC AUTO: 44 FL (ref 37–54)
EOSINOPHIL # BLD AUTO: 0.1 10*3/MM3 (ref 0–0.4)
EOSINOPHIL NFR BLD AUTO: 0.8 % (ref 0.3–6.2)
ERYTHROCYTE [DISTWIDTH] IN BLOOD BY AUTOMATED COUNT: 14.1 % (ref 12.3–15.4)
FLUAV H1 2009 PAND RNA NPH QL NAA+PROBE: NOT DETECTED
FLUAV H1 HA GENE NPH QL NAA+PROBE: NOT DETECTED
FLUAV H3 RNA NPH QL NAA+PROBE: NOT DETECTED
FLUAV SUBTYP SPEC NAA+PROBE: NOT DETECTED
FLUBV RNA ISLT QL NAA+PROBE: NOT DETECTED
GFR SERPL CREATININE-BSD FRML MDRD: 46 ML/MIN/1.73
GLOBULIN UR ELPH-MCNC: 3.1 GM/DL
GLUCOSE BLD-MCNC: 138 MG/DL (ref 65–99)
HADV DNA SPEC NAA+PROBE: NOT DETECTED
HCOV 229E RNA SPEC QL NAA+PROBE: NOT DETECTED
HCOV HKU1 RNA SPEC QL NAA+PROBE: NOT DETECTED
HCOV NL63 RNA SPEC QL NAA+PROBE: NOT DETECTED
HCOV OC43 RNA SPEC QL NAA+PROBE: NOT DETECTED
HCT VFR BLD AUTO: 39.1 % (ref 34–46.6)
HGB BLD-MCNC: 12 G/DL (ref 12–15.9)
HMPV RNA NPH QL NAA+NON-PROBE: NOT DETECTED
HOLD SPECIMEN: NORMAL
HOLD SPECIMEN: NORMAL
HPIV1 RNA SPEC QL NAA+PROBE: NOT DETECTED
HPIV2 RNA SPEC QL NAA+PROBE: NOT DETECTED
HPIV3 RNA NPH QL NAA+PROBE: NOT DETECTED
HPIV4 P GENE NPH QL NAA+PROBE: NOT DETECTED
IMM GRANULOCYTES # BLD AUTO: 0.09 10*3/MM3 (ref 0–0.05)
IMM GRANULOCYTES NFR BLD AUTO: 0.7 % (ref 0–0.5)
LYMPHOCYTES # BLD AUTO: 0.85 10*3/MM3 (ref 0.7–3.1)
LYMPHOCYTES NFR BLD AUTO: 6.8 % (ref 19.6–45.3)
M PNEUMO IGG SER IA-ACNC: NOT DETECTED
MCH RBC QN AUTO: 26.1 PG (ref 26.6–33)
MCHC RBC AUTO-ENTMCNC: 30.7 G/DL (ref 31.5–35.7)
MCV RBC AUTO: 85 FL (ref 79–97)
MONOCYTES # BLD AUTO: 1.1 10*3/MM3 (ref 0.1–0.9)
MONOCYTES NFR BLD AUTO: 8.7 % (ref 5–12)
MRSA DNA SPEC QL NAA+PROBE: NORMAL
NEUTROPHILS # BLD AUTO: 10.4 10*3/MM3 (ref 1.7–7)
NEUTROPHILS NFR BLD AUTO: 82.7 % (ref 42.7–76)
NRBC BLD AUTO-RTO: 0 /100 WBC (ref 0–0.2)
PLATELET # BLD AUTO: 187 10*3/MM3 (ref 140–450)
PMV BLD AUTO: 12.5 FL (ref 6–12)
POTASSIUM BLD-SCNC: 3.9 MMOL/L (ref 3.5–5.2)
PROT SERPL-MCNC: 6.9 G/DL (ref 6–8.5)
RBC # BLD AUTO: 4.6 10*6/MM3 (ref 3.77–5.28)
RHINOVIRUS RNA SPEC NAA+PROBE: NOT DETECTED
RSV RNA NPH QL NAA+NON-PROBE: NOT DETECTED
SODIUM BLD-SCNC: 142 MMOL/L (ref 136–145)
WBC NRBC COR # BLD: 12.58 10*3/MM3 (ref 3.4–10.8)
WHOLE BLOOD HOLD SPECIMEN: NORMAL
WHOLE BLOOD HOLD SPECIMEN: NORMAL

## 2019-10-04 PROCEDURE — 87040 BLOOD CULTURE FOR BACTERIA: CPT | Performed by: EMERGENCY MEDICINE

## 2019-10-04 PROCEDURE — 71046 X-RAY EXAM CHEST 2 VIEWS: CPT

## 2019-10-04 PROCEDURE — 85025 COMPLETE CBC W/AUTO DIFF WBC: CPT | Performed by: EMERGENCY MEDICINE

## 2019-10-04 PROCEDURE — 25010000002 VANCOMYCIN 750 MG RECONSTITUTED SOLUTION: Performed by: HOSPITALIST

## 2019-10-04 PROCEDURE — 25010000002 VANCOMYCIN 10 G RECONSTITUTED SOLUTION: Performed by: EMERGENCY MEDICINE

## 2019-10-04 PROCEDURE — 25010000002 PIPERACILLIN SOD-TAZOBACTAM PER 1 G: Performed by: HOSPITALIST

## 2019-10-04 PROCEDURE — 99285 EMERGENCY DEPT VISIT HI MDM: CPT

## 2019-10-04 PROCEDURE — 93005 ELECTROCARDIOGRAM TRACING: CPT | Performed by: EMERGENCY MEDICINE

## 2019-10-04 PROCEDURE — 93010 ELECTROCARDIOGRAM REPORT: CPT | Performed by: INTERNAL MEDICINE

## 2019-10-04 PROCEDURE — 94799 UNLISTED PULMONARY SVC/PX: CPT

## 2019-10-04 PROCEDURE — 87641 MR-STAPH DNA AMP PROBE: CPT | Performed by: HOSPITALIST

## 2019-10-04 PROCEDURE — 83605 ASSAY OF LACTIC ACID: CPT | Performed by: EMERGENCY MEDICINE

## 2019-10-04 PROCEDURE — 25010000002 PIPERACILLIN SOD-TAZOBACTAM PER 1 G: Performed by: EMERGENCY MEDICINE

## 2019-10-04 PROCEDURE — 87150 DNA/RNA AMPLIFIED PROBE: CPT | Performed by: EMERGENCY MEDICINE

## 2019-10-04 PROCEDURE — 87186 SC STD MICRODIL/AGAR DIL: CPT | Performed by: EMERGENCY MEDICINE

## 2019-10-04 PROCEDURE — 80053 COMPREHEN METABOLIC PANEL: CPT | Performed by: EMERGENCY MEDICINE

## 2019-10-04 PROCEDURE — 0100U HC BIOFIRE FILMARRAY RESP PANEL 2: CPT | Performed by: HOSPITALIST

## 2019-10-04 RX ORDER — ONDANSETRON 2 MG/ML
4 INJECTION INTRAMUSCULAR; INTRAVENOUS EVERY 6 HOURS PRN
Status: DISCONTINUED | OUTPATIENT
Start: 2019-10-04 | End: 2019-10-14 | Stop reason: HOSPADM

## 2019-10-04 RX ORDER — ACETAMINOPHEN 160 MG/5ML
650 SOLUTION ORAL EVERY 4 HOURS PRN
Status: DISCONTINUED | OUTPATIENT
Start: 2019-10-04 | End: 2019-10-14 | Stop reason: HOSPADM

## 2019-10-04 RX ORDER — SODIUM CHLORIDE 0.9 % (FLUSH) 0.9 %
10 SYRINGE (ML) INJECTION EVERY 12 HOURS SCHEDULED
Status: DISCONTINUED | OUTPATIENT
Start: 2019-10-04 | End: 2019-10-14 | Stop reason: HOSPADM

## 2019-10-04 RX ORDER — OXYCODONE HYDROCHLORIDE AND ACETAMINOPHEN 5; 325 MG/1; MG/1
1 TABLET ORAL EVERY 6 HOURS PRN
Status: DISCONTINUED | OUTPATIENT
Start: 2019-10-04 | End: 2019-10-14 | Stop reason: HOSPADM

## 2019-10-04 RX ORDER — SODIUM CHLORIDE 0.9 % (FLUSH) 0.9 %
10 SYRINGE (ML) INJECTION AS NEEDED
Status: DISCONTINUED | OUTPATIENT
Start: 2019-10-04 | End: 2019-10-14 | Stop reason: HOSPADM

## 2019-10-04 RX ORDER — ESCITALOPRAM OXALATE 20 MG/1
20 TABLET ORAL DAILY
Status: DISCONTINUED | OUTPATIENT
Start: 2019-10-04 | End: 2019-10-14 | Stop reason: HOSPADM

## 2019-10-04 RX ORDER — BUSPIRONE HYDROCHLORIDE 5 MG/1
5 TABLET ORAL 3 TIMES DAILY
Status: DISCONTINUED | OUTPATIENT
Start: 2019-10-04 | End: 2019-10-14 | Stop reason: HOSPADM

## 2019-10-04 RX ORDER — ACETAMINOPHEN 650 MG/1
650 SUPPOSITORY RECTAL EVERY 4 HOURS PRN
Status: DISCONTINUED | OUTPATIENT
Start: 2019-10-04 | End: 2019-10-14 | Stop reason: HOSPADM

## 2019-10-04 RX ORDER — ACETAMINOPHEN 325 MG/1
650 TABLET ORAL EVERY 4 HOURS PRN
Status: DISCONTINUED | OUTPATIENT
Start: 2019-10-04 | End: 2019-10-14 | Stop reason: HOSPADM

## 2019-10-04 RX ORDER — ONDANSETRON 4 MG/1
4 TABLET, FILM COATED ORAL EVERY 6 HOURS PRN
Status: DISCONTINUED | OUTPATIENT
Start: 2019-10-04 | End: 2019-10-14 | Stop reason: HOSPADM

## 2019-10-04 RX ORDER — THIAMINE MONONITRATE (VIT B1) 100 MG
100 TABLET ORAL DAILY
Status: DISCONTINUED | OUTPATIENT
Start: 2019-10-05 | End: 2019-10-14 | Stop reason: HOSPADM

## 2019-10-04 RX ORDER — ACETAMINOPHEN 500 MG
1000 TABLET ORAL ONCE
Status: COMPLETED | OUTPATIENT
Start: 2019-10-04 | End: 2019-10-04

## 2019-10-04 RX ORDER — PRAMIPEXOLE DIHYDROCHLORIDE 0.25 MG/1
0.25 TABLET ORAL NIGHTLY
Status: DISCONTINUED | OUTPATIENT
Start: 2019-10-04 | End: 2019-10-14 | Stop reason: HOSPADM

## 2019-10-04 RX ORDER — IPRATROPIUM BROMIDE AND ALBUTEROL SULFATE 2.5; .5 MG/3ML; MG/3ML
3 SOLUTION RESPIRATORY (INHALATION) EVERY 4 HOURS PRN
Status: DISCONTINUED | OUTPATIENT
Start: 2019-10-04 | End: 2019-10-14 | Stop reason: HOSPADM

## 2019-10-04 RX ORDER — GABAPENTIN 300 MG/1
300 CAPSULE ORAL 3 TIMES DAILY
Status: DISCONTINUED | OUTPATIENT
Start: 2019-10-04 | End: 2019-10-14 | Stop reason: HOSPADM

## 2019-10-04 RX ORDER — METOPROLOL SUCCINATE 25 MG/1
25 TABLET, EXTENDED RELEASE ORAL DAILY
Status: DISCONTINUED | OUTPATIENT
Start: 2019-10-04 | End: 2019-10-14 | Stop reason: HOSPADM

## 2019-10-04 RX ADMIN — ACETAMINOPHEN 1000 MG: 500 TABLET, FILM COATED ORAL at 09:16

## 2019-10-04 RX ADMIN — METOPROLOL SUCCINATE 25 MG: 25 TABLET, FILM COATED, EXTENDED RELEASE ORAL at 18:40

## 2019-10-04 RX ADMIN — ACETAMINOPHEN 650 MG: 325 TABLET, FILM COATED ORAL at 20:56

## 2019-10-04 RX ADMIN — PRAMIPEXOLE DIHYDROCHLORIDE 0.25 MG: 0.25 TABLET ORAL at 20:48

## 2019-10-04 RX ADMIN — TAZOBACTAM SODIUM AND PIPERACILLIN SODIUM 3.38 G: 375; 3 INJECTION, SOLUTION INTRAVENOUS at 09:01

## 2019-10-04 RX ADMIN — SODIUM CHLORIDE, PRESERVATIVE FREE 10 ML: 5 INJECTION INTRAVENOUS at 20:49

## 2019-10-04 RX ADMIN — OXYCODONE HYDROCHLORIDE AND ACETAMINOPHEN 1 TABLET: 5; 325 TABLET ORAL at 20:56

## 2019-10-04 RX ADMIN — BUSPIRONE HYDROCHLORIDE 5 MG: 5 TABLET ORAL at 20:48

## 2019-10-04 RX ADMIN — TAZOBACTAM SODIUM AND PIPERACILLIN SODIUM 3.38 G: 375; 3 INJECTION, SOLUTION INTRAVENOUS at 18:41

## 2019-10-04 RX ADMIN — VANCOMYCIN HYDROCHLORIDE 750 MG: 750 INJECTION, POWDER, LYOPHILIZED, FOR SOLUTION INTRAVENOUS at 20:49

## 2019-10-04 RX ADMIN — ESCITALOPRAM 20 MG: 20 TABLET, FILM COATED ORAL at 18:40

## 2019-10-04 RX ADMIN — VANCOMYCIN HYDROCHLORIDE 1500 MG: 10 INJECTION, POWDER, LYOPHILIZED, FOR SOLUTION INTRAVENOUS at 09:33

## 2019-10-04 RX ADMIN — GABAPENTIN 300 MG: 300 CAPSULE ORAL at 20:48

## 2019-10-04 NOTE — CONSULTS
"Adult Nutrition  Assessment/PES    Patient Name:  Swetha Luis  YOB: 1960  MRN: [id number]  Admit Date:  10/4/2019    Assessment Date:  10/4/2019    Comments:  Consult for TF assessment.  Patient admitted with PNA.  Recent admission, just d/c'ed 9/25.  Patient was d/c'ed home on bolus TFs as needed.  Patient was eating very well upon discharge.  Up 7# from last admission.    RN reports patient told her that she gets TFs and does not eat at home.  Patient told me she eats at home, but gets TFs if she does not eat enough.  No family at bedside at time of visit.    TF regimen that she was discharged on was as follows:     -If patient eats 100% at a meal, hold bolus  -If patient eats 75% at a meal, give 120 ml (1/2 can) bolus  -If patient eats 50% at a meal, give 240 ml (1 can) bolus  -If patient eats 25% at a meal, give 360 ml (1.5 can) bolus  -If patient eats 0% at a meal, give 480 ml (2 cans) bolus    Recommend continuing this regimen if patient is cleared for a PO diet.  SLP to see per RN.    RD to continue to follow.    Reason for Assessment     Row Name 10/04/19 1521          Reason for Assessment    Reason For Assessment  TF/PN;nurse/nurse practitioner consult     Diagnosis  cardiac disease;psychosocial;gastrointestinal disease;renal disease;pulmonary disease;infection/sepsis;neurologic conditions CHF, osteomyelitis, anxiety, Pugh's esophagus, CKD, COPD, MRSA, SAH, seizures, trach; adm with PNA     Identified At Risk by Screening Criteria  tube feeding or parenteral nutrition         Nutrition/Diet History     Row Name 10/04/19 1524          Nutrition/Diet History    Typical Food/Fluid Intake  NPO at this time         Anthropometrics     Row Name 10/04/19 1525 10/04/19 1126       Anthropometrics    Height  --  167.6 cm (65.98\")    Weight  --  74 kg (163 lb 2.3 oz)       Admit Weight    Admit Weight  -- 163# 10/4  --       Ideal Body Weight (IBW)    Ideal Body Weight (IBW) (kg)  --  59.54    % " "Ideal Body Weight  --  124.29       Usual Body Weight (UBW)    Weight Loss Time Frame  appears to have had 7# weight gain from last admission  --       Body Mass Index (BMI)    BMI (kg/m2)  --  26.4    BMI Assessment  BMI 25-29.9: overweight  --        Labs/Tests/Procedures/Meds     Row Name 10/04/19 1525          Labs/Procedures/Meds    Lab Results Reviewed  reviewed, pertinent     Lab Results Comments  Gluc, Creat, GFR, AlkP, ALT, AST, WBC        Diagnostic Tests/Procedures    Diagnostic Test/Procedure Reviewed  reviewed, pertinent        Medications    Pertinent Medications Reviewed  reviewed, pertinent         Physical Findings     Row Name 10/04/19 1526          Physical Findings    Overall Physical Appearance  overweight     Gastrointestinal  feeding tube     Tubes  PEG     Skin  -- B=19, intact         Estimated/Assessed Needs     Row Name 10/04/19 1527 10/04/19 1126       Calculation Measurements    Weight Used For Calculations  74 kg (163 lb 2.3 oz)  --    Height  --  167.6 cm (65.98\")       Estimated/Assessed Needs       KCAL/KG    KCAL/KG  25 Kcal/Kg (kcal)  --    25 Kcal/Kg (kcal)  1850  --       Protein Requirements    Est Protein Requirement Amount (gms/kg)  1.2 gm protein  --    Estimated Protein Requirements (gms/day)  88.8  --       Fluid Requirements    Estimated Fluid Requirements (mL/day)  1850  --        Nutrition Prescription Ordered     Row Name 10/04/19 1527          Nutrition Prescription PO    Current PO Diet  NPO         Problem/Interventions:  Problem 1     Row Name 10/04/19 1528          Nutrition Diagnoses Problem 1    Problem 1  Inadequate Intake/Infusion     Inadequate Intake Type  Oral     Macronutrient  Kcal;Protein     Etiology (related to)  MNT for Treatment/Condition     Signs/Symptoms (evidenced by)  NPO         Intervention Goal     Row Name 10/04/19 1528          Intervention Goal    General  Maintain nutrition;Reduce/improve symptoms;Disease management/therapy;Meet " nutritional needs for age/condition;Nutrition support treatment     PO  Initiate feeding     TF/PN  Inititiate TF/PN     Weight  No significant weight loss         Nutrition Intervention     Row Name 10/04/19 1528          Nutrition Intervention    RD/Tech Action  Follow Tx progress;Care plan reviewd;Recommend/ordered     Recommended/Ordered  EN         Nutrition Prescription     Row Name 10/04/19 1528          Nutrition Prescription EN    Enteral Prescription  Enteral begin/change;Enteral to supply     Enteral Route  PEG     Product  Diabetisource     TF Delivery Method  Bolus     TF Bolus Frequency  5 times a day     Water flush (mL)   100 mL     Water Flush Frequency  Every feeding 50 ml before and after     New EN Prescription Ordered?  No, recommended         Education/Evaluation     Row Name 10/04/19 1529          Education    Education  Will Instruct as appropriate        Monitor/Evaluation    Monitor  Per protocol;I&O;Pertinent labs;TF delivery/tolerance;Weight;Symptoms     Education Follow-up  Reinforce PRN           Electronically signed by:  Karmen Wesley RD  10/04/19 3:29 PM

## 2019-10-04 NOTE — ED PROVIDER NOTES
EMERGENCY DEPARTMENT ENCOUNTER    Room Number:  14/14  Date of encounter:  10/4/2019  PCP: Provider, No Known  Historian: patient      HPI:  Chief Complaint: SOA  A complete HPI/ROS/PMH/PSH/SH/FH are unobtainable due to: none    Context: Swetha Luis is a 59 y.o. female who presents to the ED c/o moderate SOA for 5 days. Pt also complains of productive cough with brown sputum. Pt on chronic O2 through trach collar.  Patient with subjective fever found to be 102.6 at triage.  Patient has had recent hospitalizations here at [facility] and after last hospitalization decided not to go back to rehab but went home.      PAST MEDICAL HISTORY  Active Ambulatory Problems     Diagnosis Date Noted   • Tracheostomy complication (CMS/HCC) 12/18/2018   • Gangrene of toe (CMS/HCC) 12/20/2018   • Acute diastolic congestive heart failure (CMS/HCC) 12/24/2018   • ARF (acute renal failure) (CMS/HCC) 12/24/2018   • Stage 3 chronic kidney disease (CMS/HCC) 12/24/2018   • Elevated troponin 12/24/2018   • Acute respiratory failure with hypoxemia (CMS/HCC) 12/24/2018   • Acute osteomyelitis (CMS/HCC) 12/26/2018   • UTI due to extended-spectrum beta lactamase (ESBL) producing Escherichia coli 12/27/2018   • Thrush, oral 12/28/2018   • Tracheostomy malfunction (CMS/[facility]) 07/10/2019   • COPD (chronic obstructive pulmonary disease) (CMS/[facility]) 07/10/2019   • Respiratory failure, chronic (CMS/HCC) 07/10/2019   • PEG (percutaneous endoscopic gastrostomy) status (CMS/HCC) 07/10/2019   • History of osteomyelitis 07/10/2019   • Polysubstance abuse (CMS/HCC) 07/10/2019   • History of tracheal stenosis 07/10/2019   • Chronic diastolic CHF (congestive heart failure) (CMS/HCC) 07/10/2019   • Peripheral artery disease (CMS/HCC) 07/10/2019   • Medically noncompliant 07/10/2019   • WENDI and COPD overlap syndrome (CMS/[facility]) 07/10/2019   • Dyspnea 09/22/2019   • Elevated LFTs 09/22/2019   • Elevated troponin 09/22/2019   • Tracheostomy present (CMS/HCC) 09/22/2019    • Essential hypertension 2019   • Dysphagia 2019     Resolved Ambulatory Problems     Diagnosis Date Noted   • No Resolved Ambulatory Problems     Past Medical History:   Diagnosis Date   • Acute congestive heart failure (CMS/HCC) 2018   • Acute osteomyelitis (CMS/[facility])    • Acute renal failure on dialysis (CMS/[facility])    • Anxiety    • Pugh esophagus    • CKD (chronic kidney disease)    • COPD (chronic obstructive pulmonary disease) (CMS/[facility])    • MRSA infection    • Nontraumatic subarachnoid hemorrhage (CMS/[facility])    • Seizures (CMS/[facility])    • Tracheostomy present (CMS/[facility])          PAST SURGICAL HISTORY  Past Surgical History:   Procedure Laterality Date   • TRACHEOSTOMY           FAMILY HISTORY  Family History   Problem Relation Age of Onset   • Diabetes Mother    • Hypertension Mother          SOCIAL HISTORY  Social History     Socioeconomic History   • Marital status:      Spouse name: Not on file   • Number of children: Not on file   • Years of education: Not on file   • Highest education level: Not on file   Tobacco Use   • Smoking status: Former Smoker     Packs/day: 1.00     Types: Cigarettes     Last attempt to quit: 2018     Years since quittin.2   • Smokeless tobacco: Never Used   Substance and Sexual Activity   • Alcohol use: No     Frequency: Never   • Drug use: Yes     Types: Cocaine     Comment: 20 years ago occasional   • Sexual activity: Defer         ALLERGIES  Cephalexin; Hydrocodone; Strawberry; and Zithromax [azithromycin]        REVIEW OF SYSTEMS  Review of Systems   Constitutional: Positive for fever (max 102.6).   HENT: Negative for sore throat.    Eyes: Negative.    Respiratory: Positive for cough (with brown sputum) and shortness of breath.    Cardiovascular: Negative.  Negative for chest pain.   Gastrointestinal: Positive for abdominal pain (moderate).   Genitourinary: Positive for dysuria (slight).   Musculoskeletal: Positive for back pain (chronic low).    Skin: Negative.  Negative for rash.   Neurological: Positive for headaches (mild).   All other systems reviewed and are negative.         PHYSICAL EXAM    I have reviewed the triage vital signs and nursing notes.    ED Triage Vitals [10/04/19 0626]   Temp Heart Rate Resp BP SpO2   (!) 102.6 °F (39.2 °C) 95 20 150/97 92 %      Temp src Heart Rate Source Patient Position BP Location FiO2 (%)   Tympanic -- -- -- --       Physical Exam  GENERAL: not distressed  HENT: nares patent  EYES: no scleral icterus  CV: regular rhythm, regular rate, O2 sat 99% by O2 through trach collar, slightly tachycardic  RESPIRATORY: normal effort, course rhonchi bilaterally  ABDOMEN: soft  MUSCULOSKELETAL: no deformity  NEURO: alert, moves all extremities, follows commands  SKIN: warm, dry        LAB RESULTS  Recent Results (from the past 24 hour(s))   Comprehensive Metabolic Panel    Collection Time: 10/04/19  6:23 AM   Result Value Ref Range    Glucose 138 (H) 65 - 99 mg/dL    BUN 19 6 - 20 mg/dL    Creatinine 1.21 (H) 0.57 - 1.00 mg/dL    Sodium 142 136 - 145 mmol/L    Potassium 3.9 3.5 - 5.2 mmol/L    Chloride 103 98 - 107 mmol/L    CO2 27.9 22.0 - 29.0 mmol/L    Calcium 8.8 8.6 - 10.5 mg/dL    Total Protein 6.9 6.0 - 8.5 g/dL    Albumin 3.80 3.50 - 5.20 g/dL    ALT (SGPT) 136 (H) 1 - 33 U/L    AST (SGOT) 71 (H) 1 - 32 U/L    Alkaline Phosphatase 194 (H) 39 - 117 U/L    Total Bilirubin 0.3 0.2 - 1.2 mg/dL    eGFR Non African Amer 46 (L) >60 mL/min/1.73    Globulin 3.1 gm/dL    A/G Ratio 1.2 g/dL    BUN/Creatinine Ratio 15.7 7.0 - 25.0    Anion Gap 11.1 5.0 - 15.0 mmol/L   Lactic Acid, Plasma    Collection Time: 10/04/19  6:23 AM   Result Value Ref Range    Lactate 1.4 0.5 - 2.0 mmol/L   CBC Auto Differential    Collection Time: 10/04/19  6:23 AM   Result Value Ref Range    WBC 12.58 (H) 3.40 - 10.80 10*3/mm3    RBC 4.60 3.77 - 5.28 10*6/mm3    Hemoglobin 12.0 12.0 - 15.9 g/dL    Hematocrit 39.1 34.0 - 46.6 %    MCV 85.0 79.0 - 97.0 fL     MCH 26.1 (L) 26.6 - 33.0 pg    MCHC 30.7 (L) 31.5 - 35.7 g/dL    RDW 14.1 12.3 - 15.4 %    RDW-SD 44.0 37.0 - 54.0 fl    MPV 12.5 (H) 6.0 - 12.0 fL    Platelets 187 140 - 450 10*3/mm3    Neutrophil % 82.7 (H) 42.7 - 76.0 %    Lymphocyte % 6.8 (L) 19.6 - 45.3 %    Monocyte % 8.7 5.0 - 12.0 %    Eosinophil % 0.8 0.3 - 6.2 %    Basophil % 0.3 0.0 - 1.5 %    Immature Grans % 0.7 (H) 0.0 - 0.5 %    Neutrophils, Absolute 10.40 (H) 1.70 - 7.00 10*3/mm3    Lymphocytes, Absolute 0.85 0.70 - 3.10 10*3/mm3    Monocytes, Absolute 1.10 (H) 0.10 - 0.90 10*3/mm3    Eosinophils, Absolute 0.10 0.00 - 0.40 10*3/mm3    Basophils, Absolute 0.04 0.00 - 0.20 10*3/mm3    Immature Grans, Absolute 0.09 (H) 0.00 - 0.05 10*3/mm3    nRBC 0.0 0.0 - 0.2 /100 WBC   Light Blue Top    Collection Time: 10/04/19  6:23 AM   Result Value Ref Range    Extra Tube hold for add-on    Green Top (Gel)    Collection Time: 10/04/19  6:23 AM   Result Value Ref Range    Extra Tube Hold for add-ons.    Lavender Top    Collection Time: 10/04/19  6:23 AM   Result Value Ref Range    Extra Tube hold for add-on    Gold Top - SST    Collection Time: 10/04/19  6:23 AM   Result Value Ref Range    Extra Tube Hold for add-ons.        Ordered the above labs and independently reviewed the results.        RADIOLOGY  Xr Chest 2 View    Result Date: 10/4/2019  TWO VIEWS CHEST  HISTORY: Sepsis.  COMPARISON: 09/21/2019.  A tracheostomy tube is in place in satisfactory position. The patient is rotated slightly to the right. There is mild cardiomegaly. The pulmonary interstitium is prominent. This is more prominent as compared to 09/21/2019. There is no evidence of consolidation or effusion.      Increasing pulmonary interstitial and vascular prominence, particularly in perihilar regions increased versus 09/21/2019. No evidence of consolidation or effusion.  This report was finalized on 10/4/2019 8:46 AM by Dr. Jonathan Bianchi M.D.        I ordered the above noted radiological  studies. Reviewed by me and discussed with radiologist.  See dictation for official radiology interpretation.      PROCEDURES    Procedures      MEDICATIONS GIVEN IN ER    Medications   sodium chloride 0.9 % flush 10 mL (not administered)   piperacillin-tazobactam (ZOSYN) 3.375 g in iso-osmotic dextrose 50 ml (premix) (3.375 g Intravenous New Bag 10/4/19 0901)   vancomycin 1500 mg/500 mL 0.9% NS IVPB ([facility]) (not administered)   acetaminophen (TYLENOL) tablet 1,000 mg (1,000 mg Oral Given 10/4/19 0916)         PROGRESS, DATA ANALYSIS, CONSULTS, AND MEDICAL DECISION MAKING    All labs have been independently reviewed by me.  All radiology studies have been reviewed by me and discussed with radiologist dictating the report.   EKG's independently viewed and interpreted by me.  Discussion below represents my analysis of pertinent findings related to patient's condition, differential diagnosis, treatment plan and final disposition.    0853 plan to contact [facility] for admission    0918 discussed pt cause with Dr. Moreno, [facility], who agrees to admit the pt.     ED Course as of Oct 04 0919   Fri Oct 04, 2019   0718 EKG          EKG time: 0633  Rhythm/Rate: Sinus 87  P waves and IN: Normal P waves and IN interval  QRS, axis: Normal axis, normal QRS  ST and T waves: Normal ST and T wave    Interpreted Contemporaneously by me, independently viewed  Not changed compared to prior 2019    [DB]   0737 I reviewed patient's prior medical records and note numerous prior admissions patient with multiple medical problems including tracheostomy for chronic respiratory failure.  [DB]   0852 I have reviewed patient's x-ray and labs.  Chest x-ray shows increasing interstitial prominence no clear focal infiltrate.  White blood count is elevated 12.6 suggestive of infection.  Lactic acid is normal.  I suspect etiology of her infection is pulmonary in nature and will go ahead and treat for healthcare associated pneumonia.  Will contact [facility] for  admission.  [DB]      ED Course User Index  [DB] Andrea Bone MD       AS OF 9:19 AM VITALS:    BP - 129/86  HR - 87  TEMP - (!) 101.1 °F (38.4 °C) (Tympanic)  02 SATS - 97%        DIAGNOSIS  Final diagnoses:   Healthcare associated bacterial pneumonia         DISPOSITION  ADMISSION    Discussed treatment plan and reason for admission with pt/family and admitting physician.  Pt/family voiced understanding of the plan for admission for further testing/treatment as needed.         Documentation assistance provided by emilia Sher for Dr. Bone.  Information recorded by the emilia was done at my direction and has been verified and validated by me.               Keisha Sher  10/04/19 0918       Andrea Bone MD  10/04/19 0920

## 2019-10-04 NOTE — H&P
HISTORY AND PHYSICAL   [facility]        Patient Identification:  Name: Swetha Luis  Age: 59 y.o.  Sex: female  :  1960  MRN: [id number]                     Primary Care Physician: Provider, No Known    Chief Complaint: Short of air and a productive cough with fever  History of Present Illness:         The patient  is a 59 y.o. female who presents to the hospital complaining of moderate SOA for 5 days. Pt also complains of productive cough with brown sputum. Pt on chronic O2 through trach collar.  Patient with subjective fever found to be 102.6 in the emergency room.  Patient has had recent hospitalizations here at [facility], [place] and after last hospitalization decided not to go back to rehab but went home.  X-rays done in the emergency room showed some infiltrates consistent with pneumonia and patient was started on broad-spectrum IV antibiotics for hospital-acquired pneumonia.  The patient was admitted for further treatment.    Past Medical History:  Past Medical History:   Diagnosis Date   • Acute congestive heart failure (CMS/HCC) 2018   • Acute osteomyelitis (CMS/[facility])     Right shoulder due to IVDA   • Acute renal failure on dialysis (CMS/[facility])    • Anxiety    • Pugh esophagus    • CKD (chronic kidney disease)    • COPD (chronic obstructive pulmonary disease) (CMS/[facility])    • MRSA infection    • Nontraumatic subarachnoid hemorrhage (CMS/[facility])    • Seizures (CMS/[facility])    • Tracheostomy present (CMS/[facility])      Past Surgical History:  Past Surgical History:   Procedure Laterality Date   • TRACHEOSTOMY        Home Meds:  Medications Prior to Admission   Medication Sig Dispense Refill Last Dose   • Ascorbic Acid 500 MG capsule Take  by mouth.   2019 at Unknown time   • aspirin 81 MG chewable tablet Chew 1 tablet Daily.   2019 at Unknown time   • budesonide (PULMICORT) 0.5 MG/2ML nebulizer solution Inhale 2 mL by nebulization via trach 2 (Two) Times a Day. 120 mL 0    • busPIRone (BUSPAR)  5 MG tablet Take 1 tablet by mouth 3 (Three) times a day. 90 tablet 0 10/4/2019 at Unknown time   • dextromethorphan-guaifenesin (ROBITUSSIN-DM)  MG/5ML syrup 5 mL by Per G Tube route 4 (Four) Times a Day. 600 mL 0    • escitalopram (LEXAPRO) 20 MG tablet Take 1 tablet by mouth daily. 30 tablet 0 10/3/2019 at Unknown time   • gabapentin (NEURONTIN) 300 MG capsule Take 1 capsule by mouth 3 (Three) Times a Day. 9 capsule 0 10/3/2019 at Unknown time   • ipratropium-albuterol (DUO-NEB) 0.5-2.5 mg/3 ml nebulizer Inhale 3 mL by nebulization every 4 (Four) hours as needed for wheezing. 360 mL 0    • metoprolol succinate XL (TOPROL-XL) 25 MG 24 hr tablet Take 1 tablet by mouth Daily. 30 tablet 0 10/3/2019 at Unknown time   • MULTIPLE VITAMINS-MINERALS PO Take  by mouth.   10/3/2019 at Unknown time   • nystatin (MYCOSTATIN) 286686 UNIT/GM powder Apply  topically to the appropriate area as directed 3 (Three) Times a Day. Skin folds in the groin 60 g 0    • omeprazole (priLOSEC) 40 MG capsule Take 40 mg by mouth Daily.   9/21/2019 at Unknown time   • oxyCODONE-acetaminophen (PERCOCET) 5-325 MG per tablet Take 1 tablet by mouth Every 6 (Six) Hours As Needed for Moderate Pain . 12 tablet 0 10/3/2019 at Unknown time   • pramipexole (MIRAPEX) 0.25 MG tablet Take 1 tablet by mouth Every Night. 30 tablet 0 10/3/2019 at Unknown time   • predniSONE (DELTASONE) 10 MG tablet 1 tablet by Per G Tube route Daily. 30 tablet 0    • thiamine (VITAMIN B-1) 100 MG tablet Take 1 tablet by mouth Daily. 30 tablet 0      Current meds    Current Facility-Administered Medications:   •  acetaminophen (TYLENOL) tablet 650 mg, 650 mg, Oral, Q4H PRN **OR** acetaminophen (TYLENOL) 160 MG/5ML solution 650 mg, 650 mg, Oral, Q4H PRN **OR** acetaminophen (TYLENOL) suppository 650 mg, 650 mg, Rectal, Q4H PRN, True Moreno MD  •  busPIRone (BUSPAR) tablet 5 mg, 5 mg, Oral, TID, True Moreno MD  •  escitalopram (LEXAPRO) tablet 20 mg, 20 mg, Oral,  Daily, True Moreno MD  •  gabapentin (NEURONTIN) capsule 300 mg, 300 mg, Oral, TID, True Moreno MD  •  [START ON 10/5/2019] influenza vac split quad (FLUZONE,FLUARIX,AFLURIA) injection 0.5 mL, 0.5 mL, Intramuscular, Once, True Moreno MD  •  ipratropium-albuterol (DUO-NEB) nebulizer solution 3 mL, 3 mL, Nebulization, Q4H PRN, True Moreno MD  •  metoprolol succinate XL (TOPROL-XL) 24 hr tablet 25 mg, 25 mg, Oral, Daily, True Moreno MD  •  ondansetron (ZOFRAN) tablet 4 mg, 4 mg, Oral, Q6H PRN **OR** ondansetron (ZOFRAN) injection 4 mg, 4 mg, Intravenous, Q6H PRN, True Moreno MD  •  oxyCODONE-acetaminophen (PERCOCET) 5-325 MG per tablet 1 tablet, 1 tablet, Oral, Q6H PRN, True Moreno MD  •  Pharmacy to dose vancomycin, , Does not apply, Continuous PRN, True Moreno MD  •  Pharmacy to Dose Zosyn, , Does not apply, Continuous PRN, True Moreno MD  •  pramipexole (MIRAPEX) tablet 0.25 mg, 0.25 mg, Oral, Nightly, True Moreno MD  •  sodium chloride 0.9 % flush 10 mL, 10 mL, Intravenous, PRN, Andrea Bone MD  •  sodium chloride 0.9 % flush 10 mL, 10 mL, Intravenous, Q12H, True Moreno MD  •  sodium chloride 0.9 % flush 10 mL, 10 mL, Intravenous, PRN, True Moreno MD  •  thiamine (VITAMIN B-1) tablet 100 mg, 100 mg, Oral, Daily, True Moreno MD  Allergies:  Allergies   Allergen Reactions   • Cephalexin Unknown (See Comments)     unk     • Hydrocodone Unknown (See Comments)     unk   • [place] Unknown (See Comments)     unk   • Zithromax [Azithromycin] Unknown (See Comments)     unk     Immunizations:  Immunization History   Administered Date(s) Administered   • FLUARIX/FLUZONE/AFLURIA/FLULAVAL QUAD 09/23/2019     Social History:   Social History     Social History Narrative   • Not on file     Social History     Socioeconomic History   • Marital status:      Spouse name: Not on file   • Number of children: Not on file   • Years of education: Not on file   • Highest education  "level: Not on file   Tobacco Use   • Smoking status: Former Smoker     Packs/day: 1.00     Types: Cigarettes     Last attempt to quit: 2018     Years since quittin.2   • Smokeless tobacco: Never Used   Substance and Sexual Activity   • Alcohol use: No     Frequency: Never   • Drug use: Yes     Types: Cocaine     Comment: 20 years ago occasional   • Sexual activity: Defer       Family History:  Family History   Problem Relation Age of Onset   • Diabetes Mother    • Hypertension Mother         Review of Systems  See history of present illness and past medical history.  Patient denies headache, dizziness, syncope, falls, trauma, change in vision, change in hearing, change in taste, changes in weight, changes in appetite, focal weakness, numbness, or paresthesia.  Patient denies chest pain, palpitations, dyspnea, orthopnea, PND, cough, sinus pressure, rhinorrhea, epistaxis, hemoptysis, nausea, vomiting, hematemesis, diarrhea, constipation or hematchezia.  Denies cold or heat intolerance, polydipsia, polyuria, polyphagia. Denies hematuria, pyuria, dysuria, hesitancy, frequency or urgency.  Remainder of ROS is negative.    Objective:  tMax 24 hrs: Temp (24hrs), Av.4 °F (38 °C), Min:98.4 °F (36.9 °C), Max:102.6 °F (39.2 °C)    Vitals Ranges:   Temp:  [98.4 °F (36.9 °C)-102.6 °F (39.2 °C)] 98.4 °F (36.9 °C)  Heart Rate:  [79-95] 79  Resp:  [20-24] 22  BP: (129-150)/(74-97) 130/74      Exam:  /74 (BP Location: Right arm, Patient Position: Lying)   Pulse 79   Temp 98.4 °F (36.9 °C) (Oral)   Resp 22   Ht 167.6 cm (65.98\")   Wt 74 kg (163 lb 2.3 oz)   SpO2 98%   BMI 26.34 kg/m²     General Appearance:    Alert, cooperative, no distress, appears stated age   Head:    Normocephalic, without obvious abnormality, atraumatic   Eyes:    PERRL, conjunctiva/corneas clear, EOM's intact, both eyes   Ears:    Normal external ear canals, both ears   Nose:   Nares normal, septum midline, mucosa normal, no drainage "    or sinus tenderness   Throat:   Lips, mucosa, and tongue normal   Neck:   Supple, symmetrical, trachea midline, no adenopathy;     thyroid:  no enlargement/tenderness/nodules; no carotid    bruit or JVD, patient has tracheostomy   Back:     Symmetric, no curvature, ROM normal, no CVA tenderness   Lungs:    Rhonchi and wheezes bilaterally, respirations unlabored   Chest Wall:    No tenderness or deformity    Heart:    Regular rate and rhythm, S1 and S2 normal, no murmur, rub   or gallop   Abdomen:     Soft, non-tender, bowel sounds active all four quadrants,     no masses, no hepatomegaly, no splenomegaly   Extremities:   Extremities normal, atraumatic, no cyanosis or edema   Pulses:   2+ and symmetric all extremities   Skin:   Skin color, texture, turgor normal, no rashes or lesions   Lymph nodes:   Cervical, supraclavicular, and axillary nodes normal   Neurologic:   CNII-XII intact, normal strength, sensation intact throughout      .    Data Review:  Lab Results (last 72 hours)     Procedure Component Value Units Date/Time    Comprehensive Metabolic Panel [[id number]]  (Abnormal) Collected:  10/04/19 0623    Specimen:  Blood Updated:  10/04/19 0818     Glucose 138 mg/dL      BUN 19 mg/dL      Creatinine 1.21 mg/dL      Sodium 142 mmol/L      Potassium 3.9 mmol/L      Chloride 103 mmol/L      CO2 27.9 mmol/L      Calcium 8.8 mg/dL      Total Protein 6.9 g/dL      Albumin 3.80 g/dL      ALT (SGPT) 136 U/L      AST (SGOT) 71 U/L      Alkaline Phosphatase 194 U/L      Total Bilirubin 0.3 mg/dL      eGFR Non African Amer 46 mL/min/1.73      Globulin 3.1 gm/dL      A/G Ratio 1.2 g/dL      BUN/Creatinine Ratio 15.7     Anion Gap 11.1 mmol/L     Narrative:       GFR Normal >60  Chronic Kidney Disease <60  Kidney Failure <15    Lactic Acid, Plasma [[id number]]  (Normal) Collected:  10/04/19 0623    Specimen:  Blood Updated:  10/04/19 0802     Lactate 1.4 mmol/L     [place] Draw [[id number]] Collected:  10/04/19 0623     Specimen:  Blood Updated:  10/04/19 0800    Narrative:       The following orders were created for panel order [place] Draw.  Procedure                               Abnormality         Status                     ---------                               -----------         ------                     Light Blue Top[[id number]]                                   Final result               Green Top (Gel)[[id number]]                                  Final result               Lavender Top[[id number]]                                     Final result               Gold Top - SST[[id number]]                                   Final result                 Please view results for these tests on the individual orders.    Light Blue Top [[id number]] Collected:  10/04/19 0623    Specimen:  Blood Updated:  10/04/19 0800     Extra Tube hold for add-on     Comment: Auto resulted       Green Top (Gel) [[id number]] Collected:  10/04/19 0623    Specimen:  Blood Updated:  10/04/19 0800     Extra Tube Hold for add-ons.     Comment: Auto resulted.       Lavender Top [[id number]] Collected:  10/04/19 0623    Specimen:  Blood Updated:  10/04/19 0800     Extra Tube hold for add-on     Comment: Auto resulted       Gold Top - SST [[id number]] Collected:  10/04/19 0623    Specimen:  Blood Updated:  10/04/19 0800     Extra Tube Hold for add-ons.     Comment: Auto resulted.       CBC & Differential [[id number]] Collected:  10/04/19 0623    Specimen:  Blood Updated:  10/04/19 0756    Narrative:       The following orders were created for panel order CBC & Differential.  Procedure                               Abnormality         Status                     ---------                               -----------         ------                     CBC Auto Differential[[id number]]        Abnormal            Final result                 Please view results for these tests on the individual orders.    CBC Auto Differential [[id number]]  (Abnormal) Collected:   10/04/19 0623    Specimen:  Blood Updated:  10/04/19 0756     WBC 12.58 10*3/mm3      RBC 4.60 10*6/mm3      Hemoglobin 12.0 g/dL      Hematocrit 39.1 %      MCV 85.0 fL      MCH 26.1 pg      MCHC 30.7 g/dL      RDW 14.1 %      RDW-SD 44.0 fl      MPV 12.5 fL      Platelets 187 10*3/mm3      Neutrophil % 82.7 %      Lymphocyte % 6.8 %      Monocyte % 8.7 %      Eosinophil % 0.8 %      Basophil % 0.3 %      Immature Grans % 0.7 %      Neutrophils, Absolute 10.40 10*3/mm3      Lymphocytes, Absolute 0.85 10*3/mm3      Monocytes, Absolute 1.10 10*3/mm3      Eosinophils, Absolute 0.10 10*3/mm3      Basophils, Absolute 0.04 10*3/mm3      Immature Grans, Absolute 0.09 10*3/mm3      nRBC 0.0 /100 WBC     Blood Culture - Blood, Arm, Right [[id number]] Collected:  10/04/19 0625    Specimen:  Blood from Arm, Right Updated:  10/04/19 0745    Blood Culture - Blood, Arm, Left [[id number]] Collected:  10/04/19 0624    Specimen:  Blood from Arm, Left Updated:  10/04/19 0744                   Imaging Results (all)     Procedure Component Value Units Date/Time    XR Chest 2 View [[id number]] Collected:  10/04/19 0844     Updated:  10/04/19 0849    Narrative:       TWO VIEWS CHEST     HISTORY: Sepsis.     COMPARISON: 09/21/2019.     A tracheostomy tube is in place in satisfactory position. The patient is  rotated slightly to the right. There is mild cardiomegaly. The pulmonary  interstitium is prominent. This is more prominent as compared to  09/21/2019. There is no evidence of consolidation or effusion.       Impression:       Increasing pulmonary interstitial and vascular prominence,  particularly in perihilar regions increased versus 09/21/2019. No  evidence of consolidation or effusion.     This report was finalized on 10/4/2019 8:46 AM by Dr. Jonathan Bianchi M.D.           Past Medical History:   Diagnosis Date   • Acute congestive heart failure (CMS/HCC) 12/24/2018   • Acute osteomyelitis (CMS/HCC)     Right shoulder due to IVDA    • Acute renal failure on dialysis (CMS/[facility])    • Anxiety    • Pugh esophagus    • CKD (chronic kidney disease)    • COPD (chronic obstructive pulmonary disease) (CMS/[facility])    • MRSA infection    • Nontraumatic subarachnoid hemorrhage (CMS/[facility])    • Seizures (CMS/[facility])    • Tracheostomy present (CMS/[facility])        Assessment:  Active Hospital Problems    Diagnosis  POA   • **Healthcare associated bacterial pneumonia [J15.9]  Yes   • Dyspnea [R06.00]  Yes   • Tracheostomy present (CMS/HCC) [Z93.0]  Not Applicable   • Essential hypertension [I10]  Yes   • Dysphagia [R13.10]  Yes   • COPD (chronic obstructive pulmonary disease) (CMS/[facility]) [J44.9]  Yes   • Respiratory failure, chronic (CMS/HCC) [J96.10]  Yes   • PEG (percutaneous endoscopic gastrostomy) status (CMS/HCC) [Z93.1]  Not Applicable   • Chronic diastolic CHF (congestive heart failure) (CMS/[facility]) [I50.32]  Yes   • Peripheral artery disease (CMS/[facility]) [I73.9]  Yes   • Stage 3 chronic kidney disease (CMS/[facility]) [N18.3]  Yes      Resolved Hospital Problems   No resolved problems to display.       Plan:  The patient is admitted to the hospital continue with oxygen, bronchodilators and broad-spectrum IV antibiotics for healthcare acquired pneumonia.    True Moreno MD  10/4/2019  4:31 PM

## 2019-10-04 NOTE — OUTREACH NOTE
CHF Week 2 Survey      Responses   Facility patient discharged from?  [facility]   Does the patient have one of the following disease processes/diagnoses(primary or secondary)?  CHF   Week 2 attempt successful?  No   Revoke  Readmitted          Dinora Crespo RN

## 2019-10-04 NOTE — ED TRIAGE NOTES
"Pt to ED from home per [facility] with reports of fever, SOA, increased oxygen requirements per EMS.  Pt tachypneic upon EMS arrival to home, EMS report they were able to  her down and slow respirations down.    Pt has 4.0 shiley trach in place.  EMS reports pt \"passed large mucous clot\" and respirations became less labored.    Pt is normally on 6LPM per trach mask at home.  Pt placed on 15LPM via peds NRB per EMS for transport.  Pt tolerating well.  "

## 2019-10-05 LAB
ANION GAP SERPL CALCULATED.3IONS-SCNC: 16.9 MMOL/L (ref 5–15)
ANISOCYTOSIS BLD QL: ABNORMAL
BACTERIA BLD CULT: ABNORMAL
BUN BLD-MCNC: 13 MG/DL (ref 6–20)
BUN/CREAT SERPL: 12.6 (ref 7–25)
BURR CELLS BLD QL SMEAR: ABNORMAL
CALCIUM SPEC-SCNC: 8.8 MG/DL (ref 8.6–10.5)
CHLORIDE SERPL-SCNC: 96 MMOL/L (ref 98–107)
CO2 SERPL-SCNC: 21.1 MMOL/L (ref 22–29)
CREAT BLD-MCNC: 1.03 MG/DL (ref 0.57–1)
DEPRECATED RDW RBC AUTO: 44.2 FL (ref 37–54)
EOSINOPHIL # BLD MANUAL: 0.09 10*3/MM3 (ref 0–0.4)
EOSINOPHIL NFR BLD MANUAL: 1 % (ref 0.3–6.2)
ERYTHROCYTE [DISTWIDTH] IN BLOOD BY AUTOMATED COUNT: 14.1 % (ref 12.3–15.4)
GFR SERPL CREATININE-BSD FRML MDRD: 55 ML/MIN/1.73
GIANT PLATELETS: ABNORMAL
GLUCOSE BLD-MCNC: 53 MG/DL (ref 65–99)
HCT VFR BLD AUTO: 40.8 % (ref 34–46.6)
HGB BLD-MCNC: 12.7 G/DL (ref 12–15.9)
LYMPHOCYTES # BLD MANUAL: 0.76 10*3/MM3 (ref 0.7–3.1)
LYMPHOCYTES NFR BLD MANUAL: 4 % (ref 5–12)
LYMPHOCYTES NFR BLD MANUAL: 8 % (ref 19.6–45.3)
MCH RBC QN AUTO: 26.5 PG (ref 26.6–33)
MCHC RBC AUTO-ENTMCNC: 31.1 G/DL (ref 31.5–35.7)
MCV RBC AUTO: 85.2 FL (ref 79–97)
MICROCYTES BLD QL: ABNORMAL
MONOCYTES # BLD AUTO: 0.38 10*3/MM3 (ref 0.1–0.9)
NEUTROPHILS # BLD AUTO: 8.14 10*3/MM3 (ref 1.7–7)
NEUTROPHILS NFR BLD MANUAL: 86 % (ref 42.7–76)
OVALOCYTES BLD QL SMEAR: ABNORMAL
PLATELET # BLD AUTO: 134 10*3/MM3 (ref 140–450)
PMV BLD AUTO: 13.2 FL (ref 6–12)
POIKILOCYTOSIS BLD QL SMEAR: ABNORMAL
POLYCHROMASIA BLD QL SMEAR: ABNORMAL
POTASSIUM BLD-SCNC: 4 MMOL/L (ref 3.5–5.2)
RBC # BLD AUTO: 4.79 10*6/MM3 (ref 3.77–5.28)
SODIUM BLD-SCNC: 134 MMOL/L (ref 136–145)
VARIANT LYMPHS NFR BLD MANUAL: 1 % (ref 0–5)
WBC MORPH BLD: NORMAL
WBC NRBC COR # BLD: 9.46 10*3/MM3 (ref 3.4–10.8)

## 2019-10-05 PROCEDURE — 85025 COMPLETE CBC W/AUTO DIFF WBC: CPT | Performed by: HOSPITALIST

## 2019-10-05 PROCEDURE — 85007 BL SMEAR W/DIFF WBC COUNT: CPT | Performed by: HOSPITALIST

## 2019-10-05 PROCEDURE — 25010000002 VANCOMYCIN 750 MG RECONSTITUTED SOLUTION: Performed by: HOSPITALIST

## 2019-10-05 PROCEDURE — 92610 EVALUATE SWALLOWING FUNCTION: CPT | Performed by: SPEECH-LANGUAGE PATHOLOGIST

## 2019-10-05 PROCEDURE — 25010000002 PIPERACILLIN SOD-TAZOBACTAM PER 1 G: Performed by: HOSPITALIST

## 2019-10-05 PROCEDURE — 25010000002 ERTAPENEM PER 500 MG: Performed by: HOSPITALIST

## 2019-10-05 PROCEDURE — 25010000002 ONDANSETRON PER 1 MG: Performed by: HOSPITALIST

## 2019-10-05 PROCEDURE — 80048 BASIC METABOLIC PNL TOTAL CA: CPT | Performed by: HOSPITALIST

## 2019-10-05 PROCEDURE — 97162 PT EVAL MOD COMPLEX 30 MIN: CPT

## 2019-10-05 PROCEDURE — 97110 THERAPEUTIC EXERCISES: CPT

## 2019-10-05 RX ADMIN — VANCOMYCIN HYDROCHLORIDE 750 MG: 750 INJECTION, POWDER, LYOPHILIZED, FOR SOLUTION INTRAVENOUS at 09:25

## 2019-10-05 RX ADMIN — TAZOBACTAM SODIUM AND PIPERACILLIN SODIUM 3.38 G: 375; 3 INJECTION, SOLUTION INTRAVENOUS at 02:39

## 2019-10-05 RX ADMIN — ERTAPENEM SODIUM 1 G: 1 INJECTION, POWDER, LYOPHILIZED, FOR SOLUTION INTRAMUSCULAR; INTRAVENOUS at 13:47

## 2019-10-05 RX ADMIN — OXYCODONE HYDROCHLORIDE AND ACETAMINOPHEN 1 TABLET: 5; 325 TABLET ORAL at 16:29

## 2019-10-05 RX ADMIN — Medication 100 MG: at 09:25

## 2019-10-05 RX ADMIN — GABAPENTIN 300 MG: 300 CAPSULE ORAL at 20:20

## 2019-10-05 RX ADMIN — PRAMIPEXOLE DIHYDROCHLORIDE 0.25 MG: 0.25 TABLET ORAL at 20:20

## 2019-10-05 RX ADMIN — BUSPIRONE HYDROCHLORIDE 5 MG: 5 TABLET ORAL at 20:20

## 2019-10-05 RX ADMIN — GABAPENTIN 300 MG: 300 CAPSULE ORAL at 09:25

## 2019-10-05 RX ADMIN — ESCITALOPRAM 20 MG: 20 TABLET, FILM COATED ORAL at 09:25

## 2019-10-05 RX ADMIN — BUSPIRONE HYDROCHLORIDE 5 MG: 5 TABLET ORAL at 16:25

## 2019-10-05 RX ADMIN — GABAPENTIN 300 MG: 300 CAPSULE ORAL at 16:25

## 2019-10-05 RX ADMIN — BUSPIRONE HYDROCHLORIDE 5 MG: 5 TABLET ORAL at 09:25

## 2019-10-05 RX ADMIN — ONDANSETRON 4 MG: 2 INJECTION INTRAMUSCULAR; INTRAVENOUS at 18:39

## 2019-10-05 RX ADMIN — SODIUM CHLORIDE, PRESERVATIVE FREE 10 ML: 5 INJECTION INTRAVENOUS at 09:26

## 2019-10-05 RX ADMIN — METOPROLOL SUCCINATE 25 MG: 25 TABLET, FILM COATED, EXTENDED RELEASE ORAL at 09:25

## 2019-10-05 RX ADMIN — OXYCODONE HYDROCHLORIDE AND ACETAMINOPHEN 1 TABLET: 5; 325 TABLET ORAL at 09:25

## 2019-10-05 NOTE — PLAN OF CARE
Problem: Patient Care Overview  Goal: Plan of Care Review  Outcome: Ongoing (interventions implemented as appropriate)   10/05/19 2835   OTHER   Outcome Summary Pleasant this shift. Contact ISO for MRSA. Sinus rhythm. Pureed, honey thick. Meds whole in applesauce. Purewick in place. Percocet given for pain. Tolerating well.   Coping/Psychosocial   Plan of Care Reviewed With patient

## 2019-10-05 NOTE — PLAN OF CARE
Problem: Patient Care Overview  Goal: Plan of Care Review  Outcome: Ongoing (interventions implemented as appropriate)   10/05/19 0557   Plan of Care Review   Progress improving   OTHER   Outcome Summary Awake most of the night, c/o pain in right foot. Percocet given for pain and was effective. Normal sinus on the monitor.       Problem: Pain, Chronic (Adult)  Goal: Acceptable Pain/Comfort Level and Functional Ability  Outcome: Ongoing (interventions implemented as appropriate)   10/05/19 1484   Pain, Chronic (Adult)   Acceptable Pain/Comfort Level and Functional Ability making progress toward outcome

## 2019-10-05 NOTE — PLAN OF CARE
Problem: Patient Care Overview  Goal: Plan of Care Review   10/05/19 0937   OTHER   Outcome Summary Pt. admitted to hospital from home with SOA, PNA. Pt. reports that just prior to admission she was independent with functional mobility and use of RWx for ambulation. Pt. presents this day with decreased strength and overall balance in standing. Pt. will benefit from skilled inpt. P.T. to address her functional deficits and to assist pt. in regaining her maximum level of independence with functional mobility.    Coping/Psychosocial   Plan of Care Reviewed With patient

## 2019-10-05 NOTE — THERAPY EVALUATION
Patient Name: Swetha Luis  : 1960    MRN: [id number]                              Today's Date: 10/5/2019       Admit Date: 10/4/2019    Visit Dx:     ICD-10-CM ICD-9-CM   1. Healthcare associated bacterial pneumonia J15.9 482.9     Patient Active Problem List   Diagnosis   • Tracheostomy complication (CMS/[facility])   • Gangrene of toe (CMS/[facility])   • Acute diastolic congestive heart failure (CMS/[facility])   • ARF (acute renal failure) (CMS/[facility])   • Stage 3 chronic kidney disease (CMS/[facility])   • Elevated troponin   • Acute respiratory failure with hypoxemia (CMS/[facility])   • Acute osteomyelitis (CMS/[facility])   • UTI due to extended-spectrum beta lactamase (ESBL) producing Escherichia coli   • Thrush, oral   • Tracheostomy malfunction (CMS/[facility])   • COPD (chronic obstructive pulmonary disease) (CMS/[facility])   • Respiratory failure, chronic (CMS/[facility])   • PEG (percutaneous endoscopic gastrostomy) status (CMS/[facility])   • History of osteomyelitis   • Polysubstance abuse (CMS/[facility])   • History of tracheal stenosis   • Chronic diastolic CHF (congestive heart failure) (CMS/[facility])   • Peripheral artery disease (CMS/[facility])   • Medically noncompliant   • WENDI and COPD overlap syndrome (CMS/[facility])   • Dyspnea   • Elevated LFTs   • Elevated troponin   • Tracheostomy present (CMS/[facility])   • Essential hypertension   • Dysphagia   • Healthcare associated bacterial pneumonia     Past Medical History:   Diagnosis Date   • Acute congestive heart failure (CMS/[facility]) 2018   • Acute osteomyelitis (CMS/[facility])     Right shoulder due to IVDA   • Acute renal failure on dialysis (CMS/[facility])    • Anxiety    • Pugh esophagus    • CKD (chronic kidney disease)    • COPD (chronic obstructive pulmonary disease) (CMS/[facility])    • MRSA infection    • Nontraumatic subarachnoid hemorrhage (CMS/[facility])    • Seizures (CMS/[facility])    • Tracheostomy present (CMS/[facility])      Past Surgical History:   Procedure Laterality Date   • TRACHEOSTOMY       General Information     Row Name 10/05/19 0919     "      PT Evaluation Time/Intention    Document Type  evaluation Pt. admitted with SOA; PNA.  PMH: Tracheostomy (Trach Collar).  Pt. reports feeling \"good\" this AM and is agreeable to work with P.T.   -MS     Mode of Treatment  physical therapy;individual therapy  -MS     Row Name 10/05/19 0919          General Information    Patient Profile Reviewed?  yes  -MS     Prior Level of Function  independent: With use of Rwx per pt. report  -MS     Existing Precautions/Restrictions  fall  (Significant)  Contact Isolation; Trach Collar with 8 liters oxygen  -MS     Barriers to Rehab  none identified  -MS     Row Name 10/05/19 0919          Cognitive Assessment/Intervention- PT/OT    Orientation Status (Cognition)  oriented x 3  -MS     Row Name 10/05/19 0919          Safety Issues, Functional Mobility    Comment, Safety Issues/Impairments (Mobility)  Gait belt used for safety.  -MS       User Key  (r) = Recorded By, (t) = Taken By, (c) = Cosigned By    Initials Name Provider Type    MS Andrei Lee, PT Physical Therapist        Mobility     Row Name 10/05/19 0921          Bed Mobility Assessment/Treatment    Bed Mobility Assessment/Treatment  supine-sit;sit-supine  -MS     Supine-Sit Foard (Bed Mobility)  contact guard  -MS     Sit-Supine Foard (Bed Mobility)  contact guard  -MS     Assistive Device (Bed Mobility)  bed rails  -MS     Row Name 10/05/19 0921          Sit-Stand Transfer    Sit-Stand Foard (Transfers)  contact guard  -MS     Assistive Device (Sit-Stand Transfers)  walker, front-wheeled  -MS     Row Name 10/05/19 0921          Gait/Stairs Assessment/Training    Foard Level (Gait)  contact guard  -MS     Assistive Device (Gait)  walker, front-wheeled  -MS     Distance in Feet (Gait)  20 feet  -MS     Pattern (Gait)  step-through  -MS     Bilateral Gait Deviations  forward flexed posture  -MS     Comment (Gait/Stairs)  x 1 seated rest break at 10 feet.  then pt. ambulate another 10 " feet.  Verbal/tactile cues for posture correction. Limited by overall fatigue, SOA this AM.   -MS       User Key  (r) = Recorded By, (t) = Taken By, (c) = Cosigned By    Initials Name Provider Type    Joey Castrorachelle OSPINA, PT Physical Therapist        Obj/Interventions     Row Name 10/05/19 0922          General ROM    GENERAL ROM COMMENTS  BUE/LE (WFL's)  -MS     Row Name 10/05/19 0922          MMT (Manual Muscle Testing)    General MMT Comments  BUE/LE (3+/5)  -MS     Row Name 10/05/19 0922          Therapeutic Exercise    Comment (Therapeutic Exercise)  BLE ther. ex. program x 5 reps completed (LAQ's, Hip Flexion)  -MS       User Key  (r) = Recorded By, (t) = Taken By, (c) = Cosigned By    Initials Name Provider Type    Joey Castrorachelle OSPINA, PT Physical Therapist        Goals/Plan     Row Name 10/05/19 0924          Bed Mobility Goal 1 (PT)    Activity/Assistive Device (Bed Mobility Goal 1, PT)  bed mobility activities, all  -MS     [place] Level/Cues Needed (Bed Mobility Goal 1, PT)  independent  -MS     Time Frame (Bed Mobility Goal 1, PT)  long term goal (LTG);1 week  -MS     Row Name 10/05/19 0924          Transfer Goal 1 (PT)    Activity/Assistive Device (Transfer Goal 1, PT)  transfers, all;walker, rolling  -MS     [place] Level/Cues Needed (Transfer Goal 1, PT)  independent  -MS     Time Frame (Transfer Goal 1, PT)  long term goal (LTG);1 week  -MS     Row Name 10/05/19 0924          Gait Training Goal 1 (PT)    Activity/Assistive Device (Gait Training Goal 1, PT)  gait (walking locomotion);walker, rolling  -MS     [place] Level (Gait Training Goal 1, PT)  independent  -MS     Distance (Gait Goal 1, PT)  60 feet  -MS     Time Frame (Gait Training Goal 1, PT)  long term goal (LTG);1 week  -MS       User Key  (r) = Recorded By, (t) = Taken By, (c) = Cosigned By    Initials Name Provider Type    MS Lee Andrei OSPINA, PT Physical Therapist        Clinical Impression     Row Name 10/05/19 0923           Pain Assessment    Additional Documentation  Pain Scale: Numbers Pre/Post-Treatment (Group)  -MS     Row Name 10/05/19 0923          Pain Scale: Numbers Pre/Post-Treatment    Pain Scale: Numbers, Pretreatment  0/10 - no pain  -MS     Pain Scale: Numbers, Post-Treatment  0/10 - no pain  -MS     Row Name 10/05/19 0923          Plan of Care Review    Plan of Care Reviewed With  patient  -MS     Row Name 10/05/19 0923          Physical Therapy Clinical Impression    Criteria for Skilled Interventions Met (PT Clinical Impression)  treatment indicated  -MS     Rehab Potential (PT Clinical Summary)  good, to achieve stated therapy goals  -MS     Row Name 10/05/19 0923          Positioning and Restraints    Pre-Treatment Position  in bed  -MS     Post Treatment Position  bed  -MS     In Bed  notified nsg;supine;call light within reach;encouraged to call for assist All lines intact.  -MS       User Key  (r) = Recorded By, (t) = Taken By, (c) = Cosigned By    Initials Name Provider Type    Andrei Castro, PT Physical Therapist        Outcome Measures     Row Name 10/05/19 0927          How much help from another person do you currently need...    Turning from your back to your side while in flat bed without using bedrails?  3  -MS     Moving from lying on back to sitting on the side of a flat bed without bedrails?  3  -MS     Moving to and from a bed to a chair (including a wheelchair)?  3  -MS     Standing up from a chair using your arms (e.g., wheelchair, bedside chair)?  3  -MS     Climbing 3-5 steps with a railing?  2  -MS     To walk in hospital room?  3  -MS     AM-PAC 6 Clicks Score (PT)  17  -MS     Row Name 10/05/19 0927          Functional Assessment    Outcome Measure Options  AM-PAC 6 Clicks Basic Mobility (PT)  -MS       User Key  (r) = Recorded By, (t) = Taken By, (c) = Cosigned By    Initials Name Provider Type    Andrei Castro, PT Physical Therapist        Physical Therapy Education      Title: PT OT SLP Therapies (Done)     Topic: Physical Therapy (Done)     Point: Mobility training (Done)     Learning Progress Summary           Patient Acceptance, E,D, VU,NR by MS at 10/5/2019  9:25 AM                   Point: Home exercise program (Done)     Learning Progress Summary           Patient Acceptance, E,D, VU,NR by MS at 10/5/2019  9:25 AM                   Point: Body mechanics (Done)     Learning Progress Summary           Patient Acceptance, E,D, VU,NR by MS at 10/5/2019  9:25 AM                   Point: Precautions (Done)     Learning Progress Summary           Patient Acceptance, E,D, VU,NR by MS at 10/5/2019  9:25 AM                               User Key     Initials Effective Dates Name Provider Type Discipline    MS 04/03/18 -  Andrei Lee, PT Physical Therapist PT              PT Recommendation and Plan  Planned Therapy Interventions (PT Eval): balance training, bed mobility training, gait training, home exercise program, patient/family education, postural re-education, strengthening, transfer training  Outcome Summary/Treatment Plan (PT)  Anticipated Discharge Disposition (PT): home with 24/7 care, home with home health, skilled nursing facility(Pending pt. progress)  Plan of Care Reviewed With: patient  Outcome Summary: Pt. admitted to hospital from home with SOA, PNA.  Pt. reports that just prior to admission she was independent with functional mobility and use of RWx for ambulation.  Pt. presents this day with decreased strength and overall balance in standing.  Pt. will benefit from skilled inpt. P.T. to address her functional deficits and to assist pt. in regaining her maximum level of independence with functional mobility.      Time Calculation:   PT Charges     Row Name 10/05/19 0927             Time Calculation    Start Time  0845  -MS      Stop Time  0900  -MS      Time Calculation (min)  15 min  -MS      PT Received On  10/05/19  -MS      PT - Next Appointment  10/06/19  -MS       PT Goal Re-Cert Due Date  10/12/19  -MS         Time Calculation- PT    Total Timed Code Minutes- PT  13 minute(s)  -MS        User Key  (r) = Recorded By, (t) = Taken By, (c) = Cosigned By    Initials Name Provider Type    Andrei Castro, PT Physical Therapist        Therapy Charges for Today     Code Description Service Date Service Provider Modifiers Qty    37604864474 HC PT EVAL MOD COMPLEXITY 1 10/5/2019 Andrei Lee, PT GP 1    75665122587 HC PT THER PROC EA 15 MIN 10/5/2019 Andrei Lee, PT GP 1          PT G-Codes  Outcome Measure Options: AM-PAC 6 Clicks Basic Mobility (PT)  AM-PAC 6 Clicks Score (PT): 17    Andrei Lee, PT  10/5/2019

## 2019-10-05 NOTE — THERAPY EVALUATION
Acute Care - Speech Language Pathology   Swallow Initial Evaluation [facility]     Patient Name: Swetha Luis  : 1960  MRN: [id number]  Today's Date: 10/5/2019               Admit Date: 10/4/2019    Visit Dx:     ICD-10-CM ICD-9-CM   1. Healthcare associated bacterial pneumonia J15.9 482.9   2. Oropharyngeal dysphagia R13.12 787.22     Patient Active Problem List   Diagnosis   • Tracheostomy complication (CMS/[facility])   • Gangrene of toe (CMS/[facility])   • Acute diastolic congestive heart failure (CMS/[facility])   • ARF (acute renal failure) (CMS/[facility])   • Stage 3 chronic kidney disease (CMS/[facility])   • Elevated troponin   • Acute respiratory failure with hypoxemia (CMS/[facility])   • Acute osteomyelitis (CMS/[facility])   • UTI due to extended-spectrum beta lactamase (ESBL) producing Escherichia coli   • Thrush, oral   • Tracheostomy malfunction (CMS/[facility])   • COPD (chronic obstructive pulmonary disease) (CMS/[facility])   • Respiratory failure, chronic (CMS/[facility])   • PEG (percutaneous endoscopic gastrostomy) status (CMS/[facility])   • History of osteomyelitis   • Polysubstance abuse (CMS/[facility])   • History of tracheal stenosis   • Chronic diastolic CHF (congestive heart failure) (CMS/[facility])   • Peripheral artery disease (CMS/[facility])   • Medically noncompliant   • WENDI and COPD overlap syndrome (CMS/[facility])   • Dyspnea   • Elevated LFTs   • Elevated troponin   • Tracheostomy present (CMS/[facility])   • Essential hypertension   • Dysphagia   • Healthcare associated bacterial pneumonia     Past Medical History:   Diagnosis Date   • Acute congestive heart failure (CMS/[facility]) 2018   • Acute osteomyelitis (CMS/[facility])     Right shoulder due to IVDA   • Acute renal failure on dialysis (CMS/[facility])    • Anxiety    • Pugh esophagus    • CKD (chronic kidney disease)    • COPD (chronic obstructive pulmonary disease) (CMS/[facility])    • MRSA infection    • Nontraumatic subarachnoid hemorrhage (CMS/[facility])    • Seizures (CMS/[facility])    • Tracheostomy present (CMS/[facility])      Past Surgical  "History:   Procedure Laterality Date   • TRACHEOSTOMY          SWALLOW EVALUATION (last 72 hours)      SLP Adult Swallow Evaluation     Row Name 10/05/19 1100                   Rehab Evaluation    Document Type  evaluation  -JJ        Subjective Information  no complaints  -JJ        Patient Observations  alert;cooperative;agree to therapy  -JJ        Patient/Family Observations  Pt woke up easily, pt with tracheostomy, communicates well with use of PMV  -JJ        Patient Effort  good  -JJ        Symptoms Noted During/After Treatment  none  -JJ           General Information    Patient Profile Reviewed  yes  -JJ        Pertinent History Of Current Problem  Pt with tracheostomy and PEG tube, hx of dysphagia, aspiration pneumonia, and diet non-compliance. Pt admitted on 10/4 from home. She previously was admitted to the hospital on 9/21 from a nursing facility. At the nursing facility she was supposed to be on mechanical soft with nectar but was sometimes noncompliant. She was admitted after she ate a hamburger bun and became short of air. By the time she was discharged home she was on a pureed diet with honey thick liquids and appeared to be tolerating well. Pt now re-admitted to the hospital with pneumonia and she reprted that she has not used the feeding tube for \"a while\" and had been eating pureed foods and drinking honey thick liquids at home. (pt is a poor historian)  -JJ        Current Method of Nutrition  pureed;honey-thick liquids  -JJ        Precautions/Limitations, Vision  WFL;for purposes of eval  -JJ        Precautions/Limitations, Hearing  WFL;for purposes of eval  -JJ        Prior Level of Function-Communication  WFL;other (see comments) with use of PMV  -JJ        Prior Level of Function-Swallowing  puree;honey thick liquids  -JJ        Plans/Goals Discussed with  patient;agreed upon  -JJ        Barriers to Rehab  previous functional deficit  -JJ        Patient's Goals for Discharge  patient did not " state  -JJ        Family Goals for Discharge  family did not state;other (see comments) none present  -JJ           Oral Motor and Function    Dentition Assessment  edentulous, does not have dentures;missing teeth;other (see comments) pt only has her lower front teeth  -JJ        Secretion Management  WNL/WFL  -JJ        Mucosal Quality  moist, healthy  -JJ        Volitional Swallow  WFL  -JJ        Volitional Cough  weak  -JJ           Oral Musculature and Cranial Nerve Assessment    Oral Motor General Assessment  generalized oral motor weakness  -JJ           General Eating/Swallowing Observations    Respiratory Support Currently in Use  tracheostomy;trach collar  -JJ        Eating/Swallowing Skills  self-fed;fed by SLP  -JJ        Positioning During Eating  upright 90 degree;upright in bed  -JJ        Utensils Used  spoon;cup  -JJ        Consistencies Trialed  pureed;honey-thick liquids  -JJ           Respiratory    Respiratory Status  WFL  -JJ           Clinical Swallow Eval    Oral Prep Phase  WFL  -JJ        Oral Transit  WFL  -JJ        Oral Residue  WFL  -JJ        Pharyngeal Phase  no overt signs/symptoms of pharyngeal impairment  -JJ        Esophageal Phase  unremarkable  -JJ        Clinical Swallow Evaluation Summary  Pt observed with multiple trials of puree and honey. No overt s/s aspiration or difficulty noted with any consistency tested. No aspirated material observed in tracheostomy tube when SLP asked patient to cough.  -JJ           Clinical Impression    SLP Swallowing Diagnosis  mod-severe;oral dysfunction;suspected pharyngeal dysfunction  -JJ        Functional Impact  risk of aspiration/pneumonia  -JJ        Rehab Potential/Prognosis, Swallowing  adequate, monitor progress closely  -JJ        Swallow Criteria for Skilled Therapeutic Interventions Met  demonstrates skilled criteria  -JJ           Recommendations    Therapy Frequency (Swallow)  PRN  -JJ        Predicted Duration Therapy  Intervention (Days)  until discharge  -        SLP Diet Recommendation  puree;honey thick liquids  -        Recommended Diagnostics  VFSS (MBS)  -        Recommended Precautions and Strategies  upright posture during/after eating;small bites of food and sips of liquid  -        SLP Rec. for Method of Medication Administration  meds whole;with pudding or applesauce  -        Monitor for Signs of Aspiration  yes;notify SLP if any concerns  -        Anticipated Dischage Disposition  home with assist  -          User Key  (r) = Recorded By, (t) = Taken By, (c) = Cosigned By    Initials Name Effective Dates    CHONG Darcy Stringer, MS CCC-SLP 06/08/18 -           EDUCATION  The patient has been educated in the following areas:   Dysphagia (Swallowing Impairment) Modified Diet Instruction.    SLP Recommendation and Plan  SLP Swallowing Diagnosis: mod-severe, oral dysfunction, suspected pharyngeal dysfunction  SLP Diet Recommendation: puree, honey thick liquids  Recommended Precautions and Strategies: upright posture during/after eating, small bites of food and sips of liquid  SLP Rec. for Method of Medication Administration: meds whole, with pudding or applesauce     Monitor for Signs of Aspiration: yes, notify SLP if any concerns  Recommended Diagnostics: VFSS (MBS)  Swallow Criteria for Skilled Therapeutic Interventions Met: demonstrates skilled criteria  Anticipated Dischage Disposition: home with assist  Rehab Potential/Prognosis, Swallowing: adequate, monitor progress closely  Therapy Frequency (Swallow): PRN  Predicted Duration Therapy Intervention (Days): until discharge       Plan of Care Reviewed With: patient  Progress: no change  Outcome Summary: Swallow eval completed. Patient appears to be tolerating puree and honey with no s/s aspiration observed. However, given tracheostomy, current pneumonia diagnosis, and history of dysphagia/aspiration pneumonia VFSS is warranted. Recommend pureed  diet with honey thick liquids. Give meds whole with puree. Pt needs to eat in an upright position. SLP to complete VFSS Monday to objectively assess swallow function.         SLP Outcome Measures (last 72 hours)      SLP Outcome Measures     Row Name 10/05/19 1100             SLP Outcome Measures    Outcome Measure Used?  Adult NOMS  -         Adult FCM Scores    FCM Chosen  Swallowing  -      Swallowing FCM Score  3  -        User Key  (r) = Recorded By, (t) = Taken By, (c) = Cosigned By    Initials Name Effective Dates    Darcy Guerrier MS CCC-SLP 06/08/18 -            Time Calculation:   Time Calculation- SLP     Row Name 10/05/19 1135             Time Calculation- SLP    SLP Received On  10/05/19  -        User Key  (r) = Recorded By, (t) = Taken By, (c) = Cosigned By    Initials Name Provider Type    Darcy Guerrier, MS CCC-SLP Speech and Language Pathologist          Therapy Charges for Today     Code Description Service Date Service Provider Modifiers Qty    [id number]  ST EVAL ORAL PHARYNG SWALLOW 4 10/5/2019 Darcy Stringer MS CCC-SLP GN 1               Darcy Stringer MS CCC-SLP  10/5/2019

## 2019-10-05 NOTE — PLAN OF CARE
Problem: Patient Care Overview  Goal: Plan of Care Review  Outcome: Ongoing (interventions implemented as appropriate)   10/05/19 1129   Plan of Care Review   Progress no change   OTHER   Outcome Summary Swallow eval completed. Patient appears to be tolerating puree and honey with no s/s aspiration observed. However, given tracheostomy, current pneumonia diagnosis, and history of dysphagia/aspiration pneumonia VFSS is warranted. Recommend pureed diet with honey thick liquids. Give meds whole with puree. Pt needs to eat in an upright position. SLP to complete VFSS Monday to objectively assess swallow function.   Coping/Psychosocial   Plan of Care Reviewed With patient

## 2019-10-05 NOTE — PROGRESS NOTES
"DAILY PROGRESS NOTE  [facility]    Patient Identification:  Name: Swetha Luis  Age: 59 y.o.  Sex: female  :  1960  MRN: [id number]         Primary Care Physician: Provider, No Known    Subjective:  Interval History: She complains of cough and shortness of air.    Objective:    Scheduled Meds:  busPIRone 5 mg Oral TID   ertapenem 1 g Intravenous Q24H   escitalopram 20 mg Oral Daily   gabapentin 300 mg Oral TID   influenza vaccine 0.5 mL Intramuscular Once   metoprolol succinate XL 25 mg Oral Daily   pramipexole 0.25 mg Oral Nightly   sodium chloride 10 mL Intravenous Q12H   thiamine 100 mg Oral Daily     Continuous Infusions:     Vital signs in last 24 hours:  Temp:  [97.7 °F (36.5 °C)-100.5 °F (38.1 °C)] 98.9 °F (37.2 °C)  Heart Rate:  [66-92] 71  Resp:  [17-22] 17  BP: (128-158)/() 133/88    Intake/Output:  No intake or output data in the 24 hours ending 10/05/19 1705    Exam:  /88 (BP Location: Right arm, Patient Position: Lying)   Pulse 71   Temp 98.9 °F (37.2 °C) (Oral)   Resp 17   Ht 167.6 cm (65.98\")   Wt 74 kg (163 lb 2.3 oz)   SpO2 94%   BMI 26.34 kg/m²     General Appearance:    Alert, cooperative, no distress   Head:    Normocephalic, without obvious abnormality, atraumatic   Eyes:       Throat:   Lips, tongue, gums normal   Neck:   Supple, symmetrical, trachea midline, no JVD, tracheostomy in place   Lungs:    Rhonchi and wheezes bilaterally, respirations unlabored   Chest Wall:    No tenderness or deformity    Heart:    Regular rate and rhythm, S1 and S2 normal, no murmur,no  Rub or gallop   Abdomen:     Soft, non-tender, bowel sounds active, no masses, no organomegaly    Extremities:   Extremities normal, atraumatic, no cyanosis or edema   Pulses:      Skin:   Skin is warm and dry,  no rashes or palpable lesions   Neurologic:   no focal deficits noted      Lab Results (last 72 hours)     Procedure Component Value Units Date/Time    CBC Auto Differential " [[id number]]  (Abnormal) Collected:  10/05/19 0520    Specimen:  Blood Updated:  10/05/19 0810     WBC 9.46 10*3/mm3      RBC 4.79 10*6/mm3      Hemoglobin 12.7 g/dL      Hematocrit 40.8 %      MCV 85.2 fL      MCH 26.5 pg      MCHC 31.1 g/dL      RDW 14.1 %      RDW-SD 44.2 fl      MPV 13.2 fL      Platelets 134 10*3/mm3     Manual Differential [[id number]]  (Abnormal) Collected:  10/05/19 0520    Specimen:  Blood Updated:  10/05/19 0810     Neutrophil % 86.0 %      Lymphocyte % 8.0 %      Monocyte % 4.0 %      Eosinophil % 1.0 %      Atypical Lymphocyte % 1.0 %      Neutrophils Absolute 8.14 10*3/mm3      Lymphocytes Absolute 0.76 10*3/mm3      Monocytes Absolute 0.38 10*3/mm3      Eosinophils Absolute 0.09 10*3/mm3      Anisocytosis Slight/1+     Cameron Cells Mod/2+     Microcytes Slight/1+     Ovalocytes Mod/2+     Poikilocytes Mod/2+     Polychromasia Mod/2+     WBC Morphology Normal     Giant Platelets Slight/1+    Blood Culture - Blood, Arm, Right [[id number]] Collected:  10/04/19 0625    Specimen:  Blood from Arm, Right Updated:  10/05/19 0800     Blood Culture No growth at 24 hours    Basic Metabolic Panel [[id number]]  (Abnormal) Collected:  10/05/19 0520    Specimen:  Blood Updated:  10/05/19 0735     Glucose 53 mg/dL      BUN 13 mg/dL      Creatinine 1.03 mg/dL      Sodium 134 mmol/L      Potassium 4.0 mmol/L      Chloride 96 mmol/L      CO2 21.1 mmol/L      Calcium 8.8 mg/dL      eGFR Non African Amer 55 mL/min/1.73      BUN/Creatinine Ratio 12.6     Anion Gap 16.9 mmol/L     Narrative:       GFR Normal >60  Chronic Kidney Disease <60  Kidney Failure <15    Blood Culture - Blood, Arm, Left [[id number]]  (Abnormal) Collected:  10/04/19 0624    Specimen:  Blood from Arm, Left Updated:  10/05/19 0619     Blood Culture Escherichia coli     Isolated from Aerobic and Anaerobic Bottles     Gram Stain Aerobic Bottle Gram negative bacilli      Anaerobic Bottle Gram negative bacilli    Blood Culture ID, PCR - Blood,  Arm, Left [[id number]]  (Abnormal) Collected:  10/04/19 0624    Specimen:  Blood from Arm, Left Updated:  10/05/19 0048     BCID, PCR Escherichia coli. Identification by BCID PCR.    MRSA Screen, PCR - Swab, Nares [[id number]]  (Normal) Collected:  10/04/19 1652    Specimen:  Swab from Nares Updated:  10/04/19 1853     MRSA PCR No MRSA Detected    Respiratory Panel, PCR - Swab, Nasopharynx [[id number]]  (Normal) Collected:  10/04/19 1652    Specimen:  Swab from Nasopharynx Updated:  10/04/19 1853     ADENOVIRUS, PCR Not Detected     Coronavirus 229E Not Detected     Coronavirus HKU1 Not Detected     Coronavirus NL63 Not Detected     Coronavirus OC43 Not Detected     Human Metapneumovirus Not Detected     Human Rhinovirus/Enterovirus Not Detected     Influenza B PCR Not Detected     Parainfluenza Virus 1 Not Detected     Parainfluenza Virus 2 Not Detected     Parainfluenza Virus 3 Not Detected     Parainfluenza Virus 4 Not Detected     Bordetella pertussis pcr Not Detected     Influenza A H1 2009 PCR Not Detected     Chlamydophila pneumoniae PCR Not Detected     Mycoplasma pneumo by PCR Not Detected     Influenza A PCR Not Detected     Influenza A H3 Not Detected     Influenza A H1 Not Detected     RSV, PCR Not Detected     Bordetella parapertussis PCR Not Detected    Comprehensive Metabolic Panel [[id number]]  (Abnormal) Collected:  10/04/19 0623    Specimen:  Blood Updated:  10/04/19 0818     Glucose 138 mg/dL      BUN 19 mg/dL      Creatinine 1.21 mg/dL      Sodium 142 mmol/L      Potassium 3.9 mmol/L      Chloride 103 mmol/L      CO2 27.9 mmol/L      Calcium 8.8 mg/dL      Total Protein 6.9 g/dL      Albumin 3.80 g/dL      ALT (SGPT) 136 U/L      AST (SGOT) 71 U/L      Alkaline Phosphatase 194 U/L      Total Bilirubin 0.3 mg/dL      eGFR Non African Amer 46 mL/min/1.73      Globulin 3.1 gm/dL      A/G Ratio 1.2 g/dL      BUN/Creatinine Ratio 15.7     Anion Gap 11.1 mmol/L     Narrative:       GFR Normal >60  Chronic  Kidney Disease <60  Kidney Failure <15    Lactic Acid, Plasma [[id number]]  (Normal) Collected:  10/04/19 0623    Specimen:  Blood Updated:  10/04/19 0802     Lactate 1.4 mmol/L     [place] Draw [[id number]] Collected:  10/04/19 0623    Specimen:  Blood Updated:  10/04/19 0800    Narrative:       The following orders were created for panel order [place] Draw.  Procedure                               Abnormality         Status                     ---------                               -----------         ------                     Light Blue Top[[id number]]                                   Final result               Green Top (Gel)[[id number]]                                  Final result               Lavender Top[[id number]]                                     Final result               Gold Top - SST[[id number]]                                   Final result                 Please view results for these tests on the individual orders.    Light Blue Top [[id number]] Collected:  10/04/19 0623    Specimen:  Blood Updated:  10/04/19 0800     Extra Tube hold for add-on     Comment: Auto resulted       Green Top (Gel) [[id number]] Collected:  10/04/19 0623    Specimen:  Blood Updated:  10/04/19 0800     Extra Tube Hold for add-ons.     Comment: Auto resulted.       Lavender Top [[id number]] Collected:  10/04/19 0623    Specimen:  Blood Updated:  10/04/19 0800     Extra Tube hold for add-on     Comment: Auto resulted       Gold Top - SST [[id number]] Collected:  10/04/19 0623    Specimen:  Blood Updated:  10/04/19 0800     Extra Tube Hold for add-ons.     Comment: Auto resulted.       CBC & Differential [[id number]] Collected:  10/04/19 0623    Specimen:  Blood Updated:  10/04/19 0756    Narrative:       The following orders were created for panel order CBC & Differential.  Procedure                               Abnormality         Status                     ---------                               -----------         ------                      CBC Auto Differential[[id number]]        Abnormal            Final result                 Please view results for these tests on the individual orders.    CBC Auto Differential [[id number]]  (Abnormal) Collected:  10/04/19 0623    Specimen:  Blood Updated:  10/04/19 0756     WBC 12.58 10*3/mm3      RBC 4.60 10*6/mm3      Hemoglobin 12.0 g/dL      Hematocrit 39.1 %      MCV 85.0 fL      MCH 26.1 pg      MCHC 30.7 g/dL      RDW 14.1 %      RDW-SD 44.0 fl      MPV 12.5 fL      Platelets 187 10*3/mm3      Neutrophil % 82.7 %      Lymphocyte % 6.8 %      Monocyte % 8.7 %      Eosinophil % 0.8 %      Basophil % 0.3 %      Immature Grans % 0.7 %      Neutrophils, Absolute 10.40 10*3/mm3      Lymphocytes, Absolute 0.85 10*3/mm3      Monocytes, Absolute 1.10 10*3/mm3      Eosinophils, Absolute 0.10 10*3/mm3      Basophils, Absolute 0.04 10*3/mm3      Immature Grans, Absolute 0.09 10*3/mm3      nRBC 0.0 /100 WBC         Data Review:  Results from last 7 days   Lab Units 10/05/19  0520 10/04/19  0623  09/29/19  0524   SODIUM mmol/L 134* 142  --  143   POTASSIUM mmol/L 4.0 3.9  --  4.1   CHLORIDE mmol/L 96* 103  --  111*   TOTAL CO2 mmol/L  --   --   --  25   CO2 mmol/L 21.1* 27.9  --   --    BUN mg/dL 13 19  --  25*   CREATININE mg/dL 1.03* 1.21*  --  0.9   GLUCOSE mg/dL 53* 138*   < >  --    CALCIUM mg/dL 8.8 8.8  --  9.1    < > = values in this interval not displayed.     Results from last 7 days   Lab Units 10/05/19  0520 10/04/19  0623 09/29/19  0524   WBC 10*3/mm3 9.46 12.58* 7.35   HEMOGLOBIN g/dL 12.7 12.0 12.4   HEMATOCRIT % 40.8 39.1 40.3   PLATELETS 10*3/mm3 134* 187 200             Lab Results   Lab Value Date/Time    TROPONINT 0.045 (C) 09/22/2019 1211    TROPONINT 0.034 (C) 09/22/2019 0604    TROPONINT 0.031 (C) 09/22/2019 0130    TROPONINT 0.036 (C) 09/21/2019 2248    TROPONINT 0.050 (H) 12/29/2018 0409    TROPONINT 0.085 (H) 12/24/2018 1900    TROPONINT 0.100 (C) 12/24/2018 1203    TROPONINT <0.01  03/26/2014 1731         Results from last 7 days   Lab Units 10/04/19  0623 09/29/19  0524   ALK PHOS U/L 194* 137*   BILIRUBIN mg/dL 0.3 0.3   ALT (SGPT) U/L 136* 58   AST (SGOT) U/L 71* 50*             No results found for: POCGLU        Past Medical History:   Diagnosis Date   • Acute congestive heart failure (CMS/HCC) 12/24/2018   • Acute osteomyelitis (CMS/[facility])     Right shoulder due to IVDA   • Acute renal failure on dialysis (CMS/[facility])    • Anxiety    • Pugh esophagus    • CKD (chronic kidney disease)    • COPD (chronic obstructive pulmonary disease) (CMS/[facility])    • MRSA infection    • Nontraumatic subarachnoid hemorrhage (CMS/[facility])    • Seizures (CMS/[facility])    • Tracheostomy present (CMS/HCC)        Assessment:  Active Hospital Problems    Diagnosis  POA   • **Healthcare associated bacterial pneumonia [J15.9]  Yes   • Dyspnea [R06.00]  Yes   • Tracheostomy present (CMS/HCC) [Z93.0]  Not Applicable   • Essential hypertension [I10]  Yes   • Dysphagia [R13.10]  Yes   • COPD (chronic obstructive pulmonary disease) (CMS/[facility]) [J44.9]  Yes   • Respiratory failure, chronic (CMS/[facility]) [J96.10]  Yes   • PEG (percutaneous endoscopic gastrostomy) status (CMS/HCC) [Z93.1]  Not Applicable   • Chronic diastolic CHF (congestive heart failure) (CMS/HCC) [I50.32]  Yes   • Peripheral artery disease (CMS/[facility]) [I73.9]  Yes   • Stage 3 chronic kidney disease (CMS/[facility]) [N18.3]  Yes      Resolved Hospital Problems   No resolved problems to display.       Plan:  Continue with IV antibiotics and await results of cultures.  We will get some follow-up laboratory studies.  Continue with bronchodilator treatments.    True Moreno MD  10/5/2019  5:05 PM

## 2019-10-06 PROBLEM — Z16.12 INFECTION DUE TO ESBL-PRODUCING ESCHERICHIA COLI: Status: ACTIVE | Noted: 2019-10-06

## 2019-10-06 PROBLEM — A49.8 INFECTION DUE TO ESBL-PRODUCING ESCHERICHIA COLI: Status: ACTIVE | Noted: 2019-10-06

## 2019-10-06 PROBLEM — A41.50 SEPSIS DUE TO GRAM NEGATIVE BACTERIA (HCC): Status: ACTIVE | Noted: 2019-10-06

## 2019-10-06 LAB
ANION GAP SERPL CALCULATED.3IONS-SCNC: 10.8 MMOL/L (ref 5–15)
BACTERIA SPEC AEROBE CULT: ABNORMAL
BACTERIA UR QL AUTO: ABNORMAL /HPF
BASOPHILS # BLD AUTO: 0.02 10*3/MM3 (ref 0–0.2)
BASOPHILS NFR BLD AUTO: 0.4 % (ref 0–1.5)
BILIRUB UR QL STRIP: NEGATIVE
BUN BLD-MCNC: 17 MG/DL (ref 6–20)
BUN/CREAT SERPL: 15.3 (ref 7–25)
CALCIUM SPEC-SCNC: 8.7 MG/DL (ref 8.6–10.5)
CHLORIDE SERPL-SCNC: 104 MMOL/L (ref 98–107)
CLARITY UR: CLEAR
CO2 SERPL-SCNC: 29.2 MMOL/L (ref 22–29)
COLOR UR: YELLOW
CREAT BLD-MCNC: 1.11 MG/DL (ref 0.57–1)
DEPRECATED RDW RBC AUTO: 44.4 FL (ref 37–54)
EOSINOPHIL # BLD AUTO: 0.17 10*3/MM3 (ref 0–0.4)
EOSINOPHIL NFR BLD AUTO: 3 % (ref 0.3–6.2)
ERYTHROCYTE [DISTWIDTH] IN BLOOD BY AUTOMATED COUNT: 14.1 % (ref 12.3–15.4)
GFR SERPL CREATININE-BSD FRML MDRD: 50 ML/MIN/1.73
GLUCOSE BLD-MCNC: 106 MG/DL (ref 65–99)
GLUCOSE UR STRIP-MCNC: NEGATIVE MG/DL
GRAM STN SPEC: ABNORMAL
GRAM STN SPEC: ABNORMAL
HCT VFR BLD AUTO: 37.1 % (ref 34–46.6)
HGB BLD-MCNC: 11.2 G/DL (ref 12–15.9)
HGB UR QL STRIP.AUTO: NEGATIVE
HYALINE CASTS UR QL AUTO: ABNORMAL /LPF
IMM GRANULOCYTES # BLD AUTO: 0.03 10*3/MM3 (ref 0–0.05)
IMM GRANULOCYTES NFR BLD AUTO: 0.5 % (ref 0–0.5)
ISOLATED FROM: ABNORMAL
KETONES UR QL STRIP: NEGATIVE
LEUKOCYTE ESTERASE UR QL STRIP.AUTO: ABNORMAL
LYMPHOCYTES # BLD AUTO: 0.95 10*3/MM3 (ref 0.7–3.1)
LYMPHOCYTES NFR BLD AUTO: 16.9 % (ref 19.6–45.3)
MCH RBC QN AUTO: 25.9 PG (ref 26.6–33)
MCHC RBC AUTO-ENTMCNC: 30.2 G/DL (ref 31.5–35.7)
MCV RBC AUTO: 85.9 FL (ref 79–97)
MONOCYTES # BLD AUTO: 0.73 10*3/MM3 (ref 0.1–0.9)
MONOCYTES NFR BLD AUTO: 13 % (ref 5–12)
NEUTROPHILS # BLD AUTO: 3.71 10*3/MM3 (ref 1.7–7)
NEUTROPHILS NFR BLD AUTO: 66.2 % (ref 42.7–76)
NITRITE UR QL STRIP: NEGATIVE
NRBC BLD AUTO-RTO: 0 /100 WBC (ref 0–0.2)
PH UR STRIP.AUTO: 7.5 [PH] (ref 5–8)
PLATELET # BLD AUTO: 162 10*3/MM3 (ref 140–450)
PMV BLD AUTO: 12.2 FL (ref 6–12)
POTASSIUM BLD-SCNC: 4.5 MMOL/L (ref 3.5–5.2)
PROT UR QL STRIP: ABNORMAL
RBC # BLD AUTO: 4.32 10*6/MM3 (ref 3.77–5.28)
RBC # UR: ABNORMAL /HPF
REF LAB TEST METHOD: ABNORMAL
SODIUM BLD-SCNC: 144 MMOL/L (ref 136–145)
SP GR UR STRIP: 1.01 (ref 1–1.03)
SQUAMOUS #/AREA URNS HPF: ABNORMAL /HPF
UROBILINOGEN UR QL STRIP: ABNORMAL
WBC CLUMPS # UR AUTO: ABNORMAL /HPF
WBC NRBC COR # BLD: 5.61 10*3/MM3 (ref 3.4–10.8)
WBC UR QL AUTO: ABNORMAL /HPF

## 2019-10-06 PROCEDURE — 97110 THERAPEUTIC EXERCISES: CPT

## 2019-10-06 PROCEDURE — 87086 URINE CULTURE/COLONY COUNT: CPT | Performed by: HOSPITALIST

## 2019-10-06 PROCEDURE — 81001 URINALYSIS AUTO W/SCOPE: CPT | Performed by: HOSPITALIST

## 2019-10-06 PROCEDURE — 87186 SC STD MICRODIL/AGAR DIL: CPT | Performed by: HOSPITALIST

## 2019-10-06 PROCEDURE — 25010000002 ERTAPENEM PER 500 MG: Performed by: HOSPITALIST

## 2019-10-06 PROCEDURE — 87077 CULTURE AEROBIC IDENTIFY: CPT | Performed by: HOSPITALIST

## 2019-10-06 PROCEDURE — 80048 BASIC METABOLIC PNL TOTAL CA: CPT | Performed by: HOSPITALIST

## 2019-10-06 PROCEDURE — 85025 COMPLETE CBC W/AUTO DIFF WBC: CPT | Performed by: HOSPITALIST

## 2019-10-06 RX ADMIN — BUSPIRONE HYDROCHLORIDE 5 MG: 5 TABLET ORAL at 21:18

## 2019-10-06 RX ADMIN — SODIUM CHLORIDE, PRESERVATIVE FREE 10 ML: 5 INJECTION INTRAVENOUS at 21:18

## 2019-10-06 RX ADMIN — ESCITALOPRAM 20 MG: 20 TABLET, FILM COATED ORAL at 09:33

## 2019-10-06 RX ADMIN — OXYCODONE HYDROCHLORIDE AND ACETAMINOPHEN 1 TABLET: 5; 325 TABLET ORAL at 05:34

## 2019-10-06 RX ADMIN — BUSPIRONE HYDROCHLORIDE 5 MG: 5 TABLET ORAL at 17:23

## 2019-10-06 RX ADMIN — ERTAPENEM SODIUM 1 G: 1 INJECTION, POWDER, LYOPHILIZED, FOR SOLUTION INTRAMUSCULAR; INTRAVENOUS at 11:11

## 2019-10-06 RX ADMIN — PRAMIPEXOLE DIHYDROCHLORIDE 0.25 MG: 0.25 TABLET ORAL at 21:18

## 2019-10-06 RX ADMIN — GABAPENTIN 300 MG: 300 CAPSULE ORAL at 17:23

## 2019-10-06 RX ADMIN — GABAPENTIN 300 MG: 300 CAPSULE ORAL at 09:34

## 2019-10-06 RX ADMIN — Medication 100 MG: at 09:33

## 2019-10-06 RX ADMIN — BUSPIRONE HYDROCHLORIDE 5 MG: 5 TABLET ORAL at 09:33

## 2019-10-06 RX ADMIN — OXYCODONE HYDROCHLORIDE AND ACETAMINOPHEN 1 TABLET: 5; 325 TABLET ORAL at 21:18

## 2019-10-06 RX ADMIN — GABAPENTIN 300 MG: 300 CAPSULE ORAL at 21:18

## 2019-10-06 RX ADMIN — METOPROLOL SUCCINATE 25 MG: 25 TABLET, FILM COATED, EXTENDED RELEASE ORAL at 09:33

## 2019-10-06 RX ADMIN — SODIUM CHLORIDE, PRESERVATIVE FREE 10 ML: 5 INJECTION INTRAVENOUS at 09:34

## 2019-10-06 NOTE — PLAN OF CARE
Problem: Patient Care Overview  Goal: Plan of Care Review  Outcome: Ongoing (interventions implemented as appropriate)      Problem: Pain, Chronic (Adult)  Goal: Identify Related Risk Factors and Signs and Symptoms  Outcome: Ongoing (interventions implemented as appropriate)   10/06/19 0649   Pain, Chronic (Adult)   Related Risk Factors (Chronic Pain) disease process

## 2019-10-06 NOTE — THERAPY TREATMENT NOTE
Patient Name: Swetha Luis  : 1960    MRN: [id number]                              Today's Date: 10/6/2019       Admit Date: 10/4/2019    Visit Dx:     ICD-10-CM ICD-9-CM   1. Healthcare associated bacterial pneumonia J15.9 482.9   2. Oropharyngeal dysphagia R13.12 787.22     Patient Active Problem List   Diagnosis   • Tracheostomy complication (CMS/[facility])   • Gangrene of toe (CMS/[facility])   • Acute diastolic congestive heart failure (CMS/[facility])   • ARF (acute renal failure) (CMS/[facility])   • Stage 3 chronic kidney disease (CMS/[facility])   • Elevated troponin   • Acute respiratory failure with hypoxemia (CMS/[facility])   • Acute osteomyelitis (CMS/[facility])   • UTI due to extended-spectrum beta lactamase (ESBL) producing Escherichia coli   • Thrush, oral   • Tracheostomy malfunction (CMS/[facility])   • COPD (chronic obstructive pulmonary disease) (CMS/[facility])   • Respiratory failure, chronic (CMS/[facility])   • PEG (percutaneous endoscopic gastrostomy) status (CMS/[facility])   • History of osteomyelitis   • Polysubstance abuse (CMS/[facility])   • History of tracheal stenosis   • Chronic diastolic CHF (congestive heart failure) (CMS/[facility])   • Peripheral artery disease (CMS/[facility])   • Medically noncompliant   • WENDI and COPD overlap syndrome (CMS/[facility])   • Dyspnea   • Elevated LFTs   • Elevated troponin   • Tracheostomy present (CMS/[facility])   • Essential hypertension   • Dysphagia   • Healthcare associated bacterial pneumonia     Past Medical History:   Diagnosis Date   • Acute congestive heart failure (CMS/[facility]) 2018   • Acute osteomyelitis (CMS/[facility])     Right shoulder due to IVDA   • Acute renal failure on dialysis (CMS/[facility])    • Anxiety    • Pugh esophagus    • CKD (chronic kidney disease)    • COPD (chronic obstructive pulmonary disease) (CMS/[facility])    • MRSA infection    • Nontraumatic subarachnoid hemorrhage (CMS/[facility])    • Seizures (CMS/[facility])    • Tracheostomy present (CMS/[facility])      Past Surgical History:   Procedure Laterality Date   • TRACHEOSTOMY        General Information     Row Name 10/06/19 0923          PT Evaluation Time/Intention    Document Type  therapy note (daily note) Pt. reports feeling tired this day. Otherwise, pt. agreeable to work with P.T.   -MS     Mode of Treatment  physical therapy;individual therapy  -MS     Row Name 10/06/19 0923          General Information    Patient Profile Reviewed?  yes  -MS     Existing Precautions/Restrictions  fall;oxygen therapy device and L/min  (Significant)  Contact Isolation;  Trach Collar  -MS     Row Name 10/06/19 0923          Cognitive Assessment/Intervention- PT/OT    Orientation Status (Cognition)  oriented to;person;place  -MS     Row Name 10/06/19 0923          Safety Issues, Functional Mobility    Comment, Safety Issues/Impairments (Mobility)  Gait belt used for safety.  -MS       User Key  (r) = Recorded By, (t) = Taken By, (c) = Cosigned By    Initials Name Provider Type    Andrei Castro ANAI, PT Physical Therapist        Mobility     Row Name 10/06/19 0924          Bed Mobility Assessment/Treatment    Supine-Sit [place] (Bed Mobility)  contact guard  -MS     Sit-Supine [place] (Bed Mobility)  contact guard  -MS     Assistive Device (Bed Mobility)  bed rails  -MS     Row Name 10/06/19 0924          Sit-Stand Transfer    Sit-Stand [place] (Transfers)  contact guard  -MS     Assistive Device (Sit-Stand Transfers)  walker, front-wheeled  -MS     Row Name 10/06/19 0924          Gait/Stairs Assessment/Training    [place] Level (Gait)  contact guard  -MS     Assistive Device (Gait)  walker, front-wheeled  -MS     Distance in Feet (Gait)  35 feet  -MS     Pattern (Gait)  step-through  -MS     Deviations/Abnormal Patterns (Gait)  base of support, narrow  -MS     Bilateral Gait Deviations  forward flexed posture  -MS     Comment (Gait/Stairs)  Verbal/tactile cues for posture correction and Rwx guidance.  x 1 seated rest break.   -MS       User Key  (r) = Recorded By, (t) = Taken By,  (c) = Cosigned By    Initials Name Provider Type    Andrei Castro, PT Physical Therapist        Obj/Interventions     Row Name 10/06/19 0925          Therapeutic Exercise    Comment (Therapeutic Exercise)  BLE Standing ther. ex. program x 5 reps completed (Heel Raises, Mini-squats)   -MS       User Key  (r) = Recorded By, (t) = Taken By, (c) = Cosigned By    Initials Name Provider Type    MS BartonLeeAndrei bahena, PT Physical Therapist        Goals/Plan    No documentation.       Clinical Impression     Row Name 10/06/19 0926          Pain Assessment    Additional Documentation  Pain Scale: Numbers Pre/Post-Treatment (Group)  -MS     Row Name 10/06/19 0926          Pain Scale: Numbers Pre/Post-Treatment    Pain Scale: Numbers, Pretreatment  0/10 - no pain  -MS     Pain Scale: Numbers, Post-Treatment  0/10 - no pain  -MS     Row Name 10/06/19 0926          Positioning and Restraints    Pre-Treatment Position  in bed  -MS     Post Treatment Position  bed  -MS     In Bed  notified nsg;supine;call light within reach;encouraged to call for assist;exit alarm on All lines intact.  -MS       User Key  (r) = Recorded By, (t) = Taken By, (c) = Cosigned By    Initials Name Provider Type    MS JesusAndrei, PT Physical Therapist        Outcome Measures     Row Name 10/06/19 0927          How much help from another person do you currently need...    Turning from your back to your side while in flat bed without using bedrails?  3  -MS     Moving from lying on back to sitting on the side of a flat bed without bedrails?  3  -MS     Moving to and from a bed to a chair (including a wheelchair)?  3  -MS     Standing up from a chair using your arms (e.g., wheelchair, bedside chair)?  3  -MS     Climbing 3-5 steps with a railing?  2  -MS     To walk in hospital room?  3  -MS     AM-PAC 6 Clicks Score (PT)  17  -MS     Row Name 10/06/19 0927          Functional Assessment    Outcome Measure Options  AM-PAC 6 Clicks Basic Mobility  (PT)  -MS       User Key  (r) = Recorded By, (t) = Taken By, (c) = Cosigned By    Initials Name Provider Type    MS Andrei Lee, PT Physical Therapist        Physical Therapy Education     Title: PT OT SLP Therapies (Done)     Topic: Physical Therapy (Done)     Point: Mobility training (Done)     Learning Progress Summary           Patient Acceptance, E,D, VU,NR by MS at 10/6/2019  9:26 AM    Acceptance, E,D, VU,NR by MS at 10/5/2019  9:25 AM                   Point: Home exercise program (Done)     Learning Progress Summary           Patient Acceptance, E,D, VU,NR by MS at 10/6/2019  9:26 AM    Acceptance, E,D, VU,NR by MS at 10/5/2019  9:25 AM                   Point: Body mechanics (Done)     Learning Progress Summary           Patient Acceptance, E,D, VU,NR by MS at 10/6/2019  9:26 AM    Acceptance, E,D, VU,NR by MS at 10/5/2019  9:25 AM                   Point: Precautions (Done)     Learning Progress Summary           Patient Acceptance, E,D, VU,NR by MS at 10/6/2019  9:26 AM    Acceptance, E,D, VU,NR by MS at 10/5/2019  9:25 AM                               User Key     Initials Effective Dates Name Provider Type Discipline    MS 04/03/18 -  Andrei Lee, PT Physical Therapist PT              PT Recommendation and Plan  Planned Therapy Interventions (PT Eval): balance training, bed mobility training, gait training, home exercise program, patient/family education, postural re-education, strengthening, transfer training  Outcome Summary/Treatment Plan (PT)  Anticipated Discharge Disposition (PT): home with 24/7 care, home with home health, skilled nursing facility(Pending pt. progress)  Plan of Care Reviewed With: patient  Progress: improving  Outcome Summary: Pt. able to ambulate 35 feet, CGA x 1, with use of Rwx this date.  Pt. requires CGA x1 for bed mobility and for sit <-> stand transfers.  Verbal/tactile cues given during ambulation for posture correction.  x 1 seated rest required during gait.   BLE Standing ther. ex. program completed for general strengthening.      Time Calculation:   PT Charges     Row Name 10/06/19 0927             Time Calculation    Start Time  0900  -MS      Stop Time  0915  -MS      Time Calculation (min)  15 min  -MS      PT Received On  10/06/19  -MS      PT - Next Appointment  10/07/19  -MS         Time Calculation- PT    Total Timed Code Minutes- PT  13 minute(s)  -MS        User Key  (r) = Recorded By, (t) = Taken By, (c) = Cosigned By    Initials Name Provider Type    Andrei Castro, PT Physical Therapist        Therapy Charges for Today     Code Description Service Date Service Provider Modifiers Qty    89550893812 HC PT EVAL MOD COMPLEXITY 1 10/5/2019 Andrei Lee, PT GP 1    36700545737 HC PT THER PROC EA 15 MIN 10/5/2019 Andrei Lee, PT GP 1    95461978126 HC PT THER PROC EA 15 MIN 10/6/2019 Andrei Lee, PT GP 1          PT G-Codes  Outcome Measure Options: AM-PAC 6 Clicks Basic Mobility (PT)  AM-PAC 6 Clicks Score (PT): 17    Andrei Lee, PT  10/6/2019

## 2019-10-06 NOTE — PROGRESS NOTES
"DAILY PROGRESS NOTE  [facility]    Patient Identification:  Name: Swetha Luis  Age: 59 y.o.  Sex: female  :  1960  MRN: [id number]         Primary Care Physician: Provider, No Known    Subjective:  Interval History: She complains of cough and shortness of air.  Better today    Objective:    Scheduled Meds:    busPIRone 5 mg Oral TID   ertapenem 1 g Intravenous Q24H   escitalopram 20 mg Oral Daily   gabapentin 300 mg Oral TID   influenza vaccine 0.5 mL Intramuscular Once   metoprolol succinate XL 25 mg Oral Daily   pramipexole 0.25 mg Oral Nightly   sodium chloride 10 mL Intravenous Q12H   thiamine 100 mg Oral Daily     Continuous Infusions:     Vital signs in last 24 hours:  Temp:  [97.8 °F (36.6 °C)-98.5 °F (36.9 °C)] 98.2 °F (36.8 °C)  Heart Rate:  [77-78] 77  Resp:  [16-18] 18  BP: (128-146)/(72-98) 146/93    Intake/Output:    Intake/Output Summary (Last 24 hours) at 10/6/2019 1512  Last data filed at 10/6/2019 1450  Gross per 24 hour   Intake 236 ml   Output 900 ml   Net -664 ml       Exam:  /93 (BP Location: Right arm, Patient Position: Lying)   Pulse 77   Temp 98.2 °F (36.8 °C) (Oral)   Resp 18   Ht 167.6 cm (65.98\")   Wt 74 kg (163 lb 2.3 oz)   SpO2 90%   BMI 26.34 kg/m²     General Appearance:    Alert, cooperative, no distress   Head:    Normocephalic, without obvious abnormality, atraumatic   Eyes:       Throat:   Lips, tongue, gums normal   Neck:   Supple, symmetrical, trachea midline, no JVD, tracheostomy in place   Lungs:    Rhonchi and wheezes bilaterally, respirations unlabored   Chest Wall:    No tenderness or deformity    Heart:    Regular rate and rhythm, S1 and S2 normal, no murmur,no  Rub or gallop   Abdomen:     Soft, non-tender, bowel sounds active, no masses, no organomegaly    Extremities:   Extremities normal, atraumatic, no cyanosis or edema   Pulses:      Skin:   Skin is warm and dry,  no rashes or palpable lesions   Neurologic:   no focal deficits " noted      Lab Results (last 72 hours)     Procedure Component Value Units Date/Time    CBC Auto Differential [[id number]]  (Abnormal) Collected:  10/05/19 0520    Specimen:  Blood Updated:  10/05/19 0810     WBC 9.46 10*3/mm3      RBC 4.79 10*6/mm3      Hemoglobin 12.7 g/dL      Hematocrit 40.8 %      MCV 85.2 fL      MCH 26.5 pg      MCHC 31.1 g/dL      RDW 14.1 %      RDW-SD 44.2 fl      MPV 13.2 fL      Platelets 134 10*3/mm3     Manual Differential [[id number]]  (Abnormal) Collected:  10/05/19 0520    Specimen:  Blood Updated:  10/05/19 0810     Neutrophil % 86.0 %      Lymphocyte % 8.0 %      Monocyte % 4.0 %      Eosinophil % 1.0 %      Atypical Lymphocyte % 1.0 %      Neutrophils Absolute 8.14 10*3/mm3      Lymphocytes Absolute 0.76 10*3/mm3      Monocytes Absolute 0.38 10*3/mm3      Eosinophils Absolute 0.09 10*3/mm3      Anisocytosis Slight/1+     Cameron Cells Mod/2+     Microcytes Slight/1+     Ovalocytes Mod/2+     Poikilocytes Mod/2+     Polychromasia Mod/2+     WBC Morphology Normal     Giant Platelets Slight/1+    Blood Culture - Blood, Arm, Right [[id number]] Collected:  10/04/19 0625    Specimen:  Blood from Arm, Right Updated:  10/05/19 0800     Blood Culture No growth at 24 hours    Basic Metabolic Panel [[id number]]  (Abnormal) Collected:  10/05/19 0520    Specimen:  Blood Updated:  10/05/19 0735     Glucose 53 mg/dL      BUN 13 mg/dL      Creatinine 1.03 mg/dL      Sodium 134 mmol/L      Potassium 4.0 mmol/L      Chloride 96 mmol/L      CO2 21.1 mmol/L      Calcium 8.8 mg/dL      eGFR Non African Amer 55 mL/min/1.73      BUN/Creatinine Ratio 12.6     Anion Gap 16.9 mmol/L     Narrative:       GFR Normal >60  Chronic Kidney Disease <60  Kidney Failure <15    Blood Culture - Blood, Arm, Left [[id number]]  (Abnormal) Collected:  10/04/19 0624    Specimen:  Blood from Arm, Left Updated:  10/05/19 0619     Blood Culture Escherichia coli     Isolated from Aerobic and Anaerobic Bottles     Gram Stain Aerobic  Bottle Gram negative bacilli      Anaerobic Bottle Gram negative bacilli    Blood Culture ID, PCR - Blood, Arm, Left [[id number]]  (Abnormal) Collected:  10/04/19 0624    Specimen:  Blood from Arm, Left Updated:  10/05/19 0048     BCID, PCR Escherichia coli. Identification by BCID PCR.    MRSA Screen, PCR - Swab, Nares [[id number]]  (Normal) Collected:  10/04/19 1652    Specimen:  Swab from Nares Updated:  10/04/19 1853     MRSA PCR No MRSA Detected    Respiratory Panel, PCR - Swab, Nasopharynx [[id number]]  (Normal) Collected:  10/04/19 1652    Specimen:  Swab from Nasopharynx Updated:  10/04/19 1853     ADENOVIRUS, PCR Not Detected     Coronavirus 229E Not Detected     Coronavirus HKU1 Not Detected     Coronavirus NL63 Not Detected     Coronavirus OC43 Not Detected     Human Metapneumovirus Not Detected     Human Rhinovirus/Enterovirus Not Detected     Influenza B PCR Not Detected     Parainfluenza Virus 1 Not Detected     Parainfluenza Virus 2 Not Detected     Parainfluenza Virus 3 Not Detected     Parainfluenza Virus 4 Not Detected     Bordetella pertussis pcr Not Detected     Influenza A H1 2009 PCR Not Detected     Chlamydophila pneumoniae PCR Not Detected     Mycoplasma pneumo by PCR Not Detected     Influenza A PCR Not Detected     Influenza A H3 Not Detected     Influenza A H1 Not Detected     RSV, PCR Not Detected     Bordetella parapertussis PCR Not Detected    Comprehensive Metabolic Panel [[id number]]  (Abnormal) Collected:  10/04/19 0623    Specimen:  Blood Updated:  10/04/19 0818     Glucose 138 mg/dL      BUN 19 mg/dL      Creatinine 1.21 mg/dL      Sodium 142 mmol/L      Potassium 3.9 mmol/L      Chloride 103 mmol/L      CO2 27.9 mmol/L      Calcium 8.8 mg/dL      Total Protein 6.9 g/dL      Albumin 3.80 g/dL      ALT (SGPT) 136 U/L      AST (SGOT) 71 U/L      Alkaline Phosphatase 194 U/L      Total Bilirubin 0.3 mg/dL      eGFR Non African Amer 46 mL/min/1.73      Globulin 3.1 gm/dL      A/G Ratio 1.2  g/dL      BUN/Creatinine Ratio 15.7     Anion Gap 11.1 mmol/L     Narrative:       GFR Normal >60  Chronic Kidney Disease <60  Kidney Failure <15    Lactic Acid, Plasma [[id number]]  (Normal) Collected:  10/04/19 0623    Specimen:  Blood Updated:  10/04/19 0802     Lactate 1.4 mmol/L     [facility] [[id number]] Collected:  10/04/19 0623    Specimen:  Blood Updated:  10/04/19 0800    Narrative:       The following orders were created for panel order [facility].  Procedure                               Abnormality         Status                     ---------                               -----------         ------                     Light Blue Top[[id number]]                                   Final result               Green Top (Gel)[[id number]]                                  Final result               Lavender Top[[id number]]                                     Final result               Gold Top - SST[[id number]]                                   Final result                 Please view results for these tests on the individual orders.    Light Blue Top [[id number]] Collected:  10/04/19 0623    Specimen:  Blood Updated:  10/04/19 0800     Extra Tube hold for add-on     Comment: Auto resulted       Green Top (Gel) [[id number]] Collected:  10/04/19 0623    Specimen:  Blood Updated:  10/04/19 0800     Extra Tube Hold for add-ons.     Comment: Auto resulted.       Lavender Top [[id number]] Collected:  10/04/19 0623    Specimen:  Blood Updated:  10/04/19 0800     Extra Tube hold for add-on     Comment: Auto resulted       Gold Top - SST [[id number]] Collected:  10/04/19 0623    Specimen:  Blood Updated:  10/04/19 0800     Extra Tube Hold for add-ons.     Comment: Auto resulted.       CBC & Differential [[id number]] Collected:  10/04/19 0623    Specimen:  Blood Updated:  10/04/19 0756    Narrative:       The following orders were created for panel order CBC & Differential.  Procedure                               Abnormality          Status                     ---------                               -----------         ------                     CBC Auto Differential[[id number]]        Abnormal            Final result                 Please view results for these tests on the individual orders.    CBC Auto Differential [[id number]]  (Abnormal) Collected:  10/04/19 0623    Specimen:  Blood Updated:  10/04/19 0756     WBC 12.58 10*3/mm3      RBC 4.60 10*6/mm3      Hemoglobin 12.0 g/dL      Hematocrit 39.1 %      MCV 85.0 fL      MCH 26.1 pg      MCHC 30.7 g/dL      RDW 14.1 %      RDW-SD 44.0 fl      MPV 12.5 fL      Platelets 187 10*3/mm3      Neutrophil % 82.7 %      Lymphocyte % 6.8 %      Monocyte % 8.7 %      Eosinophil % 0.8 %      Basophil % 0.3 %      Immature Grans % 0.7 %      Neutrophils, Absolute 10.40 10*3/mm3      Lymphocytes, Absolute 0.85 10*3/mm3      Monocytes, Absolute 1.10 10*3/mm3      Eosinophils, Absolute 0.10 10*3/mm3      Basophils, Absolute 0.04 10*3/mm3      Immature Grans, Absolute 0.09 10*3/mm3      nRBC 0.0 /100 WBC         Data Review:  Results from last 7 days   Lab Units 10/06/19  0529 10/05/19  0520 10/04/19  0623   SODIUM mmol/L 144 134* 142   POTASSIUM mmol/L 4.5 4.0 3.9   CHLORIDE mmol/L 104 96* 103   CO2 mmol/L 29.2* 21.1* 27.9   BUN mg/dL 17 13 19   CREATININE mg/dL 1.11* 1.03* 1.21*   GLUCOSE mg/dL 106* 53* 138*   CALCIUM mg/dL 8.7 8.8 8.8     Results from last 7 days   Lab Units 10/06/19  0529 10/05/19  0520 10/04/19  0623   WBC 10*3/mm3 5.61 9.46 12.58*   HEMOGLOBIN g/dL 11.2* 12.7 12.0   HEMATOCRIT % 37.1 40.8 39.1   PLATELETS 10*3/mm3 162 134* 187             Lab Results   Lab Value Date/Time    TROPONINT 0.045 (C) 09/22/2019 1211    TROPONINT 0.034 (C) 09/22/2019 0604    TROPONINT 0.031 (C) 09/22/2019 0130    TROPONINT 0.036 (C) 09/21/2019 2248    TROPONINT 0.050 (H) 12/29/2018 0409    TROPONINT 0.085 (H) 12/24/2018 1900    TROPONINT 0.100 (C) 12/24/2018 1203    TROPONINT <0.01 03/26/2014 1533          Results from last 7 days   Lab Units 10/04/19  0623   ALK PHOS U/L 194*   BILIRUBIN mg/dL 0.3   ALT (SGPT) U/L 136*   AST (SGOT) U/L 71*             No results found for: POCGLU        Past Medical History:   Diagnosis Date   • Acute congestive heart failure (CMS/HCC) 12/24/2018   • Acute osteomyelitis (CMS/[facility])     Right shoulder due to IVDA   • Acute renal failure on dialysis (CMS/[facility])    • Anxiety    • Pugh esophagus    • CKD (chronic kidney disease)    • COPD (chronic obstructive pulmonary disease) (CMS/[facility])    • MRSA infection    • Nontraumatic subarachnoid hemorrhage (CMS/[facility])    • Seizures (CMS/[facility])    • Tracheostomy present (CMS/[facility])        Assessment:  Active Hospital Problems    Diagnosis  POA   • **Healthcare associated bacterial pneumonia [J15.9]  Yes   • Dyspnea [R06.00]  Yes   • Tracheostomy present (CMS/HCC) [Z93.0]  Not Applicable   • Essential hypertension [I10]  Yes   • Dysphagia [R13.10]  Yes   • COPD (chronic obstructive pulmonary disease) (CMS/[facility]) [J44.9]  Yes   • Respiratory failure, chronic (CMS/[facility]) [J96.10]  Yes   • PEG (percutaneous endoscopic gastrostomy) status (CMS/HCC) [Z93.1]  Not Applicable   • Chronic diastolic CHF (congestive heart failure) (CMS/[facility]) [I50.32]  Yes   • Peripheral artery disease (CMS/[facility]) [I73.9]  Yes   • Stage 3 chronic kidney disease (CMS/[facility]) [N18.3]  Yes      Resolved Hospital Problems   No resolved problems to display.       Plan:  Continue with IV antibiotics and await results of cultures.  We will get some follow-up laboratory studies.  Continue with bronchodilator treatments.    True Moreno MD  10/6/2019  3:12 PM

## 2019-10-06 NOTE — PLAN OF CARE
Problem: Patient Care Overview  Goal: Plan of Care Review   10/06/19 0926   Plan of Care Review   Progress improving   OTHER   Outcome Summary Pt. able to ambulate 35 feet, CGA x 1, with use of Rwx this date. Pt. requires CGA x1 for bed mobility and for sit <-> stand transfers. Verbal/tactile cues given during ambulation for posture correction. x 1 seated rest required during gait. BLE Standing ther. ex. program completed for general strengthening.    Coping/Psychosocial   Plan of Care Reviewed With patient

## 2019-10-07 LAB
ANION GAP SERPL CALCULATED.3IONS-SCNC: 11.7 MMOL/L (ref 5–15)
BASOPHILS # BLD AUTO: 0.03 10*3/MM3 (ref 0–0.2)
BASOPHILS NFR BLD AUTO: 0.5 % (ref 0–1.5)
BUN BLD-MCNC: 15 MG/DL (ref 6–20)
BUN/CREAT SERPL: 14.9 (ref 7–25)
CALCIUM SPEC-SCNC: 8.8 MG/DL (ref 8.6–10.5)
CHLORIDE SERPL-SCNC: 102 MMOL/L (ref 98–107)
CO2 SERPL-SCNC: 26.3 MMOL/L (ref 22–29)
CREAT BLD-MCNC: 1.01 MG/DL (ref 0.57–1)
DEPRECATED RDW RBC AUTO: 43.1 FL (ref 37–54)
EOSINOPHIL # BLD AUTO: 0.2 10*3/MM3 (ref 0–0.4)
EOSINOPHIL NFR BLD AUTO: 3.3 % (ref 0.3–6.2)
ERYTHROCYTE [DISTWIDTH] IN BLOOD BY AUTOMATED COUNT: 14 % (ref 12.3–15.4)
GFR SERPL CREATININE-BSD FRML MDRD: 56 ML/MIN/1.73
GLUCOSE BLD-MCNC: 107 MG/DL (ref 65–99)
HCT VFR BLD AUTO: 37.7 % (ref 34–46.6)
HGB BLD-MCNC: 11.7 G/DL (ref 12–15.9)
LYMPHOCYTES # BLD AUTO: 1.26 10*3/MM3 (ref 0.7–3.1)
LYMPHOCYTES NFR BLD AUTO: 20.8 % (ref 19.6–45.3)
MCH RBC QN AUTO: 26.4 PG (ref 26.6–33)
MCHC RBC AUTO-ENTMCNC: 31 G/DL (ref 31.5–35.7)
MCV RBC AUTO: 84.9 FL (ref 79–97)
MONOCYTES # BLD AUTO: 0.62 10*3/MM3 (ref 0.1–0.9)
MONOCYTES NFR BLD AUTO: 10.2 % (ref 5–12)
NEUTROPHILS # BLD AUTO: 3.89 10*3/MM3 (ref 1.7–7)
NEUTROPHILS NFR BLD AUTO: 64.4 % (ref 42.7–76)
PLATELET # BLD AUTO: 138 10*3/MM3 (ref 140–450)
PMV BLD AUTO: 13 FL (ref 6–12)
POTASSIUM BLD-SCNC: 4.1 MMOL/L (ref 3.5–5.2)
RBC # BLD AUTO: 4.44 10*6/MM3 (ref 3.77–5.28)
SODIUM BLD-SCNC: 140 MMOL/L (ref 136–145)
WBC NRBC COR # BLD: 6.05 10*3/MM3 (ref 3.4–10.8)

## 2019-10-07 PROCEDURE — 97110 THERAPEUTIC EXERCISES: CPT

## 2019-10-07 PROCEDURE — 94799 UNLISTED PULMONARY SVC/PX: CPT

## 2019-10-07 PROCEDURE — 80048 BASIC METABOLIC PNL TOTAL CA: CPT | Performed by: HOSPITALIST

## 2019-10-07 PROCEDURE — 25010000002 ERTAPENEM PER 500 MG: Performed by: HOSPITALIST

## 2019-10-07 PROCEDURE — 85025 COMPLETE CBC W/AUTO DIFF WBC: CPT | Performed by: HOSPITALIST

## 2019-10-07 RX ORDER — AMLODIPINE BESYLATE 5 MG/1
5 TABLET ORAL
Status: DISCONTINUED | OUTPATIENT
Start: 2019-10-07 | End: 2019-10-14 | Stop reason: HOSPADM

## 2019-10-07 RX ADMIN — OXYCODONE HYDROCHLORIDE AND ACETAMINOPHEN 1 TABLET: 5; 325 TABLET ORAL at 08:25

## 2019-10-07 RX ADMIN — ERTAPENEM SODIUM 1 G: 1 INJECTION, POWDER, LYOPHILIZED, FOR SOLUTION INTRAMUSCULAR; INTRAVENOUS at 11:38

## 2019-10-07 RX ADMIN — AMLODIPINE BESYLATE 5 MG: 5 TABLET ORAL at 17:46

## 2019-10-07 RX ADMIN — GABAPENTIN 300 MG: 300 CAPSULE ORAL at 20:27

## 2019-10-07 RX ADMIN — Medication 100 MG: at 08:09

## 2019-10-07 RX ADMIN — SODIUM CHLORIDE, PRESERVATIVE FREE 10 ML: 5 INJECTION INTRAVENOUS at 08:09

## 2019-10-07 RX ADMIN — SODIUM CHLORIDE, PRESERVATIVE FREE 10 ML: 5 INJECTION INTRAVENOUS at 20:27

## 2019-10-07 RX ADMIN — BUSPIRONE HYDROCHLORIDE 5 MG: 5 TABLET ORAL at 20:26

## 2019-10-07 RX ADMIN — BUSPIRONE HYDROCHLORIDE 5 MG: 5 TABLET ORAL at 17:05

## 2019-10-07 RX ADMIN — PRAMIPEXOLE DIHYDROCHLORIDE 0.25 MG: 0.25 TABLET ORAL at 20:27

## 2019-10-07 RX ADMIN — GABAPENTIN 300 MG: 300 CAPSULE ORAL at 08:09

## 2019-10-07 RX ADMIN — OXYCODONE HYDROCHLORIDE AND ACETAMINOPHEN 1 TABLET: 5; 325 TABLET ORAL at 17:05

## 2019-10-07 RX ADMIN — IPRATROPIUM BROMIDE AND ALBUTEROL SULFATE 3 ML: 2.5; .5 SOLUTION RESPIRATORY (INHALATION) at 12:57

## 2019-10-07 RX ADMIN — BUSPIRONE HYDROCHLORIDE 5 MG: 5 TABLET ORAL at 08:09

## 2019-10-07 RX ADMIN — ESCITALOPRAM 20 MG: 20 TABLET, FILM COATED ORAL at 08:09

## 2019-10-07 RX ADMIN — METOPROLOL SUCCINATE 25 MG: 25 TABLET, FILM COATED, EXTENDED RELEASE ORAL at 08:09

## 2019-10-07 RX ADMIN — GABAPENTIN 300 MG: 300 CAPSULE ORAL at 17:05

## 2019-10-07 NOTE — PROGRESS NOTES
Discharge Planning Assessment  [facility]     Patient Name: Swetha Luis  MRN: [id number]  Today's Date: 10/7/2019    Admit Date: 10/4/2019    Discharge Needs Assessment     Row Name 10/07/19 1136       Living Environment    Lives With  spouse    Current Living Arrangements  home/apartment/condo    Primary Care Provided by  self;spouse/significant other    Provides Primary Care For  no one    Family Caregiver if Needed  spouse    Family Caregiver Names  Cristopher     Quality of Family Relationships  involved;helpful    Able to Return to Prior Arrangements  yes       Resource/Environmental Concerns    Resource/Environmental Concerns  none       Transition Planning    Patient/Family Anticipates Transition to  inpatient rehabilitation facility    Patient/Family Anticipated Services at Transition  none       Discharge Needs Assessment    Readmission Within the Last 30 Days  previous discharge plan unsuccessful    Concerns to be Addressed  basic needs;discharge planning    Equipment Currently Used at Home  wheelchair;walker, rolling;trach supplies;oxygen;hospital bed;grab bar    Outpatient/Agency/Support Group Needs  skilled nursing facility    Discharge Facility/Level of Care Needs  nursing facility, skilled    Offered/Gave Vendor List  yes    Patient's Choice of Community Agency(s)  [facility]    Discharge Coordination/Progress  SNF        Discharge Plan     Row Name 10/07/19 1130       Plan    Plan  Referral to [facility]    Patient/Family in Agreement with Plan  yes pt;  left for spouse    Plan Comments  Spoke with pt bedside. Confirmed facesheet correct. Explained role of CCP. Pt reports she lives with her spouse. They recently moved to [address] and no longer at the address on facesheet. She reports she has a hospital bed, home O2 from [place], trach supplies, and walker. Pt reports her spouse assist her with ADLs. Pt uses [facility]. Pt has been to [facility] and used [facility] in past. Pt will need IV  antibiotics and is agreeable for SNF. She reports she would like referral to [facility] and asked that CCP to call her spouse for additional referrals, VM left with spouse. CCP to follow up with referrals. BUNNY Cheek    Row Name 10/07/19 1034       Plan    Plan  -    Plan Comments  Recieved joey from [facility], pt was current with them for services but they will not be able to accept back due to non-compliance and refusal of spouse to perform teach back regarding trach care. Therefore,  no skilled need for Patient. CCP spoke with [facility] about PT as pt only walking 20 feet. [facility] states unable to accept for PT as several attempts of appointments and not able to start treatment. sergio rodriguez/ccp        Destination      Service Provider Request Status Selected Services Address Phone Number Fax Number    [facility] Pending - Request Sent N/A [address] [address] [phone]      Durable Medical Equipment      No service coordination in this encounter.      Dialysis/Infusion      No service coordination in this encounter.      Home Medical Care      No service coordination in this encounter.      Therapy      No service coordination in this encounter.      Community Resources      No service coordination in this encounter.          Demographic Summary     Row Name 10/07/19 1130       General Information    Admission Type  inpatient    Arrived From  home    Required Notices Provided  --    Reason for Consult  discharge planning    Preferred Language  English     Used During This Interaction  no       Contact Information    Permission Granted to Share Info With  facility ;family/designee        Functional Status     Row Name 10/07/19 1136       Functional Status    Usual Activity Tolerance  fair    Current Activity Tolerance  poor       Functional Status, IADL    Medications  assistive person    Meal Preparation  assistive person     Housekeeping  completely dependent    Laundry  completely dependent    Shopping  completely dependent;assistive person       Mental Status    General Appearance WDL  WDL       Mental Status Summary    Recent Changes in Mental Status/Cognitive Functioning  no changes        Psychosocial    No documentation.       Abuse/Neglect    No documentation.       Legal    No documentation.       Substance Abuse    No documentation.       Patient Forms     Row Name 10/07/19 1138       Patient Forms    Provider Choice List  Delivered    Delivered to  Patient    Method of delivery  In person            RICH Joyner

## 2019-10-07 NOTE — PLAN OF CARE
Problem: Patient Care Overview  Goal: Plan of Care Review   10/07/19 0822   OTHER   Outcome Summary [facility]SS canceled due to patient needing trach care upon arrival of transport. Exam to be re-scheduled for 10/8.   Coping/Psychosocial   Plan of Care Reviewed With patient

## 2019-10-07 NOTE — PLAN OF CARE
Problem: Patient Care Overview  Goal: Plan of Care Review  Outcome: Ongoing (interventions implemented as appropriate)   10/07/19 8899   Plan of Care Review   Progress no change   OTHER   Outcome Summary bp high md started norvasc, trach care done at 1800, rescheduled video swallow for tomorrow, consulted ID Malcolm, will cont to monitor   Coping/Psychosocial   Plan of Care Reviewed With patient     Goal: Individualization and Mutuality  Outcome: Ongoing (interventions implemented as appropriate)    Goal: Discharge Needs Assessment  Outcome: Ongoing (interventions implemented as appropriate)    Goal: Interprofessional Rounds/Family Conf  Outcome: Ongoing (interventions implemented as appropriate)      Problem: Pain, Chronic (Adult)  Goal: Identify Related Risk Factors and Signs and Symptoms  Outcome: Ongoing (interventions implemented as appropriate)    Goal: Acceptable Pain/Comfort Level and Functional Ability  Outcome: Ongoing (interventions implemented as appropriate)

## 2019-10-07 NOTE — PLAN OF CARE
Problem: Patient Care Overview  Goal: Plan of Care Review   10/07/19 1034   Plan of Care Review   Progress improving   OTHER   Outcome Summary Improved tolerance to functional activity this date with an increase in gait distance and progression of ther. ex. program. Pt. able to ambulate 50 feet, CGA x 1, with use of Rwx this date. Pt. requires CGA x 1 for bed mobility and for sit <-> stand transfers. Verbal/tactile cues for posture correction during ambulation. BLE Standing ther. ex. program x 10 reps completed for general strenghthening.    Coping/Psychosocial   Plan of Care Reviewed With patient

## 2019-10-07 NOTE — PROGRESS NOTES
"DAILY PROGRESS NOTE  [facility]    Patient Identification:  Name: Swetha Luis  Age: 59 y.o.  Sex: female  :  1960  MRN: [id number]         Primary Care Physician: Provider, No Known    Subjective:  Interval History: She complains of cough and shortness of air.  Better today    Objective:    Scheduled Meds:    busPIRone 5 mg Oral TID   ertapenem 1 g Intravenous Q24H   escitalopram 20 mg Oral Daily   gabapentin 300 mg Oral TID   influenza vaccine 0.5 mL Intramuscular Once   metoprolol succinate XL 25 mg Oral Daily   pramipexole 0.25 mg Oral Nightly   sodium chloride 10 mL Intravenous Q12H   thiamine 100 mg Oral Daily     Continuous Infusions:     Vital signs in last 24 hours:  Temp:  [98.2 °F (36.8 °C)-99.5 °F (37.5 °C)] 98.9 °F (37.2 °C)  Heart Rate:  [74-87] 87  Resp:  [18-20] 20  BP: (128-151)/(93-96) 128/93    Intake/Output:    Intake/Output Summary (Last 24 hours) at 10/7/2019 1152  Last data filed at 10/6/2019 1450  Gross per 24 hour   Intake 118 ml   Output --   Net 118 ml       Exam:  /93 (BP Location: Right arm, Patient Position: Lying)   Pulse 87   Temp 98.9 °F (37.2 °C) (Oral)   Resp 20   Ht 167.6 cm (65.98\")   Wt 74 kg (163 lb 2.3 oz)   SpO2 99%   BMI 26.34 kg/m²     General Appearance:    Alert, cooperative, no distress   Head:    Normocephalic, without obvious abnormality, atraumatic   Eyes:       Throat:   Lips, tongue, gums normal   Neck:   Supple, symmetrical, trachea midline, no JVD, tracheostomy in place   Lungs:    Rhonchi and wheezes bilaterally, respirations unlabored   Chest Wall:    No tenderness or deformity    Heart:    Regular rate and rhythm, S1 and S2 normal, no murmur,no  Rub or gallop   Abdomen:     Soft, non-tender, bowel sounds active, no masses, no organomegaly    Extremities:   Extremities normal, atraumatic, no cyanosis or edema   Pulses:      Skin:   Skin is warm and dry,  no rashes or palpable lesions   Neurologic:   no focal deficits noted    "   Lab Results (last 72 hours)     Procedure Component Value Units Date/Time    CBC Auto Differential [[id number]]  (Abnormal) Collected:  10/05/19 0520    Specimen:  Blood Updated:  10/05/19 0810     WBC 9.46 10*3/mm3      RBC 4.79 10*6/mm3      Hemoglobin 12.7 g/dL      Hematocrit 40.8 %      MCV 85.2 fL      MCH 26.5 pg      MCHC 31.1 g/dL      RDW 14.1 %      RDW-SD 44.2 fl      MPV 13.2 fL      Platelets 134 10*3/mm3     Manual Differential [[id number]]  (Abnormal) Collected:  10/05/19 0520    Specimen:  Blood Updated:  10/05/19 0810     Neutrophil % 86.0 %      Lymphocyte % 8.0 %      Monocyte % 4.0 %      Eosinophil % 1.0 %      Atypical Lymphocyte % 1.0 %      Neutrophils Absolute 8.14 10*3/mm3      Lymphocytes Absolute 0.76 10*3/mm3      Monocytes Absolute 0.38 10*3/mm3      Eosinophils Absolute 0.09 10*3/mm3      Anisocytosis Slight/1+     [place] Cells Mod/2+     Microcytes Slight/1+     Ovalocytes Mod/2+     Poikilocytes Mod/2+     Polychromasia Mod/2+     WBC Morphology Normal     Giant Platelets Slight/1+    Blood Culture - Blood, Arm, Right [[id number]] Collected:  10/04/19 0625    Specimen:  Blood from Arm, Right Updated:  10/05/19 0800     Blood Culture No growth at 24 hours    Basic Metabolic Panel [[id number]]  (Abnormal) Collected:  10/05/19 0520    Specimen:  Blood Updated:  10/05/19 0735     Glucose 53 mg/dL      BUN 13 mg/dL      Creatinine 1.03 mg/dL      Sodium 134 mmol/L      Potassium 4.0 mmol/L      Chloride 96 mmol/L      CO2 21.1 mmol/L      Calcium 8.8 mg/dL      eGFR Non African Amer 55 mL/min/1.73      BUN/Creatinine Ratio 12.6     Anion Gap 16.9 mmol/L     Narrative:       GFR Normal >60  Chronic Kidney Disease <60  Kidney Failure <15    Blood Culture - Blood, Arm, Left [[id number]]  (Abnormal) Collected:  10/04/19 0624    Specimen:  Blood from Arm, Left Updated:  10/05/19 0619     Blood Culture Escherichia coli     Isolated from Aerobic and Anaerobic Bottles     Gram Stain Aerobic Bottle  Gram negative bacilli      Anaerobic Bottle Gram negative bacilli    Blood Culture ID, PCR - Blood, Arm, Left [[id number]]  (Abnormal) Collected:  10/04/19 0624    Specimen:  Blood from Arm, Left Updated:  10/05/19 0048     BCID, PCR Escherichia coli. Identification by BCID PCR.    MRSA Screen, PCR - Swab, Nares [[id number]]  (Normal) Collected:  10/04/19 1652    Specimen:  Swab from Nares Updated:  10/04/19 1853     MRSA PCR No MRSA Detected    Respiratory Panel, PCR - Swab, Nasopharynx [[id number]]  (Normal) Collected:  10/04/19 1652    Specimen:  Swab from Nasopharynx Updated:  10/04/19 1853     ADENOVIRUS, PCR Not Detected     Coronavirus 229E Not Detected     Coronavirus HKU1 Not Detected     Coronavirus NL63 Not Detected     Coronavirus OC43 Not Detected     Human Metapneumovirus Not Detected     Human Rhinovirus/Enterovirus Not Detected     Influenza B PCR Not Detected     Parainfluenza Virus 1 Not Detected     Parainfluenza Virus 2 Not Detected     Parainfluenza Virus 3 Not Detected     Parainfluenza Virus 4 Not Detected     Bordetella pertussis pcr Not Detected     Influenza A H1 2009 PCR Not Detected     Chlamydophila pneumoniae PCR Not Detected     Mycoplasma pneumo by PCR Not Detected     Influenza A PCR Not Detected     Influenza A H3 Not Detected     Influenza A H1 Not Detected     RSV, PCR Not Detected     Bordetella parapertussis PCR Not Detected    Comprehensive Metabolic Panel [[id number]]  (Abnormal) Collected:  10/04/19 0623    Specimen:  Blood Updated:  10/04/19 0818     Glucose 138 mg/dL      BUN 19 mg/dL      Creatinine 1.21 mg/dL      Sodium 142 mmol/L      Potassium 3.9 mmol/L      Chloride 103 mmol/L      CO2 27.9 mmol/L      Calcium 8.8 mg/dL      Total Protein 6.9 g/dL      Albumin 3.80 g/dL      ALT (SGPT) 136 U/L      AST (SGOT) 71 U/L      Alkaline Phosphatase 194 U/L      Total Bilirubin 0.3 mg/dL      eGFR Non African Amer 46 mL/min/1.73      Globulin 3.1 gm/dL      A/G Ratio 1.2 g/dL       BUN/Creatinine Ratio 15.7     Anion Gap 11.1 mmol/L     Narrative:       GFR Normal >60  Chronic Kidney Disease <60  Kidney Failure <15    Lactic Acid, Plasma [[id number]]  (Normal) Collected:  10/04/19 0623    Specimen:  Blood Updated:  10/04/19 0802     Lactate 1.4 mmol/L     [facility] Draw [[id number]] Collected:  10/04/19 0623    Specimen:  Blood Updated:  10/04/19 0800    Narrative:       The following orders were created for panel order [facility] Draw.  Procedure                               Abnormality         Status                     ---------                               -----------         ------                     Light Blue Top[[id number]]                                   Final result               Green Top (Gel)[[id number]]                                  Final result               Lavender Top[[id number]]                                     Final result               Gold Top - SST[[id number]]                                   Final result                 Please view results for these tests on the individual orders.    Light Blue Top [[id number]] Collected:  10/04/19 0623    Specimen:  Blood Updated:  10/04/19 0800     Extra Tube hold for add-on     Comment: Auto resulted       Green Top (Gel) [[id number]] Collected:  10/04/19 0623    Specimen:  Blood Updated:  10/04/19 0800     Extra Tube Hold for add-ons.     Comment: Auto resulted.       Lavender Top [[id number]] Collected:  10/04/19 0623    Specimen:  Blood Updated:  10/04/19 0800     Extra Tube hold for add-on     Comment: Auto resulted       Gold Top - SST [[id number]] Collected:  10/04/19 0623    Specimen:  Blood Updated:  10/04/19 0800     Extra Tube Hold for add-ons.     Comment: Auto resulted.       CBC & Differential [[id number]] Collected:  10/04/19 0623    Specimen:  Blood Updated:  10/04/19 0756    Narrative:       The following orders were created for panel order CBC & Differential.  Procedure                               Abnormality          Status                     ---------                               -----------         ------                     CBC Auto Differential[[id number]]        Abnormal            Final result                 Please view results for these tests on the individual orders.    CBC Auto Differential [[id number]]  (Abnormal) Collected:  10/04/19 0623    Specimen:  Blood Updated:  10/04/19 0756     WBC 12.58 10*3/mm3      RBC 4.60 10*6/mm3      Hemoglobin 12.0 g/dL      Hematocrit 39.1 %      MCV 85.0 fL      MCH 26.1 pg      MCHC 30.7 g/dL      RDW 14.1 %      RDW-SD 44.0 fl      MPV 12.5 fL      Platelets 187 10*3/mm3      Neutrophil % 82.7 %      Lymphocyte % 6.8 %      Monocyte % 8.7 %      Eosinophil % 0.8 %      Basophil % 0.3 %      Immature Grans % 0.7 %      Neutrophils, Absolute 10.40 10*3/mm3      Lymphocytes, Absolute 0.85 10*3/mm3      Monocytes, Absolute 1.10 10*3/mm3      Eosinophils, Absolute 0.10 10*3/mm3      Basophils, Absolute 0.04 10*3/mm3      Immature Grans, Absolute 0.09 10*3/mm3      nRBC 0.0 /100 WBC         Data Review:  Results from last 7 days   Lab Units 10/07/19  0447 10/06/19  0529 10/05/19  0520   SODIUM mmol/L 140 144 134*   POTASSIUM mmol/L 4.1 4.5 4.0   CHLORIDE mmol/L 102 104 96*   CO2 mmol/L 26.3 29.2* 21.1*   BUN mg/dL 15 17 13   CREATININE mg/dL 1.01* 1.11* 1.03*   GLUCOSE mg/dL 107* 106* 53*   CALCIUM mg/dL 8.8 8.7 8.8     Results from last 7 days   Lab Units 10/07/19  0447 10/06/19  0529 10/05/19  0520   WBC 10*3/mm3 6.05 5.61 9.46   HEMOGLOBIN g/dL 11.7* 11.2* 12.7   HEMATOCRIT % 37.7 37.1 40.8   PLATELETS 10*3/mm3 138* 162 134*             Lab Results   Lab Value Date/Time    TROPONINT 0.045 (C) 09/22/2019 1211    TROPONINT 0.034 (C) 09/22/2019 0604    TROPONINT 0.031 (C) 09/22/2019 0130    TROPONINT 0.036 (C) 09/21/2019 2248    TROPONINT 0.050 (H) 12/29/2018 0409    TROPONINT 0.085 (H) 12/24/2018 1900    TROPONINT 0.100 (C) 12/24/2018 1203    TROPONINT <0.01 03/26/2014 2512          Results from last 7 days   Lab Units 10/04/19  0623   ALK PHOS U/L 194*   BILIRUBIN mg/dL 0.3   ALT (SGPT) U/L 136*   AST (SGOT) U/L 71*             No results found for: POCGLU        Past Medical History:   Diagnosis Date   • Acute congestive heart failure (CMS/HCC) 12/24/2018   • Acute osteomyelitis (CMS/[facility])     Right shoulder due to IVDA   • Acute renal failure on dialysis (CMS/[facility])    • Anxiety    • Pugh esophagus    • CKD (chronic kidney disease)    • COPD (chronic obstructive pulmonary disease) (CMS/[facility])    • MRSA infection    • Nontraumatic subarachnoid hemorrhage (CMS/[facility])    • Seizures (CMS/[facility])    • Tracheostomy present (CMS/[facility])        Assessment:  Active Hospital Problems    Diagnosis  POA   • **Healthcare associated bacterial pneumonia [J15.9]  Yes   • Infection due to ESBL-producing Escherichia coli [A49.8, Z16.12]  Unknown   • Sepsis due to Gram negative bacteria (CMS/[facility]) [A41.50]  Unknown   • Dyspnea [R06.00]  Yes   • Tracheostomy present (CMS/[facility]) [Z93.0]  Not Applicable   • Essential hypertension [I10]  Yes   • Dysphagia [R13.10]  Yes   • COPD (chronic obstructive pulmonary disease) (CMS/[facility]) [J44.9]  Yes   • Respiratory failure, chronic (CMS/[facility]) [J96.10]  Yes   • PEG (percutaneous endoscopic gastrostomy) status (CMS/HCC) [Z93.1]  Not Applicable   • Chronic diastolic CHF (congestive heart failure) (CMS/[facility]) [I50.32]  Yes   • Peripheral artery disease (CMS/[facility]) [I73.9]  Yes   • Stage 3 chronic kidney disease (CMS/[facility]) [N18.3]  Yes      Resolved Hospital Problems   No resolved problems to display.       Plan:  Continue with IV antibiotics and await results of cultures.  We will get some follow-up laboratory studies.  Continue with bronchodilator treatments.  CT abdomen and pelvis. ID consult.    True Moreno MD  10/7/2019  11:52 AM

## 2019-10-07 NOTE — PROGRESS NOTES
Continued Stay Note  [facility]     Patient Name: Swetha Luis  MRN: [id number]  Today's Date: 10/7/2019    Admit Date: 10/4/2019    Discharge Plan     Row Name 10/07/19 1034       Plan    Plan  -    Plan Comments  Recieved vm from [facility], pt was current with them for services but they will not be able to accept back due to non-compliance and refusal of spouse to perform teach back regarding trach care. Therefore,  no skilled need for Patient. CCP spoke with [facility] about PT as pt only walking 20 feet. [facility] states unable to accept for PT as several attempts of appointments and not able to start treatment. sergio rodriguez/ccp        Discharge Codes    No documentation.             Noelle Juarez, RN

## 2019-10-07 NOTE — PLAN OF CARE
Problem: Patient Care Overview  Goal: Plan of Care Review  Outcome: Ongoing (interventions implemented as appropriate)   10/07/19 0602   Plan of Care Review   Progress no change   OTHER   Outcome Summary VSS,pt A&Ox4. Pt non-cmpliant with puree/nectar think diet, found eating [employer] cheeseburger and cheetos throughout the night. Re-educated on the importance of following diet guidelines to prevent aspiration PNA. Pt states she wants to eat what she wants. Several incontinent episodes. Will continue to monitor closely and await further orders   Coping/Psychosocial   Plan of Care Reviewed With patient

## 2019-10-07 NOTE — DISCHARGE PLACEMENT REQUEST
"Neo Spencer (59 y.o. Female)     Date of Birth Social Security Number Address Home Phone MRN    1960  [address]  [facility] [phone] [id number]    Denominational Marital Status          None        Admission Date Admission Type Admitting Provider Attending Provider Department, Room/Bed    10/4/19 Emergency True Moreno MD Beard, Lyle E, MD [address], [address]    Discharge Date Discharge Disposition Discharge Destination                       Attending Provider:  True Moreno MD    Allergies:  Cephalexin, Hydrocodone, Strawberry, Zithromax [Azithromycin]    Isolation:  Contact   Infection:  ESBL E coli (10/06/19), MRSA (12/19/18)   Code Status:  CPR    Ht:  167.6 cm (65.98\")   Wt:  74 kg (163 lb 2.3 oz)    Admission Cmt:  None   Principal Problem:  Healthcare associated bacterial pneumonia [J15.9]                 Active Insurance as of 10/4/2019     Primary Coverage     Payor Plan Insurance Group Employer/Plan Group    [place]      Payor Plan Address Payor Plan Phone Number Payor Plan Fax Number Effective Dates    [address] [phone]  9/21/2019 - None Entered    [place] [id number]       Subscriber Name Subscriber Birth Date Member ID       NEO SPENCER 1960 [id number]                 Emergency Contacts      (Rel.) Home Phone Work Phone Mobile Phone    TJ MATT (Spouse) [phone] -- --    Shazia Spencer (Daughter) [phone] -- [phone]    Sister, \"Hattie\" (Sister) [phone] -- [phone]    Monserrat Taylor (Sister) [phone] -- [phone]    Ken Spencer (Son) -- -- [phone]          "

## 2019-10-07 NOTE — THERAPY TREATMENT NOTE
Patient Name: Swetha Luis  : 1960    MRN: [id number]                              Today's Date: 10/7/2019       Admit Date: 10/4/2019    Visit Dx:     ICD-10-CM ICD-9-CM   1. Healthcare associated bacterial pneumonia J15.9 482.9   2. Oropharyngeal dysphagia R13.12 787.22     Patient Active Problem List   Diagnosis   • Tracheostomy complication (CMS/[facility])   • Gangrene of toe (CMS/[facility])   • Acute diastolic congestive heart failure (CMS/[facility])   • ARF (acute renal failure) (CMS/[facility])   • Stage 3 chronic kidney disease (CMS/[facility])   • Elevated troponin   • Acute respiratory failure with hypoxemia (CMS/[facility])   • Acute osteomyelitis (CMS/[facility])   • UTI due to extended-spectrum beta lactamase (ESBL) producing Escherichia coli   • Thrush, oral   • Tracheostomy malfunction (CMS/[facility])   • COPD (chronic obstructive pulmonary disease) (CMS/[facility])   • Respiratory failure, chronic (CMS/[facility])   • PEG (percutaneous endoscopic gastrostomy) status (CMS/HCC)   • History of osteomyelitis   • Polysubstance abuse (CMS/[facility])   • History of tracheal stenosis   • Chronic diastolic CHF (congestive heart failure) (CMS/[facility])   • Peripheral artery disease (CMS/[facility])   • Medically noncompliant   • WENDI and COPD overlap syndrome (CMS/[facility])   • Dyspnea   • Elevated LFTs   • Elevated troponin   • Tracheostomy present (CMS/[facility])   • Essential hypertension   • Dysphagia   • Healthcare associated bacterial pneumonia   • Infection due to ESBL-producing Escherichia coli   • Sepsis due to Gram negative bacteria (CMS/[facility])     Past Medical History:   Diagnosis Date   • Acute congestive heart failure (CMS/[facility]) 2018   • Acute osteomyelitis (CMS/[facility])     Right shoulder due to IVDA   • Acute renal failure on dialysis (CMS/[facility])    • Anxiety    • Pugh esophagus    • CKD (chronic kidney disease)    • COPD (chronic obstructive pulmonary disease) (CMS/[facility])    • MRSA infection    • Nontraumatic subarachnoid hemorrhage (CMS/[facility])    • Seizures (CMS/[facility])    • Tracheostomy  "present (CMS/[facility])      Past Surgical History:   Procedure Laterality Date   • TRACHEOSTOMY       General Information     Row Name 10/07/19 1030          PT Evaluation Time/Intention    Document Type  therapy note (daily note) Upon entering room, pt. asleep but awakens easily via verbal stimuli (her name).  Pt. reports feeling \"drained\" this day but is agreeable to work with P.T.   Pt. currently with passey alvina valve in place.   -MS     Mode of Treatment  physical therapy;individual therapy  -MS     Row Name 10/07/19 1030          General Information    Existing Precautions/Restrictions  fall  (Significant)  Trach (passey alvina valve donned)  -MS     Row Name 10/07/19 1030          Safety Issues, Functional Mobility    Comment, Safety Issues/Impairments (Mobility)  Gait belt used for safety.  -MS       User Key  (r) = Recorded By, (t) = Taken By, (c) = Cosigned By    Initials Name Provider Type    MS Jesus Andrei ANAI, PT Physical Therapist        Mobility     Row Name 10/07/19 1031          Bed Mobility Assessment/Treatment    Bed Mobility Assessment/Treatment  supine-sit;sit-supine  -MS     Supine-Sit [place] (Bed Mobility)  contact guard  -MS     Sit-Supine [place] (Bed Mobility)  contact guard  -MS     Assistive Device (Bed Mobility)  bed rails  -MS     Row Name 10/07/19 1031          Sit-Stand Transfer    Sit-Stand [place] (Transfers)  contact guard  -MS     Assistive Device (Sit-Stand Transfers)  walker, front-wheeled  -MS     Row Name 10/07/19 1031          Gait/Stairs Assessment/Training    [place] Level (Gait)  contact guard  -MS     Assistive Device (Gait)  walker, front-wheeled  -MS     Distance in Feet (Gait)  50 feet  -MS     Pattern (Gait)  step-through  -MS     Deviations/Abnormal Patterns (Gait)  balaji decreased  -MS     Bilateral Gait Deviations  forward flexed posture  -MS     Comment (Gait/Stairs)  Verbal/tactile cues for posture correction.  Limited in gait distance this AM " due to fatigue, weakness.   -MS       User Key  (r) = Recorded By, (t) = Taken By, (c) = Cosigned By    Initials Name Provider Type    Andrei Castro, PT Physical Therapist        Obj/Interventions     Row Name 10/07/19 1032          Therapeutic Exercise    Comment (Therapeutic Exercise)  BLE Standing ther. ex. x 10 reps completed (Heel Raises, Mini-squats, Hip Abduction)  -MS       User Key  (r) = Recorded By, (t) = Taken By, (c) = Cosigned By    Initials Name Provider Type    Andrei Castro, PT Physical Therapist        Goals/Plan    No documentation.       Clinical Impression     Row Name 10/07/19 1033          Pain Scale: Numbers Pre/Post-Treatment    Pain Scale: Numbers, Pretreatment  0/10 - no pain  -MS     Pain Scale: Numbers, Post-Treatment  0/10 - no pain  -MS     Row Name 10/07/19 1033          Positioning and Restraints    Pre-Treatment Position  in bed  -MS     Post Treatment Position  chair  -MS     In Chair  notified nsg;reclined;sitting;call light within reach;encouraged to call for assist;with nsg  -MS       User Key  (r) = Recorded By, (t) = Taken By, (c) = Cosigned By    Initials Name Provider Type    Andrei Castro, PT Physical Therapist        Outcome Measures     Row Name 10/07/19 1035          How much help from another person do you currently need...    Turning from your back to your side while in flat bed without using bedrails?  3  -MS     Moving from lying on back to sitting on the side of a flat bed without bedrails?  3  -MS     Moving to and from a bed to a chair (including a wheelchair)?  3  -MS     Standing up from a chair using your arms (e.g., wheelchair, bedside chair)?  3  -MS     Climbing 3-5 steps with a railing?  2  -MS     To walk in hospital room?  3  -MS     AM-PAC 6 Clicks Score (PT)  17  -MS     Row Name 10/07/19 1035          Functional Assessment    Outcome Measure Options  AM-PAC 6 Clicks Basic Mobility (PT)  -MS       User Key  (r) = Recorded By, (t) =  Taken By, (c) = Cosigned By    Initials Name Provider Type    MS Andrei Lee, PT Physical Therapist        Physical Therapy Education     Title: PT OT SLP Therapies (Done)     Topic: Physical Therapy (Done)     Point: Mobility training (Done)     Learning Progress Summary           Patient Acceptance, E,D, VU,NR by MS at 10/7/2019 10:33 AM    Acceptance, E,D, VU,NR by MS at 10/6/2019  9:26 AM    Acceptance, E,D, VU,NR by MS at 10/5/2019  9:25 AM                   Point: Home exercise program (Done)     Learning Progress Summary           Patient Acceptance, E,D, VU,NR by MS at 10/7/2019 10:33 AM    Acceptance, E,D, VU,NR by MS at 10/6/2019  9:26 AM    Acceptance, E,D, VU,NR by MS at 10/5/2019  9:25 AM                   Point: Body mechanics (Done)     Learning Progress Summary           Patient Acceptance, E,D, VU,NR by MS at 10/7/2019 10:33 AM    Acceptance, E,D, VU,NR by MS at 10/6/2019  9:26 AM    Acceptance, E,D, VU,NR by MS at 10/5/2019  9:25 AM                   Point: Precautions (Done)     Learning Progress Summary           Patient Acceptance, E,D, VU,NR by MS at 10/7/2019 10:33 AM    Acceptance, E,D, VU,NR by MS at 10/6/2019  9:26 AM    Acceptance, E,D, VU,NR by MS at 10/5/2019  9:25 AM                               User Key     Initials Effective Dates Name Provider Type Discipline    MS 04/03/18 -  Andrei Lee PT Physical Therapist PT              PT Recommendation and Plan  Planned Therapy Interventions (PT Eval): balance training, bed mobility training, gait training, home exercise program, patient/family education, postural re-education, strengthening, transfer training  Outcome Summary/Treatment Plan (PT)  Anticipated Discharge Disposition (PT): home with 24/7 care, home with home health, skilled nursing facility(Pending pt. progress)  Plan of Care Reviewed With: patient  Progress: improving  Outcome Summary: Improved tolerance to functional activity this date with an increase in gait  distance and progression of ther. ex. program.  Pt. able to ambulate 50 feet, CGA x 1, with use of Rwx this date.  Pt. requires CGA x 1 for bed mobility and for sit <-> stand transfers.  Verbal/tactile cues for posture correction during ambulation.  BLE Standing ther. ex. program x 10 reps completed for general strenghthening.      Time Calculation:   PT Charges     Row Name 10/07/19 1035             Time Calculation    Start Time  1000  -MS      Stop Time  1020  -MS      Time Calculation (min)  20 min  -MS      PT Received On  10/07/19  -MS      PT - Next Appointment  10/08/19  -MS         Time Calculation- PT    Total Timed Code Minutes- PT  17 minute(s)  -MS        User Key  (r) = Recorded By, (t) = Taken By, (c) = Cosigned By    Initials Name Provider Type    Andrei Castro, PT Physical Therapist        Therapy Charges for Today     Code Description Service Date Service Provider Modifiers Qty    01037530946 HC PT THER PROC EA 15 MIN 10/6/2019 Andrei Lee, PT GP 1    23254160743 HC PT THER PROC EA 15 MIN 10/7/2019 Andrei Lee, PT GP 1          PT G-Codes  Outcome Measure Options: AM-PAC 6 Clicks Basic Mobility (PT)  AM-PAC 6 Clicks Score (PT): 17    Andrei Lee, PT  10/7/2019

## 2019-10-07 NOTE — PROGRESS NOTES
FIO2 weaned to 30% at this time-SPO2 %-Requested Shiley #4 cuffed trach be ordered to bedside per policy.

## 2019-10-08 ENCOUNTER — APPOINTMENT (OUTPATIENT)
Dept: GENERAL RADIOLOGY | Facility: HOSPITAL | Age: 59
End: 2019-10-08

## 2019-10-08 ENCOUNTER — APPOINTMENT (OUTPATIENT)
Dept: CT IMAGING | Facility: HOSPITAL | Age: 59
End: 2019-10-08

## 2019-10-08 PROBLEM — N39.0 UTI (URINARY TRACT INFECTION): Status: ACTIVE | Noted: 2019-10-08

## 2019-10-08 PROBLEM — N13.2 URETERAL STONE WITH HYDRONEPHROSIS: Status: ACTIVE | Noted: 2019-10-08

## 2019-10-08 LAB
ANION GAP SERPL CALCULATED.3IONS-SCNC: 10.2 MMOL/L (ref 5–15)
BASOPHILS # BLD AUTO: 0.04 10*3/MM3 (ref 0–0.2)
BASOPHILS NFR BLD AUTO: 0.5 % (ref 0–1.5)
BUN BLD-MCNC: 15 MG/DL (ref 6–20)
BUN/CREAT SERPL: 15.8 (ref 7–25)
CALCIUM SPEC-SCNC: 9.3 MG/DL (ref 8.6–10.5)
CHLORIDE SERPL-SCNC: 101 MMOL/L (ref 98–107)
CO2 SERPL-SCNC: 29.8 MMOL/L (ref 22–29)
CREAT BLD-MCNC: 0.95 MG/DL (ref 0.57–1)
DEPRECATED RDW RBC AUTO: 44.2 FL (ref 37–54)
EOSINOPHIL # BLD AUTO: 0.21 10*3/MM3 (ref 0–0.4)
EOSINOPHIL NFR BLD AUTO: 2.8 % (ref 0.3–6.2)
ERYTHROCYTE [DISTWIDTH] IN BLOOD BY AUTOMATED COUNT: 14.3 % (ref 12.3–15.4)
GFR SERPL CREATININE-BSD FRML MDRD: 60 ML/MIN/1.73
GLUCOSE BLD-MCNC: 83 MG/DL (ref 65–99)
HCT VFR BLD AUTO: 38.4 % (ref 34–46.6)
HGB BLD-MCNC: 11.8 G/DL (ref 12–15.9)
IMM GRANULOCYTES # BLD AUTO: 0.03 10*3/MM3 (ref 0–0.05)
IMM GRANULOCYTES NFR BLD AUTO: 0.4 % (ref 0–0.5)
LYMPHOCYTES # BLD AUTO: 1.57 10*3/MM3 (ref 0.7–3.1)
LYMPHOCYTES NFR BLD AUTO: 21 % (ref 19.6–45.3)
MCH RBC QN AUTO: 26.2 PG (ref 26.6–33)
MCHC RBC AUTO-ENTMCNC: 30.7 G/DL (ref 31.5–35.7)
MCV RBC AUTO: 85.1 FL (ref 79–97)
MONOCYTES # BLD AUTO: 0.68 10*3/MM3 (ref 0.1–0.9)
MONOCYTES NFR BLD AUTO: 9.1 % (ref 5–12)
NEUTROPHILS # BLD AUTO: 4.94 10*3/MM3 (ref 1.7–7)
NEUTROPHILS NFR BLD AUTO: 66.2 % (ref 42.7–76)
NRBC BLD AUTO-RTO: 0 /100 WBC (ref 0–0.2)
PLATELET # BLD AUTO: 216 10*3/MM3 (ref 140–450)
PMV BLD AUTO: 11.9 FL (ref 6–12)
POTASSIUM BLD-SCNC: 4.1 MMOL/L (ref 3.5–5.2)
RBC # BLD AUTO: 4.51 10*6/MM3 (ref 3.77–5.28)
SODIUM BLD-SCNC: 141 MMOL/L (ref 136–145)
WBC NRBC COR # BLD: 7.47 10*3/MM3 (ref 3.4–10.8)

## 2019-10-08 PROCEDURE — 92611 MOTION FLUOROSCOPY/SWALLOW: CPT

## 2019-10-08 PROCEDURE — 97110 THERAPEUTIC EXERCISES: CPT

## 2019-10-08 PROCEDURE — 80048 BASIC METABOLIC PNL TOTAL CA: CPT | Performed by: HOSPITALIST

## 2019-10-08 PROCEDURE — 94799 UNLISTED PULMONARY SVC/PX: CPT

## 2019-10-08 PROCEDURE — 74230 X-RAY XM SWLNG FUNCJ C+: CPT

## 2019-10-08 PROCEDURE — 25010000002 ERTAPENEM PER 500 MG: Performed by: HOSPITALIST

## 2019-10-08 PROCEDURE — 85025 COMPLETE CBC W/AUTO DIFF WBC: CPT | Performed by: HOSPITALIST

## 2019-10-08 PROCEDURE — 74176 CT ABD & PELVIS W/O CONTRAST: CPT

## 2019-10-08 RX ADMIN — PRAMIPEXOLE DIHYDROCHLORIDE 0.25 MG: 0.25 TABLET ORAL at 20:25

## 2019-10-08 RX ADMIN — SODIUM CHLORIDE, PRESERVATIVE FREE 10 ML: 5 INJECTION INTRAVENOUS at 10:12

## 2019-10-08 RX ADMIN — GABAPENTIN 300 MG: 300 CAPSULE ORAL at 20:25

## 2019-10-08 RX ADMIN — BUSPIRONE HYDROCHLORIDE 5 MG: 5 TABLET ORAL at 10:11

## 2019-10-08 RX ADMIN — SODIUM CHLORIDE, PRESERVATIVE FREE 10 ML: 5 INJECTION INTRAVENOUS at 20:26

## 2019-10-08 RX ADMIN — BUSPIRONE HYDROCHLORIDE 5 MG: 5 TABLET ORAL at 16:57

## 2019-10-08 RX ADMIN — OXYCODONE HYDROCHLORIDE AND ACETAMINOPHEN 1 TABLET: 5; 325 TABLET ORAL at 20:25

## 2019-10-08 RX ADMIN — ESCITALOPRAM 20 MG: 20 TABLET, FILM COATED ORAL at 10:11

## 2019-10-08 RX ADMIN — BARIUM SULFATE 55 ML: 0.81 POWDER, FOR SUSPENSION ORAL at 09:06

## 2019-10-08 RX ADMIN — GABAPENTIN 300 MG: 300 CAPSULE ORAL at 16:57

## 2019-10-08 RX ADMIN — BUSPIRONE HYDROCHLORIDE 5 MG: 5 TABLET ORAL at 20:25

## 2019-10-08 RX ADMIN — BARIUM SULFATE 50 ML: 400 SUSPENSION ORAL at 09:07

## 2019-10-08 RX ADMIN — ERTAPENEM SODIUM 1 G: 1 INJECTION, POWDER, LYOPHILIZED, FOR SOLUTION INTRAMUSCULAR; INTRAVENOUS at 10:11

## 2019-10-08 RX ADMIN — AMLODIPINE BESYLATE 5 MG: 5 TABLET ORAL at 10:11

## 2019-10-08 RX ADMIN — BARIUM SULFATE 4 ML: 980 POWDER, FOR SUSPENSION ORAL at 09:07

## 2019-10-08 RX ADMIN — METOPROLOL SUCCINATE 25 MG: 25 TABLET, FILM COATED, EXTENDED RELEASE ORAL at 10:11

## 2019-10-08 RX ADMIN — Medication 100 MG: at 10:10

## 2019-10-08 RX ADMIN — GABAPENTIN 300 MG: 300 CAPSULE ORAL at 10:12

## 2019-10-08 NOTE — CONSULTS
Adult Nutrition  Assessment/PES    Patient Name:  Swetha Luis  YOB: 1960  MRN: [id number]  Admit Date:  10/4/2019    Assessment Date:  10/8/2019    Comments:  Pt tolerating Dysphagia Pureed HTL diet with adequate po intake. Note VFSS scheduled today.    Reason for Assessment     Row Name 10/08/19 0827          Reason for Assessment    Reason For Assessment  follow-up protocol         Nutrition/Diet History     Row Name 10/08/19 0828          Nutrition/Diet History    Typical Food/Fluid Intake  on Dysphagia diet, VFSS pending           Labs/Tests/Procedures/Meds     Row Name 10/08/19 0829          Labs/Procedures/Meds    Lab Results Reviewed  reviewed        Diagnostic Tests/Procedures    Diagnostic Test/Procedure Reviewed  reviewed     Diagnostic Test/Procedures Comments  VFSS        Medications    Pertinent Medications Reviewed  reviewed         Physical Findings     Row Name 10/08/19 0830          Physical Findings    Tubes  PEG     Skin  -- chelsey 19           Nutrition Prescription Ordered     Row Name 10/08/19 0831          Nutrition Prescription PO    Current PO Diet  Dysphagia     Dysphagia Level  2  Pureed     Fluid Consistency  Honey thick         Evaluation of Received Nutrient/Fluid Intake     Row Name 10/08/19 0832          PO Evaluation    % PO Intake  %               Problem/Interventions:    Problem 2     Row Name 10/08/19 0832          Nutrition Diagnoses Problem 2    Problem 2  Swallowing Difficulty     Etiology (related to)  MNT for Treatment/Condition     Signs/Symptoms (evidenced by)  SLP/Swallow eval     Swallow eval status  Pending     Type of SLP Evaluation  VFSS               Intervention Goal     Row Name 10/08/19 0833          Intervention Goal    General  Maintain nutrition     PO  Tolerate PO;PO intake (%)     PO Intake %  75 %     Weight  No significant weight loss         Nutrition Intervention     Row Name 10/08/19 0833          Nutrition Intervention    RD/Tech  Action  Follow Tx progress;Care plan reviewd           Education/Evaluation     Row Name 10/08/19 1833          Education    Education  Will Instruct as appropriate        Monitor/Evaluation    Monitor  Per protocol;PO intake;Pertinent labs;Weight;Skin status           Electronically signed by:  Carol White RD  10/08/19 8:35 AM

## 2019-10-08 NOTE — CONSULTS
"[facility]  Clyde Selby MD    Patient Care Team:  Provider, No Known as PCP - General    Chief complaint: Left ureteral stone    Subjective .     History of present illness: This 59-year-old female was admitted to the hospital on 10/4/2019 with shortness of air for several days productive cough.  She also was found to have fever up to 102.6.  She was diagnosed with pneumonia at that time and started on broad-spectrum antibiotics.    According to the patient she has had some left lower quadrant abdominal pain for \"a month\".  She denies much trouble voiding.  She denies hematuria or dysuria.  She does have a history of urinary calculi.    Review of Systems  All systems were reviewed and were negative except for persistent left lower quadrant discomfort at times.  Shortness of air on tracheostomy.    History  Past Medical History:   Diagnosis Date   • Acute congestive heart failure (CMS/HCC) 2018   • Acute osteomyelitis (CMS/[facility])     Right shoulder due to IVDA   • Acute renal failure on dialysis (CMS/[facility])    • Anxiety    • Pugh esophagus    • CKD (chronic kidney disease)    • COPD (chronic obstructive pulmonary disease) (CMS/[facility])    • MRSA infection    • Nontraumatic subarachnoid hemorrhage (CMS/[facility])    • Seizures (CMS/[facility])    • Tracheostomy present (CMS/[facility])    , Past Surgical History:   Procedure Laterality Date   • TRACHEOSTOMY     , Family History   Problem Relation Age of Onset   • Diabetes Mother    • Hypertension Mother    , Social History     Tobacco Use   • Smoking status: Former Smoker     Packs/day: 1.00     Types: Cigarettes     Last attempt to quit: 2018     Years since quittin.2   • Smokeless tobacco: Never Used   Substance Use Topics   • Alcohol use: No     Frequency: Never   • Drug use: Yes     Types: Cocaine     Comment: 20 years ago occasional   , Medications Prior to Admission   Medication Sig Dispense Refill Last Dose   • Ascorbic Acid 500 MG capsule Take  by mouth.   " 9/21/2019 at Unknown time   • aspirin 81 MG chewable tablet Chew 1 tablet Daily.   9/21/2019 at Unknown time   • budesonide (PULMICORT) 0.5 MG/2ML nebulizer solution Inhale 2 mL by nebulization via trach 2 (Two) Times a Day. 120 mL 0    • busPIRone (BUSPAR) 5 MG tablet Take 1 tablet by mouth 3 (Three) times a day. 90 tablet 0 10/4/2019 at Unknown time   • dextromethorphan-guaifenesin (ROBITUSSIN-DM)  MG/5ML syrup 5 mL by Per G Tube route 4 (Four) Times a Day. 600 mL 0    • escitalopram (LEXAPRO) 20 MG tablet Take 1 tablet by mouth daily. 30 tablet 0 10/3/2019 at Unknown time   • gabapentin (NEURONTIN) 300 MG capsule Take 1 capsule by mouth 3 (Three) Times a Day. 9 capsule 0 10/3/2019 at Unknown time   • ipratropium-albuterol (DUO-NEB) 0.5-2.5 mg/3 ml nebulizer Inhale 3 mL by nebulization every 4 (Four) hours as needed for wheezing. 360 mL 0    • metoprolol succinate XL (TOPROL-XL) 25 MG 24 hr tablet Take 1 tablet by mouth Daily. 30 tablet 0 10/3/2019 at Unknown time   • MULTIPLE VITAMINS-MINERALS PO Take  by mouth.   10/3/2019 at Unknown time   • nystatin (MYCOSTATIN) 329354 UNIT/GM powder Apply  topically to the appropriate area as directed 3 (Three) Times a Day. Skin folds in the groin 60 g 0    • omeprazole (priLOSEC) 40 MG capsule Take 40 mg by mouth Daily.   9/21/2019 at Unknown time   • oxyCODONE-acetaminophen (PERCOCET) 5-325 MG per tablet Take 1 tablet by mouth Every 6 (Six) Hours As Needed for Moderate Pain . 12 tablet 0 10/3/2019 at Unknown time   • pramipexole (MIRAPEX) 0.25 MG tablet Take 1 tablet by mouth Every Night. 30 tablet 0 10/3/2019 at Unknown time   • predniSONE (DELTASONE) 10 MG tablet 1 tablet by Per G Tube route Daily. 30 tablet 0    • thiamine (VITAMIN B-1) 100 MG tablet Take 1 tablet by mouth Daily. 30 tablet 0    , Scheduled Meds:    amLODIPine 5 mg Oral Q24H   busPIRone 5 mg Oral TID   ertapenem 1 g Intravenous Q24H   escitalopram 20 mg Oral Daily   gabapentin 300 mg Oral TID    influenza vaccine 0.5 mL Intramuscular Once   metoprolol succinate XL 25 mg Oral Daily   pramipexole 0.25 mg Oral Nightly   sodium chloride 10 mL Intravenous Q12H   thiamine 100 mg Oral Daily   , Continuous Infusions:   , PRN Meds:  •  acetaminophen **OR** acetaminophen **OR** acetaminophen  •  ipratropium-albuterol  •  ondansetron **OR** ondansetron  •  oxyCODONE-acetaminophen  •  sodium chloride  •  sodium chloride, Allergies:  Cephalexin; Hydrocodone; Strawberry; and Zithromax [azithromycin] and     Objective     Vital Signs   Temp:  [97.7 °F (36.5 °C)-98.2 °F (36.8 °C)] 97.8 °F (36.6 °C)  Heart Rate:  [72-92] 92  Resp:  [20] 20  BP: (120-155)/(81-98) 124/84    Physical Exam:     General Appearance:    Alert, cooperative, in no acute distress   Head:    Normocephalic, without obvious abnormality, atraumatic   Eyes:            Lids and lashes normal, conjunctivae and sclerae normal, no   icterus, no pallor, corneas clear, PERRLA   Ears:    Ears appear intact with no abnormalities noted   Throat:   No oral lesions, no thrush, oral mucosa moist   Neck:   No adenopathy, supple, trachea midline,   Back:     No kyphosis present, no scoliosis present, no skin lesions,       erythema or scars, no tenderness to percussion or                   palpation,   range of motion normal   Lungs:     Clear to auscultation,respirations regular, even and                   unlabored    Heart:    Regular rhythm and normal rate, normal S1 and S2, no            murmur, no gallop, no rub, no click   Breast Exam:    Deferred   Abdomen:     Normal bowel sounds, no masses, no organomegaly, soft        non-tender, non-distended, no guarding, no rebound                 tenderness   Genitalia:    Deferred   Extremities:   Moves all extremities well, no edema, no cyanosis, no              redness       Skin:   No bleeding, bruising or rash       Neurologic:   Cranial nerves 2 - 12 grossly intact, sensation intact,        Results Review:   I  reviewed the patient's new clinical results.  Discussed with The patient.  I have personally reviewed her CT scan which shows a distal left ureteral stone with minimal hydronephrosis at this point.    Recent Results (from the past 24 hour(s))   Basic Metabolic Panel    Collection Time: 10/08/19  5:41 AM   Result Value Ref Range    Glucose 83 65 - 99 mg/dL    BUN 15 6 - 20 mg/dL    Creatinine 0.95 0.57 - 1.00 mg/dL    Sodium 141 136 - 145 mmol/L    Potassium 4.1 3.5 - 5.2 mmol/L    Chloride 101 98 - 107 mmol/L    CO2 29.8 (H) 22.0 - 29.0 mmol/L    Calcium 9.3 8.6 - 10.5 mg/dL    eGFR Non African Amer 60 (L) >60 mL/min/1.73    BUN/Creatinine Ratio 15.8 7.0 - 25.0    Anion Gap 10.2 5.0 - 15.0 mmol/L   CBC Auto Differential    Collection Time: 10/08/19  5:41 AM   Result Value Ref Range    WBC 7.47 3.40 - 10.80 10*3/mm3    RBC 4.51 3.77 - 5.28 10*6/mm3    Hemoglobin 11.8 (L) 12.0 - 15.9 g/dL    Hematocrit 38.4 34.0 - 46.6 %    MCV 85.1 79.0 - 97.0 fL    MCH 26.2 (L) 26.6 - 33.0 pg    MCHC 30.7 (L) 31.5 - 35.7 g/dL    RDW 14.3 12.3 - 15.4 %    RDW-SD 44.2 37.0 - 54.0 fl    MPV 11.9 6.0 - 12.0 fL    Platelets 216 140 - 450 10*3/mm3    Neutrophil % 66.2 42.7 - 76.0 %    Lymphocyte % 21.0 19.6 - 45.3 %    Monocyte % 9.1 5.0 - 12.0 %    Eosinophil % 2.8 0.3 - 6.2 %    Basophil % 0.5 0.0 - 1.5 %    Immature Grans % 0.4 0.0 - 0.5 %    Neutrophils, Absolute 4.94 1.70 - 7.00 10*3/mm3    Lymphocytes, Absolute 1.57 0.70 - 3.10 10*3/mm3    Monocytes, Absolute 0.68 0.10 - 0.90 10*3/mm3    Eosinophils, Absolute 0.21 0.00 - 0.40 10*3/mm3    Basophils, Absolute 0.04 0.00 - 0.20 10*3/mm3    Immature Grans, Absolute 0.03 0.00 - 0.05 10*3/mm3    nRBC 0.0 0.0 - 0.2 /100 WBC          Assessment/Plan       Healthcare associated bacterial pneumonia    Stage 3 chronic kidney disease (CMS/HCC)    COPD (chronic obstructive pulmonary disease) (CMS/HCC)    Respiratory failure, chronic (CMS/HCC)    PEG (percutaneous endoscopic gastrostomy) status  (CMS/HCC)    Chronic diastolic CHF (congestive heart failure) (CMS/HCC)    Peripheral artery disease (CMS/HCC)    Dyspnea    Tracheostomy present (CMS/HCC)    Essential hypertension    Dysphagia    Infection due to ESBL-producing Escherichia coli    Sepsis due to Gram negative bacteria (CMS/HCC)    Ureteral stone with hydronephrosis    UTI (urinary tract infection)      Enterococcus UTI on ertapenem.    I discussed the patients findings and my recommendations with patient and Sensitivities are still pending on her culture.  Tentatively I have her on the operating schedule for Thursday provided she is medically stable and has been covered by antibiotics adequately.  The plan is to remove her left ureteral stone or depending on her situation possibly just placing a stent.    Clyde Selby MD  10/08/19  6:27 PM    Time: 40 including review of x-rays, history and physical, discussion of plans

## 2019-10-08 NOTE — PLAN OF CARE
Problem: Patient Care Overview  Goal: Plan of Care Review   10/08/19 1254   OTHER   Outcome Summary Limited progress towards goals during PT. Able to ambulate short distance in room w/ min A using RW. Very slow shuffling gait w/small steps, cues for posture and step length. Limited by fatigue and SOB; Sp02 at 96% after ambulating on room air. Currently recommend DC to SNU.

## 2019-10-08 NOTE — CONSULTS
CONSULT NOTE    Infectious Diseases - Rafa Tidwell MD  [facility]       Patient Identification:  Name: Swetha Luis  Age: 59 y.o.  Sex: female  :  1960  MRN: [id number]             Date of Consultation: 10/7/2019      Primary Care Physician: Provider, No Known                               Requesting Physician: Dr. True Moreno  Reason for Consultation: E. coli bacteremia    Impression: 59-year-old female with complicated past medical history including recent hospitalization at [facility] for 4 days from 2019 through 2019.  She was admitted from nursing home with symptoms of tight chest tightness increasing cough and shortness of breath.  Work-up did not reveal any acute infectious process except for thick secretions in the lung that improved with suctioning.  She did have mildly elevated troponin but no other acute abnormalities were seen after 4 days of care in the hospital she was eventually discharged with instructions for proper adherence to nectar thick liquid diet and tracheostomy care.  She was also plan to have proper tube feeds.  Subsequently patient presented to the emergency room at [facility] on 2019 for similar symptoms of chest pain or shortness of breath and had extensive work-up done and was discharged back to rehab facility.  Patient then subsequently went home.  She then presented on 10/4/2019 with productive cough and fever.  She was noted to have temperature of 102.6 in the emergency room chest x-ray did show some infiltrate concerning for pneumonia and patient was started on broad-spectrum antibiotics for healthcare associated pneumonia.  She had abnormal urinalysis.  Blood culture did come back positive for ESBL positive E. coli resulting in antibiotic adjustment to ertapenem.  Patient is received ertapenem for the last 3 days with improvement in sense of well-being and resolution of fever.  Infectious disease service is consulted.  This  presentation is consistent with:  1-ESBL positive E. coli sepsis with bacteremia likely due to  2-urinary tract infection  3-recurrent shortness of breath due to male handling of secretions due to chronic tracheostomy and noncompliance to diet  4-history of chronic respiratory failure and dysphagia with recurrent aspiration status post tracheostomy and PEG tube placement  5-history of COPD with recurrent exacerbation  6-history of seizure disorder and subarachnoid hemorrhage and immobilization syndrome      Recommendations/Discussions: She has improved clinically in terms of resolution of sepsis on current antibiotic therapy.  Given her presentation and overall work-up the likely source of gram-negative bacteremia i.e. E. coli is probably  tract.  I would recommend 2 weeks of IV/IM ertapenem from 10/5/2019 with low threshold to perform CT scan of the abdomen and pelvis to rule out  tract structural abnormalities if she shows recurrence of symptoms during or after IV ertapenem treatment.  Aggressive pulmonary toilet and aspiration precaution and management of underlying COPD is needed to prevent confusion in the future if her condition changes as it is very difficult to differentiate between pneumonias and retained mucus secretions due to altered physiology of her respiratory tract as a result of dysphagia and chronic tracheostomy device.  Thank you Dr. Moreno for letting me be the part of your patient care please see above impression and recommendations        History of Present Illness:   59-year-old female with complicated past medical history including recent hospitalization at [facility] for 4 days from 9/21/2019 through 9/25/2019.  She was admitted from nursing home with symptoms of tight chest tightness increasing cough and shortness of breath.  Work-up did not reveal any acute infectious process except for thick secretions in the lung that improved with suctioning.  She did have mildly elevated  troponin but no other acute abnormalities were seen after 4 days of care in the hospital she was eventually discharged with instructions for proper adherence to nectar thick liquid diet and tracheostomy care.  She was also plan to have proper tube feeds.  Subsequently patient presented to the emergency room at [facility] on 9/29/2019 for similar symptoms of chest pain or shortness of breath and had extensive work-up done and was discharged back to rehab facility.  Patient then subsequently went home.  She then presented on 10/4/2019 with productive cough and fever.  She was noted to have temperature of 102.6 in the emergency room chest x-ray did show some infiltrate concerning for pneumonia and patient was started on broad-spectrum antibiotics for healthcare associated pneumonia.  She had abnormal urinalysis.  Blood culture did come back positive for ESBL positive E. coli resulting in antibiotic adjustment to ertapenem.  Patient is received ertapenem for the last 3 days with improvement in sense of well-being and resolution of fever.  Infectious disease service is consulted.      Past Medical History:  Past Medical History:   Diagnosis Date   • Acute congestive heart failure (CMS/HCC) 12/24/2018   • Acute osteomyelitis (CMS/[facility])     Right shoulder due to IVDA   • Acute renal failure on dialysis (CMS/[facility])    • Anxiety    • Pugh esophagus    • CKD (chronic kidney disease)    • COPD (chronic obstructive pulmonary disease) (CMS/[facility])    • MRSA infection    • Nontraumatic subarachnoid hemorrhage (CMS/HCC)    • Seizures (CMS/HCC)    • Tracheostomy present (CMS/[facility])      Past Surgical History:  Past Surgical History:   Procedure Laterality Date   • TRACHEOSTOMY        Home Meds:  Medications Prior to Admission   Medication Sig Dispense Refill Last Dose   • Ascorbic Acid 500 MG capsule Take  by mouth.   9/21/2019 at Unknown time   • aspirin 81 MG chewable tablet Chew 1 tablet Daily.   9/21/2019 at Unknown  time   • budesonide (PULMICORT) 0.5 MG/2ML nebulizer solution Inhale 2 mL by nebulization via trach 2 (Two) Times a Day. 120 mL 0    • busPIRone (BUSPAR) 5 MG tablet Take 1 tablet by mouth 3 (Three) times a day. 90 tablet 0 10/4/2019 at Unknown time   • dextromethorphan-guaifenesin (ROBITUSSIN-DM)  MG/5ML syrup 5 mL by Per G Tube route 4 (Four) Times a Day. 600 mL 0    • escitalopram (LEXAPRO) 20 MG tablet Take 1 tablet by mouth daily. 30 tablet 0 10/3/2019 at Unknown time   • gabapentin (NEURONTIN) 300 MG capsule Take 1 capsule by mouth 3 (Three) Times a Day. 9 capsule 0 10/3/2019 at Unknown time   • ipratropium-albuterol (DUO-NEB) 0.5-2.5 mg/3 ml nebulizer Inhale 3 mL by nebulization every 4 (Four) hours as needed for wheezing. 360 mL 0    • metoprolol succinate XL (TOPROL-XL) 25 MG 24 hr tablet Take 1 tablet by mouth Daily. 30 tablet 0 10/3/2019 at Unknown time   • MULTIPLE VITAMINS-MINERALS PO Take  by mouth.   10/3/2019 at Unknown time   • nystatin (MYCOSTATIN) 804062 UNIT/GM powder Apply  topically to the appropriate area as directed 3 (Three) Times a Day. Skin folds in the groin 60 g 0    • omeprazole (priLOSEC) 40 MG capsule Take 40 mg by mouth Daily.   9/21/2019 at Unknown time   • oxyCODONE-acetaminophen (PERCOCET) 5-325 MG per tablet Take 1 tablet by mouth Every 6 (Six) Hours As Needed for Moderate Pain . 12 tablet 0 10/3/2019 at Unknown time   • pramipexole (MIRAPEX) 0.25 MG tablet Take 1 tablet by mouth Every Night. 30 tablet 0 10/3/2019 at Unknown time   • predniSONE (DELTASONE) 10 MG tablet 1 tablet by Per G Tube route Daily. 30 tablet 0    • thiamine (VITAMIN B-1) 100 MG tablet Take 1 tablet by mouth Daily. 30 tablet 0      Current Meds:     Current Facility-Administered Medications:   •  acetaminophen (TYLENOL) tablet 650 mg, 650 mg, Oral, Q4H PRN, 650 mg at 10/04/19 2056 **OR** acetaminophen (TYLENOL) 160 MG/5ML solution 650 mg, 650 mg, Oral, Q4H PRN **OR** acetaminophen (TYLENOL) suppository  650 mg, 650 mg, Rectal, Q4H PRN, True Moreno MD  •  amLODIPine (NORVASC) tablet 5 mg, 5 mg, Oral, Q24H, True Moreno MD, 5 mg at 10/07/19 1746  •  busPIRone (BUSPAR) tablet 5 mg, 5 mg, Oral, TID, True Moreno MD, 5 mg at 10/07/19 2026  •  ertapenem (INVanz) 1 g/100 mL 0.9% NS VTB (mbp), 1 g, Intravenous, Q24H, True Moreno MD, 1 g at 10/07/19 1138  •  escitalopram (LEXAPRO) tablet 20 mg, 20 mg, Oral, Daily, True Moreno MD, 20 mg at 10/07/19 0809  •  gabapentin (NEURONTIN) capsule 300 mg, 300 mg, Oral, TID, True Moreno MD, 300 mg at 10/07/19 2027  •  influenza vac split quad (FLUZONE,FLUARIX,AFLURIA) injection 0.5 mL, 0.5 mL, Intramuscular, Once, True Moreno MD  •  ipratropium-albuterol (DUO-NEB) nebulizer solution 3 mL, 3 mL, Nebulization, Q4H PRN, True Moreno MD, 3 mL at 10/07/19 1257  •  metoprolol succinate XL (TOPROL-XL) 24 hr tablet 25 mg, 25 mg, Oral, Daily, True Moreno MD, 25 mg at 10/07/19 0809  •  ondansetron (ZOFRAN) tablet 4 mg, 4 mg, Oral, Q6H PRN **OR** ondansetron (ZOFRAN) injection 4 mg, 4 mg, Intravenous, Q6H PRN, True Moreno MD, 4 mg at 10/05/19 1839  •  oxyCODONE-acetaminophen (PERCOCET) 5-325 MG per tablet 1 tablet, 1 tablet, Oral, Q6H PRN, True Moreno MD, 1 tablet at 10/07/19 1705  •  pramipexole (MIRAPEX) tablet 0.25 mg, 0.25 mg, Oral, Nightly, True Moreno MD, 0.25 mg at 10/07/19 2027  •  sodium chloride 0.9 % flush 10 mL, 10 mL, Intravenous, PRN, LizandroAndrea MD  •  sodium chloride 0.9 % flush 10 mL, 10 mL, Intravenous, Q12H, True Moreno MD, 10 mL at 10/07/19 2027  •  sodium chloride 0.9 % flush 10 mL, 10 mL, Intravenous, PRN, True Moreno MD  •  thiamine (VITAMIN B-1) tablet 100 mg, 100 mg, Oral, Daily, True Moreno MD, 100 mg at 10/07/19 0809  Allergies:  Allergies   Allergen Reactions   • Cephalexin Unknown (See Comments)     unk     • Hydrocodone Unknown (See Comments)     unk   • [place] Unknown (See Comments)     unk   • Zithromax  "[Azithromycin] Unknown (See Comments)     unk     Social History:   Social History     Tobacco Use   • Smoking status: Former Smoker     Packs/day: 1.00     Types: Cigarettes     Last attempt to quit: 2018     Years since quittin.2   • Smokeless tobacco: Never Used   Substance Use Topics   • Alcohol use: No     Frequency: Never      Family History:  Family History   Problem Relation Age of Onset   • Diabetes Mother    • Hypertension Mother           Review of Systems  See history of present illness and past medical history.  Feels much better.  At the time of admission to the emergency room her review system was as follows:  Constitutional: Positive for fever (max 102.6).   HENT: Negative for sore throat.    Eyes: Negative.    Respiratory: Positive for cough (with brown sputum) and shortness of breath.    Cardiovascular: Negative.  Negative for chest pain.   Gastrointestinal: Positive for abdominal pain (moderate).   Genitourinary: Positive for dysuria (slight).   Musculoskeletal: Positive for back pain (chronic low).   Skin: Negative.  Negative for rash.   Neurological: Positive for headaches (mild).   All other systems reviewed and are negative. Remainder of ROS is negative.      Vitals:   /98 (BP Location: Right arm, Patient Position: Lying)   Pulse 72   Temp 98.2 °F (36.8 °C) (Oral)   Resp 20   Ht 167.6 cm (65.98\")   Wt 74 kg (163 lb 2.3 oz)   SpO2 100%   BMI 26.34 kg/m²   I/O: No intake or output data in the 24 hours ending 10/07/19 5651  Exam:  General Appearance:    Alert, cooperative, no distress, appears stated age   Head:    Normocephalic, without obvious abnormality, atraumatic   Eyes:    PERRL, conjunctivae/corneas clear, EOM's intact, both eyes   Ears:    Normal external ear canals, both ears   Nose:   Nares normal, septum midline, mucosa normal, no drainage    or sinus tenderness   Throat:   Lips, tongue, gums normal; oral mucosa pink and moist   Neck:  Tracheostomy in place "   Back:     Symmetric, no curvature, ROM normal, no CVA tenderness   Lungs:    Decreased breath sounds at the base scattered rhonchi anteriorly   Chest Wall:    No tenderness or deformity    Heart:    Regular rate and rhythm, S1 and S2 normal, no murmur, rub   or gallop   Abdomen:    PEG tube in place   Extremities:   Extremities normal, atraumatic, no cyanosis or edema   Pulses:   Pulses palpable in all extremities; symmetric all extremities   Skin:   Skin color normal, Skin is warm and dry,  no rashes or palpable lesions   Neurologic:  No new neurological deficits noted       Data Review:    I reviewed the patient's new clinical results.  Results from last 7 days   Lab Units 10/07/19  0447 10/06/19  0529 10/05/19  0520 10/04/19  0623   WBC 10*3/mm3 6.05 5.61 9.46 12.58*   HEMOGLOBIN g/dL 11.7* 11.2* 12.7 12.0   PLATELETS 10*3/mm3 138* 162 134* 187     Results from last 7 days   Lab Units 10/07/19  0447 10/06/19  0529 10/05/19  0520 10/04/19  0623   SODIUM mmol/L 140 144 134* 142   POTASSIUM mmol/L 4.1 4.5 4.0 3.9   CHLORIDE mmol/L 102 104 96* 103   CO2 mmol/L 26.3 29.2* 21.1* 27.9   BUN mg/dL 15 17 13 19   CREATININE mg/dL 1.01* 1.11* 1.03* 1.21*   CALCIUM mg/dL 8.8 8.7 8.8 8.8   GLUCOSE mg/dL 107* 106* 53* 138*     Microbiology Results (last 10 days)     Procedure Component Value - Date/Time    Respiratory Panel, PCR - Swab, Nasopharynx [[id number]]  (Normal) Collected:  10/04/19 1652    Lab Status:  Final result Specimen:  Swab from Nasopharynx Updated:  10/04/19 2304     ADENOVIRUS, PCR Not Detected     Coronavirus 229E Not Detected     Coronavirus HKU1 Not Detected     Coronavirus NL63 Not Detected     Coronavirus OC43 Not Detected     Human Metapneumovirus Not Detected     Human Rhinovirus/Enterovirus Not Detected     Influenza B PCR Not Detected     Parainfluenza Virus 1 Not Detected     Parainfluenza Virus 2 Not Detected     Parainfluenza Virus 3 Not Detected     Parainfluenza Virus 4 Not Detected      Bordetella pertussis pcr Not Detected     Influenza A H1 2009 PCR Not Detected     Chlamydophila pneumoniae PCR Not Detected     Mycoplasma pneumo by PCR Not Detected     Influenza A PCR Not Detected     Influenza A H3 Not Detected     Influenza A H1 Not Detected     RSV, PCR Not Detected     Bordetella parapertussis PCR Not Detected    MRSA Screen, PCR - Swab, Nares [[id number]]  (Normal) Collected:  10/04/19 1652    Lab Status:  Final result Specimen:  Swab from Nares Updated:  10/04/19 1853     MRSA PCR No MRSA Detected    Blood Culture - Blood, Arm, Right [[id number]] Collected:  10/04/19 0625    Lab Status:  Preliminary result Specimen:  Blood from Arm, Right Updated:  10/07/19 0800     Blood Culture No growth at 3 days    Blood Culture - Blood, Arm, Left [[id number]]  (Abnormal)  (Susceptibility) Collected:  10/04/19 0624    Lab Status:  Final result Specimen:  Blood from Arm, Left Updated:  10/06/19 0621     Blood Culture Escherichia coli ESBL     Comment:   Consider infectious disease consult.  Susceptibility results may not correlate to clinical outcomes.  For ESBL-producing infections in the blood, a carbapenem is recommended as first-line therapy for optimal clinical outcomes.        Isolated from Aerobic and Anaerobic Bottles     Gram Stain Aerobic Bottle Gram negative bacilli      Anaerobic Bottle Gram negative bacilli    Susceptibility      Escherichia coli ESBL     AFRICA     Ertapenem Susceptible     Meropenem Susceptible                    Blood Culture ID, PCR - Blood, Arm, Left [[id number]]  (Abnormal) Collected:  10/04/19 0624    Lab Status:  Final result Specimen:  Blood from Arm, Left Updated:  10/05/19 0048     BCID, PCR Escherichia coli. Identification by BCID PCR.        Xr Chest 2 View    Result Date: 10/4/2019  Increasing pulmonary interstitial and vascular prominence, particularly in perihilar regions increased versus 09/21/2019. No evidence of consolidation or effusion.  This report was  finalized on 10/4/2019 8:46 AM by Dr. Jonathan Bianchi M.D.      Xr Chest 1 View    Result Date: 9/21/2019  Poor inspiration without active airspace disease     This report was finalized on 9/21/2019 11:06 PM by Andrei Beatty M.D.          Assessment:  Active Hospital Problems    Diagnosis  POA   • **Healthcare associated bacterial pneumonia [J15.9]  Yes   • Infection due to ESBL-producing Escherichia coli [A49.8, Z16.12]  Unknown   • Sepsis due to Gram negative bacteria (CMS/[facility]) [A41.50]  Unknown   • Dyspnea [R06.00]  Yes   • Tracheostomy present (CMS/[facility]) [Z93.0]  Not Applicable   • Essential hypertension [I10]  Yes   • Dysphagia [R13.10]  Yes   • COPD (chronic obstructive pulmonary disease) (CMS/[facility]) [J44.9]  Yes   • Respiratory failure, chronic (CMS/[facility]) [J96.10]  Yes   • PEG (percutaneous endoscopic gastrostomy) status (CMS/[facility]) [Z93.1]  Not Applicable   • Chronic diastolic CHF (congestive heart failure) (CMS/[facility]) [I50.32]  Yes   • Peripheral artery disease (CMS/[facility]) [I73.9]  Yes   • Stage 3 chronic kidney disease (CMS/[facility]) [N18.3]  Yes      Resolved Hospital Problems   No resolved problems to display.         Plan:  See above  Rafa Fowler MD   10/7/2019  9:51 PM    Much of this encounter note is an electronic transcription/translation of spoken language to printed text. The electronic translation of spoken language may permit erroneous, or at times, nonsensical words or phrases to be inadvertently transcribed; Although I have reviewed the note for such errors, some may still exist

## 2019-10-08 NOTE — PLAN OF CARE
Problem: Patient Care Overview  Goal: Plan of Care Review  Outcome: Ongoing (interventions implemented as appropriate)   10/08/19 0601   Plan of Care Review   Progress no change   OTHER   Outcome Summary Norvasc for BP's, trach care completed, video swallow scheduled for 10/8, on pureed NTL, on IV antibiotics, VSS, will CTM   Coping/Psychosocial   Plan of Care Reviewed With patient       Problem: Pain, Chronic (Adult)  Goal: Identify Related Risk Factors and Signs and Symptoms  Outcome: Outcome(s) achieved Date Met: 10/08/19    Goal: Acceptable Pain/Comfort Level and Functional Ability  Outcome: Ongoing (interventions implemented as appropriate)      Problem: Pneumonia (Adult)  Goal: Signs and Symptoms of Listed Potential Problems Will be Absent, Minimized or Managed (Pneumonia)  Outcome: Ongoing (interventions implemented as appropriate)      Problem: Fall Risk (Adult)  Goal: Identify Related Risk Factors and Signs and Symptoms  Outcome: Outcome(s) achieved Date Met: 10/08/19    Goal: Absence of Fall  Outcome: Ongoing (interventions implemented as appropriate)      Problem: Skin Injury Risk (Adult)  Goal: Identify Related Risk Factors and Signs and Symptoms  Outcome: Outcome(s) achieved Date Met: 10/08/19    Goal: Skin Health and Integrity  Outcome: Ongoing (interventions implemented as appropriate)         no blurred vision/no change in level of consciousness

## 2019-10-08 NOTE — PLAN OF CARE
Problem: Patient Care Overview  Goal: Plan of Care Review   10/08/19 0965   OTHER   Outcome Summary Video swallow completed. Pt with size #4 shiley with aqua PMV in place. SLP provided education to patient to remove PMV when sleeping. Full VFSS report pending. Pt exhibited silent aspiration with nectar. SLP recs upgrade to [facility] soft, no mixed, and to continue honey (no straws, small drinks). Pt would benefit from periodic throat clearing to clear non transient penetration. Clear mouth of food before taking a drink. Pt with aspiration risk despite modified diet. Meds whole or crushed with puree. Allow free water protocol with ice. SLP recs dysphagia therapy at next level of care.    Coping/Psychosocial   Plan of Care Reviewed With patient

## 2019-10-08 NOTE — PROGRESS NOTES
"DAILY PROGRESS NOTE  [facility]    Patient Identification:  Name: Swetha Luis  Age: 59 y.o.  Sex: female  :  1960  MRN: [id number]         Primary Care Physician: Provider, No Known    Subjective:  Interval History: She complains of cough and shortness of air.  Better today    Objective:    Scheduled Meds:    amLODIPine 5 mg Oral Q24H   busPIRone 5 mg Oral TID   ertapenem 1 g Intravenous Q24H   escitalopram 20 mg Oral Daily   gabapentin 300 mg Oral TID   influenza vaccine 0.5 mL Intramuscular Once   metoprolol succinate XL 25 mg Oral Daily   pramipexole 0.25 mg Oral Nightly   sodium chloride 10 mL Intravenous Q12H   thiamine 100 mg Oral Daily     Continuous Infusions:     Vital signs in last 24 hours:  Temp:  [97.7 °F (36.5 °C)-98.3 °F (36.8 °C)] 97.8 °F (36.6 °C)  Heart Rate:  [72-96] 87  Resp:  [20-28] 20  BP: (120-155)/() 120/81    Intake/Output:  No intake or output data in the 24 hours ending 10/08/19 1238    Exam:  /81 (BP Location: Left arm, Patient Position: Lying)   Pulse 87   Temp 97.8 °F (36.6 °C) (Oral)   Resp 20   Ht 167.6 cm (65.98\")   Wt 74 kg (163 lb 2.3 oz)   SpO2 97%   BMI 26.34 kg/m²     General Appearance:    Alert, cooperative, no distress   Head:    Normocephalic, without obvious abnormality, atraumatic   Eyes:       Throat:   Lips, tongue, gums normal   Neck:   Supple, symmetrical, trachea midline, no JVD, tracheostomy in place   Lungs:    Rhonchi and wheezes bilaterally, respirations unlabored   Chest Wall:    No tenderness or deformity    Heart:    Regular rate and rhythm, S1 and S2 normal, no murmur,no  Rub or gallop   Abdomen:     Soft, non-tender, bowel sounds active, no masses, no organomegaly    Extremities:   Extremities normal, atraumatic, no cyanosis or edema   Pulses:      Skin:   Skin is warm and dry,  no rashes or palpable lesions   Neurologic:   no focal deficits noted      Lab Results (last 72 hours)     Procedure Component Value Units " Date/Time    CBC Auto Differential [[id number]]  (Abnormal) Collected:  10/05/19 0520    Specimen:  Blood Updated:  10/05/19 0810     WBC 9.46 10*3/mm3      RBC 4.79 10*6/mm3      Hemoglobin 12.7 g/dL      Hematocrit 40.8 %      MCV 85.2 fL      MCH 26.5 pg      MCHC 31.1 g/dL      RDW 14.1 %      RDW-SD 44.2 fl      MPV 13.2 fL      Platelets 134 10*3/mm3     Manual Differential [[id number]]  (Abnormal) Collected:  10/05/19 0520    Specimen:  Blood Updated:  10/05/19 0810     Neutrophil % 86.0 %      Lymphocyte % 8.0 %      Monocyte % 4.0 %      Eosinophil % 1.0 %      Atypical Lymphocyte % 1.0 %      Neutrophils Absolute 8.14 10*3/mm3      Lymphocytes Absolute 0.76 10*3/mm3      Monocytes Absolute 0.38 10*3/mm3      Eosinophils Absolute 0.09 10*3/mm3      Anisocytosis Slight/1+     Oral Cells Mod/2+     Microcytes Slight/1+     Ovalocytes Mod/2+     Poikilocytes Mod/2+     Polychromasia Mod/2+     WBC Morphology Normal     Giant Platelets Slight/1+    Blood Culture - Blood, Arm, Right [[id number]] Collected:  10/04/19 0625    Specimen:  Blood from Arm, Right Updated:  10/05/19 0800     Blood Culture No growth at 24 hours    Basic Metabolic Panel [[id number]]  (Abnormal) Collected:  10/05/19 0520    Specimen:  Blood Updated:  10/05/19 0735     Glucose 53 mg/dL      BUN 13 mg/dL      Creatinine 1.03 mg/dL      Sodium 134 mmol/L      Potassium 4.0 mmol/L      Chloride 96 mmol/L      CO2 21.1 mmol/L      Calcium 8.8 mg/dL      eGFR Non African Amer 55 mL/min/1.73      BUN/Creatinine Ratio 12.6     Anion Gap 16.9 mmol/L     Narrative:       GFR Normal >60  Chronic Kidney Disease <60  Kidney Failure <15    Blood Culture - Blood, Arm, Left [[id number]]  (Abnormal) Collected:  10/04/19 0624    Specimen:  Blood from Arm, Left Updated:  10/05/19 0619     Blood Culture Escherichia coli     Isolated from Aerobic and Anaerobic Bottles     Gram Stain Aerobic Bottle Gram negative bacilli      Anaerobic Bottle Gram negative bacilli     Blood Culture ID, PCR - Blood, Arm, Left [[id number]]  (Abnormal) Collected:  10/04/19 0624    Specimen:  Blood from Arm, Left Updated:  10/05/19 0048     BCID, PCR Escherichia coli. Identification by BCID PCR.    MRSA Screen, PCR - Swab, Nares [[id number]]  (Normal) Collected:  10/04/19 1652    Specimen:  Swab from Nares Updated:  10/04/19 1853     MRSA PCR No MRSA Detected    Respiratory Panel, PCR - Swab, Nasopharynx [[id number]]  (Normal) Collected:  10/04/19 1652    Specimen:  Swab from Nasopharynx Updated:  10/04/19 1853     ADENOVIRUS, PCR Not Detected     Coronavirus 229E Not Detected     Coronavirus HKU1 Not Detected     Coronavirus NL63 Not Detected     Coronavirus OC43 Not Detected     Human Metapneumovirus Not Detected     Human Rhinovirus/Enterovirus Not Detected     Influenza B PCR Not Detected     Parainfluenza Virus 1 Not Detected     Parainfluenza Virus 2 Not Detected     Parainfluenza Virus 3 Not Detected     Parainfluenza Virus 4 Not Detected     Bordetella pertussis pcr Not Detected     Influenza A H1 2009 PCR Not Detected     Chlamydophila pneumoniae PCR Not Detected     Mycoplasma pneumo by PCR Not Detected     Influenza A PCR Not Detected     Influenza A H3 Not Detected     Influenza A H1 Not Detected     RSV, PCR Not Detected     Bordetella parapertussis PCR Not Detected    Comprehensive Metabolic Panel [[id number]]  (Abnormal) Collected:  10/04/19 0623    Specimen:  Blood Updated:  10/04/19 0818     Glucose 138 mg/dL      BUN 19 mg/dL      Creatinine 1.21 mg/dL      Sodium 142 mmol/L      Potassium 3.9 mmol/L      Chloride 103 mmol/L      CO2 27.9 mmol/L      Calcium 8.8 mg/dL      Total Protein 6.9 g/dL      Albumin 3.80 g/dL      ALT (SGPT) 136 U/L      AST (SGOT) 71 U/L      Alkaline Phosphatase 194 U/L      Total Bilirubin 0.3 mg/dL      eGFR Non African Amer 46 mL/min/1.73      Globulin 3.1 gm/dL      A/G Ratio 1.2 g/dL      BUN/Creatinine Ratio 15.7     Anion Gap 11.1 mmol/L     Narrative:        GFR Normal >60  Chronic Kidney Disease <60  Kidney Failure <15    Lactic Acid, Plasma [[id number]]  (Normal) Collected:  10/04/19 0623    Specimen:  Blood Updated:  10/04/19 0802     Lactate 1.4 mmol/L     [place] Draw [[id number]] Collected:  10/04/19 0623    Specimen:  Blood Updated:  10/04/19 0800    Narrative:       The following orders were created for panel order [place] Draw.  Procedure                               Abnormality         Status                     ---------                               -----------         ------                     Light Blue Top[[id number]]                                   Final result               Green Top (Gel)[[id number]]                                  Final result               Lavender Top[[id number]]                                     Final result               Gold Top - SST[[id number]]                                   Final result                 Please view results for these tests on the individual orders.    Light Blue Top [[id number]] Collected:  10/04/19 0623    Specimen:  Blood Updated:  10/04/19 0800     Extra Tube hold for add-on     Comment: Auto resulted       Green Top (Gel) [[id number]] Collected:  10/04/19 0623    Specimen:  Blood Updated:  10/04/19 0800     Extra Tube Hold for add-ons.     Comment: Auto resulted.       Lavender Top [[id number]] Collected:  10/04/19 0623    Specimen:  Blood Updated:  10/04/19 0800     Extra Tube hold for add-on     Comment: Auto resulted       Gold Top - SST [[id number]] Collected:  10/04/19 0623    Specimen:  Blood Updated:  10/04/19 0800     Extra Tube Hold for add-ons.     Comment: Auto resulted.       CBC & Differential [[id number]] Collected:  10/04/19 0623    Specimen:  Blood Updated:  10/04/19 0756    Narrative:       The following orders were created for panel order CBC & Differential.  Procedure                               Abnormality         Status                     ---------                                -----------         ------                     CBC Auto Differential[[id number]]        Abnormal            Final result                 Please view results for these tests on the individual orders.    CBC Auto Differential [[id number]]  (Abnormal) Collected:  10/04/19 0623    Specimen:  Blood Updated:  10/04/19 0756     WBC 12.58 10*3/mm3      RBC 4.60 10*6/mm3      Hemoglobin 12.0 g/dL      Hematocrit 39.1 %      MCV 85.0 fL      MCH 26.1 pg      MCHC 30.7 g/dL      RDW 14.1 %      RDW-SD 44.0 fl      MPV 12.5 fL      Platelets 187 10*3/mm3      Neutrophil % 82.7 %      Lymphocyte % 6.8 %      Monocyte % 8.7 %      Eosinophil % 0.8 %      Basophil % 0.3 %      Immature Grans % 0.7 %      Neutrophils, Absolute 10.40 10*3/mm3      Lymphocytes, Absolute 0.85 10*3/mm3      Monocytes, Absolute 1.10 10*3/mm3      Eosinophils, Absolute 0.10 10*3/mm3      Basophils, Absolute 0.04 10*3/mm3      Immature Grans, Absolute 0.09 10*3/mm3      nRBC 0.0 /100 WBC         Data Review:  Results from last 7 days   Lab Units 10/08/19  0541 10/07/19  0447 10/06/19  0529   SODIUM mmol/L 141 140 144   POTASSIUM mmol/L 4.1 4.1 4.5   CHLORIDE mmol/L 101 102 104   CO2 mmol/L 29.8* 26.3 29.2*   BUN mg/dL 15 15 17   CREATININE mg/dL 0.95 1.01* 1.11*   GLUCOSE mg/dL 83 107* 106*   CALCIUM mg/dL 9.3 8.8 8.7     Results from last 7 days   Lab Units 10/08/19  0541 10/07/19  0447 10/06/19  0529   WBC 10*3/mm3 7.47 6.05 5.61   HEMOGLOBIN g/dL 11.8* 11.7* 11.2*   HEMATOCRIT % 38.4 37.7 37.1   PLATELETS 10*3/mm3 216 138* 162             Lab Results   Lab Value Date/Time    TROPONINT 0.045 (C) 09/22/2019 1211    TROPONINT 0.034 (C) 09/22/2019 0604    TROPONINT 0.031 (C) 09/22/2019 0130    TROPONINT 0.036 (C) 09/21/2019 2248    TROPONINT 0.050 (H) 12/29/2018 0409    TROPONINT 0.085 (H) 12/24/2018 1900    TROPONINT 0.100 (C) 12/24/2018 1203    TROPONINT <0.01 03/26/2014 1731         Results from last 7 days   Lab Units 10/04/19  0623   ALK PHOS U/L 194*    BILIRUBIN mg/dL 0.3   ALT (SGPT) U/L 136*   AST (SGOT) U/L 71*             No results found for: POCGLU        Past Medical History:   Diagnosis Date   • Acute congestive heart failure (CMS/HCC) 12/24/2018   • Acute osteomyelitis (CMS/[facility])     Right shoulder due to IVDA   • Acute renal failure on dialysis (CMS/[facility])    • Anxiety    • Pugh esophagus    • CKD (chronic kidney disease)    • COPD (chronic obstructive pulmonary disease) (CMS/[facility])    • MRSA infection    • Nontraumatic subarachnoid hemorrhage (CMS/[facility])    • Seizures (CMS/[facility])    • Tracheostomy present (CMS/[facility])        Assessment:  Active Hospital Problems    Diagnosis  POA   • **Healthcare associated bacterial pneumonia [J15.9]  Yes   • Ureteral stone with hydronephrosis [N13.2]  Unknown   • UTI (urinary tract infection) [N39.0]  Unknown   • Infection due to ESBL-producing Escherichia coli [A49.8, Z16.12]  Unknown   • Sepsis due to Gram negative bacteria (CMS/[facility]) [A41.50]  Unknown   • Dyspnea [R06.00]  Yes   • Tracheostomy present (CMS/[facility]) [Z93.0]  Not Applicable   • Essential hypertension [I10]  Yes   • Dysphagia [R13.10]  Yes   • COPD (chronic obstructive pulmonary disease) (CMS/[facility]) [J44.9]  Yes   • Respiratory failure, chronic (CMS/[facility]) [J96.10]  Yes   • PEG (percutaneous endoscopic gastrostomy) status (CMS/HCC) [Z93.1]  Not Applicable   • Chronic diastolic CHF (congestive heart failure) (CMS/[facility]) [I50.32]  Yes   • Peripheral artery disease (CMS/[facility]) [I73.9]  Yes   • Stage 3 chronic kidney disease (CMS/[facility]) [N18.3]  Yes      Resolved Hospital Problems   No resolved problems to display.       Plan:  Continue with IV antibiotics and await results of cultures.  We will get some follow-up laboratory studies.  Continue with bronchodilator treatments.  CT abdomen and pelvis results noted. ID consult noted. Ask for urology consult.    True Moreno MD  10/8/2019  12:38 PM

## 2019-10-08 NOTE — THERAPY TREATMENT NOTE
Patient Name: Swetha Luis  : 1960    MRN: [id number]                              Today's Date: 10/8/2019       Admit Date: 10/4/2019    Visit Dx:     ICD-10-CM ICD-9-CM   1. Healthcare associated bacterial pneumonia J15.9 482.9   2. Oropharyngeal dysphagia R13.12 787.22     Patient Active Problem List   Diagnosis   • Tracheostomy complication (CMS/[facility])   • Gangrene of toe (CMS/[facility])   • Acute diastolic congestive heart failure (CMS/[facility])   • ARF (acute renal failure) (CMS/[facility])   • Stage 3 chronic kidney disease (CMS/[facility])   • Elevated troponin   • Acute respiratory failure with hypoxemia (CMS/[facility])   • Acute osteomyelitis (CMS/[facility])   • UTI due to extended-spectrum beta lactamase (ESBL) producing Escherichia coli   • Thrush, oral   • Tracheostomy malfunction (CMS/[facility])   • COPD (chronic obstructive pulmonary disease) (CMS/[facility])   • Respiratory failure, chronic (CMS/HCC)   • PEG (percutaneous endoscopic gastrostomy) status (CMS/HCC)   • History of osteomyelitis   • Polysubstance abuse (CMS/[facility])   • History of tracheal stenosis   • Chronic diastolic CHF (congestive heart failure) (CMS/[facility])   • Peripheral artery disease (CMS/[facility])   • Medically noncompliant   • WENDI and COPD overlap syndrome (CMS/[facility])   • Dyspnea   • Elevated LFTs   • Elevated troponin   • Tracheostomy present (CMS/[facility])   • Essential hypertension   • Dysphagia   • Healthcare associated bacterial pneumonia   • Infection due to ESBL-producing Escherichia coli   • Sepsis due to Gram negative bacteria (CMS/[facility])   • Ureteral stone with hydronephrosis   • UTI (urinary tract infection)     Past Medical History:   Diagnosis Date   • Acute congestive heart failure (CMS/[facility]) 2018   • Acute osteomyelitis (CMS/[facility])     Right shoulder due to IVDA   • Acute renal failure on dialysis (CMS/[facility])    • Anxiety    • Pugh esophagus    • CKD (chronic kidney disease)    • COPD (chronic obstructive pulmonary disease) (CMS/[facility])    • MRSA infection    • Nontraumatic  subarachnoid hemorrhage (CMS/HCC)    • Seizures (CMS/HCC)    • Tracheostomy present (CMS/HCC)      Past Surgical History:   Procedure Laterality Date   • TRACHEOSTOMY       General Information     Row Name 10/08/19 1251          PT Evaluation Time/Intention    Document Type  therapy note (daily note)  -AR     Mode of Treatment  physical therapy  -AR     Row Name 10/08/19 1251          General Information    Patient Profile Reviewed?  yes  -AR     Existing Precautions/Restrictions  fall;oxygen therapy device and L/min  -AR     Row Name 10/08/19 1251          Cognitive Assessment/Intervention- PT/OT    Orientation Status (Cognition)  oriented to;person;place  -AR       User Key  (r) = Recorded By, (t) = Taken By, (c) = Cosigned By    Initials Name Provider Type    AR Maria Del Carmen Brandon PT Physical Therapist        Mobility     Row Name 10/08/19 1251          Bed Mobility Assessment/Treatment    Bed Mobility Assessment/Treatment  supine-sit;sit-supine  -AR     Supine-Sit [place] (Bed Mobility)  supervision  -AR     Sit-Supine [place] (Bed Mobility)  not tested  -AR     Assistive Device (Bed Mobility)  bed rails;head of bed elevated  -AR     Row Name 10/08/19 1251          Sit-Stand Transfer    Sit-Stand [place] (Transfers)  contact guard  -AR     Assistive Device (Sit-Stand Transfers)  walker, front-wheeled  -AR     Row Name 10/08/19 1251          Gait/Stairs Assessment/Training    Gait/Stairs Assessment/Training  gait/ambulation independence  -AR     [place] Level (Gait)  contact guard  -AR     Assistive Device (Gait)  walker, front-wheeled  -AR     Distance in Feet (Gait)  20, limited by fatigue very small shuffling steps  -AR     Pattern (Gait)  swing-to  -AR     Deviations/Abnormal Patterns (Gait)  balaji decreased;festinating/shuffling  -AR     Bilateral Gait Deviations  heel strike decreased;forward flexed posture  -AR     Comment (Gait/Stairs)  declined further distance, VC for posture and  step length  -AR       User Key  (r) = Recorded By, (t) = Taken By, (c) = Cosigned By    Initials Name Provider Type    Maria Del Carmen Thakkar, PT Physical Therapist        Obj/Interventions    No documentation.       Goals/Plan    No documentation.       Clinical Impression     Row Name 10/08/19 1252          Pain Scale: Numbers Pre/Post-Treatment    Pain Scale: Numbers, Pretreatment  0/10 - no pain  -AR     Pain Intervention(s)  Repositioned  -AR     Row Name 10/08/19 1252          Physical Therapy Clinical Impression    Criteria for Skilled Interventions Met (PT Clinical Impression)  treatment indicated  -AR     Rehab Potential (PT Clinical Summary)  good, to achieve stated therapy goals  -AR     Row Name 10/08/19 1252          Vital Signs    Pre SpO2 (%)  98  -AR     O2 Delivery Pre Treatment  trach collar  -AR     Intra SpO2 (%)  96  -AR     O2 Delivery Intra Treatment  room air now has venturi mask in room to ambulate w/ 02  -AR     Post SpO2 (%)  97  -AR     O2 Delivery Post Treatment  trach collar  -AR     Row Name 10/08/19 1252          Positioning and Restraints    Pre-Treatment Position  in bed  -AR     Post Treatment Position  chair  -AR     In Chair  notified nsg;reclined;sitting;call light within reach;encouraged to call for assist  -AR       User Key  (r) = Recorded By, (t) = Taken By, (c) = Cosigned By    Initials Name Provider Type    Maria Del Carmen Thakkar, PT Physical Therapist        Outcome Measures     Row Name 10/08/19 1254          How much help from another person do you currently need...    Turning from your back to your side while in flat bed without using bedrails?  3  -AR     Moving from lying on back to sitting on the side of a flat bed without bedrails?  3  -AR     Moving to and from a bed to a chair (including a wheelchair)?  3  -AR     Standing up from a chair using your arms (e.g., wheelchair, bedside chair)?  3  -AR     Climbing 3-5 steps with a railing?  2  -AR     To walk in hospital  room?  3  -AR     AM-PAC 6 Clicks Score (PT)  17  -AR       User Key  (r) = Recorded By, (t) = Taken By, (c) = Cosigned By    Initials Name Provider Type    Maria Del Carmen Thakkar PT Physical Therapist        Physical Therapy Education     Title: PT OT SLP Therapies (In Progress)     Topic: Physical Therapy (In Progress)     Point: Mobility training (In Progress)     Learning Progress Summary           Patient Acceptance, E, NR by AR at 10/8/2019 12:54 PM    Acceptance, E,TB,D, VU,DU,NR by  at 10/7/2019  9:35 PM    Acceptance, E,D, VU,NR by MS at 10/7/2019 10:33 AM    Acceptance, E,D, VU,NR by MS at 10/6/2019  9:26 AM    Acceptance, E,D, VU,NR by MS at 10/5/2019  9:25 AM                   Point: Home exercise program (In Progress)     Learning Progress Summary           Patient Acceptance, E, NR by AR at 10/8/2019 12:54 PM    Acceptance, E,TB,D, VU,DU,NR by  at 10/7/2019  9:35 PM    Acceptance, E,D, VU,NR by MS at 10/7/2019 10:33 AM    Acceptance, E,D, VU,NR by MS at 10/6/2019  9:26 AM    Acceptance, E,D, VU,NR by MS at 10/5/2019  9:25 AM                   Point: Body mechanics (In Progress)     Learning Progress Summary           Patient Acceptance, E, NR by AR at 10/8/2019 12:54 PM    Acceptance, E,TB,D, VU,DU,NR by  at 10/7/2019  9:35 PM    Acceptance, E,D, VU,NR by MS at 10/7/2019 10:33 AM    Acceptance, E,D, VU,NR by MS at 10/6/2019  9:26 AM    Acceptance, E,D, VU,NR by MS at 10/5/2019  9:25 AM                   Point: Precautions (In Progress)     Learning Progress Summary           Patient Acceptance, E, NR by AR at 10/8/2019 12:54 PM    Acceptance, E,TB,D, VU,DU,NR by  at 10/7/2019  9:35 PM    Acceptance, E,D, VU,NR by MS at 10/7/2019 10:33 AM    Acceptance, E,D, VU,NR by MS at 10/6/2019  9:26 AM    Acceptance, PRATIMA MÉNDEZ VU,NR by MS at 10/5/2019  9:25 AM                               User Key     Initials Effective Dates Name Provider Type Discipline    MS 04/03/18 -  Andrei Lee, PT Physical  Therapist PT     04/06/17 -  Yvette Gutierrez, RN Registered Nurse Nurse    AR 04/03/18 -  Maria Del Carmen Brandon PT Physical Therapist PT              PT Recommendation and Plan     Outcome Summary/Treatment Plan (PT)  Anticipated Discharge Disposition (PT): skilled nursing facility  Outcome Summary: Limited progress towards goals during PT.  Able to ambulate short distance in room w/ min A using RW. Very slow shuffling gait w/small steps, cues for posture and step length.  Limited by fatigue and SOB; Sp02 at 96% after ambulating on room air.  Currently recommend DC to SNU.       Time Calculation:   PT Charges     Row Name 10/08/19 1250             Time Calculation    Start Time  1135  -AR      Stop Time  1149  -AR      Time Calculation (min)  14 min  -AR      PT Received On  10/08/19  -AR      PT - Next Appointment  10/09/19  -AR        User Key  (r) = Recorded By, (t) = Taken By, (c) = Cosigned By    Initials Name Provider Type    AR Maria Del Carmen Brandon PT Physical Therapist        Therapy Charges for Today     Code Description Service Date Service Provider Modifiers Qty    71701210622 HC PT THER PROC EA 15 MIN 10/8/2019 Maria Del Carmen Brandon, PT GP 1          PT G-Codes  Outcome Measure Options: AM-PAC 6 Clicks Basic Mobility (PT)  AM-PAC 6 Clicks Score (PT): 17    Maria Del Carmen Brandon PT  10/8/2019

## 2019-10-08 NOTE — MBS/VFSS/FEES
Acute Care - Speech Language Pathology   Swallow Initial Evaluation [facility]     Patient Name: Swetha Luis  : 1960  MRN: [id number]  Today's Date: 10/8/2019               Admit Date: 10/4/2019    Visit Dx:     ICD-10-CM ICD-9-CM   1. Healthcare associated bacterial pneumonia J15.9 482.9   2. Oropharyngeal dysphagia R13.12 787.22     Patient Active Problem List   Diagnosis   • Tracheostomy complication (CMS/[facility])   • Gangrene of toe (CMS/[facility])   • Acute diastolic congestive heart failure (CMS/[facility])   • ARF (acute renal failure) (CMS/[facility])   • Stage 3 chronic kidney disease (CMS/[facility])   • Elevated troponin   • Acute respiratory failure with hypoxemia (CMS/[facility])   • Acute osteomyelitis (CMS/[facility])   • UTI due to extended-spectrum beta lactamase (ESBL) producing Escherichia coli   • Thrush, oral   • Tracheostomy malfunction (CMS/[facility])   • COPD (chronic obstructive pulmonary disease) (CMS/[facility])   • Respiratory failure, chronic (CMS/[facility])   • PEG (percutaneous endoscopic gastrostomy) status (CMS/[facility])   • History of osteomyelitis   • Polysubstance abuse (CMS/[facility])   • History of tracheal stenosis   • Chronic diastolic CHF (congestive heart failure) (CMS/[facility])   • Peripheral artery disease (CMS/[facility])   • Medically noncompliant   • WENDI and COPD overlap syndrome (CMS/[facility])   • Dyspnea   • Elevated LFTs   • Elevated troponin   • Tracheostomy present (CMS/[facility])   • Essential hypertension   • Dysphagia   • Healthcare associated bacterial pneumonia   • Infection due to ESBL-producing Escherichia coli   • Sepsis due to Gram negative bacteria (CMS/[facility])     Past Medical History:   Diagnosis Date   • Acute congestive heart failure (CMS/[facility]) 2018   • Acute osteomyelitis (CMS/[facility])     Right shoulder due to IVDA   • Acute renal failure on dialysis (CMS/[facility])    • Anxiety    • Pugh esophagus    • CKD (chronic kidney disease)    • COPD (chronic obstructive pulmonary disease) (CMS/[facility])    • MRSA infection    • Nontraumatic  subarachnoid hemorrhage (CMS/[facility])    • Seizures (CMS/[facility])    • Tracheostomy present (CMS/[facility])      Past Surgical History:   Procedure Laterality Date   • TRACHEOSTOMY          SWALLOW EVALUATION (last 72 hours)      SLP Adult Swallow Evaluation     Row Name 10/08/19 8713                   Rehab Evaluation    Document Type  evaluation  -SH        Subjective Information  no complaints  -SH        Patient Observations  alert;cooperative  -        Patient Effort  adequate  -        Comment  poor informant  -SH           General Information    Patient Profile Reviewed  yes  -SH        Pertinent History Of Current Problem  Healthcare associated bacterial pna, non compliance with diet recs, hx asp pna, #4 shiley in place w/ aqua PMV, stridor noted  -        Current Method of Nutrition  pureed;honey-thick liquids  -        Precautions/Limitations, Vision  WFL;for purposes of eval  -SH        Precautions/Limitations, Hearing  WFL;for purposes of eval  -        Prior Level of Function-Swallowing  other (see comments) pt is a poor informant  -        Plans/Goals Discussed with  patient;agreed upon  -        Barriers to Rehab  previous functional deficit  -        Patient's Goals for Discharge  patient did not state  -           Pain Scale: Numbers Pre/Post-Treatment    Pain Scale: Numbers, Pretreatment  0/10 - no pain  -SH        Pain Scale: Numbers, Post-Treatment  0/10 - no pain  -SH           MBS/VFSS Interpretation    VFSS Summary  Patient presents with moderate oropharyngeal dysphagia characterized by poor bolus control, swallow delay, swallow mistiming, and impaired protective reaction to aspiration. Trace non-transient penetration before and during the swallow with initial trial of honey via spoon. No penetration with second trial of honey via spoon. Spill past valleculae with honey via cup with transient penetration during the swallow x1. Improved swallow initiation with initial trial of honey via  straw without penetration. No penetration with nectar via spoon. Deep non transient penetration during the swallow with nectar. Smaller bolus size did reduce the amount of penetrated material, but did not eliminate it. Non transient penetration present with straw drinks of nectar during the swallow. Silent and deep non transient penetration to vocal cords with thins via cup despite cues for small drink. No penetration during the swallow with puree. Mild base of tongue residue post swallow. Epiglottic undercoating noted from residue before the a cued second swallow. Munching mastication with mech soft and regular. Spillage to pyriforms before the swallow with mech soft without penetration. Pt required a liquid wash to fully prepare hard solid bolus. Deep penetration before and silent aspiration during the swallow with nectar via straw as a liquid wash with regular. Deep non transient penetration to the vocal cords with honey via straw with fatigue. No penetration appreciated with honey via cup at the end of the study.   -           SLP Communication to Radiology    Summary Statement  Patient presents with moderate oropharyngeal dysphagia characterized by poor bolus control, swallow delay, swallow mistiming, and impaired protective reaction to aspiration. Silent penetration with honey and thins. Silent aspiration with nectar. No penetration with puree, mechanical soft, or regular.   -           Clinical Impression    SLP Swallowing Diagnosis  moderate;oral dysfunction;pharyngeal dysfunction  -        Functional Impact  risk of aspiration/pneumonia  -        Rehab Potential/Prognosis, Swallowing  adequate, monitor progress closely  -        Swallow Criteria for Skilled Therapeutic Interventions Met  demonstrates skilled criteria  -           Recommendations    Therapy Frequency (Swallow)  PRN  -        Predicted Duration Therapy Intervention (Days)  until discharge  -        SLP Diet Recommendation   mechanical soft with no mixed consistencies;honey thick liquids;ice chips between meals after oral care, with supervision  -        Recommended Diagnostics  VFSS (MBS)  -        Recommended Precautions and Strategies  upright posture during/after eating;small bites of food and sips of liquid;no straw;volitional throat clear  -        SLP Rec. for Method of Medication Administration  meds whole;meds crushed;with pudding or applesauce;as tolerated  -        Monitor for Signs of Aspiration  yes;notify SLP if any concerns  -        Anticipated Dischage Disposition  anticipate therapy at next level of care  -           Swallow Goals (SLP)    Oral Nutrition/Hydration Goal Selection (SLP)  --  -        Lingual Strengthening Goal Selection (SLP)  lingual strengthening, SLP goal 1  -        Pharyngeal Strengthening Exercise Goal Selection (SLP)  pharyngeal strengthening exercise, SLP goal 1  -        Additional Documentation  pharyngeal strengthening exercise goal selection (SLP);lingual strengthening goal selection (SLP)  -           Oral Nutrition/Hydration Goal 1 (SLP)    Oral Nutrition/Hydration Goal 1, SLP  Pt will patricia mech soft, no mixed, and honey, no straws without clinical signs of pna,  -        Time Frame (Oral Nutrition/Hydration Goal 1, SLP)  by discharge  -           Lingual Strengthening Goal 1 (SLP)    Activity (Lingual Strengthening Goal 1, SLP)  increase tongue back strength  -        Increase Tongue Back Strength  lingual resistance exercises Aurelia  -        [place]/Accuracy (Lingual Strengthening Goal 1, SLP)  with minimal cues (75-90% accuracy)  -        Time Frame (Lingual Strengthening Goal 1, SLP)  by discharge  -           Pharyngeal Strengthening Exercise Goal 1 (SLP)    Activity (Pharyngeal Strengthening Goal 1, SLP)  increase squeeze/positive pressure generation  -        Increase Squeeze/Positive Pressure Generation  breath hold exercises;aurelia;shaker;hard  effortful swallow;falsetto adduction exercises  -        [place]/Accuracy (Pharyngeal Strengthening Goal 1, SLP)  with minimal cues (75-90% accuracy)  -        Time Frame (Pharyngeal Strengthening Goal 1, SLP)  by discharge  -          User Key  (r) = Recorded By, (t) = Taken By, (c) = Cosigned By    Initials Name Effective Dates     Kirsten North, MS CCC-SLP 03/07/18 -           EDUCATION  The patient has been educated in the following areas:   Dysphagia (Swallowing Impairment).    SLP Recommendation and Plan  SLP Swallowing Diagnosis: moderate, oral dysfunction, pharyngeal dysfunction  SLP Diet Recommendation: mechanical soft with no mixed consistencies, honey thick liquids, ice chips between meals after oral care, with supervision  Recommended Precautions and Strategies: upright posture during/after eating, small bites of food and sips of liquid, no straw, volitional throat clear  SLP Rec. for Method of Medication Administration: meds whole, meds crushed, with pudding or applesauce, as tolerated     Monitor for Signs of Aspiration: yes, notify SLP if any concerns  Re[place] Diagn[place] [place]  Swallow Criteria for Skilled Therapeutic Interventions Met: demonstrates skilled criteria  Anticipated Dischage Disposition: anticipate therapy at next level of care  Rehab Potential/Prognosis, Swallowing: adequate, monitor progress closely  Therapy Frequency (Swallow): PRN  Predicted Duration Therapy Intervention (Days): until discharge       Plan of Care Reviewed With: patient  Outcome Summary: Video swallow completed. Pt with size #4 shiley with aqua PMV in place. SLP provided education to patient to remove PMV when sleeping. Full VFSS report pending. Pt exhibited silent aspiration with nectar. SLP recs upgrade to mech soft, no mixed, and to continue honey (no straws, small drinks). Pt would benefit from periodic throat clearing to clear non transient penetration. Pt with aspiration risk despite  modified diet. Meds whole or crushed with puree.     SLP GOALS     Row Name 10/08/19 0830             Oral Nutrition/Hydration Goal 1 (SLP)    Oral Nutrition/Hydration Goal 1, SLP  Pt will patricia mech soft, no mixed, and honey, no straws without clinical signs of pna,  -      Time Frame (Oral Nutrition/Hydration Goal 1, SLP)  by discharge  -         Lingual Strengthening Goal 1 (SLP)    Activity (Lingual Strengthening Goal 1, SLP)  increase tongue back strength  -      Increase Tongue Back Strength  lingual resistance exercises Aurelia  -      [place]/Accuracy (Lingual Strengthening Goal 1, SLP)  with minimal cues (75-90% accuracy)  -      Time Frame (Lingual Strengthening Goal 1, SLP)  by discharge  -         Pharyngeal Strengthening Exercise Goal 1 (SLP)    Activity (Pharyngeal Strengthening Goal 1, SLP)  increase squeeze/positive pressure generation  -      Increase Squeeze/Positive Pressure Generation  breath hold exercises;aurelia;shaker;hard effortful swallow;falsetto adduction exercises  -      [place]/Accuracy (Pharyngeal Strengthening Goal 1, SLP)  with minimal cues (75-90% accuracy)  -      Time Frame (Pharyngeal Strengthening Goal 1, SLP)  by discharge  -        User Key  (r) = Recorded By, (t) = Taken By, (c) = Cosigned By    Initials Name Provider Type    Kirsten Rocha MS CCC-SLP Speech and Language Pathologist           SLP Outcome Measures (last 72 hours)      SLP Outcome Measures     Row Name 10/08/19 1100             SLP Outcome Measures    Outcome Measure Used?  Adult NOMS  -         Adult FCM Scores    FCM Chosen  Swallowing  -      Swallowing FCM Score  3  -        User Key  (r) = Recorded By, (t) = Taken By, (c) = Cosigned By    Initials Name Effective Dates    Kirsten Rocha MS CCC-SLP 03/07/18 -            Time Calculation:   Time Calculation- SLP     Row Name 10/08/19 1101             Time Calculation- SLP    SLP Start Time  0830  -      SLP Received On   10/08/19  -        User Key  (r) = Recorded By, (t) = Taken By, (c) = Cosigned By    Initials Name Provider Type     Kirsten North MS CCC-SLP Speech and Language Pathologist          Therapy Charges for Today     Code Description Service Date Service Provider Modifiers Qty    [id number] HC ST MOTION FLUORO EVAL SWALLOW 5 10/8/2019 Kirsten North MS CCC-SLP GN 1               Kirsten North MS CCC-SLP  10/8/2019

## 2019-10-09 LAB
BACTERIA SPEC AEROBE CULT: ABNORMAL
BACTERIA SPEC AEROBE CULT: NORMAL

## 2019-10-09 PROCEDURE — 97110 THERAPEUTIC EXERCISES: CPT

## 2019-10-09 PROCEDURE — 94799 UNLISTED PULMONARY SVC/PX: CPT

## 2019-10-09 PROCEDURE — 25010000002 ERTAPENEM PER 500 MG: Performed by: HOSPITALIST

## 2019-10-09 PROCEDURE — 25010000002 LINEZOLID 600 MG/300ML SOLUTION: Performed by: HOSPITALIST

## 2019-10-09 RX ORDER — LINEZOLID 2 MG/ML
600 INJECTION, SOLUTION INTRAVENOUS EVERY 12 HOURS SCHEDULED
Status: DISCONTINUED | OUTPATIENT
Start: 2019-10-09 | End: 2019-10-14

## 2019-10-09 RX ADMIN — LINEZOLID 600 MG: 600 INJECTION, SOLUTION INTRAVENOUS at 21:00

## 2019-10-09 RX ADMIN — OXYCODONE HYDROCHLORIDE AND ACETAMINOPHEN 1 TABLET: 5; 325 TABLET ORAL at 15:00

## 2019-10-09 RX ADMIN — AMLODIPINE BESYLATE 5 MG: 5 TABLET ORAL at 09:06

## 2019-10-09 RX ADMIN — GABAPENTIN 300 MG: 300 CAPSULE ORAL at 20:06

## 2019-10-09 RX ADMIN — BUSPIRONE HYDROCHLORIDE 5 MG: 5 TABLET ORAL at 09:06

## 2019-10-09 RX ADMIN — METOPROLOL SUCCINATE 25 MG: 25 TABLET, FILM COATED, EXTENDED RELEASE ORAL at 09:05

## 2019-10-09 RX ADMIN — OXYCODONE HYDROCHLORIDE AND ACETAMINOPHEN 1 TABLET: 5; 325 TABLET ORAL at 09:12

## 2019-10-09 RX ADMIN — Medication 100 MG: at 09:05

## 2019-10-09 RX ADMIN — SODIUM CHLORIDE, PRESERVATIVE FREE 10 ML: 5 INJECTION INTRAVENOUS at 20:06

## 2019-10-09 RX ADMIN — ERTAPENEM SODIUM 1 G: 1 INJECTION, POWDER, LYOPHILIZED, FOR SOLUTION INTRAMUSCULAR; INTRAVENOUS at 15:01

## 2019-10-09 RX ADMIN — PRAMIPEXOLE DIHYDROCHLORIDE 0.25 MG: 0.25 TABLET ORAL at 20:07

## 2019-10-09 RX ADMIN — GABAPENTIN 300 MG: 300 CAPSULE ORAL at 09:05

## 2019-10-09 RX ADMIN — BUSPIRONE HYDROCHLORIDE 5 MG: 5 TABLET ORAL at 16:44

## 2019-10-09 RX ADMIN — SODIUM CHLORIDE, PRESERVATIVE FREE 10 ML: 5 INJECTION INTRAVENOUS at 09:06

## 2019-10-09 RX ADMIN — ESCITALOPRAM 20 MG: 20 TABLET, FILM COATED ORAL at 09:05

## 2019-10-09 RX ADMIN — GABAPENTIN 300 MG: 300 CAPSULE ORAL at 16:44

## 2019-10-09 RX ADMIN — BUSPIRONE HYDROCHLORIDE 5 MG: 5 TABLET ORAL at 20:07

## 2019-10-09 RX ADMIN — LINEZOLID 600 MG: 600 INJECTION, SOLUTION INTRAVENOUS at 15:01

## 2019-10-09 NOTE — PLAN OF CARE
Problem: Patient Care Overview  Goal: Plan of Care Review  Outcome: Ongoing (interventions implemented as appropriate)   10/09/19 0439   Plan of Care Review   Progress no change   OTHER   Outcome Summary Urology consulted, 5 mm stone in ureter, possible suregery and stent placement for 10/10, trach care completed, plan to d/c to SNU, VSS, will CTM   Coping/Psychosocial   Plan of Care Reviewed With patient       Problem: Pain, Chronic (Adult)  Goal: Acceptable Pain/Comfort Level and Functional Ability  Outcome: Ongoing (interventions implemented as appropriate)      Problem: Pneumonia (Adult)  Goal: Signs and Symptoms of Listed Potential Problems Will be Absent, Minimized or Managed (Pneumonia)  Outcome: Ongoing (interventions implemented as appropriate)      Problem: Fall Risk (Adult)  Goal: Absence of Fall  Outcome: Ongoing (interventions implemented as appropriate)      Problem: Skin Injury Risk (Adult)  Goal: Skin Health and Integrity  Outcome: Ongoing (interventions implemented as appropriate)

## 2019-10-09 NOTE — THERAPY TREATMENT NOTE
Patient Name: Swetha Luis  : 1960    MRN: [id number]                              Today's Date: 10/9/2019       Admit Date: 10/4/2019    Visit Dx:     ICD-10-CM ICD-9-CM   1. Healthcare associated bacterial pneumonia J15.9 482.9   2. Oropharyngeal dysphagia R13.12 787.22     Patient Active Problem List   Diagnosis   • Tracheostomy complication (CMS/[facility])   • Gangrene of toe (CMS/[facility])   • Acute diastolic congestive heart failure (CMS/[facility])   • ARF (acute renal failure) (CMS/[facility])   • Stage 3 chronic kidney disease (CMS/[facility])   • Elevated troponin   • Acute respiratory failure with hypoxemia (CMS/[facility])   • Acute osteomyelitis (CMS/[facility])   • UTI due to extended-spectrum beta lactamase (ESBL) producing Escherichia coli   • Thrush, oral   • Tracheostomy malfunction (CMS/[facility])   • COPD (chronic obstructive pulmonary disease) (CMS/[facility])   • Respiratory failure, chronic (CMS/HCC)   • PEG (percutaneous endoscopic gastrostomy) status (CMS/HCC)   • History of osteomyelitis   • Polysubstance abuse (CMS/[facility])   • History of tracheal stenosis   • Chronic diastolic CHF (congestive heart failure) (CMS/[facility])   • Peripheral artery disease (CMS/[facility])   • Medically noncompliant   • WENDI and COPD overlap syndrome (CMS/[facility])   • Dyspnea   • Elevated LFTs   • Elevated troponin   • Tracheostomy present (CMS/[facility])   • Essential hypertension   • Dysphagia   • Healthcare associated bacterial pneumonia   • Infection due to ESBL-producing Escherichia coli   • Sepsis due to Gram negative bacteria (CMS/[facility])   • Ureteral stone with hydronephrosis   • UTI (urinary tract infection)     Past Medical History:   Diagnosis Date   • Acute congestive heart failure (CMS/[facility]) 2018   • Acute osteomyelitis (CMS/[facility])     Right shoulder due to IVDA   • Acute renal failure on dialysis (CMS/[facility])    • Anxiety    • Pugh esophagus    • CKD (chronic kidney disease)    • COPD (chronic obstructive pulmonary disease) (CMS/[facility])    • MRSA infection    • Nontraumatic  "subarachnoid hemorrhage (CMS/HCC)    • Seizures (CMS/HCC)    • Tracheostomy present (CMS/HCC)      Past Surgical History:   Procedure Laterality Date   • TRACHEOSTOMY       General Information     Row Name 10/09/19 0920          PT Evaluation Time/Intention    Document Type  therapy note (daily note) Pt. reports feeling \"good\" this AM with c/o mild SOA with upright mobility.  Otherwise, pt. agreeable to work with P.T.   -MS     Mode of Treatment  physical therapy;individual therapy  -MS     Row Name 10/09/19 0920          General Information    Patient Profile Reviewed?  yes  -MS     Existing Precautions/Restrictions  fall  (Significant)  Trach;  6 liters oxygen;  Pt. has [place] Valve donned this AM.   -MS     Row Name 10/09/19 0920          Cognitive Assessment/Intervention- PT/OT    Orientation Status (Cognition)  oriented x 3  -MS     Row Name 10/09/19 0920          Safety Issues, Functional Mobility    Comment, Safety Issues/Impairments (Mobility)  Gait belt used for safety.   -MS       User Key  (r) = Recorded By, (t) = Taken By, (c) = Cosigned By    Initials Name Provider Type    MS Andrei Lee, PT Physical Therapist        Mobility     Row Name 10/09/19 0921          Bed Mobility Assessment/Treatment    Supine-Sit [place] (Bed Mobility)  contact guard  -MS     Assistive Device (Bed Mobility)  bed rails  -MS     Row Name 10/09/19 0921          Sit-Stand Transfer    Sit-Stand [place] (Transfers)  contact guard  -MS     Assistive Device (Sit-Stand Transfers)  walker, front-wheeled  -MS     Row Name 10/09/19 0921          Gait/Stairs Assessment/Training    [place] Level (Gait)  contact guard  -MS     Assistive Device (Gait)  walker, front-wheeled  -MS     Distance in Feet (Gait)  40 feet  -MS     Pattern (Gait)  step-through  -MS     Deviations/Abnormal Patterns (Gait)  stride length decreased  -MS     Bilateral Gait Deviations  forward flexed posture  -MS     Comment (Gait/Stairs)  " Verbal/tactile cues for posture correction and to increase her bilateral step length.   Limited in gait distance by overall fatigue, SOA.   -MS       User Key  (r) = Recorded By, (t) = Taken By, (c) = Cosigned By    Initials Name Provider Type    Andrei Castro, PT Physical Therapist        Obj/Interventions     Row Name 10/09/19 0922          Therapeutic Exercise    Comment (Therapeutic Exercise)  BLE Standing and sitting ther. ex. program x 10 reps completed (Hip Flexion, LAQ's, Mini-squats, Heel Raises)  -MS       User Key  (r) = Recorded By, (t) = Taken By, (c) = Cosigned By    Initials Name Provider Type    Andrei Castro, PT Physical Therapist        Goals/Plan    No documentation.       Clinical Impression     Row Name 10/09/19 0923          Pain Scale: Numbers Pre/Post-Treatment    Pain Scale: Numbers, Pretreatment  2/10  -MS     Pain Scale: Numbers, Post-Treatment  2/10  -MS     Pain Location - Orientation  lower  -MS     Pain Location  back  -MS     Pre/Post Treatment Pain Comment  Pt. reports this is a chronic pain issue  -MS     Row Name 10/09/19 0923          Vital Signs    Pre SpO2 (%)  95  -MS     O2 Delivery Pre Treatment  trach collar  -MS     Post SpO2 (%)  96 Immediately post ambulation.   -MS     O2 Delivery Post Treatment  trach collar  -MS     Row Name 10/09/19 0923          Positioning and Restraints    Pre-Treatment Position  in bed  -MS     Post Treatment Position  chair  -MS     In Chair  notified nsg;reclined;sitting;call light within reach;encouraged to call for assist All lines intact.  -MS       User Key  (r) = Recorded By, (t) = Taken By, (c) = Cosigned By    Initials Name Provider Type    Andrei Castro, PT Physical Therapist        Outcome Measures     Row Name 10/09/19 0926          How much help from another person do you currently need...    Turning from your back to your side while in flat bed without using bedrails?  3  -MS     Moving from lying on back to  sitting on the side of a flat bed without bedrails?  3  -MS     Moving to and from a bed to a chair (including a wheelchair)?  3  -MS     Standing up from a chair using your arms (e.g., wheelchair, bedside chair)?  3  -MS     Climbing 3-5 steps with a railing?  2  -MS     To walk in hospital room?  3  -MS     AM-PAC 6 Clicks Score (PT)  17  -MS     Row Name 10/09/19 0926          Functional Assessment    Outcome Measure Options  AM-PAC 6 Clicks Basic Mobility (PT)  -MS       User Key  (r) = Recorded By, (t) = Taken By, (c) = Cosigned By    Initials Name Provider Type    MS Andrei Lee, PT Physical Therapist        Physical Therapy Education     Title: PT OT SLP Therapies (Done)     Topic: Physical Therapy (Done)     Point: Mobility training (Done)     Learning Progress Summary           Patient Acceptance, E,D, VU,NR by MS at 10/9/2019  9:24 AM    Acceptance, E,TB,D, VU,NR,DU by  at 10/8/2019 10:17 PM    Acceptance, E, NR by AR at 10/8/2019 12:54 PM    Acceptance, E,TB,D, VU,DU,NR by  at 10/7/2019  9:35 PM    Acceptance, E,D, VU,NR by MS at 10/7/2019 10:33 AM    Acceptance, E,D, VU,NR by MS at 10/6/2019  9:26 AM    Acceptance, E,D, VU,NR by MS at 10/5/2019  9:25 AM                   Point: Home exercise program (Done)     Learning Progress Summary           Patient Acceptance, E,D, VU,NR by MS at 10/9/2019  9:24 AM    Acceptance, E,TB,D, VU,NR,DU by  at 10/8/2019 10:17 PM    Acceptance, E, NR by AR at 10/8/2019 12:54 PM    Acceptance, E,TB,D, VU,DU,NR by  at 10/7/2019  9:35 PM    Acceptance, E,D, VU,NR by MS at 10/7/2019 10:33 AM    Acceptance, E,D, VU,NR by MS at 10/6/2019  9:26 AM    Acceptance, E,D, VU,NR by MS at 10/5/2019  9:25 AM                   Point: Body mechanics (Done)     Learning Progress Summary           Patient Acceptance, PRATIMA MÉNDEZ VU,NR by MS at 10/9/2019  9:24 AM    Acceptance, HONEY,CLAYTON,PRATIMA, MOSHE,VERA,DU by  at 10/8/2019 10:17 PM    Acceptance, VERA MÉNDEZ by AR at 10/8/2019 12:54 PM    Acceptance,  E,TB,D, VU,DU,NR by  at 10/7/2019  9:35 PM    Acceptance, E,D, VU,NR by MS at 10/7/2019 10:33 AM    Acceptance, E,D, VU,NR by MS at 10/6/2019  9:26 AM    Acceptance, E,D, VU,NR by MS at 10/5/2019  9:25 AM                   Point: Precautions (Done)     Learning Progress Summary           Patient Acceptance, E,D, VU,NR by MS at 10/9/2019  9:24 AM    Acceptance, E,TB,D, VU,NR,DU by  at 10/8/2019 10:17 PM    Acceptance, E, NR by AR at 10/8/2019 12:54 PM    Acceptance, E,TB,D, VU,DU,NR by  at 10/7/2019  9:35 PM    Acceptance, E,D, VU,NR by MS at 10/7/2019 10:33 AM    Acceptance, E,D, VU,NR by MS at 10/6/2019  9:26 AM    Acceptance, E,D, VU,NR by MS at 10/5/2019  9:25 AM                               User Key     Initials Effective Dates Name Provider Type Discipline    MS 04/03/18 -  Andrei Lee PT Physical Therapist PT     04/06/17 -  Yvette Gutierrez, RN Registered Nurse Nurse    AR 04/03/18 -  Maria Del Carmen Brandon, PT Physical Therapist PT              PT Recommendation and Plan  Planned Therapy Interventions (PT Eval): balance training, bed mobility training, gait training, home exercise program, patient/family education, postural re-education, strengthening, transfer training  Outcome Summary/Treatment Plan (PT)  Anticipated Discharge Disposition (PT): home with 24/7 care, home with home health, skilled nursing facility(Pending pt. progress)  Plan of Care Reviewed With: patient  Progress: improving  Outcome Summary: Improved tolerance to functional activity this date.  Pt. able to ambulate 40 feet, CGA x 1, with use of Rwx. Pt. requires CGA x 1 for bed mobility (bed rail use), and CGA x 1 for sit <-> stand transfers.  Verbal/tactile cues given during ambulation for posture correction and to increase her bilateral step length.  Progression in ther. ex. program this date to include both sitting and standing BLE ther. ex. x 10 reps for general strengthening.      Time Calculation:   PT Charges     Row Name  10/09/19 0926             Time Calculation    Start Time  0840  -MS      Stop Time  0900  -MS      Time Calculation (min)  20 min  -MS      PT Received On  10/09/19  -MS      PT - Next Appointment  10/10/19  -MS         Time Calculation- PT    Total Timed Code Minutes- PT  17 minute(s)  -MS        User Key  (r) = Recorded By, (t) = Taken By, (c) = Cosigned By    Initials Name Provider Type    Andrei Castro, PT Physical Therapist        Therapy Charges for Today     Code Description Service Date Service Provider Modifiers Qty    [id number] HC PT THER PROC EA 15 MIN 10/9/2019 Andrei Lee, PT GP 1          PT G-Codes  Outcome Measure Options: AM-PAC 6 Clicks Basic Mobility (PT)  AM-PAC 6 Clicks Score (PT): 17    Andrei Lee, PT  10/9/2019

## 2019-10-09 NOTE — PROGRESS NOTES
Continued Stay Note  [facility]     Patient Name: Swetha Luis  MRN: [id number]  Today's Date: 10/9/2019    Admit Date: 10/4/2019    Discharge Plan     Row Name 10/09/19 0916       Plan    Plan  Referral to [facility] SN[facility]-    Patient/Family in Agreement with Plan  other (see comments)    Plan Comments  Spoke with Natalia/[facility] she states  nurse made APS report. CCP calling [facility] Hotline [phone] and per Hotline, case was accepted for investigation and assigned to APS  Anny Sierra [phone]. CCP called APS  Anny, introduced self and explained CCP role.  She states she came and saw patient yesterday and per her recommendation is Rehab at [place]. CCP explained  referral to [facility] and will update her on referral outcome. CCP Contact info provided.  CCP called Rachelle/Darshana [facility] [phone] and left  requesting call back. Partial Packet in CCP office. Corey rodriguez/ccp        Discharge Codes    No documentation.             Noelle Juarez, JEMIMA

## 2019-10-09 NOTE — PROGRESS NOTES
"  Infectious Diseases Progress Note    Rafa Fowler MD     [facility]  [place]: 5 days  Patient Identification:  Name: Swetha Luis  Age: 59 y.o.  Sex: female  :  1960  MRN: [id number]         Primary Care Physician: Provider, No Known            Subjective: Overall feeling better  Interval History: Consultation note.    Objective:    Scheduled Meds:    amLODIPine 5 mg Oral Q24H   busPIRone 5 mg Oral TID   ertapenem 1 g Intravenous Q24H   escitalopram 20 mg Oral Daily   gabapentin 300 mg Oral TID   influenza vaccine 0.5 mL Intramuscular Once   metoprolol succinate XL 25 mg Oral Daily   pramipexole 0.25 mg Oral Nightly   sodium chloride 10 mL Intravenous Q12H   thiamine 100 mg Oral Daily     Continuous Infusions:     Vital signs in last 24 hours:  Temp:  [97.7 °F (36.5 °C)-98.7 °F (37.1 °C)] 98 °F (36.7 °C)  Heart Rate:  [78-92] 81  Resp:  [16-20] 16  BP: (111-145)/(80-87) 111/80    Intake/Output:  No intake or output data in the 24 hours ending 10/09/19 0811    Exam:  /80 (BP Location: Right arm, Patient Position: Lying)   Pulse 81   Temp 98 °F (36.7 °C) (Oral)   Resp 16   Ht 167.6 cm (65.98\")   Wt 74 kg (163 lb 2.3 oz)   SpO2 100%   BMI 26.34 kg/m²     General Appearance:    Alert, cooperative, no distress, AAOx3                          Head:    Normocephalic, without obvious abnormality, atraumatic                           Eyes:    PERRL, conjunctivae/corneas clear, EOM's intact, both eyes                         Throat:   Lips, tongue, gums normal; oral mucosa pink and moist                           Neck: Trach in place                         Lungs:    Clear to auscultation bilaterally, respirations unlabored                 Chest Wall:    No tenderness or deformity                          Heart:    Regular rate and rhythm, S1 and S2 normal, no murmur, no rub                                         or gallop                  Abdomen:   PEG in place                 Extremities:  "  Extremities normal, atraumatic, no cyanosis or edema                        Pulses:   Pulses palpable in all extremities                            Skin:   Skin is warm and dry,  no rashes or palpable lesions                  Neurologic: No new neurological deficits noted       Data Review:    I reviewed the patient's new clinical results.  Results from last 7 days   Lab Units 10/08/19  0541 10/07/19  0447 10/06/19  0529 10/05/19  0520 10/04/19  0623   WBC 10*3/mm3 7.47 6.05 5.61 9.46 12.58*   HEMOGLOBIN g/dL 11.8* 11.7* 11.2* 12.7 12.0   PLATELETS 10*3/mm3 216 138* 162 134* 187     Results from last 7 days   Lab Units 10/08/19  0541 10/07/19  0447 10/06/19  0529 10/05/19  0520 10/04/19  0623   SODIUM mmol/L 141 140 144 134* 142   POTASSIUM mmol/L 4.1 4.1 4.5 4.0 3.9   CHLORIDE mmol/L 101 102 104 96* 103   CO2 mmol/L 29.8* 26.3 29.2* 21.1* 27.9   BUN mg/dL 15 15 17 13 19   CREATININE mg/dL 0.95 1.01* 1.11* 1.03* 1.21*   CALCIUM mg/dL 9.3 8.8 8.7 8.8 8.8   GLUCOSE mg/dL 83 107* 106* 53* 138*     Microbiology Results (last 10 days)     Procedure Component Value - Date/Time    Urine Culture - Urine, Urine, Catheter In/Out [[id number]]  (Abnormal) Collected:  10/06/19 2130    Lab Status:  Preliminary result Specimen:  Urine, Catheter In/Out Updated:  10/08/19 0916     Urine Culture >100,000 CFU/mL Enterococcus species    Respiratory Panel, PCR - Swab, Nasopharynx [[id number]]  (Normal) Collected:  10/04/19 1652    Lab Status:  Final result Specimen:  Swab from Nasopharynx Updated:  10/04/19 1853     ADENOVIRUS, PCR Not Detected     Coronavirus 229E Not Detected     Coronavirus HKU1 Not Detected     Coronavirus NL63 Not Detected     Coronavirus OC43 Not Detected     Human Metapneumovirus Not Detected     Human Rhinovirus/Enterovirus Not Detected     Influenza B PCR Not Detected     Parainfluenza Virus 1 Not Detected     Parainfluenza Virus 2 Not Detected     Parainfluenza Virus 3 Not Detected     Parainfluenza Virus 4 Not  Detected     Bordetella pertussis pcr Not Detected     Influenza A H1 2009 PCR Not Detected     Chlamydophila pneumoniae PCR Not Detected     Mycoplasma pneumo by PCR Not Detected     Influenza A PCR Not Detected     Influenza A H3 Not Detected     Influenza A H1 Not Detected     RSV, PCR Not Detected     Bordetella parapertussis PCR Not Detected    MRSA Screen, PCR - Swab, Nares [[id number]]  (Normal) Collected:  10/04/19 1652    Lab Status:  Final result Specimen:  Swab from Nares Updated:  10/04/19 1853     MRSA PCR No MRSA Detected    Blood Culture - Blood, Arm, Right [[id number]] Collected:  10/04/19 0625    Lab Status:  Final result Specimen:  Blood from Arm, Right Updated:  10/09/19 0745     Blood Culture No growth at 5 days    Blood Culture - Blood, Arm, Left [[id number]]  (Abnormal)  (Susceptibility) Collected:  10/04/19 0624    Lab Status:  Final result Specimen:  Blood from Arm, Left Updated:  10/06/19 0621     Blood Culture Escherichia coli ESBL     Comment:   Consider infectious disease consult.  Susceptibility results may not correlate to clinical outcomes.  For ESBL-producing infections in the blood, a carbapenem is recommended as first-line therapy for optimal clinical outcomes.        Isolated from Aerobic and Anaerobic Bottles     Gram Stain Aerobic Bottle Gram negative bacilli      Anaerobic Bottle Gram negative bacilli    Susceptibility      Escherichia coli ESBL     AFRICA     Ertapenem Susceptible     Meropenem Susceptible                    Blood Culture ID, PCR - Blood, Arm, Left [[id number]]  (Abnormal) Collected:  10/04/19 0624    Lab Status:  Final result Specimen:  Blood from Arm, Left Updated:  10/05/19 0048     BCID, PCR Escherichia coli. Identification by BCID PCR.      Xr Chest 2 View    Result Date: 10/4/2019  Increasing pulmonary interstitial and vascular prominence, particularly in perihilar regions increased versus 09/21/2019. No evidence of consolidation or effusion.  This report was  finalized on 10/4/2019 8:46 AM by Dr. Jonathan Bianchi M.D.      Xr Chest 1 View    Result Date: 9/21/2019  Poor inspiration without active airspace disease     This report was finalized on 9/21/2019 11:06 PM by Andrei Beatty M.D.      Ct Abdomen Pelvis Stone Protocol    Result Date: 10/8/2019  1. 5 mm left distal ureteric calculus with mild left hydronephrosis and left renal edema. 2. Additional findings as above.  Radiation dose reduction techniques were utilized, including automated exposure control and exposure modulation based on body size.             Assessment:  1-ESBL positive E. coli sepsis with bacteremia likely due to  2-urinary tract infection  3-recurrent shortness of breath due to male handling of secretions due to chronic tracheostomy and noncompliance to diet  4-history of chronic respiratory failure and dysphagia with recurrent aspiration status post tracheostomy and PEG tube placement  5-history of COPD with recurrent exacerbation  6-history of seizure disorder and subarachnoid hemorrhage and immobilization syndrome  7-enterococcus positive and repeat urine culture after being on ertapenem with continued improvement argues against this pathogen being a true culprit.        Recommendations/Discussions:   She has improved clinically in terms of resolution of sepsis on current antibiotic therapy.  Given her presentation and overall work-up the likely source of gram-negative bacteremia i.e. E. coli is probably  tract.  CT scan reviewed and will follow  service recommendations.  Would not recommend any specific treatment for enterococcus in the urine as this probably colonizer in the context of systemic sepsis caused by E. coli with bacteremia.      Rafa Fowler MD  10/9/2019  8:11 AM    Much of this encounter note is an electronic transcription/translation of spoken language to printed text. The electronic translation of spoken language may permit erroneous, or at times, nonsensical words or  phrases to be inadvertently transcribed; Although I have reviewed the note for such errors, some may still exist

## 2019-10-09 NOTE — PROGRESS NOTES
"  Infectious Diseases Progress Note    Rafa Fowler MD     [facility]  [place]: 4 days  Patient Identification:  Name: Swetha Luis  Age: 59 y.o.  Sex: female  :  1960  MRN: [id number]         Primary Care Physician: Provider, No Known            Subjective: No complaints feeling better.  Breathing is better cough is better.  Interval History: Consultation note.    Objective:    Scheduled Meds:  amLODIPine 5 mg Oral Q24H   busPIRone 5 mg Oral TID   ertapenem 1 g Intravenous Q24H   escitalopram 20 mg Oral Daily   gabapentin 300 mg Oral TID   influenza vaccine 0.5 mL Intramuscular Once   metoprolol succinate XL 25 mg Oral Daily   pramipexole 0.25 mg Oral Nightly   sodium chloride 10 mL Intravenous Q12H   thiamine 100 mg Oral Daily     Continuous Infusions:     Vital signs in last 24 hours:  Temp:  [97.7 °F (36.5 °C)-98.7 °F (37.1 °C)] 98.7 °F (37.1 °C)  Heart Rate:  [85-92] 90  Resp:  [18-20] 18  BP: (120-145)/(81-93) 145/87    Intake/Output:  No intake or output data in the 24 hours ending 10/08/19 2135    Exam:  /87 (BP Location: Right arm, Patient Position: Lying)   Pulse 90   Temp 98.7 °F (37.1 °C) (Oral)   Resp 18   Ht 167.6 cm (65.98\")   Wt 74 kg (163 lb 2.3 oz)   SpO2 96%   BMI 26.34 kg/m²     General Appearance:    Alert, cooperative, no distress, AAOx3                          Head:    Normocephalic, without obvious abnormality, atraumatic                           Eyes:    PERRL, conjunctivae/corneas clear, EOM's intact, both eyes                         Throat:   Lips, tongue, gums normal; oral mucosa pink and moist                           Neck: Trach in place                         Lungs:    Clear to auscultation bilaterally, respirations unlabored                 Chest Wall:    No tenderness or deformity                          Heart:    Regular rate and rhythm, S1 and S2 normal, no murmur, no rub                                         or gallop                  " Abdomen:   PEG in place                 Extremities:   Extremities normal, atraumatic, no cyanosis or edema                        Pulses:   Pulses palpable in all extremities                            Skin:   Skin is warm and dry,  no rashes or palpable lesions                  Neurologic: No new neurological deficits noted       Data Review:    I reviewed the patient's new clinical results.  Results from last 7 days   Lab Units 10/08/19  0541 10/07/19  0447 10/06/19  0529 10/05/19  0520 10/04/19  0623   WBC 10*3/mm3 7.47 6.05 5.61 9.46 12.58*   HEMOGLOBIN g/dL 11.8* 11.7* 11.2* 12.7 12.0   PLATELETS 10*3/mm3 216 138* 162 134* 187     Results from last 7 days   Lab Units 10/08/19  0541 10/07/19  0447 10/06/19  0529 10/05/19  0520 10/04/19  0623   SODIUM mmol/L 141 140 144 134* 142   POTASSIUM mmol/L 4.1 4.1 4.5 4.0 3.9   CHLORIDE mmol/L 101 102 104 96* 103   CO2 mmol/L 29.8* 26.3 29.2* 21.1* 27.9   BUN mg/dL 15 15 17 13 19   CREATININE mg/dL 0.95 1.01* 1.11* 1.03* 1.21*   CALCIUM mg/dL 9.3 8.8 8.7 8.8 8.8   GLUCOSE mg/dL 83 107* 106* 53* 138*     Microbiology Results (last 10 days)     Procedure Component Value - Date/Time    Urine Culture - Urine, Urine, Catheter In/Out [[id number]]  (Abnormal) Collected:  10/06/19 2130    Lab Status:  Preliminary result Specimen:  Urine, Catheter In/Out Updated:  10/08/19 0916     Urine Culture >100,000 CFU/mL Enterococcus species    Respiratory Panel, PCR - Swab, Nasopharynx [[id number]]  (Normal) Collected:  10/04/19 1652    Lab Status:  Final result Specimen:  Swab from Nasopharynx Updated:  10/04/19 1853     ADENOVIRUS, PCR Not Detected     Coronavirus 229E Not Detected     Coronavirus HKU1 Not Detected     Coronavirus NL63 Not Detected     Coronavirus OC43 Not Detected     Human Metapneumovirus Not Detected     Human Rhinovirus/Enterovirus Not Detected     Influenza B PCR Not Detected     Parainfluenza Virus 1 Not Detected     Parainfluenza Virus 2 Not Detected      Parainfluenza Virus 3 Not Detected     Parainfluenza Virus 4 Not Detected     Bordetella pertussis pcr Not Detected     Influenza A H1 2009 PCR Not Detected     Chlamydophila pneumoniae PCR Not Detected     Mycoplasma pneumo by PCR Not Detected     Influenza A PCR Not Detected     Influenza A H3 Not Detected     Influenza A H1 Not Detected     RSV, PCR Not Detected     Bordetella parapertussis PCR Not Detected    MRSA Screen, PCR - Swab, Nares [[id number]]  (Normal) Collected:  10/04/19 1652    Lab Status:  Final result Specimen:  Swab from Nares Updated:  10/04/19 1853     MRSA PCR No MRSA Detected    Blood Culture - Blood, Arm, Right [[id number]] Collected:  10/04/19 0625    Lab Status:  Preliminary result Specimen:  Blood from Arm, Right Updated:  10/08/19 0745     Blood Culture No growth at 4 days    Blood Culture - Blood, Arm, Left [[id number]]  (Abnormal)  (Susceptibility) Collected:  10/04/19 0624    Lab Status:  Final result Specimen:  Blood from Arm, Left Updated:  10/06/19 0621     Blood Culture Escherichia coli ESBL     Comment:   Consider infectious disease consult.  Susceptibility results may not correlate to clinical outcomes.  For ESBL-producing infections in the blood, a carbapenem is recommended as first-line therapy for optimal clinical outcomes.        Isolated from Aerobic and Anaerobic Bottles     Gram Stain Aerobic Bottle Gram negative bacilli      Anaerobic Bottle Gram negative bacilli    Susceptibility      Escherichia coli ESBL     AFRICA     Ertapenem Susceptible     Meropenem Susceptible                    Blood Culture ID, PCR - Blood, Arm, Left [[id number]]  (Abnormal) Collected:  10/04/19 0624    Lab Status:  Final result Specimen:  Blood from Arm, Left Updated:  10/05/19 0048     BCID, PCR Escherichia coli. Identification by BCID PCR.      Xr Chest 2 View    Result Date: 10/4/2019  Increasing pulmonary interstitial and vascular prominence, particularly in perihilar regions increased versus  09/21/2019. No evidence of consolidation or effusion.  This report was finalized on 10/4/2019 8:46 AM by Dr. Jonathan Bianchi M.D.      Xr Chest 1 View    Result Date: 9/21/2019  Poor inspiration without active airspace disease     This report was finalized on 9/21/2019 11:06 PM by Andrei Beatty M.D.      Ct Abdomen Pelvis Stone Protocol    Result Date: 10/8/2019  1. 5 mm left distal ureteric calculus with mild left hydronephrosis and left renal edema. 2. Additional findings as above.  Radiation dose reduction techniques were utilized, including automated exposure control and exposure modulation based on body size.             Assessment:  1-ESBL positive E. coli sepsis with bacteremia likely due to  2-urinary tract infection  3-recurrent shortness of breath due to male handling of secretions due to chronic tracheostomy and noncompliance to diet  4-history of chronic respiratory failure and dysphagia with recurrent aspiration status post tracheostomy and PEG tube placement  5-history of COPD with recurrent exacerbation  6-history of seizure disorder and subarachnoid hemorrhage and immobilization syndrome  7-enterococcus positive and repeat urine culture after being on ertapenem with continued improvement argues against this pathogen being a true culprit.        Recommendations/Discussions: She has improved clinically in terms of resolution of sepsis on current antibiotic therapy.  Given her presentation and overall work-up the likely source of gram-negative bacteremia i.e. E. coli is probably  tract.  CT scan reviewed and will follow  service recommendations.  Would not recommend any specific treatment for enterococcus in the urine as this probably colonizer in the context of systemic sepsis caused by E. coli with bacteremia.      Rafa Fowler MD  10/8/2019  9:35 PM    Much of this encounter note is an electronic transcription/translation of spoken language to printed text. The electronic translation of spoken  language may permit erroneous, or at times, nonsensical words or phrases to be inadvertently transcribed; Although I have reviewed the note for such errors, some may still exist

## 2019-10-09 NOTE — PLAN OF CARE
Problem: Patient Care Overview  Goal: Plan of Care Review   10/09/19 0944   Plan of Care Review   Progress improving   OTHER   Outcome Summary Improved tolerance to functional activity this date. Pt. able to ambulate 40 feet, CGA x 1, with use of Rwx. Pt. requires CGA x 1 for bed mobility (bed rail use), and CGA x 1 for sit <-> stand transfers. Verbal/tactile cues given during ambulation for posture correction and to increase her bilateral step length. Progression in ther. ex. program this date to include both sitting and standing BLE ther. ex. x 10 reps for general strengthening.    Coping/Psychosocial   Plan of Care Reviewed With patient

## 2019-10-09 NOTE — PROGRESS NOTES
[facility]  Clyde Selby MD     LOS: 5 days   Patient Care Team:  Provider, No Known as PCP - General        Subjective     Interval History:     Patient Complaints: Overall feels better.  Does not complain of left lower abdominal pain at this point.    History taken from: patient chart And she is being followed for a distal left ureteral stone with some mild hydronephrosis.    Review of Systems:    All systems were reviewed and were negative except for shortness of air with just standing in the room.    Objective     Vital Signs  Temp:  [97.7 °F (36.5 °C)-98.7 °F (37.1 °C)] 98 °F (36.7 °C)  Heart Rate:  [78-92] 81  Resp:  [16-20] 16  BP: (111-145)/(80-96) 139/96    Physical Exam:   Physical Exam:     General Appearance:    Alert, cooperative, in no acute distress   Head:    Normocephalic, without obvious abnormality, atraumatic   Eyes:            Lids and lashes normal, conjunctivae and sclerae normal, no   icterus, no pallor, corneas clear, PERRLA   Ears:    Ears appear intact with no abnormalities noted   Throat:  Tracheostomy present.  No oral lesions, no thrush, oral mucosa moist   Neck:   No adenopathy, supple, trachea midline,   Back:     No kyphosis present, no scoliosis present, no skin lesions,       erythema or scars, no tenderness to percussion or                   palpation,   range of motion normal   Lungs:     Clear to auscultation,respirations regular, even and                   unlabored    Heart:    Regular rhythm and normal rate, normal S1 and S2, no            murmur, no gallop, no rub, no click   Breast Exam:    Deferred   Abdomen:     Normal bowel sounds, no masses, no organomegaly, soft        non-tender, non-distended, no guarding, no rebound                 tenderness   Genitalia:    Deferred   Extremities:   Moves all extremities well, no edema, no cyanosis, no              redness       Skin:   No bleeding, bruising or rash       Neurologic:   Cranial nerves 2 - 12 grossly  intact, sensation intact,         Results Review:     I reviewed the patient's new clinical results.    No results found for this or any previous visit (from the past 24 hour(s)).    Medication Review:   Current Facility-Administered Medications:   •  acetaminophen (TYLENOL) tablet 650 mg, 650 mg, Oral, Q4H PRN, 650 mg at 10/04/19 2056 **OR** acetaminophen (TYLENOL) 160 MG/5ML solution 650 mg, 650 mg, Oral, Q4H PRN **OR** acetaminophen (TYLENOL) suppository 650 mg, 650 mg, Rectal, Q4H PRN, True Moreno MD  •  amLODIPine (NORVASC) tablet 5 mg, 5 mg, Oral, Q24H, True Moreno MD, 5 mg at 10/09/19 0906  •  busPIRone (BUSPAR) tablet 5 mg, 5 mg, Oral, TID, True Moreno MD, 5 mg at 10/09/19 0906  •  ertapenem (INVanz) 1 g/100 mL 0.9% NS VTB (mbp), 1 g, Intravenous, Q24H, True Moreno MD, 1 g at 10/08/19 1011  •  escitalopram (LEXAPRO) tablet 20 mg, 20 mg, Oral, Daily, True Moreno MD, 20 mg at 10/09/19 0905  •  gabapentin (NEURONTIN) capsule 300 mg, 300 mg, Oral, TID, True Moreno MD, 300 mg at 10/09/19 0905  •  influenza vac split quad (FLUZONE,FLUARIX,AFLURIA) injection 0.5 mL, 0.5 mL, Intramuscular, Once, True Moreno MD  •  ipratropium-albuterol (DUO-NEB) nebulizer solution 3 mL, 3 mL, Nebulization, Q4H PRN, True Moreno MD, 3 mL at 10/07/19 1257  •  metoprolol succinate XL (TOPROL-XL) 24 hr tablet 25 mg, 25 mg, Oral, Daily, True Moreno MD, 25 mg at 10/09/19 0905  •  ondansetron (ZOFRAN) tablet 4 mg, 4 mg, Oral, Q6H PRN **OR** ondansetron (ZOFRAN) injection 4 mg, 4 mg, Intravenous, Q6H PRN, True Moreno MD, 4 mg at 10/05/19 1839  •  oxyCODONE-acetaminophen (PERCOCET) 5-325 MG per tablet 1 tablet, 1 tablet, Oral, Q6H PRN, True Moreno MD, 1 tablet at 10/09/19 0912  •  pramipexole (MIRAPEX) tablet 0.25 mg, 0.25 mg, Oral, Nightly, True Moreno MD, 0.25 mg at 10/08/19 2025  •  sodium chloride 0.9 % flush 10 mL, 10 mL, Intravenous, PRN, LizandroAndrea MD  •  sodium chloride 0.9 % flush 10 mL, 10  mL, Intravenous, Q12H, True Moreno MD, 10 mL at 10/09/19 0906  •  sodium chloride 0.9 % flush 10 mL, 10 mL, Intravenous, PRN, True Moreno MD  •  thiamine (VITAMIN B-1) tablet 100 mg, 100 mg, Oral, Daily, True Moreno MD, 100 mg at 10/09/19 0905    Assessment/Plan       Healthcare associated bacterial pneumonia    Stage 3 chronic kidney disease (CMS/[facility])    COPD (chronic obstructive pulmonary disease) (CMS/[facility])    Respiratory failure, chronic (CMS/[facility])    PEG (percutaneous endoscopic gastrostomy) status (CMS/[facility])    Chronic diastolic CHF (congestive heart failure) (CMS/[facility])    Peripheral artery disease (CMS/[facility])    Dyspnea    Tracheostomy present (CMS/[facility])    Essential hypertension    Dysphagia    Infection due to ESBL-producing Escherichia coli    Sepsis due to Gram negative bacteria (CMS/[facility])    Ureteral stone with hydronephrosis    UTI (urinary tract infection)          Plan for disposition:The patient's urine culture shows enterococcus.  Her blood cultures show E. coli.  Sensitivities are not back on her urine culture.  Hopefully her antibiotics will cover her enterococcus.  It was not present on previous cultures.  Although it may be a colonization, with the plan of instrumentation, she will need to be covered for enterococcus.  I might put her off for surgery tomorrow depending on her sensitivities and her clinical course.  She certainly is short of breath.    Cldye Selby MD  10/09/19  9:21 AM      Time: >33

## 2019-10-09 NOTE — PROGRESS NOTES
"DAILY PROGRESS NOTE  [facility]    Patient Identification:  Name: Swetha Luis  Age: 59 y.o.  Sex: female  :  1960  MRN: [id number]         Primary Care Physician: Provider, No Known    Subjective:  Interval History: She complains of cough and shortness of air.  Better today    Objective:    Scheduled Meds:    amLODIPine 5 mg Oral Q24H   busPIRone 5 mg Oral TID   ertapenem 1 g Intravenous Q24H   escitalopram 20 mg Oral Daily   gabapentin 300 mg Oral TID   influenza vaccine 0.5 mL Intramuscular Once   Linezolid 600 mg Intravenous Q12H   metoprolol succinate XL 25 mg Oral Daily   pramipexole 0.25 mg Oral Nightly   sodium chloride 10 mL Intravenous Q12H   thiamine 100 mg Oral Daily     Continuous Infusions:     Vital signs in last 24 hours:  Temp:  [97.7 °F (36.5 °C)-98.7 °F (37.1 °C)] 98 °F (36.7 °C)  Heart Rate:  [78-92] 81  Resp:  [16-20] 16  BP: (111-145)/(80-96) 139/96    Intake/Output:    Intake/Output Summary (Last 24 hours) at 10/9/2019 1253  Last data filed at 10/9/2019 0900  Gross per 24 hour   Intake 120 ml   Output --   Net 120 ml       Exam:  /96 (BP Location: Right arm, Patient Position: Lying)   Pulse 81   Temp 98 °F (36.7 °C) (Oral)   Resp 16   Ht 167.6 cm (65.98\")   Wt 74 kg (163 lb 2.3 oz)   SpO2 100%   BMI 26.34 kg/m²     General Appearance:    Alert, cooperative, no distress   Head:    Normocephalic, without obvious abnormality, atraumatic   Eyes:       Throat:   Lips, tongue, gums normal   Neck:   Supple, symmetrical, trachea midline, no JVD, tracheostomy in place   Lungs:    Rhonchi and wheezes bilaterally, respirations unlabored   Chest Wall:    No tenderness or deformity    Heart:    Regular rate and rhythm, S1 and S2 normal, no murmur,no  Rub or gallop   Abdomen:     Soft, non-tender, bowel sounds active, no masses, no organomegaly    Extremities:   Extremities normal, atraumatic, no cyanosis or edema   Pulses:      Skin:   Skin is warm and dry,  no rashes " or palpable lesions   Neurologic:   no focal deficits noted      Lab Results (last 72 hours)     Procedure Component Value Units Date/Time    CBC Auto Differential [[id number]]  (Abnormal) Collected:  10/05/19 0520    Specimen:  Blood Updated:  10/05/19 0810     WBC 9.46 10*3/mm3      RBC 4.79 10*6/mm3      Hemoglobin 12.7 g/dL      Hematocrit 40.8 %      MCV 85.2 fL      MCH 26.5 pg      MCHC 31.1 g/dL      RDW 14.1 %      RDW-SD 44.2 fl      MPV 13.2 fL      Platelets 134 10*3/mm3     Manual Differential [[id number]]  (Abnormal) Collected:  10/05/19 0520    Specimen:  Blood Updated:  10/05/19 0810     Neutrophil % 86.0 %      Lymphocyte % 8.0 %      Monocyte % 4.0 %      Eosinophil % 1.0 %      Atypical Lymphocyte % 1.0 %      Neutrophils Absolute 8.14 10*3/mm3      Lymphocytes Absolute 0.76 10*3/mm3      Monocytes Absolute 0.38 10*3/mm3      Eosinophils Absolute 0.09 10*3/mm3      Anisocytosis Slight/1+     Cameron Cells Mod/2+     Microcytes Slight/1+     Ovalocytes Mod/2+     Poikilocytes Mod/2+     Polychromasia Mod/2+     WBC Morphology Normal     Giant Platelets Slight/1+    Blood Culture - Blood, Arm, Right [[id number]] Collected:  10/04/19 0625    Specimen:  Blood from Arm, Right Updated:  10/05/19 0800     Blood Culture No growth at 24 hours    Basic Metabolic Panel [[id number]]  (Abnormal) Collected:  10/05/19 0520    Specimen:  Blood Updated:  10/05/19 0735     Glucose 53 mg/dL      BUN 13 mg/dL      Creatinine 1.03 mg/dL      Sodium 134 mmol/L      Potassium 4.0 mmol/L      Chloride 96 mmol/L      CO2 21.1 mmol/L      Calcium 8.8 mg/dL      eGFR Non African Amer 55 mL/min/1.73      BUN/Creatinine Ratio 12.6     Anion Gap 16.9 mmol/L     Narrative:       GFR Normal >60  Chronic Kidney Disease <60  Kidney Failure <15    Blood Culture - Blood, Arm, Left [[id number]]  (Abnormal) Collected:  10/04/19 0624    Specimen:  Blood from Arm, Left Updated:  10/05/19 0619     Blood Culture Escherichia coli     Isolated from  Aerobic and Anaerobic Bottles     Gram Stain Aerobic Bottle Gram negative bacilli      Anaerobic Bottle Gram negative bacilli    Blood Culture ID, PCR - Blood, Arm, Left [[id number]]  (Abnormal) Collected:  10/04/19 0624    Specimen:  Blood from Arm, Left Updated:  10/05/19 0048     BCID, PCR Escherichia coli. Identification by BCID PCR.    MRSA Screen, PCR - Swab, Nares [[id number]]  (Normal) Collected:  10/04/19 1652    Specimen:  Swab from Nares Updated:  10/04/19 1853     MRSA PCR No MRSA Detected    Respiratory Panel, PCR - Swab, Nasopharynx [[id number]]  (Normal) Collected:  10/04/19 1652    Specimen:  Swab from Nasopharynx Updated:  10/04/19 1853     ADENOVIRUS, PCR Not Detected     Coronavirus 229E Not Detected     Coronavirus HKU1 Not Detected     Coronavirus NL63 Not Detected     Coronavirus OC43 Not Detected     Human Metapneumovirus Not Detected     Human Rhinovirus/Enterovirus Not Detected     Influenza B PCR Not Detected     Parainfluenza Virus 1 Not Detected     Parainfluenza Virus 2 Not Detected     Parainfluenza Virus 3 Not Detected     Parainfluenza Virus 4 Not Detected     Bordetella pertussis pcr Not Detected     Influenza A H1 2009 PCR Not Detected     Chlamydophila pneumoniae PCR Not Detected     Mycoplasma pneumo by PCR Not Detected     Influenza A PCR Not Detected     Influenza A H3 Not Detected     Influenza A H1 Not Detected     RSV, PCR Not Detected     Bordetella parapertussis PCR Not Detected    Comprehensive Metabolic Panel [[id number]]  (Abnormal) Collected:  10/04/19 0623    Specimen:  Blood Updated:  10/04/19 0818     Glucose 138 mg/dL      BUN 19 mg/dL      Creatinine 1.21 mg/dL      Sodium 142 mmol/L      Potassium 3.9 mmol/L      Chloride 103 mmol/L      CO2 27.9 mmol/L      Calcium 8.8 mg/dL      Total Protein 6.9 g/dL      Albumin 3.80 g/dL      ALT (SGPT) 136 U/L      AST (SGOT) 71 U/L      Alkaline Phosphatase 194 U/L      Total Bilirubin 0.3 mg/dL      eGFR Non  Amer 46  mL/min/1.73      Globulin 3.1 gm/dL      A/G Ratio 1.2 g/dL      BUN/Creatinine Ratio 15.7     Anion Gap 11.1 mmol/L     Narrative:       GFR Normal >60  Chronic Kidney Disease <60  Kidney Failure <15    Lactic Acid, Plasma [[id number]]  (Normal) Collected:  10/04/19 0623    Specimen:  Blood Updated:  10/04/19 0802     Lactate 1.4 mmol/L     [place] Draw [[id number]] Collected:  10/04/19 0623    Specimen:  Blood Updated:  10/04/19 0800    Narrative:       The following orders were created for panel order [place] Draw.  Procedure                               Abnormality         Status                     ---------                               -----------         ------                     Light Blue Top[[id number]]                                   Final result               Green Top (Gel)[[id number]]                                  Final result               Lavender Top[[id number]]                                     Final result               Gold Top - SST[[id number]]                                   Final result                 Please view results for these tests on the individual orders.    Light Blue Top [[id number]] Collected:  10/04/19 0623    Specimen:  Blood Updated:  10/04/19 0800     Extra Tube hold for add-on     Comment: Auto resulted       Green Top (Gel) [[id number]] Collected:  10/04/19 0623    Specimen:  Blood Updated:  10/04/19 0800     Extra Tube Hold for add-ons.     Comment: Auto resulted.       Lavender Top [[id number]] Collected:  10/04/19 0623    Specimen:  Blood Updated:  10/04/19 0800     Extra Tube hold for add-on     Comment: Auto resulted       Gold Top - SST [[id number]] Collected:  10/04/19 0623    Specimen:  Blood Updated:  10/04/19 0800     Extra Tube Hold for add-ons.     Comment: Auto resulted.       CBC & Differential [[id number]] Collected:  10/04/19 0623    Specimen:  Blood Updated:  10/04/19 0756    Narrative:       The following orders were created for panel order CBC &  Differential.  Procedure                               Abnormality         Status                     ---------                               -----------         ------                     CBC Auto Differential[[id number]]        Abnormal            Final result                 Please view results for these tests on the individual orders.    CBC Auto Differential [[id number]]  (Abnormal) Collected:  10/04/19 0623    Specimen:  Blood Updated:  10/04/19 0756     WBC 12.58 10*3/mm3      RBC 4.60 10*6/mm3      Hemoglobin 12.0 g/dL      Hematocrit 39.1 %      MCV 85.0 fL      MCH 26.1 pg      MCHC 30.7 g/dL      RDW 14.1 %      RDW-SD 44.0 fl      MPV 12.5 fL      Platelets 187 10*3/mm3      Neutrophil % 82.7 %      Lymphocyte % 6.8 %      Monocyte % 8.7 %      Eosinophil % 0.8 %      Basophil % 0.3 %      Immature Grans % 0.7 %      Neutrophils, Absolute 10.40 10*3/mm3      Lymphocytes, Absolute 0.85 10*3/mm3      Monocytes, Absolute 1.10 10*3/mm3      Eosinophils, Absolute 0.10 10*3/mm3      Basophils, Absolute 0.04 10*3/mm3      Immature Grans, Absolute 0.09 10*3/mm3      nRBC 0.0 /100 WBC         Data Review:  Results from last 7 days   Lab Units 10/08/19  0541 10/07/19  0447 10/06/19  0529   SODIUM mmol/L 141 140 144   POTASSIUM mmol/L 4.1 4.1 4.5   CHLORIDE mmol/L 101 102 104   CO2 mmol/L 29.8* 26.3 29.2*   BUN mg/dL 15 15 17   CREATININE mg/dL 0.95 1.01* 1.11*   GLUCOSE mg/dL 83 107* 106*   CALCIUM mg/dL 9.3 8.8 8.7     Results from last 7 days   Lab Units 10/08/19  0541 10/07/19  0447 10/06/19  0529   WBC 10*3/mm3 7.47 6.05 5.61   HEMOGLOBIN g/dL 11.8* 11.7* 11.2*   HEMATOCRIT % 38.4 37.7 37.1   PLATELETS 10*3/mm3 216 138* 162             Lab Results   Lab Value Date/Time    TROPONINT 0.045 (C) 09/22/2019 1211    TROPONINT 0.034 (C) 09/22/2019 0604    TROPONINT 0.031 (C) 09/22/2019 0130    TROPONINT 0.036 (C) 09/21/2019 2248    TROPONINT 0.050 (H) 12/29/2018 0409    TROPONINT 0.085 (H) 12/24/2018 1900    TROPONINT  0.100 (C) 12/24/2018 1203    TROPONINT <0.01 03/26/2014 1731         Results from last 7 days   Lab Units 10/04/19  0623   ALK PHOS U/L 194*   BILIRUBIN mg/dL 0.3   ALT (SGPT) U/L 136*   AST (SGOT) U/L 71*             No results found for: POCGLU        Past Medical History:   Diagnosis Date   • Acute congestive heart failure (CMS/[facility]) 12/24/2018   • Acute osteomyelitis (CMS/HCC)     Right shoulder due to IVDA   • Acute renal failure on dialysis (CMS/[facility])    • Anxiety    • Pugh esophagus    • CKD (chronic kidney disease)    • COPD (chronic obstructive pulmonary disease) (CMS/[facility])    • MRSA infection    • Nontraumatic subarachnoid hemorrhage (CMS/[facility])    • Seizures (CMS/HCC)    • Tracheostomy present (CMS/HCC)        Assessment:  Active Hospital Problems    Diagnosis  POA   • **Healthcare associated bacterial pneumonia [J15.9]  Yes   • Ureteral stone with hydronephrosis [N13.2]  Unknown   • UTI (urinary tract infection) [N39.0]  Unknown   • Infection due to ESBL-producing Escherichia coli [A49.8, Z16.12]  Unknown   • Sepsis due to Gram negative bacteria (CMS/HCC) [A41.50]  Unknown   • Dyspnea [R06.00]  Yes   • Tracheostomy present (CMS/HCC) [Z93.0]  Not Applicable   • Essential hypertension [I10]  Yes   • Dysphagia [R13.10]  Yes   • COPD (chronic obstructive pulmonary disease) (CMS/HCC) [J44.9]  Yes   • Respiratory failure, chronic (CMS/HCC) [J96.10]  Yes   • PEG (percutaneous endoscopic gastrostomy) status (CMS/HCC) [Z93.1]  Not Applicable   • Chronic diastolic CHF (congestive heart failure) (CMS/HCC) [I50.32]  Yes   • Peripheral artery disease (CMS/HCC) [I73.9]  Yes   • Stage 3 chronic kidney disease (CMS/HCC) [N18.3]  Yes      Resolved Hospital Problems   No resolved problems to display.       Plan:  Continue with IV antibiotics and Add Zyvox for VRE.  We will get some follow-up laboratory studies.  Continue with bronchodilator treatments.  CT abdomen and pelvis results noted. ID consult noted.  urology consult  noted. Cysto lithotripsy and stent sometime    True Moreno MD  10/9/2019  12:53 PM

## 2019-10-09 NOTE — PLAN OF CARE
Problem: Patient Care Overview  Goal: Plan of Care Review  Outcome: Ongoing (interventions implemented as appropriate)   10/09/19 1940   Plan of Care Review   Progress improving   OTHER   Outcome Summary VSS, PAIN CONTROLLED WITH PRN PAIN MEDICATION, PT TOLERATING FOOD AND LIQUIDS, FOLLOW COMMANDS APPROPIATELY   Coping/Psychosocial   Plan of Care Reviewed With patient       Problem: Pneumonia (Adult)  Goal: Signs and Symptoms of Listed Potential Problems Will be Absent, Minimized or Managed (Pneumonia)  Outcome: Ongoing (interventions implemented as appropriate)      Problem: Fall Risk (Adult)  Goal: Absence of Fall  Outcome: Ongoing (interventions implemented as appropriate)      Problem: Skin Injury Risk (Adult)  Goal: Skin Health and Integrity  Outcome: Revised

## 2019-10-10 ENCOUNTER — ANESTHESIA (OUTPATIENT)
Dept: PERIOP | Facility: HOSPITAL | Age: 59
End: 2019-10-10

## 2019-10-10 ENCOUNTER — APPOINTMENT (OUTPATIENT)
Dept: GENERAL RADIOLOGY | Facility: HOSPITAL | Age: 59
End: 2019-10-10

## 2019-10-10 ENCOUNTER — ANESTHESIA EVENT (OUTPATIENT)
Dept: PERIOP | Facility: HOSPITAL | Age: 59
End: 2019-10-10

## 2019-10-10 LAB
ANION GAP SERPL CALCULATED.3IONS-SCNC: 12.4 MMOL/L (ref 5–15)
BASOPHILS # BLD AUTO: 0.06 10*3/MM3 (ref 0–0.2)
BASOPHILS NFR BLD AUTO: 0.6 % (ref 0–1.5)
BUN BLD-MCNC: 19 MG/DL (ref 6–20)
BUN/CREAT SERPL: 19 (ref 7–25)
CALCIUM SPEC-SCNC: 9.4 MG/DL (ref 8.6–10.5)
CHLORIDE SERPL-SCNC: 100 MMOL/L (ref 98–107)
CO2 SERPL-SCNC: 25.6 MMOL/L (ref 22–29)
CREAT BLD-MCNC: 1 MG/DL (ref 0.57–1)
DEPRECATED RDW RBC AUTO: 43.5 FL (ref 37–54)
EOSINOPHIL # BLD AUTO: 0.29 10*3/MM3 (ref 0–0.4)
EOSINOPHIL NFR BLD AUTO: 3.1 % (ref 0.3–6.2)
ERYTHROCYTE [DISTWIDTH] IN BLOOD BY AUTOMATED COUNT: 14.3 % (ref 12.3–15.4)
GFR SERPL CREATININE-BSD FRML MDRD: 57 ML/MIN/1.73
GLUCOSE BLD-MCNC: 78 MG/DL (ref 65–99)
HCT VFR BLD AUTO: 38.6 % (ref 34–46.6)
HGB BLD-MCNC: 12 G/DL (ref 12–15.9)
IMM GRANULOCYTES # BLD AUTO: 0.05 10*3/MM3 (ref 0–0.05)
IMM GRANULOCYTES NFR BLD AUTO: 0.5 % (ref 0–0.5)
LYMPHOCYTES # BLD AUTO: 2.26 10*3/MM3 (ref 0.7–3.1)
LYMPHOCYTES NFR BLD AUTO: 23.8 % (ref 19.6–45.3)
MCH RBC QN AUTO: 26.1 PG (ref 26.6–33)
MCHC RBC AUTO-ENTMCNC: 31.1 G/DL (ref 31.5–35.7)
MCV RBC AUTO: 84.1 FL (ref 79–97)
MONOCYTES # BLD AUTO: 0.7 10*3/MM3 (ref 0.1–0.9)
MONOCYTES NFR BLD AUTO: 7.4 % (ref 5–12)
NEUTROPHILS # BLD AUTO: 6.13 10*3/MM3 (ref 1.7–7)
NEUTROPHILS NFR BLD AUTO: 64.6 % (ref 42.7–76)
NRBC BLD AUTO-RTO: 0 /100 WBC (ref 0–0.2)
PLATELET # BLD AUTO: 244 10*3/MM3 (ref 140–450)
PMV BLD AUTO: 12 FL (ref 6–12)
POTASSIUM BLD-SCNC: 4.1 MMOL/L (ref 3.5–5.2)
RBC # BLD AUTO: 4.59 10*6/MM3 (ref 3.77–5.28)
SODIUM BLD-SCNC: 138 MMOL/L (ref 136–145)
WBC NRBC COR # BLD: 9.49 10*3/MM3 (ref 3.4–10.8)

## 2019-10-10 PROCEDURE — 25010000002 ERTAPENEM PER 500 MG: Performed by: HOSPITALIST

## 2019-10-10 PROCEDURE — 0B21XFZ CHANGE TRACHEOSTOMY DEVICE IN TRACHEA, EXTERNAL APPROACH: ICD-10-PCS | Performed by: INTERNAL MEDICINE

## 2019-10-10 PROCEDURE — 25010000002 LINEZOLID 600 MG/300ML SOLUTION: Performed by: HOSPITALIST

## 2019-10-10 PROCEDURE — 97110 THERAPEUTIC EXERCISES: CPT

## 2019-10-10 PROCEDURE — 85025 COMPLETE CBC W/AUTO DIFF WBC: CPT | Performed by: HOSPITALIST

## 2019-10-10 PROCEDURE — 80048 BASIC METABOLIC PNL TOTAL CA: CPT | Performed by: HOSPITALIST

## 2019-10-10 PROCEDURE — 25010000002 ONDANSETRON PER 1 MG: Performed by: HOSPITALIST

## 2019-10-10 RX ADMIN — SODIUM CHLORIDE, PRESERVATIVE FREE 10 ML: 5 INJECTION INTRAVENOUS at 09:48

## 2019-10-10 RX ADMIN — SODIUM CHLORIDE, PRESERVATIVE FREE 10 ML: 5 INJECTION INTRAVENOUS at 21:50

## 2019-10-10 RX ADMIN — BUSPIRONE HYDROCHLORIDE 5 MG: 5 TABLET ORAL at 21:47

## 2019-10-10 RX ADMIN — GABAPENTIN 300 MG: 300 CAPSULE ORAL at 16:17

## 2019-10-10 RX ADMIN — OXYCODONE HYDROCHLORIDE AND ACETAMINOPHEN 1 TABLET: 5; 325 TABLET ORAL at 09:49

## 2019-10-10 RX ADMIN — BUSPIRONE HYDROCHLORIDE 5 MG: 5 TABLET ORAL at 09:48

## 2019-10-10 RX ADMIN — GABAPENTIN 300 MG: 300 CAPSULE ORAL at 21:47

## 2019-10-10 RX ADMIN — ERTAPENEM SODIUM 1 G: 1 INJECTION, POWDER, LYOPHILIZED, FOR SOLUTION INTRAMUSCULAR; INTRAVENOUS at 12:54

## 2019-10-10 RX ADMIN — ESCITALOPRAM 20 MG: 20 TABLET, FILM COATED ORAL at 09:48

## 2019-10-10 RX ADMIN — PRAMIPEXOLE DIHYDROCHLORIDE 0.25 MG: 0.25 TABLET ORAL at 21:47

## 2019-10-10 RX ADMIN — Medication 100 MG: at 09:48

## 2019-10-10 RX ADMIN — LINEZOLID 600 MG: 600 INJECTION, SOLUTION INTRAVENOUS at 21:47

## 2019-10-10 RX ADMIN — METOPROLOL SUCCINATE 25 MG: 25 TABLET, FILM COATED, EXTENDED RELEASE ORAL at 09:48

## 2019-10-10 RX ADMIN — BUSPIRONE HYDROCHLORIDE 5 MG: 5 TABLET ORAL at 16:17

## 2019-10-10 RX ADMIN — AMLODIPINE BESYLATE 5 MG: 5 TABLET ORAL at 09:48

## 2019-10-10 RX ADMIN — ONDANSETRON 4 MG: 2 INJECTION INTRAMUSCULAR; INTRAVENOUS at 23:07

## 2019-10-10 RX ADMIN — LINEZOLID 600 MG: 600 INJECTION, SOLUTION INTRAVENOUS at 09:48

## 2019-10-10 NOTE — PROGRESS NOTES
"  Infectious Diseases Progress Note    Rafa Fowler MD     [facility]  [place]: 6 days  Patient Identification:  Name: Swetha Luis  Age: 59 y.o.  Sex: female  :  1960  MRN: [id number]         Primary Care Physician: Provider, No Known            Subjective: Denies any persistent fever or chills.  Just tired.  Interval History: Consultation note.    Objective:    Scheduled Meds:    amLODIPine 5 mg Oral Q24H   busPIRone 5 mg Oral TID   ertapenem 1 g Intravenous Q24H   escitalopram 20 mg Oral Daily   gabapentin 300 mg Oral TID   influenza vaccine 0.5 mL Intramuscular Once   Linezolid 600 mg Intravenous Q12H   metoprolol succinate XL 25 mg Oral Daily   pramipexole 0.25 mg Oral Nightly   sodium chloride 10 mL Intravenous Q12H   thiamine 100 mg Oral Daily     Continuous Infusions:     Vital signs in last 24 hours:  Temp:  [97.7 °F (36.5 °C)-98.9 °F (37.2 °C)] 97.7 °F (36.5 °C)  Heart Rate:  [83-96] 83  Resp:  [16] 16  BP: (124-141)/(89-96) 141/91    Intake/Output:    Intake/Output Summary (Last 24 hours) at 10/10/2019 0727  Last data filed at 10/9/2019 1700  Gross per 24 hour   Intake 360 ml   Output --   Net 360 ml       Exam:  /91 (BP Location: Right arm, Patient Position: Lying)   Pulse 83   Temp 97.7 °F (36.5 °C) (Oral)   Resp 16   Ht 167.6 cm (65.98\")   Wt 89.8 kg (198 lb)   SpO2 97%   BMI 31.97 kg/m²     General Appearance:    Alert, cooperative, no distress, AAOx3                          Head:    Normocephalic, without obvious abnormality, atraumatic                           Eyes:    PERRL, conjunctivae/corneas clear, EOM's intact, both eyes                         Throat:   Lips, tongue, gums normal; oral mucosa pink and moist                           Neck: Trach in place                         Lungs:    Clear to auscultation bilaterally, respirations unlabored                 Chest Wall:    No tenderness or deformity                          Heart:    Regular rate and rhythm, " S1 and S2 normal, no murmur, no rub                                         or gallop                  Abdomen:   PEG in place                 Extremities:   Extremities normal, atraumatic, no cyanosis or edema                        Pulses:   Pulses palpable in all extremities                            Skin:   Skin is warm and dry,  no rashes or palpable lesions                  Neurologic: No new neurological deficits noted       Data Review:    I reviewed the patient's new clinical results.  Results from last 7 days   Lab Units 10/10/19  0419 10/08/19  0541 10/07/19  0447 10/06/19  0529 10/05/19  0520 10/04/19  0623   WBC 10*3/mm3 9.49 7.47 6.05 5.61 9.46 12.58*   HEMOGLOBIN g/dL 12.0 11.8* 11.7* 11.2* 12.7 12.0   PLATELETS 10*3/mm3 244 216 138* 162 134* 187     Results from last 7 days   Lab Units 10/10/19  0419 10/08/19  0541 10/07/19  0447 10/06/19  0529 10/05/19  0520 10/04/19  0623   SODIUM mmol/L 138 141 140 144 134* 142   POTASSIUM mmol/L 4.1 4.1 4.1 4.5 4.0 3.9   CHLORIDE mmol/L 100 101 102 104 96* 103   CO2 mmol/L 25.6 29.8* 26.3 29.2* 21.1* 27.9   BUN mg/dL 19 15 15 17 13 19   CREATININE mg/dL 1.00 0.95 1.01* 1.11* 1.03* 1.21*   CALCIUM mg/dL 9.4 9.3 8.8 8.7 8.8 8.8   GLUCOSE mg/dL 78 83 107* 106* 53* 138*     Microbiology Results (last 10 days)     Procedure Component Value - Date/Time    Urine Culture - Urine, Urine, Catheter In/Out [[id number]]  (Abnormal)  (Susceptibility) Collected:  10/06/19 2130    Lab Status:  Final result Specimen:  Urine, Catheter In/Out Updated:  10/09/19 1144     Urine Culture >100,000 CFU/mL Enterococcus faecium, VRE     Comment:   Vancomycin Resistant Enterococcus species. Patient may be an isolation risk.       Susceptibility      Enterococcus faecium, VRE     [place]     Ampicillin Resistant     Levofloxacin Resistant     Linezolid Susceptible     Nitrofurantoin Susceptible     Tetracycline Resistant     Vancomycin Resistant                    Respiratory Panel, PCR - Swab,  Nasopharynx [[id number]]  (Normal) Collected:  10/04/19 1652    Lab Status:  Final result Specimen:  Swab from Nasopharynx Updated:  10/04/19 1853     ADENOVIRUS, PCR Not Detected     Coronavirus 229E Not Detected     Coronavirus HKU1 Not Detected     Coronavirus NL63 Not Detected     Coronavirus OC43 Not Detected     Human Metapneumovirus Not Detected     Human Rhinovirus/Enterovirus Not Detected     Influenza B PCR Not Detected     Parainfluenza Virus 1 Not Detected     Parainfluenza Virus 2 Not Detected     Parainfluenza Virus 3 Not Detected     Parainfluenza Virus 4 Not Detected     Bordetella pertussis pcr Not Detected     Influenza A H1 2009 PCR Not Detected     Chlamydophila pneumoniae PCR Not Detected     Mycoplasma pneumo by PCR Not Detected     Influenza A PCR Not Detected     Influenza A H3 Not Detected     Influenza A H1 Not Detected     RSV, PCR Not Detected     Bordetella parapertussis PCR Not Detected    MRSA Screen, PCR - Swab, Nares [[id number]]  (Normal) Collected:  10/04/19 1652    Lab Status:  Final result Specimen:  Swab from Nares Updated:  10/04/19 1853     MRSA PCR No MRSA Detected    Blood Culture - Blood, Arm, Right [[id number]] Collected:  10/04/19 0625    Lab Status:  Final result Specimen:  Blood from Arm, Right Updated:  10/09/19 0745     Blood Culture No growth at 5 days    Blood Culture - Blood, Arm, Left [[id number]]  (Abnormal)  (Susceptibility) Collected:  10/04/19 0624    Lab Status:  Final result Specimen:  Blood from Arm, Left Updated:  10/06/19 0621     Blood Culture Escherichia coli ESBL     Comment:   Consider infectious disease consult.  Susceptibility results may not correlate to clinical outcomes.  For ESBL-producing infections in the blood, a carbapenem is recommended as first-line therapy for optimal clinical outcomes.        Isolated from Aerobic and Anaerobic Bottles     Gram Stain Aerobic Bottle Gram negative bacilli      Anaerobic Bottle Gram negative bacilli     Susceptibility      Escherichia coli ESBL     [place]     Ertapenem Susceptible     Meropenem Susceptible                    Blood Culture ID, PCR - Blood, Arm, Left [[id number]]  (Abnormal) Collected:  10/04/19 0624    Lab Status:  Final result Specimen:  Blood from Arm, Left Updated:  10/05/19 0048     BCID, PCR Escherichia coli. Identification by BCID PCR.      Xr Chest 2 View    Result Date: 10/4/2019  Increasing pulmonary interstitial and vascular prominence, particularly in perihilar regions increased versus 09/21/2019. No evidence of consolidation or effusion.  This report was finalized on 10/4/2019 8:46 AM by Dr. Jonathan Bianchi M.D.      Xr Chest 1 View    Result Date: 9/21/2019  Poor inspiration without active airspace disease     This report was finalized on 9/21/2019 11:06 PM by Andrei Beatty M.D.      Ct Abdomen Pelvis Stone Protocol    Result Date: 10/8/2019  1. 5 mm left distal ureteric calculus with mild left hydronephrosis and left renal edema. 2. Additional findings as above.  Radiation dose reduction techniques were utilized, including automated exposure control and exposure modulation based on body size.             Assessment:  1-ESBL positive E. coli sepsis with bacteremia likely due to  2-urinary tract infection  3-recurrent shortness of breath due to male handling of secretions due to chronic tracheostomy and noncompliance to diet  4-history of chronic respiratory failure and dysphagia with recurrent aspiration status post tracheostomy and PEG tube placement  5-history of COPD with recurrent exacerbation  6-history of seizure disorder and subarachnoid hemorrhage and immobilization syndrome  7-VRE on repeat urine culture after being on ertapenem with continued improvement argues against this pathogen being a true culprit -likely a colonizer.        Recommendations/Discussions:   · She has improved clinically in terms of resolution of sepsis on current antibiotic therapy.    · Given her presentation  and overall work-up the likely source of gram-negative bacteremia i.e. E. coli is probably  tract.  · CT scan reviewed and plans for intervention per  service noted  · Would not recommend any specific treatment forVRE in the urine as this probably colonizer in the context of systemic sepsis caused by E. coli with bacteremia.      Rafa Fowler MD  10/10/2019  7:27 AM    Much of this encounter note is an electronic transcription/translation of spoken language to printed text. The electronic translation of spoken language may permit erroneous, or at times, nonsensical words or phrases to be inadvertently transcribed; Although I have reviewed the note for such errors, some may still exist

## 2019-10-10 NOTE — PROGRESS NOTES
Continued Stay Note  [facility]     Patient Name: Swetha Luis  MRN: [id number]  Today's Date: 10/10/2019    Admit Date: 10/4/2019    Discharge Plan     Row Name 10/10/19 1532       Plan    Plan  [facility] when medically ready    Patient/Family in Agreement with Plan  other (see comments)    Plan Comments  Patient awaiting surgery. CCP called spouse Cristopher Luis [phone] via outbound call, introduced self and explained CCP role. Updated him that [facility] has accepted pt and able to come when medically ready. Spouse unsure if plan will be SNF at [place], CCP explained [facility] contacted and their recommendation is SNF at [place]. He requests [facility]  to call him and discuss. CCP called Angelina Sierra APS  [phone] and updated her and she will call spouse. Pt continues on 2 IV antibiotics and awaiting Cysto with Urology. Packet in ccp office. sergio rodriguez/ccp        Discharge Codes    No documentation.             Noelle Juarez, RN

## 2019-10-10 NOTE — PROGRESS NOTES
Tracheostomy exchanged at bedside. Old tracheostomy removed. New size 4 cuffless placed without complication. Patient with strong cough was able to exhale air nicely post placement.

## 2019-10-10 NOTE — CONSULTS
[facility]    Reason: pneumonia    HPI:  Mrs. Luis is a 60yo WF with a history of tracheostomy she was admitted 10/4 with increasing shortness of breath for 4 days, gradual onset, constant moderate no specific alleviating or exacerbating factors.  Associated with incrased sputum production and color changes.  She had fever as well. She has been admitted here and is being treated with linezolid and invanz at the direction of infectious disease consult.  Her  blood culture has an ESBL E. Coli.  Her urine has E. Facium, VRE. She has had improving cough and sputum.  She has not had recent fever.  Her inner cannula fell out and was lost and no replacement could be found.      Past Medical History:   Diagnosis Date   • Acute congestive heart failure (CMS/[facility]) 2018   • Acute osteomyelitis (CMS/[facility])     Right shoulder due to IVDA   • Acute renal failure on dialysis (CMS/[facility])    • Anxiety    • Pugh esophagus    • CKD (chronic kidney disease)    • COPD (chronic obstructive pulmonary disease) (CMS/[facility])    • MRSA infection    • Nontraumatic subarachnoid hemorrhage (CMS/[facility])    • Seizures (CMS/[facility])    • Tracheostomy present (CMS/[facility])      Social History     Socioeconomic History   • Marital status:      Spouse name: Not on file   • Number of children: Not on file   • Years of education: Not on file   • Highest education level: Not on file   Tobacco Use   • Smoking status: Former Smoker     Packs/day: 1.00     Types: Cigarettes     Last attempt to quit: 2018     Years since quittin.2   • Smokeless tobacco: Never Used   Substance and Sexual Activity   • Alcohol use: No     Frequency: Never   • Drug use: Yes     Types: Cocaine     Comment: 20 years ago occasional   • Sexual activity: Defer     Family History   Problem Relation Age of Onset   • Diabetes Mother    • Hypertension Mother      MEDS: reviewed  ALL: list of 4, reviewed as per chart  ROS: UTO due to tracheostomy    Vital Sign  Min/Max for last 24 hours  Temp  Min: 97.4 °F (36.3 °C)  Max: 98.9 °F (37.2 °C)   BP  Min: 112/82  Max: 141/91   Pulse  Min: 77  Max: 96   Resp  Min: 16  Max: 16   SpO2  Min: 97 %  Max: 99 %   Flow (L/min)  Min: 6  Max: 6   Weight  Min: 89.8 kg (198 lb)  Max: 89.8 kg (198 lb)     GEN:  appears ill, obese, Alert  HEENT: PERRL, EOMI, no icterus, mmm, no jvd, trachea midline, neck supple, normal conjunctiva, stoma appears healthy, no palpable thyroid nodules  CHEST: decreased ae, coarse bilat, no wheezes, no crackles, no use of accessory muscles  CV: RRR, no m/g/r  ABD: soft, nt, nd +bs, no hepatosplenomegaly  EXT: no c/c/e  SKIN: no rashes, no xanthomas, nl turgor, warm, dry  LYMPH: no palpable cervical or supraclavicular lymphadenopathy  NEURO: CN 2-12 intact and symmetric bilaterally  PSYCH:deferred  MSK: +kyphosis, moves all 4 extremities.     Labs: 10/10: reviewed:  Cr 1  Na 138  Bicarb 25  Wbc 9.49  hgb 12  plts 244    Cxr: 10/4: cardiomegaly, faint left  Lower lobe infiltrate    A/P:  1. Tracheostomy -- tracheostomy exchanged at bedside  2. Aspiration pneumonia -- mild improving, antibiotics as per ID  3. Chronic bronchitis/tracheitis -- continue pulmonary toilet  4. UTI  5. Bacteremia  6. Chronic hypoxemic respiratory failure  7. Copd -- continue brochodilators.

## 2019-10-10 NOTE — PLAN OF CARE
Problem: Patient Care Overview  Goal: Plan of Care Review  Outcome: Ongoing (interventions implemented as appropriate)   10/10/19 1947   Plan of Care Review   Progress improving   OTHER   Outcome Summary v/s stable, pain managed with prn medications, dr. osborne here at bedside to exchange trach, pt tolerated well   Coping/Psychosocial   Plan of Care Reviewed With patient       Problem: Pain, Chronic (Adult)  Goal: Acceptable Pain/Comfort Level and Functional Ability  Outcome: Ongoing (interventions implemented as appropriate)      Problem: Pneumonia (Adult)  Goal: Signs and Symptoms of Listed Potential Problems Will be Absent, Minimized or Managed (Pneumonia)  Outcome: Ongoing (interventions implemented as appropriate)      Problem: Fall Risk (Adult)  Goal: Absence of Fall  Outcome: Ongoing (interventions implemented as appropriate)      Problem: Skin Injury Risk (Adult)  Goal: Skin Health and Integrity  Outcome: Ongoing (interventions implemented as appropriate)

## 2019-10-10 NOTE — PROGRESS NOTES
"DAILY PROGRESS NOTE  [facility]    Patient Identification:  Name: Swetha Luis  Age: 59 y.o.  Sex: female  :  1960  MRN: [id number]         Primary Care Physician: Provider, No Known    Subjective:  Interval History: She complains of cough and shortness of air.  Better today    Objective:    Scheduled Meds:    amLODIPine 5 mg Oral Q24H   busPIRone 5 mg Oral TID   ertapenem 1 g Intravenous Q24H   escitalopram 20 mg Oral Daily   gabapentin 300 mg Oral TID   influenza vaccine 0.5 mL Intramuscular Once   Linezolid 600 mg Intravenous Q12H   metoprolol succinate XL 25 mg Oral Daily   pramipexole 0.25 mg Oral Nightly   sodium chloride 10 mL Intravenous Q12H   thiamine 100 mg Oral Daily     Continuous Infusions:     Vital signs in last 24 hours:  Temp:  [97.7 °F (36.5 °C)-98.9 °F (37.2 °C)] 98.1 °F (36.7 °C)  Heart Rate:  [77-96] 77  Resp:  [16] 16  BP: (112-141)/(82-91) 112/82    Intake/Output:    Intake/Output Summary (Last 24 hours) at 10/10/2019 1206  Last data filed at 10/10/2019 0900  Gross per 24 hour   Intake 360 ml   Output --   Net 360 ml       Exam:  /82 (BP Location: Right arm, Patient Position: Lying)   Pulse 77   Temp 98.1 °F (36.7 °C) (Oral)   Resp 16   Ht 167.6 cm (65.98\")   Wt 89.8 kg (198 lb)   SpO2 99%   BMI 31.97 kg/m²     General Appearance:    Alert, cooperative, no distress   Head:    Normocephalic, without obvious abnormality, atraumatic   Eyes:       Throat:   Lips, tongue, gums normal   Neck:   Supple, symmetrical, trachea midline, no JVD, tracheostomy in place   Lungs:    Rhonchi and wheezes bilaterally, respirations unlabored   Chest Wall:    No tenderness or deformity    Heart:    Regular rate and rhythm, S1 and S2 normal, no murmur,no  Rub or gallop   Abdomen:     Soft, non-tender, bowel sounds active, no masses, no organomegaly    Extremities:   Extremities normal, atraumatic, no cyanosis or edema   Pulses:      Skin:   Skin is warm and dry,  no rashes or " palpable lesions   Neurologic:   no focal deficits noted      Lab Results (last 72 hours)     Procedure Component Value Units Date/Time    CBC Auto Differential [[id number]]  (Abnormal) Collected:  10/05/19 0520    Specimen:  Blood Updated:  10/05/19 0810     WBC 9.46 10*3/mm3      RBC 4.79 10*6/mm3      Hemoglobin 12.7 g/dL      Hematocrit 40.8 %      MCV 85.2 fL      MCH 26.5 pg      MCHC 31.1 g/dL      RDW 14.1 %      RDW-SD 44.2 fl      MPV 13.2 fL      Platelets 134 10*3/mm3     Manual Differential [[id number]]  (Abnormal) Collected:  10/05/19 0520    Specimen:  Blood Updated:  10/05/19 0810     Neutrophil % 86.0 %      Lymphocyte % 8.0 %      Monocyte % 4.0 %      Eosinophil % 1.0 %      Atypical Lymphocyte % 1.0 %      Neutrophils Absolute 8.14 10*3/mm3      Lymphocytes Absolute 0.76 10*3/mm3      Monocytes Absolute 0.38 10*3/mm3      Eosinophils Absolute 0.09 10*3/mm3      Anisocytosis Slight/1+     Rochester Cells Mod/2+     Microcytes Slight/1+     Ovalocytes Mod/2+     Poikilocytes Mod/2+     Polychromasia Mod/2+     WBC Morphology Normal     Giant Platelets Slight/1+    Blood Culture - Blood, Arm, Right [[id number]] Collected:  10/04/19 0625    Specimen:  Blood from Arm, Right Updated:  10/05/19 0800     Blood Culture No growth at 24 hours    Basic Metabolic Panel [[id number]]  (Abnormal) Collected:  10/05/19 0520    Specimen:  Blood Updated:  10/05/19 0735     Glucose 53 mg/dL      BUN 13 mg/dL      Creatinine 1.03 mg/dL      Sodium 134 mmol/L      Potassium 4.0 mmol/L      Chloride 96 mmol/L      CO2 21.1 mmol/L      Calcium 8.8 mg/dL      eGFR Non African Amer 55 mL/min/1.73      BUN/Creatinine Ratio 12.6     Anion Gap 16.9 mmol/L     Narrative:       GFR Normal >60  Chronic Kidney Disease <60  Kidney Failure <15    Blood Culture - Blood, Arm, Left [[id number]]  (Abnormal) Collected:  10/04/19 0624    Specimen:  Blood from Arm, Left Updated:  10/05/19 0619     Blood Culture Escherichia coli     Isolated from  Aerobic and Anaerobic Bottles     Gram Stain Aerobic Bottle Gram negative bacilli      Anaerobic Bottle Gram negative bacilli    Blood Culture ID, PCR - Blood, Arm, Left [[id number]]  (Abnormal) Collected:  10/04/19 0624    Specimen:  Blood from Arm, Left Updated:  10/05/19 0048     BCID, PCR Escherichia coli. Identification by BCID PCR.    MRSA Screen, PCR - Swab, Nares [[id number]]  (Normal) Collected:  10/04/19 1652    Specimen:  Swab from Nares Updated:  10/04/19 1853     MRSA PCR No MRSA Detected    Respiratory Panel, PCR - Swab, Nasopharynx [[id number]]  (Normal) Collected:  10/04/19 1652    Specimen:  Swab from Nasopharynx Updated:  10/04/19 1853     ADENOVIRUS, PCR Not Detected     Coronavirus 229E Not Detected     Coronavirus HKU1 Not Detected     Coronavirus NL63 Not Detected     Coronavirus OC43 Not Detected     Human Metapneumovirus Not Detected     Human Rhinovirus/Enterovirus Not Detected     Influenza B PCR Not Detected     Parainfluenza Virus 1 Not Detected     Parainfluenza Virus 2 Not Detected     Parainfluenza Virus 3 Not Detected     Parainfluenza Virus 4 Not Detected     Bordetella pertussis pcr Not Detected     Influenza A H1 2009 PCR Not Detected     Chlamydophila pneumoniae PCR Not Detected     Mycoplasma pneumo by PCR Not Detected     Influenza A PCR Not Detected     Influenza A H3 Not Detected     Influenza A H1 Not Detected     RSV, PCR Not Detected     Bordetella parapertussis PCR Not Detected    Comprehensive Metabolic Panel [[id number]]  (Abnormal) Collected:  10/04/19 0623    Specimen:  Blood Updated:  10/04/19 0818     Glucose 138 mg/dL      BUN 19 mg/dL      Creatinine 1.21 mg/dL      Sodium 142 mmol/L      Potassium 3.9 mmol/L      Chloride 103 mmol/L      CO2 27.9 mmol/L      Calcium 8.8 mg/dL      Total Protein 6.9 g/dL      Albumin 3.80 g/dL      ALT (SGPT) 136 U/L      AST (SGOT) 71 U/L      Alkaline Phosphatase 194 U/L      Total Bilirubin 0.3 mg/dL      eGFR Non  Amer 46  mL/min/1.73      Globulin 3.1 gm/dL      A/G Ratio 1.2 g/dL      BUN/Creatinine Ratio 15.7     Anion Gap 11.1 mmol/L     Narrative:       GFR Normal >60  Chronic Kidney Disease <60  Kidney Failure <15    Lactic Acid, Plasma [[id number]]  (Normal) Collected:  10/04/19 0623    Specimen:  Blood Updated:  10/04/19 0802     Lactate 1.4 mmol/L     [employer] Draw [[id number]] Collected:  10/04/19 0623    Specimen:  Blood Updated:  10/04/19 0800    Narrative:       The following orders were created for panel order [employer] Draw.  Procedure                               Abnormality         Status                     ---------                               -----------         ------                     Light Blue Top[[id number]]                                   Final result               Green Top (Gel)[[id number]]                                  Final result               Lavender Top[[id number]]                                     Final result               Gold Top - SST[[id number]]                                   Final result                 Please view results for these tests on the individual orders.    Light Blue Top [[id number]] Collected:  10/04/19 0623    Specimen:  Blood Updated:  10/04/19 0800     Extra Tube hold for add-on     Comment: Auto resulted       Green Top (Gel) [[id number]] Collected:  10/04/19 0623    Specimen:  Blood Updated:  10/04/19 0800     Extra Tube Hold for add-ons.     Comment: Auto resulted.       Lavender Top [[id number]] Collected:  10/04/19 0623    Specimen:  Blood Updated:  10/04/19 0800     Extra Tube hold for add-on     Comment: Auto resulted       Gold Top - SST [[id number]] Collected:  10/04/19 0623    Specimen:  Blood Updated:  10/04/19 0800     Extra Tube Hold for add-ons.     Comment: Auto resulted.       CBC & Differential [[id number]] Collected:  10/04/19 0623    Specimen:  Blood Updated:  10/04/19 0756    Narrative:       The following orders were created for panel order CBC &  Differential.  Procedure                               Abnormality         Status                     ---------                               -----------         ------                     CBC Auto Differential[[id number]]        Abnormal            Final result                 Please view results for these tests on the individual orders.    CBC Auto Differential [[id number]]  (Abnormal) Collected:  10/04/19 0623    Specimen:  Blood Updated:  10/04/19 0756     WBC 12.58 10*3/mm3      RBC 4.60 10*6/mm3      Hemoglobin 12.0 g/dL      Hematocrit 39.1 %      MCV 85.0 fL      MCH 26.1 pg      MCHC 30.7 g/dL      RDW 14.1 %      RDW-SD 44.0 fl      MPV 12.5 fL      Platelets 187 10*3/mm3      Neutrophil % 82.7 %      Lymphocyte % 6.8 %      Monocyte % 8.7 %      Eosinophil % 0.8 %      Basophil % 0.3 %      Immature Grans % 0.7 %      Neutrophils, Absolute 10.40 10*3/mm3      Lymphocytes, Absolute 0.85 10*3/mm3      Monocytes, Absolute 1.10 10*3/mm3      Eosinophils, Absolute 0.10 10*3/mm3      Basophils, Absolute 0.04 10*3/mm3      Immature Grans, Absolute 0.09 10*3/mm3      nRBC 0.0 /100 WBC         Data Review:  Results from last 7 days   Lab Units 10/10/19  0419 10/08/19  0541 10/07/19  0447   SODIUM mmol/L 138 141 140   POTASSIUM mmol/L 4.1 4.1 4.1   CHLORIDE mmol/L 100 101 102   CO2 mmol/L 25.6 29.8* 26.3   BUN mg/dL 19 15 15   CREATININE mg/dL 1.00 0.95 1.01*   GLUCOSE mg/dL 78 83 107*   CALCIUM mg/dL 9.4 9.3 8.8     Results from last 7 days   Lab Units 10/10/19  0419 10/08/19  0541 10/07/19  0447   WBC 10*3/mm3 9.49 7.47 6.05   HEMOGLOBIN g/dL 12.0 11.8* 11.7*   HEMATOCRIT % 38.6 38.4 37.7   PLATELETS 10*3/mm3 244 216 138*             Lab Results   Lab Value Date/Time    TROPONINT 0.045 (C) 09/22/2019 1211    TROPONINT 0.034 (C) 09/22/2019 0604    TROPONINT 0.031 (C) 09/22/2019 0130    TROPONINT 0.036 (C) 09/21/2019 2248    TROPONINT 0.050 (H) 12/29/2018 0409    TROPONINT 0.085 (H) 12/24/2018 1900    TROPONINT 0.100  (C) 12/24/2018 1203    TROPONINT <0.01 03/26/2014 1731         Results from last 7 days   Lab Units 10/04/19  0623   ALK PHOS U/L 194*   BILIRUBIN mg/dL 0.3   ALT (SGPT) U/L 136*   AST (SGOT) U/L 71*             No results found for: POCGLU        Past Medical History:   Diagnosis Date   • Acute congestive heart failure (CMS/[facility]) 12/24/2018   • Acute osteomyelitis (CMS/[facility])     Right shoulder due to IVDA   • Acute renal failure on dialysis (CMS/[facility])    • Anxiety    • Pugh esophagus    • CKD (chronic kidney disease)    • COPD (chronic obstructive pulmonary disease) (CMS/[facility])    • MRSA infection    • Nontraumatic subarachnoid hemorrhage (CMS/[facility])    • Seizures (CMS/[facility])    • Tracheostomy present (CMS/[facility])        Assessment:  Active Hospital Problems    Diagnosis  POA   • **Healthcare associated bacterial pneumonia [J15.9]  Yes   • Ureteral stone with hydronephrosis [N13.2]  Unknown   • UTI (urinary tract infection) [N39.0]  Unknown   • Infection due to ESBL-producing Escherichia coli [A49.8, Z16.12]  Unknown   • Sepsis due to Gram negative bacteria (CMS/HCC) [A41.50]  Unknown   • Dyspnea [R06.00]  Yes   • Tracheostomy present (CMS/HCC) [Z93.0]  Not Applicable   • Essential hypertension [I10]  Yes   • Dysphagia [R13.10]  Yes   • COPD (chronic obstructive pulmonary disease) (CMS/HCC) [J44.9]  Yes   • Respiratory failure, chronic (CMS/HCC) [J96.10]  Yes   • PEG (percutaneous endoscopic gastrostomy) status (CMS/HCC) [Z93.1]  Not Applicable   • Chronic diastolic CHF (congestive heart failure) (CMS/HCC) [I50.32]  Yes   • Peripheral artery disease (CMS/HCC) [I73.9]  Yes   • Stage 3 chronic kidney disease (CMS/HCC) [N18.3]  Yes      Resolved Hospital Problems   No resolved problems to display.       Plan:  Continue with IV antibiotics and  Zyvox for VRE.  We will get some follow-up laboratory studies.  Continue with bronchodilator treatments.  CT abdomen and pelvis results noted. ID consult noted.  urology consult noted.  Cysto lithotripsy and stent sometime  Pulmonary to see for trach.  True Moreno MD  10/10/2019  12:06 PM

## 2019-10-10 NOTE — NURSING NOTE
Pt inner cannula and pmv is missing, unable to replace due to hospital not having  Size 4 disposable Shiley trach. Nursing manager and  Dr Moreno made aware, consult made to pulmonary to replace trach.

## 2019-10-10 NOTE — NURSING NOTE
Made dr. barnes aware the at consent is still unobtainable by  and that pt has eaten a muffin. Surgery on hold for now

## 2019-10-10 NOTE — THERAPY TREATMENT NOTE
Patient Name: Swetha Luis  : 1960    MRN: [id number]                              Today's Date: 10/10/2019       Admit Date: 10/4/2019    Visit Dx:     ICD-10-CM ICD-9-CM   1. Healthcare associated bacterial pneumonia J15.9 482.9   2. Oropharyngeal dysphagia R13.12 787.22     Patient Active Problem List   Diagnosis   • Tracheostomy complication (CMS/[facility])   • Gangrene of toe (CMS/[facility])   • Acute diastolic congestive heart failure (CMS/[facility])   • ARF (acute renal failure) (CMS/[facility])   • Stage 3 chronic kidney disease (CMS/[facility])   • Elevated troponin   • Acute respiratory failure with hypoxemia (CMS/[facility])   • Acute osteomyelitis (CMS/[facility])   • UTI due to extended-spectrum beta lactamase (ESBL) producing Escherichia coli   • Thrush, oral   • Tracheostomy malfunction (CMS/[facility])   • COPD (chronic obstructive pulmonary disease) (CMS/[facility])   • Respiratory failure, chronic (CMS/HCC)   • PEG (percutaneous endoscopic gastrostomy) status (CMS/HCC)   • History of osteomyelitis   • Polysubstance abuse (CMS/[facility])   • History of tracheal stenosis   • Chronic diastolic CHF (congestive heart failure) (CMS/[facility])   • Peripheral artery disease (CMS/[facility])   • Medically noncompliant   • WENDI and COPD overlap syndrome (CMS/[facility])   • Dyspnea   • Elevated LFTs   • Elevated troponin   • Tracheostomy present (CMS/[facility])   • Essential hypertension   • Dysphagia   • Healthcare associated bacterial pneumonia   • Infection due to ESBL-producing Escherichia coli   • Sepsis due to Gram negative bacteria (CMS/[facility])   • Ureteral stone with hydronephrosis   • UTI (urinary tract infection)     Past Medical History:   Diagnosis Date   • Acute congestive heart failure (CMS/[facility]) 2018   • Acute osteomyelitis (CMS/[facility])     Right shoulder due to IVDA   • Acute renal failure on dialysis (CMS/[facility])    • Anxiety    • Pugh esophagus    • CKD (chronic kidney disease)    • COPD (chronic obstructive pulmonary disease) (CMS/[facility])    • MRSA infection    • Nontraumatic  "subarachnoid hemorrhage (CMS/HCC)    • Seizures (CMS/HCC)    • Tracheostomy present (CMS/HCC)      Past Surgical History:   Procedure Laterality Date   • TRACHEOSTOMY       General Information     Row Name 10/10/19 1346          PT Evaluation Time/Intention    Document Type  therapy note (daily note) Pt. reports feeling \"tired\" this day.  Otherwise, pt. agreeable to work with P.T.   -MS     Mode of Treatment  physical therapy;individual therapy  -MS     Row Name 10/10/19 134          General Information    Patient Profile Reviewed?  yes  -MS     Existing Precautions/Restrictions  fall;oxygen therapy device and L/min  (Significant)  Trach. (6 Liters oxygen); Contact isolation  -MS     Row Name 10/10/19 1346          Safety Issues, Functional Mobility    Comment, Safety Issues/Impairments (Mobility)  Gait belt used for safety.  -MS       User Key  (r) = Recorded By, (t) = Taken By, (c) = Cosigned By    Initials Name Provider Type    MS Andrei Lee ANAI, PT Physical Therapist        Mobility     Row Name 10/10/19 1347          Bed Mobility Assessment/Treatment    Supine-Sit [place] (Bed Mobility)  contact guard  -MS     Sit-Supine [place] (Bed Mobility)  contact guard  -MS     Assistive Device (Bed Mobility)  bed rails  -MS     Row Name 10/10/19 1345          Sit-Stand Transfer    Sit-Stand [place] (Transfers)  contact guard  -MS     Assistive Device (Sit-Stand Transfers)  -- HHA x 1  -MS     Row Name 10/10/19 1345          Gait/Stairs Assessment/Training    [place] Level (Gait)  minimum assist (75% patient effort)  -MS     Assistive Device (Gait)  -- HHA x 1  -MS     Distance in Feet (Gait)  20 feet  -MS     Pattern (Gait)  step-through  -MS     Deviations/Abnormal Patterns (Gait)  balaji decreased;stride length decreased  -MS     Bilateral Gait Deviations  forward flexed posture  -MS     Comment (Gait/Stairs)  In order to challenge pt.'s standing balance, pt. ambulated without A.D. and only HHA " x1 this date.  Verbal/tactile cues for posture correction and to increase her bilateral step length.  Limited in gait distance due to fatigue, SOA.   -MS       User Key  (r) = Recorded By, (t) = Taken By, (c) = Cosigned By    Initials Name Provider Type    Andrei Castro, PT Physical Therapist        Obj/Interventions     Row Name 10/10/19 9685          Therapeutic Exercise    Comment (Therapeutic Exercise)  BLE ther. ex. program x 15 reps completed (Ankle Pumps, Hip Flexion, LAQ's, Isometric Hip Add)  -MS       User Key  (r) = Recorded By, (t) = Taken By, (c) = Cosigned By    Initials Name Provider Type    Andrei Castro, PT Physical Therapist        Goals/Plan    No documentation.       Clinical Impression     Row Name 10/10/19 1002          Pain Scale: Numbers Pre/Post-Treatment    Pain Scale: Numbers, Pretreatment  0/10 - no pain  -MS     Pain Scale: Numbers, Post-Treatment  0/10 - no pain  -MS     Row Name 10/10/19 2328          Positioning and Restraints    Pre-Treatment Position  in bed  -MS     Post Treatment Position  bed  -MS     In Bed  notified nsg;supine;call light within reach;encouraged to call for assist;exit alarm on All lines intact.   -MS       User Key  (r) = Recorded By, (t) = Taken By, (c) = Cosigned By    Initials Name Provider Type    Andrei Castro, PT Physical Therapist        Outcome Measures     Row Name 10/10/19 1755          How much help from another person do you currently need...    Turning from your back to your side while in flat bed without using bedrails?  3  -MS     Moving from lying on back to sitting on the side of a flat bed without bedrails?  3  -MS     Moving to and from a bed to a chair (including a wheelchair)?  3  -MS     Standing up from a chair using your arms (e.g., wheelchair, bedside chair)?  3  -MS     Climbing 3-5 steps with a railing?  2  -MS     To walk in hospital room?  3  -MS     AM-PAC 6 Clicks Score (PT)  17  -MS     Row Name 10/10/19 5701           Functional Assessment    Outcome Measure Options  AM-PAC 6 Clicks Basic Mobility (PT)  -MS       User Key  (r) = Recorded By, (t) = Taken By, (c) = Cosigned By    Initials Name Provider Type    Andrei Castro, PT Physical Therapist        Physical Therapy Education     Title: PT OT SLP Therapies (Done)     Topic: Physical Therapy (Done)     Point: Mobility training (Done)     Learning Progress Summary           Patient Acceptance, E,D, VU,NR by MS at 10/10/2019  1:51 PM    Acceptance, E,TB,D, VU,DU,NR by  at 10/9/2019 11:55 PM    Acceptance, E,D, VU,NR by MS at 10/9/2019  9:24 AM    Acceptance, E,TB,D, VU,NR,DU by  at 10/8/2019 10:17 PM    Acceptance, E, NR by AR at 10/8/2019 12:54 PM    Acceptance, E,TB,D, VU,DU,NR by  at 10/7/2019  9:35 PM    Acceptance, E,D, VU,NR by MS at 10/7/2019 10:33 AM    Acceptance, E,D, VU,NR by MS at 10/6/2019  9:26 AM    Acceptance, E,D, VU,NR by MS at 10/5/2019  9:25 AM                   Point: Home exercise program (Done)     Learning Progress Summary           Patient Acceptance, E,D, VU,NR by MS at 10/10/2019  1:51 PM    Acceptance, E,TB,D, VU,DU,NR by  at 10/9/2019 11:55 PM    Acceptance, E,D, VU,NR by MS at 10/9/2019  9:24 AM    Acceptance, E,TB,D, VU,NR,DU by  at 10/8/2019 10:17 PM    Acceptance, E, NR by AR at 10/8/2019 12:54 PM    Acceptance, E,TB,D, VU,DU,NR by  at 10/7/2019  9:35 PM    Acceptance, E,D, VU,NR by MS at 10/7/2019 10:33 AM    Acceptance, E,D, VU,NR by MS at 10/6/2019  9:26 AM    Acceptance, E,D, VU,NR by MS at 10/5/2019  9:25 AM                   Point: Body mechanics (Done)     Learning Progress Summary           Patient Acceptance, E,D, VU,NR by MS at 10/10/2019  1:51 PM    Acceptance, E,TB,D, VU,DU,NR by  at 10/9/2019 11:55 PM    Acceptance, E,D, VU,NR by MS at 10/9/2019  9:24 AM    Acceptance, E,TB,D, VU,NR,DU by  at 10/8/2019 10:17 PM    Acceptance, E, NR by AR at 10/8/2019 12:54 PM    Acceptance, E,TB,D, VU,DU,NR by  at  10/7/2019  9:35 PM    Acceptance, E,D, VU,NR by MS at 10/7/2019 10:33 AM    Acceptance, E,D, VU,NR by MS at 10/6/2019  9:26 AM    Acceptance, E,D, VU,NR by MS at 10/5/2019  9:25 AM                   Point: Precautions (Done)     Learning Progress Summary           Patient Acceptance, E,D, VU,NR by MS at 10/10/2019  1:51 PM    Acceptance, E,TB,D, VU,DU,NR by  at 10/9/2019 11:55 PM    Acceptance, E,D, VU,NR by MS at 10/9/2019  9:24 AM    Acceptance, E,TB,D, VU,NR,DU by  at 10/8/2019 10:17 PM    Acceptance, E, NR by AR at 10/8/2019 12:54 PM    Acceptance, E,TB,D, VU,DU,NR by  at 10/7/2019  9:35 PM    Acceptance, E,D, VU,NR by MS at 10/7/2019 10:33 AM    Acceptance, E,D, VU,NR by MS at 10/6/2019  9:26 AM    Acceptance, E,D, VU,NR by MS at 10/5/2019  9:25 AM                               User Key     Initials Effective Dates Name Provider Type Discipline    MS 04/03/18 -  Andrei Lee, PT Physical Therapist PT     04/06/17 -  Yvette Gutierrez, RN Registered Nurse Nurse    AR 04/03/18 -  Maria Del Carmen Brandon, PT Physical Therapist PT              PT Recommendation and Plan  Planned Therapy Interventions (PT Eval): balance training, bed mobility training, gait training, home exercise program, patient/family education, postural re-education, strengthening, transfer training  Outcome Summary/Treatment Plan (PT)  Anticipated Discharge Disposition (PT): home with 24/7 care, home with home health, skilled nursing facility(Pending pt. progress)  Plan of Care Reviewed With: patient  Progress: improving  Outcome Summary: Pt. able to ambulate 20 feet, Min. assist x1, with HHA x1 this date to challenge her balance when upright.  Pt. requires CGA x 1 for bed mobility and for sit <-> stand transfers.  Verbal/tactile cues for posture correction and to increase her bilateral step length during ambulation.  Progression in BLE ther. ex. program to x15 reps for general strengthening.      Time Calculation:   PT Charges     Row Name  10/10/19 1353 10/10/19 1124          Time Calculation    Start Time  1320  -MS  --     Stop Time  1340  -MS  --     Time Calculation (min)  20 min  -MS  --     PT Received On  10/10/19  -MS  --     PT - Next Appointment  10/11/19  -MS  10/10/19  -MS        Time Calculation- PT    Total Timed Code Minutes- PT  17 minute(s)  -MS  --       User Key  (r) = Recorded By, (t) = Taken By, (c) = Cosigned By    Initials Name Provider Type    Andrei Castro, PT Physical Therapist        Therapy Charges for Today     Code Description Service Date Service Provider Modifiers Qty    37025147473 HC PT THER PROC EA 15 MIN 10/9/2019 Andrei Lee, PT GP 1    41587373342 HC PT THER PROC EA 15 MIN 10/10/2019 Andrei Lee, PT GP 1          PT G-Codes  Outcome Measure Options: AM-PAC 6 Clicks Basic Mobility (PT)  AM-PAC 6 Clicks Score (PT): 17    Andrei Lee, PT  10/10/2019

## 2019-10-10 NOTE — PLAN OF CARE
Problem: Patient Care Overview  Goal: Plan of Care Review   10/10/19 1845   OTHER   Outcome Summary Pt. able to ambulate 20 feet, Min. assist x1, with HHA x1 this date to challenge her balance when upright. Pt. requires CGA x 1 for bed mobility and for sit <-> stand transfers. Verbal/tactile cues for posture correction and to increase her bilateral step length during ambulation. Progression in BLE ther. ex. program to x15 reps for general strengthening.    Coping/Psychosocial   Plan of Care Reviewed With patient

## 2019-10-10 NOTE — PLAN OF CARE
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, on IV antibiotics, Urology may hold on surgery 10/10, pt on 6/30 O2, suctions required, no c/o pain or nausea, VSS, will CTM   Coping/Psychosocial   Plan of Care Reviewed With patient       Problem: Pain, Chronic (Adult)  Goal: Acceptable Pain/Comfort Level and Functional Ability  Outcome: Ongoing (interventions implemented as appropriate)      Problem: Pneumonia (Adult)  Goal: Signs and Symptoms of Listed Potential Problems Will be Absent, Minimized or Managed (Pneumonia)  Outcome: Ongoing (interventions implemented as appropriate)      Problem: Fall Risk (Adult)  Goal: Absence of Fall  Outcome: Ongoing (interventions implemented as appropriate)      Problem: Skin Injury Risk (Adult)  Goal: Skin Health and Integrity  Outcome: Ongoing (interventions implemented as appropriate)

## 2019-10-10 NOTE — PROGRESS NOTES
[facility]  Clyde Selby MD     LOS: 6 days   Patient Care Team:  Provider, No Known as PCP - General        Subjective     Interval History:     Patient Complaints: Complains of left lower quadrant pain and discomfort.    History taken from: patient chart RN And the patient complains of left lower quadrant pain.  Yesterday this was not much of any problem.  Today it bothers her.    Review of Systems:    All systems were reviewed and were negative except for shortness of air which is improved.    Objective     Vital Signs  Temp:  [97.7 °F (36.5 °C)-98.9 °F (37.2 °C)] 97.7 °F (36.5 °C)  Heart Rate:  [83-96] 83  Resp:  [16] 16  BP: (124-141)/(89-96) 141/91    Physical Exam:   Physical Exam:     General Appearance:    Alert, cooperative, in no acute distress   Head:    Normocephalic, without obvious abnormality, atraumatic   Eyes:            Lids and lashes normal, conjunctivae and sclerae normal, no   icterus, no pallor, corneas clear, PERRLA   Ears:    Ears appear intact with no abnormalities noted   Throat:  Tracheostomy in place no oral lesions, no thrush, oral mucosa moist   Neck:   No adenopathy, supple, trachea midline,   Back:     No kyphosis present, no scoliosis present, no skin lesions,       erythema or scars, no tenderness to percussion or                   palpation,   range of motion normal   Lungs:     Clear to auscultation,respirations regular, even and                   unlabored    Heart:    Regular rhythm and normal rate, normal S1 and S2, no            murmur, no gallop, no rub, no click   Breast Exam:    Deferred   Abdomen:     Normal bowel sounds, no masses, no organomegaly, soft       and little tender to palpation left lower quadrant, non-distended, no guarding, no rebound                 tenderness   Genitalia:    Deferred   Extremities:   Moves all extremities well, no edema, no cyanosis, no              redness       Skin:   No bleeding, bruising or rash       Neurologic:   Cranial  nerves 2 - 12 grossly intact, sensation intact,         Results Review:     I reviewed the patient's new clinical results.    Recent Results (from the past 24 hour(s))   Basic Metabolic Panel    Collection Time: 10/10/19  4:19 AM   Result Value Ref Range    Glucose 78 65 - 99 mg/dL    BUN 19 6 - 20 mg/dL    Creatinine 1.00 0.57 - 1.00 mg/dL    Sodium 138 136 - 145 mmol/L    Potassium 4.1 3.5 - 5.2 mmol/L    Chloride 100 98 - 107 mmol/L    CO2 25.6 22.0 - 29.0 mmol/L    Calcium 9.4 8.6 - 10.5 mg/dL    eGFR Non African Amer 57 (L) >60 mL/min/1.73    BUN/Creatinine Ratio 19.0 7.0 - 25.0    Anion Gap 12.4 5.0 - 15.0 mmol/L   CBC Auto Differential    Collection Time: 10/10/19  4:19 AM   Result Value Ref Range    WBC 9.49 3.40 - 10.80 10*3/mm3    RBC 4.59 3.77 - 5.28 10*6/mm3    Hemoglobin 12.0 12.0 - 15.9 g/dL    Hematocrit 38.6 34.0 - 46.6 %    MCV 84.1 79.0 - 97.0 fL    MCH 26.1 (L) 26.6 - 33.0 pg    MCHC 31.1 (L) 31.5 - 35.7 g/dL    RDW 14.3 12.3 - 15.4 %    RDW-SD 43.5 37.0 - 54.0 fl    MPV 12.0 6.0 - 12.0 fL    Platelets 244 140 - 450 10*3/mm3    Neutrophil % 64.6 42.7 - 76.0 %    Lymphocyte % 23.8 19.6 - 45.3 %    Monocyte % 7.4 5.0 - 12.0 %    Eosinophil % 3.1 0.3 - 6.2 %    Basophil % 0.6 0.0 - 1.5 %    Immature Grans % 0.5 0.0 - 0.5 %    Neutrophils, Absolute 6.13 1.70 - 7.00 10*3/mm3    Lymphocytes, Absolute 2.26 0.70 - 3.10 10*3/mm3    Monocytes, Absolute 0.70 0.10 - 0.90 10*3/mm3    Eosinophils, Absolute 0.29 0.00 - 0.40 10*3/mm3    Basophils, Absolute 0.06 0.00 - 0.20 10*3/mm3    Immature Grans, Absolute 0.05 0.00 - 0.05 10*3/mm3    nRBC 0.0 0.0 - 0.2 /100 WBC       Medication Review:   Current Facility-Administered Medications:   •  acetaminophen (TYLENOL) tablet 650 mg, 650 mg, Oral, Q4H PRN, 650 mg at 10/04/19 2056 **OR** acetaminophen (TYLENOL) 160 MG/5ML solution 650 mg, 650 mg, Oral, Q4H PRN **OR** acetaminophen (TYLENOL) suppository 650 mg, 650 mg, Rectal, Q4H PRN, True Moreno MD  •  amLODIPine  (NORVASC) tablet 5 mg, 5 mg, Oral, Q24H, True Moreno MD, 5 mg at 10/09/19 0906  •  busPIRone (BUSPAR) tablet 5 mg, 5 mg, Oral, TID, True Moreno MD, 5 mg at 10/09/19 2007  •  ertapenem (INVanz) 1 g/100 mL 0.9% NS VTB (mbp), 1 g, Intravenous, Q24H, True Moreno MD, 1 g at 10/09/19 1501  •  escitalopram (LEXAPRO) tablet 20 mg, 20 mg, Oral, Daily, True Moreno MD, 20 mg at 10/09/19 0905  •  gabapentin (NEURONTIN) capsule 300 mg, 300 mg, Oral, TID, True Moreno MD, 300 mg at 10/09/19 2006  •  influenza vac split quad (FLUZONE,FLUARIX,AFLURIA) injection 0.5 mL, 0.5 mL, Intramuscular, Once, True Moreno MD  •  ipratropium-albuterol (DUO-NEB) nebulizer solution 3 mL, 3 mL, Nebulization, Q4H PRN, True Moreno MD, 3 mL at 10/07/19 1257  •  Linezolid (ZYVOX) 600 mg 300 mL, 600 mg, Intravenous, Q12H, True Moreno MD, Last Rate: 300 mL/hr at 10/09/19 2100, 600 mg at 10/09/19 2100  •  metoprolol succinate XL (TOPROL-XL) 24 hr tablet 25 mg, 25 mg, Oral, Daily, True Moreno MD, 25 mg at 10/09/19 0905  •  ondansetron (ZOFRAN) tablet 4 mg, 4 mg, Oral, Q6H PRN **OR** ondansetron (ZOFRAN) injection 4 mg, 4 mg, Intravenous, Q6H PRN, True Moreno MD, 4 mg at 10/05/19 1839  •  oxyCODONE-acetaminophen (PERCOCET) 5-325 MG per tablet 1 tablet, 1 tablet, Oral, Q6H PRN, True Moreno MD, 1 tablet at 10/09/19 1500  •  pramipexole (MIRAPEX) tablet 0.25 mg, 0.25 mg, Oral, Nightly, True Moreno MD, 0.25 mg at 10/09/19 2007  •  sodium chloride 0.9 % flush 10 mL, 10 mL, Intravenous, PRN, Lizandro, Andrea SORIANO MD  •  sodium chloride 0.9 % flush 10 mL, 10 mL, Intravenous, Q12H, True Moreno MD, 10 mL at 10/09/19 2006  •  sodium chloride 0.9 % flush 10 mL, 10 mL, Intravenous, PRN, True Moreno MD  •  thiamine (VITAMIN B-1) tablet 100 mg, 100 mg, Oral, Daily, True Moreno MD, 100 mg at 10/09/19 0905    Assessment/Plan       Healthcare associated bacterial pneumonia    Stage 3 chronic kidney disease (CMS/HCC)    COPD (chronic  obstructive pulmonary disease) (CMS/HCC)    Respiratory failure, chronic (CMS/HCC)    PEG (percutaneous endoscopic gastrostomy) status (CMS/HCC)    Chronic diastolic CHF (congestive heart failure) (CMS/HCC)    Peripheral artery disease (CMS/HCC)    Dyspnea    Tracheostomy present (CMS/HCC)    Essential hypertension    Dysphagia    Infection due to ESBL-producing Escherichia coli    Sepsis due to Gram negative bacteria (CMS/HCC)    Ureteral stone with hydronephrosis    UTI (urinary tract infection)      Distal left ureteral stone    Plan for disposition:I have discussed cystoscopy with ureteroscopy laser destruction of her stone and stent placement.  She knows there is risk including pain, infection, bleeding.    Clyde Selby MD  10/10/19  7:22 AM      Time: 25+

## 2019-10-11 ENCOUNTER — APPOINTMENT (OUTPATIENT)
Dept: CT IMAGING | Facility: HOSPITAL | Age: 59
End: 2019-10-11

## 2019-10-11 ENCOUNTER — APPOINTMENT (OUTPATIENT)
Dept: GENERAL RADIOLOGY | Facility: HOSPITAL | Age: 59
End: 2019-10-11

## 2019-10-11 LAB
ANION GAP SERPL CALCULATED.3IONS-SCNC: 11.9 MMOL/L (ref 5–15)
ANION GAP SERPL CALCULATED.3IONS-SCNC: 8.8 MMOL/L (ref 5–15)
BASOPHILS # BLD AUTO: 0.04 10*3/MM3 (ref 0–0.2)
BASOPHILS # BLD AUTO: 0.06 10*3/MM3 (ref 0–0.2)
BASOPHILS NFR BLD AUTO: 0.5 % (ref 0–1.5)
BASOPHILS NFR BLD AUTO: 0.6 % (ref 0–1.5)
BUN BLD-MCNC: 17 MG/DL (ref 6–20)
BUN BLD-MCNC: 20 MG/DL (ref 6–20)
BUN/CREAT SERPL: 16.2 (ref 7–25)
BUN/CREAT SERPL: 17.7 (ref 7–25)
CALCIUM SPEC-SCNC: 9.2 MG/DL (ref 8.6–10.5)
CALCIUM SPEC-SCNC: 9.6 MG/DL (ref 8.6–10.5)
CHLORIDE SERPL-SCNC: 102 MMOL/L (ref 98–107)
CHLORIDE SERPL-SCNC: 102 MMOL/L (ref 98–107)
CO2 SERPL-SCNC: 27.1 MMOL/L (ref 22–29)
CO2 SERPL-SCNC: 30.2 MMOL/L (ref 22–29)
CREAT BLD-MCNC: 1.05 MG/DL (ref 0.57–1)
CREAT BLD-MCNC: 1.13 MG/DL (ref 0.57–1)
DEPRECATED RDW RBC AUTO: 42.1 FL (ref 37–54)
DEPRECATED RDW RBC AUTO: 43 FL (ref 37–54)
EOSINOPHIL # BLD AUTO: 0.25 10*3/MM3 (ref 0–0.4)
EOSINOPHIL # BLD AUTO: 0.31 10*3/MM3 (ref 0–0.4)
EOSINOPHIL NFR BLD AUTO: 3 % (ref 0.3–6.2)
EOSINOPHIL NFR BLD AUTO: 3.2 % (ref 0.3–6.2)
ERYTHROCYTE [DISTWIDTH] IN BLOOD BY AUTOMATED COUNT: 14.2 % (ref 12.3–15.4)
ERYTHROCYTE [DISTWIDTH] IN BLOOD BY AUTOMATED COUNT: 14.3 % (ref 12.3–15.4)
GFR SERPL CREATININE-BSD FRML MDRD: 49 ML/MIN/1.73
GFR SERPL CREATININE-BSD FRML MDRD: 54 ML/MIN/1.73
GLUCOSE BLD-MCNC: 77 MG/DL (ref 65–99)
GLUCOSE BLD-MCNC: 88 MG/DL (ref 65–99)
HCT VFR BLD AUTO: 39 % (ref 34–46.6)
HCT VFR BLD AUTO: 41.3 % (ref 34–46.6)
HGB BLD-MCNC: 12.4 G/DL (ref 12–15.9)
HGB BLD-MCNC: 12.7 G/DL (ref 12–15.9)
IMM GRANULOCYTES # BLD AUTO: 0.04 10*3/MM3 (ref 0–0.05)
IMM GRANULOCYTES # BLD AUTO: 0.04 10*3/MM3 (ref 0–0.05)
IMM GRANULOCYTES NFR BLD AUTO: 0.4 % (ref 0–0.5)
IMM GRANULOCYTES NFR BLD AUTO: 0.5 % (ref 0–0.5)
LYMPHOCYTES # BLD AUTO: 2.09 10*3/MM3 (ref 0.7–3.1)
LYMPHOCYTES # BLD AUTO: 2.39 10*3/MM3 (ref 0.7–3.1)
LYMPHOCYTES NFR BLD AUTO: 24.4 % (ref 19.6–45.3)
LYMPHOCYTES NFR BLD AUTO: 25.1 % (ref 19.6–45.3)
MCH RBC QN AUTO: 25.4 PG (ref 26.6–33)
MCH RBC QN AUTO: 26.3 PG (ref 26.6–33)
MCHC RBC AUTO-ENTMCNC: 30.8 G/DL (ref 31.5–35.7)
MCHC RBC AUTO-ENTMCNC: 31.8 G/DL (ref 31.5–35.7)
MCV RBC AUTO: 82.6 FL (ref 79–97)
MCV RBC AUTO: 82.8 FL (ref 79–97)
MONOCYTES # BLD AUTO: 0.52 10*3/MM3 (ref 0.1–0.9)
MONOCYTES # BLD AUTO: 0.59 10*3/MM3 (ref 0.1–0.9)
MONOCYTES NFR BLD AUTO: 6 % (ref 5–12)
MONOCYTES NFR BLD AUTO: 6.2 % (ref 5–12)
NEUTROPHILS # BLD AUTO: 5.4 10*3/MM3 (ref 1.7–7)
NEUTROPHILS # BLD AUTO: 6.4 10*3/MM3 (ref 1.7–7)
NEUTROPHILS NFR BLD AUTO: 64.7 % (ref 42.7–76)
NEUTROPHILS NFR BLD AUTO: 65.4 % (ref 42.7–76)
NRBC BLD AUTO-RTO: 0 /100 WBC (ref 0–0.2)
NRBC BLD AUTO-RTO: 0 /100 WBC (ref 0–0.2)
PLATELET # BLD AUTO: 279 10*3/MM3 (ref 140–450)
PLATELET # BLD AUTO: 300 10*3/MM3 (ref 140–450)
PMV BLD AUTO: 11.3 FL (ref 6–12)
PMV BLD AUTO: 11.7 FL (ref 6–12)
POTASSIUM BLD-SCNC: 4.3 MMOL/L (ref 3.5–5.2)
POTASSIUM BLD-SCNC: 4.3 MMOL/L (ref 3.5–5.2)
RBC # BLD AUTO: 4.71 10*6/MM3 (ref 3.77–5.28)
RBC # BLD AUTO: 5 10*6/MM3 (ref 3.77–5.28)
SODIUM BLD-SCNC: 141 MMOL/L (ref 136–145)
SODIUM BLD-SCNC: 141 MMOL/L (ref 136–145)
WBC NRBC COR # BLD: 8.34 10*3/MM3 (ref 3.4–10.8)
WBC NRBC COR # BLD: 9.79 10*3/MM3 (ref 3.4–10.8)

## 2019-10-11 PROCEDURE — 80048 BASIC METABOLIC PNL TOTAL CA: CPT | Performed by: UROLOGY

## 2019-10-11 PROCEDURE — 0T778DZ DILATION OF LEFT URETER WITH INTRALUMINAL DEVICE, VIA NATURAL OR ARTIFICIAL OPENING ENDOSCOPIC: ICD-10-PCS | Performed by: UROLOGY

## 2019-10-11 PROCEDURE — 0 IOTHALAMATE 60 % SOLUTION: Performed by: UROLOGY

## 2019-10-11 PROCEDURE — 25010000002 ERTAPENEM PER 500 MG: Performed by: HOSPITALIST

## 2019-10-11 PROCEDURE — 25010000002 FENTANYL CITRATE (PF) 100 MCG/2ML SOLUTION: Performed by: ANESTHESIOLOGY

## 2019-10-11 PROCEDURE — 25010000002 PROPOFOL 10 MG/ML EMULSION: Performed by: ANESTHESIOLOGY

## 2019-10-11 PROCEDURE — 85025 COMPLETE CBC W/AUTO DIFF WBC: CPT | Performed by: HOSPITALIST

## 2019-10-11 PROCEDURE — BT1F1ZZ FLUOROSCOPY OF LEFT KIDNEY, URETER AND BLADDER USING LOW OSMOLAR CONTRAST: ICD-10-PCS | Performed by: UROLOGY

## 2019-10-11 PROCEDURE — 25010000002 PHENYLEPHRINE PER 1 ML: Performed by: ANESTHESIOLOGY

## 2019-10-11 PROCEDURE — 0TF78ZZ FRAGMENTATION IN LEFT URETER, VIA NATURAL OR ARTIFICIAL OPENING ENDOSCOPIC: ICD-10-PCS | Performed by: UROLOGY

## 2019-10-11 PROCEDURE — 74176 CT ABD & PELVIS W/O CONTRAST: CPT

## 2019-10-11 PROCEDURE — 25010000002 ONDANSETRON PER 1 MG: Performed by: HOSPITALIST

## 2019-10-11 PROCEDURE — 94640 AIRWAY INHALATION TREATMENT: CPT

## 2019-10-11 PROCEDURE — 74420 UROGRAPHY RTRGR +-KUB: CPT

## 2019-10-11 PROCEDURE — 94799 UNLISTED PULMONARY SVC/PX: CPT

## 2019-10-11 PROCEDURE — 85025 COMPLETE CBC W/AUTO DIFF WBC: CPT | Performed by: UROLOGY

## 2019-10-11 PROCEDURE — C1769 GUIDE WIRE: HCPCS | Performed by: UROLOGY

## 2019-10-11 PROCEDURE — 25010000002 LINEZOLID 600 MG/300ML SOLUTION: Performed by: HOSPITALIST

## 2019-10-11 PROCEDURE — 80048 BASIC METABOLIC PNL TOTAL CA: CPT | Performed by: HOSPITALIST

## 2019-10-11 PROCEDURE — C1758 CATHETER, URETERAL: HCPCS | Performed by: UROLOGY

## 2019-10-11 PROCEDURE — C2617 STENT, NON-COR, TEM W/O DEL: HCPCS | Performed by: UROLOGY

## 2019-10-11 DEVICE — URETERAL STENT
Type: IMPLANTABLE DEVICE | Status: FUNCTIONAL
Brand: PERCUFLEX™ PLUS

## 2019-10-11 RX ORDER — PROMETHAZINE HYDROCHLORIDE 25 MG/1
25 TABLET ORAL ONCE AS NEEDED
Status: DISCONTINUED | OUTPATIENT
Start: 2019-10-11 | End: 2019-10-11 | Stop reason: HOSPADM

## 2019-10-11 RX ORDER — NALOXONE HCL 0.4 MG/ML
0.2 VIAL (ML) INJECTION AS NEEDED
Status: DISCONTINUED | OUTPATIENT
Start: 2019-10-11 | End: 2019-10-11 | Stop reason: HOSPADM

## 2019-10-11 RX ORDER — FLUCONAZOLE 2 MG/ML
200 INJECTION, SOLUTION INTRAVENOUS DAILY
Status: DISCONTINUED | OUTPATIENT
Start: 2019-10-12 | End: 2019-10-14

## 2019-10-11 RX ORDER — LIDOCAINE HYDROCHLORIDE 40 MG/ML
SOLUTION TOPICAL AS NEEDED
Status: DISCONTINUED | OUTPATIENT
Start: 2019-10-11 | End: 2019-10-11 | Stop reason: SURG

## 2019-10-11 RX ORDER — EPHEDRINE SULFATE 50 MG/ML
5 INJECTION, SOLUTION INTRAVENOUS ONCE AS NEEDED
Status: DISCONTINUED | OUTPATIENT
Start: 2019-10-11 | End: 2019-10-11 | Stop reason: HOSPADM

## 2019-10-11 RX ORDER — PROMETHAZINE HYDROCHLORIDE 25 MG/ML
6.25 INJECTION, SOLUTION INTRAMUSCULAR; INTRAVENOUS
Status: DISCONTINUED | OUTPATIENT
Start: 2019-10-11 | End: 2019-10-11 | Stop reason: HOSPADM

## 2019-10-11 RX ORDER — FENTANYL CITRATE 50 UG/ML
INJECTION, SOLUTION INTRAMUSCULAR; INTRAVENOUS AS NEEDED
Status: DISCONTINUED | OUTPATIENT
Start: 2019-10-11 | End: 2019-10-11 | Stop reason: SURG

## 2019-10-11 RX ORDER — ONDANSETRON 2 MG/ML
4 INJECTION INTRAMUSCULAR; INTRAVENOUS ONCE AS NEEDED
Status: DISCONTINUED | OUTPATIENT
Start: 2019-10-11 | End: 2019-10-11 | Stop reason: HOSPADM

## 2019-10-11 RX ORDER — CLOTRIMAZOLE AND BETAMETHASONE DIPROPIONATE 10; .64 MG/G; MG/G
CREAM TOPICAL EVERY 12 HOURS SCHEDULED
Status: DISCONTINUED | OUTPATIENT
Start: 2019-10-11 | End: 2019-10-14 | Stop reason: HOSPADM

## 2019-10-11 RX ORDER — LABETALOL HYDROCHLORIDE 5 MG/ML
5 INJECTION, SOLUTION INTRAVENOUS
Status: DISCONTINUED | OUTPATIENT
Start: 2019-10-11 | End: 2019-10-11 | Stop reason: HOSPADM

## 2019-10-11 RX ORDER — FLUMAZENIL 0.1 MG/ML
0.2 INJECTION INTRAVENOUS AS NEEDED
Status: DISCONTINUED | OUTPATIENT
Start: 2019-10-11 | End: 2019-10-11 | Stop reason: HOSPADM

## 2019-10-11 RX ORDER — MIDAZOLAM HYDROCHLORIDE 1 MG/ML
1 INJECTION INTRAMUSCULAR; INTRAVENOUS
Status: DISCONTINUED | OUTPATIENT
Start: 2019-10-11 | End: 2019-10-11 | Stop reason: HOSPADM

## 2019-10-11 RX ORDER — OXYCODONE AND ACETAMINOPHEN 7.5; 325 MG/1; MG/1
1 TABLET ORAL ONCE AS NEEDED
Status: DISCONTINUED | OUTPATIENT
Start: 2019-10-11 | End: 2019-10-11 | Stop reason: HOSPADM

## 2019-10-11 RX ORDER — DIPHENHYDRAMINE HCL 25 MG
25 CAPSULE ORAL
Status: DISCONTINUED | OUTPATIENT
Start: 2019-10-11 | End: 2019-10-11 | Stop reason: HOSPADM

## 2019-10-11 RX ORDER — SODIUM CHLORIDE 9 MG/ML
75 INJECTION, SOLUTION INTRAVENOUS CONTINUOUS
Status: DISCONTINUED | OUTPATIENT
Start: 2019-10-11 | End: 2019-10-14 | Stop reason: HOSPADM

## 2019-10-11 RX ORDER — CLOTRIMAZOLE 1 %
CREAM (GRAM) TOPICAL AS NEEDED
Status: DISCONTINUED | OUTPATIENT
Start: 2019-10-11 | End: 2019-10-11 | Stop reason: HOSPADM

## 2019-10-11 RX ORDER — ACETAMINOPHEN 325 MG/1
650 TABLET ORAL ONCE AS NEEDED
Status: DISCONTINUED | OUTPATIENT
Start: 2019-10-11 | End: 2019-10-11 | Stop reason: HOSPADM

## 2019-10-11 RX ORDER — MAGNESIUM HYDROXIDE 1200 MG/15ML
LIQUID ORAL AS NEEDED
Status: DISCONTINUED | OUTPATIENT
Start: 2019-10-11 | End: 2019-10-11 | Stop reason: HOSPADM

## 2019-10-11 RX ORDER — DIPHENHYDRAMINE HYDROCHLORIDE 50 MG/ML
12.5 INJECTION INTRAMUSCULAR; INTRAVENOUS
Status: DISCONTINUED | OUTPATIENT
Start: 2019-10-11 | End: 2019-10-11 | Stop reason: HOSPADM

## 2019-10-11 RX ORDER — HYDRALAZINE HYDROCHLORIDE 20 MG/ML
5 INJECTION INTRAMUSCULAR; INTRAVENOUS
Status: DISCONTINUED | OUTPATIENT
Start: 2019-10-11 | End: 2019-10-11 | Stop reason: HOSPADM

## 2019-10-11 RX ORDER — SODIUM CHLORIDE 0.9 % (FLUSH) 0.9 %
3-10 SYRINGE (ML) INJECTION AS NEEDED
Status: DISCONTINUED | OUTPATIENT
Start: 2019-10-11 | End: 2019-10-11 | Stop reason: HOSPADM

## 2019-10-11 RX ORDER — FAMOTIDINE 10 MG/ML
20 INJECTION, SOLUTION INTRAVENOUS ONCE
Status: COMPLETED | OUTPATIENT
Start: 2019-10-11 | End: 2019-10-11

## 2019-10-11 RX ORDER — SODIUM CHLORIDE, SODIUM LACTATE, POTASSIUM CHLORIDE, CALCIUM CHLORIDE 600; 310; 30; 20 MG/100ML; MG/100ML; MG/100ML; MG/100ML
9 INJECTION, SOLUTION INTRAVENOUS CONTINUOUS
Status: DISCONTINUED | OUTPATIENT
Start: 2019-10-11 | End: 2019-10-14 | Stop reason: HOSPADM

## 2019-10-11 RX ORDER — FENTANYL CITRATE 50 UG/ML
50 INJECTION, SOLUTION INTRAMUSCULAR; INTRAVENOUS
Status: DISCONTINUED | OUTPATIENT
Start: 2019-10-11 | End: 2019-10-11 | Stop reason: HOSPADM

## 2019-10-11 RX ORDER — PROMETHAZINE HYDROCHLORIDE 25 MG/1
25 SUPPOSITORY RECTAL ONCE AS NEEDED
Status: DISCONTINUED | OUTPATIENT
Start: 2019-10-11 | End: 2019-10-11 | Stop reason: HOSPADM

## 2019-10-11 RX ORDER — HYDROCODONE BITARTRATE AND ACETAMINOPHEN 7.5; 325 MG/1; MG/1
1 TABLET ORAL ONCE AS NEEDED
Status: DISCONTINUED | OUTPATIENT
Start: 2019-10-11 | End: 2019-10-11 | Stop reason: HOSPADM

## 2019-10-11 RX ORDER — HYDROMORPHONE HYDROCHLORIDE 1 MG/ML
0.5 INJECTION, SOLUTION INTRAMUSCULAR; INTRAVENOUS; SUBCUTANEOUS
Status: DISCONTINUED | OUTPATIENT
Start: 2019-10-11 | End: 2019-10-11 | Stop reason: HOSPADM

## 2019-10-11 RX ORDER — PROMETHAZINE HYDROCHLORIDE 25 MG/ML
12.5 INJECTION, SOLUTION INTRAMUSCULAR; INTRAVENOUS ONCE AS NEEDED
Status: DISCONTINUED | OUTPATIENT
Start: 2019-10-11 | End: 2019-10-11 | Stop reason: HOSPADM

## 2019-10-11 RX ORDER — LIDOCAINE HYDROCHLORIDE 10 MG/ML
0.5 INJECTION, SOLUTION EPIDURAL; INFILTRATION; INTRACAUDAL; PERINEURAL ONCE AS NEEDED
Status: DISCONTINUED | OUTPATIENT
Start: 2019-10-11 | End: 2019-10-11 | Stop reason: HOSPADM

## 2019-10-11 RX ORDER — PROPOFOL 10 MG/ML
VIAL (ML) INTRAVENOUS AS NEEDED
Status: DISCONTINUED | OUTPATIENT
Start: 2019-10-11 | End: 2019-10-11 | Stop reason: SURG

## 2019-10-11 RX ORDER — MIDAZOLAM HYDROCHLORIDE 1 MG/ML
2 INJECTION INTRAMUSCULAR; INTRAVENOUS
Status: DISCONTINUED | OUTPATIENT
Start: 2019-10-11 | End: 2019-10-11 | Stop reason: HOSPADM

## 2019-10-11 RX ORDER — SODIUM CHLORIDE 0.9 % (FLUSH) 0.9 %
3 SYRINGE (ML) INJECTION EVERY 12 HOURS SCHEDULED
Status: DISCONTINUED | OUTPATIENT
Start: 2019-10-11 | End: 2019-10-11 | Stop reason: HOSPADM

## 2019-10-11 RX ADMIN — LINEZOLID 600 MG: 600 INJECTION, SOLUTION INTRAVENOUS at 22:50

## 2019-10-11 RX ADMIN — ONDANSETRON 4 MG: 2 INJECTION INTRAMUSCULAR; INTRAVENOUS at 11:33

## 2019-10-11 RX ADMIN — FENTANYL CITRATE 25 MCG: 50 INJECTION INTRAMUSCULAR; INTRAVENOUS at 17:58

## 2019-10-11 RX ADMIN — ERTAPENEM SODIUM 1 G: 1 INJECTION, POWDER, LYOPHILIZED, FOR SOLUTION INTRAMUSCULAR; INTRAVENOUS at 11:20

## 2019-10-11 RX ADMIN — SODIUM CHLORIDE 75 ML/HR: 9 INJECTION, SOLUTION INTRAVENOUS at 23:02

## 2019-10-11 RX ADMIN — FENTANYL CITRATE 50 MCG: 50 INJECTION, SOLUTION INTRAMUSCULAR; INTRAVENOUS at 18:20

## 2019-10-11 RX ADMIN — FAMOTIDINE 20 MG: 10 INJECTION INTRAVENOUS at 16:48

## 2019-10-11 RX ADMIN — FENTANYL CITRATE 25 MCG: 50 INJECTION INTRAMUSCULAR; INTRAVENOUS at 17:48

## 2019-10-11 RX ADMIN — SODIUM CHLORIDE, PRESERVATIVE FREE 10 ML: 5 INJECTION INTRAVENOUS at 08:15

## 2019-10-11 RX ADMIN — PROPOFOL 100 MG: 10 INJECTION, EMULSION INTRAVENOUS at 17:17

## 2019-10-11 RX ADMIN — PHENYLEPHRINE HYDROCHLORIDE 200 MCG: 10 INJECTION INTRAVENOUS at 17:49

## 2019-10-11 RX ADMIN — LINEZOLID 600 MG: 600 INJECTION, SOLUTION INTRAVENOUS at 08:39

## 2019-10-11 RX ADMIN — SODIUM CHLORIDE, POTASSIUM CHLORIDE, SODIUM LACTATE AND CALCIUM CHLORIDE 9 ML/HR: 600; 310; 30; 20 INJECTION, SOLUTION INTRAVENOUS at 16:48

## 2019-10-11 RX ADMIN — LIDOCAINE HYDROCHLORIDE 1 EACH: 40 SOLUTION TOPICAL at 17:17

## 2019-10-11 RX ADMIN — PROPOFOL 50 MG: 10 INJECTION, EMULSION INTRAVENOUS at 17:19

## 2019-10-11 RX ADMIN — PRAMIPEXOLE DIHYDROCHLORIDE 0.25 MG: 0.25 TABLET ORAL at 22:47

## 2019-10-11 RX ADMIN — IPRATROPIUM BROMIDE AND ALBUTEROL SULFATE 3 ML: 2.5; .5 SOLUTION RESPIRATORY (INHALATION) at 12:12

## 2019-10-11 RX ADMIN — CLOTRIMAZOLE AND BETAMETHASONE DIPROPIONATE: 10; .5 CREAM TOPICAL at 22:47

## 2019-10-11 RX ADMIN — BUSPIRONE HYDROCHLORIDE 5 MG: 5 TABLET ORAL at 22:47

## 2019-10-11 RX ADMIN — FENTANYL CITRATE 50 MCG: 50 INJECTION, SOLUTION INTRAMUSCULAR; INTRAVENOUS at 16:30

## 2019-10-11 RX ADMIN — OXYCODONE HYDROCHLORIDE AND ACETAMINOPHEN 1 TABLET: 5; 325 TABLET ORAL at 22:47

## 2019-10-11 RX ADMIN — FENTANYL CITRATE 50 MCG: 50 INJECTION INTRAMUSCULAR; INTRAVENOUS at 17:20

## 2019-10-11 NOTE — PLAN OF CARE
Problem: Patient Care Overview  Goal: Plan of Care Review  Outcome: Ongoing (interventions implemented as appropriate)   10/11/19 0518   Plan of Care Review   Progress no change   OTHER   Outcome Summary Pt vomited x3 undigested food. Zofran given with good results. Tolerating trach collar. NAD.   Coping/Psychosocial   Plan of Care Reviewed With patient

## 2019-10-11 NOTE — PROGRESS NOTES
"DAILY PROGRESS NOTE  [facility]    Patient Identification:  Name: Swetha Luis  Age: 59 y.o.  Sex: female  :  1960  MRN: [id number]         Primary Care Physician: Provider, No Known    Subjective:  Interval History: She complains of cough and shortness of air.  Better today.    Objective:    Scheduled Meds:    amLODIPine 5 mg Oral Q24H   busPIRone 5 mg Oral TID   ertapenem 1 g Intravenous Q24H   escitalopram 20 mg Oral Daily   gabapentin 300 mg Oral TID   influenza vaccine 0.5 mL Intramuscular Once   Linezolid 600 mg Intravenous Q12H   metoprolol succinate XL 25 mg Oral Daily   pramipexole 0.25 mg Oral Nightly   sodium chloride 10 mL Intravenous Q12H   thiamine 100 mg Oral Daily     Continuous Infusions:     Vital signs in last 24 hours:  Temp:  [97.4 °F (36.3 °C)-98.2 °F (36.8 °C)] 97.7 °F (36.5 °C)  Heart Rate:  [72-80] 72  Resp:  [16-18] 18  BP: (116-129)/(86-88) 128/87    Intake/Output:    Intake/Output Summary (Last 24 hours) at 10/11/2019 1031  Last data filed at 10/10/2019 2105  Gross per 24 hour   Intake 240 ml   Output 750 ml   Net -510 ml       Exam:  /87 (BP Location: Right arm, Patient Position: Lying)   Pulse 72   Temp 97.7 °F (36.5 °C) (Oral)   Resp 18   Ht 167.6 cm (65.98\")   Wt 89.8 kg (198 lb)   SpO2 99%   BMI 31.97 kg/m²     General Appearance:    Alert, cooperative, no distress   Head:    Normocephalic, without obvious abnormality, atraumatic   Eyes:       Throat:   Lips, tongue, gums normal   Neck:   Supple, symmetrical, trachea midline, no JVD, tracheostomy in place   Lungs:    Rhonchi and wheezes bilaterally, respirations unlabored   Chest Wall:    No tenderness or deformity    Heart:    Regular rate and rhythm, S1 and S2 normal, no murmur,no  Rub or gallop   Abdomen:     Soft, non-tender, bowel sounds active, no masses, no organomegaly    Extremities:   Extremities normal, atraumatic, no cyanosis or edema   Pulses:      Skin:   Skin is warm and dry,  no " rashes or palpable lesions   Neurologic:   no focal deficits noted      Lab Results (last 72 hours)     Procedure Component Value Units Date/Time    CBC Auto Differential [[id number]]  (Abnormal) Collected:  10/05/19 0520    Specimen:  Blood Updated:  10/05/19 0810     WBC 9.46 10*3/mm3      RBC 4.79 10*6/mm3      Hemoglobin 12.7 g/dL      Hematocrit 40.8 %      MCV 85.2 fL      MCH 26.5 pg      MCHC 31.1 g/dL      RDW 14.1 %      RDW-SD 44.2 fl      MPV 13.2 fL      Platelets 134 10*3/mm3     Manual Differential [[id number]]  (Abnormal) Collected:  10/05/19 0520    Specimen:  Blood Updated:  10/05/19 0810     Neutrophil % 86.0 %      Lymphocyte % 8.0 %      Monocyte % 4.0 %      Eosinophil % 1.0 %      Atypical Lymphocyte % 1.0 %      Neutrophils Absolute 8.14 10*3/mm3      Lymphocytes Absolute 0.76 10*3/mm3      Monocytes Absolute 0.38 10*3/mm3      Eosinophils Absolute 0.09 10*3/mm3      Anisocytosis Slight/1+     Cameron Cells Mod/2+     Microcytes Slight/1+     Ovalocytes Mod/2+     Poikilocytes Mod/2+     Polychromasia Mod/2+     WBC Morphology Normal     Giant Platelets Slight/1+    Blood Culture - Blood, Arm, Right [[id number]] Collected:  10/04/19 0625    Specimen:  Blood from Arm, Right Updated:  10/05/19 0800     Blood Culture No growth at 24 hours    Basic Metabolic Panel [[id number]]  (Abnormal) Collected:  10/05/19 0520    Specimen:  Blood Updated:  10/05/19 0735     Glucose 53 mg/dL      BUN 13 mg/dL      Creatinine 1.03 mg/dL      Sodium 134 mmol/L      Potassium 4.0 mmol/L      Chloride 96 mmol/L      CO2 21.1 mmol/L      Calcium 8.8 mg/dL      eGFR Non African Amer 55 mL/min/1.73      BUN/Creatinine Ratio 12.6     Anion Gap 16.9 mmol/L     Narrative:       GFR Normal >60  Chronic Kidney Disease <60  Kidney Failure <15    Blood Culture - Blood, Arm, Left [[id number]]  (Abnormal) Collected:  10/04/19 0624    Specimen:  Blood from Arm, Left Updated:  10/05/19 0619     Blood Culture Escherichia coli      Isolated from Aerobic and Anaerobic Bottles     Gram Stain Aerobic Bottle Gram negative bacilli      Anaerobic Bottle Gram negative bacilli    Blood Culture ID, PCR - Blood, Arm, Left [[id number]]  (Abnormal) Collected:  10/04/19 0624    Specimen:  Blood from Arm, Left Updated:  10/05/19 0048     BCID, PCR Escherichia coli. Identification by BCID PCR.    MRSA Screen, PCR - Swab, Nares [[id number]]  (Normal) Collected:  10/04/19 1652    Specimen:  Swab from Nares Updated:  10/04/19 1853     MRSA PCR No MRSA Detected    Respiratory Panel, PCR - Swab, Nasopharynx [[id number]]  (Normal) Collected:  10/04/19 1652    Specimen:  Swab from Nasopharynx Updated:  10/04/19 1853     ADENOVIRUS, PCR Not Detected     Coronavirus 229E Not Detected     Coronavirus HKU1 Not Detected     Coronavirus NL63 Not Detected     Coronavirus OC43 Not Detected     Human Metapneumovirus Not Detected     Human Rhinovirus/Enterovirus Not Detected     Influenza B PCR Not Detected     Parainfluenza Virus 1 Not Detected     Parainfluenza Virus 2 Not Detected     Parainfluenza Virus 3 Not Detected     Parainfluenza Virus 4 Not Detected     Bordetella pertussis pcr Not Detected     Influenza A H1 2009 PCR Not Detected     Chlamydophila pneumoniae PCR Not Detected     Mycoplasma pneumo by PCR Not Detected     Influenza A PCR Not Detected     Influenza A H3 Not Detected     Influenza A H1 Not Detected     RSV, PCR Not Detected     Bordetella parapertussis PCR Not Detected    Comprehensive Metabolic Panel [[id number]]  (Abnormal) Collected:  10/04/19 0623    Specimen:  Blood Updated:  10/04/19 0818     Glucose 138 mg/dL      BUN 19 mg/dL      Creatinine 1.21 mg/dL      Sodium 142 mmol/L      Potassium 3.9 mmol/L      Chloride 103 mmol/L      CO2 27.9 mmol/L      Calcium 8.8 mg/dL      Total Protein 6.9 g/dL      Albumin 3.80 g/dL      ALT (SGPT) 136 U/L      AST (SGOT) 71 U/L      Alkaline Phosphatase 194 U/L      Total Bilirubin 0.3 mg/dL      eGFR Non   Amer 46 mL/min/1.73      Globulin 3.1 gm/dL      A/G Ratio 1.2 g/dL      BUN/Creatinine Ratio 15.7     Anion Gap 11.1 mmol/L     Narrative:       GFR Normal >60  Chronic Kidney Disease <60  Kidney Failure <15    Lactic Acid, Plasma [[id number]]  (Normal) Collected:  10/04/19 0623    Specimen:  Blood Updated:  10/04/19 0802     Lactate 1.4 mmol/L     [place] Draw [[id number]] Collected:  10/04/19 0623    Specimen:  Blood Updated:  10/04/19 0800    Narrative:       The following orders were created for panel order [place] Draw.  Procedure                               Abnormality         Status                     ---------                               -----------         ------                     Light Blue Top[[id number]]                                   Final result               Green Top (Gel)[[id number]]                                  Final result               Lavender Top[[id number]]                                     Final result               Gold Top - SST[[id number]]                                   Final result                 Please view results for these tests on the individual orders.    Light Blue Top [[id number]] Collected:  10/04/19 0623    Specimen:  Blood Updated:  10/04/19 0800     Extra Tube hold for add-on     Comment: Auto resulted       Green Top (Gel) [[id number]] Collected:  10/04/19 0623    Specimen:  Blood Updated:  10/04/19 0800     Extra Tube Hold for add-ons.     Comment: Auto resulted.       Lavender Top [[id number]] Collected:  10/04/19 0623    Specimen:  Blood Updated:  10/04/19 0800     Extra Tube hold for add-on     Comment: Auto resulted       Gold Top - SST [[id number]] Collected:  10/04/19 0623    Specimen:  Blood Updated:  10/04/19 0800     Extra Tube Hold for add-ons.     Comment: Auto resulted.       CBC & Differential [[id number]] Collected:  10/04/19 0623    Specimen:  Blood Updated:  10/04/19 0756    Narrative:       The following orders were created for panel order  CBC & Differential.  Procedure                               Abnormality         Status                     ---------                               -----------         ------                     CBC Auto Differential[[id number]]        Abnormal            Final result                 Please view results for these tests on the individual orders.    CBC Auto Differential [[id number]]  (Abnormal) Collected:  10/04/19 0623    Specimen:  Blood Updated:  10/04/19 0756     WBC 12.58 10*3/mm3      RBC 4.60 10*6/mm3      Hemoglobin 12.0 g/dL      Hematocrit 39.1 %      MCV 85.0 fL      MCH 26.1 pg      MCHC 30.7 g/dL      RDW 14.1 %      RDW-SD 44.0 fl      MPV 12.5 fL      Platelets 187 10*3/mm3      Neutrophil % 82.7 %      Lymphocyte % 6.8 %      Monocyte % 8.7 %      Eosinophil % 0.8 %      Basophil % 0.3 %      Immature Grans % 0.7 %      Neutrophils, Absolute 10.40 10*3/mm3      Lymphocytes, Absolute 0.85 10*3/mm3      Monocytes, Absolute 1.10 10*3/mm3      Eosinophils, Absolute 0.10 10*3/mm3      Basophils, Absolute 0.04 10*3/mm3      Immature Grans, Absolute 0.09 10*3/mm3      nRBC 0.0 /100 WBC         Data Review:  Results from last 7 days   Lab Units 10/11/19  0501 10/10/19  0419 10/08/19  0541   SODIUM mmol/L 141 138 141   POTASSIUM mmol/L 4.3 4.1 4.1   CHLORIDE mmol/L 102 100 101   CO2 mmol/L 30.2* 25.6 29.8*   BUN mg/dL 20 19 15   CREATININE mg/dL 1.13* 1.00 0.95   GLUCOSE mg/dL 88 78 83   CALCIUM mg/dL 9.6 9.4 9.3     Results from last 7 days   Lab Units 10/11/19  0501 10/10/19  0419 10/08/19  0541   WBC 10*3/mm3 9.79 9.49 7.47   HEMOGLOBIN g/dL 12.7 12.0 11.8*   HEMATOCRIT % 41.3 38.6 38.4   PLATELETS 10*3/mm3 300 244 216             Lab Results   Lab Value Date/Time    TROPONINT 0.045 (C) 09/22/2019 1211    TROPONINT 0.034 (C) 09/22/2019 0604    TROPONINT 0.031 (C) 09/22/2019 0130    TROPONINT 0.036 (C) 09/21/2019 2248    TROPONINT 0.050 (H) 12/29/2018 0409    TROPONINT 0.085 (H) 12/24/2018 1900    TROPONINT  0.100 (C) 12/24/2018 1203    TROPONINT <0.01 03/26/2014 1731               Invalid input(s): PROT, LABALBU          No results found for: POCGLU        Past Medical History:   Diagnosis Date   • Acute congestive heart failure (CMS/[facility]) 12/24/2018   • Acute osteomyelitis (CMS/HCC)     Right shoulder due to IVDA   • Acute renal failure on dialysis (CMS/HCC)    • Anxiety    • Pugh esophagus    • CKD (chronic kidney disease)    • COPD (chronic obstructive pulmonary disease) (CMS/HCC)    • MRSA infection    • Nontraumatic subarachnoid hemorrhage (CMS/HCC)    • Seizures (CMS/HCC)    • Tracheostomy present (CMS/HCC)        Assessment:  Active Hospital Problems    Diagnosis  POA   • **Healthcare associated bacterial pneumonia [J15.9]  Yes   • Ureteral stone with hydronephrosis [N13.2]  Unknown   • UTI (urinary tract infection) [N39.0]  Unknown   • Infection due to ESBL-producing Escherichia coli [A49.8, Z16.12]  Unknown   • Sepsis due to Gram negative bacteria (CMS/HCC) [A41.50]  Unknown   • Dyspnea [R06.00]  Yes   • Tracheostomy present (CMS/HCC) [Z93.0]  Not Applicable   • Essential hypertension [I10]  Yes   • Dysphagia [R13.10]  Yes   • COPD (chronic obstructive pulmonary disease) (CMS/HCC) [J44.9]  Yes   • Respiratory failure, chronic (CMS/HCC) [J96.10]  Yes   • PEG (percutaneous endoscopic gastrostomy) status (CMS/HCC) [Z93.1]  Not Applicable   • Chronic diastolic CHF (congestive heart failure) (CMS/HCC) [I50.32]  Yes   • Peripheral artery disease (CMS/HCC) [I73.9]  Yes   • Stage 3 chronic kidney disease (CMS/HCC) [N18.3]  Yes      Resolved Hospital Problems   No resolved problems to display.       Plan:  Continue with IV antibiotics and  Zyvox for VRE.  We will get some follow-up laboratory studies.  Continue with bronchodilator treatments.  CT abdomen and pelvis results noted. ID consult noted.  urology consult noted. Cysto lithotripsy and stent sometime  Pulmonary  Seeing  for trach.  True Moreno,  MD  10/11/2019  10:31 AM

## 2019-10-11 NOTE — PROGRESS NOTES
[facility]     Mar/chart reviewed  Follow up tracheostomy, still with some cough, sputum says its not bad, she is not having to be suctioned much    Vital Sign Min/Max for last 24 hours  Temp  Min: 97.4 °F (36.3 °C)  Max: 98.2 °F (36.8 °C)   BP  Min: 116/86  Max: 129/87   Pulse  Min: 72  Max: 80   Resp  Min: 16  Max: 18   SpO2  Min: 92 %  Max: 100 %   Flow (L/min)  Min: 6  Max: 7   No Data Recorded     Appears ill, axox3,   perrl, eomi, no icterus,  mmm, no jvd, trachea midline, neck supple,  chest decreased a little coarse bilaterally, no crackles, no wheezes,   rrr,   soft, nt, nd +bs,  no c/c/ 1+ edema    Labs: 10/11; reviewed:  Cr 1.13  Bicarb 30  Wbc 9.79  hgb 12.7  plts 300    A/P:  1. Tracheostomy -- tracheostomy exchanged at bedside  2. Aspiration pneumonia -- mild improving, antibiotics as per ID  3. Chronic bronchitis/tracheitis -- continue pulmonary toilet  4. UTI  5. Bacteremia  6. Chronic hypoxemic respiratory failure  7. Copd -- continue brochodilators.

## 2019-10-11 NOTE — PROGRESS NOTES
Continued Stay Note  [facility]     Patient Name: Swetha Luis  MRN: [id number]  Today's Date: 10/11/2019    Admit Date: 10/4/2019    Discharge Plan     Row Name 10/11/19 1354       Plan    Plan  [facility] when ready pending bed available    Patient/Family in Agreement with Plan  yes    Plan Comments  Pt to have sx later today. Called and Left  for Rachelle/Reji [phone] for bed availablity over the weekend. Packet in cubby. Godfrey [email or web address]        Discharge Codes    No documentation.             Noelle Juarez, RN

## 2019-10-11 NOTE — ANESTHESIA PREPROCEDURE EVALUATION
Anesthesia Evaluation     Patient summary reviewed and Nursing notes reviewed   NPO Solid Status: > 8 hours  NPO Liquid Status: > 8 hours           Airway   Neck ROM: full  Comment: tracheostomy  Dental - normal exam     Pulmonary     breath sounds clear to auscultation  (+) pneumonia , COPD, shortness of breath, sleep apnea,     ROS comment: Tracheostomy, tracheal stenosis  Cardiovascular     Rhythm: regular    (+) hypertension, CHF, PVD,       Neuro/Psych  (+) seizures, psychiatric history,       ROS Comment: Sub arachnoid hemmorrhage  GI/Hepatic/Renal/Endo    (+)   renal disease (ureteral stone, hydronephrosis),     Musculoskeletal     Abdominal    Substance History      OB/GYN          Other                        Anesthesia Plan    ASA 4     general     intravenous induction   Anesthetic plan, all risks, benefits, and alternatives have been provided, discussed and informed consent has been obtained with: patient.

## 2019-10-11 NOTE — PROGRESS NOTES
[facility]  Clyde Selby MD     LOS: 7 days   Patient Care Team:  Provider, No Known as PCP - General        Subjective     Interval History:     Patient Complaints: Left ureteral stone    History taken from: Patient is alert and talkative today.  Still has some left lower quadrant discomfort off and on.  I walked in the room and found her eating a regular diet even though her status is n.p.o.    Review of Systems:    All systems were reviewed and were negative except for left lower abdominal discomfort.    Objective     Vital Signs  Temp:  [97.4 °F (36.3 °C)-98.2 °F (36.8 °C)] 97.7 °F (36.5 °C)  Heart Rate:  [72-80] 72  Resp:  [16-18] 18  BP: (116-129)/(86-88) 128/87    Physical Exam:   Physical Exam:     General Appearance:    Alert, cooperative, in no acute distress   Head:    Normocephalic, without obvious abnormality, atraumatic   Eyes:            Lids and lashes normal, conjunctivae and sclerae normal, no   icterus, no pallor, corneas clear, PERRLA   Ears:    Ears appear intact with no abnormalities noted   Throat:   No oral lesions, no thrush, oral mucosa moist   Neck:   No adenopathy, supple, trachea midline,   Back:     No kyphosis present, no scoliosis present, no skin lesions,       erythema or scars, no tenderness to percussion or                   palpation,   range of motion normal   Lungs:     Clear to auscultation,respirations regular, even and                   unlabored    Heart:    Regular rhythm and normal rate, normal S1 and S2, no            murmur, no gallop, no rub, no click   Breast Exam:    Deferred   Abdomen:     Normal bowel sounds, no masses, no organomegaly, soft        non-tender, non-distended, no guarding, no rebound                 tenderness   Genitalia:    Deferred   Extremities:   Moves all extremities well, no edema, no cyanosis, no              redness       Skin:   No bleeding, bruising or rash       Neurologic:   Cranial nerves 2 - 12 grossly intact, sensation  intact,         Results Review:     I reviewed the patient's new clinical results.  Discussed with The patient and nursing staff.  I personally reviewed her CT scan.  She has a distal left ureteral stone with minimal hydronephrosis.    Recent Results (from the past 24 hour(s))   Basic Metabolic Panel    Collection Time: 10/11/19  5:01 AM   Result Value Ref Range    Glucose 88 65 - 99 mg/dL    BUN 20 6 - 20 mg/dL    Creatinine 1.13 (H) 0.57 - 1.00 mg/dL    Sodium 141 136 - 145 mmol/L    Potassium 4.3 3.5 - 5.2 mmol/L    Chloride 102 98 - 107 mmol/L    CO2 30.2 (H) 22.0 - 29.0 mmol/L    Calcium 9.6 8.6 - 10.5 mg/dL    eGFR Non African Amer 49 (L) >60 mL/min/1.73    BUN/Creatinine Ratio 17.7 7.0 - 25.0    Anion Gap 8.8 5.0 - 15.0 mmol/L   CBC Auto Differential    Collection Time: 10/11/19  5:01 AM   Result Value Ref Range    WBC 9.79 3.40 - 10.80 10*3/mm3    RBC 5.00 3.77 - 5.28 10*6/mm3    Hemoglobin 12.7 12.0 - 15.9 g/dL    Hematocrit 41.3 34.0 - 46.6 %    MCV 82.6 79.0 - 97.0 fL    MCH 25.4 (L) 26.6 - 33.0 pg    MCHC 30.8 (L) 31.5 - 35.7 g/dL    RDW 14.3 12.3 - 15.4 %    RDW-SD 43.0 37.0 - 54.0 fl    MPV 11.7 6.0 - 12.0 fL    Platelets 300 140 - 450 10*3/mm3    Neutrophil % 65.4 42.7 - 76.0 %    Lymphocyte % 24.4 19.6 - 45.3 %    Monocyte % 6.0 5.0 - 12.0 %    Eosinophil % 3.2 0.3 - 6.2 %    Basophil % 0.6 0.0 - 1.5 %    Immature Grans % 0.4 0.0 - 0.5 %    Neutrophils, Absolute 6.40 1.70 - 7.00 10*3/mm3    Lymphocytes, Absolute 2.39 0.70 - 3.10 10*3/mm3    Monocytes, Absolute 0.59 0.10 - 0.90 10*3/mm3    Eosinophils, Absolute 0.31 0.00 - 0.40 10*3/mm3    Basophils, Absolute 0.06 0.00 - 0.20 10*3/mm3    Immature Grans, Absolute 0.04 0.00 - 0.05 10*3/mm3    nRBC 0.0 0.0 - 0.2 /100 WBC       Medication Review:   Current Facility-Administered Medications:   •  acetaminophen (TYLENOL) tablet 650 mg, 650 mg, Oral, Q4H PRN, 650 mg at 10/04/19 2056 **OR** acetaminophen (TYLENOL) 160 MG/5ML solution 650 mg, 650 mg, Oral, Q4H  PRN **OR** acetaminophen (TYLENOL) suppository 650 mg, 650 mg, Rectal, Q4H PRN, True Moreno MD  •  amLODIPine (NORVASC) tablet 5 mg, 5 mg, Oral, Q24H, True Moreno MD, 5 mg at 10/10/19 0948  •  busPIRone (BUSPAR) tablet 5 mg, 5 mg, Oral, TID, True Moreno MD, 5 mg at 10/10/19 2147  •  ertapenem (INVanz) 1 g/100 mL 0.9% NS VTB (mbp), 1 g, Intravenous, Q24H, True Moreno MD, 1 g at 10/10/19 1254  •  escitalopram (LEXAPRO) tablet 20 mg, 20 mg, Oral, Daily, True Moreno MD, 20 mg at 10/10/19 0948  •  gabapentin (NEURONTIN) capsule 300 mg, 300 mg, Oral, TID, True Moreno MD, 300 mg at 10/10/19 2147  •  influenza vac split quad (FLUZONE,FLUARIX,AFLURIA) injection 0.5 mL, 0.5 mL, Intramuscular, Once, True Moreno MD  •  ipratropium-albuterol (DUO-NEB) nebulizer solution 3 mL, 3 mL, Nebulization, Q4H PRN, True Moreno MD, 3 mL at 10/07/19 1257  •  Linezolid (ZYVOX) 600 mg 300 mL, 600 mg, Intravenous, Q12H, True Moreno MD, Last Rate: 300 mL/hr at 10/11/19 0839, 600 mg at 10/11/19 0839  •  metoprolol succinate XL (TOPROL-XL) 24 hr tablet 25 mg, 25 mg, Oral, Daily, True Moreno MD, 25 mg at 10/10/19 0948  •  ondansetron (ZOFRAN) tablet 4 mg, 4 mg, Oral, Q6H PRN **OR** ondansetron (ZOFRAN) injection 4 mg, 4 mg, Intravenous, Q6H PRN, True Moreno MD, 4 mg at 10/10/19 2307  •  oxyCODONE-acetaminophen (PERCOCET) 5-325 MG per tablet 1 tablet, 1 tablet, Oral, Q6H PRN, True Moreno MD, 1 tablet at 10/10/19 0949  •  pramipexole (MIRAPEX) tablet 0.25 mg, 0.25 mg, Oral, Nightly, True Moreno MD, 0.25 mg at 10/10/19 2147  •  sodium chloride 0.9 % flush 10 mL, 10 mL, Intravenous, PRN, Lizandro, Andrea SORIANO MD  •  sodium chloride 0.9 % flush 10 mL, 10 mL, Intravenous, Q12H, True Moreno MD, 10 mL at 10/11/19 0815  •  sodium chloride 0.9 % flush 10 mL, 10 mL, Intravenous, PRN, True Moreno MD  •  thiamine (VITAMIN B-1) tablet 100 mg, 100 mg, Oral, Daily, True Moreno MD, 100 mg at 10/10/19  0948    Assessment/Plan       Healthcare associated bacterial pneumonia    Stage 3 chronic kidney disease (CMS/[facility])    COPD (chronic obstructive pulmonary disease) (CMS/[facility])    Respiratory failure, chronic (CMS/[facility])    PEG (percutaneous endoscopic gastrostomy) status (CMS/[facility])    Chronic diastolic CHF (congestive heart failure) (CMS/[facility])    Peripheral artery disease (CMS/[facility])    Dyspnea    Tracheostomy present (CMS/[facility])    Essential hypertension    Dysphagia    Infection due to ESBL-producing Escherichia coli    Sepsis due to Gram negative bacteria (CMS/[facility])    Ureteral stone with hydronephrosis    UTI (urinary tract infection)      Left ureteral stone with hydronephrosis  Failure to comply with diet orders    Plan for disposition:Hopefully we will be able to perform her procedure later this evening.  The plan is for ureteroscopy with laser destruction stone and stent placement.  I explained this fully to her again.    Clyde Selby MD  10/11/19  9:11 AM      Time: Greater than 33

## 2019-10-11 NOTE — OP NOTE
[facility]  Clyde Selby MD      URETEROSCOPY LASER LITHOTRIPSY WITH STENT INSERTION  Procedure Note    Swetha Luis  10/11/2019    Pre-op Diagnosis:   Distal left ureteral stone with obstruction    Post-Op Diagnosis Codes:  Same    Procedure(s):  Cystoscopy, left retrograde pyelogram, left rigid ureteroscopy, laser destruction ureteral stone, placement left ureteral stent 4.8 x 28 cm without retrieval line.    Surgeon(s):  Clyde Selby MD    Anesthesia: General    Surgical Assistant: Staff    Staff:   Circulator: Cassandra Robb RN  Scrub Person: Karmen Kovacs    Findings: Obstructing distal left ureteral stone.  Bladder gravel.    Description of Procedure: After adequate induction with general anesthesia the patient was placed in the modified supine lithotomy position on the operating table and prepped and draped in a sterile fashion over the genitalia.  Cystoscopy was performed with a 23 [place] sheath and 30 degree lens.  Urethra was normal.  The bladder showed some gravel present.  A retrograde pyelogram was performed on the left side showing an obstructing distal ureteral stone.  A guidewire was passed up the left ureter to the kidney.  Over the guidewire was passed dilators up to 12 [place].  I then performed rigid ureteroscopy.  The stone was identified.  Using the holmium laser was destroyed down the multiple small particles.  The scope was then removed.  Over the remaining guidewire was placed a 4.8 [place] by 28 cm double-pigtail ureteral stent without retrieval line.  The patient was then sent to the recovery room in satisfactory condition.    Complications none.    Estimated Blood Loss: none    Specimens:  * No orders in the log *      Drains:   Gastrostomy/Enterostomy PEG-jejunostomy Umbilicus (Active)   Surrounding Skin Intact 10/11/2019  4:13 PM   Securement taped to abdomen 10/11/2019  4:13 PM   Drain Status Clamped 10/11/2019  4:13 PM   Drainage Appearance None 10/11/2019  4:13 PM    Site Description Unable to view 10/11/2019 12:00 AM   Dressing Status Clean;Dry;Intact 10/11/2019  1:46 PM   Dressing Intervention Dressing reinforced 10/9/2019  8:45 PM   Dressing Type Split gauze 10/11/2019  1:46 PM       [REMOVED] Gastrostomy/Enterostomy Percutaneous endoscopic gastrostomy (PEG) Umbilicus (Removed)   Surrounding Skin Dry;Intact 9/25/2019  8:50 AM   Securement other (see comments) 9/25/2019  8:50 AM   Drain Status Clamped;Irrigated 9/25/2019  8:50 AM   Drainage Appearance None 9/24/2019 10:45 AM   Dressing Status Old drainage;Other (Comment) 9/25/2019  8:50 AM   Tube Feeding Product Glucerna 1.2 charlie 9/23/2019 11:45 PM   Tube Feeding Strength Full strength 9/23/2019 11:45 PM   Tube Feeding Method Continuous per pump 9/23/2019 11:45 PM   Tube Feeding Rate (mL) 60 mL 9/23/2019 11:45 PM   Tube Feeding Bag Changed Yes 9/23/2019 11:45 PM   Tube Feeding Residual (mL) 0 mL 9/24/2019  8:10 PM   Tube Feeding Residual Returned (mL) 0 mL 9/24/2019  8:10 PM   Feeding Tube Flushed With Sterile water 9/24/2019  8:10 PM   Flush/ Irrigation Intake (mL) 75 9/24/2019  8:10 PM   Tube Feeding Intake (mL) 480 9/23/2019  5:09 AM   Intake (mL) 60 mL 9/23/2019  9:05 AM       [REMOVED] External Urinary Catheter (Removed)   Site Assessment Clean;Skin intact 9/24/2019  8:10 PM   Application/Removal external catheter changed 9/24/2019  9:45 PM   Collection Container Wall suction 9/25/2019  8:50 AM   Wall suction (mmHG) 100 mmHG 9/25/2019  8:50 AM   Securement Method Other (Comment) 9/24/2019  1:00 AM   Output (mL) 300 mL 9/25/2019  6:32 PM       [REMOVED] External Urinary Catheter (Removed)   Site Assessment Clean;Skin intact 10/6/2019  2:01 PM   Application/Removal external catheter changed 10/6/2019  7:15 AM   Collection Container Wall suction 10/6/2019  2:01 PM   Wall suction (mmHG) 125 mmHG 10/6/2019  2:01 PM   Output (mL) 900 mL 10/6/2019  7:15 AM       [REMOVED] External Urinary Catheter (Removed)   Site Assessment  Skin intact 10/10/2019  2:15 PM   Application/Removal external catheter applied 10/10/2019  9:05 PM   Collection Container Wall suction 10/11/2019 12:00 AM   Wall suction (mmHG) 125 mmHG 10/10/2019  2:15 PM   Output (mL) 750 mL 10/10/2019  9:05 PM         Complications: None      Clyde Selby MD     Date: 10/11/2019  Time: 6:11 PM

## 2019-10-11 NOTE — PROGRESS NOTES
Continued Stay Note  [facility]     Patient Name: Swetha Luis  MRN: [id number]  Today's Date: 10/11/2019    Admit Date: 10/4/2019    Discharge Plan     Row Name 10/11/19 1354       Plan    Plan  Home vs [facility] when ready pending bed available    Patient/Family in Agreement with Plan  other (see comments)    Plan Comments  Pt to have sx later today. Called and Left  for [facility] [phone] for bed availablity over the weekend. Spoke with pt at bedside, she does not want to go to [facility] and she wants to go home now. CCP encouraged her to go to rehab to get stronger and could look at other SNF places. She declines and wants to go home. [facility] called [facility]  Anny Sierra [phone]. Await [facility] call back.  Called and left Catie DOMINGO RN/CCP Manager . Packet in radha. Godfrey rnccp        Discharge Codes    No documentation.             Noelle Juarez, RN

## 2019-10-11 NOTE — ANESTHESIA PROCEDURE NOTES
Airway  Urgency: elective    Date/Time: 10/11/2019 5:18 PM  Airway not difficult    General Information and Staff    Patient location during procedure: OR    Indications and Patient Condition  Indications for airway management: airway protection    Preoxygenated: yes      Additional Comments  #4 uncuffed Shiley exchanged for cuffed #4 Shiley using inner cannula

## 2019-10-12 LAB
ANION GAP SERPL CALCULATED.3IONS-SCNC: 12 MMOL/L (ref 5–15)
BASOPHILS # BLD AUTO: 0.05 10*3/MM3 (ref 0–0.2)
BASOPHILS NFR BLD AUTO: 0.6 % (ref 0–1.5)
BUN BLD-MCNC: 17 MG/DL (ref 6–20)
BUN/CREAT SERPL: 15.9 (ref 7–25)
CALCIUM SPEC-SCNC: 9.2 MG/DL (ref 8.6–10.5)
CHLORIDE SERPL-SCNC: 104 MMOL/L (ref 98–107)
CO2 SERPL-SCNC: 25 MMOL/L (ref 22–29)
CREAT BLD-MCNC: 1.07 MG/DL (ref 0.57–1)
DEPRECATED RDW RBC AUTO: 41 FL (ref 37–54)
EOSINOPHIL # BLD AUTO: 0.17 10*3/MM3 (ref 0–0.4)
EOSINOPHIL NFR BLD AUTO: 2 % (ref 0.3–6.2)
ERYTHROCYTE [DISTWIDTH] IN BLOOD BY AUTOMATED COUNT: 14.2 % (ref 12.3–15.4)
GFR SERPL CREATININE-BSD FRML MDRD: 52 ML/MIN/1.73
GLUCOSE BLD-MCNC: 81 MG/DL (ref 65–99)
HCT VFR BLD AUTO: 37.4 % (ref 34–46.6)
HGB BLD-MCNC: 11.7 G/DL (ref 12–15.9)
IMM GRANULOCYTES # BLD AUTO: 0.04 10*3/MM3 (ref 0–0.05)
IMM GRANULOCYTES NFR BLD AUTO: 0.5 % (ref 0–0.5)
LYMPHOCYTES # BLD AUTO: 1.63 10*3/MM3 (ref 0.7–3.1)
LYMPHOCYTES NFR BLD AUTO: 18.9 % (ref 19.6–45.3)
MCH RBC QN AUTO: 25.4 PG (ref 26.6–33)
MCHC RBC AUTO-ENTMCNC: 31.3 G/DL (ref 31.5–35.7)
MCV RBC AUTO: 81.1 FL (ref 79–97)
MONOCYTES # BLD AUTO: 0.55 10*3/MM3 (ref 0.1–0.9)
MONOCYTES NFR BLD AUTO: 6.4 % (ref 5–12)
NEUTROPHILS # BLD AUTO: 6.19 10*3/MM3 (ref 1.7–7)
NEUTROPHILS NFR BLD AUTO: 71.6 % (ref 42.7–76)
NRBC BLD AUTO-RTO: 0 /100 WBC (ref 0–0.2)
PLATELET # BLD AUTO: 313 10*3/MM3 (ref 140–450)
PMV BLD AUTO: 11.7 FL (ref 6–12)
POTASSIUM BLD-SCNC: 4.3 MMOL/L (ref 3.5–5.2)
RBC # BLD AUTO: 4.61 10*6/MM3 (ref 3.77–5.28)
SODIUM BLD-SCNC: 141 MMOL/L (ref 136–145)
WBC NRBC COR # BLD: 8.63 10*3/MM3 (ref 3.4–10.8)

## 2019-10-12 PROCEDURE — 25010000002 FLUCONAZOLE PER 200 MG: Performed by: UROLOGY

## 2019-10-12 PROCEDURE — 25010000002 LINEZOLID 600 MG/300ML SOLUTION: Performed by: HOSPITALIST

## 2019-10-12 PROCEDURE — 85025 COMPLETE CBC W/AUTO DIFF WBC: CPT | Performed by: UROLOGY

## 2019-10-12 PROCEDURE — 25010000002 ERTAPENEM PER 500 MG: Performed by: HOSPITALIST

## 2019-10-12 PROCEDURE — 02HV33Z INSERTION OF INFUSION DEVICE INTO SUPERIOR VENA CAVA, PERCUTANEOUS APPROACH: ICD-10-PCS | Performed by: HOSPITALIST

## 2019-10-12 PROCEDURE — 97110 THERAPEUTIC EXERCISES: CPT | Performed by: PHYSICAL THERAPIST

## 2019-10-12 PROCEDURE — C1751 CATH, INF, PER/CENT/MIDLINE: HCPCS

## 2019-10-12 PROCEDURE — 80048 BASIC METABOLIC PNL TOTAL CA: CPT | Performed by: UROLOGY

## 2019-10-12 RX ADMIN — GABAPENTIN 300 MG: 300 CAPSULE ORAL at 09:41

## 2019-10-12 RX ADMIN — BUSPIRONE HYDROCHLORIDE 5 MG: 5 TABLET ORAL at 09:41

## 2019-10-12 RX ADMIN — LINEZOLID 600 MG: 600 INJECTION, SOLUTION INTRAVENOUS at 20:32

## 2019-10-12 RX ADMIN — PRAMIPEXOLE DIHYDROCHLORIDE 0.25 MG: 0.25 TABLET ORAL at 20:32

## 2019-10-12 RX ADMIN — BUSPIRONE HYDROCHLORIDE 5 MG: 5 TABLET ORAL at 18:12

## 2019-10-12 RX ADMIN — METOPROLOL SUCCINATE 25 MG: 25 TABLET, FILM COATED, EXTENDED RELEASE ORAL at 09:41

## 2019-10-12 RX ADMIN — LINEZOLID 600 MG: 600 INJECTION, SOLUTION INTRAVENOUS at 09:40

## 2019-10-12 RX ADMIN — GABAPENTIN 300 MG: 300 CAPSULE ORAL at 18:12

## 2019-10-12 RX ADMIN — CLOTRIMAZOLE AND BETAMETHASONE DIPROPIONATE: 10; .5 CREAM TOPICAL at 09:42

## 2019-10-12 RX ADMIN — AMLODIPINE BESYLATE 5 MG: 5 TABLET ORAL at 09:41

## 2019-10-12 RX ADMIN — SODIUM CHLORIDE, PRESERVATIVE FREE 10 ML: 5 INJECTION INTRAVENOUS at 20:33

## 2019-10-12 RX ADMIN — BUSPIRONE HYDROCHLORIDE 5 MG: 5 TABLET ORAL at 20:32

## 2019-10-12 RX ADMIN — SODIUM CHLORIDE, PRESERVATIVE FREE 10 ML: 5 INJECTION INTRAVENOUS at 09:42

## 2019-10-12 RX ADMIN — FLUCONAZOLE IN SODIUM CHLORIDE 200 MG: 2 INJECTION, SOLUTION INTRAVENOUS at 09:40

## 2019-10-12 RX ADMIN — GABAPENTIN 300 MG: 300 CAPSULE ORAL at 02:35

## 2019-10-12 RX ADMIN — GABAPENTIN 300 MG: 300 CAPSULE ORAL at 20:32

## 2019-10-12 RX ADMIN — ERTAPENEM SODIUM 1 G: 1 INJECTION, POWDER, LYOPHILIZED, FOR SOLUTION INTRAMUSCULAR; INTRAVENOUS at 13:14

## 2019-10-12 RX ADMIN — Medication 100 MG: at 09:41

## 2019-10-12 RX ADMIN — ESCITALOPRAM 20 MG: 20 TABLET, FILM COATED ORAL at 09:41

## 2019-10-12 RX ADMIN — CLOTRIMAZOLE AND BETAMETHASONE DIPROPIONATE: 10; .5 CREAM TOPICAL at 20:32

## 2019-10-12 RX ADMIN — OXYCODONE HYDROCHLORIDE AND ACETAMINOPHEN 1 TABLET: 5; 325 TABLET ORAL at 14:35

## 2019-10-12 NOTE — PLAN OF CARE
Problem: Patient Care Overview  Goal: Plan of Care Review  Outcome: Ongoing (interventions implemented as appropriate)   10/12/19 9525   Plan of Care Review   Progress improving   OTHER   Outcome Summary Pain well controlled with percocet x1 after uretal stone removal today. New trach placed during this surgery. Has cuff but is to remain deflated. She has insecurities about new cuff and reassurance is sometimes necessary. She has slept wel throughout the night.    Coping/Psychosocial   Plan of Care Reviewed With patient       Problem: Pneumonia (Adult)  Goal: Signs and Symptoms of Listed Potential Problems Will be Absent, Minimized or Managed (Pneumonia)  Outcome: Ongoing (interventions implemented as appropriate)      Problem: Fall Risk (Adult)  Goal: Absence of Fall  Outcome: Ongoing (interventions implemented as appropriate)

## 2019-10-12 NOTE — PROGRESS NOTES
"DAILY PROGRESS NOTE  [facility]    Patient Identification:  Name: Swetha Luis  Age: 59 y.o.  Sex: female  :  1960  MRN: [id number]         Primary Care Physician: Provider, No Known    Subjective:  Interval History: She complains of cough and shortness of air.  Better today.    Objective:    Scheduled Meds:    amLODIPine 5 mg Oral Q24H   busPIRone 5 mg Oral TID   clotrimazole-betamethasone  Topical Q12H   ertapenem 1 g Intravenous Q24H   escitalopram 20 mg Oral Daily   fluconazole 200 mg Intravenous Daily   gabapentin 300 mg Oral TID   influenza vaccine 0.5 mL Intramuscular Once   Linezolid 600 mg Intravenous Q12H   metoprolol succinate XL 25 mg Oral Daily   pramipexole 0.25 mg Oral Nightly   sodium chloride 10 mL Intravenous Q12H   thiamine 100 mg Oral Daily     Continuous Infusions:    lactated ringers 9 mL/hr Last Rate: 9 mL/hr (10/11/19 1648)   sodium chloride 75 mL/hr Last Rate: 75 mL/hr (10/11/19 2302)       Vital signs in last 24 hours:  Temp:  [97.9 °F (36.6 °C)-99.1 °F (37.3 °C)] 98.2 °F (36.8 °C)  Heart Rate:  [73-93] 73  Resp:  [12-16] 14  BP: (101-130)/() 115/85    Intake/Output:    Intake/Output Summary (Last 24 hours) at 10/12/2019 1450  Last data filed at 10/12/2019 1353  Gross per 24 hour   Intake 2340 ml   Output --   Net 2340 ml       Exam:  /85 (BP Location: Right arm, Patient Position: Sitting)   Pulse 73   Temp 98.2 °F (36.8 °C) (Oral)   Resp 14   Ht 167.6 cm (65.98\")   Wt 89.8 kg (197 lb 15.6 oz)   SpO2 96%   BMI 31.97 kg/m²     General Appearance:    Alert, cooperative, no distress   Head:    Normocephalic, without obvious abnormality, atraumatic   Eyes:       Throat:   Lips, tongue, gums normal   Neck:   Supple, symmetrical, trachea midline, no JVD, tracheostomy in place   Lungs:    Rhonchi and wheezes bilaterally, respirations unlabored   Chest Wall:    No tenderness or deformity    Heart:    Regular rate and rhythm, S1 and S2 normal, no murmur,no  " Rub or gallop   Abdomen:     Soft, non-tender, bowel sounds active, no masses, no organomegaly    Extremities:   Extremities normal, atraumatic, no cyanosis or edema   Pulses:      Skin:   Skin is warm and dry,  no rashes or palpable lesions   Neurologic:   no focal deficits noted      Lab Results (last 72 hours)     Procedure Component Value Units Date/Time    CBC Auto Differential [[id number]]  (Abnormal) Collected:  10/05/19 0520    Specimen:  Blood Updated:  10/05/19 0810     WBC 9.46 10*3/mm3      RBC 4.79 10*6/mm3      Hemoglobin 12.7 g/dL      Hematocrit 40.8 %      MCV 85.2 fL      MCH 26.5 pg      MCHC 31.1 g/dL      RDW 14.1 %      RDW-SD 44.2 fl      MPV 13.2 fL      Platelets 134 10*3/mm3     Manual Differential [[id number]]  (Abnormal) Collected:  10/05/19 0520    Specimen:  Blood Updated:  10/05/19 0810     Neutrophil % 86.0 %      Lymphocyte % 8.0 %      Monocyte % 4.0 %      Eosinophil % 1.0 %      Atypical Lymphocyte % 1.0 %      Neutrophils Absolute 8.14 10*3/mm3      Lymphocytes Absolute 0.76 10*3/mm3      Monocytes Absolute 0.38 10*3/mm3      Eosinophils Absolute 0.09 10*3/mm3      Anisocytosis Slight/1+     Cameron Cells Mod/2+     Microcytes Slight/1+     Ovalocytes Mod/2+     Poikilocytes Mod/2+     Polychromasia Mod/2+     WBC Morphology Normal     Giant Platelets Slight/1+    Blood Culture - Blood, Arm, Right [[id number]] Collected:  10/04/19 0625    Specimen:  Blood from Arm, Right Updated:  10/05/19 0800     Blood Culture No growth at 24 hours    Basic Metabolic Panel [[id number]]  (Abnormal) Collected:  10/05/19 0520    Specimen:  Blood Updated:  10/05/19 0735     Glucose 53 mg/dL      BUN 13 mg/dL      Creatinine 1.03 mg/dL      Sodium 134 mmol/L      Potassium 4.0 mmol/L      Chloride 96 mmol/L      CO2 21.1 mmol/L      Calcium 8.8 mg/dL      eGFR Non African Amer 55 mL/min/1.73      BUN/Creatinine Ratio 12.6     Anion Gap 16.9 mmol/L     Narrative:       GFR Normal >60  Chronic Kidney Disease  <60  Kidney Failure <15    Blood Culture - Blood, Arm, Left [[id number]]  (Abnormal) Collected:  10/04/19 0624    Specimen:  Blood from Arm, Left Updated:  10/05/19 0619     Blood Culture Escherichia coli     Isolated from Aerobic and Anaerobic Bottles     Gram Stain Aerobic Bottle Gram negative bacilli      Anaerobic Bottle Gram negative bacilli    Blood Culture ID, PCR - Blood, Arm, Left [[id number]]  (Abnormal) Collected:  10/04/19 0624    Specimen:  Blood from Arm, Left Updated:  10/05/19 0048     BCID, PCR Escherichia coli. Identification by BCID PCR.    MRSA Screen, PCR - Swab, Nares [[id number]]  (Normal) Collected:  10/04/19 1652    Specimen:  Swab from Nares Updated:  10/04/19 1853     MRSA PCR No MRSA Detected    Respiratory Panel, PCR - Swab, Nasopharynx [[id number]]  (Normal) Collected:  10/04/19 1652    Specimen:  Swab from Nasopharynx Updated:  10/04/19 1853     ADENOVIRUS, PCR Not Detected     Coronavirus 229E Not Detected     Coronavirus HKU1 Not Detected     Coronavirus NL63 Not Detected     Coronavirus OC43 Not Detected     Human Metapneumovirus Not Detected     Human Rhinovirus/Enterovirus Not Detected     Influenza B PCR Not Detected     Parainfluenza Virus 1 Not Detected     Parainfluenza Virus 2 Not Detected     Parainfluenza Virus 3 Not Detected     Parainfluenza Virus 4 Not Detected     Bordetella pertussis pcr Not Detected     Influenza A H1 2009 PCR Not Detected     Chlamydophila pneumoniae PCR Not Detected     Mycoplasma pneumo by PCR Not Detected     Influenza A PCR Not Detected     Influenza A H3 Not Detected     Influenza A H1 Not Detected     RSV, PCR Not Detected     Bordetella parapertussis PCR Not Detected    Comprehensive Metabolic Panel [[id number]]  (Abnormal) Collected:  10/04/19 0623    Specimen:  Blood Updated:  10/04/19 0818     Glucose 138 mg/dL      BUN 19 mg/dL      Creatinine 1.21 mg/dL      Sodium 142 mmol/L      Potassium 3.9 mmol/L      Chloride 103 mmol/L      CO2 27.9  mmol/L      Calcium 8.8 mg/dL      Total Protein 6.9 g/dL      Albumin 3.80 g/dL      ALT (SGPT) 136 U/L      AST (SGOT) 71 U/L      Alkaline Phosphatase 194 U/L      Total Bilirubin 0.3 mg/dL      eGFR Non African Amer 46 mL/min/1.73      Globulin 3.1 gm/dL      A/G Ratio 1.2 g/dL      BUN/Creatinine Ratio 15.7     Anion Gap 11.1 mmol/L     Narrative:       GFR Normal >60  Chronic Kidney Disease <60  Kidney Failure <15    Lactic Acid, Plasma [[id number]]  (Normal) Collected:  10/04/19 0623    Specimen:  Blood Updated:  10/04/19 0802     Lactate 1.4 mmol/L     [place] Draw [[id number]] Collected:  10/04/19 0623    Specimen:  Blood Updated:  10/04/19 0800    Narrative:       The following orders were created for panel order [place] Draw.  Procedure                               Abnormality         Status                     ---------                               -----------         ------                     Light Blue Top[[id number]]                                   Final result               Green Top (Gel)[[id number]]                                  Final result               Lavender Top[[id number]]                                     Final result               Gold Top - SST[[id number]]                                   Final result                 Please view results for these tests on the individual orders.    Light Blue Top [[id number]] Collected:  10/04/19 0623    Specimen:  Blood Updated:  10/04/19 0800     Extra Tube hold for add-on     Comment: Auto resulted       Green Top (Gel) [[id number]] Collected:  10/04/19 0623    Specimen:  Blood Updated:  10/04/19 0800     Extra Tube Hold for add-ons.     Comment: Auto resulted.       Lavender Top [[id number]] Collected:  10/04/19 0623    Specimen:  Blood Updated:  10/04/19 0800     Extra Tube hold for add-on     Comment: Auto resulted       Gold Top - SST [[id number]] Collected:  10/04/19 0623    Specimen:  Blood Updated:  10/04/19 0800     Extra Tube Hold for add-ons.      Comment: Auto resulted.       CBC & Differential [[id number]] Collected:  10/04/19 0623    Specimen:  Blood Updated:  10/04/19 0756    Narrative:       The following orders were created for panel order CBC & Differential.  Procedure                               Abnormality         Status                     ---------                               -----------         ------                     CBC Auto Differential[[id number]]        Abnormal            Final result                 Please view results for these tests on the individual orders.    CBC Auto Differential [[id number]]  (Abnormal) Collected:  10/04/19 0623    Specimen:  Blood Updated:  10/04/19 0756     WBC 12.58 10*3/mm3      RBC 4.60 10*6/mm3      Hemoglobin 12.0 g/dL      Hematocrit 39.1 %      MCV 85.0 fL      MCH 26.1 pg      MCHC 30.7 g/dL      RDW 14.1 %      RDW-SD 44.0 fl      MPV 12.5 fL      Platelets 187 10*3/mm3      Neutrophil % 82.7 %      Lymphocyte % 6.8 %      Monocyte % 8.7 %      Eosinophil % 0.8 %      Basophil % 0.3 %      Immature Grans % 0.7 %      Neutrophils, Absolute 10.40 10*3/mm3      Lymphocytes, Absolute 0.85 10*3/mm3      Monocytes, Absolute 1.10 10*3/mm3      Eosinophils, Absolute 0.10 10*3/mm3      Basophils, Absolute 0.04 10*3/mm3      Immature Grans, Absolute 0.09 10*3/mm3      nRBC 0.0 /100 WBC         Data Review:  Results from last 7 days   Lab Units 10/12/19  0631 10/11/19  2039 10/11/19  0501   SODIUM mmol/L 141 141 141   POTASSIUM mmol/L 4.3 4.3 4.3   CHLORIDE mmol/L 104 102 102   CO2 mmol/L 25.0 27.1 30.2*   BUN mg/dL 17 17 20   CREATININE mg/dL 1.07* 1.05* 1.13*   GLUCOSE mg/dL 81 77 88   CALCIUM mg/dL 9.2 9.2 9.6     Results from last 7 days   Lab Units 10/12/19  0631 10/11/19  2039 10/11/19  0501   WBC 10*3/mm3 8.63 8.34 9.79   HEMOGLOBIN g/dL 11.7* 12.4 12.7   HEMATOCRIT % 37.4 39.0 41.3   PLATELETS 10*3/mm3 313 279 300             Lab Results   Lab Value Date/Time    TROPONINT 0.045 (C) 09/22/2019 1211     TROPONINT 0.034 (C) 09/22/2019 0604    TROPONINT 0.031 (C) 09/22/2019 0130    TROPONINT 0.036 (C) 09/21/2019 2248    TROPONINT 0.050 (H) 12/29/2018 0409    TROPONINT 0.085 (H) 12/24/2018 1900    TROPONINT 0.100 (C) 12/24/2018 1203    TROPONINT <0.01 03/26/2014 1731               Invalid input(s): PROT, LABALBU          No results found for: POCGLU        Past Medical History:   Diagnosis Date   • Acute congestive heart failure (CMS/[facility]) 12/24/2018   • Acute osteomyelitis (CMS/HCC)     Right shoulder due to IVDA   • Acute renal failure on dialysis (CMS/HCC)    • Anxiety    • Pugh esophagus    • CKD (chronic kidney disease)    • COPD (chronic obstructive pulmonary disease) (CMS/HCC)    • MRSA infection    • Nontraumatic subarachnoid hemorrhage (CMS/HCC)    • Seizures (CMS/HCC)    • Tracheostomy present (CMS/HCC)        Assessment:  Active Hospital Problems    Diagnosis  POA   • **Healthcare associated bacterial pneumonia [J15.9]  Yes   • Ureteral stone with hydronephrosis [N13.2]  Unknown   • UTI (urinary tract infection) [N39.0]  Unknown   • Infection due to ESBL-producing Escherichia coli [A49.8, Z16.12]  Unknown   • Sepsis due to Gram negative bacteria (CMS/HCC) [A41.50]  Unknown   • Dyspnea [R06.00]  Yes   • Tracheostomy present (CMS/HCC) [Z93.0]  Not Applicable   • Essential hypertension [I10]  Yes   • Dysphagia [R13.10]  Yes   • COPD (chronic obstructive pulmonary disease) (CMS/HCC) [J44.9]  Yes   • Respiratory failure, chronic (CMS/HCC) [J96.10]  Yes   • PEG (percutaneous endoscopic gastrostomy) status (CMS/HCC) [Z93.1]  Not Applicable   • Chronic diastolic CHF (congestive heart failure) (CMS/HCC) [I50.32]  Yes   • Peripheral artery disease (CMS/HCC) [I73.9]  Yes   • Stage 3 chronic kidney disease (CMS/HCC) [N18.3]  Yes      Resolved Hospital Problems   No resolved problems to display.       Plan:  Continue with IV antibiotics and  Zyvox for VRE.  We will get some follow-up laboratory studies.  Continue with  bronchodilator treatments.  CT abdomen and pelvis results noted. ID consult noted.  urology consult noted. S/P Cysto lithotripsy and stent   Pulmonary  Seeing  for trach.  Get PICC line  True Moreno MD  10/12/2019  2:50 PM

## 2019-10-12 NOTE — PLAN OF CARE
Problem: Patient Care Overview  Goal: Plan of Care Review   10/12/19 0954   OTHER   Outcome Summary Patient agreeable to PT. She transferred supine to sit and sit<->stand with CGA of PT and ambulated 30' with HHA (also holding onto IV pole at times), performed seated ex during rest sitting EOB, then ambulated another 10' CGA (HHA and support of IV pole) to get to chair. Patient motivated but remains limited by SOA/fatigue with activity.

## 2019-10-12 NOTE — NURSING NOTE
Iv team consulted to place a picc line for 2 weeks of ABT. Dr Moreno aware of kidney function and ok'd pt to have a picc line. No Nephrologist on this tx team. Attempted HOWARD in both the cephalic and basilic, had good blood return in both vessels but met resistance in subclavian area and unable to thread the catheter. Stopped procedure and applied gauze. Reported off to Danna gore RN that picc was unsuccessful due to threading issues and recommend pt be sent to IR to be evaluated for picc placement in lt arm and if pt is unable to get a picc pt could have a midline placed with only 2 weeks of ABT ordered if MD approves pt to be discharged with a midline.     Lot [id number] expires 2020-07-31.

## 2019-10-12 NOTE — PROGRESS NOTES
"   LOS: 8 days   Patient Care Team:  Provider, No Known as PCP - General        Subjective     POD 1   Hx of left distal ureteral stone treated by Dr. Selby 10/11 with Left uscope, LL, SBE, and stent        Subjective:  Symptoms:  Stable.    Activity level: Impaired due to weakness.    Pain:  She reports no pain.          Objective     Vital Signs  Temp:  [97.9 °F (36.6 °C)-99.1 °F (37.3 °C)] 97.9 °F (36.6 °C)  Heart Rate:  [] 73  Resp:  [12-28] 14  BP: (101-130)/() 112/76    Objective:  General Appearance:  Comfortable, well-appearing, in no acute distress and not in pain.    Vital signs: (most recent): Blood pressure 112/76, pulse 73, temperature 97.9 °F (36.6 °C), temperature source Oral, resp. rate 14, height 167.6 cm (65.98\"), weight 89.8 kg (197 lb 15.6 oz), SpO2 96 %.  Vital signs are normal.    Output: Producing urine (incontient of urine. wearing briefs).    HEENT: (Trach present)    Abdomen: Abdomen is soft.  Bowel sounds are normal.   There is no abdominal tenderness.     Neurological: Patient is alert.    Pupils:  Pupils are equal, round, and reactive to light.    Skin:  Warm and dry.              Results Review:  New clinical results reviewed.        Assessment/Plan       Healthcare associated bacterial pneumonia    Stage 3 chronic kidney disease (CMS/HCC)    COPD (chronic obstructive pulmonary disease) (CMS/HCC)    Respiratory failure, chronic (CMS/HCC)    PEG (percutaneous endoscopic gastrostomy) status (CMS/HCC)    Chronic diastolic CHF (congestive heart failure) (CMS/HCC)    Peripheral artery disease (CMS/HCC)    Dyspnea    Tracheostomy present (CMS/HCC)    Essential hypertension    Dysphagia    Infection due to ESBL-producing Escherichia coli    Sepsis due to Gram negative bacteria (CMS/HCC)    Ureteral stone with hydronephrosis    UTI (urinary tract infection)      Assessment:  (We are signing off. Pt needs to follow up with Dr. Selby in 1-2 weeks for stent removal. ).       Aixa IRAHETA" AMINATA Poole  10/12/19  9:20 AM

## 2019-10-12 NOTE — PROGRESS NOTES
"  Infectious Diseases Progress Note    Rafa Fowler MD     [facility]  [place]: 8 days  Patient Identification:  Name: Swetha Luis  Age: 59 y.o.  Sex: female  :  1960  MRN: [id number]         Primary Care Physician: Provider, No Known            Subjective: Denies any complaints.  Underwent cystoscopy stenting and stone extraction on 10/11/2019.  Interval History: Consultation note.    Objective:    Scheduled Meds:    amLODIPine 5 mg Oral Q24H   busPIRone 5 mg Oral TID   clotrimazole-betamethasone  Topical Q12H   ertapenem 1 g Intravenous Q24H   escitalopram 20 mg Oral Daily   fluconazole 200 mg Intravenous Daily   gabapentin 300 mg Oral TID   influenza vaccine 0.5 mL Intramuscular Once   Linezolid 600 mg Intravenous Q12H   metoprolol succinate XL 25 mg Oral Daily   pramipexole 0.25 mg Oral Nightly   sodium chloride 10 mL Intravenous Q12H   thiamine 100 mg Oral Daily     Continuous Infusions:    lactated ringers 9 mL/hr Last Rate: 9 mL/hr (10/11/19 1648)   sodium chloride 75 mL/hr Last Rate: 75 mL/hr (10/11/19 2302)       Vital signs in last 24 hours:  Temp:  [97.7 °F (36.5 °C)-99.1 °F (37.3 °C)] 98.2 °F (36.8 °C)  Heart Rate:  [] 73  Resp:  [12-28] 12  BP: (101-130)/() 124/91    Intake/Output:    Intake/Output Summary (Last 24 hours) at 10/12/2019 0630  Last data filed at 10/11/2019 2302  Gross per 24 hour   Intake 1800 ml   Output --   Net 1800 ml       Exam:  /91 (BP Location: Right arm, Patient Position: Lying)   Pulse 73   Temp 98.2 °F (36.8 °C) (Oral)   Resp 12   Ht 167.6 cm (65.98\")   Wt 89.8 kg (198 lb)   SpO2 96%   BMI 31.97 kg/m²     General Appearance:    Alert, cooperative, no distress, AAOx3                          Head:    Normocephalic, without obvious abnormality, atraumatic                           Eyes:    PERRL, conjunctivae/corneas clear, EOM's intact, both eyes                         Throat:   Lips, tongue, gums normal; oral mucosa pink and moist    "                        Neck: Trach in place                         Lungs:    Clear to auscultation bilaterally, respirations unlabored                 Chest Wall:    No tenderness or deformity                          Heart:    Regular rate and rhythm, S1 and S2 normal, no murmur, no rub                                         or gallop                  Abdomen:   PEG in place                 Extremities:   Extremities normal, atraumatic, no cyanosis or edema                        Pulses:   Pulses palpable in all extremities                            Skin:   Skin is warm and dry,  no rashes or palpable lesions                  Neurologic: No new neurological deficits noted       Data Review:    I reviewed the patient's new clinical results.  Results from last 7 days   Lab Units 10/11/19  2039 10/11/19  0501 10/10/19  0419 10/08/19  0541 10/07/19  0447 10/06/19  0529   WBC 10*3/mm3 8.34 9.79 9.49 7.47 6.05 5.61   HEMOGLOBIN g/dL 12.4 12.7 12.0 11.8* 11.7* 11.2*   PLATELETS 10*3/mm3 279 300 244 216 138* 162     Results from last 7 days   Lab Units 10/11/19  2039 10/11/19  0501 10/10/19  0419 10/08/19  0541 10/07/19  0447 10/06/19  0529   SODIUM mmol/L 141 141 138 141 140 144   POTASSIUM mmol/L 4.3 4.3 4.1 4.1 4.1 4.5   CHLORIDE mmol/L 102 102 100 101 102 104   CO2 mmol/L 27.1 30.2* 25.6 29.8* 26.3 29.2*   BUN mg/dL 17 20 19 15 15 17   CREATININE mg/dL 1.05* 1.13* 1.00 0.95 1.01* 1.11*   CALCIUM mg/dL 9.2 9.6 9.4 9.3 8.8 8.7   GLUCOSE mg/dL 77 88 78 83 107* 106*     Microbiology Results (last 10 days)     Procedure Component Value - Date/Time    Urine Culture - Urine, Urine, Catheter In/Out [[id number]]  (Abnormal)  (Susceptibility) Collected:  10/06/19 2130    Lab Status:  Final result Specimen:  Urine, Catheter In/Out Updated:  10/09/19 1144     Urine Culture >100,000 CFU/mL Enterococcus faecium, VRE     Comment:   Vancomycin Resistant Enterococcus species. Patient may be an isolation risk.       Susceptibility       Enterococcus faecium, VRE     [place]     Ampicillin Resistant     Levofloxacin Resistant     Linezolid Susceptible     Nitrofurantoin Susceptible     Tetracycline Resistant     Vancomycin Resistant                    Respiratory Panel, PCR - Swab, Nasopharynx [[id number]]  (Normal) Collected:  10/04/19 1652    Lab Status:  Final result Specimen:  Swab from Nasopharynx Updated:  10/04/19 1853     ADENOVIRUS, PCR Not Detected     Coronavirus 229E Not Detected     Coronavirus HKU1 Not Detected     Coronavirus NL63 Not Detected     Coronavirus OC43 Not Detected     Human Metapneumovirus Not Detected     Human Rhinovirus/Enterovirus Not Detected     Influenza B PCR Not Detected     Parainfluenza Virus 1 Not Detected     Parainfluenza Virus 2 Not Detected     Parainfluenza Virus 3 Not Detected     Parainfluenza Virus 4 Not Detected     Bordetella pertussis pcr Not Detected     Influenza A H1 2009 PCR Not Detected     Chlamydophila pneumoniae PCR Not Detected     Mycoplasma pneumo by PCR Not Detected     Influenza A PCR Not Detected     Influenza A H3 Not Detected     Influenza A H1 Not Detected     RSV, PCR Not Detected     Bordetella parapertussis PCR Not Detected    MRSA Screen, PCR - Swab, Nares [[id number]]  (Normal) Collected:  10/04/19 1652    Lab Status:  Final result Specimen:  Swab from Nares Updated:  10/04/19 1853     MRSA PCR No MRSA Detected    Blood Culture - Blood, Arm, Right [[id number]] Collected:  10/04/19 0625    Lab Status:  Final result Specimen:  Blood from Arm, Right Updated:  10/09/19 0745     Blood Culture No growth at 5 days    Blood Culture - Blood, Arm, Left [[id number]]  (Abnormal)  (Susceptibility) Collected:  10/04/19 0624    Lab Status:  Final result Specimen:  Blood from Arm, Left Updated:  10/06/19 0621     Blood Culture Escherichia coli ESBL     Comment:   Consider infectious disease consult.  Susceptibility results may not correlate to clinical outcomes.  For ESBL-producing infections in the  blood, a carbapenem is recommended as first-line therapy for optimal clinical outcomes.        Isolated from Aerobic and Anaerobic Bottles     Gram Stain Aerobic Bottle Gram negative bacilli      Anaerobic Bottle Gram negative bacilli    Susceptibility      Escherichia coli ESBL     [place]     Ertapenem Susceptible     Meropenem Susceptible                    Blood Culture ID, PCR - Blood, Arm, Left [[id number]]  (Abnormal) Collected:  10/04/19 0624    Lab Status:  Final result Specimen:  Blood from Arm, Left Updated:  10/05/19 0048     BCID, PCR Escherichia coli. Identification by BCID PCR.      Xr Chest 2 View    Result Date: 10/4/2019  Increasing pulmonary interstitial and vascular prominence, particularly in perihilar regions increased versus 09/21/2019. No evidence of consolidation or effusion.  This report was finalized on 10/4/2019 8:46 AM by Dr. Jonathan Bianchi M.D.      Xr Chest 1 View    Result Date: 9/21/2019  Poor inspiration without active airspace disease     This report was finalized on 9/21/2019 11:06 PM by Andrei Beatty M.D.      Ct Abdomen Pelvis Stone Protocol    Result Date: 10/11/2019  1. Stable exam. There is a 5 mm distal ureteric calculus with mild left pelvocaliectasis and renal edema. 2. Additional findings as above.  Radiation dose reduction techniques were utilized, including automated exposure control and exposure modulation based on body size.       Ct Abdomen Pelvis Stone Protocol    Result Date: 10/10/2019  1. 5 mm left distal ureteric calculus with mild left hydronephrosis and left renal edema. 2. Additional findings as above.  Radiation dose reduction techniques were utilized, including automated exposure control and exposure modulation based on body size.  This report was finalized on 10/10/2019 10:08 AM by Dr. Richard Ceballos M.D.      Fl Retrograde Pyelogram In Or    Result Date: 10/11/2019   As described.  This report was finalized on 10/11/2019 6:23 PM by Dr. Eligio Dewitt,  M.D.      Xr Chest Post Cva Port    Result Date: 10/11/2019   As described.  This report was finalized on 10/11/2019 7:11 PM by Dr. Eligio Dewitt M.D.            Assessment:  1-ESBL positive E. coli sepsis with bacteremia likely due to  2-urinary tract infection  3-recurrent shortness of breath due to male handling of secretions due to chronic tracheostomy and noncompliance to diet  4-history of chronic respiratory failure and dysphagia with recurrent aspiration status post tracheostomy and PEG tube placement  5-history of COPD with recurrent exacerbation  6-history of seizure disorder and subarachnoid hemorrhage and immobilization syndrome  7-VRE on repeat urine culture after being on ertapenem with continued improvement argues against this pathogen being a true culprit -likely a colonizer.        Recommendations/Discussions:   · She has improved clinically in terms of resolution of sepsis on current antibiotic therapy.    · Given her presentation and overall work-up the likely source of gram-negative bacteremia i.e. E. coli is probably  tract.  · Would need 2 weeks of IV ertapenem from last negative blood culture.  · CT scan reviewed and plans for intervention per  service noted  · Would not recommend any specific treatment forVRE in the urine as this probably colonizer in the context of systemic sepsis caused by E. coli with bacteremia.      Rafa Fowler MD  10/12/2019  6:30 AM    Much of this encounter note is an electronic transcription/translation of spoken language to printed text. The electronic translation of spoken language may permit erroneous, or at times, nonsensical words or phrases to be inadvertently transcribed; Although I have reviewed the note for such errors, some may still exist

## 2019-10-12 NOTE — ANESTHESIA POSTPROCEDURE EVALUATION
"Patient: Swetha Luis    Procedure Summary     Date:  10/11/19 Room / Location:  [facility] OR 01 / [facility]    Anesthesia Start:  1712 Anesthesia Stop:  1813    Procedure:  CYSTOSCOPY,  URETEROSCOPY, LEFT RETROGRADE, LEFT PYELOGRAM, LASER LITHOTRIPSY, PLACEMENT OF STENT. (Left ) Diagnosis:      Surgeon:  Clyde Selby MD Provider:  Ricardo Yu MD    Anesthesia Type:  general ASA Status:  4          Anesthesia Type: general  Last vitals  BP   125/90 (10/11/19 1945)   Temp   37.3 °C (99.1 °F) (10/11/19 1945)   Pulse   89 (10/11/19 1945)   Resp   12 (10/11/19 1945)     SpO2   96 % (10/11/19 1945)     Post Anesthesia Care and Evaluation      Comments: Patient discharged before being evaluated by an Anesthesiologist. No apparent complications per the record.  This case was not medically directed. I am completing this chart for medical records purposes; I personally have no medical involvement with this patient.    /90 (BP Location: Right arm, Patient Position: Lying)   Pulse 89   Temp 37.3 °C (99.1 °F) (Oral)   Resp 12   Ht 167.6 cm (65.98\")   Wt 89.8 kg (198 lb)   SpO2 96%   BMI 31.97 kg/m²           "

## 2019-10-12 NOTE — THERAPY TREATMENT NOTE
Patient Name: Swetha Luis  : 1960    MRN: [id number]                              Today's Date: 10/12/2019       Admit Date: 10/4/2019    Visit Dx:     ICD-10-CM ICD-9-CM   1. Healthcare associated bacterial pneumonia J15.9 482.9   2. Oropharyngeal dysphagia R13.12 787.22     Patient Active Problem List   Diagnosis   • Tracheostomy complication (CMS/[facility])   • Gangrene of toe (CMS/[facility])   • Acute diastolic congestive heart failure (CMS/[facility])   • ARF (acute renal failure) (CMS/[facility])   • Stage 3 chronic kidney disease (CMS/[facility])   • Elevated troponin   • Acute respiratory failure with hypoxemia (CMS/[facility])   • Acute osteomyelitis (CMS/[facility])   • UTI due to extended-spectrum beta lactamase (ESBL) producing Escherichia coli   • Thrush, oral   • Tracheostomy malfunction (CMS/[facility])   • COPD (chronic obstructive pulmonary disease) (CMS/[facility])   • Respiratory failure, chronic (CMS/HCC)   • PEG (percutaneous endoscopic gastrostomy) status (CMS/HCC)   • History of osteomyelitis   • Polysubstance abuse (CMS/[facility])   • History of tracheal stenosis   • Chronic diastolic CHF (congestive heart failure) (CMS/[facility])   • Peripheral artery disease (CMS/[facility])   • Medically noncompliant   • WENDI and COPD overlap syndrome (CMS/[facility])   • Dyspnea   • Elevated LFTs   • Elevated troponin   • Tracheostomy present (CMS/[facility])   • Essential hypertension   • Dysphagia   • Healthcare associated bacterial pneumonia   • Infection due to ESBL-producing Escherichia coli   • Sepsis due to Gram negative bacteria (CMS/[facility])   • Ureteral stone with hydronephrosis   • UTI (urinary tract infection)     Past Medical History:   Diagnosis Date   • Acute congestive heart failure (CMS/[facility]) 2018   • Acute osteomyelitis (CMS/[facility])     Right shoulder due to IVDA   • Acute renal failure on dialysis (CMS/[facility])    • Anxiety    • Pugh esophagus    • CKD (chronic kidney disease)    • COPD (chronic obstructive pulmonary disease) (CMS/[facility])    • MRSA infection    • Nontraumatic  subarachnoid hemorrhage (CMS/HCC)    • Seizures (CMS/HCC)    • Tracheostomy present (CMS/HCC)      Past Surgical History:   Procedure Laterality Date   • TRACHEOSTOMY       General Information     Row Name 10/12/19 0943          PT Evaluation Time/Intention    Document Type  therapy note (daily note)  -RA     Mode of Treatment  physical therapy;individual therapy  -RA     Row Name 10/12/19 0943          General Information    Patient Profile Reviewed?  yes  -RA     Existing Precautions/Restrictions  fall;oxygen therapy device and L/min  (Significant)   -RA     Row Name 10/12/19 0943          Cognitive Assessment/Intervention- PT/OT    Orientation Status (Cognition)  oriented x 3  -RA       User Key  (r) = Recorded By, (t) = Taken By, (c) = Cosigned By    Initials Name Provider Type    RA Shani Marin, PT Physical Therapist        Mobility     Row Name 10/12/19 0944          Bed Mobility Assessment/Treatment    Supine-Sit Donley (Bed Mobility)  contact guard  -RA     Sit-Supine Donley (Bed Mobility)  -- up in chair  -RA     Assistive Device (Bed Mobility)  bed rails;head of bed elevated  -RA     Row Name 10/12/19 1001          Transfer Assessment/Treatment    Comment (Transfers)  Patient stood EOB for brief change after initial transition STS, then rested a couple of minutes before beginning ambulation.   -RA     Row Name 10/12/19 0944          Bed-Chair Transfer    Bed-Chair Donley (Transfers)  contact guard  -RA     Assistive Device (Bed-Chair Transfers)  other (see comments) HHA, holding onto IV pole  -RA     Row Name 10/12/19 0944          Sit-Stand Transfer    Sit-Stand Donley (Transfers)  contact guard  -RA     Assistive Device (Sit-Stand Transfers)  other (see comments) HHA x 1  -RA     Row Name 10/12/19 0944          Gait/Stairs Assessment/Training    Donley Level (Gait)  minimum assist (75% patient effort)  -RA     Assistive Device (Gait)  -- HHA x 1  -RA     Distance in  Feet (Gait)  30', 10' (seated rest in between)  -RA     Pattern (Gait)  step-through  -RA     Deviations/Abnormal Patterns (Gait)  balaji decreased;stride length decreased  -RA     Bilateral Gait Deviations  forward flexed posture  -RA     Comment (Gait/Stairs)  Ambulation today with HHA and/or holding onto IV pole, required cues for more upright posture. Activity limited by SOA/fatigue.  -RA       User Key  (r) = Recorded By, (t) = Taken By, (c) = Cosigned By    Initials Name Provider Type    Shani Garcia, PT Physical Therapist        Obj/Interventions     Row Name 10/12/19 0961          Therapeutic Exercise    Comment (Therapeutic Exercise)  B LE ther ex x 10-15 reps (seated AP, LAQ, march, hip add iso)  -RA       User Key  (r) = Recorded By, (t) = Taken By, (c) = Cosigned By    Initials Name Provider Type    Shani Garcia, PT Physical Therapist        Goals/Plan    No documentation.       Clinical Impression     Row Name 10/12/19 1082          Pain Scale: Numbers Pre/Post-Treatment    Pain Scale: Numbers, Pretreatment  0/10 - no pain  -RA     Pain Scale: Numbers, Post-Treatment  0/10 - no pain  -RA     Row Name 10/12/19 0942          Vital Signs    Pre SpO2 (%)  96  -RA     O2 Delivery Pre Treatment  trach collar  -RA     Post SpO2 (%)  96  -RA     O2 Delivery Post Treatment  trach collar  -RA     Pre Patient Position  Supine  -RA     Post Patient Position  Sitting  -RA     Rest Breaks   2  -RA     Row Name 10/12/19 0976          Positioning and Restraints    Pre-Treatment Position  in bed  -RA     Post Treatment Position  chair  -RA     In Chair  with nsg;sitting;call light within reach;encouraged to call for assist;exit alarm on  -RA       User Key  (r) = Recorded By, (t) = Taken By, (c) = Cosigned By    Initials Name Provider Type    Shani Garcia, PT Physical Therapist        Outcome Measures     Row Name 10/12/19 5976          How much help from another person do you currently need...     Turning from your back to your side while in flat bed without using bedrails?  3  -RA     Moving from lying on back to sitting on the side of a flat bed without bedrails?  3  -RA     Moving to and from a bed to a chair (including a wheelchair)?  3  -RA     Standing up from a chair using your arms (e.g., wheelchair, bedside chair)?  3  -RA     Climbing 3-5 steps with a railing?  2  -RA     To walk in hospital room?  3  -RA     AM-PAC 6 Clicks Score (PT)  17  -RA       User Key  (r) = Recorded By, (t) = Taken By, (c) = Cosigned By    Initials Name Provider Type    Shani Garcia, PT Physical Therapist        Physical Therapy Education     Title: PT OT SLP Therapies (Done)     Topic: Physical Therapy (Done)     Point: Mobility training (Done)     Learning Progress Summary           Patient Acceptance, E,TB, VU,NR by RA at 10/12/2019  9:54 AM    Acceptance, E,D, VU,NR by MS at 10/10/2019  1:51 PM    Acceptance, E,TB,D, VU,DU,NR by  at 10/9/2019 11:55 PM    Acceptance, E,D, VU,NR by MS at 10/9/2019  9:24 AM    Acceptance, E,TB,D, VU,NR,DU by  at 10/8/2019 10:17 PM    Acceptance, E, NR by AR at 10/8/2019 12:54 PM    Acceptance, E,TB,D, VU,DU,NR by  at 10/7/2019  9:35 PM    Acceptance, E,D, VU,NR by MS at 10/7/2019 10:33 AM    Acceptance, E,D, VU,NR by MS at 10/6/2019  9:26 AM    Acceptance, E,D, VU,NR by MS at 10/5/2019  9:25 AM                   Point: Home exercise program (Done)     Learning Progress Summary           Patient Acceptance, E,TB, VU,NR by RA at 10/12/2019  9:54 AM    Acceptance, E,D, VU,NR by MS at 10/10/2019  1:51 PM    Acceptance, E,TB,D, VU,DU,NR by  at 10/9/2019 11:55 PM    Acceptance, E,D, VU,NR by MS at 10/9/2019  9:24 AM    Acceptance, E,TB,D, VU,NR,DU by  at 10/8/2019 10:17 PM    Acceptance, E, NR by AR at 10/8/2019 12:54 PM    Acceptance, E,TB,D, VU,DU,NR by  at 10/7/2019  9:35 PM    Acceptance, E,D, VU,NR by MS at 10/7/2019 10:33 AM    Acceptance, E,D, VU,NR by MS at 10/6/2019   9:26 AM    Acceptance, E,D, VU,NR by MS at 10/5/2019  9:25 AM                   Point: Body mechanics (Done)     Learning Progress Summary           Patient Acceptance, E,TB, VU,NR by RA at 10/12/2019  9:54 AM    Acceptance, E,D, VU,NR by MS at 10/10/2019  1:51 PM    Acceptance, E,TB,D, VU,DU,NR by  at 10/9/2019 11:55 PM    Acceptance, E,D, VU,NR by MS at 10/9/2019  9:24 AM    Acceptance, E,TB,D, VU,NR,DU by AH at 10/8/2019 10:17 PM    Acceptance, E, NR by AR at 10/8/2019 12:54 PM    Acceptance, E,TB,D, VU,DU,NR by  at 10/7/2019  9:35 PM    Acceptance, E,D, VU,NR by MS at 10/7/2019 10:33 AM    Acceptance, E,D, VU,NR by MS at 10/6/2019  9:26 AM    Acceptance, E,D, VU,NR by MS at 10/5/2019  9:25 AM                   Point: Precautions (Done)     Learning Progress Summary           Patient Acceptance, E,TB, VU,NR by RA at 10/12/2019  9:54 AM    Acceptance, E,D, VU,NR by MS at 10/10/2019  1:51 PM    Acceptance, E,TB,D, VU,DU,NR by  at 10/9/2019 11:55 PM    Acceptance, E,D, VU,NR by MS at 10/9/2019  9:24 AM    Acceptance, E,TB,D, VU,NR,DU by  at 10/8/2019 10:17 PM    Acceptance, E, NR by AR at 10/8/2019 12:54 PM    Acceptance, E,TB,D, VU,DU,NR by  at 10/7/2019  9:35 PM    Acceptance, E,D, VU,NR by MS at 10/7/2019 10:33 AM    Acceptance, E,D, VU,NR by MS at 10/6/2019  9:26 AM    Acceptance, E,D, VU,NR by MS at 10/5/2019  9:25 AM                               User Key     Initials Effective Dates Name Provider Type Discipline     04/06/17 -  Tomas, Shani L, PT Physical Therapist PT    MS 04/03/18 -  Andrei Lee, PT Physical Therapist PT     04/06/17 -  Yvette Gutierrez, RN Registered Nurse Nurse    AR 04/03/18 -  Maria Del Carmen Brandon, PT Physical Therapist PT              PT Recommendation and Plan     Outcome Summary: Patient agreeable to PT.  She transferred supine to sit and sit<->stand with CGA of PT and ambulated 30' with HHA (also holding onto IV pole at times), performed seated ex during rest sitting  EOB, then ambulated another 10' CGA (HHA and support of IV pole) to get to chair.  Patient motivated but remains limited by SOA/fatigue with activity.     Time Calculation:   PT Charges     Row Name 10/12/19 0959             Time Calculation    Start Time  0855  -      Stop Time  0919  -      Time Calculation (min)  24 min  -RA      PT Received On  10/12/19  -RA      PT - Next Appointment  10/14/19  -        User Key  (r) = Recorded By, (t) = Taken By, (c) = Cosigned By    Initials Name Provider Type    Shani Garcia, PT Physical Therapist        Therapy Charges for Today     Code Description Service Date Service Provider Modifiers Qty    17295942619 HC PT THER PROC EA 15 MIN 10/12/2019 Shani Marin PT GP 2          PT G-Codes  Outcome Measure Options: AM-PAC 6 Clicks Basic Mobility (PT)  AM-PAC 6 Clicks Score (PT): 17    Shani Marin PT  10/12/2019

## 2019-10-12 NOTE — PROGRESS NOTES
LOS: 8 days   Patient Care Team:  Provider, No Known as PCP - General    Subjective     Patient reports she is doing pretty well she still coughing with some mucus.  But does not feel short of breath she is currently wearing a Passy-Brionna valve she is got supplemental oxygen on via a trach collar.    Review of Systems:          Objective     Vital Signs  Vital Sign Min/Max for last 24 hours  Temp  Min: 97.9 °F (36.6 °C)  Max: 99.1 °F (37.3 °C)   BP  Min: 101/70  Max: 130/93   Pulse  Min: 73  Max: 93   Resp  Min: 12  Max: 16   SpO2  Min: 92 %  Max: 99 %   Flow (L/min)  Min: 2  Max: 10   Weight  Min: 89.8 kg (197 lb 15.6 oz)  Max: 89.8 kg (197 lb 15.6 oz)        Ventilator/Non-Invasive Ventilation Settings (From admission, onward)    None                       Body mass index is 31.97 kg/m².  I/O last 3 completed shifts:  In: 1800 [I.V.:1500; IV Piggyback:300]  Out: 750 [Urine:750]  I/O this shift:  In: 540 [P.O.:240; IV Piggyback:300]  Out: -         Physical Exam:  General Appearance: Well-developed elderly white female looking her age and she certainly looks several decades older than her stated age she does not appear in any acute distress  Eyes: Conjunctiva are clear and anicteric pupils are equal  ENT: Mucous membranes are little dry no erythema no exudates nasal septum midline  Neck: Tracheostomy site looks okay I do not see any erythema or drainage Shiley trach appears to be in position there is no jugular venous distention or palpable adenopathy  Lungs: Decreased but clear I do not hear any wheezes rales or rhonchi nonlabored symmetric expansion  Cardiac: Regular rate and rhythm no murmur  Abdomen: Soft no palpable organomegaly or masses she does have a left upper quadrant feeding type tube  : Not examined  Musculoskeletal: Grossly normal 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  Skin: No jaundice no petechiae skin is warm and dry to touch  Neuro: She is alert and oriented she  is cooperative following commands moving all 4 extremities  Extremities/P Vascular: No clubbing no cyanosis no edema palpable radial dorsalis pedis pulses bilaterally  MSE: Seems to be in good spirits       Labs:  Results from last 7 days   Lab Units 10/12/19  0631 10/11/19  2039 10/11/19  0501 10/10/19  0419 10/08/19  0541 10/07/19  0447 10/06/19  0529   GLUCOSE mg/dL 81 77 88 78 83 107* 106*   SODIUM mmol/L 141 141 141 138 141 140 144   POTASSIUM mmol/L 4.3 4.3 4.3 4.1 4.1 4.1 4.5   CO2 mmol/L 25.0 27.1 30.2* 25.6 29.8* 26.3 29.2*   CHLORIDE mmol/L 104 102 102 100 101 102 104   ANION GAP mmol/L 12.0 11.9 8.8 12.4 10.2 11.7 10.8   CREATININE mg/dL 1.07* 1.05* 1.13* 1.00 0.95 1.01* 1.11*   BUN mg/dL 17 17 20 19 15 15 17   BUN / CREAT RATIO  15.9 16.2 17.7 19.0 15.8 14.9 15.3   CALCIUM mg/dL 9.2 9.2 9.6 9.4 9.3 8.8 8.7   EGFR IF NONAFRICN AM mL/min/1.73 52* 54* 49* 57* 60* 56* 50*     Estimated Creatinine Clearance: 63.9 mL/min (A) (by C-G formula based on SCr of 1.07 mg/dL (H)).      Results from last 7 days   Lab Units 10/12/19  0631 10/11/19  2039 10/11/19  0501 10/10/19  0419 10/08/19  0541 10/07/19  0447 10/06/19  0529   WBC 10*3/mm3 8.63 8.34 9.79 9.49 7.47 6.05 5.61   RBC 10*6/mm3 4.61 4.71 5.00 4.59 4.51 4.44 4.32   HEMOGLOBIN g/dL 11.7* 12.4 12.7 12.0 11.8* 11.7* 11.2*   HEMATOCRIT % 37.4 39.0 41.3 38.6 38.4 37.7 37.1   MCV fL 81.1 82.8 82.6 84.1 85.1 84.9 85.9   MCH pg 25.4* 26.3* 25.4* 26.1* 26.2* 26.4* 25.9*   MCHC g/dL 31.3* 31.8 30.8* 31.1* 30.7* 31.0* 30.2*   RDW % 14.2 14.2 14.3 14.3 14.3 14.0 14.1   RDW-SD fl 41.0 42.1 43.0 43.5 44.2 43.1 44.4   MPV fL 11.7 11.3 11.7 12.0 11.9 13.0* 12.2*   PLATELETS 10*3/mm3 313 279 300 244 216 138* 162   NEUTROPHIL % % 71.6 64.7 65.4 64.6 66.2 64.4 66.2   LYMPHOCYTE % % 18.9* 25.1 24.4 23.8 21.0 20.8 16.9*   MONOCYTES % % 6.4 6.2 6.0 7.4 9.1 10.2 13.0*   EOSINOPHIL % % 2.0 3.0 3.2 3.1 2.8 3.3 3.0   BASOPHIL % % 0.6 0.5 0.6 0.6 0.5 0.5 0.4   IMM GRAN % % 0.5 0.5 0.4  0.5 0.4  --  0.5   NEUTROS ABS 10*3/mm3 6.19 5.40 6.40 6.13 4.94 3.89 3.71   LYMPHS ABS 10*3/mm3 1.63 2.09 2.39 2.26 1.57 1.26 0.95   MONOS ABS 10*3/mm3 0.55 0.52 0.59 0.70 0.68 0.62 0.73   EOS ABS 10*3/mm3 0.17 0.25 0.31 0.29 0.21 0.20 0.17   BASOS ABS 10*3/mm3 0.05 0.04 0.06 0.06 0.04 0.03 0.02   IMMATURE GRANS (ABS) 10*3/mm3 0.04 0.04 0.04 0.05 0.03  --  0.03   NRBC /100 WBC 0.0 0.0 0.0 0.0 0.0  --  0.0                             Microbiology Results (last 10 days)     Procedure Component Value - Date/Time    Urine Culture - Urine, Urine, Catheter In/Out [[id number]]  (Abnormal)  (Susceptibility) Collected:  10/06/19 2130    Lab Status:  Final result Specimen:  Urine, Catheter In/Out Updated:  10/09/19 1144     Urine Culture >100,000 CFU/mL Enterococcus faecium, VRE     Comment:   Vancomycin Resistant Enterococcus species. Patient may be an isolation risk.       Susceptibility      Enterococcus faecium, VRE     [place]     Ampicillin Resistant     Levofloxacin Resistant     Linezolid Susceptible     Nitrofurantoin Susceptible     Tetracycline Resistant     Vancomycin Resistant                    Respiratory Panel, PCR - Swab, Nasopharynx [[id number]]  (Normal) Collected:  10/04/19 1652    Lab Status:  Final result Specimen:  Swab from Nasopharynx Updated:  10/04/19 1853     ADENOVIRUS, PCR Not Detected     Coronavirus 229E Not Detected     Coronavirus HKU1 Not Detected     Coronavirus NL63 Not Detected     Coronavirus OC43 Not Detected     Human Metapneumovirus Not Detected     Human Rhinovirus/Enterovirus Not Detected     Influenza B PCR Not Detected     Parainfluenza Virus 1 Not Detected     Parainfluenza Virus 2 Not Detected     Parainfluenza Virus 3 Not Detected     Parainfluenza Virus 4 Not Detected     Bordetella pertussis pcr Not Detected     Influenza A H1 2009 PCR Not Detected     Chlamydophila pneumoniae PCR Not Detected     Mycoplasma pneumo by PCR Not Detected     Influenza A PCR Not Detected     Influenza  A H3 Not Detected     Influenza A H1 Not Detected     RSV, PCR Not Detected     Bordetella parapertussis PCR Not Detected    MRSA Screen, PCR - Swab, Nares [[id number]]  (Normal) Collected:  10/04/19 1652    Lab Status:  Final result Specimen:  Swab from Nares Updated:  10/04/19 1853     MRSA PCR No MRSA Detected    Blood Culture - Blood, Arm, Right [[id number]] Collected:  10/04/19 0625    Lab Status:  Final result Specimen:  Blood from Arm, Right Updated:  10/09/19 0745     Blood Culture No growth at 5 days    Blood Culture - Blood, Arm, Left [[id number]]  (Abnormal)  (Susceptibility) Collected:  10/04/19 0624    Lab Status:  Final result Specimen:  Blood from Arm, Left Updated:  10/06/19 0621     Blood Culture Escherichia coli ESBL     Comment:   Consider infectious disease consult.  Susceptibility results may not correlate to clinical outcomes.  For ESBL-producing infections in the blood, a carbapenem is recommended as first-line therapy for optimal clinical outcomes.        Isolated from Aerobic and Anaerobic Bottles     Gram Stain Aerobic Bottle Gram negative bacilli      Anaerobic Bottle Gram negative bacilli    Susceptibility      Escherichia coli ESBL     [place]     Ertapenem Susceptible     Meropenem Susceptible                    Blood Culture ID, PCR - Blood, Arm, Left [[id number]]  (Abnormal) Collected:  10/04/19 0624    Lab Status:  Final result Specimen:  Blood from Arm, Left Updated:  10/05/19 0048     BCID, PCR Escherichia coli. Identification by BCID PCR.                amLODIPine 5 mg Oral Q24H   busPIRone 5 mg Oral TID   clotrimazole-betamethasone  Topical Q12H   ertapenem 1 g Intravenous Q24H   escitalopram 20 mg Oral Daily   fluconazole 200 mg Intravenous Daily   gabapentin 300 mg Oral TID   influenza vaccine 0.5 mL Intramuscular Once   Linezolid 600 mg Intravenous Q12H   metoprolol succinate XL 25 mg Oral Daily   pramipexole 0.25 mg Oral Nightly   sodium chloride 10 mL Intravenous Q12H   thiamine 100  mg Oral Daily       lactated ringers 9 mL/hr Last Rate: 9 mL/hr (10/11/19 1648)   sodium chloride 75 mL/hr Last Rate: 75 mL/hr (10/11/19 2302)       Diagnostics:  Xr Chest 2 View    Result Date: 10/4/2019  TWO VIEWS CHEST  HISTORY: Sepsis.  COMPARISON: 09/21/2019.  A tracheostomy tube is in place in satisfactory position. The patient is rotated slightly to the right. There is mild cardiomegaly. The pulmonary interstitium is prominent. This is more prominent as compared to 09/21/2019. There is no evidence of consolidation or effusion.      Increasing pulmonary interstitial and vascular prominence, particularly in perihilar regions increased versus 09/21/2019. No evidence of consolidation or effusion.  This report was finalized on 10/4/2019 8:46 AM by Dr. Jonathan Bianchi M.D.      Xr Chest 1 View    Result Date: 9/21/2019  PORTABLE CHEST X-RAY  CLINICAL HISTORY: SOA Triage Protocol  COMPARISON: 07/10/2019.  FINDINGS: Portable AP view of the chest was obtained with overlying monitor leads in place. A tracheostomy has been placed in the interim and terminates near the level of the thoracic inlet. Lungs are poorly aerated but clear. No edema or pleural fluid. Mild cardiomegaly and aortic ectasia.        Poor inspiration without active airspace disease     This report was finalized on 9/21/2019 11:06 PM by Andrei Beatty M.D.      Ct Abdomen Pelvis Stone Protocol    Result Date: 10/11/2019  CT ABDOMEN AND PELVIS WITHOUT CONTRAST  HISTORY: Follow-up renal stone.  TECHNIQUE: Axial CT images of the abdomen and pelvis were obtained without administration of intravenous contrast. The patient was not given oral contrast. Coronal and sagittal reformats were obtained.  COMPARISON: 10/08/2019.  FINDINGS: There is an unchanged distal left ureteric calculus, best seen on image 123. This is causing mild left-sided pelvocaliectasis, left renal edema and perinephric stranding. A low-density lesion is again identified within the medial  cortex of the upper and midpole of the left kidney that likely represents a cyst. Bilateral adrenal glands are normal. Cortical scarring is seen within the upper pole of the right kidney. No renal calculi or hydronephrosis on the right. The urinary bladder is partially distended and normal.  Noncontrast attenuation of the liver is normal. The gallbladder, spleen and the pancreas is normal. Bilateral adrenal glands are normal. Small retroperitoneal lymph nodes are favored to be reactive. The uterus is anteverted and normal. No abnormal adnexal mass is seen. The small and large bowel loops demonstrate normal caliber. The patient has a percutaneous gastrostomy tube in place. No ascites is seen. Ectasia of the celiac axis at its bifurcation where it measures up to 1.2 cm. Moderate calcified atherosclerotic plaque is seen within the abdominal aorta and its branches.      1. Stable exam. There is a 5 mm distal ureteric calculus with mild left pelvocaliectasis and renal edema. 2. Additional findings as above.  Radiation dose reduction techniques were utilized, including automated exposure control and exposure modulation based on body size.       Ct Abdomen Pelvis Stone Protocol    Result Date: 10/10/2019  CT ABDOMEN AND PELVIS WITHOUT CONTRAST  HISTORY: Abdominal pain and anemia.  TECHNIQUE: Axial CT images of the abdomen and pelvis were obtained without administration of intravenous contrast. The patient was not given oral contrast. Coronal and sagittal reformats were obtained.  COMPARISON: 03/26/2014  FINDINGS: The noncontrast attenuation of the liver is normal. No focal hepatic lesion or intrahepatic biliary dilatation is seen. The gallbladder, spleen and the pancreas is normal. The patient has a percutaneous gastrostomy tube in place. Bilateral adrenal glands are normal. Both kidneys are normal in size and attenuation. Low-density area seen medially within the left kidney, image 42 is favored to represent a simple  cyst. There is mild left renal edema and mild dilatation of the renal collecting system.. There is an irregular 5-6 mm calculus seen within the left distal ureter just proximal to the UV junction. No renal calculi on the right. The right ureter demonstrates normal caliber. The urinary bladder is otherwise unremarkable. The uterus is anteverted and normal. The lung bases demonstrate mild bibasilar atelectasis, emphysema and scarring. Subcentimeter retroperitoneal lymph nodes are present. Index left paraaortic node measures up to 9 mm in short axis dimension and is favored to be reactive. Moderate atherosclerotic change is seen within the abdominal aorta and its branches. There is dilatation of the celiac at the level of its bifurcation where it measures up to 1.1 cm. Unchanged from prior imaging.      1. 5 mm left distal ureteric calculus with mild left hydronephrosis and left renal edema. 2. Additional findings as above.  Radiation dose reduction techniques were utilized, including automated exposure control and exposure modulation based on body size.  This report was finalized on 10/10/2019 10:08 AM by Dr. Richard Ceballos M.D.      Fl Retrograde Pyelogram In Or    Result Date: 10/11/2019  FL RETROGRADE PYELOGRAM IN OR-  INDICATIONS: Urolithiasis. Left ureteral stent placement  TECHNIQUE: FLUOROSCOPIC ASSISTANCE IN THE OPERATING ROOM.  FINDINGS:  5 intraoperative fluoroscopic spot views were obtained and recorded the PACS for review, revealing left ureteral stent placement. Please see operative report for full details.  Fluoroscopy time: 24 seconds       As described.  This report was finalized on 10/11/2019 6:23 PM by Dr. Eligio Dewitt M.D.      Fl Video Swallow    Result Date: 10/8/2019  VIDEO SWALLOWING EXAMINATION BY SPEECH PATHOLOGY  Clinical: Dysphasia  Video swallowing examination performed under the direction of speech pathology. Imaging reviewed by radiologist who concurs with the findings.  Speech  pathology summary:   Patient presents with moderate oropharyngeal dysphagia characterized by poor bolus control, swallow delay, swallow mistiming, and impaired protective reaction to aspiration. Silent penetration with honey and thins. Silent aspiration with nectar. No penetration with puree, mechanical soft, or regular.  FLUOROSCOPY TIME: 2 minutes 53 seconds, 5064 images.  This report was finalized on 10/8/2019 11:03 AM by Dr. Jayson Godwin M.D.      Xr Chest Post Cva Port    Result Date: 10/11/2019  XR CHEST POST CVA PORT-  INDICATIONS: Check Shiley  TECHNIQUE: Frontal view of the chest  COMPARISON: 10/04/2019  FINDINGS:  Tracheostomy tube tip is 4.5 cm above the aguila. The heart size is borderline. Mild prominence of vascular and interstitial markings. Aorta is tortuous. No focal pulmonary consolidation, pleural effusion, or pneumothorax. No acute osseous process.       As described.  This report was finalized on 10/11/2019 7:11 PM by Dr. Eligio Dewitt M.D.      Results for orders placed during the hospital encounter of 09/21/19   Adult Transthoracic Echo Complete W/ Cont if Necessary Per Protocol    Narrative · Left ventricular systolic function is normal. Calculated EF = 64%.   Estimated EF was in disagreement with the calculated EF. Estimated EF =   55%. Normal left ventricular cavity size and wall thickness noted. All   left ventricular wall segments contract normally. Left ventricular   diastolic function is normal.              Active Hospital Problems    Diagnosis  POA   • **Healthcare associated bacterial pneumonia [J15.9]  Yes   • Ureteral stone with hydronephrosis [N13.2]  Unknown   • UTI (urinary tract infection) [N39.0]  Unknown   • Infection due to ESBL-producing Escherichia coli [A49.8, Z16.12]  Unknown   • Sepsis due to Gram negative bacteria (CMS/HCC) [A41.50]  Unknown   • Dyspnea [R06.00]  Yes   • Tracheostomy present (CMS/HCC) [Z93.0]  Not Applicable   • Essential hypertension [I10]  Yes    • Dysphagia [R13.10]  Yes   • COPD (chronic obstructive pulmonary disease) (CMS/[facility]) [J44.9]  Yes   • Respiratory failure, chronic (CMS/HCC) [J96.10]  Yes   • PEG (percutaneous endoscopic gastrostomy) status (CMS/HCC) [Z93.1]  Not Applicable   • Chronic diastolic CHF (congestive heart failure) (CMS/HCC) [I50.32]  Yes   • Peripheral artery disease (CMS/HCC) [I73.9]  Yes   • Stage 3 chronic kidney disease (CMS/[facility]) [N18.3]  Yes      Resolved Hospital Problems   No resolved problems to display.         Assessment/Plan     1. Pneumonia probable aspiration ID following and managing antibiotics  2. Chronic tracheostomy seems to be doing well I did discuss with her when she is not talking with any by the room to go ahead and take her Passy-Brionna valve off so that she can cough and clear secretions better  3. Ureterolithiasis with obstruction status post cystoscopy lithotripsy and stent  4. Chronic respiratory failure  5. COPD continue bronchodilators  6. chronic kidney disease stage III  7. E. coli, ESBL positive, bacteremia antibiotics per ID  8. Enterococcus UTI is just colonization ID favors colonization will follow their lead  9. oral pharyngeal dysphasia    Plan for disposition:    Dany Baez MD  10/12/19  2:54 PM    Time:

## 2019-10-13 LAB
ANION GAP SERPL CALCULATED.3IONS-SCNC: 8.9 MMOL/L (ref 5–15)
BASOPHILS # BLD AUTO: 0.05 10*3/MM3 (ref 0–0.2)
BASOPHILS NFR BLD AUTO: 0.6 % (ref 0–1.5)
BUN BLD-MCNC: 23 MG/DL (ref 6–20)
BUN/CREAT SERPL: 19.3 (ref 7–25)
CALCIUM SPEC-SCNC: 8.9 MG/DL (ref 8.6–10.5)
CHLORIDE SERPL-SCNC: 102 MMOL/L (ref 98–107)
CO2 SERPL-SCNC: 27.1 MMOL/L (ref 22–29)
CREAT BLD-MCNC: 1.19 MG/DL (ref 0.57–1)
DEPRECATED RDW RBC AUTO: 44.6 FL (ref 37–54)
EOSINOPHIL # BLD AUTO: 0.24 10*3/MM3 (ref 0–0.4)
EOSINOPHIL NFR BLD AUTO: 2.9 % (ref 0.3–6.2)
ERYTHROCYTE [DISTWIDTH] IN BLOOD BY AUTOMATED COUNT: 14.3 % (ref 12.3–15.4)
GFR SERPL CREATININE-BSD FRML MDRD: 46 ML/MIN/1.73
GLUCOSE BLD-MCNC: 80 MG/DL (ref 65–99)
HCT VFR BLD AUTO: 37.2 % (ref 34–46.6)
HGB BLD-MCNC: 11.4 G/DL (ref 12–15.9)
IMM GRANULOCYTES # BLD AUTO: 0.03 10*3/MM3 (ref 0–0.05)
IMM GRANULOCYTES NFR BLD AUTO: 0.4 % (ref 0–0.5)
LYMPHOCYTES # BLD AUTO: 1.97 10*3/MM3 (ref 0.7–3.1)
LYMPHOCYTES NFR BLD AUTO: 24.1 % (ref 19.6–45.3)
MCH RBC QN AUTO: 25.9 PG (ref 26.6–33)
MCHC RBC AUTO-ENTMCNC: 30.6 G/DL (ref 31.5–35.7)
MCV RBC AUTO: 84.4 FL (ref 79–97)
MONOCYTES # BLD AUTO: 0.58 10*3/MM3 (ref 0.1–0.9)
MONOCYTES NFR BLD AUTO: 7.1 % (ref 5–12)
NEUTROPHILS # BLD AUTO: 5.29 10*3/MM3 (ref 1.7–7)
NEUTROPHILS NFR BLD AUTO: 64.9 % (ref 42.7–76)
NRBC BLD AUTO-RTO: 0 /100 WBC (ref 0–0.2)
PLATELET # BLD AUTO: 266 10*3/MM3 (ref 140–450)
PMV BLD AUTO: 11.3 FL (ref 6–12)
POTASSIUM BLD-SCNC: 4.5 MMOL/L (ref 3.5–5.2)
RBC # BLD AUTO: 4.41 10*6/MM3 (ref 3.77–5.28)
SODIUM BLD-SCNC: 138 MMOL/L (ref 136–145)
WBC NRBC COR # BLD: 8.16 10*3/MM3 (ref 3.4–10.8)

## 2019-10-13 PROCEDURE — 94799 UNLISTED PULMONARY SVC/PX: CPT

## 2019-10-13 PROCEDURE — 85025 COMPLETE CBC W/AUTO DIFF WBC: CPT | Performed by: UROLOGY

## 2019-10-13 PROCEDURE — 25010000002 LINEZOLID 600 MG/300ML SOLUTION: Performed by: HOSPITALIST

## 2019-10-13 PROCEDURE — 80048 BASIC METABOLIC PNL TOTAL CA: CPT | Performed by: UROLOGY

## 2019-10-13 PROCEDURE — 25010000002 FLUCONAZOLE PER 200 MG: Performed by: UROLOGY

## 2019-10-13 PROCEDURE — 25010000002 ERTAPENEM PER 500 MG: Performed by: HOSPITALIST

## 2019-10-13 RX ADMIN — GABAPENTIN 300 MG: 300 CAPSULE ORAL at 20:44

## 2019-10-13 RX ADMIN — Medication 100 MG: at 08:57

## 2019-10-13 RX ADMIN — ERTAPENEM SODIUM 1 G: 1 INJECTION, POWDER, LYOPHILIZED, FOR SOLUTION INTRAMUSCULAR; INTRAVENOUS at 13:00

## 2019-10-13 RX ADMIN — LINEZOLID 600 MG: 600 INJECTION, SOLUTION INTRAVENOUS at 20:44

## 2019-10-13 RX ADMIN — BUSPIRONE HYDROCHLORIDE 5 MG: 5 TABLET ORAL at 08:57

## 2019-10-13 RX ADMIN — CLOTRIMAZOLE AND BETAMETHASONE DIPROPIONATE: 10; .5 CREAM TOPICAL at 08:58

## 2019-10-13 RX ADMIN — ESCITALOPRAM 20 MG: 20 TABLET, FILM COATED ORAL at 08:57

## 2019-10-13 RX ADMIN — BUSPIRONE HYDROCHLORIDE 5 MG: 5 TABLET ORAL at 20:44

## 2019-10-13 RX ADMIN — BUSPIRONE HYDROCHLORIDE 5 MG: 5 TABLET ORAL at 18:34

## 2019-10-13 RX ADMIN — AMLODIPINE BESYLATE 5 MG: 5 TABLET ORAL at 08:57

## 2019-10-13 RX ADMIN — CLOTRIMAZOLE AND BETAMETHASONE DIPROPIONATE: 10; .5 CREAM TOPICAL at 20:44

## 2019-10-13 RX ADMIN — LINEZOLID 600 MG: 600 INJECTION, SOLUTION INTRAVENOUS at 08:57

## 2019-10-13 RX ADMIN — OXYCODONE HYDROCHLORIDE AND ACETAMINOPHEN 1 TABLET: 5; 325 TABLET ORAL at 18:34

## 2019-10-13 RX ADMIN — SODIUM CHLORIDE, PRESERVATIVE FREE 10 ML: 5 INJECTION INTRAVENOUS at 08:58

## 2019-10-13 RX ADMIN — FLUCONAZOLE IN SODIUM CHLORIDE 200 MG: 2 INJECTION, SOLUTION INTRAVENOUS at 08:58

## 2019-10-13 RX ADMIN — GABAPENTIN 300 MG: 300 CAPSULE ORAL at 08:57

## 2019-10-13 RX ADMIN — PRAMIPEXOLE DIHYDROCHLORIDE 0.25 MG: 0.25 TABLET ORAL at 20:44

## 2019-10-13 RX ADMIN — GABAPENTIN 300 MG: 300 CAPSULE ORAL at 18:34

## 2019-10-13 RX ADMIN — SODIUM CHLORIDE, PRESERVATIVE FREE 10 ML: 5 INJECTION INTRAVENOUS at 20:44

## 2019-10-13 RX ADMIN — METOPROLOL SUCCINATE 25 MG: 25 TABLET, FILM COATED, EXTENDED RELEASE ORAL at 08:57

## 2019-10-13 NOTE — PROGRESS NOTES
LOS: 9 days   Patient Care Team:  Provider, No Known as PCP - General    Subjective     Reports she is feeling much better today does not have any real complaints..    Review of Systems:          Objective     Vital Signs  Vital Sign Min/Max for last 24 hours  Temp  Min: 97.2 °F (36.2 °C)  Max: 97.7 °F (36.5 °C)   BP  Min: 115/75  Max: 132/83   Pulse  Min: 89  Max: 89   Resp  Min: 14  Max: 14   SpO2  Min: 97 %  Max: 98 %   Flow (L/min)  Min: 10  Max: 10   Weight  Min: 89.8 kg (197 lb 15.6 oz)  Max: 89.8 kg (197 lb 15.6 oz)        Ventilator/Non-Invasive Ventilation Settings (From admission, onward)    None                       Body mass index is 31.97 kg/m².  I/O last 3 completed shifts:  In: 2500 [P.O.:600; I.V.:1000; IV Piggyback:900]  Out: -   I/O this shift:  In: 236 [P.O.:236]  Out: -         Physical Exam:  General Appearance: Well-developed elderly white female looking her age and she certainly looks several decades older than her stated age she does not appear in any acute distress  Eyes: Conjunctiva are clear and anicteric pupils are equal  ENT: Mucous membranes are moist no erythema no exudates nasal septum midline  Neck: Tracheostomy site looks okay I do not see any erythema or drainage Shiley trach appears to be in position there is no jugular venous distention or palpable adenopathy  Lungs: Decreased but clear I do not hear any wheezes rales or rhonchi nonlabored symmetric expansion  Cardiac: Regular rate and rhythm no murmur  Abdomen: Soft no palpable organomegaly or masses she does have a left upper quadrant feeding type tube  : Not examined  Musculoskeletal: Grossly normal 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  Skin: No jaundice no petechiae skin is warm and dry to touch  Neuro: She is alert and oriented she is cooperative following commands moving all 4 extremities  Extremities/P Vascular: No clubbing no cyanosis no edema palpable radial dorsalis pedis pulses  bilaterally  MSE: Seems to be in good spirits       Labs:  Results from last 7 days   Lab Units 10/13/19  0636 10/12/19  0631 10/11/19  2039 10/11/19  0501 10/10/19  0419 10/08/19  0541 10/07/19  0447   GLUCOSE mg/dL 80 81 77 88 78 83 107*   SODIUM mmol/L 138 141 141 141 138 141 140   POTASSIUM mmol/L 4.5 4.3 4.3 4.3 4.1 4.1 4.1   CO2 mmol/L 27.1 25.0 27.1 30.2* 25.6 29.8* 26.3   CHLORIDE mmol/L 102 104 102 102 100 101 102   ANION GAP mmol/L 8.9 12.0 11.9 8.8 12.4 10.2 11.7   CREATININE mg/dL 1.19* 1.07* 1.05* 1.13* 1.00 0.95 1.01*   BUN mg/dL 23* 17 17 20 19 15 15   BUN / CREAT RATIO  19.3 15.9 16.2 17.7 19.0 15.8 14.9   CALCIUM mg/dL 8.9 9.2 9.2 9.6 9.4 9.3 8.8   EGFR IF NONAFRICN AM mL/min/1.73 46* 52* 54* 49* 57* 60* 56*     Estimated Creatinine Clearance: 57.5 mL/min (A) (by C-G formula based on SCr of 1.19 mg/dL (H)).      Results from last 7 days   Lab Units 10/13/19  0636 10/12/19  0631 10/11/19  2039 10/11/19  0501 10/10/19  0419 10/08/19  0541 10/07/19  0447   WBC 10*3/mm3 8.16 8.63 8.34 9.79 9.49 7.47 6.05   RBC 10*6/mm3 4.41 4.61 4.71 5.00 4.59 4.51 4.44   HEMOGLOBIN g/dL 11.4* 11.7* 12.4 12.7 12.0 11.8* 11.7*   HEMATOCRIT % 37.2 37.4 39.0 41.3 38.6 38.4 37.7   MCV fL 84.4 81.1 82.8 82.6 84.1 85.1 84.9   MCH pg 25.9* 25.4* 26.3* 25.4* 26.1* 26.2* 26.4*   MCHC g/dL 30.6* 31.3* 31.8 30.8* 31.1* 30.7* 31.0*   RDW % 14.3 14.2 14.2 14.3 14.3 14.3 14.0   RDW-SD fl 44.6 41.0 42.1 43.0 43.5 44.2 43.1   MPV fL 11.3 11.7 11.3 11.7 12.0 11.9 13.0*   PLATELETS 10*3/mm3 266 313 279 300 244 216 138*   NEUTROPHIL % % 64.9 71.6 64.7 65.4 64.6 66.2 64.4   LYMPHOCYTE % % 24.1 18.9* 25.1 24.4 23.8 21.0 20.8   MONOCYTES % % 7.1 6.4 6.2 6.0 7.4 9.1 10.2   EOSINOPHIL % % 2.9 2.0 3.0 3.2 3.1 2.8 3.3   BASOPHIL % % 0.6 0.6 0.5 0.6 0.6 0.5 0.5   IMM GRAN % % 0.4 0.5 0.5 0.4 0.5 0.4  --    NEUTROS ABS 10*3/mm3 5.29 6.19 5.40 6.40 6.13 4.94 3.89   LYMPHS ABS 10*3/mm3 1.97 1.63 2.09 2.39 2.26 1.57 1.26   MONOS ABS 10*3/mm3 0.58  0.55 0.52 0.59 0.70 0.68 0.62   EOS ABS 10*3/mm3 0.24 0.17 0.25 0.31 0.29 0.21 0.20   BASOS ABS 10*3/mm3 0.05 0.05 0.04 0.06 0.06 0.04 0.03   IMMATURE GRANS (ABS) 10*3/mm3 0.03 0.04 0.04 0.04 0.05 0.03  --    NRBC /100 WBC 0.0 0.0 0.0 0.0 0.0 0.0  --                              Microbiology Results (last 10 days)     Procedure Component Value - Date/Time    Urine Culture - Urine, Urine, Catheter In/Out [[id number]]  (Abnormal)  (Susceptibility) Collected:  10/06/19 2130    Lab Status:  Final result Specimen:  Urine, Catheter In/Out Updated:  10/09/19 1144     Urine Culture >100,000 CFU/mL Enterococcus faecium, VRE     Comment:   Vancomycin Resistant Enterococcus species. Patient may be an isolation risk.       Susceptibility      Enterococcus faecium, VRE     [place]     Ampicillin Resistant     Levofloxacin Resistant     Linezolid Susceptible     Nitrofurantoin Susceptible     Tetracycline Resistant     Vancomycin Resistant                    Respiratory Panel, PCR - Swab, Nasopharynx [[id number]]  (Normal) Collected:  10/04/19 1652    Lab Status:  Final result Specimen:  Swab from Nasopharynx Updated:  10/04/19 1853     ADENOVIRUS, PCR Not Detected     Coronavirus 229E Not Detected     Coronavirus HKU1 Not Detected     Coronavirus NL63 Not Detected     Coronavirus OC43 Not Detected     Human Metapneumovirus Not Detected     Human Rhinovirus/Enterovirus Not Detected     Influenza B PCR Not Detected     Parainfluenza Virus 1 Not Detected     Parainfluenza Virus 2 Not Detected     Parainfluenza Virus 3 Not Detected     Parainfluenza Virus 4 Not Detected     Bordetella pertussis pcr Not Detected     Influenza A H1 2009 PCR Not Detected     Chlamydophila pneumoniae PCR Not Detected     Mycoplasma pneumo by PCR Not Detected     Influenza A PCR Not Detected     Influenza A H3 Not Detected     Influenza A H1 Not Detected     RSV, PCR Not Detected     Bordetella parapertussis PCR Not Detected    MRSA Screen, PCR - Swab, Nares  [[id number]]  (Normal) Collected:  10/04/19 1652    Lab Status:  Final result Specimen:  Swab from Nares Updated:  10/04/19 1853     MRSA PCR No MRSA Detected    Blood Culture - Blood, Arm, Right [[id number]] Collected:  10/04/19 0625    Lab Status:  Final result Specimen:  Blood from Arm, Right Updated:  10/09/19 0745     Blood Culture No growth at 5 days    Blood Culture - Blood, Arm, Left [[id number]]  (Abnormal)  (Susceptibility) Collected:  10/04/19 0624    Lab Status:  Final result Specimen:  Blood from Arm, Left Updated:  10/06/19 0621     Blood Culture Escherichia coli ESBL     Comment:   Consider infectious disease consult.  Susceptibility results may not correlate to clinical outcomes.  For ESBL-producing infections in the blood, a carbapenem is recommended as first-line therapy for optimal clinical outcomes.        Isolated from Aerobic and Anaerobic Bottles     Gram Stain Aerobic Bottle Gram negative bacilli      Anaerobic Bottle Gram negative bacilli    Susceptibility      Escherichia coli ESBL     AFRCIA     Ertapenem Susceptible     Meropenem Susceptible                    Blood Culture ID, PCR - Blood, Arm, Left [[id number]]  (Abnormal) Collected:  10/04/19 0624    Lab Status:  Final result Specimen:  Blood from Arm, Left Updated:  10/05/19 0048     BCID, PCR Escherichia coli. Identification by BCID PCR.                amLODIPine 5 mg Oral Q24H   busPIRone 5 mg Oral TID   clotrimazole-betamethasone  Topical Q12H   ertapenem 1 g Intravenous Q24H   escitalopram 20 mg Oral Daily   fluconazole 200 mg Intravenous Daily   gabapentin 300 mg Oral TID   influenza vaccine 0.5 mL Intramuscular Once   Linezolid 600 mg Intravenous Q12H   metoprolol succinate XL 25 mg Oral Daily   pramipexole 0.25 mg Oral Nightly   sodium chloride 10 mL Intravenous Q12H   thiamine 100 mg Oral Daily       lactated ringers 9 mL/hr Last Rate: 9 mL/hr (10/11/19 1648)   sodium chloride 75 mL/hr Last Rate: 75 mL/hr (10/11/19 2302)        Diagnostics:  Xr Chest 2 View    Result Date: 10/4/2019  TWO VIEWS CHEST  HISTORY: Sepsis.  COMPARISON: 09/21/2019.  A tracheostomy tube is in place in satisfactory position. The patient is rotated slightly to the right. There is mild cardiomegaly. The pulmonary interstitium is prominent. This is more prominent as compared to 09/21/2019. There is no evidence of consolidation or effusion.      Increasing pulmonary interstitial and vascular prominence, particularly in perihilar regions increased versus 09/21/2019. No evidence of consolidation or effusion.  This report was finalized on 10/4/2019 8:46 AM by Dr. Jonathan Bianchi M.D.      Xr Chest 1 View    Result Date: 9/21/2019  PORTABLE CHEST X-RAY  CLINICAL HISTORY: SOA Triage Protocol  COMPARISON: 07/10/2019.  FINDINGS: Portable AP view of the chest was obtained with overlying monitor leads in place. A tracheostomy has been placed in the interim and terminates near the level of the thoracic inlet. Lungs are poorly aerated but clear. No edema or pleural fluid. Mild cardiomegaly and aortic ectasia.        Poor inspiration without active airspace disease     This report was finalized on 9/21/2019 11:06 PM by Andrei Beatty M.D.      Ct Abdomen Pelvis Stone Protocol    Result Date: 10/11/2019  CT ABDOMEN AND PELVIS WITHOUT CONTRAST  HISTORY: Follow-up renal stone.  TECHNIQUE: Axial CT images of the abdomen and pelvis were obtained without administration of intravenous contrast. The patient was not given oral contrast. Coronal and sagittal reformats were obtained.  COMPARISON: 10/08/2019.  FINDINGS: There is an unchanged distal left ureteric calculus, best seen on image 123. This is causing mild left-sided pelvocaliectasis, left renal edema and perinephric stranding. A low-density lesion is again identified within the medial cortex of the upper and midpole of the left kidney that likely represents a cyst. Bilateral adrenal glands are normal. Cortical scarring is seen  within the upper pole of the right kidney. No renal calculi or hydronephrosis on the right. The urinary bladder is partially distended and normal.  Noncontrast attenuation of the liver is normal. The gallbladder, spleen and the pancreas is normal. Bilateral adrenal glands are normal. Small retroperitoneal lymph nodes are favored to be reactive. The uterus is anteverted and normal. No abnormal adnexal mass is seen. The small and large bowel loops demonstrate normal caliber. The patient has a percutaneous gastrostomy tube in place. No ascites is seen. Ectasia of the celiac axis at its bifurcation where it measures up to 1.2 cm. Moderate calcified atherosclerotic plaque is seen within the abdominal aorta and its branches.      1. Stable exam. There is a 5 mm distal ureteric calculus with mild left pelvocaliectasis and renal edema. 2. Additional findings as above.  Radiation dose reduction techniques were utilized, including automated exposure control and exposure modulation based on body size.       Ct Abdomen Pelvis Stone Protocol    Result Date: 10/10/2019  CT ABDOMEN AND PELVIS WITHOUT CONTRAST  HISTORY: Abdominal pain and anemia.  TECHNIQUE: Axial CT images of the abdomen and pelvis were obtained without administration of intravenous contrast. The patient was not given oral contrast. Coronal and sagittal reformats were obtained.  COMPARISON: 03/26/2014  FINDINGS: The noncontrast attenuation of the liver is normal. No focal hepatic lesion or intrahepatic biliary dilatation is seen. The gallbladder, spleen and the pancreas is normal. The patient has a percutaneous gastrostomy tube in place. Bilateral adrenal glands are normal. Both kidneys are normal in size and attenuation. Low-density area seen medially within the left kidney, image 42 is favored to represent a simple cyst. There is mild left renal edema and mild dilatation of the renal collecting system.. There is an irregular 5-6 mm calculus seen within the left  distal ureter just proximal to the UV junction. No renal calculi on the right. The right ureter demonstrates normal caliber. The urinary bladder is otherwise unremarkable. The uterus is anteverted and normal. The lung bases demonstrate mild bibasilar atelectasis, emphysema and scarring. Subcentimeter retroperitoneal lymph nodes are present. Index left paraaortic node measures up to 9 mm in short axis dimension and is favored to be reactive. Moderate atherosclerotic change is seen within the abdominal aorta and its branches. There is dilatation of the celiac at the level of its bifurcation where it measures up to 1.1 cm. Unchanged from prior imaging.      1. 5 mm left distal ureteric calculus with mild left hydronephrosis and left renal edema. 2. Additional findings as above.  Radiation dose reduction techniques were utilized, including automated exposure control and exposure modulation based on body size.  This report was finalized on 10/10/2019 10:08 AM by Dr. Richard Ceballos M.D.      Fl Retrograde Pyelogram In Or    Result Date: 10/11/2019  FL RETROGRADE PYELOGRAM IN OR-  INDICATIONS: Urolithiasis. Left ureteral stent placement  TECHNIQUE: FLUOROSCOPIC ASSISTANCE IN THE OPERATING ROOM.  FINDINGS:  5 intraoperative fluoroscopic spot views were obtained and recorded the PACS for review, revealing left ureteral stent placement. Please see operative report for full details.  Fluoroscopy time: 24 seconds       As described.  This report was finalized on 10/11/2019 6:23 PM by Dr. Eligio Dewitt M.D.      Fl Video Swallow    Result Date: 10/8/2019  VIDEO SWALLOWING EXAMINATION BY SPEECH PATHOLOGY  Clinical: Dysphasia  Video swallowing examination performed under the direction of speech pathology. Imaging reviewed by radiologist who concurs with the findings.  Speech pathology summary:   Patient presents with moderate oropharyngeal dysphagia characterized by poor bolus control, swallow delay, swallow mistiming, and  impaired protective reaction to aspiration. Silent penetration with honey and thins. Silent aspiration with nectar. No penetration with puree, mechanical soft, or regular.  FLUOROSCOPY TIME: 2 minutes 53 seconds, 5064 images.  This report was finalized on 10/8/2019 11:03 AM by Dr. Jayson Godwin M.D.      Xr Chest Post Cva Port    Result Date: 10/11/2019  XR CHEST POST CVA PORT-  INDICATIONS: Check Shiley  TECHNIQUE: Frontal view of the chest  COMPARISON: 10/04/2019  FINDINGS:  Tracheostomy tube tip is 4.5 cm above the aguila. The heart size is borderline. Mild prominence of vascular and interstitial markings. Aorta is tortuous. No focal pulmonary consolidation, pleural effusion, or pneumothorax. No acute osseous process.       As described.  This report was finalized on 10/11/2019 7:11 PM by Dr. Eligio Dewitt M.D.      Results for orders placed during the hospital encounter of 09/21/19   Adult Transthoracic Echo Complete W/ Cont if Necessary Per Protocol    Narrative · Left ventricular systolic function is normal. Calculated EF = 64%.   Estimated EF was in disagreement with the calculated EF. Estimated EF =   55%. Normal left ventricular cavity size and wall thickness noted. All   left ventricular wall segments contract normally. Left ventricular   diastolic function is normal.              Active Hospital Problems    Diagnosis  POA   • **Healthcare associated bacterial pneumonia [J15.9]  Yes   • Ureteral stone with hydronephrosis [N13.2]  Unknown   • UTI (urinary tract infection) [N39.0]  Unknown   • Infection due to ESBL-producing Escherichia coli [A49.8, Z16.12]  Unknown   • Sepsis due to Gram negative bacteria (CMS/[facility]) [A41.50]  Unknown   • Dyspnea [R06.00]  Yes   • Tracheostomy present (CMS/[facility]) [Z93.0]  Not Applicable   • Essential hypertension [I10]  Yes   • Dysphagia [R13.10]  Yes   • COPD (chronic obstructive pulmonary disease) (CMS/[facility]) [J44.9]  Yes   • Respiratory failure, chronic (CMS/[facility])  [J96.10]  Yes   • PEG (percutaneous endoscopic gastrostomy) status (CMS/[facility]) [Z93.1]  Not Applicable   • Chronic diastolic CHF (congestive heart failure) (CMS/[facility]) [I50.32]  Yes   • Peripheral artery disease (CMS/[facility]) [I73.9]  Yes   • Stage 3 chronic kidney disease (CMS/[facility]) [N18.3]  Yes      Resolved Hospital Problems   No resolved problems to display.         Assessment/Plan     1. Pneumonia probable aspiration ID following and managing antibiotics follow-up chest x-ray in 4 to 6 weeks to ensure clearing.  2. Chronic tracheostomy seems to be doing well I did discuss with her when she is not talking to go ahead and take her Pass[place]ion[place] valve off so that she can cough and clear secretions better  3. Ureterolithiasis with obstruction status post cystoscopy lithotripsy and stent  4. Chronic respiratory failure  5. COPD continue bronchodilators  6. chronic kidney disease stage III  7. E. coli, ESBL positive, bacteremia antibiotics per ID  8. Enterococcus UTI is just colonization ID favors colonization will follow their lead  9. oral pharyngeal dysphasia    Plan for disposition:    Dany Baez MD  10/13/19  3:17 PM    Time:

## 2019-10-13 NOTE — PLAN OF CARE
Problem: Patient Care Overview  Goal: Plan of Care Review  Outcome: Ongoing (interventions implemented as appropriate)   10/13/19 0541   Plan of Care Review   Progress improving   OTHER   Outcome Summary Plan is home with HH. PICC attempt unsuccessful. Will need IR referral for PICC placement unless MD ok with midline at home. Slept well throughout the night. Clearing secretions well with independent cough. VSS   Coping/Psychosocial   Plan of Care Reviewed With patient       Problem: Pneumonia (Adult)  Goal: Signs and Symptoms of Listed Potential Problems Will be Absent, Minimized or Managed (Pneumonia)  Outcome: Ongoing (interventions implemented as appropriate)

## 2019-10-13 NOTE — NURSING NOTE
During zyvox infusion, this RN checked IV site and infiltration noted to right wrist. Gaby, pharmacist, advised to pull IV and elevate extremity. No heat or ice indicated. Infiltrate site marked. No erythema. No tenderness.

## 2019-10-13 NOTE — PROGRESS NOTES
"  Infectious Diseases Progress Note    Rafa Fowler MD     [facility]  [place]: 9 days  Patient Identification:  Name: Swetha Luis  Age: 59 y.o.  Sex: female  :  1960  MRN: [id number]         Primary Care Physician: Provider, No Known            Subjective: Feeling better denies any specific complaints.  Interval History: Consultation note.  Underwent  intervention that involve cystoscopy stone extraction and rigid stent placement in the left ureter on 10/11/2019.    Objective:    Scheduled Meds:    amLODIPine 5 mg Oral Q24H   busPIRone 5 mg Oral TID   clotrimazole-betamethasone  Topical Q12H   ertapenem 1 g Intravenous Q24H   escitalopram 20 mg Oral Daily   fluconazole 200 mg Intravenous Daily   gabapentin 300 mg Oral TID   influenza vaccine 0.5 mL Intramuscular Once   Linezolid 600 mg Intravenous Q12H   metoprolol succinate XL 25 mg Oral Daily   pramipexole 0.25 mg Oral Nightly   sodium chloride 10 mL Intravenous Q12H   thiamine 100 mg Oral Daily     Continuous Infusions:    lactated ringers 9 mL/hr Last Rate: 9 mL/hr (10/11/19 1648)   sodium chloride 75 mL/hr Last Rate: 75 mL/hr (10/11/19 2302)       Vital signs in last 24 hours:  Temp:  [97.2 °F (36.2 °C)-98.2 °F (36.8 °C)] 97.4 °F (36.3 °C)  Heart Rate:  [86] 86  Resp:  [14] 14  BP: (115-132)/(75-95) 132/95    Intake/Output:    Intake/Output Summary (Last 24 hours) at 10/13/2019 0737  Last data filed at 10/12/2019 2032  Gross per 24 hour   Intake 1200 ml   Output --   Net 1200 ml       Exam:  /95 (BP Location: Right arm, Patient Position: Lying)   Pulse 86   Temp 97.4 °F (36.3 °C) (Oral)   Resp 14   Ht 167.6 cm (65.98\")   Wt 89.8 kg (197 lb 15.6 oz)   SpO2 97%   BMI 31.97 kg/m²     General Appearance:    Alert, cooperative, no distress, AAOx3                          Head:    Normocephalic, without obvious abnormality, atraumatic                           Eyes:    PERRL, conjunctivae/corneas clear, EOM's intact, both eyes       "                   Throat:   Lips, tongue, gums normal; oral mucosa pink and moist                           Neck: Trach in place                         Lungs:    Clear to auscultation bilaterally, respirations unlabored                 Chest Wall:    No tenderness or deformity                          Heart:    Regular rate and rhythm, S1 and S2 normal                  Abdomen:   PEG in place                 Extremities:   Extremities normal, atraumatic, no cyanosis or edema                        Pulses:   Pulses palpable in all extremities                            Skin:   Skin is warm and dry,  no rashes or palpable lesions                  Neurologic: No new neurological deficits noted       Data Review:    I reviewed the patient's new clinical results.  Results from last 7 days   Lab Units 10/13/19  0636 10/12/19  0631 10/11/19  2039 10/11/19  0501 10/10/19  0419 10/08/19  0541 10/07/19  0447   WBC 10*3/mm3 8.16 8.63 8.34 9.79 9.49 7.47 6.05   HEMOGLOBIN g/dL 11.4* 11.7* 12.4 12.7 12.0 11.8* 11.7*   PLATELETS 10*3/mm3 266 313 279 300 244 216 138*     Results from last 7 days   Lab Units 10/12/19  0631 10/11/19  2039 10/11/19  0501 10/10/19  0419 10/08/19  0541 10/07/19  0447   SODIUM mmol/L 141 141 141 138 141 140   POTASSIUM mmol/L 4.3 4.3 4.3 4.1 4.1 4.1   CHLORIDE mmol/L 104 102 102 100 101 102   CO2 mmol/L 25.0 27.1 30.2* 25.6 29.8* 26.3   BUN mg/dL 17 17 20 19 15 15   CREATININE mg/dL 1.07* 1.05* 1.13* 1.00 0.95 1.01*   CALCIUM mg/dL 9.2 9.2 9.6 9.4 9.3 8.8   GLUCOSE mg/dL 81 77 88 78 83 107*     Microbiology Results (last 10 days)     Procedure Component Value - Date/Time    Urine Culture - Urine, Urine, Catheter In/Out [[id number]]  (Abnormal)  (Susceptibility) Collected:  10/06/19 2130    Lab Status:  Final result Specimen:  Urine, Catheter In/Out Updated:  10/09/19 1144     Urine Culture >100,000 CFU/mL Enterococcus faecium, VRE     Comment:   Vancomycin Resistant Enterococcus species. Patient may be  an isolation risk.       Susceptibility      Enterococcus faecium, VRE     [place]     Ampicillin Resistant     Levofloxacin Resistant     Linezolid Susceptible     Nitrofurantoin Susceptible     Tetracycline Resistant     Vancomycin Resistant                    Respiratory Panel, PCR - Swab, Nasopharynx [[id number]]  (Normal) Collected:  10/04/19 1652    Lab Status:  Final result Specimen:  Swab from Nasopharynx Updated:  10/04/19 1853     ADENOVIRUS, PCR Not Detected     Coronavirus 229E Not Detected     Coronavirus HKU1 Not Detected     Coronavirus NL63 Not Detected     Coronavirus OC43 Not Detected     Human Metapneumovirus Not Detected     Human Rhinovirus/Enterovirus Not Detected     Influenza B PCR Not Detected     Parainfluenza Virus 1 Not Detected     Parainfluenza Virus 2 Not Detected     Parainfluenza Virus 3 Not Detected     Parainfluenza Virus 4 Not Detected     Bordetella pertussis pcr Not Detected     Influenza A H1 2009 PCR Not Detected     Chlamydophila pneumoniae PCR Not Detected     Mycoplasma pneumo by PCR Not Detected     Influenza A PCR Not Detected     Influenza A H3 Not Detected     Influenza A H1 Not Detected     RSV, PCR Not Detected     Bordetella parapertussis PCR Not Detected    MRSA Screen, PCR - Swab, Nares [[id number]]  (Normal) Collected:  10/04/19 1652    Lab Status:  Final result Specimen:  Swab from Nares Updated:  10/04/19 1853     MRSA PCR No MRSA Detected    Blood Culture - Blood, Arm, Right [[id number]] Collected:  10/04/19 0625    Lab Status:  Final result Specimen:  Blood from Arm, Right Updated:  10/09/19 0745     Blood Culture No growth at 5 days    Blood Culture - Blood, Arm, Left [[id number]]  (Abnormal)  (Susceptibility) Collected:  10/04/19 0624    Lab Status:  Final result Specimen:  Blood from Arm, Left Updated:  10/06/19 0621     Blood Culture Escherichia coli ESBL     Comment:   Consider infectious disease consult.  Susceptibility results may not correlate to clinical  outcomes.  For ESBL-producing infections in the blood, a carbapenem is recommended as first-line therapy for optimal clinical outcomes.        Isolated from Aerobic and Anaerobic Bottles     Gram Stain Aerobic Bottle Gram negative bacilli      Anaerobic Bottle Gram negative bacilli    Susceptibility      Escherichia coli ESBL     [place]     Ertapenem Susceptible     Meropenem Susceptible                    Blood Culture ID, PCR - Blood, Arm, Left [[id number]]  (Abnormal) Collected:  10/04/19 0624    Lab Status:  Final result Specimen:  Blood from Arm, Left Updated:  10/05/19 0048     BCID, PCR Escherichia coli. Identification by BCID PCR.      Xr Chest 2 View    Result Date: 10/4/2019  Increasing pulmonary interstitial and vascular prominence, particularly in perihilar regions increased versus 09/21/2019. No evidence of consolidation or effusion.  This report was finalized on 10/4/2019 8:46 AM by Dr. Jonathan Bianchi M.D.      Xr Chest 1 View    Result Date: 9/21/2019  Poor inspiration without active airspace disease     This report was finalized on 9/21/2019 11:06 PM by Andrei Beatty M.D.      Ct Abdomen Pelvis Stone Protocol    Result Date: 10/11/2019  1. Stable exam. There is a 5 mm distal ureteric calculus with mild left pelvocaliectasis and renal edema. 2. Additional findings as above.  Radiation dose reduction techniques were utilized, including automated exposure control and exposure modulation based on body size.       Ct Abdomen Pelvis Stone Protocol    Result Date: 10/10/2019  1. 5 mm left distal ureteric calculus with mild left hydronephrosis and left renal edema. 2. Additional findings as above.  Radiation dose reduction techniques were utilized, including automated exposure control and exposure modulation based on body size.  This report was finalized on 10/10/2019 10:08 AM by Dr. Richard Ceballos M.D.      Fl Retrograde Pyelogram In Or    Result Date: 10/11/2019   As described.  This report was finalized on  10/11/2019 6:23 PM by Dr. Eligio Dewitt M.D.      Xr Chest Post Cva Port    Result Date: 10/11/2019   As described.  This report was finalized on 10/11/2019 7:11 PM by Dr. Eligio Dewitt M.D.            Assessment:  1-ESBL positive E. coli sepsis with bacteremia likely due to  2-urinary tract infection  3-recurrent shortness of breath due to male handling of secretions due to chronic tracheostomy and noncompliance to diet  4-history of chronic respiratory failure and dysphagia with recurrent aspiration status post tracheostomy and PEG tube placement - aspiration pneumonia  5-history of COPD with recurrent exacerbation  6-history of seizure disorder and subarachnoid hemorrhage and immobilization syndrome  7-VRE on repeat urine culture after being on ertapenem with continued improvement argues against this pathogen being a true culprit -likely a colonizer.        Recommendations/Discussions:   · She has improved clinically in terms of resolution of sepsis on current antibiotic therapy.    · Given her presentation and overall work-up the likely source of gram-negative bacteremia i.e. E. coli is probably  tract.  · Would need 2 weeks of IV ertapenem from last negative blood culture.  If establishment of IV access is difficult then remaining course of ertapenem can be administered as IM injection.  · CT scan reviewed and intervention per  service noted  · Would not recommend any specific treatment forVRE in the urine as this probably colonizer in the context of systemic sepsis caused by E. coli with bacteremia.      Rafa Fowler MD  10/13/2019  7:37 AM    Much of this encounter note is an electronic transcription/translation of spoken language to printed text. The electronic translation of spoken language may permit erroneous, or at times, nonsensical words or phrases to be inadvertently transcribed; Although I have reviewed the note for such errors, some may still exist

## 2019-10-13 NOTE — PROGRESS NOTES
"DAILY PROGRESS NOTE  [facility]    Patient Identification:  Name: Swetha Luis  Age: 59 y.o.  Sex: female  :  1960  MRN: [id number]         Primary Care Physician: Provider, No Known    Subjective:  Interval History: She complains of cough and shortness of air.  Better today.    Objective:    Scheduled Meds:    amLODIPine 5 mg Oral Q24H   busPIRone 5 mg Oral TID   clotrimazole-betamethasone  Topical Q12H   ertapenem 1 g Intravenous Q24H   escitalopram 20 mg Oral Daily   fluconazole 200 mg Intravenous Daily   gabapentin 300 mg Oral TID   influenza vaccine 0.5 mL Intramuscular Once   Linezolid 600 mg Intravenous Q12H   metoprolol succinate XL 25 mg Oral Daily   pramipexole 0.25 mg Oral Nightly   sodium chloride 10 mL Intravenous Q12H   thiamine 100 mg Oral Daily     Continuous Infusions:    lactated ringers 9 mL/hr Last Rate: 9 mL/hr (10/11/19 1648)   sodium chloride 75 mL/hr Last Rate: 75 mL/hr (10/11/19 2302)       Vital signs in last 24 hours:  Temp:  [97.2 °F (36.2 °C)-97.7 °F (36.5 °C)] 97.7 °F (36.5 °C)  Heart Rate:  [89] 89  Resp:  [14] 14  BP: (115-132)/(75-95) 132/83    Intake/Output:    Intake/Output Summary (Last 24 hours) at 10/13/2019 1517  Last data filed at 10/13/2019 1242  Gross per 24 hour   Intake 896 ml   Output --   Net 896 ml       Exam:  /83 (BP Location: Right arm, Patient Position: Lying)   Pulse 89   Temp 97.7 °F (36.5 °C) (Oral)   Resp 14   Ht 167.6 cm (65.98\")   Wt 89.8 kg (197 lb 15.6 oz)   SpO2 98%   BMI 31.97 kg/m²     General Appearance:    Alert, cooperative, no distress   Head:    Normocephalic, without obvious abnormality, atraumatic   Eyes:       Throat:   Lips, tongue, gums normal   Neck:   Supple, symmetrical, trachea midline, no JVD, tracheostomy in place   Lungs:    Rhonchi and wheezes bilaterally, respirations unlabored   Chest Wall:    No tenderness or deformity    Heart:    Regular rate and rhythm, S1 and S2 normal, no murmur,no  Rub or " gallop   Abdomen:     Soft, non-tender, bowel sounds active, no masses, no organomegaly    Extremities:   Extremities normal, atraumatic, no cyanosis or edema   Pulses:      Skin:   Skin is warm and dry,  no rashes or palpable lesions   Neurologic:   no focal deficits noted      Lab Results (last 72 hours)     Procedure Component Value Units Date/Time    CBC Auto Differential [[id number]]  (Abnormal) Collected:  10/05/19 0520    Specimen:  Blood Updated:  10/05/19 0810     WBC 9.46 10*3/mm3      RBC 4.79 10*6/mm3      Hemoglobin 12.7 g/dL      Hematocrit 40.8 %      MCV 85.2 fL      MCH 26.5 pg      MCHC 31.1 g/dL      RDW 14.1 %      RDW-SD 44.2 fl      MPV 13.2 fL      Platelets 134 10*3/mm3     Manual Differential [[id number]]  (Abnormal) Collected:  10/05/19 0520    Specimen:  Blood Updated:  10/05/19 0810     Neutrophil % 86.0 %      Lymphocyte % 8.0 %      Monocyte % 4.0 %      Eosinophil % 1.0 %      Atypical Lymphocyte % 1.0 %      Neutrophils Absolute 8.14 10*3/mm3      Lymphocytes Absolute 0.76 10*3/mm3      Monocytes Absolute 0.38 10*3/mm3      Eosinophils Absolute 0.09 10*3/mm3      Anisocytosis Slight/1+     Cardinal Cells Mod/2+     Microcytes Slight/1+     Ovalocytes Mod/2+     Poikilocytes Mod/2+     Polychromasia Mod/2+     WBC Morphology Normal     Giant Platelets Slight/1+    Blood Culture - Blood, Arm, Right [[id number]] Collected:  10/04/19 0625    Specimen:  Blood from Arm, Right Updated:  10/05/19 0800     Blood Culture No growth at 24 hours    Basic Metabolic Panel [[id number]]  (Abnormal) Collected:  10/05/19 0520    Specimen:  Blood Updated:  10/05/19 0735     Glucose 53 mg/dL      BUN 13 mg/dL      Creatinine 1.03 mg/dL      Sodium 134 mmol/L      Potassium 4.0 mmol/L      Chloride 96 mmol/L      CO2 21.1 mmol/L      Calcium 8.8 mg/dL      eGFR Non African Amer 55 mL/min/1.73      BUN/Creatinine Ratio 12.6     Anion Gap 16.9 mmol/L     Narrative:       GFR Normal >60  Chronic Kidney Disease  <60  Kidney Failure <15    Blood Culture - Blood, Arm, Left [[id number]]  (Abnormal) Collected:  10/04/19 0624    Specimen:  Blood from Arm, Left Updated:  10/05/19 0619     Blood Culture Escherichia coli     Isolated from Aerobic and Anaerobic Bottles     Gram Stain Aerobic Bottle Gram negative bacilli      Anaerobic Bottle Gram negative bacilli    Blood Culture ID, PCR - Blood, Arm, Left [[id number]]  (Abnormal) Collected:  10/04/19 0624    Specimen:  Blood from Arm, Left Updated:  10/05/19 0048     BCID, PCR Escherichia coli. Identification by BCID PCR.    MRSA Screen, PCR - Swab, Nares [[id number]]  (Normal) Collected:  10/04/19 1652    Specimen:  Swab from Nares Updated:  10/04/19 1853     MRSA PCR No MRSA Detected    Respiratory Panel, PCR - Swab, Nasopharynx [[id number]]  (Normal) Collected:  10/04/19 1652    Specimen:  Swab from Nasopharynx Updated:  10/04/19 1853     ADENOVIRUS, PCR Not Detected     Coronavirus 229E Not Detected     Coronavirus HKU1 Not Detected     Coronavirus NL63 Not Detected     Coronavirus OC43 Not Detected     Human Metapneumovirus Not Detected     Human Rhinovirus/Enterovirus Not Detected     Influenza B PCR Not Detected     Parainfluenza Virus 1 Not Detected     Parainfluenza Virus 2 Not Detected     Parainfluenza Virus 3 Not Detected     Parainfluenza Virus 4 Not Detected     Bordetella pertussis pcr Not Detected     Influenza A H1 2009 PCR Not Detected     Chlamydophila pneumoniae PCR Not Detected     Mycoplasma pneumo by PCR Not Detected     Influenza A PCR Not Detected     Influenza A H3 Not Detected     Influenza A H1 Not Detected     RSV, PCR Not Detected     Bordetella parapertussis PCR Not Detected    Comprehensive Metabolic Panel [[id number]]  (Abnormal) Collected:  10/04/19 0623    Specimen:  Blood Updated:  10/04/19 0818     Glucose 138 mg/dL      BUN 19 mg/dL      Creatinine 1.21 mg/dL      Sodium 142 mmol/L      Potassium 3.9 mmol/L      Chloride 103 mmol/L      CO2 27.9  mmol/L      Calcium 8.8 mg/dL      Total Protein 6.9 g/dL      Albumin 3.80 g/dL      ALT (SGPT) 136 U/L      AST (SGOT) 71 U/L      Alkaline Phosphatase 194 U/L      Total Bilirubin 0.3 mg/dL      eGFR Non African Amer 46 mL/min/1.73      Globulin 3.1 gm/dL      A/G Ratio 1.2 g/dL      BUN/Creatinine Ratio 15.7     Anion Gap 11.1 mmol/L     Narrative:       GFR Normal >60  Chronic Kidney Disease <60  Kidney Failure <15    Lactic Acid, Plasma [[id number]]  (Normal) Collected:  10/04/19 0623    Specimen:  Blood Updated:  10/04/19 0802     Lactate 1.4 mmol/L     [place] Draw [[id number]] Collected:  10/04/19 0623    Specimen:  Blood Updated:  10/04/19 0800    Narrative:       The following orders were created for panel order [place] Draw.  Procedure                               Abnormality         Status                     ---------                               -----------         ------                     Light Blue Top[[id number]]                                   Final result               Green Top (Gel)[[id number]]                                  Final result               Lavender Top[[id number]]                                     Final result               Gold Top - SST[[id number]]                                   Final result                 Please view results for these tests on the individual orders.    Light Blue Top [[id number]] Collected:  10/04/19 0623    Specimen:  Blood Updated:  10/04/19 0800     Extra Tube hold for add-on     Comment: Auto resulted       Green Top (Gel) [[id number]] Collected:  10/04/19 0623    Specimen:  Blood Updated:  10/04/19 0800     Extra Tube Hold for add-ons.     Comment: Auto resulted.       Lavender Top [[id number]] Collected:  10/04/19 0623    Specimen:  Blood Updated:  10/04/19 0800     Extra Tube hold for add-on     Comment: Auto resulted       Gold Top - SST [[id number]] Collected:  10/04/19 0623    Specimen:  Blood Updated:  10/04/19 0800     Extra Tube Hold for add-ons.      Comment: Auto resulted.       CBC & Differential [[id number]] Collected:  10/04/19 0623    Specimen:  Blood Updated:  10/04/19 0756    Narrative:       The following orders were created for panel order CBC & Differential.  Procedure                               Abnormality         Status                     ---------                               -----------         ------                     CBC Auto Differential[[id number]]        Abnormal            Final result                 Please view results for these tests on the individual orders.    CBC Auto Differential [[id number]]  (Abnormal) Collected:  10/04/19 0623    Specimen:  Blood Updated:  10/04/19 0756     WBC 12.58 10*3/mm3      RBC 4.60 10*6/mm3      Hemoglobin 12.0 g/dL      Hematocrit 39.1 %      MCV 85.0 fL      MCH 26.1 pg      MCHC 30.7 g/dL      RDW 14.1 %      RDW-SD 44.0 fl      MPV 12.5 fL      Platelets 187 10*3/mm3      Neutrophil % 82.7 %      Lymphocyte % 6.8 %      Monocyte % 8.7 %      Eosinophil % 0.8 %      Basophil % 0.3 %      Immature Grans % 0.7 %      Neutrophils, Absolute 10.40 10*3/mm3      Lymphocytes, Absolute 0.85 10*3/mm3      Monocytes, Absolute 1.10 10*3/mm3      Eosinophils, Absolute 0.10 10*3/mm3      Basophils, Absolute 0.04 10*3/mm3      Immature Grans, Absolute 0.09 10*3/mm3      nRBC 0.0 /100 WBC         Data Review:  Results from last 7 days   Lab Units 10/13/19  0636 10/12/19  0631 10/11/19  2039   SODIUM mmol/L 138 141 141   POTASSIUM mmol/L 4.5 4.3 4.3   CHLORIDE mmol/L 102 104 102   CO2 mmol/L 27.1 25.0 27.1   BUN mg/dL 23* 17 17   CREATININE mg/dL 1.19* 1.07* 1.05*   GLUCOSE mg/dL 80 81 77   CALCIUM mg/dL 8.9 9.2 9.2     Results from last 7 days   Lab Units 10/13/19  0636 10/12/19  0631 10/11/19  2039   WBC 10*3/mm3 8.16 8.63 8.34   HEMOGLOBIN g/dL 11.4* 11.7* 12.4   HEMATOCRIT % 37.2 37.4 39.0   PLATELETS 10*3/mm3 266 313 279             Lab Results   Lab Value Date/Time    TROPONINT 0.045 (C) 09/22/2019 1211     TROPONINT 0.034 (C) 09/22/2019 0604    TROPONINT 0.031 (C) 09/22/2019 0130    TROPONINT 0.036 (C) 09/21/2019 2248    TROPONINT 0.050 (H) 12/29/2018 0409    TROPONINT 0.085 (H) 12/24/2018 1900    TROPONINT 0.100 (C) 12/24/2018 1203    TROPONINT <0.01 03/26/2014 1731               Invalid input(s): PROT, LABALBU          No results found for: POCGLU        Past Medical History:   Diagnosis Date   • Acute congestive heart failure (CMS/[facility]) 12/24/2018   • Acute osteomyelitis (CMS/HCC)     Right shoulder due to IVDA   • Acute renal failure on dialysis (CMS/HCC)    • Anxiety    • Pugh esophagus    • CKD (chronic kidney disease)    • COPD (chronic obstructive pulmonary disease) (CMS/HCC)    • MRSA infection    • Nontraumatic subarachnoid hemorrhage (CMS/HCC)    • Seizures (CMS/HCC)    • Tracheostomy present (CMS/HCC)        Assessment:  Active Hospital Problems    Diagnosis  POA   • **Healthcare associated bacterial pneumonia [J15.9]  Yes   • Ureteral stone with hydronephrosis [N13.2]  Unknown   • UTI (urinary tract infection) [N39.0]  Unknown   • Infection due to ESBL-producing Escherichia coli [A49.8, Z16.12]  Unknown   • Sepsis due to Gram negative bacteria (CMS/HCC) [A41.50]  Unknown   • Dyspnea [R06.00]  Yes   • Tracheostomy present (CMS/HCC) [Z93.0]  Not Applicable   • Essential hypertension [I10]  Yes   • Dysphagia [R13.10]  Yes   • COPD (chronic obstructive pulmonary disease) (CMS/HCC) [J44.9]  Yes   • Respiratory failure, chronic (CMS/HCC) [J96.10]  Yes   • PEG (percutaneous endoscopic gastrostomy) status (CMS/HCC) [Z93.1]  Not Applicable   • Chronic diastolic CHF (congestive heart failure) (CMS/HCC) [I50.32]  Yes   • Peripheral artery disease (CMS/HCC) [I73.9]  Yes   • Stage 3 chronic kidney disease (CMS/HCC) [N18.3]  Yes      Resolved Hospital Problems   No resolved problems to display.       Plan:  Continue with IV antibiotics and  Zyvox for VRE.  We will get some follow-up laboratory studies.  Continue with  bronchodilator treatments.  CT abdomen and pelvis results noted. ID consult noted.  urology consult noted. S/P Cysto lithotripsy and stent   Pulmonary  Seeing  for trach.  Get PICC line. May need IR to do it.   ???home with home health vs SNU for rehab.  True Moreno MD  10/13/2019  3:17 PM

## 2019-10-14 VITALS
DIASTOLIC BLOOD PRESSURE: 65 MMHG | BODY MASS INDEX: 31.82 KG/M2 | RESPIRATION RATE: 18 BRPM | HEIGHT: 66 IN | WEIGHT: 197.97 LBS | HEART RATE: 68 BPM | SYSTOLIC BLOOD PRESSURE: 105 MMHG | TEMPERATURE: 98.3 F | OXYGEN SATURATION: 100 %

## 2019-10-14 DIAGNOSIS — A49.8 INFECTION DUE TO ESBL-PRODUCING ESCHERICHIA COLI: Primary | ICD-10-CM

## 2019-10-14 DIAGNOSIS — Z16.12 INFECTION DUE TO ESBL-PRODUCING ESCHERICHIA COLI: Primary | ICD-10-CM

## 2019-10-14 LAB
ANION GAP SERPL CALCULATED.3IONS-SCNC: 13.9 MMOL/L (ref 5–15)
BASOPHILS # BLD AUTO: 0.06 10*3/MM3 (ref 0–0.2)
BASOPHILS NFR BLD AUTO: 0.8 % (ref 0–1.5)
BUN BLD-MCNC: 20 MG/DL (ref 6–20)
BUN/CREAT SERPL: 20 (ref 7–25)
CALCIUM SPEC-SCNC: 8.6 MG/DL (ref 8.6–10.5)
CHLORIDE SERPL-SCNC: 105 MMOL/L (ref 98–107)
CO2 SERPL-SCNC: 21.1 MMOL/L (ref 22–29)
CREAT BLD-MCNC: 1 MG/DL (ref 0.57–1)
DEPRECATED RDW RBC AUTO: 43.2 FL (ref 37–54)
EOSINOPHIL # BLD AUTO: 0.3 10*3/MM3 (ref 0–0.4)
EOSINOPHIL NFR BLD AUTO: 3.9 % (ref 0.3–6.2)
ERYTHROCYTE [DISTWIDTH] IN BLOOD BY AUTOMATED COUNT: 14 % (ref 12.3–15.4)
GFR SERPL CREATININE-BSD FRML MDRD: 57 ML/MIN/1.73
GLUCOSE BLD-MCNC: 86 MG/DL (ref 65–99)
HCT VFR BLD AUTO: 36.1 % (ref 34–46.6)
HGB BLD-MCNC: 11.3 G/DL (ref 12–15.9)
IMM GRANULOCYTES # BLD AUTO: 0.03 10*3/MM3 (ref 0–0.05)
IMM GRANULOCYTES NFR BLD AUTO: 0.4 % (ref 0–0.5)
LYMPHOCYTES # BLD AUTO: 2.22 10*3/MM3 (ref 0.7–3.1)
LYMPHOCYTES NFR BLD AUTO: 28.8 % (ref 19.6–45.3)
MCH RBC QN AUTO: 26.5 PG (ref 26.6–33)
MCHC RBC AUTO-ENTMCNC: 31.3 G/DL (ref 31.5–35.7)
MCV RBC AUTO: 84.7 FL (ref 79–97)
MONOCYTES # BLD AUTO: 0.58 10*3/MM3 (ref 0.1–0.9)
MONOCYTES NFR BLD AUTO: 7.5 % (ref 5–12)
NEUTROPHILS # BLD AUTO: 4.52 10*3/MM3 (ref 1.7–7)
NEUTROPHILS NFR BLD AUTO: 58.6 % (ref 42.7–76)
NRBC BLD AUTO-RTO: 0 /100 WBC (ref 0–0.2)
PLATELET # BLD AUTO: 276 10*3/MM3 (ref 140–450)
PMV BLD AUTO: 11.7 FL (ref 6–12)
POTASSIUM BLD-SCNC: 4.5 MMOL/L (ref 3.5–5.2)
RBC # BLD AUTO: 4.26 10*6/MM3 (ref 3.77–5.28)
SODIUM BLD-SCNC: 140 MMOL/L (ref 136–145)
WBC NRBC COR # BLD: 7.71 10*3/MM3 (ref 3.4–10.8)

## 2019-10-14 PROCEDURE — 25010000002 FLUCONAZOLE PER 200 MG: Performed by: UROLOGY

## 2019-10-14 PROCEDURE — 25010000002 ERTAPENEM PER 500 MG: Performed by: HOSPITALIST

## 2019-10-14 PROCEDURE — 25010000002 LINEZOLID 600 MG/300ML SOLUTION: Performed by: HOSPITALIST

## 2019-10-14 PROCEDURE — 94799 UNLISTED PULMONARY SVC/PX: CPT

## 2019-10-14 PROCEDURE — 80048 BASIC METABOLIC PNL TOTAL CA: CPT | Performed by: UROLOGY

## 2019-10-14 PROCEDURE — 97110 THERAPEUTIC EXERCISES: CPT

## 2019-10-14 PROCEDURE — 85025 COMPLETE CBC W/AUTO DIFF WBC: CPT | Performed by: UROLOGY

## 2019-10-14 RX ORDER — ERTAPENEM 1 G/1
1 INJECTION, POWDER, LYOPHILIZED, FOR SOLUTION INTRAMUSCULAR; INTRAVENOUS EVERY 24 HOURS
Status: CANCELLED | OUTPATIENT
Start: 2019-10-15

## 2019-10-14 RX ORDER — AMLODIPINE BESYLATE 5 MG/1
5 TABLET ORAL
Qty: 30 TABLET | Refills: 0 | Status: ON HOLD | OUTPATIENT
Start: 2019-10-15 | End: 2020-12-08 | Stop reason: SDDI

## 2019-10-14 RX ORDER — CLOTRIMAZOLE AND BETAMETHASONE DIPROPIONATE 10; .64 MG/G; MG/G
CREAM TOPICAL EVERY 12 HOURS SCHEDULED
Qty: 15 G | Refills: 0 | Status: SHIPPED | OUTPATIENT
Start: 2019-10-14

## 2019-10-14 RX ORDER — ERTAPENEM 1 G/1
1 INJECTION, POWDER, LYOPHILIZED, FOR SOLUTION INTRAMUSCULAR; INTRAVENOUS EVERY 24 HOURS
Status: ON HOLD
Start: 2019-10-14 | End: 2019-10-20

## 2019-10-14 RX ORDER — ACETAMINOPHEN 325 MG/1
650 TABLET ORAL EVERY 4 HOURS PRN
Start: 2019-10-14

## 2019-10-14 RX ADMIN — BUSPIRONE HYDROCHLORIDE 5 MG: 5 TABLET ORAL at 08:09

## 2019-10-14 RX ADMIN — ACETAMINOPHEN 650 MG: 325 TABLET, FILM COATED ORAL at 08:09

## 2019-10-14 RX ADMIN — Medication 100 MG: at 08:09

## 2019-10-14 RX ADMIN — FLUCONAZOLE IN SODIUM CHLORIDE 200 MG: 2 INJECTION, SOLUTION INTRAVENOUS at 08:21

## 2019-10-14 RX ADMIN — SODIUM CHLORIDE, PRESERVATIVE FREE 10 ML: 5 INJECTION INTRAVENOUS at 08:09

## 2019-10-14 RX ADMIN — LINEZOLID 600 MG: 600 INJECTION, SOLUTION INTRAVENOUS at 08:09

## 2019-10-14 RX ADMIN — SODIUM CHLORIDE 75 ML/HR: 9 INJECTION, SOLUTION INTRAVENOUS at 02:33

## 2019-10-14 RX ADMIN — AMLODIPINE BESYLATE 5 MG: 5 TABLET ORAL at 08:09

## 2019-10-14 RX ADMIN — ERTAPENEM SODIUM 1 G: 1 INJECTION, POWDER, LYOPHILIZED, FOR SOLUTION INTRAMUSCULAR; INTRAVENOUS at 11:18

## 2019-10-14 RX ADMIN — CLOTRIMAZOLE AND BETAMETHASONE DIPROPIONATE: 10; .5 CREAM TOPICAL at 08:10

## 2019-10-14 RX ADMIN — OXYCODONE HYDROCHLORIDE AND ACETAMINOPHEN 1 TABLET: 5; 325 TABLET ORAL at 01:25

## 2019-10-14 RX ADMIN — GABAPENTIN 300 MG: 300 CAPSULE ORAL at 08:10

## 2019-10-14 RX ADMIN — ESCITALOPRAM 20 MG: 20 TABLET, FILM COATED ORAL at 08:09

## 2019-10-14 RX ADMIN — METOPROLOL SUCCINATE 25 MG: 25 TABLET, FILM COATED, EXTENDED RELEASE ORAL at 08:09

## 2019-10-14 NOTE — DISCHARGE INSTR - OTHER ORDERS
You will need to come daily for the Next 3 days to ACU or Ambulatory Care Unit on the First Floor by Radiology for your injection of your antibiotic. Your appointment Times are below.

## 2019-10-14 NOTE — PLAN OF CARE
Problem: Patient Care Overview  Goal: Plan of Care Review  Outcome: Ongoing (interventions implemented as appropriate)   10/14/19 7159   Plan of Care Review   Progress improving   OTHER   Outcome Summary No signficant overnight events. Plans to d/c after decision made for route of IV medications at home. Plans for HH.    Coping/Psychosocial   Plan of Care Reviewed With patient       Problem: Pain, Chronic (Adult)  Goal: Acceptable Pain/Comfort Level and Functional Ability  Outcome: Ongoing (interventions implemented as appropriate)      Problem: Fall Risk (Adult)  Goal: Absence of Fall  Outcome: Ongoing (interventions implemented as appropriate)

## 2019-10-14 NOTE — DISCHARGE PLACEMENT REQUEST
"Neo Spencer (59 y.o. Female)     Date of Birth Social Security Number Address Home Phone MRN    1960  [id number]  [facility] [address] [phone] [id number]    Yarsani Marital Status          None        Admission Date Admission Type Admitting Provider Attending Provider Department, Room/Bed    10/4/19 Emergency True Moreno MD Jackson, Alan David, MD [address], [address]    Discharge Date Discharge Disposition Discharge Destination                       Attending Provider:  Lavon Robles MD    Allergies:  Cephalexin, Hydrocodone, Strawberry, Zithromax [Azithromycin]    Isolation:  Contact   Infection:  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), ESBL E coli (10/06/19), MRSA (12/19/18)   Code Status:  CPR    Ht:  167.6 cm (65.98\")   Wt:  89.8 kg (197 lb 15.6 oz)    Admission Cmt:  None   Principal Problem:  Healthcare associated bacterial pneumonia [J15.9]                 Active Insurance as of 10/4/2019     Primary Coverage     Payor Plan Insurance Group Employer/Plan Group    [place]      Payor Plan Address Payor Plan Phone Number Payor Plan Fax Number Effective Dates    [address] [phone]  9/21/2019 - None Entered    [place] [id number]       Subscriber Name Subscriber Birth Date Member ID       NEO SPENCER 1960 [id number]                 Emergency Contacts      (Rel.) Home Phone Work Phone Mobile Phone    MATT SPENCER (Spouse) [phone] -- --    Shazia Spencer (Daughter) [phone] -- [phone]    Sister, \"Hattie\" (Sister) [phone] -- [phone]    Monserrat Taylor (Sister) [phone] -- [phone]    Ken Spencer (Son) -- -- [phone]              "

## 2019-10-14 NOTE — PROGRESS NOTES
Continued Stay Note  [facility]     Patient Name: Swetha Luis  MRN: [id number]  Today's Date: 10/14/2019    Admit Date: 10/4/2019    Discharge Plan     Row Name 10/14/19 1635       Plan    Plan  Home with spouse and ACU appointments for IM Injection of Invanz x 3 days    Patient/Family in Agreement with Plan  yes    Plan Comments  Spoke with spouse at nurses station. He verbalized understanding and appointment times are on AVS. Bekah RN to provide dc papers. godfrey rodriguez/ccp    Row Name 10/14/19 8038       Plan    Plan  Home with spouse and ACU Setup for     Plan Comments  DC orders noted. CCP called faxed clinicals to ACU at [phone] for 3 days of IM injection of Invanz. CCP called schedule1 at [facility] and appointment time is   for tomorrow. Added information to AVS. Per JEMIMA Godfrey to drive pt home. They have refused HH. Godfrey rodriguez/ccp        Discharge Codes    No documentation.       Expected Discharge Date and Time     Expected Discharge Date Expected Discharge Time    Oct 14, 2019             Noelle Juarez, JEMIMA

## 2019-10-14 NOTE — DISCHARGE INSTR - APPOINTMENTS
Tomorrow, Tuesday October 15, 2019 at 12 arrive at 1145 am  At ambulatory care unit (ACU) Next to Radiology.     Wednesday, October 16, 2019  at 1pm arrive at 12:45      Thursday, October 17, 2019 at 11:30 arrive at 11:15.

## 2019-10-14 NOTE — DISCHARGE SUMMARY
NAME: Swetha Luis ADMIT: 10/4/2019   : 1960  PCP: Provider, No Known    MRN: [id number] LOS: 10 days   AGE/SEX: 59 y.o. female  ROOM: [facility]     Date of Admission:  10/4/2019  Date of Discharge:  10/14/2019    PCP: Provider, No Known    CHIEF COMPLAINT  Shortness of Breath      DISCHARGE DIAGNOSIS  Active Hospital Problems    Diagnosis  POA   • **Healthcare associated bacterial pneumonia [J15.9]  Yes   • Ureteral stone with hydronephrosis [N13.2]  Yes   • UTI (urinary tract infection) [N39.0]  Yes   • Infection due to ESBL-producing Escherichia coli [A49.8, Z16.12]  Yes   • Sepsis due to Gram negative bacteria (CMS/HCC) [A41.50]  Yes   • Dyspnea [R06.00]  Yes   • Tracheostomy present (CMS/HCC) [Z93.0]  Not Applicable   • Essential hypertension [I10]  Yes   • Dysphagia [R13.10]  Yes   • COPD (chronic obstructive pulmonary disease) (CMS/HCC) [J44.9]  Yes   • Respiratory failure, chronic (CMS/HCC) [J96.10]  Yes   • PEG (percutaneous endoscopic gastrostomy) status (CMS/HCC) [Z93.1]  Not Applicable   • Chronic diastolic CHF (congestive heart failure) (CMS/HCC) [I50.32]  Yes   • Peripheral artery disease (CMS/HCC) [I73.9]  Yes   • Stage 3 chronic kidney disease (CMS/HCC) [N18.3]  Yes      Resolved Hospital Problems   No resolved problems to display.       SECONDARY DIAGNOSES  Past Medical History:   Diagnosis Date   • Acute congestive heart failure (CMS/[facility]) 2018   • Acute osteomyelitis (CMS/HCC)     Right shoulder due to IVDA   • Acute renal failure on dialysis (CMS/HCC)    • Anxiety    • Pugh esophagus    • CKD (chronic kidney disease)    • COPD (chronic obstructive pulmonary disease) (CMS/HCC)    • MRSA infection    • Nontraumatic subarachnoid hemorrhage (CMS/[facility])    • Seizures (CMS/[facility])    • Tracheostomy present (CMS/HCC)        CONSULTS   ID- Dr. Fowler  Urology Dr. Selby  Pulmonary- Dr. Love    [facility] COURSE  Patient is a 59 y.o. female with history of CKD, COPD, s/p tracheostomy, and  multiple other medical issues. Patient was admitted with healthcare associated/aspiration pneumonia and has tracheostomy.  Blood cultures were positive for ESBL strain of E. coli and she had VRE growing in the urine (ID felt colonized and did not need treatment).  Had ureteral stone with hydronephrosis and underwent urologic procedure with cystoscopy and stenting.  Infectious disease recommending finishing course of Invanz for the bacteremia.  PICC line attempts were unsuccessful but patient has completed the majority of the antibiotics already and infectious disease thinks it would be okay for patient to have IM Invanz to finish her course.  We recommended to the patient to consider discharge to skilled nursing facility but she wishes to go home and to have the antibiotic infusion at the ambulatory care infusion center.    DIAGNOSTICS    CT Abdomen Pelvis Stone Protocol [[id number]] Reinaldo as Reviewed   Order Status: Completed Collected: 10/11/19 0840    Updated: 10/14/19 1104   Narrative:     CT ABDOMEN AND PELVIS WITHOUT CONTRAST     HISTORY: Follow-up renal stone.     TECHNIQUE: Axial CT images of the abdomen and pelvis were obtained  without administration of intravenous contrast. The patient was not  given oral contrast. Coronal and sagittal reformats were obtained.     COMPARISON: 10/08/2019.     FINDINGS: There is an unchanged distal left ureteric calculus, best seen  on image 123. This is causing mild left-sided pelvocaliectasis, left  renal edema and perinephric stranding. A low-density lesion is again  identified within the medial cortex of the upper and midpole of the left  kidney that likely represents a cyst. Bilateral adrenal glands are  normal. Cortical scarring is seen within the upper pole of the right  kidney. No renal calculi or hydronephrosis on the right. The urinary  bladder is partially distended and normal.     Noncontrast attenuation of the liver is normal. The gallbladder, spleen  and the  pancreas is normal. Bilateral adrenal glands are normal. Small  retroperitoneal lymph nodes are favored to be reactive. The uterus is  anteverted and normal. No abnormal adnexal mass is seen. The small and  large bowel loops demonstrate normal caliber. The patient has a  percutaneous gastrostomy tube in place. No ascites is seen. Ectasia of  the celiac axis at its bifurcation where it measures up to 1.2 cm.  Moderate calcified atherosclerotic plaque is seen within the abdominal  aorta and its branches.      Impression:     1. Stable exam. There is a 5 mm distal ureteric calculus with mild left  pelvocaliectasis and renal edema.  2. Additional findings as above.            PHYSICAL EXAM  Objective    Alert  nad  No resp distress  +trach  +chronically ill but resting comfortably today    CONDITION ON DISCHARGE  Stable.      DISCHARGE DISPOSITION   Home or Self Care      DISCHARGE MEDICATIONS       Your medication list      START taking these medications      Instructions Last Dose Given Next Dose Due   acetaminophen 325 MG tablet  Commonly known as:  TYLENOL      Take 2 tablets by mouth Every 4 (Four) Hours As Needed for Mild Pain .       amLODIPine 5 MG tablet  Commonly known as:  NORVASC  Start taking on:  10/15/2019      Take 1 tablet by mouth Daily.       clotrimazole-betamethasone 1-0.05 % cream  Commonly known as:  LOTRISONE      Apply  topically to the appropriate area as directed Every 12 (Twelve) Hours. Apply to perivaginal area and perineum after washing.       ertapenem 1 g injection  Commonly known as:  INVanz      Inject 1,000 mg into the appropriate muscle as directed by prescriber Daily for 3 days.          CONTINUE taking these medications      Instructions Last Dose Given Next Dose Due   Ascorbic Acid 500 MG capsule      Take  by mouth.       aspirin 81 MG chewable tablet      Chew 1 tablet Daily.       budesonide 0.5 MG/2ML nebulizer solution  Commonly known as:  PULMICORT      Inhale 2 mL by  nebulization via trach 2 (Two) Times a Day.       busPIRone 5 MG tablet  Commonly known as:  BUSPAR      Take 1 tablet by mouth 3 (Three) times a day.       escitalopram 20 MG tablet  Commonly known as:  LEXAPRO      Take 1 tablet by mouth daily.       gabapentin 300 MG capsule  Commonly known as:  NEURONTIN      Take 1 capsule by mouth 3 (Three) Times a Day.       ipratropium-albuterol 0.5-2.5 mg/3 ml nebulizer  Commonly known as:  DUO-NEB      Inhale 3 mL by nebulization every 4 (Four) hours as needed for wheezing.       metoprolol succinate XL 25 MG 24 hr tablet  Commonly known as:  TOPROL-XL      Take 1 tablet by mouth Daily.       MULTIPLE VITAMINS-MINERALS PO      Take  by mouth.       omeprazole 40 MG capsule  Commonly known as:  priLOSEC      Take 40 mg by mouth Daily.       oxyCODONE-acetaminophen 5-325 MG per tablet  Commonly known as:  PERCOCET      Take 1 tablet by mouth Every 6 (Six) Hours As Needed for Moderate Pain .       pramipexole 0.25 MG tablet  Commonly known as:  MIRAPEX      Take 1 tablet by mouth Every Night.       thiamine 100 MG tablet  Commonly known as:  VITAMIN B-1      Take 1 tablet by mouth Daily.          STOP taking these medications    dextromethorphan-guaifenesin  MG/5ML syrup  Commonly known as:  ROBITUSSIN-DM        nystatin 458297 UNIT/GM powder  Commonly known as:  MYCOSTATIN        predniSONE 10 MG tablet  Commonly known as:  DELTASONE              Where to Get Your Medications      These medications were sent to [facility] - [place] [address]    Hours:  7:00AM-6PM Mon-Fri Phone:  [phone]   · amLODIPine 5 MG tablet  · clotrimazole-betamethasone 1-0.05 % cream     Information about where to get these medications is not yet available    Ask your nurse or doctor about these medications  · acetaminophen 325 MG tablet  · ertapenem 1 g injection        No future appointments.   Contact information for follow-up providers     Provider,  No Known .    Contact information:  [facility] [address] [phone]                   Contact information for after-discharge care     Destination     [facility] .    Service:  Skilled Nursing  Contact information:  [address]ne [address]ilar[address] [facility] [address] [phone]                             TEST  RESULTS PENDING AT DISCHARGE         Lavon Robles MD  [facility]  10/14/19  2:05 PM      Time: greater than 32 minutes on discharge  It was a pleasure taking care of this patient while in the hospital.

## 2019-10-14 NOTE — PROGRESS NOTES
Continued Stay Note  [facility]     Patient Name: Swetha Luis  MRN: [id number]  Today's Date: 10/14/2019    Admit Date: 10/4/2019    Discharge Plan     Row Name 10/14/19 1141       Plan    Plan  Awaiting call back from [facility]-    Patient/Family in Agreement with Plan  other (see comments)    Plan Comments  Still no return call back from [facility]  Annyjacqueline Sierra. [facility] called her x2 this morning and left vm requesting call back as pt anticipated dc once plans are arranged. [facility] called [facility] Hotline [phone] to speak to  for Supervisor contact info. Pt will need completion of Invanz antibiotic, possibly by IM in ACU; Pending dc plan. sergio rodriguez/ccp        Discharge Codes    No documentation.             Noelle Juarez, RN

## 2019-10-14 NOTE — THERAPY TREATMENT NOTE
Patient Name: Swetha Luis  : 1960    MRN: [id number]                              Today's Date: 10/14/2019       Admit Date: 10/4/2019    Visit Dx:     ICD-10-CM ICD-9-CM   1. Healthcare associated bacterial pneumonia J15.9 482.9   2. Oropharyngeal dysphagia R13.12 787.22     Patient Active Problem List   Diagnosis   • Tracheostomy complication (CMS/[facility])   • Gangrene of toe (CMS/[facility])   • Acute diastolic congestive heart failure (CMS/[facility])   • ARF (acute renal failure) (CMS/[facility])   • Stage 3 chronic kidney disease (CMS/[facility])   • Elevated troponin   • Acute respiratory failure with hypoxemia (CMS/[facility])   • Acute osteomyelitis (CMS/[facility])   • UTI due to extended-spectrum beta lactamase (ESBL) producing Escherichia coli   • Thrush, oral   • Tracheostomy malfunction (CMS/[facility])   • COPD (chronic obstructive pulmonary disease) (CMS/[facility])   • Respiratory failure, chronic (CMS/HCC)   • PEG (percutaneous endoscopic gastrostomy) status (CMS/HCC)   • History of osteomyelitis   • Polysubstance abuse (CMS/[facility])   • History of tracheal stenosis   • Chronic diastolic CHF (congestive heart failure) (CMS/[facility])   • Peripheral artery disease (CMS/[facility])   • Medically noncompliant   • WENDI and COPD overlap syndrome (CMS/[facility])   • Dyspnea   • Elevated LFTs   • Elevated troponin   • Tracheostomy present (CMS/[facility])   • Essential hypertension   • Dysphagia   • Healthcare associated bacterial pneumonia   • Infection due to ESBL-producing Escherichia coli   • Sepsis due to Gram negative bacteria (CMS/[facility])   • Ureteral stone with hydronephrosis   • UTI (urinary tract infection)     Past Medical History:   Diagnosis Date   • Acute congestive heart failure (CMS/[facility]) 2018   • Acute osteomyelitis (CMS/[facility])     Right shoulder due to IVDA   • Acute renal failure on dialysis (CMS/[facility])    • Anxiety    • Pugh esophagus    • CKD (chronic kidney disease)    • COPD (chronic obstructive pulmonary disease) (CMS/[facility])    • MRSA infection    • Nontraumatic  subarachnoid hemorrhage (CMS/HCC)    • Seizures (CMS/HCC)    • Tracheostomy present (CMS/HCC)      Past Surgical History:   Procedure Laterality Date   • TRACHEOSTOMY     • URETEROSCOPY LASER LITHOTRIPSY WITH STENT INSERTION Left 10/11/2019    Procedure: CYSTOSCOPY,  URETEROSCOPY, LEFT RETROGRADE, LEFT PYELOGRAM, LASER LITHOTRIPSY, PLACEMENT OF STENT.;  Surgeon: Clyde Selby MD;  Location: [facility] OR;  Service: Urology     General Information     Row Name 10/14/19 1436          PT Evaluation Time/Intention    Document Type  therapy note (daily note) Pt. reports continued SOA with mobility but otherwise, pt. agreeable to work with P.T.   -MS     Mode of Treatment  physical therapy;individual therapy  -MS     Row Name 10/14/19 1436          General Information    Patient Profile Reviewed?  yes  -MS     Existing Precautions/Restrictions  fall;oxygen therapy device and L/min  (Significant)  Trach (6 liters oxygen);  Contact Isolation  -MS     Row Name 10/14/19 1436          Safety Issues, Functional Mobility    Comment, Safety Issues/Impairments (Mobility)  Gait belt used for safety.  -MS       User Key  (r) = Recorded By, (t) = Taken By, (c) = Cosigned By    Initials Name Provider Type    MS Andrei Lee L, PT Physical Therapist        Mobility     Row Name 10/14/19 1437          Bed Mobility Assessment/Treatment    Supine-Sit Potter (Bed Mobility)  contact guard  -MS     Sit-Supine Potter (Bed Mobility)  contact guard  -MS     Assistive Device (Bed Mobility)  bed rails  -MS     Row Name 10/14/19 1437          Sit-Stand Transfer    Sit-Stand Potter (Transfers)  contact guard  -MS     Assistive Device (Sit-Stand Transfers)  walker, front-wheeled  -MS     Row Name 10/14/19 1437          Gait/Stairs Assessment/Training    Potter Level (Gait)  contact guard  -MS     Assistive Device (Gait)  walker, front-wheeled  -MS     Distance in Feet (Gait)  50 feet  -MS     Pattern (Gait)   step-through  -MS     Deviations/Abnormal Patterns (Gait)  balaji decreased  -MS     Bilateral Gait Deviations  forward flexed posture  -MS     Comment (Gait/Stairs)  Verbal/tactile cues for posture correction and Rwx guidance.  Limited in gait distance by overall fatigue, SOA.   -MS       User Key  (r) = Recorded By, (t) = Taken By, (c) = Cosigned By    Initials Name Provider Type    Andrei Castro, PT Physical Therapist        Obj/Interventions     Row Name 10/14/19 1438          Therapeutic Exercise    Comment (Therapeutic Exercise)  BLE Standing ther. ex. program x 10 reps completed (Heel raises, Mini-squats)  -MS       User Key  (r) = Recorded By, (t) = Taken By, (c) = Cosigned By    Initials Name Provider Type    Andrei Castro, PT Physical Therapist        Goals/Plan    No documentation.       Clinical Impression     Row Name 10/14/19 1439          Pain Scale: Numbers Pre/Post-Treatment    Pain Scale: Numbers, Pretreatment  0/10 - no pain  -MS     Pain Scale: Numbers, Post-Treatment  0/10 - no pain  -MS     Row Name 10/14/19 1439          Positioning and Restraints    Pre-Treatment Position  in bed  -MS     Post Treatment Position  bed  -MS     In Bed  notified nsg;supine;call light within reach;encouraged to call for assist;exit alarm on All lines intact.  -MS       User Key  (r) = Recorded By, (t) = Taken By, (c) = Cosigned By    Initials Name Provider Type    Andrei Castro, PT Physical Therapist        Outcome Measures     Row Name 10/14/19 1441          How much help from another person do you currently need...    Turning from your back to your side while in flat bed without using bedrails?  3  -MS     Moving from lying on back to sitting on the side of a flat bed without bedrails?  3  -MS     Moving to and from a bed to a chair (including a wheelchair)?  3  -MS     Standing up from a chair using your arms (e.g., wheelchair, bedside chair)?  3  -MS     Climbing 3-5 steps with a  railing?  3  -MS     To walk in hospital room?  3  -MS     AM-PAC 6 Clicks Score (PT)  18  -MS     Row Name 10/14/19 1446          Functional Assessment    Outcome Measure Options  AM-PAC 6 Clicks Basic Mobility (PT)  -MS       User Key  (r) = Recorded By, (t) = Taken By, (c) = Cosigned By    Initials Name Provider Type    Andrei Castro ANAI, PT Physical Therapist        Physical Therapy Education     Title: PT OT SLP Therapies (Done)     Topic: Physical Therapy (Done)     Point: Mobility training (Done)     Learning Progress Summary           Patient Acceptance, E,D, VU,NR by MS at 10/14/2019  2:39 PM    Acceptance, E,TB, VU,NR by RA at 10/12/2019  9:54 AM    Acceptance, E,D, VU,NR by MS at 10/10/2019  1:51 PM    Acceptance, E,TB,D, VU,DU,NR by  at 10/9/2019 11:55 PM    Acceptance, E,D, VU,NR by MS at 10/9/2019  9:24 AM    Acceptance, E,TB,D, VU,NR,DU by  at 10/8/2019 10:17 PM    Acceptance, E, NR by AR at 10/8/2019 12:54 PM    Acceptance, E,TB,D, VU,DU,NR by  at 10/7/2019  9:35 PM    Acceptance, E,D, VU,NR by MS at 10/7/2019 10:33 AM    Acceptance, E,D, VU,NR by MS at 10/6/2019  9:26 AM    Acceptance, E,D, VU,NR by MS at 10/5/2019  9:25 AM                   Point: Home exercise program (Done)     Learning Progress Summary           Patient Acceptance, E,D, VU,NR by MS at 10/14/2019  2:39 PM    Acceptance, E,TB, VU,NR by RA at 10/12/2019  9:54 AM    Acceptance, E,D, VU,NR by MS at 10/10/2019  1:51 PM    Acceptance, E,TB,D, VU,DU,NR by  at 10/9/2019 11:55 PM    Acceptance, E,D, VU,NR by MS at 10/9/2019  9:24 AM    Acceptance, E,TB,D, VU,NR,DU by  at 10/8/2019 10:17 PM    Acceptance, E, NR by AR at 10/8/2019 12:54 PM    Acceptance, E,TB,D, VU,DU,NR by AH at 10/7/2019  9:35 PM    Acceptance, E,D, VU,NR by MS at 10/7/2019 10:33 AM    Acceptance, E,D, VU,NR by MS at 10/6/2019  9:26 AM    Acceptance, E,D, VU,NR by MS at 10/5/2019  9:25 AM                   Point: Body mechanics (Done)     Learning Progress  Summary           Patient Acceptance, E,D, VU,NR by MS at 10/14/2019  2:39 PM    Acceptance, E,TB, VU,NR by RA at 10/12/2019  9:54 AM    Acceptance, E,D, VU,NR by MS at 10/10/2019  1:51 PM    Acceptance, E,TB,D, VU,DU,NR by  at 10/9/2019 11:55 PM    Acceptance, E,D, VU,NR by MS at 10/9/2019  9:24 AM    Acceptance, E,TB,D, VU,NR,DU by  at 10/8/2019 10:17 PM    Acceptance, E, NR by AR at 10/8/2019 12:54 PM    Acceptance, E,TB,D, VU,DU,NR by  at 10/7/2019  9:35 PM    Acceptance, E,D, VU,NR by MS at 10/7/2019 10:33 AM    Acceptance, E,D, VU,NR by MS at 10/6/2019  9:26 AM    Acceptance, E,D, VU,NR by MS at 10/5/2019  9:25 AM                   Point: Precautions (Done)     Learning Progress Summary           Patient Acceptance, E,D, VU,NR by MS at 10/14/2019  2:39 PM    Acceptance, E,TB, VU,NR by RA at 10/12/2019  9:54 AM    Acceptance, E,D, VU,NR by MS at 10/10/2019  1:51 PM    Acceptance, E,TB,D, VU,DU,NR by  at 10/9/2019 11:55 PM    Acceptance, E,D, VU,NR by MS at 10/9/2019  9:24 AM    Acceptance, E,TB,D, VU,NR,DU by  at 10/8/2019 10:17 PM    Acceptance, E, NR by AR at 10/8/2019 12:54 PM    Acceptance, E,TB,D, VU,DU,NR by  at 10/7/2019  9:35 PM    Acceptance, E,D, VU,NR by MS at 10/7/2019 10:33 AM    Acceptance, E,D, VU,NR by MS at 10/6/2019  9:26 AM    Acceptance, E,D, VU,NR by MS at 10/5/2019  9:25 AM                               User Key     Initials Effective Dates Name Provider Type Discipline    RA 04/06/17 -  Shani Marin, PT Physical Therapist PT    MS 04/03/18 -  Andrei Lee, PT Physical Therapist PT     04/06/17 -  Yvette Gutierrez, JEMIMA Registered Nurse Nurse    AR 04/03/18 -  Maria Del Carmen Brandon, PT Physical Therapist PT              PT Recommendation and Plan  Planned Therapy Interventions (PT Eval): balance training, bed mobility training, gait training, home exercise program, patient/family education, postural re-education, strengthening, transfer training  Outcome Summary/Treatment Plan  (PT)  Anticipated Discharge Disposition (PT): home with 24/7 care, home with home health, skilled nursing facility(Pending pt. progress)  Plan of Care Reviewed With: patient  Progress: improving  Outcome Summary: Pt. able to ambulate 50 feet, CGA x 1, with use of Rwx this date.  Pt. requires CGA x1 for bed mobility and for sit <-> stand transfers.  Pt. requires verbal/tactile cues for posture correction and Rwx guidance. BLE standing ther. ex. program x 10 reps completed for general strengthening.      Time Calculation:   PT Charges     Row Name 10/14/19 1441 10/14/19 1039          Time Calculation    Start Time  1350  -MS  --     Stop Time  1407  -MS  --     Time Calculation (min)  17 min  -MS  --     PT Received On  10/14/19  -MS  --     PT - Next Appointment  10/15/19  -MS  10/14/19  -MS        Time Calculation- PT    Total Timed Code Minutes- PT  14 minute(s)  -MS  --       User Key  (r) = Recorded By, (t) = Taken By, (c) = Cosigned By    Initials Name Provider Type    Andrei Castro, PT Physical Therapist        Therapy Charges for Today     Code Description Service Date Service Provider Modifiers Qty    [id number] HC PT THER PROC EA 15 MIN 10/14/2019 Andrei Lee, PT GP 1          PT G-Codes  Outcome Measure Options: AM-PAC 6 Clicks Basic Mobility (PT)  AM-PAC 6 Clicks Score (PT): 18    Andrei Lee PT  10/14/2019

## 2019-10-14 NOTE — PROGRESS NOTES
LOS: 10 days   Patient Care Team:  Provider, Loretta Known as PCP - General    Subjective     Reports she is feeling much better today does not have any real complaints.. Her only complaint is she wants to go home.    Review of Systems:          Objective     Vital Signs  Vital Sign Min/Max for last 24 hours  Temp  Min: 97.7 °F (36.5 °C)  Max: 98.5 °F (36.9 °C)   BP  Min: 96/74  Max: 105/65   Pulse  Min: 63  Max: 80   Resp  Min: 16  Max: 18   SpO2  Min: 95 %  Max: 100 %   Flow (L/min)  Min: 6  Max: 10   No Data Recorded        Ventilator/Non-Invasive Ventilation Settings (From admission, onward)    None                       Body mass index is 31.97 kg/m².  I/O last 3 completed shifts:  In: 1954 [P.O.:354; I.V.:1000; IV Piggyback:600]  Out: -   I/O this shift:  In: 360 [P.O.:360]  Out: -         Physical Exam:  General Appearance: Well-developed elderly white female looking her age and she certainly looks several decades older than her stated age she does not appear in any acute distress  Eyes: Conjunctiva are clear and anicteric pupils are equal  ENT: Mucous membranes are moist no erythema no exudates nasal septum midline  Neck: Tracheostomy site looks okay I do not see any erythema or drainage Shiley trach appears to be in position there is no jugular venous distention or palpable adenopathy  Lungs: Decreased but clear I do not hear any wheezes rales or rhonchi nonlabored symmetric expansion  Cardiac: Regular rate and rhythm no murmur  Abdomen: Soft no palpable organomegaly or masses she does have a left upper quadrant feeding type tube  : Not examined  Musculoskeletal: Grossly normal 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  Skin: No jaundice no petechiae skin is warm and dry to touch  Neuro: She is alert and oriented she is cooperative following commands moving all 4 extremities  Extremities/P Vascular: No clubbing no cyanosis no edema palpable radial dorsalis pedis pulses  bilaterally  MSE: Seems to be in good spirits       Labs:  Results from last 7 days   Lab Units 10/14/19  0418 10/13/19  0636 10/12/19  0631 10/11/19  2039 10/11/19  0501 10/10/19  0419 10/08/19  0541   GLUCOSE mg/dL 86 80 81 77 88 78 83   SODIUM mmol/L 140 138 141 141 141 138 141   POTASSIUM mmol/L 4.5 4.5 4.3 4.3 4.3 4.1 4.1   CO2 mmol/L 21.1* 27.1 25.0 27.1 30.2* 25.6 29.8*   CHLORIDE mmol/L 105 102 104 102 102 100 101   ANION GAP mmol/L 13.9 8.9 12.0 11.9 8.8 12.4 10.2   CREATININE mg/dL 1.00 1.19* 1.07* 1.05* 1.13* 1.00 0.95   BUN mg/dL 20 23* 17 17 20 19 15   BUN / CREAT RATIO  20.0 19.3 15.9 16.2 17.7 19.0 15.8   CALCIUM mg/dL 8.6 8.9 9.2 9.2 9.6 9.4 9.3   EGFR IF NONAFRICN AM mL/min/1.73 57* 46* 52* 54* 49* 57* 60*     Estimated Creatinine Clearance: 68.4 mL/min (by C-G formula based on SCr of 1 mg/dL).      Results from last 7 days   Lab Units 10/14/19  0418 10/13/19  0636 10/12/19  0631 10/11/19  2039 10/11/19  0501 10/10/19  0419 10/08/19  0541   WBC 10*3/mm3 7.71 8.16 8.63 8.34 9.79 9.49 7.47   RBC 10*6/mm3 4.26 4.41 4.61 4.71 5.00 4.59 4.51   HEMOGLOBIN g/dL 11.3* 11.4* 11.7* 12.4 12.7 12.0 11.8*   HEMATOCRIT % 36.1 37.2 37.4 39.0 41.3 38.6 38.4   MCV fL 84.7 84.4 81.1 82.8 82.6 84.1 85.1   MCH pg 26.5* 25.9* 25.4* 26.3* 25.4* 26.1* 26.2*   MCHC g/dL 31.3* 30.6* 31.3* 31.8 30.8* 31.1* 30.7*   RDW % 14.0 14.3 14.2 14.2 14.3 14.3 14.3   RDW-SD fl 43.2 44.6 41.0 42.1 43.0 43.5 44.2   MPV fL 11.7 11.3 11.7 11.3 11.7 12.0 11.9   PLATELETS 10*3/mm3 276 266 313 279 300 244 216   NEUTROPHIL % % 58.6 64.9 71.6 64.7 65.4 64.6 66.2   LYMPHOCYTE % % 28.8 24.1 18.9* 25.1 24.4 23.8 21.0   MONOCYTES % % 7.5 7.1 6.4 6.2 6.0 7.4 9.1   EOSINOPHIL % % 3.9 2.9 2.0 3.0 3.2 3.1 2.8   BASOPHIL % % 0.8 0.6 0.6 0.5 0.6 0.6 0.5   IMM GRAN % % 0.4 0.4 0.5 0.5 0.4 0.5 0.4   NEUTROS ABS 10*3/mm3 4.52 5.29 6.19 5.40 6.40 6.13 4.94   LYMPHS ABS 10*3/mm3 2.22 1.97 1.63 2.09 2.39 2.26 1.57   MONOS ABS 10*3/mm3 0.58 0.58 0.55 0.52 0.59  0.70 0.68   EOS ABS 10*3/mm3 0.30 0.24 0.17 0.25 0.31 0.29 0.21   BASOS ABS 10*3/mm3 0.06 0.05 0.05 0.04 0.06 0.06 0.04   IMMATURE GRANS (ABS) 10*3/mm3 0.03 0.03 0.04 0.04 0.04 0.05 0.03   NRBC /100 WBC 0.0 0.0 0.0 0.0 0.0 0.0 0.0                             Microbiology Results (last 10 days)     Procedure Component Value - Date/Time    Urine Culture - Urine, Urine, Catheter In/Out [[id number]]  (Abnormal)  (Susceptibility) Collected:  10/06/19 2130    Lab Status:  Final result Specimen:  Urine, Catheter In/Out Updated:  10/09/19 1144     Urine Culture >100,000 CFU/mL Enterococcus faecium, VRE     Comment:   Vancomycin Resistant Enterococcus species. Patient may be an isolation risk.       Susceptibility      Enterococcus faecium, VRE     [place]     Ampicillin Resistant     Levofloxacin Resistant     Linezolid Susceptible     Nitrofurantoin Susceptible     Tetracycline Resistant     Vancomycin Resistant                    Respiratory Panel, PCR - Swab, Nasopharynx [[id number]]  (Normal) Collected:  10/04/19 1652    Lab Status:  Final result Specimen:  Swab from Nasopharynx Updated:  10/04/19 1853     ADENOVIRUS, PCR Not Detected     Coronavirus 229E Not Detected     Coronavirus HKU1 Not Detected     Coronavirus NL63 Not Detected     Coronavirus OC43 Not Detected     Human Metapneumovirus Not Detected     Human Rhinovirus/Enterovirus Not Detected     Influenza B PCR Not Detected     Parainfluenza Virus 1 Not Detected     Parainfluenza Virus 2 Not Detected     Parainfluenza Virus 3 Not Detected     Parainfluenza Virus 4 Not Detected     Bordetella pertussis pcr Not Detected     Influenza A H1 2009 PCR Not Detected     Chlamydophila pneumoniae PCR Not Detected     Mycoplasma pneumo by PCR Not Detected     Influenza A PCR Not Detected     Influenza A H3 Not Detected     Influenza A H1 Not Detected     RSV, PCR Not Detected     Bordetella parapertussis PCR Not Detected    MRSA Screen, PCR - Swab, Nares [[id number]]  (Normal)  Collected:  10/04/19 1652    Lab Status:  Final result Specimen:  Swab from Nares Updated:  10/04/19 1853     MRSA PCR No MRSA Detected                amLODIPine 5 mg Oral Q24H   busPIRone 5 mg Oral TID   clotrimazole-betamethasone  Topical Q12H   ertapenem 1 g Intravenous Q24H   escitalopram 20 mg Oral Daily   gabapentin 300 mg Oral TID   influenza vaccine 0.5 mL Intramuscular Once   metoprolol succinate XL 25 mg Oral Daily   pramipexole 0.25 mg Oral Nightly   sodium chloride 10 mL Intravenous Q12H   thiamine 100 mg Oral Daily       lactated ringers 9 mL/hr Last Rate: 9 mL/hr (10/11/19 8418)   sodium chloride 75 mL/hr Last Rate: 75 mL/hr (10/14/19 7813)       Diagnostics:  Xr Chest 2 View    Result Date: 10/4/2019  TWO VIEWS CHEST  HISTORY: Sepsis.  COMPARISON: 09/21/2019.  A tracheostomy tube is in place in satisfactory position. The patient is rotated slightly to the right. There is mild cardiomegaly. The pulmonary interstitium is prominent. This is more prominent as compared to 09/21/2019. There is no evidence of consolidation or effusion.      Increasing pulmonary interstitial and vascular prominence, particularly in perihilar regions increased versus 09/21/2019. No evidence of consolidation or effusion.  This report was finalized on 10/4/2019 8:46 AM by Dr. Jonathan Bianchi M.D.      Xr Chest 1 View    Result Date: 9/21/2019  PORTABLE CHEST X-RAY  CLINICAL HISTORY: SOA Triage Protocol  COMPARISON: 07/10/2019.  FINDINGS: Portable AP view of the chest was obtained with overlying monitor leads in place. A tracheostomy has been placed in the interim and terminates near the level of the thoracic inlet. Lungs are poorly aerated but clear. No edema or pleural fluid. Mild cardiomegaly and aortic ectasia.        Poor inspiration without active airspace disease     This report was finalized on 9/21/2019 11:06 PM by Andrei Beatty M.D.      Ct Abdomen Pelvis Stone Protocol    Result Date: 10/11/2019  CT ABDOMEN AND PELVIS  WITHOUT CONTRAST  HISTORY: Follow-up renal stone.  TECHNIQUE: Axial CT images of the abdomen and pelvis were obtained without administration of intravenous contrast. The patient was not given oral contrast. Coronal and sagittal reformats were obtained.  COMPARISON: 10/08/2019.  FINDINGS: There is an unchanged distal left ureteric calculus, best seen on image 123. This is causing mild left-sided pelvocaliectasis, left renal edema and perinephric stranding. A low-density lesion is again identified within the medial cortex of the upper and midpole of the left kidney that likely represents a cyst. Bilateral adrenal glands are normal. Cortical scarring is seen within the upper pole of the right kidney. No renal calculi or hydronephrosis on the right. The urinary bladder is partially distended and normal.  Noncontrast attenuation of the liver is normal. The gallbladder, spleen and the pancreas is normal. Bilateral adrenal glands are normal. Small retroperitoneal lymph nodes are favored to be reactive. The uterus is anteverted and normal. No abnormal adnexal mass is seen. The small and large bowel loops demonstrate normal caliber. The patient has a percutaneous gastrostomy tube in place. No ascites is seen. Ectasia of the celiac axis at its bifurcation where it measures up to 1.2 cm. Moderate calcified atherosclerotic plaque is seen within the abdominal aorta and its branches.      1. Stable exam. There is a 5 mm distal ureteric calculus with mild left pelvocaliectasis and renal edema. 2. Additional findings as above.  Radiation dose reduction techniques were utilized, including automated exposure control and exposure modulation based on body size.       Ct Abdomen Pelvis Stone Protocol    Result Date: 10/10/2019  CT ABDOMEN AND PELVIS WITHOUT CONTRAST  HISTORY: Abdominal pain and anemia.  TECHNIQUE: Axial CT images of the abdomen and pelvis were obtained without administration of intravenous contrast. The patient was not  given oral contrast. Coronal and sagittal reformats were obtained.  COMPARISON: 03/26/2014  FINDINGS: The noncontrast attenuation of the liver is normal. No focal hepatic lesion or intrahepatic biliary dilatation is seen. The gallbladder, spleen and the pancreas is normal. The patient has a percutaneous gastrostomy tube in place. Bilateral adrenal glands are normal. Both kidneys are normal in size and attenuation. Low-density area seen medially within the left kidney, image 42 is favored to represent a simple cyst. There is mild left renal edema and mild dilatation of the renal collecting system.. There is an irregular 5-6 mm calculus seen within the left distal ureter just proximal to the UV junction. No renal calculi on the right. The right ureter demonstrates normal caliber. The urinary bladder is otherwise unremarkable. The uterus is anteverted and normal. The lung bases demonstrate mild bibasilar atelectasis, emphysema and scarring. Subcentimeter retroperitoneal lymph nodes are present. Index left paraaortic node measures up to 9 mm in short axis dimension and is favored to be reactive. Moderate atherosclerotic change is seen within the abdominal aorta and its branches. There is dilatation of the celiac at the level of its bifurcation where it measures up to 1.1 cm. Unchanged from prior imaging.      1. 5 mm left distal ureteric calculus with mild left hydronephrosis and left renal edema. 2. Additional findings as above.  Radiation dose reduction techniques were utilized, including automated exposure control and exposure modulation based on body size.  This report was finalized on 10/10/2019 10:08 AM by Dr. Richard Ceballos M.D.      Fl Retrograde Pyelogram In Or    Result Date: 10/11/2019  FL RETROGRADE PYELOGRAM IN OR-  INDICATIONS: Urolithiasis. Left ureteral stent placement  TECHNIQUE: FLUOROSCOPIC ASSISTANCE IN THE OPERATING ROOM.  FINDINGS:  5 intraoperative fluoroscopic spot views were obtained and  recorded the PACS for review, revealing left ureteral stent placement. Please see operative report for full details.  Fluoroscopy time: 24 seconds       As described.  This report was finalized on 10/11/2019 6:23 PM by Dr. Eligio Dewitt M.D.      Fl Video Swallow    Result Date: 10/8/2019  VIDEO SWALLOWING EXAMINATION BY SPEECH PATHOLOGY  Clinical: Dysphasia  Video swallowing examination performed under the direction of speech pathology. Imaging reviewed by radiologist who concurs with the findings.  Speech pathology summary:   Patient presents with moderate oropharyngeal dysphagia characterized by poor bolus control, swallow delay, swallow mistiming, and impaired protective reaction to aspiration. Silent penetration with honey and thins. Silent aspiration with nectar. No penetration with puree, mechanical soft, or regular.  FLUOROSCOPY TIME: 2 minutes 53 seconds, 5064 images.  This report was finalized on 10/8/2019 11:03 AM by Dr. Jayson Godwin M.D.      Xr Chest Post Cva Port    Result Date: 10/11/2019  XR CHEST POST CVA PORT-  INDICATIONS: Check Shiley  TECHNIQUE: Frontal view of the chest  COMPARISON: 10/04/2019  FINDINGS:  Tracheostomy tube tip is 4.5 cm above the aguila. The heart size is borderline. Mild prominence of vascular and interstitial markings. Aorta is tortuous. No focal pulmonary consolidation, pleural effusion, or pneumothorax. No acute osseous process.       As described.  This report was finalized on 10/11/2019 7:11 PM by Dr. Eligio Dewitt M.D.      Results for orders placed during the hospital encounter of 09/21/19   Adult Transthoracic Echo Complete W/ Cont if Necessary Per Protocol    Narrative · Left ventricular systolic function is normal. Calculated EF = 64%.   Estimated EF was in disagreement with the calculated EF. Estimated EF =   55%. Normal left ventricular cavity size and wall thickness noted. All   left ventricular wall segments contract normally. Left ventricular    diastolic function is normal.              Active Hospital Problems    Diagnosis  POA   • **Healthcare associated bacterial pneumonia [J15.9]  Yes   • Ureteral stone with hydronephrosis [N13.2]  Yes   • UTI (urinary tract infection) [N39.0]  Yes   • Infection due to ESBL-producing Escherichia coli [A49.8, Z16.12]  Yes   • Sepsis due to Gram negative bacteria (CMS/[facility]) [A41.50]  Yes   • Dyspnea [R06.00]  Yes   • Tracheostomy present (CMS/HCC) [Z93.0]  Not Applicable   • Essential hypertension [I10]  Yes   • Dysphagia [R13.10]  Yes   • COPD (chronic obstructive pulmonary disease) (CMS/[facility]) [J44.9]  Yes   • Respiratory failure, chronic (CMS/HCC) [J96.10]  Yes   • PEG (percutaneous endoscopic gastrostomy) status (CMS/HCC) [Z93.1]  Not Applicable   • Chronic diastolic CHF (congestive heart failure) (CMS/HCC) [I50.32]  Yes   • Peripheral artery disease (CMS/HCC) [I73.9]  Yes   • Stage 3 chronic kidney disease (CMS/[facility]) [N18.3]  Yes      Resolved Hospital Problems   No resolved problems to display.         Assessment/Plan     1. Pneumonia probable aspiration ID following and managing antibiotics follow-up chest x-ray in 4 to 6 weeks to ensure clearing.  2. Chronic tracheostomy seems to be doing well I did discuss with her when she is not talking to go ahead and take her Passy-Stanton valve off so that she can cough and clear secretions better  3. Ureterolithiasis with obstruction status post cystoscopy lithotripsy and stent  4. Chronic respiratory failure  5. COPD continue bronchodilators  6. chronic kidney disease stage III  7. E. coli, ESBL positive, bacteremia antibiotics per ID  8. Enterococcus UTI is just colonization ID favors colonization will follow their lead  9. oral pharyngeal dysphasia    Plan for disposition: A pulmonary standpoint she is doing well she can go home from a pulmonary standpoint when okay with ID continue bronchodilators and routine trach care with follow-up chest x-ray in 4 to 6 weeks.  We will  see BAILEY Baez MD  10/14/19  3:14 PM    Time:

## 2019-10-14 NOTE — PLAN OF CARE
Problem: Patient Care Overview  Goal: Plan of Care Review   10/14/19 0363   OTHER   Outcome Summary Pt. able to ambulate 50 feet, CGA x 1, with use of Rwx this date. Pt. requires CGA x1 for bed mobility and for sit <-> stand transfers. Pt. requires verbal/tactile cues for posture correction and Rwx guidance. BLE standing ther. ex. program x 10 reps completed for general strengthening.    Coping/Psychosocial   Plan of Care Reviewed With patient

## 2019-10-14 NOTE — PROGRESS NOTES
Continued Stay Note  [facility]     Patient Name: Swetha Luis  MRN: [id number]  Today's Date: 10/14/2019    Admit Date: 10/4/2019    Discharge Plan     Row Name 10/14/19 1244       Plan    Plan  Home with Spouse ACU setup for IM Invanz    Patient/Family in Agreement with Plan  yes    Plan Comments  CCP spoke with [facility] Hotline and additional contact info is [phone] and Corey Jenkins [phone]. [facility] called Anny Sierra [phone] and discuss pt now wants to go home with spouse. [facility] discussed setuping ACU appointment for remainder of IV abx course. She stats pt is alert and oriented and able to make her own decision, She will follow at home visit. Spoke with pt at bedside, she states she still wants to go home with spouse. CCP discussed HH setup for IV abx or coming to ACU here at hospital to complete course. Pt requested to come to ACU. [facility] spoke with JEMIMA Godfrey she will call Dr. Fowler for orders.     Row Name 10/14/19 1142       Plan    Plan  Awaiting call back from [facility]-    Patient/Family in Agreement with Plan  other (see comments)    Plan Comments  Still no return call back from [facility]  Anny Sierra. [facility] called her x2 this morning and left vm requesting call back as pt anticipated dc once plans are arranged. [facility] called [facility] Hotline [phone] to speak to  for Supervisor contact info. Pt will need completion of Invanz antibiotic, possibly by IM in ACU; Pending dc plan. sergio rodriguez/giancarlo        Discharge Codes    No documentation.             Noelle Juarez, JEMIMA

## 2019-10-15 ENCOUNTER — HOSPITAL ENCOUNTER (OUTPATIENT)
Dept: INFUSION THERAPY | Facility: HOSPITAL | Age: 59
Setting detail: INFUSION SERIES
Discharge: HOME | End: 2019-10-15

## 2019-10-15 ENCOUNTER — READMISSION MANAGEMENT (OUTPATIENT)
Dept: CALL CENTER | Facility: HOSPITAL | Age: 59
End: 2019-10-15

## 2019-10-15 NOTE — PROGRESS NOTES
Case Management Discharge Note    Final Note: Patient DC'd home.             Transportation Services  Private: Car    Final Discharge Disposition Code: 01 - home or self-care

## 2019-10-16 ENCOUNTER — HOSPITAL ENCOUNTER (OUTPATIENT)
Dept: INFUSION THERAPY | Facility: HOSPITAL | Age: 59
Discharge: HOME | End: 2019-10-16
Admitting: INTERNAL MEDICINE

## 2019-10-16 VITALS
OXYGEN SATURATION: 99 % | HEART RATE: 80 BPM | RESPIRATION RATE: 20 BRPM | TEMPERATURE: 98.4 F | DIASTOLIC BLOOD PRESSURE: 76 MMHG | SYSTOLIC BLOOD PRESSURE: 111 MMHG

## 2019-10-16 DIAGNOSIS — B96.29 UTI DUE TO EXTENDED-SPECTRUM BETA LACTAMASE (ESBL) PRODUCING ESCHERICHIA COLI: Primary | ICD-10-CM

## 2019-10-16 DIAGNOSIS — N39.0 UTI DUE TO EXTENDED-SPECTRUM BETA LACTAMASE (ESBL) PRODUCING ESCHERICHIA COLI: Primary | ICD-10-CM

## 2019-10-16 DIAGNOSIS — Z16.12 UTI DUE TO EXTENDED-SPECTRUM BETA LACTAMASE (ESBL) PRODUCING ESCHERICHIA COLI: Primary | ICD-10-CM

## 2019-10-16 DIAGNOSIS — A49.8 INFECTION DUE TO ESBL-PRODUCING ESCHERICHIA COLI: ICD-10-CM

## 2019-10-16 DIAGNOSIS — Z16.12 INFECTION DUE TO ESBL-PRODUCING ESCHERICHIA COLI: ICD-10-CM

## 2019-10-16 LAB
ALBUMIN SERPL-MCNC: 3.6 G/DL (ref 3.5–5.2)
ALBUMIN/GLOB SERPL: 1 G/DL
ALP SERPL-CCNC: 219 U/L (ref 39–117)
ALT SERPL W P-5'-P-CCNC: 31 U/L (ref 1–33)
ANION GAP SERPL CALCULATED.3IONS-SCNC: 13.6 MMOL/L (ref 5–15)
AST SERPL-CCNC: 28 U/L (ref 1–32)
BASOPHILS # BLD AUTO: 0.07 10*3/MM3 (ref 0–0.2)
BASOPHILS NFR BLD AUTO: 0.9 % (ref 0–1.5)
BILIRUB SERPL-MCNC: 0.2 MG/DL (ref 0.2–1.2)
BUN BLD-MCNC: 19 MG/DL (ref 6–20)
BUN/CREAT SERPL: 21.3 (ref 7–25)
CALCIUM SPEC-SCNC: 9.4 MG/DL (ref 8.6–10.5)
CHLORIDE SERPL-SCNC: 102 MMOL/L (ref 98–107)
CO2 SERPL-SCNC: 25.4 MMOL/L (ref 22–29)
CREAT BLD-MCNC: 0.89 MG/DL (ref 0.57–1)
DEPRECATED RDW RBC AUTO: 43.5 FL (ref 37–54)
EOSINOPHIL # BLD AUTO: 0.28 10*3/MM3 (ref 0–0.4)
EOSINOPHIL NFR BLD AUTO: 3.5 % (ref 0.3–6.2)
ERYTHROCYTE [DISTWIDTH] IN BLOOD BY AUTOMATED COUNT: 14.4 % (ref 12.3–15.4)
GFR SERPL CREATININE-BSD FRML MDRD: 65 ML/MIN/1.73
GLOBULIN UR ELPH-MCNC: 3.7 GM/DL
GLUCOSE BLD-MCNC: 124 MG/DL (ref 65–99)
HCT VFR BLD AUTO: 42.5 % (ref 34–46.6)
HGB BLD-MCNC: 13.2 G/DL (ref 12–15.9)
IMM GRANULOCYTES # BLD AUTO: 0.03 10*3/MM3 (ref 0–0.05)
IMM GRANULOCYTES NFR BLD AUTO: 0.4 % (ref 0–0.5)
LYMPHOCYTES # BLD AUTO: 1.92 10*3/MM3 (ref 0.7–3.1)
LYMPHOCYTES NFR BLD AUTO: 23.7 % (ref 19.6–45.3)
MCH RBC QN AUTO: 26.1 PG (ref 26.6–33)
MCHC RBC AUTO-ENTMCNC: 31.1 G/DL (ref 31.5–35.7)
MCV RBC AUTO: 84 FL (ref 79–97)
MONOCYTES # BLD AUTO: 0.49 10*3/MM3 (ref 0.1–0.9)
MONOCYTES NFR BLD AUTO: 6 % (ref 5–12)
NEUTROPHILS # BLD AUTO: 5.31 10*3/MM3 (ref 1.7–7)
NEUTROPHILS NFR BLD AUTO: 65.5 % (ref 42.7–76)
NRBC BLD AUTO-RTO: 0 /100 WBC (ref 0–0.2)
PLATELET # BLD AUTO: 296 10*3/MM3 (ref 140–450)
PMV BLD AUTO: 10.9 FL (ref 6–12)
POTASSIUM BLD-SCNC: 4.1 MMOL/L (ref 3.5–5.2)
PROT SERPL-MCNC: 7.3 G/DL (ref 6–8.5)
RBC # BLD AUTO: 5.06 10*6/MM3 (ref 3.77–5.28)
SODIUM BLD-SCNC: 141 MMOL/L (ref 136–145)
WBC NRBC COR # BLD: 8.1 10*3/MM3 (ref 3.4–10.8)

## 2019-10-16 PROCEDURE — 25010000002 ERTAPENEM PER 500 MG: Performed by: INTERNAL MEDICINE

## 2019-10-16 PROCEDURE — 36415 COLL VENOUS BLD VENIPUNCTURE: CPT

## 2019-10-16 PROCEDURE — 80053 COMPREHEN METABOLIC PANEL: CPT | Performed by: INTERNAL MEDICINE

## 2019-10-16 PROCEDURE — 96372 THER/PROPH/DIAG INJ SC/IM: CPT

## 2019-10-16 PROCEDURE — 25010000003 LIDOCAINE 1 % SOLUTION: Performed by: INTERNAL MEDICINE

## 2019-10-16 PROCEDURE — 85025 COMPLETE CBC W/AUTO DIFF WBC: CPT | Performed by: INTERNAL MEDICINE

## 2019-10-16 RX ORDER — LIDOCAINE HYDROCHLORIDE 10 MG/ML
3.2 INJECTION, SOLUTION INFILTRATION; PERINEURAL ONCE
Status: CANCELLED
Start: 2019-10-16 | End: 2019-10-16

## 2019-10-16 RX ORDER — ERTAPENEM 1 G/1
1 INJECTION, POWDER, LYOPHILIZED, FOR SOLUTION INTRAMUSCULAR; INTRAVENOUS EVERY 24 HOURS
Status: CANCELLED | OUTPATIENT
Start: 2019-10-16

## 2019-10-16 RX ORDER — ERTAPENEM 1 G/1
1 INJECTION, POWDER, LYOPHILIZED, FOR SOLUTION INTRAMUSCULAR; INTRAVENOUS EVERY 24 HOURS
Status: DISCONTINUED | OUTPATIENT
Start: 2019-10-16 | End: 2019-10-18 | Stop reason: HOSPADM

## 2019-10-16 RX ORDER — LIDOCAINE HYDROCHLORIDE 10 MG/ML
3.2 INJECTION, SOLUTION INFILTRATION; PERINEURAL ONCE
Status: CANCELLED
Start: 2019-10-17 | End: 2019-10-17

## 2019-10-16 RX ORDER — LIDOCAINE HYDROCHLORIDE 10 MG/ML
3.2 INJECTION, SOLUTION INFILTRATION; PERINEURAL ONCE
Status: COMPLETED | OUTPATIENT
Start: 2019-10-16 | End: 2019-10-16

## 2019-10-16 RX ADMIN — LIDOCAINE HYDROCHLORIDE 3.2 ML: 10 INJECTION, SOLUTION INFILTRATION; PERINEURAL at 13:52

## 2019-10-16 RX ADMIN — ERTAPENEM SODIUM 1 G: 1 INJECTION, POWDER, LYOPHILIZED, FOR SOLUTION INTRAMUSCULAR; INTRAVENOUS at 13:52

## 2019-10-16 NOTE — OUTREACH NOTE
Prep Survey      Responses   Facility patient discharged from?  [facility]   Is patient eligible?  Yes   Discharge diagnosis  pneumonia  Ureteral stone with hydronephrosis uti  sepsis  Tracheostomy present    Does the patient have one of the following disease processes/diagnoses(primary or secondary)?  Sepsis   Does the patient have Home health ordered?  No   Is there a DME ordered?  No   Prep survey completed?  Yes          Lena Whitney RN

## 2019-10-16 NOTE — PATIENT INSTRUCTIONS
Ertapenem injection  What is this medicine?  ERTAPENEM (er ta PEN em) is a carbapenem antibiotic. It is used to treat certain kinds of bacterial infections. It will not work for colds, flu, or other viral infections.  This medicine may be used for other purposes; ask your health care provider or pharmacist if you have questions.  COMMON BRAND NAME(S): Invanz  What should I tell my health care provider before I take this medicine?  They need to know if you have any of these conditions:  -brain tumor, lesion  -kidney disease  -seizure disorder  -an unusual or allergic reaction to ertapenem, other antibiotics, amide local anesthetics like lidocaine, or other medicines, foods, dyes, or preservatives  -pregnant or trying to get pregnant  -breast-feeding  How should I use this medicine?  This medicine is infused into a vein or injected deep into a muscle. It is usually given by a health care professional in a hospital or clinic setting.  If you get this medicine at home, you will be taught how to prepare and give this medicine. Use exactly as directed. Take your medicine at regular intervals. Do not take your medicine more often than directed.  It is important that you put your used needles and syringes in a special sharps container. Do not put them in a trash can. If you do not have a sharps container, call your pharmacist or healthcare provider to get one.  Talk to your pediatrician regarding the use of this medicine in children. While this drug may be prescribed for children as young as 3 months old for selected conditions, precautions do apply.  Overdosage: If you think you have taken too much of this medicine contact a poison control center or emergency room at once.  NOTE: This medicine is only for you. Do not share this medicine with others.  What if I miss a dose?  If you miss a dose, take it as soon as you can. If it is almost time for your next dose, take only that dose. Do not take double or extra doses.  What  may interact with this medicine?  -birth control pills  -probenecid  This list may not describe all possible interactions. Give your health care provider a list of all the medicines, herbs, non-prescription drugs, or dietary supplements you use. Also tell them if you smoke, drink alcohol, or use illegal drugs. Some items may interact with your medicine.  What should I watch for while using this medicine?  Tell your doctor or health care professional if your symptoms do not improve or if you get new symptoms. Your doctor will monitor your condition and blood work as needed.  Do not treat diarrhea with over the counter products. Contact your doctor if you have diarrhea that lasts more than 2 days or if it is severe and watery.  What side effects may I notice from receiving this medicine?  Side effects that you should report to your doctor or health care professional as soon as possible:  -allergic reactions like skin rash, itching or hives, swelling of the face, lips, or tongue  -anxiety, confusion, dizzy  -chest pain  -difficulty breathing, wheezing  -edema, swelling  -fever  -irregular heart rate, blood pressure  -pain or difficulty passing urine  -seizures  -unusually weak or tired  -white or red patches in mouth  Side effects that usually do not require medical attention (report to your doctor or health care professional if they continue or are bothersome):  -constipation or diarrhea  -difficulty sleeping  -headache  -nausea, vomiting  -pain, swelling or irritation where injected  -stomach upset  -vaginal itch, irritation  This list may not describe all possible side effects. Call your doctor for medical advice about side effects. You may report side effects to FDA at 9-895-FDA-2927.  Where should I keep my medicine?  Keep out of the reach of children.  You will be instructed on how to store this medicine. Throw away any unused medicine after the expiration date on the label.  NOTE: This sheet is a summary. It may  not cover all possible information. If you have questions about this medicine, talk to your doctor, pharmacist, or health care provider.  © 2019 [employer] (2014-07-25 07:36:44)

## 2019-10-17 ENCOUNTER — HOSPITAL ENCOUNTER (OUTPATIENT)
Dept: INFUSION THERAPY | Facility: HOSPITAL | Age: 59
Discharge: HOME | End: 2019-10-17

## 2019-10-18 ENCOUNTER — HOSPITAL ENCOUNTER (OUTPATIENT)
Dept: INFUSION THERAPY | Facility: HOSPITAL | Age: 59
Discharge: HOME | End: 2019-10-18
Admitting: INTERNAL MEDICINE

## 2019-10-18 ENCOUNTER — APPOINTMENT (OUTPATIENT)
Dept: GENERAL RADIOLOGY | Facility: HOSPITAL | Age: 59
End: 2019-10-18

## 2019-10-18 ENCOUNTER — HOSPITAL ENCOUNTER (INPATIENT)
Facility: HOSPITAL | Age: 59
LOS: 2 days | Discharge: HOME | End: 2019-10-20
Attending: EMERGENCY MEDICINE | Admitting: HOSPITALIST

## 2019-10-18 ENCOUNTER — READMISSION MANAGEMENT (OUTPATIENT)
Dept: CALL CENTER | Facility: HOSPITAL | Age: 59
End: 2019-10-18

## 2019-10-18 VITALS
OXYGEN SATURATION: 99 % | TEMPERATURE: 98.7 F | HEART RATE: 97 BPM | DIASTOLIC BLOOD PRESSURE: 79 MMHG | RESPIRATION RATE: 20 BRPM | SYSTOLIC BLOOD PRESSURE: 113 MMHG

## 2019-10-18 DIAGNOSIS — R53.1 GENERALIZED WEAKNESS: ICD-10-CM

## 2019-10-18 DIAGNOSIS — B96.29 UTI DUE TO EXTENDED-SPECTRUM BETA LACTAMASE (ESBL) PRODUCING ESCHERICHIA COLI: Primary | ICD-10-CM

## 2019-10-18 DIAGNOSIS — N39.0 UTI DUE TO EXTENDED-SPECTRUM BETA LACTAMASE (ESBL) PRODUCING ESCHERICHIA COLI: Primary | ICD-10-CM

## 2019-10-18 DIAGNOSIS — J44.1 COPD WITH ACUTE EXACERBATION (HCC): ICD-10-CM

## 2019-10-18 DIAGNOSIS — Z16.12 UTI DUE TO EXTENDED-SPECTRUM BETA LACTAMASE (ESBL) PRODUCING ESCHERICHIA COLI: Primary | ICD-10-CM

## 2019-10-18 DIAGNOSIS — R06.02 SHORTNESS OF BREATH: Primary | ICD-10-CM

## 2019-10-18 LAB
ALBUMIN SERPL-MCNC: 3.4 G/DL (ref 3.5–5.2)
ALBUMIN/GLOB SERPL: 0.8 G/DL
ALP SERPL-CCNC: 223 U/L (ref 39–117)
ALT SERPL W P-5'-P-CCNC: 46 U/L (ref 1–33)
ANION GAP SERPL CALCULATED.3IONS-SCNC: 12.5 MMOL/L (ref 5–15)
ARTERIAL PATENCY WRIST A: POSITIVE
AST SERPL-CCNC: 32 U/L (ref 1–32)
ATMOSPHERIC PRESS: 747.6 MMHG
BASE EXCESS BLDA CALC-SCNC: 3.5 MMOL/L (ref 0–2)
BASOPHILS # BLD AUTO: 0.09 10*3/MM3 (ref 0–0.2)
BASOPHILS NFR BLD AUTO: 1 % (ref 0–1.5)
BDY SITE: ABNORMAL
BILIRUB SERPL-MCNC: 0.2 MG/DL (ref 0.2–1.2)
BUN BLD-MCNC: 19 MG/DL (ref 6–20)
BUN/CREAT SERPL: 17.3 (ref 7–25)
CALCIUM SPEC-SCNC: 9.3 MG/DL (ref 8.6–10.5)
CHLORIDE SERPL-SCNC: 103 MMOL/L (ref 98–107)
CO2 SERPL-SCNC: 27.5 MMOL/L (ref 22–29)
CREAT BLD-MCNC: 1.1 MG/DL (ref 0.57–1)
D-LACTATE SERPL-SCNC: 1.4 MMOL/L (ref 0.5–2)
DEPRECATED RDW RBC AUTO: 44 FL (ref 37–54)
EOSINOPHIL # BLD AUTO: 0.33 10*3/MM3 (ref 0–0.4)
EOSINOPHIL NFR BLD AUTO: 3.7 % (ref 0.3–6.2)
ERYTHROCYTE [DISTWIDTH] IN BLOOD BY AUTOMATED COUNT: 14.6 % (ref 12.3–15.4)
GAS FLOW AIRWAY: 15 LPM
GFR SERPL CREATININE-BSD FRML MDRD: 51 ML/MIN/1.73
GLOBULIN UR ELPH-MCNC: 4.1 GM/DL
GLUCOSE BLD-MCNC: 109 MG/DL (ref 65–99)
HCO3 BLDA-SCNC: 29.4 MMOL/L (ref 22–28)
HCT VFR BLD AUTO: 41.7 % (ref 34–46.6)
HGB BLD-MCNC: 13.4 G/DL (ref 12–15.9)
IMM GRANULOCYTES # BLD AUTO: 0.03 10*3/MM3 (ref 0–0.05)
IMM GRANULOCYTES NFR BLD AUTO: 0.3 % (ref 0–0.5)
INR PPP: 1 (ref 0.9–1.1)
LYMPHOCYTES # BLD AUTO: 2.4 10*3/MM3 (ref 0.7–3.1)
LYMPHOCYTES NFR BLD AUTO: 26.8 % (ref 19.6–45.3)
MAGNESIUM SERPL-MCNC: 2 MG/DL (ref 1.6–2.6)
MCH RBC QN AUTO: 26.8 PG (ref 26.6–33)
MCHC RBC AUTO-ENTMCNC: 32.1 G/DL (ref 31.5–35.7)
MCV RBC AUTO: 83.4 FL (ref 79–97)
MODALITY: ABNORMAL
MONOCYTES # BLD AUTO: 0.62 10*3/MM3 (ref 0.1–0.9)
MONOCYTES NFR BLD AUTO: 6.9 % (ref 5–12)
NEUTROPHILS # BLD AUTO: 5.49 10*3/MM3 (ref 1.7–7)
NEUTROPHILS NFR BLD AUTO: 61.3 % (ref 42.7–76)
NRBC BLD AUTO-RTO: 0 /100 WBC (ref 0–0.2)
NT-PROBNP SERPL-MCNC: 82.3 PG/ML (ref 5–900)
PCO2 BLDA: 47.9 MM HG (ref 35–45)
PH BLDA: 7.39 PH UNITS (ref 7.35–7.45)
PLATELET # BLD AUTO: 306 10*3/MM3 (ref 140–450)
PMV BLD AUTO: 11 FL (ref 6–12)
PO2 BLDA: 99.6 MM HG (ref 80–100)
POTASSIUM BLD-SCNC: 3.9 MMOL/L (ref 3.5–5.2)
PROCALCITONIN SERPL-MCNC: 0.06 NG/ML (ref 0.1–0.25)
PROT SERPL-MCNC: 7.5 G/DL (ref 6–8.5)
PROTHROMBIN TIME: 12.9 SECONDS (ref 11.7–14.2)
RBC # BLD AUTO: 5 10*6/MM3 (ref 3.77–5.28)
SAO2 % BLDCOA: 97.6 % (ref 92–99)
SODIUM BLD-SCNC: 143 MMOL/L (ref 136–145)
TOTAL RATE: 28 BREATHS/MINUTE
TROPONIN T SERPL-MCNC: 0.05 NG/ML (ref 0–0.03)
WBC NRBC COR # BLD: 8.96 10*3/MM3 (ref 3.4–10.8)

## 2019-10-18 PROCEDURE — 36600 WITHDRAWAL OF ARTERIAL BLOOD: CPT

## 2019-10-18 PROCEDURE — 85610 PROTHROMBIN TIME: CPT | Performed by: EMERGENCY MEDICINE

## 2019-10-18 PROCEDURE — 36415 COLL VENOUS BLD VENIPUNCTURE: CPT

## 2019-10-18 PROCEDURE — 25010000002 METHYLPREDNISOLONE PER 125 MG: Performed by: EMERGENCY MEDICINE

## 2019-10-18 PROCEDURE — 83735 ASSAY OF MAGNESIUM: CPT | Performed by: EMERGENCY MEDICINE

## 2019-10-18 PROCEDURE — 83880 ASSAY OF NATRIURETIC PEPTIDE: CPT | Performed by: EMERGENCY MEDICINE

## 2019-10-18 PROCEDURE — 99284 EMERGENCY DEPT VISIT MOD MDM: CPT

## 2019-10-18 PROCEDURE — 71045 X-RAY EXAM CHEST 1 VIEW: CPT

## 2019-10-18 PROCEDURE — 25010000002 ERTAPENEM PER 500 MG: Performed by: INTERNAL MEDICINE

## 2019-10-18 PROCEDURE — 84145 PROCALCITONIN (PCT): CPT | Performed by: EMERGENCY MEDICINE

## 2019-10-18 PROCEDURE — 83605 ASSAY OF LACTIC ACID: CPT | Performed by: EMERGENCY MEDICINE

## 2019-10-18 PROCEDURE — 94799 UNLISTED PULMONARY SVC/PX: CPT

## 2019-10-18 PROCEDURE — 87040 BLOOD CULTURE FOR BACTERIA: CPT | Performed by: EMERGENCY MEDICINE

## 2019-10-18 PROCEDURE — 85025 COMPLETE CBC W/AUTO DIFF WBC: CPT | Performed by: EMERGENCY MEDICINE

## 2019-10-18 PROCEDURE — 93005 ELECTROCARDIOGRAM TRACING: CPT | Performed by: EMERGENCY MEDICINE

## 2019-10-18 PROCEDURE — 82803 BLOOD GASES ANY COMBINATION: CPT

## 2019-10-18 PROCEDURE — 80053 COMPREHEN METABOLIC PANEL: CPT | Performed by: EMERGENCY MEDICINE

## 2019-10-18 PROCEDURE — 94640 AIRWAY INHALATION TREATMENT: CPT

## 2019-10-18 PROCEDURE — 96372 THER/PROPH/DIAG INJ SC/IM: CPT

## 2019-10-18 PROCEDURE — 25010000003 LIDOCAINE 1 % SOLUTION: Performed by: INTERNAL MEDICINE

## 2019-10-18 PROCEDURE — 84484 ASSAY OF TROPONIN QUANT: CPT | Performed by: EMERGENCY MEDICINE

## 2019-10-18 RX ORDER — ALBUTEROL SULFATE 2.5 MG/3ML
2.5 SOLUTION RESPIRATORY (INHALATION) ONCE
Status: COMPLETED | OUTPATIENT
Start: 2019-10-18 | End: 2019-10-18

## 2019-10-18 RX ORDER — ERTAPENEM 1 G/1
1 INJECTION, POWDER, LYOPHILIZED, FOR SOLUTION INTRAMUSCULAR; INTRAVENOUS EVERY 24 HOURS
Status: DISCONTINUED | OUTPATIENT
Start: 2019-10-18 | End: 2019-10-20 | Stop reason: HOSPADM

## 2019-10-18 RX ORDER — LIDOCAINE HYDROCHLORIDE 10 MG/ML
3.2 INJECTION, SOLUTION INFILTRATION; PERINEURAL ONCE
Status: COMPLETED | OUTPATIENT
Start: 2019-10-18 | End: 2019-10-18

## 2019-10-18 RX ORDER — LIDOCAINE HYDROCHLORIDE 10 MG/ML
3.2 INJECTION, SOLUTION INFILTRATION; PERINEURAL ONCE
Status: CANCELLED
Start: 2019-10-18 | End: 2019-10-18

## 2019-10-18 RX ORDER — METHYLPREDNISOLONE SODIUM SUCCINATE 125 MG/2ML
125 INJECTION, POWDER, LYOPHILIZED, FOR SOLUTION INTRAMUSCULAR; INTRAVENOUS ONCE
Status: COMPLETED | OUTPATIENT
Start: 2019-10-18 | End: 2019-10-18

## 2019-10-18 RX ORDER — ERTAPENEM 1 G/1
1 INJECTION, POWDER, LYOPHILIZED, FOR SOLUTION INTRAMUSCULAR; INTRAVENOUS EVERY 24 HOURS
Status: CANCELLED | OUTPATIENT
Start: 2019-10-18

## 2019-10-18 RX ORDER — IPRATROPIUM BROMIDE AND ALBUTEROL SULFATE 2.5; .5 MG/3ML; MG/3ML
3 SOLUTION RESPIRATORY (INHALATION) ONCE
Status: COMPLETED | OUTPATIENT
Start: 2019-10-18 | End: 2019-10-18

## 2019-10-18 RX ORDER — LIDOCAINE HYDROCHLORIDE 10 MG/ML
3.2 INJECTION, SOLUTION INFILTRATION; PERINEURAL ONCE
Status: CANCELLED
Start: 2019-10-19 | End: 2019-10-19

## 2019-10-18 RX ADMIN — ERTAPENEM SODIUM 1 G: 1 INJECTION, POWDER, LYOPHILIZED, FOR SOLUTION INTRAMUSCULAR; INTRAVENOUS at 12:50

## 2019-10-18 RX ADMIN — ALBUTEROL SULFATE 2.5 MG: 2.5 SOLUTION RESPIRATORY (INHALATION) at 23:05

## 2019-10-18 RX ADMIN — IPRATROPIUM BROMIDE AND ALBUTEROL SULFATE 3 ML: 2.5; .5 SOLUTION RESPIRATORY (INHALATION) at 22:13

## 2019-10-18 RX ADMIN — METHYLPREDNISOLONE SODIUM SUCCINATE 125 MG: 125 INJECTION, POWDER, FOR SOLUTION INTRAMUSCULAR; INTRAVENOUS at 22:11

## 2019-10-18 RX ADMIN — LIDOCAINE HYDROCHLORIDE 3.2 ML: 10 INJECTION, SOLUTION INFILTRATION; PERINEURAL at 12:50

## 2019-10-18 NOTE — OUTREACH NOTE
Sepsis Week 1 Survey      Responses   Facility patient discharged from?  [facility]   Does the patient have one of the following disease processes/diagnoses(primary or secondary)?  Sepsis   Is there a successful TCM telephone encounter documented?  No   Week 1 attempt successful?  No   Unsuccessful attempts  Attempt 1          Tracie Evans RN

## 2019-10-19 ENCOUNTER — READMISSION MANAGEMENT (OUTPATIENT)
Dept: CALL CENTER | Facility: HOSPITAL | Age: 59
End: 2019-10-19

## 2019-10-19 ENCOUNTER — HOSPITAL ENCOUNTER (OUTPATIENT)
Dept: INFUSION THERAPY | Facility: HOSPITAL | Age: 59
Discharge: HOME | End: 2019-10-19

## 2019-10-19 PROBLEM — J20.9 ACUTE TRACHEOBRONCHITIS: Status: ACTIVE | Noted: 2019-10-19

## 2019-10-19 PROBLEM — I50.32 CHRONIC DIASTOLIC CHF (CONGESTIVE HEART FAILURE) (HCC): Status: ACTIVE | Noted: 2019-10-19

## 2019-10-19 PROBLEM — R78.81 BACTEREMIA: Status: ACTIVE | Noted: 2019-10-19

## 2019-10-19 LAB
ANION GAP SERPL CALCULATED.3IONS-SCNC: 18.1 MMOL/L (ref 5–15)
BUN BLD-MCNC: 22 MG/DL (ref 6–20)
BUN/CREAT SERPL: 18.2 (ref 7–25)
CALCIUM SPEC-SCNC: 9.8 MG/DL (ref 8.6–10.5)
CHLORIDE SERPL-SCNC: 97 MMOL/L (ref 98–107)
CO2 SERPL-SCNC: 20.9 MMOL/L (ref 22–29)
CREAT BLD-MCNC: 1.21 MG/DL (ref 0.57–1)
DEPRECATED RDW RBC AUTO: 43.6 FL (ref 37–54)
ERYTHROCYTE [DISTWIDTH] IN BLOOD BY AUTOMATED COUNT: 14.5 % (ref 12.3–15.4)
GFR SERPL CREATININE-BSD FRML MDRD: 46 ML/MIN/1.73
GLUCOSE BLD-MCNC: 271 MG/DL (ref 65–99)
GLUCOSE BLDC GLUCOMTR-MCNC: 174 MG/DL (ref 70–130)
GLUCOSE BLDC GLUCOMTR-MCNC: 238 MG/DL (ref 70–130)
HCT VFR BLD AUTO: 41.6 % (ref 34–46.6)
HGB BLD-MCNC: 12.7 G/DL (ref 12–15.9)
MAGNESIUM SERPL-MCNC: 2 MG/DL (ref 1.6–2.6)
MCH RBC QN AUTO: 25.3 PG (ref 26.6–33)
MCHC RBC AUTO-ENTMCNC: 30.5 G/DL (ref 31.5–35.7)
MCV RBC AUTO: 82.9 FL (ref 79–97)
PHOSPHATE SERPL-MCNC: 3 MG/DL (ref 2.5–4.5)
PLATELET # BLD AUTO: 294 10*3/MM3 (ref 140–450)
PMV BLD AUTO: 11.6 FL (ref 6–12)
POTASSIUM BLD-SCNC: 4.4 MMOL/L (ref 3.5–5.2)
RBC # BLD AUTO: 5.02 10*6/MM3 (ref 3.77–5.28)
SODIUM BLD-SCNC: 136 MMOL/L (ref 136–145)
TROPONIN T SERPL-MCNC: 0.04 NG/ML (ref 0–0.03)
WBC NRBC COR # BLD: 7.06 10*3/MM3 (ref 3.4–10.8)

## 2019-10-19 PROCEDURE — 85027 COMPLETE CBC AUTOMATED: CPT | Performed by: HOSPITALIST

## 2019-10-19 PROCEDURE — 83735 ASSAY OF MAGNESIUM: CPT | Performed by: HOSPITALIST

## 2019-10-19 PROCEDURE — 63710000001 PREDNISONE PER 1 MG: Performed by: INTERNAL MEDICINE

## 2019-10-19 PROCEDURE — 84100 ASSAY OF PHOSPHORUS: CPT | Performed by: HOSPITALIST

## 2019-10-19 PROCEDURE — 84484 ASSAY OF TROPONIN QUANT: CPT | Performed by: HOSPITALIST

## 2019-10-19 PROCEDURE — 94799 UNLISTED PULMONARY SVC/PX: CPT

## 2019-10-19 PROCEDURE — 82962 GLUCOSE BLOOD TEST: CPT

## 2019-10-19 PROCEDURE — 25010000002 ERTAPENEM PER 500 MG: Performed by: INTERNAL MEDICINE

## 2019-10-19 PROCEDURE — 63710000001 INSULIN LISPRO (HUMAN) PER 5 UNITS: Performed by: INTERNAL MEDICINE

## 2019-10-19 PROCEDURE — 80048 BASIC METABOLIC PNL TOTAL CA: CPT | Performed by: HOSPITALIST

## 2019-10-19 PROCEDURE — 25010000002 METHYLPREDNISOLONE PER 40 MG: Performed by: HOSPITALIST

## 2019-10-19 RX ORDER — ONDANSETRON 4 MG/1
4 TABLET, FILM COATED ORAL EVERY 6 HOURS PRN
Status: DISCONTINUED | OUTPATIENT
Start: 2019-10-19 | End: 2019-10-20 | Stop reason: HOSPADM

## 2019-10-19 RX ORDER — ASPIRIN 81 MG/1
81 TABLET, CHEWABLE ORAL DAILY
Status: DISCONTINUED | OUTPATIENT
Start: 2019-10-19 | End: 2019-10-20 | Stop reason: HOSPADM

## 2019-10-19 RX ORDER — NITROGLYCERIN 0.4 MG/1
0.4 TABLET SUBLINGUAL
Status: DISCONTINUED | OUTPATIENT
Start: 2019-10-19 | End: 2019-10-20 | Stop reason: HOSPADM

## 2019-10-19 RX ORDER — GABAPENTIN 300 MG/1
300 CAPSULE ORAL 3 TIMES DAILY
Status: DISCONTINUED | OUTPATIENT
Start: 2019-10-19 | End: 2019-10-20 | Stop reason: HOSPADM

## 2019-10-19 RX ORDER — METOPROLOL SUCCINATE 25 MG/1
25 TABLET, EXTENDED RELEASE ORAL DAILY
Status: DISCONTINUED | OUTPATIENT
Start: 2019-10-19 | End: 2019-10-20 | Stop reason: HOSPADM

## 2019-10-19 RX ORDER — PRAMIPEXOLE DIHYDROCHLORIDE 0.25 MG/1
0.25 TABLET ORAL NIGHTLY
Status: DISCONTINUED | OUTPATIENT
Start: 2019-10-19 | End: 2019-10-20 | Stop reason: HOSPADM

## 2019-10-19 RX ORDER — METHYLPREDNISOLONE SODIUM SUCCINATE 40 MG/ML
40 INJECTION, POWDER, LYOPHILIZED, FOR SOLUTION INTRAMUSCULAR; INTRAVENOUS EVERY 8 HOURS
Status: DISCONTINUED | OUTPATIENT
Start: 2019-10-19 | End: 2019-10-19

## 2019-10-19 RX ORDER — ALBUTEROL SULFATE 2.5 MG/3ML
2.5 SOLUTION RESPIRATORY (INHALATION) EVERY 6 HOURS PRN
Status: DISCONTINUED | OUTPATIENT
Start: 2019-10-19 | End: 2019-10-20 | Stop reason: HOSPADM

## 2019-10-19 RX ORDER — AMLODIPINE BESYLATE 5 MG/1
5 TABLET ORAL
Status: DISCONTINUED | OUTPATIENT
Start: 2019-10-19 | End: 2019-10-20 | Stop reason: HOSPADM

## 2019-10-19 RX ORDER — ACETAMINOPHEN 160 MG/5ML
650 SOLUTION ORAL EVERY 4 HOURS PRN
Status: DISCONTINUED | OUTPATIENT
Start: 2019-10-19 | End: 2019-10-20 | Stop reason: HOSPADM

## 2019-10-19 RX ORDER — SODIUM CHLORIDE 0.9 % (FLUSH) 0.9 %
10 SYRINGE (ML) INJECTION AS NEEDED
Status: DISCONTINUED | OUTPATIENT
Start: 2019-10-19 | End: 2019-10-20 | Stop reason: HOSPADM

## 2019-10-19 RX ORDER — ACETAMINOPHEN 325 MG/1
650 TABLET ORAL EVERY 4 HOURS PRN
Status: DISCONTINUED | OUTPATIENT
Start: 2019-10-19 | End: 2019-10-20 | Stop reason: HOSPADM

## 2019-10-19 RX ORDER — BISACODYL 5 MG/1
5 TABLET, DELAYED RELEASE ORAL DAILY PRN
Status: DISCONTINUED | OUTPATIENT
Start: 2019-10-19 | End: 2019-10-20 | Stop reason: HOSPADM

## 2019-10-19 RX ORDER — IPRATROPIUM BROMIDE AND ALBUTEROL SULFATE 2.5; .5 MG/3ML; MG/3ML
3 SOLUTION RESPIRATORY (INHALATION)
Status: DISCONTINUED | OUTPATIENT
Start: 2019-10-19 | End: 2019-10-20 | Stop reason: HOSPADM

## 2019-10-19 RX ORDER — ESCITALOPRAM OXALATE 20 MG/1
20 TABLET ORAL DAILY
Status: DISCONTINUED | OUTPATIENT
Start: 2019-10-19 | End: 2019-10-20 | Stop reason: HOSPADM

## 2019-10-19 RX ORDER — BISACODYL 10 MG
10 SUPPOSITORY, RECTAL RECTAL DAILY PRN
Status: DISCONTINUED | OUTPATIENT
Start: 2019-10-19 | End: 2019-10-20 | Stop reason: HOSPADM

## 2019-10-19 RX ORDER — ONDANSETRON 2 MG/ML
4 INJECTION INTRAMUSCULAR; INTRAVENOUS EVERY 6 HOURS PRN
Status: DISCONTINUED | OUTPATIENT
Start: 2019-10-19 | End: 2019-10-20 | Stop reason: HOSPADM

## 2019-10-19 RX ORDER — ACETAMINOPHEN 650 MG/1
650 SUPPOSITORY RECTAL EVERY 4 HOURS PRN
Status: DISCONTINUED | OUTPATIENT
Start: 2019-10-19 | End: 2019-10-20 | Stop reason: HOSPADM

## 2019-10-19 RX ORDER — PREDNISONE 20 MG/1
20 TABLET ORAL
Status: DISCONTINUED | OUTPATIENT
Start: 2019-10-19 | End: 2019-10-19

## 2019-10-19 RX ORDER — IPRATROPIUM BROMIDE AND ALBUTEROL SULFATE 2.5; .5 MG/3ML; MG/3ML
3 SOLUTION RESPIRATORY (INHALATION) EVERY 4 HOURS PRN
Status: DISCONTINUED | OUTPATIENT
Start: 2019-10-19 | End: 2019-10-20 | Stop reason: HOSPADM

## 2019-10-19 RX ORDER — PREDNISONE 20 MG/1
20 TABLET ORAL
Status: DISCONTINUED | OUTPATIENT
Start: 2019-10-19 | End: 2019-10-20 | Stop reason: HOSPADM

## 2019-10-19 RX ORDER — DEXTROSE MONOHYDRATE 25 G/50ML
25 INJECTION, SOLUTION INTRAVENOUS
Status: DISCONTINUED | OUTPATIENT
Start: 2019-10-19 | End: 2019-10-20 | Stop reason: HOSPADM

## 2019-10-19 RX ORDER — PANTOPRAZOLE SODIUM 40 MG/1
40 TABLET, DELAYED RELEASE ORAL EVERY MORNING
Status: DISCONTINUED | OUTPATIENT
Start: 2019-10-19 | End: 2019-10-20 | Stop reason: HOSPADM

## 2019-10-19 RX ORDER — NICOTINE POLACRILEX 4 MG
15 LOZENGE BUCCAL
Status: DISCONTINUED | OUTPATIENT
Start: 2019-10-19 | End: 2019-10-20 | Stop reason: HOSPADM

## 2019-10-19 RX ORDER — BUDESONIDE 0.5 MG/2ML
0.5 INHALANT ORAL 2 TIMES DAILY
Status: DISCONTINUED | OUTPATIENT
Start: 2019-10-19 | End: 2019-10-20 | Stop reason: HOSPADM

## 2019-10-19 RX ORDER — THIAMINE MONONITRATE (VIT B1) 100 MG
100 TABLET ORAL DAILY
Status: DISCONTINUED | OUTPATIENT
Start: 2019-10-19 | End: 2019-10-20 | Stop reason: HOSPADM

## 2019-10-19 RX ORDER — OXYCODONE HYDROCHLORIDE AND ACETAMINOPHEN 5; 325 MG/1; MG/1
1 TABLET ORAL EVERY 6 HOURS PRN
Status: DISCONTINUED | OUTPATIENT
Start: 2019-10-19 | End: 2019-10-20 | Stop reason: HOSPADM

## 2019-10-19 RX ORDER — SODIUM CHLORIDE 0.9 % (FLUSH) 0.9 %
10 SYRINGE (ML) INJECTION EVERY 12 HOURS SCHEDULED
Status: DISCONTINUED | OUTPATIENT
Start: 2019-10-19 | End: 2019-10-20 | Stop reason: HOSPADM

## 2019-10-19 RX ORDER — BUSPIRONE HYDROCHLORIDE 5 MG/1
5 TABLET ORAL 3 TIMES DAILY
Status: DISCONTINUED | OUTPATIENT
Start: 2019-10-19 | End: 2019-10-20 | Stop reason: HOSPADM

## 2019-10-19 RX ADMIN — BUSPIRONE HYDROCHLORIDE 5 MG: 5 TABLET ORAL at 16:19

## 2019-10-19 RX ADMIN — OXYCODONE HYDROCHLORIDE AND ACETAMINOPHEN 1 TABLET: 5; 325 TABLET ORAL at 08:31

## 2019-10-19 RX ADMIN — METOPROLOL SUCCINATE 25 MG: 25 TABLET, FILM COATED, EXTENDED RELEASE ORAL at 08:24

## 2019-10-19 RX ADMIN — IPRATROPIUM BROMIDE AND ALBUTEROL SULFATE 3 ML: 2.5; .5 SOLUTION RESPIRATORY (INHALATION) at 16:22

## 2019-10-19 RX ADMIN — ESCITALOPRAM 20 MG: 20 TABLET, FILM COATED ORAL at 08:24

## 2019-10-19 RX ADMIN — GABAPENTIN 300 MG: 300 CAPSULE ORAL at 22:47

## 2019-10-19 RX ADMIN — PANTOPRAZOLE SODIUM 40 MG: 40 TABLET, DELAYED RELEASE ORAL at 06:46

## 2019-10-19 RX ADMIN — AMLODIPINE BESYLATE 5 MG: 5 TABLET ORAL at 08:23

## 2019-10-19 RX ADMIN — IPRATROPIUM BROMIDE AND ALBUTEROL SULFATE 3 ML: 2.5; .5 SOLUTION RESPIRATORY (INHALATION) at 11:17

## 2019-10-19 RX ADMIN — GABAPENTIN 300 MG: 300 CAPSULE ORAL at 08:23

## 2019-10-19 RX ADMIN — IPRATROPIUM BROMIDE AND ALBUTEROL SULFATE 3 ML: 2.5; .5 SOLUTION RESPIRATORY (INHALATION) at 19:18

## 2019-10-19 RX ADMIN — BUSPIRONE HYDROCHLORIDE 5 MG: 5 TABLET ORAL at 22:04

## 2019-10-19 RX ADMIN — PRAMIPEXOLE DIHYDROCHLORIDE 0.25 MG: 0.25 TABLET ORAL at 03:08

## 2019-10-19 RX ADMIN — METHYLPREDNISOLONE SODIUM SUCCINATE 40 MG: 40 INJECTION, POWDER, FOR SOLUTION INTRAMUSCULAR; INTRAVENOUS at 14:20

## 2019-10-19 RX ADMIN — PRAMIPEXOLE DIHYDROCHLORIDE 0.25 MG: 0.25 TABLET ORAL at 22:04

## 2019-10-19 RX ADMIN — BUDESONIDE 0.5 MG: 0.5 INHALANT RESPIRATORY (INHALATION) at 07:15

## 2019-10-19 RX ADMIN — GABAPENTIN 300 MG: 300 CAPSULE ORAL at 16:19

## 2019-10-19 RX ADMIN — ERTAPENEM SODIUM 1 G: 1 INJECTION, POWDER, LYOPHILIZED, FOR SOLUTION INTRAMUSCULAR; INTRAVENOUS at 17:46

## 2019-10-19 RX ADMIN — SODIUM CHLORIDE, PRESERVATIVE FREE 10 ML: 5 INJECTION INTRAVENOUS at 22:05

## 2019-10-19 RX ADMIN — ASPIRIN 81 MG: 81 TABLET, CHEWABLE ORAL at 08:24

## 2019-10-19 RX ADMIN — INSULIN LISPRO 2 UNITS: 100 INJECTION, SOLUTION INTRAVENOUS; SUBCUTANEOUS at 22:48

## 2019-10-19 RX ADMIN — PREDNISONE 20 MG: 20 TABLET ORAL at 22:04

## 2019-10-19 RX ADMIN — Medication 100 MG: at 08:24

## 2019-10-19 RX ADMIN — SODIUM CHLORIDE, PRESERVATIVE FREE 10 ML: 5 INJECTION INTRAVENOUS at 03:09

## 2019-10-19 RX ADMIN — IPRATROPIUM BROMIDE AND ALBUTEROL SULFATE 3 ML: 2.5; .5 SOLUTION RESPIRATORY (INHALATION) at 07:03

## 2019-10-19 RX ADMIN — INSULIN LISPRO 4 UNITS: 100 INJECTION, SOLUTION INTRAVENOUS; SUBCUTANEOUS at 17:13

## 2019-10-19 RX ADMIN — BUSPIRONE HYDROCHLORIDE 5 MG: 5 TABLET ORAL at 08:24

## 2019-10-19 RX ADMIN — SODIUM CHLORIDE, PRESERVATIVE FREE 10 ML: 5 INJECTION INTRAVENOUS at 08:24

## 2019-10-19 RX ADMIN — METHYLPREDNISOLONE SODIUM SUCCINATE 40 MG: 40 INJECTION, POWDER, FOR SOLUTION INTRAMUSCULAR; INTRAVENOUS at 06:46

## 2019-10-19 RX ADMIN — BUDESONIDE 0.5 MG: 0.5 INHALANT RESPIRATORY (INHALATION) at 19:17

## 2019-10-19 NOTE — PLAN OF CARE
Problem: Patient Care Overview  Goal: Plan of Care Review  Outcome: Ongoing (interventions implemented as appropriate)   10/19/19 0339   Coping/Psychosocial   Plan of Care Reviewed With patient   Plan of Care Review   Progress improving   OTHER   Outcome Summary Admitted again after d/c 10/14 for similar symptoms. Resting peacefully all night. No issues with trach. On home o2 settings.        Problem: Airway, Artificial (Adult)  Goal: Signs and Symptoms of Listed Potential Problems Will be Absent, Minimized or Managed (Airway, Artificial)  Outcome: Ongoing (interventions implemented as appropriate)

## 2019-10-19 NOTE — ED PROVIDER NOTES
EMERGENCY DEPARTMENT ENCOUNTER    CHIEF COMPLAINT  Chief Complaint: SOA  History given by: patient  History limited by: none  Room Number: 13/13  PMD: Provider, No Known      HPI:  Pt is a 59 y.o. female who presents via EMS complaining of SOA that began this AM. Pt states she had her trach replaced recently and also was just admitted for a similar episode of SOA. Pt is on 8L of supplemental oxygen at baseline. Pt denies cp, abd pain, or any other episodes.        Duration:  One day  Onset: gradual  Timing: constant  Location: chest  Intensity/Severity: moderate  Progression: unchanged  Associated Symptoms: none  Treatment before arrival: EMS care and treatment    PAST MEDICAL HISTORY  Active Ambulatory Problems     Diagnosis Date Noted   • Tracheostomy complication (CMS/HCC) 12/18/2018   • Gangrene of toe (CMS/HCC) 12/20/2018   • Acute diastolic congestive heart failure (CMS/HCC) 12/24/2018   • ARF (acute renal failure) (CMS/HCC) 12/24/2018   • Stage 3 chronic kidney disease (CMS/HCC) 12/24/2018   • Elevated troponin 12/24/2018   • Acute respiratory failure with hypoxemia (CMS/HCC) 12/24/2018   • Acute osteomyelitis (CMS/HCC) 12/26/2018   • UTI due to extended-spectrum beta lactamase (ESBL) producing Escherichia coli 12/27/2018   • Thrush, oral 12/28/2018   • Tracheostomy malfunction (CMS/HCC) 07/10/2019   • COPD (chronic obstructive pulmonary disease) (CMS/HCC) 07/10/2019   • Respiratory failure, chronic (CMS/HCC) 07/10/2019   • PEG (percutaneous endoscopic gastrostomy) status (CMS/HCC) 07/10/2019   • History of osteomyelitis 07/10/2019   • Polysubstance abuse (CMS/HCC) 07/10/2019   • History of tracheal stenosis 07/10/2019   • Chronic diastolic CHF (congestive heart failure) (CMS/HCC) 07/10/2019   • Peripheral artery disease (CMS/HCC) 07/10/2019   • Medically noncompliant 07/10/2019   • WENDI and COPD overlap syndrome (CMS/HCC) 07/10/2019   • Dyspnea 09/22/2019   • Elevated LFTs 09/22/2019   • Elevated troponin  2019   • Tracheostomy present (CMS/formerly [facility]) 2019   • Essential hypertension 2019   • Dysphagia 2019   • Healthcare associated bacterial pneumonia 10/04/2019   • Infection due to ESBL-producing Escherichia coli 10/06/2019   • Sepsis due to Gram negative bacteria (CMS/formerly [facility]) 10/06/2019   • Ureteral stone with hydronephrosis 10/08/2019   • UTI (urinary tract infection) 10/08/2019     Resolved Ambulatory Problems     Diagnosis Date Noted   • No Resolved Ambulatory Problems     Past Medical History:   Diagnosis Date   • Acute congestive heart failure (CMS/formerly [facility]) 2018   • Acute osteomyelitis (CMS/HCC)    • Acute renal failure on dialysis (CMS/HCC)    • Anxiety    • Pugh esophagus    • CKD (chronic kidney disease)    • COPD (chronic obstructive pulmonary disease) (CMS/HCC)    • MRSA infection    • Nontraumatic subarachnoid hemorrhage (CMS/HCC)    • Seizures (CMS/HCC)    • Tracheostomy present (CMS/HCC)        PAST SURGICAL HISTORY  Past Surgical History:   Procedure Laterality Date   • TRACHEOSTOMY     • URETEROSCOPY LASER LITHOTRIPSY WITH STENT INSERTION Left 10/11/2019    Procedure: CYSTOSCOPY,  URETEROSCOPY, LEFT RETROGRADE, LEFT PYELOGRAM, LASER LITHOTRIPSY, PLACEMENT OF STENT.;  Surgeon: Clyde Selby MD;  Location: [facility];  Service: Urology       FAMILY HISTORY  Family History   Problem Relation Age of Onset   • Diabetes Mother    • Hypertension Mother        SOCIAL HISTORY  Social History     Socioeconomic History   • Marital status:      Spouse name: Not on file   • Number of children: Not on file   • Years of education: Not on file   • Highest education level: Not on file   Tobacco Use   • Smoking status: Former Smoker     Packs/day: 1.00     Types: Cigarettes     Last attempt to quit: 2018     Years since quittin.2   • Smokeless tobacco: Never Used   Substance and Sexual Activity   • Alcohol use: No     Frequency: Never   • Drug use: Yes     Types: Cocaine      Comment: 20 years ago occasional   • Sexual activity: Defer       ALLERGIES  Cephalexin; Hydrocodone; Strawberry; and Zithromax [azithromycin]    REVIEW OF SYSTEMS  Review of Systems   Constitutional: Negative for fever.   HENT: Negative for sore throat.    Eyes: Negative.    Respiratory: Positive for shortness of breath. Negative for cough.    Cardiovascular: Negative for chest pain.   Gastrointestinal: Negative for abdominal pain, diarrhea and vomiting.   Genitourinary: Negative for dysuria.   Musculoskeletal: Negative for neck pain.   Skin: Negative for rash.   Allergic/Immunologic: Negative.    Neurological: Negative for weakness, numbness and headaches.   Hematological: Negative.    Psychiatric/Behavioral: Negative.    All other systems reviewed and are negative.      PHYSICAL EXAM  ED Triage Vitals [10/18/19 2120]   Temp Heart Rate Resp BP SpO2   98.3 °F (36.8 °C) 98 -- -- 98 %      Temp src Heart Rate Source Patient Position BP Location FiO2 (%)   Oral -- -- -- --       Physical Exam   Constitutional: She is oriented to person, place, and time. No distress.   HENT:   Head: Normocephalic and atraumatic.   Dry mucous membranes   Eyes: EOM are normal. Pupils are equal, round, and reactive to light.   Pale conjunctiva   Neck: Normal range of motion. Neck supple.   Cardiovascular: Normal rate, regular rhythm and normal heart sounds.   100 BPM   Pulmonary/Chest: She is in respiratory distress (moderate). She has wheezes (bilaterally).   Trach in place    Abdominal: Soft. Bowel sounds are normal. She exhibits no distension. There is no tenderness. There is no rebound and no guarding.   Musculoskeletal: Normal range of motion. She exhibits no edema (pedal) or tenderness (calf).   Neurological: She is alert and oriented to person, place, and time. She has normal sensation and normal strength.   nonfocal neuro exam   Skin: Skin is warm and dry. No rash noted.   Psychiatric: Mood and affect normal.   Nursing note  and vitals reviewed.      LAB RESULTS  Lab Results (last 24 hours)     Procedure Component Value Units Date/Time    CBC & Differential [[id number]] Collected:  10/18/19 2202    Specimen:  Blood Updated:  10/18/19 2215    Narrative:       The following orders were created for panel order CBC & Differential.  Procedure                               Abnormality         Status                     ---------                               -----------         ------                     CBC Auto Differential[[id number]]        Normal              Final result                 Please view results for these tests on the individual orders.    Comprehensive Metabolic Panel [[id number]]  (Abnormal) Collected:  10/18/19 2202    Specimen:  Blood Updated:  10/18/19 2237     Glucose 109 mg/dL      BUN 19 mg/dL      Creatinine 1.10 mg/dL      Sodium 143 mmol/L      Potassium 3.9 mmol/L      Chloride 103 mmol/L      CO2 27.5 mmol/L      Calcium 9.3 mg/dL      Total Protein 7.5 g/dL      Albumin 3.40 g/dL      ALT (SGPT) 46 U/L      AST (SGOT) 32 U/L      Alkaline Phosphatase 223 U/L      Total Bilirubin 0.2 mg/dL      eGFR Non African Amer 51 mL/min/1.73      Globulin 4.1 gm/dL      A/G Ratio 0.8 g/dL      BUN/Creatinine Ratio 17.3     Anion Gap 12.5 mmol/L     Narrative:       GFR Normal >60  Chronic Kidney Disease <60  Kidney Failure <15    Protime-INR [[id number]]  (Normal) Collected:  10/18/19 2202    Specimen:  Blood Updated:  10/18/19 2224     Protime 12.9 Seconds      INR 1.00    BNP [[id number]]  (Normal) Collected:  10/18/19 2202    Specimen:  Blood Updated:  10/18/19 2234     proBNP 82.3 pg/mL     Narrative:       Among patients with dyspnea, NT-proBNP is highly sensitive for the detection of acute congestive heart failure. In addition NT-proBNP of <300 pg/ml effectively rules out acute congestive heart failure with 99% negative predictive value.    Troponin [[id number]]  (Abnormal) Collected:  10/18/19 2202    Specimen:  Blood  "Updated:  10/18/19 2250     Troponin T 0.053 ng/mL     Narrative:       Troponin T Reference Range:  <= 0.03 ng/mL-   Negative for AMI  >0.03 ng/mL-     Abnormal for myocardial necrosis.  Clinicians would have to utilize clinical acumen, EKG, Troponin and serial changes to determine if it is an Acute Myocardial Infarction or myocardial injury due to an underlying chronic condition.     Blood Culture - Blood, Arm, Left [[id number]] Collected:  10/18/19 2202    Specimen:  Blood from Arm, Left Updated:  10/18/19 2211    Procalcitonin [[id number]]  (Abnormal) Collected:  10/18/19 2202    Specimen:  Blood Updated:  10/18/19 2241     Procalcitonin 0.06 ng/mL     Narrative:       As a Marker for Sepsis (Non-Neonates):   1. <0.5 ng/mL represents a low risk of severe sepsis and/or septic shock.  1. >2 ng/mL represents a high risk of severe sepsis and/or septic shock.    As a Marker for Lower Respiratory Tract Infections that require antibiotic therapy:  PCT on Admission     Antibiotic Therapy             6-12 Hrs later  > 0.5                Strongly Recommended            >0.25 - <0.5         Recommended  0.1 - 0.25           Discouraged                   Remeasure/reassess PCT  <0.1                 Strongly Discouraged          Remeasure/reassess PCT      As 28 day mortality risk marker: \"Change in Procalcitonin Result\" (> 80 % or <=80 %) if Day 0 (or Day 1) and Day 4 values are available. Refer to http://www.[facility]-pct-calculator.com/   Change in PCT <=80 %   A decrease of PCT levels below or equal to 80 % defines a positive change in PCT test result representing a higher risk for 28-day all-cause mortality of patients diagnosed with severe sepsis or septic shock.  Change in PCT > 80 %   A decrease of PCT levels of more than 80 % defines a negative change in PCT result representing a lower risk for 28-day all-cause mortality of patients diagnosed with severe sepsis or septic shock.                Lactic Acid, Plasma " [[id number]]  (Normal) Collected:  10/18/19 2202    Specimen:  Blood Updated:  10/18/19 2226     Lactate 1.4 mmol/L     Magnesium [[id number]]  (Normal) Collected:  10/18/19 2202    Specimen:  Blood Updated:  10/18/19 2237     Magnesium 2.0 mg/dL     CBC Auto Differential [[id number]]  (Normal) Collected:  10/18/19 2202    Specimen:  Blood Updated:  10/18/19 2215     WBC 8.96 10*3/mm3      RBC 5.00 10*6/mm3      Hemoglobin 13.4 g/dL      Hematocrit 41.7 %      MCV 83.4 fL      MCH 26.8 pg      MCHC 32.1 g/dL      RDW 14.6 %      RDW-SD 44.0 fl      MPV 11.0 fL      Platelets 306 10*3/mm3      Neutrophil % 61.3 %      Lymphocyte % 26.8 %      Monocyte % 6.9 %      Eosinophil % 3.7 %      Basophil % 1.0 %      Immature Grans % 0.3 %      Neutrophils, Absolute 5.49 10*3/mm3      Lymphocytes, Absolute 2.40 10*3/mm3      Monocytes, Absolute 0.62 10*3/mm3      Eosinophils, Absolute 0.33 10*3/mm3      Basophils, Absolute 0.09 10*3/mm3      Immature Grans, Absolute 0.03 10*3/mm3      nRBC 0.0 /100 WBC     Blood Gas, Arterial [[id number]]  (Abnormal) Collected:  10/18/19 2205    Specimen:  Arterial Blood Updated:  10/18/19 2214     Site Arterial: right radial     Candelario's Test Positive     pH, Arterial 7.395 pH units      pCO2, Arterial 47.9 mm Hg      pO2, Arterial 99.6 mm Hg      HCO3, Arterial 29.4 mmol/L      Base Excess, Arterial 3.5 mmol/L      O2 Saturation Calculated 97.6 %      Barometric Pressure for Blood Gas 747.6 mmHg      Modality NRB     Flow Rate 15 lpm      Rate 28 Breaths/minute     Blood Culture - Blood, Arm, Left [[id number]] Collected:  10/18/19 2216    Specimen:  Blood from Arm, Left Updated:  10/18/19 2223          I ordered the above labs and reviewed the results    RADIOLOGY  XR Chest 1 View   Preliminary Result       No active disease by portable imaging.                   I ordered the above noted radiological studies. Interpreted by radiologist. Reviewed by me in PACS.        PROCEDURES  Procedures    EKG    EKG time: 2148  Rhythm/Rate: NSR 91 BPM  No Acute Ischemia  Non-Specific ST-T changes    unchanged compared to prior on 9/22/19    Interpreted Contemporaneously by me.  Independently viewed by me    PROGRESS AND CONSULTS     2131-Ordered lab work, EKG, and CXR for further evaluation. Solu-medrol/duo-neb ordered. Ordered placed to have trach suctioned.     2255 -patient still with increased work of breathing.  Patient still has wheezing and now rhonchi and states she still feels short of breath. Sats of 96% on 40% trach mask.  The patient's troponin is chronically elevated but I asked the hospitalist to repeat to rule out cardiac injury.    2315-discussed the patient's case with Dr. Gasca in the emergency department.  He is here and will see the patient and admit her for further evaluation and care.      MEDICAL DECISION MAKING  Results were reviewed/discussed with the patient and they were also made aware of online access. Pt also made aware that some labs, such as cultures, will not be resulted during ER visit and follow up with PMD is necessary.     MDM  Number of Diagnoses or Management Options     Amount and/or Complexity of Data Reviewed  Clinical lab tests: reviewed and ordered (See Lab section)  Tests in the radiology section of CPT®: reviewed and ordered (CXR)  Tests in the medicine section of CPT®: reviewed and ordered (See EKG procedure note.   )  Decide to obtain previous medical records or to obtain history from someone other than the patient: yes  Review and summarize past medical records: (Pt has been seen to ED multiple times for SOA and trach issues. Pt was admitted 10/4-10/14/19 for PNA, COPD, and chronic respiratory failure. They informed pt that she should be discharged to skilled nursing facility which she declined and asked to be discharged home.)  Independent visualization of images, tracings, or specimens: yes           DIAGNOSIS  Final diagnoses:    Shortness of breath   COPD with acute exacerbation (CMS/[facility], located [facility])       DISPOSITION  Admission    Latest Documented Vital Signs:  As of 11:01 PM  BP- 113/86 HR- 95 Temp- 98.3 °F (36.8 °C) (Oral) O2 sat- 95%    --  Documentation assistance provided by emilia Em for MD Christy.  Information recorded by the scribe was done at my direction and has been verified and validated by me.            Savi Em  10/18/19 2200       Chad Cannon MD  10/18/19 7869

## 2019-10-19 NOTE — H&P
Name: Swetha Luis ADMIT: 10/18/2019   : 1960  PCP: Provider, No Known    MRN: [id number] LOS: 0 days   AGE/SEX: 59 y.o. female  ROOM:      Chief Complaint   Patient presents with   • Shortness of Breath       Subjective   Patient is a 59 y.o. female who presents to [facility] with the above chief complaint.  She has multiple medical comorbidities and had a rather prolonged hospitalization at the beginning of the month for full details please see that H&P and discharge summary.  She is been out of the hospital about 5 days now and completed her IM antibiotics.  Today she woke up feeling short of breath.  She is had increased cough and has had some increased sputum production from her tracheostomy.  The shortness of breath has been worsening throughout the day and prompted her to come to the emergency room this evening.  She denies any fevers or chills no nausea or vomiting denies any choking episodes.  She is been tolerating her modified diet.  She denies any other new symptoms or complaints.  In the emergency room her chest x-ray showed no new changes no signs of pneumonia.  She was felt to have tracheobronchitis and was admitted to our service for further evaluation and management.    History of Present Illness    Past Medical History:   Diagnosis Date   • Acute congestive heart failure (CMS/HCC) 2018   • Acute osteomyelitis (CMS/HCC)     Right shoulder due to IVDA   • Acute renal failure on dialysis (CMS/HCC)    • Anxiety    • Pugh esophagus    • CKD (chronic kidney disease)    • COPD (chronic obstructive pulmonary disease) (CMS/HCC)    • MRSA infection    • Nontraumatic subarachnoid hemorrhage (CMS/HCC)    • Seizures (CMS/HCC)    • Tracheostomy present (CMS/HCC)      Past Surgical History:   Procedure Laterality Date   • TRACHEOSTOMY     • URETEROSCOPY LASER LITHOTRIPSY WITH STENT INSERTION Left 10/11/2019    Procedure: CYSTOSCOPY,  URETEROSCOPY, LEFT RETROGRADE, LEFT  PYELOGRAM, LASER LITHOTRIPSY, PLACEMENT OF STENT.;  Surgeon: Clyde Selby MD;  Location: [facility] MAIN OR;  Service: Urology     Family History   Problem Relation Age of Onset   • Diabetes Mother    • Hypertension Mother      Social History     Tobacco Use   • Smoking status: Former Smoker     Packs/day: 1.00     Types: Cigarettes     Last attempt to quit: 2018     Years since quittin.2   • Smokeless tobacco: Never Used   Substance Use Topics   • Alcohol use: No     Frequency: Never   • Drug use: Yes     Types: Cocaine     Comment: 20 years ago occasional       (Not in a hospital admission)  Allergies:  Cephalexin; Hydrocodone; Strawberry; and Zithromax [azithromycin]    Review of Systems   Constitutional: Negative for chills, fatigue and fever.   HENT: Negative for congestion, rhinorrhea and sore throat.    Eyes: Negative for photophobia, redness and visual disturbance.   Respiratory: Positive for cough, shortness of breath and wheezing. Negative for chest tightness.    Cardiovascular: Negative for chest pain, palpitations and leg swelling.   Gastrointestinal: Negative for constipation, diarrhea, nausea and vomiting.   Endocrine: Negative for polydipsia, polyphagia and polyuria.   Genitourinary: Negative for difficulty urinating, dysuria and hematuria.   Musculoskeletal: Negative for back pain, joint swelling and myalgias.   Skin: Negative for pallor and rash.   Allergic/Immunologic: Negative for environmental allergies and immunocompromised state.   Neurological: Negative for dizziness, numbness and headaches.   Hematological: Negative for adenopathy. Does not bruise/bleed easily.   Psychiatric/Behavioral: Negative for agitation, behavioral problems and confusion.        Objective    Vital Signs  Temp:  [98.3 °F (36.8 °C)-98.7 °F (37.1 °C)] 98.3 °F (36.8 °C)  Heart Rate:  [] 90  Resp:  [20-28] 20  BP: (113-122)/(79-98) 113/86  SpO2:  [95 %-100 %] 100 %  on  Flow (L/min):  [10-15] 10;   Device  (Oxygen Therapy): tracheostomy collar  Body mass index is 31.95 kg/m².    Physical Exam   Constitutional: She is oriented to person, place, and time. She appears well-developed and well-nourished. No distress.   HENT:   Head: Normocephalic and atraumatic.   tracheostomy   Eyes: Conjunctivae and EOM are normal.   Neck: Neck supple.   Cardiovascular: Normal rate, regular rhythm and normal heart sounds.   Pulmonary/Chest: Effort normal. She has wheezes. She has rales.   Abdominal: Soft. Bowel sounds are normal. She exhibits no distension.   Musculoskeletal: Normal range of motion. She exhibits no edema.   Neurological: She is alert and oriented to person, place, and time.   Skin: Skin is warm and dry. Capillary refill takes less than 2 seconds.   Psychiatric: She has a normal mood and affect. Her behavior is normal.   Nursing note and vitals reviewed.      Results Review:   I reviewed the patient's new clinical results.  Results from last 7 days   Lab Units 10/18/19  2202 10/16/19  1340 10/14/19  0418 10/13/19  0636 10/12/19  0631   WBC 10*3/mm3 8.96 8.10 7.71 8.16 8.63   HEMOGLOBIN g/dL 13.4 13.2 11.3* 11.4* 11.7*   PLATELETS 10*3/mm3 306 296 276 266 313     Results from last 7 days   Lab Units 10/18/19  2202 10/16/19  1340 10/14/19  0418 10/13/19  0636 10/12/19  0631   SODIUM mmol/L 143 141 140 138 141   POTASSIUM mmol/L 3.9 4.1 4.5 4.5 4.3   CHLORIDE mmol/L 103 102 105 102 104   CO2 mmol/L 27.5 25.4 21.1* 27.1 25.0   BUN mg/dL 19 19 20 23* 17   CREATININE mg/dL 1.10* 0.89 1.00 1.19* 1.07*   GLUCOSE mg/dL 109* 124* 86 80 81   ALBUMIN g/dL 3.40* 3.60  --   --   --    BILIRUBIN mg/dL 0.2 0.2  --   --   --    ALK PHOS U/L 223* 219*  --   --   --    AST (SGOT) U/L 32 28  --   --   --    ALT (SGPT) U/L 46* 31  --   --   --    Estimated Creatinine Clearance: 62.2 mL/min (A) (by C-G formula based on SCr of 1.1 mg/dL (H)).  Results from last 7 days   Lab Units 10/18/19  2202   INR  1.00   TROPONIN T ng/mL 0.053*   PROBNP  pg/mL 82.3         Invalid input(s): LDLCALC    XR Chest 1 View   Preliminary Result       No active disease by portable imaging.                Assessment/Plan       Stage 3 chronic kidney disease (CMS/HCC)    COPD (chronic obstructive pulmonary disease) (CMS/HCC)    Respiratory failure, chronic (CMS/HCC)    Peripheral artery disease (CMS/HCC)    WENDI and COPD overlap syndrome (CMS/HCC)    Dyspnea    Essential hypertension    Acute tracheobronchitis    Chronic diastolic CHF (congestive heart failure) (CMS/HCC)      Assessment & Plan  This is a 59-year-old female with a history of tracheostomy and chronic respiratory failure with recent admission to the hospital who presents to the hospital with increasing shortness of breath probable tracheobronchitis.  I feel like this is probably related to a viral infection.  She is just completed IV and IM ertapenem and inpatient treatment for pneumonia secondary to aspiration on the last admission.  Possible she could have aspirated again causing some inflammation increased secretions as well.  Started her on IV steroids breathing treatments and supportive care.  Follow upper respiratory viral panel.  Pulmonary's been consulted for assistance with management.  Hopefully she turns around quickly.  Plan to continue her modified diet at this time.    I discussed the patients findings and my recommendations with patient, family and nursing staff.          Jonathan Gasca MD  [facility]  10/18/19  11:40 PM

## 2019-10-19 NOTE — PROGRESS NOTES
"Continued Stay Note  [facility]     Patient Name: Swetha Luis  MRN: [id number]  Today's Date: 10/19/2019    Admit Date: 10/18/2019    Discharge Plan     Row Name 10/19/19 1844       Plan    Plan  Pended sister approves patient going home with her- await return call from sister.     Patient/Family in Agreement with Plan  yes    Plan Comments  Introduced self and explained role of CCP and facesheet verified with patient at bedside..  Patient states she lives with her spouse, but would like to discharge to her sister, Hattie Shankar home due to she feels her spouse \"needs a break\".  Spoke with Hattie Shankar, patient's sister via phone at [phone] and she states she wants to discuss with her  and will call CCP back tomorrow with outcome of her discussion with her spouse and states she will provide transporation for patient at discharge.  CCP will follow and assist as needed.... Kitty Persaud RN,CCP         Discharge Codes    No documentation.             Kitty Persaud RN    "

## 2019-10-19 NOTE — PROGRESS NOTES
Name: Swetha Luis ADMIT: 10/18/2019   : 1960  PCP: Provider, No Known    MRN: [id number] LOS: 1 days   AGE/SEX: 59 y.o. female  ROOM: [facility]   Subjective   Chief Complaint   Patient presents with   • Shortness of Breath   dyspnea fair  On oxygen  Was on IM abx as outpatient  Had recent bacteremia    ROS  No f/c  No n/v  No cp/palp  +soa/cough    Objective   Vital Signs  Temp:  [97.4 °F (36.3 °C)-98.9 °F (37.2 °C)] 98.9 °F (37.2 °C)  Heart Rate:  [] 106  Resp:  [16-28] 18  BP: ()/(66-98) 117/77  SpO2:  [95 %-100 %] 100 %  on  Flow (L/min):  [8-15] 8;   Device (Oxygen Therapy): humidified;tracheostomy collar  Body mass index is 31.95 kg/m².    Chronically ill  +trach  Physical Exam   HENT:   Head: Normocephalic and atraumatic.   Eyes: Conjunctivae are normal. No scleral icterus.   Neck: Neck supple. No tracheal deviation present.   Cardiovascular: Normal rate and regular rhythm.   Pulmonary/Chest: She is in respiratory distress. She exhibits no tenderness.   Abdominal: Soft. There is no tenderness. There is no guarding.   Neurological: She is alert. She exhibits normal muscle tone.   Skin: Skin is warm and dry.       Results Review:       I reviewed the patient's new clinical results.  Results from last 7 days   Lab Units 10/19/19  0451 10/18/19  2202 10/16/19  1340 10/14/19  0418   WBC 10*3/mm3 7.06 8.96 8.10 7.71   HEMOGLOBIN g/dL 12.7 13.4 13.2 11.3*   PLATELETS 10*3/mm3 294 306 296 276     Results from last 7 days   Lab Units 10/19/19  0451 10/18/19  2202 10/16/19  1340 10/14/19  0418   SODIUM mmol/L 136 143 141 140   POTASSIUM mmol/L 4.4 3.9 4.1 4.5   CHLORIDE mmol/L 97* 103 102 105   CO2 mmol/L 20.9* 27.5 25.4 21.1*   BUN mg/dL 22* 19 19 20   CREATININE mg/dL 1.21* 1.10* 0.89 1.00   GLUCOSE mg/dL 271* 109* 124* 86   Estimated Creatinine Clearance: 56.5 mL/min (A) (by C-G formula based on SCr of 1.21 mg/dL (H)).  Results from last 7 days   Lab Units 10/18/19  2202 10/16/19  1340   ALBUMIN  g/dL 3.40* 3.60   BILIRUBIN mg/dL 0.2 0.2   ALK PHOS U/L 223* 219*   AST (SGOT) U/L 32 28   ALT (SGPT) U/L 46* 31     Results from last 7 days   Lab Units 10/19/19  0451 10/18/19  2202 10/16/19  1340 10/14/19  0418   CALCIUM mg/dL 9.8 9.3 9.4 8.6   ALBUMIN g/dL  --  3.40* 3.60  --    MAGNESIUM mg/dL 2.0 2.0  --   --    PHOSPHORUS mg/dL 3.0  --   --   --      Results from last 7 days   Lab Units 10/18/19  2202   PROCALCITONIN ng/mL 0.06*   LACTATE mmol/L 1.4       Coag   Results from last 7 days   Lab Units 10/18/19  2202   INR  1.00     HbA1C   Lab Results   Component Value Date    HGBA1C 6.0 (H) 06/13/2019    HGBA1C 6.0 (H) 05/29/2019    HGBA1C 7.1 (H) 04/21/2019     Infection   Results from last 7 days   Lab Units 10/18/19  2202   PROCALCITONIN ng/mL 0.06*     Radiology(recent) Xr Chest 1 View    Result Date: 10/19/2019   No active disease by portable imaging.  This report was finalized on 10/19/2019 3:54 AM by Andrei Beatty M.D.      Lab Results   Component Value Date    TROPONINT 0.035 (C) 10/19/2019     No components found for: TSH;2      amLODIPine 5 mg Oral Q24H   aspirin 81 mg Oral Daily   budesonide 0.5 mg Nebulization BID   busPIRone 5 mg Oral TID   ertapenem 1 g Intravenous Q24H   escitalopram 20 mg Oral Daily   gabapentin 300 mg Oral TID   insulin lispro 0-9 Units Subcutaneous 4x Daily With Meals & Nightly   ipratropium-albuterol 3 mL Nebulization 4x Daily - RT   metoprolol succinate XL 25 mg Oral Daily   pantoprazole 40 mg Oral QAM   pramipexole 0.25 mg Oral Nightly   predniSONE 20 mg Oral Daily With Breakfast   sodium chloride 10 mL Intravenous Q12H   thiamine 100 mg Oral Daily      Diet Dysphagia; II - Pureed; Honey Thick; No Straws; Cardiac      Assessment/Plan      Active Hospital Problems    Diagnosis  POA   • Acute tracheobronchitis [J20.9]  Yes   • Chronic diastolic CHF (congestive heart failure) (CMS/HCC) [I50.32]  Yes   • Bacteremia [R78.81]  Yes   • Dyspnea [R06.00]  Yes   • Essential  hypertension [I10]  Yes   • COPD (chronic obstructive pulmonary disease) (CMS/[facility]) [J44.9]  Yes   • WENDI and COPD overlap syndrome (CMS/[facility]) [G47.33, J44.9]  Yes   • Peripheral artery disease (CMS/HCC) [I73.9]  Yes   • Respiratory failure, chronic (CMS/[facility]) [J96.10]  Yes   • Stage 3 chronic kidney disease (CMS/[facility]) [N18.3]  Yes      Resolved Hospital Problems   No resolved problems to display.         · Continue trach care and breathing txs  · No evidence for PNA  · Decrease IV steroids to PO  · Dr. Fowler had planned on IM ertapenem through 10/27 for recent bacteremia so will restart that IV while here  · Above meds  · Modified diet  · Add SSI    Thanks to Pulmonary      DW RN  Suspect discharge on 10/20. Previously had recommended SNF but pt and family refused and wanted to go home    Lavon Robles MD  [facility]  10/19/19  3:59 PM

## 2019-10-19 NOTE — OUTREACH NOTE
Sepsis Week 1 Survey      Responses   Facility patient discharged from?  [facility]   Does the patient have one of the following disease processes/diagnoses(primary or secondary)?  Sepsis   Is there a successful TCM telephone encounter documented?  No   Week 1 attempt successful?  No   Revoke  Readmitted          Fern Ortiz RN

## 2019-10-19 NOTE — ED NOTES
Per EMS,  reports SOB that began an hour ago. Pt recently having a trach placed. Pt A&O x4.      Liliane Junior RN  10/18/19 1531

## 2019-10-19 NOTE — CONSULTS
[facility]    Reason for consult: tracheobronchitis    HPI:  Mrs. Luis is a 58yo WF known to me from a recent admission.  She just completed a course of broad spectrum antibiotics.  She was brought back to the ER today with a chief complaint of shortness of breath today. Mrs. Luis herself is unable to provide me with much history she seems to have some short term memory troubles.  She says she is not sure if she was even here at [facility] a week ago or not.  She denies any respiratory complaints at the moment.  Says she has minimal sputum but is still requiring suctioning at times.  She has no chest pain.  She has chronic cough.  Her passimuir valve was on when I came in the room and she was resting comfortably.      Past Medical History:   Diagnosis Date   • Acute congestive heart failure (CMS/[facility]) 2018   • Acute osteomyelitis (CMS/[facility])     Right shoulder due to IVDA   • Acute renal failure on dialysis (CMS/[facility])    • Anxiety    • Pugh esophagus    • CKD (chronic kidney disease)    • COPD (chronic obstructive pulmonary disease) (CMS/[facility])    • MRSA infection    • Nontraumatic subarachnoid hemorrhage (CMS/[facility])    • Seizures (CMS/[facility])    • Tracheostomy present (CMS/[facility])      Social History     Socioeconomic History   • Marital status:      Spouse name: Not on file   • Number of children: Not on file   • Years of education: Not on file   • Highest education level: Not on file   Tobacco Use   • Smoking status: Former Smoker     Packs/day: 1.00     Types: Cigarettes     Last attempt to quit: 2018     Years since quittin.2   • Smokeless tobacco: Never Used   Substance and Sexual Activity   • Alcohol use: No     Frequency: Never   • Drug use: Yes     Types: Cocaine     Comment: 20 years ago occasional   • Sexual activity: Defer     Family History   Problem Relation Age of Onset   • Diabetes Mother    • Hypertension Mother      MEDS: reviewed as per chart  ALL: list of 4 as per  chart, reviewed  ROS:  Reviewed below as obtained in the ER, but it seems unreliable  Constitutional: Negative for fever.   HENT: Negative for sore throat.    Eyes: Negative.    Respiratory: Positive for shortness of breath. Negative for cough.    Cardiovascular: Negative for chest pain.   Gastrointestinal: Negative for abdominal pain, diarrhea and vomiting.   Genitourinary: Negative for dysuria.   Musculoskeletal: Negative for neck pain.   Skin: Negative for rash.   Allergic/Immunologic: Negative.    Neurological: Negative for weakness, numbness and headaches.   Hematological: Negative.    Psychiatric/Behavioral: Negative.    All other systems reviewed and are negative.    Vital Sign Min/Max for last 24 hours  Temp  Min: 97.4 °F (36.3 °C)  Max: 98.7 °F (37.1 °C)   BP  Min: 90/69  Max: 122/98   Pulse  Min: 82  Max: 101   Resp  Min: 20  Max: 28   SpO2  Min: 95 %  Max: 100 %   Flow (L/min)  Min: 8  Max: 15   No Data Recorded     GEN:  , appears ill, obese, AxOx3  HEENT: PERRL, EOMI, no icterus, mmm, no jvd, trachea midline, neck supple, tracheostomy in place, stoma appears healthy, no palpable thyroid nodules  CHEST: fair ae, slightly coarse bilat, no wheezes, no crackles, no use of accessory muscles  CV: RRR, no m/g/r  ABD: soft, nt, nd +bs, no hepatosplenomegaly  EXT: no c/c/ trace edema  SKIN: no rashes, no xanthomas, nl turgor  LYMPH: no palpable cervical or supraclavicular lymphadenopathy  NEURO: CN 2-12 intact and symmetric bilaterally  PSYCH: nl affect, nl orientation, nl judgement, nl mood  MSK: no kyphoscoliosis, 5/5 strength ue and le bilaterally    Labs: 10/19: reviewed:  procal 0.06  Wbc 8.96 (61% neutrophils)  hgb 13.4  plts 306  7.39/47/99  Cr 0.89  Bicarb 25    CXR: no acute disease    A/P:  1. Tracheostomy -- It appears she just has some chronic trachitis.  I'm not sure, maybe her current facility can't handle suctioning her?  Maybe she needs a little better pulmonary toilet. Schedule bronchodilators  and observe  She does not appear to have bacterial infection, no fever, normal procal, normal wbc, nl neutorphils.   2. Chronic hypercapnic respiratory failure -- mild no specific therapy required  3. Copd -- schedule bronchodilators.  4. melissa -- s/p tracheostomy

## 2019-10-20 ENCOUNTER — APPOINTMENT (OUTPATIENT)
Dept: INFUSION THERAPY | Facility: HOSPITAL | Age: 59
End: 2019-10-20

## 2019-10-20 VITALS
HEART RATE: 87 BPM | DIASTOLIC BLOOD PRESSURE: 80 MMHG | TEMPERATURE: 98.7 F | BODY MASS INDEX: 31.95 KG/M2 | OXYGEN SATURATION: 93 % | SYSTOLIC BLOOD PRESSURE: 113 MMHG | HEIGHT: 66 IN | RESPIRATION RATE: 18 BRPM

## 2019-10-20 PROBLEM — J96.11 CHRONIC RESPIRATORY FAILURE WITH HYPOXIA (HCC): Status: ACTIVE | Noted: 2019-07-10

## 2019-10-20 LAB
B PARAPERT DNA SPEC QL NAA+PROBE: NOT DETECTED
B PERT DNA SPEC QL NAA+PROBE: NOT DETECTED
C PNEUM DNA NPH QL NAA+NON-PROBE: NOT DETECTED
FLUAV H1 2009 PAND RNA NPH QL NAA+PROBE: NOT DETECTED
FLUAV H1 HA GENE NPH QL NAA+PROBE: NOT DETECTED
FLUAV H3 RNA NPH QL NAA+PROBE: NOT DETECTED
FLUAV SUBTYP SPEC NAA+PROBE: NOT DETECTED
FLUBV RNA ISLT QL NAA+PROBE: NOT DETECTED
GLUCOSE BLDC GLUCOMTR-MCNC: 136 MG/DL (ref 70–130)
GLUCOSE BLDC GLUCOMTR-MCNC: 150 MG/DL (ref 70–130)
HADV DNA SPEC NAA+PROBE: NOT DETECTED
HCOV 229E RNA SPEC QL NAA+PROBE: NOT DETECTED
HCOV HKU1 RNA SPEC QL NAA+PROBE: NOT DETECTED
HCOV NL63 RNA SPEC QL NAA+PROBE: NOT DETECTED
HCOV OC43 RNA SPEC QL NAA+PROBE: NOT DETECTED
HMPV RNA NPH QL NAA+NON-PROBE: NOT DETECTED
HPIV1 RNA SPEC QL NAA+PROBE: NOT DETECTED
HPIV2 RNA SPEC QL NAA+PROBE: NOT DETECTED
HPIV3 RNA NPH QL NAA+PROBE: NOT DETECTED
HPIV4 P GENE NPH QL NAA+PROBE: NOT DETECTED
M PNEUMO IGG SER IA-ACNC: NOT DETECTED
RHINOVIRUS RNA SPEC NAA+PROBE: NOT DETECTED
RSV RNA NPH QL NAA+NON-PROBE: NOT DETECTED

## 2019-10-20 PROCEDURE — 0100U HC BIOFIRE FILMARRAY RESP PANEL 2: CPT | Performed by: HOSPITALIST

## 2019-10-20 PROCEDURE — 82962 GLUCOSE BLOOD TEST: CPT

## 2019-10-20 PROCEDURE — 97161 PT EVAL LOW COMPLEX 20 MIN: CPT

## 2019-10-20 PROCEDURE — 94799 UNLISTED PULMONARY SVC/PX: CPT

## 2019-10-20 PROCEDURE — 63710000001 PREDNISONE PER 1 MG: Performed by: INTERNAL MEDICINE

## 2019-10-20 RX ORDER — ERTAPENEM 1 G/1
1 INJECTION, POWDER, LYOPHILIZED, FOR SOLUTION INTRAMUSCULAR; INTRAVENOUS EVERY 24 HOURS
Refills: 0
Start: 2019-10-20 | End: 2019-10-27

## 2019-10-20 RX ORDER — PREDNISONE 20 MG/1
20 TABLET ORAL
Qty: 3 TABLET | Refills: 0 | Status: SHIPPED | OUTPATIENT
Start: 2019-10-21 | End: 2019-10-25

## 2019-10-20 RX ADMIN — ESCITALOPRAM 20 MG: 20 TABLET, FILM COATED ORAL at 09:26

## 2019-10-20 RX ADMIN — Medication 100 MG: at 09:26

## 2019-10-20 RX ADMIN — GABAPENTIN 300 MG: 300 CAPSULE ORAL at 09:25

## 2019-10-20 RX ADMIN — ASPIRIN 81 MG: 81 TABLET, CHEWABLE ORAL at 09:26

## 2019-10-20 RX ADMIN — SODIUM CHLORIDE, PRESERVATIVE FREE 10 ML: 5 INJECTION INTRAVENOUS at 09:26

## 2019-10-20 RX ADMIN — OXYCODONE HYDROCHLORIDE AND ACETAMINOPHEN 1 TABLET: 5; 325 TABLET ORAL at 01:38

## 2019-10-20 RX ADMIN — IPRATROPIUM BROMIDE AND ALBUTEROL SULFATE 3 ML: 2.5; .5 SOLUTION RESPIRATORY (INHALATION) at 05:36

## 2019-10-20 RX ADMIN — PANTOPRAZOLE SODIUM 40 MG: 40 TABLET, DELAYED RELEASE ORAL at 06:29

## 2019-10-20 RX ADMIN — IPRATROPIUM BROMIDE AND ALBUTEROL SULFATE 3 ML: 2.5; .5 SOLUTION RESPIRATORY (INHALATION) at 12:31

## 2019-10-20 RX ADMIN — BUSPIRONE HYDROCHLORIDE 5 MG: 5 TABLET ORAL at 09:26

## 2019-10-20 RX ADMIN — AMLODIPINE BESYLATE 5 MG: 5 TABLET ORAL at 09:26

## 2019-10-20 RX ADMIN — PREDNISONE 20 MG: 20 TABLET ORAL at 09:25

## 2019-10-20 RX ADMIN — METOPROLOL SUCCINATE 25 MG: 25 TABLET, FILM COATED, EXTENDED RELEASE ORAL at 09:26

## 2019-10-20 RX ADMIN — BUDESONIDE 0.5 MG: 0.5 INHALANT RESPIRATORY (INHALATION) at 05:39

## 2019-10-20 NOTE — PLAN OF CARE
Problem: Patient Care Overview  Goal: Plan of Care Review   10/20/19 1018   Coping/Psychosocial   Plan of Care Reviewed With patient   OTHER   Outcome Summary pt agreeable to PT this date, presents w generalized weakness 2' hospital stay, appears baseline w functional mobiility, safely ambulated household distance SBA rwx, 02. pt hopeful to DC home today w family/caregiver assist. pt reports has all equipment needs. [facility] PT as deemed necessary. will sign off at this time.

## 2019-10-20 NOTE — PROGRESS NOTES
Continued Stay Note  [facility]     Patient Name: Swetha Luis  MRN: [id number]  Today's Date: 10/20/2019    Admit Date: 10/18/2019    Discharge Plan     Row Name 10/20/19 1142       Plan    Plan  Home with spouse     Plan Comments  Spoke with JEMIMA Pride and she states patient has decided to return home with spouse and she states patient denies any needs.... Kitty Persaud RN,CCP         Discharge Codes    No documentation.       Expected Discharge Date and Time     Expected Discharge Date Expected Discharge Time    Oct 20, 2019             Kitty Persaud RN

## 2019-10-20 NOTE — THERAPY DISCHARGE NOTE
Acute Care - Physical Therapy Initial Eval/Discharge  [facility]     Patient Name: Swetha Luis  : 1960  MRN: [id number]  Today's Date: 10/20/2019   Onset of Illness/Injury or Date of Surgery: 10/18/19  Date of Referral to PT: 10/20/19  Referring Physician: Travis      Admit Date: 10/18/2019    Visit Dx:    ICD-10-CM ICD-9-CM   1. Shortness of breath R06.02 786.05   2. COPD with acute exacerbation (CMS/[facility]) J44.1 491.21   3. Generalized weakness R53.1 780.79     Patient Active Problem List   Diagnosis   • Tracheostomy complication (CMS/[facility])   • Gangrene of toe (CMS/[facility])   • Acute diastolic congestive heart failure (CMS/[facility])   • ARF (acute renal failure) (CMS/[facility])   • Stage 3 chronic kidney disease (CMS/[facility])   • Elevated troponin   • Acute respiratory failure with hypoxemia (CMS/[facility])   • Acute osteomyelitis (CMS/[facility])   • UTI due to extended-spectrum beta lactamase (ESBL) producing Escherichia coli   • Thrush, oral   • Tracheostomy malfunction (CMS/[facility])   • COPD (chronic obstructive pulmonary disease) (CMS/[facility])   • Chronic respiratory failure with hypoxia (CMS/HCC)   • PEG (percutaneous endoscopic gastrostomy) status (CMS/HCC)   • History of osteomyelitis   • Polysubstance abuse (CMS/[facility])   • History of tracheal stenosis   • Chronic diastolic CHF (congestive heart failure) (CMS/[facility])   • Peripheral artery disease (CMS/[facility])   • Medically noncompliant   • WENDI and COPD overlap syndrome (CMS/[facility])   • Dyspnea   • Elevated LFTs   • Elevated troponin   • Tracheostomy present (CMS/[facility])   • Essential hypertension   • Dysphagia   • Healthcare associated bacterial pneumonia   • Infection due to ESBL-producing Escherichia coli   • Sepsis due to Gram negative bacteria (CMS/[facility])   • Ureteral stone with hydronephrosis   • UTI (urinary tract infection)   • Acute tracheobronchitis   • Chronic diastolic CHF (congestive heart failure) (CMS/[facility])   • Bacteremia     Past Medical History:   Diagnosis Date   • Acute congestive  heart failure (CMS/HCC) 12/24/2018   • Acute osteomyelitis (CMS/[facility])     Right shoulder due to IVDA   • Acute renal failure on dialysis (CMS/[facility])    • Anxiety    • Pugh esophagus    • CKD (chronic kidney disease)    • COPD (chronic obstructive pulmonary disease) (CMS/[facility])    • MRSA infection    • Nontraumatic subarachnoid hemorrhage (CMS/[facility])    • Seizures (CMS/[facility])    • Tracheostomy present (CMS/[facility])      Past Surgical History:   Procedure Laterality Date   • TRACHEOSTOMY     • URETEROSCOPY LASER LITHOTRIPSY WITH STENT INSERTION Left 10/11/2019    Procedure: CYSTOSCOPY,  URETEROSCOPY, LEFT RETROGRADE, LEFT PYELOGRAM, LASER LITHOTRIPSY, PLACEMENT OF STENT.;  Surgeon: Clyde Selby MD;  Location: [facility];  Service: Urology          PT ASSESSMENT (last 12 hours)      Physical Therapy Evaluation     Row Name 10/20/19 1014          PT Evaluation Time/Intention    Subjective Information  no complaints  -     Document Type  discharge treatment  -     Mode of Treatment  individual therapy;physical therapy  -     Patient Effort  excellent  -     Symptoms Noted During/After Treatment  none  -     Row Name 10/20/19 1014          General Information    Patient Profile Reviewed?  yes  -     Onset of Illness/Injury or Date of Surgery  10/18/19  -     Referring Physician  Travis  -     Patient Observations  alert;cooperative;agree to therapy  -     Patient/Family Observations  pt supine in bed no acute distress  -     Prior Level of Function  independent:  -     Equipment Currently Used at Home  walker, rolling  -     Pertinent History of Current Functional Problem  dyspnea hx resp failure  -     Existing Precautions/Restrictions  fall;oxygen therapy device and L/min  -     Risks Reviewed  patient:  -     Benefits Reviewed  patient:  -     Row Name 10/20/19 1014          Cognitive Assessment/Intervention- PT/OT    Orientation Status (Cognition)  oriented x 4  -     Follows  Commands (Cognition)  WFL  -[facility] Name 10/20/19 1014          Bed Mobility Assessment/Treatment    Bed Mobility Assessment/Treatment  supine-sit;sit-supine  -     Supine-Sit [place] (Bed Mobility)  supervision  -     Sit-Supine [place] (Bed Mobility)  not tested  -[facility] Name 10/20/19 1014          Transfer Assessment/Treatment    Transfer Assessment/Treatment  sit-stand transfer;stand-sit transfer  -     Sit-Stand [place] (Transfers)  stand by assist  -     Stand-Sit [place] (Transfers)  stand by assist  -[facility] Name 10/20/19 1014          Sit-Stand Transfer    Assistive Device (Sit-Stand Transfers)  walker, front-wheeled  -[facility] Name 10/20/19 1014          Stand-Sit Transfer    Assistive Device (Stand-Sit Transfers)  walker, front-wheeled  [facility] Name 10/20/19 1014          Gait/Stairs Assessment/Training    [place] Level (Gait)  stand by assist  [facility]     Assistive Device (Gait)  walker, front-wheeled  -     Distance in Feet (Gait)  80  -     Pattern (Gait)  step-through  -     Deviations/Abnormal Patterns (Gait)  balaji decreased;ataxic  -     Bilateral Gait Deviations  forward flexed posture  -[facility] Name 10/20/19 1014          General ROM    GENERAL ROM COMMENTS  BLEs functional  -[facility] Name 10/20/19 1014          MMT (Manual Muscle Testing)    General MMT Comments  BLEs grossly at least 3/5  -[facility] Name 10/20/19 1014          Pain Assessment    Additional Documentation  Pain Scale: FACES Pre/Post-Treatment (Group)  -LH     Row Name 10/20/19 1014          Pain Scale: FACES Pre/Post-Treatment    Pain: FACES Scale, Pretreatment  0-->no hurt  -     Pain: FACES Scale, Post-Treatment  0-->no hurt  -[facility] Name 10/20/19 1014          Plan of Care Review    Plan of Care Reviewed With  patient  -[facility] Name 10/20/19 1014          Physical Therapy Clinical Impression    Date of Referral to PT  10/20/19  -     Criteria for Skilled  Interventions Met (PT Clinical Impression)  current level of function same as previous level of function  -     Pathology/Pathophysiology Noted (Describe Specifically for Each System)  pulmonary  -     Impairments Found (describe specific impairments)  aerobic capacity/endurance  -     Row Name 10/20/19 1014          Vital Signs    Pre SpO2 (%)  95  -LH     O2 Delivery Pre Treatment  trach collar  -     Post SpO2 (%)  94  -LH     O2 Delivery Post Treatment  trach collar  -     Row Name 10/20/19 1014          Positioning and Restraints    Pre-Treatment Position  in bed  -     Post Treatment Position  chair  -     In Chair  reclined;call light within reach;encouraged to call for assist;exit alarm on  -       User Key  (r) = Recorded By, (t) = Taken By, (c) = Cosigned By    Initials Name Provider Type     Rachael Morales, PT Physical Therapist          Physical Therapy Education     Title: PT OT SLP Therapies (In Progress)     Topic: Physical Therapy (Resolved)     Point: Mobility training (Resolved)     Learning Progress Summary           Patient Acceptance, E, VU,DU by  at 10/20/2019 10:18 AM                   Point: Home exercise program (Resolved)     Learning Progress Summary           Patient Acceptance, E, VU,DU by  at 10/20/2019 10:18 AM                   Point: Body mechanics (Resolved)     Learning Progress Summary           Patient Acceptance, E, VU,DU by  at 10/20/2019 10:18 AM                               User Key     Initials Effective Dates Name Provider Type [facility] 04/03/18 -  Rachael Morales, PT Physical Therapist PT                PT Recommendation and Plan  Anticipated Discharge Disposition (PT): home with assist, home with home health  Therapy Frequency (PT Clinical Impression): evaluation only  Outcome Summary/Treatment Plan (PT)  Anticipated Discharge Disposition (PT): home with assist, home with home health  Plan of Care Reviewed With: patient  Outcome Summary: pt  agreeable to PT this date, presents w generalized weakness 2' hospital stay, appears baseline w functional  mobiility, safely ambulated household distance SBA rwx, 02. pt hopeful to DC home today w family/caregiver assist. pt reports has all equipment needs. [facility] PT as deemed necessary. will sign off at this time.     Outcome Measures     Row Name 10/20/19 1000             How much help from another person do you currently need...    Turning from your back to your side while in flat bed without using bedrails?  4  -LH      Moving from lying on back to sitting on the side of a flat bed without bedrails?  4  -LH      Moving to and from a bed to a chair (including a wheelchair)?  4  -LH      Standing up from a chair using your arms (e.g., wheelchair, bedside chair)?  4  -LH      Climbing 3-5 steps with a railing?  3  -LH      To walk in hospital room?  3  -      AM-PAC 6 Clicks Score (PT)  22  -         Functional Assessment    Outcome Measure Options  AM-PAC 6 Clicks Basic Mobility (PT)  -        User Key  (r) = Recorded By, (t) = Taken By, (c) = Cosigned By    Initials Name Provider Type     Rachael Morales, PT Physical Therapist           Time Calculation:   PT Charges     Row Name 10/20/19 1020             Time Calculation    Start Time  0945  -      Stop Time  1003  -      Time Calculation (min)  18 min  -      PT Received On  10/20/19  -        User Key  (r) = Recorded By, (t) = Taken By, (c) = Cosigned By    Initials Name Provider Type     Rachael Morales, PT Physical Therapist        Therapy Charges for Today     Code Description Service Date Service Provider Modifiers Qty    [id number]  PT EVAL LOW COMPLEXITY 2 10/20/2019 Rachael Morales, PT GP 1          PT G-Codes  Outcome Measure Options: AM-PAC 6 Clicks Basic Mobility (PT)  AM-PAC 6 Clicks Score (PT): 22    PT Discharge Summary  Anticipated Discharge Disposition (PT): home with assist, home with home health    Rachael Morales  PT  10/20/2019

## 2019-10-20 NOTE — PLAN OF CARE
Problem: Patient Care Overview  Goal: Plan of Care Review  Outcome: Ongoing (interventions implemented as appropriate)      Problem: Pneumonia (Adult)  Goal: Signs and Symptoms of Listed Potential Problems Will be Absent, Minimized or Managed (Pneumonia)  Outcome: Ongoing (interventions implemented as appropriate)      Problem: Airway, Artificial (Adult)  Goal: Signs and Symptoms of Listed Potential Problems Will be Absent, Minimized or Managed (Airway, Artificial)  Outcome: Outcome(s) achieved Date Met: 10/20/19

## 2019-10-20 NOTE — PROGRESS NOTES
Kyler Villela MD                          [phone]      Patient ID:    Name:  Swetha Luis    MRN:  [id number]    1960   59 y.o.  female            Patient Care Team:  Provider, No Known as PCP - General    CC/ Reason for visit: Chronic respiratory failure, tracheostomy    Subjective: Pt seen and examined this AM. No acute overnight events noted. Doing better.  On minimal supplemental oxygen via tracheostomy.  She does have a PMV valve which she is using appropriately unable to talk.  Secretions not too much of an issue per patient    ROS: Denies any subjective fevers, syncope or presyncopal events, new neurological deficits, nausea or vomiting currently    Objective     Vital Signs past 24hrs    BP range: BP: (108-113)/(67-80) 113/80  Pulse range: Heart Rate:  [] 87  Resp rate range: Resp:  [18] 18  Temp range: Temp (24hrs), Av.7 °F (37.1 °C), Min:98.7 °F (37.1 °C), Max:98.7 °F (37.1 °C)      Ventilator/Non-Invasive Ventilation Settings (From admission, onward)    None          Device (Oxygen Therapy): tracheostomy collar        ; Body mass index is 31.95 kg/m².      Intake/Output Summary (Last 24 hours) at 10/20/2019 1428  Last data filed at 10/19/2019 2142  Gross per 24 hour   Intake 60 ml   Output --   Net 60 ml       PHYSICAL EXAM   Constitutional: Middle aged pt in bed, No acute respiratory distress, + accessory muscle use  Head: - NCAT  Eyes: No pallor, Anicteric conjunctiva, EOMI.  ENMT:  Mallampati 4, no oral thrush. Dry MM.   NECK: Trachea midline, SHILEY 4 UNCUFFED + No thyromegaly, no palpable cervical lymphadenopathy  Heart: RRR, no murmur. No pedal edema   Lungs: LEONA +, Occ rhonchi, No wheezes/ crackles heard    Abdomen: Soft. No tenderness, guarding or rigidity. No palpable masses  Extremities: Extremities warm and well perfused. No cyanosis/ clubbing  Neuro: Conscious, answers appropriately, no gross focal neuro deficits  Psych: Mood and affect  appropriate    Scheduled meds:    amLODIPine 5 mg Oral Q24H   aspirin 81 mg Oral Daily   budesonide 0.5 mg Nebulization BID   busPIRone 5 mg Oral TID   ertapenem 1 g Intravenous Q24H   escitalopram 20 mg Oral Daily   gabapentin 300 mg Oral TID   insulin lispro 0-9 Units Subcutaneous 4x Daily With Meals & Nightly   ipratropium-albuterol 3 mL Nebulization 4x Daily - RT   metoprolol succinate XL 25 mg Oral Daily   pantoprazole 40 mg Oral QAM   pramipexole 0.25 mg Oral Nightly   predniSONE 20 mg Oral Daily With Breakfast   sodium chloride 10 mL Intravenous Q12H   thiamine 100 mg Oral Daily       IV meds:                           Data Review:      Results from last 7 days   Lab Units 10/19/19  0451 10/18/19  2202 10/16/19  1340   SODIUM mmol/L 136 143 141   POTASSIUM mmol/L 4.4 3.9 4.1   CHLORIDE mmol/L 97* 103 102   CO2 mmol/L 20.9* 27.5 25.4   BUN mg/dL 22* 19 19   CREATININE mg/dL 1.21* 1.10* 0.89   CALCIUM mg/dL 9.8 9.3 9.4   BILIRUBIN mg/dL  --  0.2 0.2   ALK PHOS U/L  --  223* 219*   ALT (SGPT) U/L  --  46* 31   AST (SGOT) U/L  --  32 28   GLUCOSE mg/dL 271* 109* 124*   WBC 10*3/mm3 7.06 8.96 8.10   HEMOGLOBIN g/dL 12.7 13.4 13.2   PLATELETS 10*3/mm3 294 306 296   INR   --  1.00  --    PROBNP pg/mL  --  82.3  --    PROCALCITONIN ng/mL  --  0.06*  --        Lab Results   Component Value Date    CALCIUM 9.8 10/19/2019    PHOS 3.0 10/19/2019       Results from last 7 days   Lab Units 10/18/19  2216 10/18/19  2202   BLOODCX  No growth at 24 hours No growth at 24 hours       Results from last 7 days   Lab Units 10/18/19  2205   PH, ARTERIAL pH units 7.395   PO2 ART mm Hg 99.6   PCO2, ARTERIAL mm Hg 47.9*   HCO3 ART mmol/L 29.4*        Results Review:    I have reviewed the relevant laboratory results and independently reviewed the chest imaging from this hospitalization including the available echocardiogram reports personally and summarized it if/ when appropriate below    Assessment    Chronic respiratory failure;  hypoxemia  Tracheostomy status-Shiley 4 uncuffed  Recent severe respiratory failure/pneumonia  COPD not in exacerbation  WENDI  Morbid obesity    PLAN:  All issues are new to me  Patient has done well with tracheostomy care as well as clearance of respiratory secretions on the current regimen.  Agree with discharge the patient to home.  She will need outpatient follow-up in the clinic as scheduled  Would continue with tracheostomy for now    I have discussed my findings and recommendations with patient and nursing staff.     Kyler Villela MD  10/20/2019   Tarsorrhaphy Text: A tarsorrhaphy was performed using Frost sutures.

## 2019-10-20 NOTE — DISCHARGE SUMMARY
NAME: Swetha Luis ADMIT: 10/18/2019   : 1960  PCP: Provider, No Known    MRN: [id number] LOS: 2 days   AGE/SEX: 59 y.o. female  ROOM: [facility]/     Date of Admission:  10/18/2019  Date of Discharge:  10/20/2019    PCP: Provider, No Known    CHIEF COMPLAINT  Shortness of Breath      DISCHARGE DIAGNOSIS  Active Hospital Problems    Diagnosis  POA   • **Chronic respiratory failure with hypoxia (CMS/HCC) [J96.11]  Yes   • Acute tracheobronchitis [J20.9]  Yes   • Chronic diastolic CHF (congestive heart failure) (CMS/HCC) [I50.32]  Yes   • Bacteremia [R78.81]  Yes   • Dyspnea [R06.00]  Yes   • Essential hypertension [I10]  Yes   • COPD (chronic obstructive pulmonary disease) (CMS/HCC) [J44.9]  Yes   • WENDI and COPD overlap syndrome (CMS/HCC) [G47.33, J44.9]  Yes   • Peripheral artery disease (CMS/HCC) [I73.9]  Yes   • Stage 3 chronic kidney disease (CMS/HCC) [N18.3]  Yes      Resolved Hospital Problems   No resolved problems to display.       SECONDARY DIAGNOSES  Past Medical History:   Diagnosis Date   • Acute congestive heart failure (CMS/HCC) 2018   • Acute osteomyelitis (CMS/HCC)     Right shoulder due to IVDA   • Acute renal failure on dialysis (CMS/HCC)    • Anxiety    • Pugh esophagus    • CKD (chronic kidney disease)    • COPD (chronic obstructive pulmonary disease) (CMS/HCC)    • MRSA infection    • Nontraumatic subarachnoid hemorrhage (CMS/HCC)    • Seizures (CMS/HCC)    • Tracheostomy present (CMS/HCC)        CONSULTS   Pulmonary    HOSPITAL COURSE  Patient is a 59 y.o. female with history of CKD, COPD, s/p tracheostomy, and multiple other medical issues. She was in the hospital from 10/4-10/14. Patient was admitted with healthcare associated/aspiration pneumonia and has tracheostomy.  Blood cultures were positive for ESBL strain of E. coli and she had VRE growing in the urine (ID felt colonized and did not need treatment).  Had ureteral stone with hydronephrosis and underwent urologic  procedure with cystoscopy and stenting.  She ws discharged on IM invanz though the infusion center.     She was only out of the hospital for a couple of days and presented to [facility] emergency room with shortness of breath.  Work-up in the ER did not show any signs of pneumonia but ER felt that she had tracheobronchitis and was admitted to our service for further evaluation and management.    She was admitted and given breathing treatments, oxygen, steroids as well as was continued on ertapenem as Dr. Castro had planned on her completing antibiotics through 10/27 ertapenem IM in the infusion center from her recent bacteremia.  She was stable for discharge today.  She is quite chronically ill but she declines home health or skilled nursing facility placement and wishes to go back home to live with her .    DIAGNOSTICS    XR Chest 1 View [[id number]] Reinaldo as Reviewed   Order Status: Completed Collected: 10/18/19 2157    Updated: 10/19/19 0357   Narrative:     PORTABLE CHEST X-RAY     CLINICAL HISTORY: soa     COMPARISON:  10/11/2019.     FINDINGS:  Single portable view of the chest obtained.  There are  various overlying monitor leads/artifacts in place. Tracheostomy remains  in place. The lungs are well expanded and clear where they can be  visualized.  Cardiac size is within normal limits.  Vascularity is  normal considering technique.  No pleural fluid is demonstrated by  portable imaging.  Mild aortic ectasia.               Impression:        No active disease by portable imaging.       10/19/2019 0451  10/19/2019 0613  Basic Metabolic Panel [[id number]]    (Abnormal)   Blood from Hand, Right     Final result  Glucose 271 Abnormally high  mg/dL   BUN 22 Abnormally high  mg/dL   Creatinine 1.21 Abnormally high  mg/dL   Sodium 136 mmol/L   Potassium 4.4 mmol/L   Chloride 97 Abnormally low  mmol/L   CO2 20.9 Abnormally low  mmol/L   Calcium 9.8 mg/dL   eGFR Non  Am 46 Abnormally low  mL/min/1.73    BUN/Creatinine Ratio 18.2    Anion Gap 18.1 Abnormally high  mmol/L          10/19/2019 0451  10/19/2019 0530  CBC (No Diff) [[id number]]   (Abnormal)   Blood from Hand, Right     Final result  WBC 7.06 10*3/mm3   RBC 5.02 10*6/mm3   Hemoglobin 12.7 g/dL   Hematocrit 41.6 %   MCV 82.9 fL   MCH 25.3 Abnormally low  pg   MCHC 30.5 Abnormally low  g/dL   RDW 14.5 %   RDW-SD 43.6 fl   MPV 11.6 fL   Platelets 294 10*3/mm3          PHYSICAL EXAM  Objective    Alert  nad  Chronic resp distress  Chronically ill, +trach    CONDITION ON DISCHARGE  Stable.      DISCHARGE DISPOSITION   Home or Self Care      DISCHARGE MEDICATIONS       Your medication list      START taking these medications      Instructions Last Dose Given Next Dose Due   predniSONE 20 MG tablet  Commonly known as:  DELTASONE  Start taking on:  10/21/2019      Take 1 tablet by mouth Daily With Breakfast for 3 days.          CONTINUE taking these medications      Instructions Last Dose Given Next Dose Due   acetaminophen 325 MG tablet  Commonly known as:  TYLENOL      Take 2 tablets by mouth Every 4 (Four) Hours As Needed for Mild Pain .       amLODIPine 5 MG tablet  Commonly known as:  NORVASC      Take 1 tablet by mouth Daily.       Ascorbic Acid 500 MG capsule      Take  by mouth.       aspirin 81 MG chewable tablet      Chew 1 tablet Daily.       budesonide 0.5 MG/2ML nebulizer solution  Commonly known as:  PULMICORT      Inhale 2 mL by nebulization via trach 2 (Two) Times a Day.       busPIRone 5 MG tablet  Commonly known as:  BUSPAR      Take 1 tablet by mouth 3 (Three) times a day.       clotrimazole-betamethasone 1-0.05 % cream  Commonly known as:  LOTRISONE      Apply  topically to the appropriate area as directed Every 12 (Twelve) Hours. Apply to perivaginal area and perineum after washing.       ertapenem 1 g injection  Commonly known as:  INVanz      Inject 1,000 mg into the appropriate muscle as directed by prescriber Daily for 7 days.        escitalopram 20 MG tablet  Commonly known as:  LEXAPRO      Take 1 tablet by mouth daily.       gabapentin 300 MG capsule  Commonly known as:  NEURONTIN      Take 1 capsule by mouth 3 (Three) Times a Day.       ipratropium-albuterol 0.5-2.5 mg/3 ml nebulizer  Commonly known as:  DUO-NEB      Inhale 3 mL by nebulization every 4 (Four) hours as needed for wheezing.       metoprolol succinate XL 25 MG 24 hr tablet  Commonly known as:  TOPROL-XL      Take 1 tablet by mouth Daily.       MULTIPLE VITAMINS-MINERALS PO      Take  by mouth.       omeprazole 40 MG capsule  Commonly known as:  priLOSEC      Take 40 mg by mouth Daily.       oxyCODONE-acetaminophen 5-325 MG per tablet  Commonly known as:  PERCOCET      Take 1 tablet by mouth Every 6 (Six) Hours As Needed for Moderate Pain .       pramipexole 0.25 MG tablet  Commonly known as:  MIRAPEX      Take 1 tablet by mouth Every Night.       thiamine 100 MG tablet  Commonly known as:  VITAMIN B-1      Take 1 tablet by mouth Daily.             Where to Get Your Medications      These medications were sent to [facility] - [place] [id number]    Hours:  7:00AM-6PM Mon-Fri Phone:  [phone]   · predniSONE 20 MG tablet     Information about where to get these medications is not yet available    Ask your nurse or doctor about these medications  · ertapenem 1 g injection          Future Appointments   Date Time Provider Department Center   10/21/2019 11:30 AM ROOM 1 [facility]   10/22/2019 11:00 [facility]   10/23/2019 11:00 AM ROOM 5 [facility]  [facility]   10/24/2019  2:00 PM ROOM 4 [facility]  [facility]     Additional Instructions for the Follow-ups that You Need to Schedule     Discharge Follow-up with PCP   As directed       Currently Documented PCP:    Provider, No Known    PCP Phone Number:    [phone]     Follow Up Details:  1-2 weeks           Follow-up Information     Provider, No Known .     Why:  1-2 weeks  Contact information:  [facility] [address]  [phone]                   TEST  RESULTS PENDING AT DISCHARGE   Order Current Status    Blood Culture - Blood, Arm, Left Preliminary result    Blood Culture - Blood, Arm, Left Preliminary result             Lavon Robles MD  [facility]  10/20/19  10:29 AM      Time: greater than 32 minutes on discharge  It was a pleasure taking care of this patient while in the hospital.

## 2019-10-21 ENCOUNTER — HOSPITAL ENCOUNTER (OUTPATIENT)
Dept: INFUSION THERAPY | Facility: HOSPITAL | Age: 59
Discharge: HOME | End: 2019-10-21

## 2019-10-21 ENCOUNTER — READMISSION MANAGEMENT (OUTPATIENT)
Dept: CALL CENTER | Facility: HOSPITAL | Age: 59
End: 2019-10-21

## 2019-10-21 NOTE — OUTREACH NOTE
Prep Survey      Responses   Facility patient discharged from?  [place]   Is patient eligible?  Yes   Discharge diagnosis  Chronic respiratory failure with hypoxia    Does the patient have one of the following disease processes/diagnoses(primary or secondary)?  COPD/Pneumonia   Does the patient have Home health ordered?  No   Is there a DME ordered?  No   Prep survey completed?  Yes          Andree Delacruz RN

## 2019-10-21 NOTE — PROGRESS NOTES
Case Management Discharge Note    Final Note: Patient DC'd Home            Transportation Services  Private: Car    Final Discharge Disposition Code: 01 - home or self-care

## 2019-10-22 ENCOUNTER — READMISSION MANAGEMENT (OUTPATIENT)
Dept: CALL CENTER | Facility: HOSPITAL | Age: 59
End: 2019-10-22

## 2019-10-22 ENCOUNTER — HOSPITAL ENCOUNTER (OUTPATIENT)
Dept: INFUSION THERAPY | Facility: HOSPITAL | Age: 59
Discharge: HOME | End: 2019-10-22
Admitting: INTERNAL MEDICINE

## 2019-10-22 VITALS
RESPIRATION RATE: 22 BRPM | SYSTOLIC BLOOD PRESSURE: 119 MMHG | OXYGEN SATURATION: 99 % | TEMPERATURE: 98.6 F | DIASTOLIC BLOOD PRESSURE: 87 MMHG | HEART RATE: 86 BPM

## 2019-10-22 DIAGNOSIS — N39.0 UTI DUE TO EXTENDED-SPECTRUM BETA LACTAMASE (ESBL) PRODUCING ESCHERICHIA COLI: Primary | ICD-10-CM

## 2019-10-22 DIAGNOSIS — Z16.12 UTI DUE TO EXTENDED-SPECTRUM BETA LACTAMASE (ESBL) PRODUCING ESCHERICHIA COLI: Primary | ICD-10-CM

## 2019-10-22 DIAGNOSIS — B96.29 UTI DUE TO EXTENDED-SPECTRUM BETA LACTAMASE (ESBL) PRODUCING ESCHERICHIA COLI: Primary | ICD-10-CM

## 2019-10-22 PROCEDURE — 25010000003 LIDOCAINE 1 % SOLUTION: Performed by: INTERNAL MEDICINE

## 2019-10-22 PROCEDURE — 96372 THER/PROPH/DIAG INJ SC/IM: CPT

## 2019-10-22 PROCEDURE — 25010000002 ERTAPENEM PER 500 MG: Performed by: INTERNAL MEDICINE

## 2019-10-22 RX ORDER — ERTAPENEM 1 G/1
1 INJECTION, POWDER, LYOPHILIZED, FOR SOLUTION INTRAMUSCULAR; INTRAVENOUS EVERY 24 HOURS
Status: DISCONTINUED | OUTPATIENT
Start: 2019-10-22 | End: 2019-10-24 | Stop reason: HOSPADM

## 2019-10-22 RX ORDER — LIDOCAINE HYDROCHLORIDE 10 MG/ML
3.2 INJECTION, SOLUTION INFILTRATION; PERINEURAL ONCE
Status: COMPLETED | OUTPATIENT
Start: 2019-10-22 | End: 2019-10-22

## 2019-10-22 RX ORDER — LIDOCAINE HYDROCHLORIDE 10 MG/ML
3.2 INJECTION, SOLUTION INFILTRATION; PERINEURAL ONCE
Start: 2019-10-22 | End: 2019-10-22

## 2019-10-22 RX ORDER — ERTAPENEM 1 G/1
1 INJECTION, POWDER, LYOPHILIZED, FOR SOLUTION INTRAMUSCULAR; INTRAVENOUS EVERY 24 HOURS
OUTPATIENT
Start: 2019-10-22

## 2019-10-22 RX ADMIN — ERTAPENEM SODIUM 1 G: 1 INJECTION, POWDER, LYOPHILIZED, FOR SOLUTION INTRAMUSCULAR; INTRAVENOUS at 12:44

## 2019-10-22 RX ADMIN — LIDOCAINE HYDROCHLORIDE 3.2 ML: 10 INJECTION, SOLUTION INFILTRATION; PERINEURAL at 12:44

## 2019-10-22 NOTE — OUTREACH NOTE
COPD/PN Week 1 Survey      Responses   Facility patient discharged from?  [facility]   Does the patient have one of the following disease processes/diagnoses(primary or secondary)?  COPD/Pneumonia   Is there a successful TCM telephone encounter documented?  No   Was the primary reason for admission:  COPD exacerbation   Week 1 attempt successful?  Yes   Call start time  1218   Rescheduled  Rescheduled-pt requested   General alerts for this patient  Pt with Trach   Discharge diagnosis  Chronic respiratory failure with hypoxia    Is patient permission given to speak with other caregiver?  Yes   List who call center can speak with  mohamud Choi RN

## 2019-10-22 NOTE — PROGRESS NOTES
Patient tolerated injection well.  Her  went to outpatient pharmacy to  prescription and patient is free to be discharged per wheel chair once he returns.  She was given AVS with next two days appoointments and she was informed we would contact Dr. Fowler to inquire if he wants to add missed appointments or not and we will let her know tomorrow.

## 2019-10-23 ENCOUNTER — APPOINTMENT (OUTPATIENT)
Dept: INFUSION THERAPY | Facility: HOSPITAL | Age: 59
End: 2019-10-23

## 2019-10-23 LAB
BACTERIA SPEC AEROBE CULT: NORMAL
BACTERIA SPEC AEROBE CULT: NORMAL

## 2019-10-24 ENCOUNTER — APPOINTMENT (OUTPATIENT)
Dept: INFUSION THERAPY | Facility: HOSPITAL | Age: 59
End: 2019-10-24

## 2019-10-24 ENCOUNTER — READMISSION MANAGEMENT (OUTPATIENT)
Dept: CALL CENTER | Facility: HOSPITAL | Age: 59
End: 2019-10-24

## 2019-10-24 NOTE — OUTREACH NOTE
COPD/PN Week 1 Survey      Responses   Facility patient discharged from?  [place]   Does the patient have one of the following disease processes/diagnoses(primary or secondary)?  COPD/Pneumonia   Is there a successful TCM telephone encounter documented?  No   Was the primary reason for admission:  Pneumonia   Week 1 attempt successful?  Yes   Call start time  1442   Call end time  1442   General alerts for this patient  Pt with Trach   Discharge diagnosis  Chronic respiratory failure with hypoxia    Is patient permission given to speak with other caregiver?  Yes   List who call center can speak with  - Jimbo   Person spoke with today (if not patient) and relationship  - Jimbo   What is the patient's perception of their health status since discharge?  Improving   Week 1 call completed?  Yes   Revoked  No further contact(revokes)-requires comment   Graduated/Revoked comments   said she was fine but did not was to talk.          Carlita Carrero RN

## 2019-11-12 ENCOUNTER — HOSPITAL ENCOUNTER (EMERGENCY)
Facility: HOSPITAL | Age: 59
Discharge: HOME | End: 2019-11-12
Attending: EMERGENCY MEDICINE | Admitting: EMERGENCY MEDICINE

## 2019-11-12 ENCOUNTER — APPOINTMENT (OUTPATIENT)
Dept: GENERAL RADIOLOGY | Facility: HOSPITAL | Age: 59
End: 2019-11-12

## 2019-11-12 VITALS
TEMPERATURE: 99.2 F | RESPIRATION RATE: 16 BRPM | WEIGHT: 165.2 LBS | SYSTOLIC BLOOD PRESSURE: 122 MMHG | DIASTOLIC BLOOD PRESSURE: 75 MMHG | HEIGHT: 66 IN | HEART RATE: 97 BPM | OXYGEN SATURATION: 96 % | BODY MASS INDEX: 26.55 KG/M2

## 2019-11-12 DIAGNOSIS — J44.1 COPD EXACERBATION (HCC): Primary | ICD-10-CM

## 2019-11-12 LAB
ALBUMIN SERPL-MCNC: 3.5 G/DL (ref 3.5–5.2)
ALBUMIN/GLOB SERPL: 1 G/DL
ALP SERPL-CCNC: 177 U/L (ref 39–117)
ALT SERPL W P-5'-P-CCNC: 26 U/L (ref 1–33)
ANION GAP SERPL CALCULATED.3IONS-SCNC: 8.8 MMOL/L (ref 5–15)
AST SERPL-CCNC: 16 U/L (ref 1–32)
B PARAPERT DNA SPEC QL NAA+PROBE: NOT DETECTED
B PERT DNA SPEC QL NAA+PROBE: NOT DETECTED
BASOPHILS # BLD AUTO: 0.04 10*3/MM3 (ref 0–0.2)
BASOPHILS NFR BLD AUTO: 0.4 % (ref 0–1.5)
BILIRUB SERPL-MCNC: 0.3 MG/DL (ref 0.2–1.2)
BUN BLD-MCNC: 21 MG/DL (ref 6–20)
BUN/CREAT SERPL: 19.3 (ref 7–25)
C PNEUM DNA NPH QL NAA+NON-PROBE: NOT DETECTED
CALCIUM SPEC-SCNC: 9 MG/DL (ref 8.6–10.5)
CHLORIDE SERPL-SCNC: 102 MMOL/L (ref 98–107)
CO2 SERPL-SCNC: 28.2 MMOL/L (ref 22–29)
CREAT BLD-MCNC: 1.09 MG/DL (ref 0.57–1)
D-LACTATE SERPL-SCNC: 0.9 MMOL/L (ref 0.5–2)
DEPRECATED RDW RBC AUTO: 47.4 FL (ref 37–54)
EOSINOPHIL # BLD AUTO: 0.35 10*3/MM3 (ref 0–0.4)
EOSINOPHIL NFR BLD AUTO: 3.4 % (ref 0.3–6.2)
ERYTHROCYTE [DISTWIDTH] IN BLOOD BY AUTOMATED COUNT: 15.7 % (ref 12.3–15.4)
FLUAV H1 2009 PAND RNA NPH QL NAA+PROBE: NOT DETECTED
FLUAV H1 HA GENE NPH QL NAA+PROBE: NOT DETECTED
FLUAV H3 RNA NPH QL NAA+PROBE: NOT DETECTED
FLUAV SUBTYP SPEC NAA+PROBE: NOT DETECTED
FLUBV RNA ISLT QL NAA+PROBE: NOT DETECTED
GFR SERPL CREATININE-BSD FRML MDRD: 51 ML/MIN/1.73
GLOBULIN UR ELPH-MCNC: 3.6 GM/DL
GLUCOSE BLD-MCNC: 84 MG/DL (ref 65–99)
HADV DNA SPEC NAA+PROBE: NOT DETECTED
HCOV 229E RNA SPEC QL NAA+PROBE: NOT DETECTED
HCOV HKU1 RNA SPEC QL NAA+PROBE: NOT DETECTED
HCOV NL63 RNA SPEC QL NAA+PROBE: NOT DETECTED
HCOV OC43 RNA SPEC QL NAA+PROBE: NOT DETECTED
HCT VFR BLD AUTO: 40 % (ref 34–46.6)
HGB BLD-MCNC: 12.5 G/DL (ref 12–15.9)
HMPV RNA NPH QL NAA+NON-PROBE: NOT DETECTED
HOLD SPECIMEN: NORMAL
HOLD SPECIMEN: NORMAL
HPIV1 RNA SPEC QL NAA+PROBE: NOT DETECTED
HPIV2 RNA SPEC QL NAA+PROBE: NOT DETECTED
HPIV3 RNA NPH QL NAA+PROBE: NOT DETECTED
HPIV4 P GENE NPH QL NAA+PROBE: NOT DETECTED
IMM GRANULOCYTES # BLD AUTO: 0.04 10*3/MM3 (ref 0–0.05)
IMM GRANULOCYTES NFR BLD AUTO: 0.4 % (ref 0–0.5)
LYMPHOCYTES # BLD AUTO: 2.09 10*3/MM3 (ref 0.7–3.1)
LYMPHOCYTES NFR BLD AUTO: 20.5 % (ref 19.6–45.3)
M PNEUMO IGG SER IA-ACNC: NOT DETECTED
MCH RBC QN AUTO: 25.9 PG (ref 26.6–33)
MCHC RBC AUTO-ENTMCNC: 31.3 G/DL (ref 31.5–35.7)
MCV RBC AUTO: 83 FL (ref 79–97)
MONOCYTES # BLD AUTO: 0.64 10*3/MM3 (ref 0.1–0.9)
MONOCYTES NFR BLD AUTO: 6.3 % (ref 5–12)
NEUTROPHILS # BLD AUTO: 7.03 10*3/MM3 (ref 1.7–7)
NEUTROPHILS NFR BLD AUTO: 69 % (ref 42.7–76)
NRBC BLD AUTO-RTO: 0 /100 WBC (ref 0–0.2)
NT-PROBNP SERPL-MCNC: 196.9 PG/ML (ref 5–900)
PLATELET # BLD AUTO: 195 10*3/MM3 (ref 140–450)
PMV BLD AUTO: 11.8 FL (ref 6–12)
POTASSIUM BLD-SCNC: 4 MMOL/L (ref 3.5–5.2)
PROCALCITONIN SERPL-MCNC: 0.05 NG/ML (ref 0.1–0.25)
PROT SERPL-MCNC: 7.1 G/DL (ref 6–8.5)
RBC # BLD AUTO: 4.82 10*6/MM3 (ref 3.77–5.28)
RHINOVIRUS RNA SPEC NAA+PROBE: NOT DETECTED
RSV RNA NPH QL NAA+NON-PROBE: NOT DETECTED
SODIUM BLD-SCNC: 139 MMOL/L (ref 136–145)
TROPONIN T SERPL-MCNC: 0.01 NG/ML (ref 0–0.03)
WBC NRBC COR # BLD: 10.19 10*3/MM3 (ref 3.4–10.8)
WHOLE BLOOD HOLD SPECIMEN: NORMAL
WHOLE BLOOD HOLD SPECIMEN: NORMAL

## 2019-11-12 PROCEDURE — 87040 BLOOD CULTURE FOR BACTERIA: CPT | Performed by: PHYSICIAN ASSISTANT

## 2019-11-12 PROCEDURE — 96374 THER/PROPH/DIAG INJ IV PUSH: CPT

## 2019-11-12 PROCEDURE — 71045 X-RAY EXAM CHEST 1 VIEW: CPT

## 2019-11-12 PROCEDURE — 85025 COMPLETE CBC W/AUTO DIFF WBC: CPT | Performed by: PHYSICIAN ASSISTANT

## 2019-11-12 PROCEDURE — 0100U HC BIOFIRE FILMARRAY RESP PANEL 2: CPT | Performed by: PHYSICIAN ASSISTANT

## 2019-11-12 PROCEDURE — 93005 ELECTROCARDIOGRAM TRACING: CPT | Performed by: EMERGENCY MEDICINE

## 2019-11-12 PROCEDURE — 94640 AIRWAY INHALATION TREATMENT: CPT

## 2019-11-12 PROCEDURE — 84484 ASSAY OF TROPONIN QUANT: CPT | Performed by: PHYSICIAN ASSISTANT

## 2019-11-12 PROCEDURE — 93005 ELECTROCARDIOGRAM TRACING: CPT | Performed by: NURSE PRACTITIONER

## 2019-11-12 PROCEDURE — 80053 COMPREHEN METABOLIC PANEL: CPT | Performed by: PHYSICIAN ASSISTANT

## 2019-11-12 PROCEDURE — 99284 EMERGENCY DEPT VISIT MOD MDM: CPT

## 2019-11-12 PROCEDURE — 25010000002 METHYLPREDNISOLONE PER 125 MG: Performed by: PHYSICIAN ASSISTANT

## 2019-11-12 PROCEDURE — 83605 ASSAY OF LACTIC ACID: CPT | Performed by: PHYSICIAN ASSISTANT

## 2019-11-12 PROCEDURE — 94799 UNLISTED PULMONARY SVC/PX: CPT

## 2019-11-12 PROCEDURE — 83880 ASSAY OF NATRIURETIC PEPTIDE: CPT | Performed by: PHYSICIAN ASSISTANT

## 2019-11-12 PROCEDURE — 93010 ELECTROCARDIOGRAM REPORT: CPT | Performed by: INTERNAL MEDICINE

## 2019-11-12 PROCEDURE — 84145 PROCALCITONIN (PCT): CPT | Performed by: PHYSICIAN ASSISTANT

## 2019-11-12 RX ORDER — METHYLPREDNISOLONE SODIUM SUCCINATE 125 MG/2ML
125 INJECTION, POWDER, LYOPHILIZED, FOR SOLUTION INTRAMUSCULAR; INTRAVENOUS ONCE
Status: COMPLETED | OUTPATIENT
Start: 2019-11-12 | End: 2019-11-12

## 2019-11-12 RX ORDER — IPRATROPIUM BROMIDE AND ALBUTEROL SULFATE 2.5; .5 MG/3ML; MG/3ML
3 SOLUTION RESPIRATORY (INHALATION) ONCE
Status: COMPLETED | OUTPATIENT
Start: 2019-11-12 | End: 2019-11-12

## 2019-11-12 RX ORDER — SODIUM CHLORIDE 0.9 % (FLUSH) 0.9 %
10 SYRINGE (ML) INJECTION AS NEEDED
Status: DISCONTINUED | OUTPATIENT
Start: 2019-11-12 | End: 2019-11-12 | Stop reason: HOSPADM

## 2019-11-12 RX ORDER — PREDNISONE 20 MG/1
TABLET ORAL
Qty: 13 TABLET | Refills: 0 | Status: SHIPPED | OUTPATIENT
Start: 2019-11-12 | End: 2019-12-29

## 2019-11-12 RX ADMIN — IPRATROPIUM BROMIDE AND ALBUTEROL SULFATE 3 ML: 2.5; .5 SOLUTION RESPIRATORY (INHALATION) at 18:07

## 2019-11-12 RX ADMIN — METHYLPREDNISOLONE SODIUM SUCCINATE 125 MG: 125 INJECTION, POWDER, FOR SOLUTION INTRAMUSCULAR; INTRAVENOUS at 18:03

## 2019-11-12 RX ADMIN — IPRATROPIUM BROMIDE AND ALBUTEROL SULFATE 3 ML: 2.5; .5 SOLUTION RESPIRATORY (INHALATION) at 16:10

## 2019-11-12 NOTE — DISCHARGE INSTRUCTIONS
Take the prednisone as prescribed.  Return to the ER for fevers, shortness of breath, chest pain, as needed.   Follow up with your primary care doctor or the pulmonologist listed above within 48 hours for recheck. If you do not have a primary care provider, you may call the [employer]ia[employer] number to help you establish one.

## 2019-11-12 NOTE — ED TRIAGE NOTES
Pt from home with c/o SOB three days ago.   Pt has a trach and per EMS seems to have phlegm in the trach.   Pt is currently on a non-rebreather at this time.   Pt is on O2 at home via NC.   Pt also has c/o N/V/D  Pt's trach is open, does not have a canula in it.

## 2019-11-12 NOTE — ED PROVIDER NOTES
MD ATTESTATION NOTE    The JUSTYNA and I have discussed this patient's history, physical exam, and treatment plan.  I have reviewed the documentation and personally had a face to face interaction with the patient. I affirm the documentation and agree with the treatment and plan.  The attached note describes my personal findings.      Swetha Luis is a 59 y.o. female who presents to the ED c/o shortness of breath.  She states is been ongoing for 2 days.  Symptoms are constant.  She denies have any associated fever or chest pain.  She actually denies having cough as well.  However, she states that she has had these similar symptoms before and that this feels just like her COPD exacerbations.  I examined her after she got her DuoNeb treatment she states that she feels much better and feels like her normal self.  Respiratory therapy said that when they suction her leg got a couple large chunks of mucus plugs out and that after getting it removed the patient asked to go home.      On exam:  Prominent bilateral expiratory wheezing  Patient speaks in sentences  She has a tracheostomy with no surrounding evidence of warmth or redness  Tachycardic, regular rhythm   No lower extremity edema or tenderness      Medical Decision Making:  ED Course as of Nov 12 1649   Tue Nov 12, 2019   1643 WBC: 10.19 [TD]   1643 Lactate: 0.9 [TD]   1649 EKG interpreted by myself.  Time 1434.  Sinus rhythm.  Heart rate 105.  Normal axis.  Normal intervals.  Left atrial abnormality.  No acute ST abnormalities.  [TD]      ED Course User Index  [TD] Travis Rodriguez II, MD Davis, Thomas Edward II, MD  11/12/19 4218

## 2019-11-17 LAB
BACTERIA SPEC AEROBE CULT: NORMAL
BACTERIA SPEC AEROBE CULT: NORMAL

## 2019-11-19 ENCOUNTER — DOCUMENTATION (OUTPATIENT)
Dept: SOCIAL WORK | Facility: HOSPITAL | Age: 59
End: 2019-11-19

## 2019-11-19 NOTE — PROGRESS NOTES
Received vm from Lucy MONTEMAYOR  cell:[phone]/office [phone] requesting call back from this CCP. CCP returned call and confirmed her discharge date on 10/14/19, and re-admit date on 10/18-10/20. CCP explained she could contact Medical records for further information. She verbalized understanding. Noelle Juarez RN

## 2019-12-29 ENCOUNTER — APPOINTMENT (OUTPATIENT)
Dept: GENERAL RADIOLOGY | Facility: HOSPITAL | Age: 59
End: 2019-12-29

## 2019-12-29 ENCOUNTER — HOSPITAL ENCOUNTER (EMERGENCY)
Facility: HOSPITAL | Age: 59
Discharge: HOME | End: 2019-12-29
Attending: EMERGENCY MEDICINE | Admitting: EMERGENCY MEDICINE

## 2019-12-29 DIAGNOSIS — J44.1 COPD WITH ACUTE EXACERBATION (HCC): Primary | ICD-10-CM

## 2019-12-29 DIAGNOSIS — I10 POORLY-CONTROLLED HYPERTENSION: ICD-10-CM

## 2019-12-29 LAB
ALBUMIN SERPL-MCNC: 3.7 G/DL (ref 3.5–5.2)
ALBUMIN/GLOB SERPL: 1.2 G/DL
ALP SERPL-CCNC: 231 U/L (ref 39–117)
ALT SERPL W P-5'-P-CCNC: 34 U/L (ref 1–33)
ANION GAP SERPL CALCULATED.3IONS-SCNC: 11.9 MMOL/L (ref 5–15)
AST SERPL-CCNC: 28 U/L (ref 1–32)
BASOPHILS # BLD AUTO: 0.05 10*3/MM3 (ref 0–0.2)
BASOPHILS NFR BLD AUTO: 0.8 % (ref 0–1.5)
BILIRUB SERPL-MCNC: 0.2 MG/DL (ref 0.2–1.2)
BUN BLD-MCNC: 14 MG/DL (ref 6–20)
BUN/CREAT SERPL: 11 (ref 7–25)
CALCIUM SPEC-SCNC: 9.2 MG/DL (ref 8.6–10.5)
CHLORIDE SERPL-SCNC: 101 MMOL/L (ref 98–107)
CO2 SERPL-SCNC: 27.1 MMOL/L (ref 22–29)
CREAT BLD-MCNC: 1.27 MG/DL (ref 0.57–1)
DEPRECATED RDW RBC AUTO: 41.7 FL (ref 37–54)
EOSINOPHIL # BLD AUTO: 0.28 10*3/MM3 (ref 0–0.4)
EOSINOPHIL NFR BLD AUTO: 4.2 % (ref 0.3–6.2)
ERYTHROCYTE [DISTWIDTH] IN BLOOD BY AUTOMATED COUNT: 14.4 % (ref 12.3–15.4)
GFR SERPL CREATININE-BSD FRML MDRD: 43 ML/MIN/1.73
GLOBULIN UR ELPH-MCNC: 3.1 GM/DL
GLUCOSE BLD-MCNC: 113 MG/DL (ref 65–99)
HCT VFR BLD AUTO: 39.3 % (ref 34–46.6)
HGB BLD-MCNC: 12.5 G/DL (ref 12–15.9)
HOLD SPECIMEN: NORMAL
HOLD SPECIMEN: NORMAL
IMM GRANULOCYTES # BLD AUTO: 0.02 10*3/MM3 (ref 0–0.05)
IMM GRANULOCYTES NFR BLD AUTO: 0.3 % (ref 0–0.5)
LYMPHOCYTES # BLD AUTO: 1.53 10*3/MM3 (ref 0.7–3.1)
LYMPHOCYTES NFR BLD AUTO: 23.2 % (ref 19.6–45.3)
MCH RBC QN AUTO: 25.8 PG (ref 26.6–33)
MCHC RBC AUTO-ENTMCNC: 31.8 G/DL (ref 31.5–35.7)
MCV RBC AUTO: 81.2 FL (ref 79–97)
MONOCYTES # BLD AUTO: 0.52 10*3/MM3 (ref 0.1–0.9)
MONOCYTES NFR BLD AUTO: 7.9 % (ref 5–12)
NEUTROPHILS # BLD AUTO: 4.2 10*3/MM3 (ref 1.7–7)
NEUTROPHILS NFR BLD AUTO: 63.6 % (ref 42.7–76)
NRBC BLD AUTO-RTO: 0 /100 WBC (ref 0–0.2)
NT-PROBNP SERPL-MCNC: 189.3 PG/ML (ref 5–900)
PLATELET # BLD AUTO: 188 10*3/MM3 (ref 140–450)
PMV BLD AUTO: 11.1 FL (ref 6–12)
POTASSIUM BLD-SCNC: 3.5 MMOL/L (ref 3.5–5.2)
PROT SERPL-MCNC: 6.8 G/DL (ref 6–8.5)
RBC # BLD AUTO: 4.84 10*6/MM3 (ref 3.77–5.28)
SODIUM BLD-SCNC: 140 MMOL/L (ref 136–145)
TROPONIN T SERPL-MCNC: 0.03 NG/ML (ref 0–0.03)
WBC NRBC COR # BLD: 6.6 10*3/MM3 (ref 3.4–10.8)
WHOLE BLOOD HOLD SPECIMEN: NORMAL
WHOLE BLOOD HOLD SPECIMEN: NORMAL

## 2019-12-29 PROCEDURE — 80053 COMPREHEN METABOLIC PANEL: CPT | Performed by: EMERGENCY MEDICINE

## 2019-12-29 PROCEDURE — 93005 ELECTROCARDIOGRAM TRACING: CPT | Performed by: EMERGENCY MEDICINE

## 2019-12-29 PROCEDURE — 94640 AIRWAY INHALATION TREATMENT: CPT

## 2019-12-29 PROCEDURE — 93005 ELECTROCARDIOGRAM TRACING: CPT

## 2019-12-29 PROCEDURE — 83880 ASSAY OF NATRIURETIC PEPTIDE: CPT | Performed by: EMERGENCY MEDICINE

## 2019-12-29 PROCEDURE — 85025 COMPLETE CBC W/AUTO DIFF WBC: CPT

## 2019-12-29 PROCEDURE — 99285 EMERGENCY DEPT VISIT HI MDM: CPT

## 2019-12-29 PROCEDURE — 84484 ASSAY OF TROPONIN QUANT: CPT | Performed by: EMERGENCY MEDICINE

## 2019-12-29 PROCEDURE — 94799 UNLISTED PULMONARY SVC/PX: CPT

## 2019-12-29 PROCEDURE — 93010 ELECTROCARDIOGRAM REPORT: CPT | Performed by: INTERNAL MEDICINE

## 2019-12-29 PROCEDURE — 96374 THER/PROPH/DIAG INJ IV PUSH: CPT

## 2019-12-29 PROCEDURE — 71045 X-RAY EXAM CHEST 1 VIEW: CPT

## 2019-12-29 RX ORDER — PREDNISONE 20 MG/1
60 TABLET ORAL DAILY
Qty: 9 TABLET | Refills: 0 | Status: SHIPPED | OUTPATIENT
Start: 2019-12-29 | End: 2020-11-18 | Stop reason: HOSPADM

## 2019-12-29 RX ORDER — METOPROLOL SUCCINATE 25 MG/1
25 TABLET, EXTENDED RELEASE ORAL DAILY
Qty: 30 TABLET | Refills: 0 | Status: ON HOLD | OUTPATIENT
Start: 2019-12-29 | End: 2020-12-08 | Stop reason: SDDI

## 2019-12-29 RX ORDER — LABETALOL HYDROCHLORIDE 5 MG/ML
10 INJECTION, SOLUTION INTRAVENOUS ONCE
Status: COMPLETED | OUTPATIENT
Start: 2019-12-29 | End: 2019-12-29

## 2019-12-29 RX ORDER — CLONIDINE HYDROCHLORIDE 0.1 MG/1
0.1 TABLET ORAL ONCE
Status: COMPLETED | OUTPATIENT
Start: 2019-12-29 | End: 2019-12-29

## 2019-12-29 RX ORDER — IPRATROPIUM BROMIDE AND ALBUTEROL SULFATE 2.5; .5 MG/3ML; MG/3ML
3 SOLUTION RESPIRATORY (INHALATION) ONCE
Status: COMPLETED | OUTPATIENT
Start: 2019-12-29 | End: 2019-12-29

## 2019-12-29 RX ORDER — SODIUM CHLORIDE 0.9 % (FLUSH) 0.9 %
10 SYRINGE (ML) INJECTION AS NEEDED
Status: DISCONTINUED | OUTPATIENT
Start: 2019-12-29 | End: 2019-12-30 | Stop reason: HOSPADM

## 2019-12-29 RX ADMIN — LABETALOL HYDROCHLORIDE 10 MG: 5 INJECTION, SOLUTION INTRAVENOUS at 23:38

## 2019-12-29 RX ADMIN — CLONIDINE HYDROCHLORIDE 0.1 MG: 0.1 TABLET ORAL at 21:54

## 2019-12-29 RX ADMIN — IPRATROPIUM BROMIDE AND ALBUTEROL SULFATE 3 ML: 2.5; .5 SOLUTION RESPIRATORY (INHALATION) at 20:50

## 2019-12-30 VITALS
RESPIRATION RATE: 18 BRPM | BODY MASS INDEX: 28.41 KG/M2 | TEMPERATURE: 98.2 F | OXYGEN SATURATION: 95 % | DIASTOLIC BLOOD PRESSURE: 90 MMHG | HEIGHT: 64 IN | WEIGHT: 166.4 LBS | HEART RATE: 82 BPM | SYSTOLIC BLOOD PRESSURE: 125 MMHG

## 2020-02-10 ENCOUNTER — INPATIENT HOSPITAL (OUTPATIENT)
Dept: URBAN - METROPOLITAN AREA HOSPITAL 107 | Facility: HOSPITAL | Age: 60
End: 2020-02-10
Payer: MEDICAID

## 2020-02-10 DIAGNOSIS — Z43.1 ENCOUNTER FOR ATTENTION TO GASTROSTOMY: ICD-10-CM

## 2020-02-10 PROCEDURE — 99221 1ST HOSP IP/OBS SF/LOW 40: CPT | Performed by: INTERNAL MEDICINE

## 2020-10-22 NOTE — PROGRESS NOTES
Patient tolerated injection without difficulty. Patient discharged with  per wheelchair at 1300.     Units: mg

## 2020-11-15 ENCOUNTER — APPOINTMENT (OUTPATIENT)
Dept: CT IMAGING | Facility: HOSPITAL | Age: 60
End: 2020-11-15

## 2020-11-15 ENCOUNTER — HOSPITAL ENCOUNTER (INPATIENT)
Facility: HOSPITAL | Age: 60
LOS: 3 days | Discharge: HOME | End: 2020-11-18
Attending: EMERGENCY MEDICINE | Admitting: INTERNAL MEDICINE

## 2020-11-15 ENCOUNTER — APPOINTMENT (OUTPATIENT)
Dept: GENERAL RADIOLOGY | Facility: HOSPITAL | Age: 60
End: 2020-11-15

## 2020-11-15 DIAGNOSIS — I82.4Y2 ACUTE DEEP VEIN THROMBOSIS (DVT) OF PROXIMAL VEIN OF LEFT LOWER EXTREMITY (HCC): Primary | ICD-10-CM

## 2020-11-15 DIAGNOSIS — N18.31 STAGE 3A CHRONIC KIDNEY DISEASE (HCC): ICD-10-CM

## 2020-11-15 PROBLEM — R79.89 ELEVATED D-DIMER: Status: ACTIVE | Noted: 2020-11-15

## 2020-11-15 LAB
ALBUMIN SERPL-MCNC: 3.8 G/DL (ref 3.5–5.2)
ALBUMIN/GLOB SERPL: 1.5 G/DL
ALP SERPL-CCNC: 197 U/L (ref 39–117)
ALT SERPL W P-5'-P-CCNC: 18 U/L (ref 1–33)
ANION GAP SERPL CALCULATED.3IONS-SCNC: 8.9 MMOL/L (ref 5–15)
APTT PPP: 30.9 SECONDS (ref 22.7–35.4)
AST SERPL-CCNC: 21 U/L (ref 1–32)
BASOPHILS # BLD AUTO: 0.04 10*3/MM3 (ref 0–0.2)
BASOPHILS NFR BLD AUTO: 0.5 % (ref 0–1.5)
BILIRUB SERPL-MCNC: <0.2 MG/DL (ref 0–1.2)
BUN SERPL-MCNC: 9 MG/DL (ref 8–23)
BUN/CREAT SERPL: 7.9 (ref 7–25)
CALCIUM SPEC-SCNC: 9.2 MG/DL (ref 8.6–10.5)
CHLORIDE SERPL-SCNC: 102 MMOL/L (ref 98–107)
CO2 SERPL-SCNC: 30.1 MMOL/L (ref 22–29)
CREAT SERPL-MCNC: 1.14 MG/DL (ref 0.57–1)
D DIMER PPP FEU-MCNC: 2.39 MCGFEU/ML (ref 0–0.49)
DEPRECATED RDW RBC AUTO: 43.3 FL (ref 37–54)
EOSINOPHIL # BLD AUTO: 0.26 10*3/MM3 (ref 0–0.4)
EOSINOPHIL NFR BLD AUTO: 3.4 % (ref 0.3–6.2)
ERYTHROCYTE [DISTWIDTH] IN BLOOD BY AUTOMATED COUNT: 16.1 % (ref 12.3–15.4)
GFR SERPL CREATININE-BSD FRML MDRD: 49 ML/MIN/1.73
GLOBULIN UR ELPH-MCNC: 2.5 GM/DL
GLUCOSE SERPL-MCNC: 154 MG/DL (ref 65–99)
HCT VFR BLD AUTO: 35.1 % (ref 34–46.6)
HGB BLD-MCNC: 10.5 G/DL (ref 12–15.9)
IMM GRANULOCYTES # BLD AUTO: 0.02 10*3/MM3 (ref 0–0.05)
IMM GRANULOCYTES NFR BLD AUTO: 0.3 % (ref 0–0.5)
INR PPP: 0.89 (ref 0.9–1.1)
LYMPHOCYTES # BLD AUTO: 1.5 10*3/MM3 (ref 0.7–3.1)
LYMPHOCYTES NFR BLD AUTO: 19.9 % (ref 19.6–45.3)
MCH RBC QN AUTO: 22.6 PG (ref 26.6–33)
MCHC RBC AUTO-ENTMCNC: 29.9 G/DL (ref 31.5–35.7)
MCV RBC AUTO: 75.5 FL (ref 79–97)
MONOCYTES # BLD AUTO: 0.59 10*3/MM3 (ref 0.1–0.9)
MONOCYTES NFR BLD AUTO: 7.8 % (ref 5–12)
NEUTROPHILS NFR BLD AUTO: 5.13 10*3/MM3 (ref 1.7–7)
NEUTROPHILS NFR BLD AUTO: 68.1 % (ref 42.7–76)
NRBC BLD AUTO-RTO: 0 /100 WBC (ref 0–0.2)
NT-PROBNP SERPL-MCNC: 125.3 PG/ML (ref 0–900)
PLATELET # BLD AUTO: 211 10*3/MM3 (ref 140–450)
PMV BLD AUTO: 10.3 FL (ref 6–12)
POTASSIUM SERPL-SCNC: 3.9 MMOL/L (ref 3.5–5.2)
PROT SERPL-MCNC: 6.3 G/DL (ref 6–8.5)
PROTHROMBIN TIME: 11.8 SECONDS (ref 11.7–14.2)
QT INTERVAL: 386 MS
RBC # BLD AUTO: 4.65 10*6/MM3 (ref 3.77–5.28)
SODIUM SERPL-SCNC: 141 MMOL/L (ref 136–145)
TROPONIN T SERPL-MCNC: <0.01 NG/ML (ref 0–0.03)
WBC # BLD AUTO: 7.54 10*3/MM3 (ref 3.4–10.8)

## 2020-11-15 PROCEDURE — 83880 ASSAY OF NATRIURETIC PEPTIDE: CPT | Performed by: EMERGENCY MEDICINE

## 2020-11-15 PROCEDURE — 80053 COMPREHEN METABOLIC PANEL: CPT | Performed by: EMERGENCY MEDICINE

## 2020-11-15 PROCEDURE — 99285 EMERGENCY DEPT VISIT HI MDM: CPT

## 2020-11-15 PROCEDURE — 85379 FIBRIN DEGRADATION QUANT: CPT | Performed by: EMERGENCY MEDICINE

## 2020-11-15 PROCEDURE — 93010 ELECTROCARDIOGRAM REPORT: CPT | Performed by: INTERNAL MEDICINE

## 2020-11-15 PROCEDURE — 85610 PROTHROMBIN TIME: CPT | Performed by: EMERGENCY MEDICINE

## 2020-11-15 PROCEDURE — U0004 COV-19 TEST NON-CDC HGH THRU: HCPCS | Performed by: EMERGENCY MEDICINE

## 2020-11-15 PROCEDURE — 85730 THROMBOPLASTIN TIME PARTIAL: CPT | Performed by: EMERGENCY MEDICINE

## 2020-11-15 PROCEDURE — 84484 ASSAY OF TROPONIN QUANT: CPT | Performed by: EMERGENCY MEDICINE

## 2020-11-15 PROCEDURE — 85025 COMPLETE CBC W/AUTO DIFF WBC: CPT | Performed by: EMERGENCY MEDICINE

## 2020-11-15 PROCEDURE — 71045 X-RAY EXAM CHEST 1 VIEW: CPT

## 2020-11-15 PROCEDURE — 25010000002 ENOXAPARIN PER 10 MG: Performed by: EMERGENCY MEDICINE

## 2020-11-15 PROCEDURE — 0 IOPAMIDOL PER 1 ML: Performed by: EMERGENCY MEDICINE

## 2020-11-15 PROCEDURE — 93005 ELECTROCARDIOGRAM TRACING: CPT | Performed by: EMERGENCY MEDICINE

## 2020-11-15 PROCEDURE — 71275 CT ANGIOGRAPHY CHEST: CPT

## 2020-11-15 RX ORDER — ACETAMINOPHEN 500 MG
1000 TABLET ORAL ONCE
Status: COMPLETED | OUTPATIENT
Start: 2020-11-15 | End: 2020-11-15

## 2020-11-15 RX ORDER — GLUCOSAMINE HCL/CHONDROITIN SU 500-400 MG
1 CAPSULE ORAL 4 TIMES DAILY
COMMUNITY
Start: 2020-09-29

## 2020-11-15 RX ORDER — BLOOD SUGAR DIAGNOSTIC
1 STRIP MISCELLANEOUS 4 TIMES DAILY
COMMUNITY
Start: 2020-09-29

## 2020-11-15 RX ORDER — INSULIN GLARGINE 100 [IU]/ML
5 INJECTION, SOLUTION SUBCUTANEOUS 2 TIMES DAILY
COMMUNITY
Start: 2020-09-29

## 2020-11-15 RX ORDER — INSULIN GLULISINE 100 [IU]/ML
5 INJECTION, SOLUTION SUBCUTANEOUS 3 TIMES DAILY
COMMUNITY
End: 2020-11-18 | Stop reason: HOSPADM

## 2020-11-15 RX ORDER — SODIUM CHLORIDE 0.9 % (FLUSH) 0.9 %
10 SYRINGE (ML) INJECTION AS NEEDED
Status: DISCONTINUED | OUTPATIENT
Start: 2020-11-15 | End: 2020-11-18 | Stop reason: HOSPADM

## 2020-11-15 RX ORDER — BLOOD-GLUCOSE METER
1 KIT MISCELLANEOUS 4 TIMES DAILY
COMMUNITY
Start: 2020-09-29

## 2020-11-15 RX ADMIN — IOPAMIDOL 95 ML: 755 INJECTION, SOLUTION INTRAVENOUS at 22:19

## 2020-11-15 RX ADMIN — ENOXAPARIN SODIUM 80 MG: 80 INJECTION SUBCUTANEOUS at 21:54

## 2020-11-15 RX ADMIN — ACETAMINOPHEN 1000 MG: 500 TABLET, FILM COATED ORAL at 22:38

## 2020-11-16 ENCOUNTER — APPOINTMENT (OUTPATIENT)
Dept: CARDIOLOGY | Facility: HOSPITAL | Age: 60
End: 2020-11-16

## 2020-11-16 PROBLEM — E11.65 TYPE 2 DIABETES MELLITUS WITH HYPERGLYCEMIA, WITH LONG-TERM CURRENT USE OF INSULIN (HCC): Status: ACTIVE | Noted: 2020-11-16

## 2020-11-16 PROBLEM — Z79.4 TYPE 2 DIABETES MELLITUS WITH HYPERGLYCEMIA, WITH LONG-TERM CURRENT USE OF INSULIN (HCC): Status: ACTIVE | Noted: 2020-11-16

## 2020-11-16 LAB
ANION GAP SERPL CALCULATED.3IONS-SCNC: 9.8 MMOL/L (ref 5–15)
APTT PPP: 32.9 SECONDS (ref 22.7–35.4)
APTT PPP: 97.9 SECONDS (ref 22.7–35.4)
ARTERIAL PATENCY WRIST A: POSITIVE
ARTERIAL PATENCY WRIST A: POSITIVE
ATMOSPHERIC PRESS: 752.5 MMHG
ATMOSPHERIC PRESS: 752.8 MMHG
BASE EXCESS BLDA CALC-SCNC: 4.7 MMOL/L (ref 0–2)
BASE EXCESS BLDA CALC-SCNC: 4.9 MMOL/L (ref 0–2)
BASOPHILS # BLD AUTO: 0.05 10*3/MM3 (ref 0–0.2)
BASOPHILS NFR BLD AUTO: 0.7 % (ref 0–1.5)
BDY SITE: ABNORMAL
BDY SITE: ABNORMAL
BH CV LOW VAS LEFT COMMON FEMORAL SPONT: 1
BH CV LOW VAS LEFT DISTAL FEMORAL SPONT: 1
BH CV LOW VAS LEFT EXTERNAL ILIAC COMPRESS: NORMAL
BH CV LOW VAS LEFT EXTERNAL ILIAC SPONT: 1
BH CV LOW VAS LEFT EXTERNAL ILIAC THROMBUS: NORMAL
BH CV LOW VAS LEFT GASTRONEMIUS VESSEL: 1
BH CV LOW VAS LEFT LESSER SAPH VESSEL: 1
BH CV LOW VAS LEFT MID FEMORAL SPONT: 1
BH CV LOW VAS LEFT PERONEAL VESSEL: 1
BH CV LOW VAS LEFT POPLITEAL SPONT: 1
BH CV LOW VAS LEFT POSTERIOR TIBIAL VESSEL: 1
BH CV LOW VAS LEFT PROFUNDA FEMORAL SPONT: 1
BH CV LOW VAS LEFT PROXIMAL FEMORAL SPONT: 1
BH CV LOW VAS LEFT SAPHENOFEMORAL JUNCTION SPONT: 1
BH CV LOW VAS LEFT SOLEAL VESSEL: 1
BH CV LOWER VASCULAR LEFT COMMON FEMORAL AUGMENT: NORMAL
BH CV LOWER VASCULAR LEFT COMMON FEMORAL COMPRESS: NORMAL
BH CV LOWER VASCULAR LEFT COMMON FEMORAL PHASIC: NORMAL
BH CV LOWER VASCULAR LEFT COMMON FEMORAL SPONT: NORMAL
BH CV LOWER VASCULAR LEFT COMMON FEMORAL THROMBUS: NORMAL
BH CV LOWER VASCULAR LEFT DISTAL FEMORAL COMPRESS: NORMAL
BH CV LOWER VASCULAR LEFT DISTAL FEMORAL THROMBUS: NORMAL
BH CV LOWER VASCULAR LEFT GASTRONEMIUS COMPRESS: NORMAL
BH CV LOWER VASCULAR LEFT GASTRONEMIUS THROMBUS: NORMAL
BH CV LOWER VASCULAR LEFT GREATER SAPH AK COMPRESS: NORMAL
BH CV LOWER VASCULAR LEFT GREATER SAPH BK COMPRESS: NORMAL
BH CV LOWER VASCULAR LEFT LESSER SAPH COMPRESS: NORMAL
BH CV LOWER VASCULAR LEFT LESSER SAPH THROMBUS: NORMAL
BH CV LOWER VASCULAR LEFT MID FEMORAL AUGMENT: NORMAL
BH CV LOWER VASCULAR LEFT MID FEMORAL COMPRESS: NORMAL
BH CV LOWER VASCULAR LEFT MID FEMORAL PHASIC: NORMAL
BH CV LOWER VASCULAR LEFT MID FEMORAL SPONT: NORMAL
BH CV LOWER VASCULAR LEFT MID FEMORAL THROMBUS: NORMAL
BH CV LOWER VASCULAR LEFT PERONEAL COMPRESS: NORMAL
BH CV LOWER VASCULAR LEFT PERONEAL THROMBUS: NORMAL
BH CV LOWER VASCULAR LEFT POPLITEAL AUGMENT: NORMAL
BH CV LOWER VASCULAR LEFT POPLITEAL COMPRESS: NORMAL
BH CV LOWER VASCULAR LEFT POPLITEAL PHASIC: NORMAL
BH CV LOWER VASCULAR LEFT POPLITEAL SPONT: NORMAL
BH CV LOWER VASCULAR LEFT POPLITEAL THROMBUS: NORMAL
BH CV LOWER VASCULAR LEFT POSTERIOR TIBIAL COMPRESS: NORMAL
BH CV LOWER VASCULAR LEFT POSTERIOR TIBIAL THROMBUS: NORMAL
BH CV LOWER VASCULAR LEFT PROFUNDA FEMORAL COMPRESS: NORMAL
BH CV LOWER VASCULAR LEFT PROFUNDA FEMORAL THROMBUS: NORMAL
BH CV LOWER VASCULAR LEFT PROXIMAL FEMORAL COMPRESS: NORMAL
BH CV LOWER VASCULAR LEFT PROXIMAL FEMORAL THROMBUS: NORMAL
BH CV LOWER VASCULAR LEFT SAPHENOFEMORAL JUNCTION COMPRESS: NORMAL
BH CV LOWER VASCULAR LEFT SAPHENOFEMORAL JUNCTION THROMBUS: NORMAL
BH CV LOWER VASCULAR LEFT SOLEAL COMPRESS: NORMAL
BH CV LOWER VASCULAR LEFT SOLEAL THROMBUS: NORMAL
BH CV LOWER VASCULAR RIGHT COMMON FEMORAL AUGMENT: NORMAL
BH CV LOWER VASCULAR RIGHT COMMON FEMORAL COMPETENT: NORMAL
BH CV LOWER VASCULAR RIGHT COMMON FEMORAL COMPRESS: NORMAL
BH CV LOWER VASCULAR RIGHT COMMON FEMORAL PHASIC: NORMAL
BH CV LOWER VASCULAR RIGHT COMMON FEMORAL SPONT: NORMAL
BUN SERPL-MCNC: 7 MG/DL (ref 8–23)
BUN/CREAT SERPL: 7.1 (ref 7–25)
CALCIUM SPEC-SCNC: 9.4 MG/DL (ref 8.6–10.5)
CHLORIDE SERPL-SCNC: 104 MMOL/L (ref 98–107)
CO2 SERPL-SCNC: 28.2 MMOL/L (ref 22–29)
CREAT SERPL-MCNC: 0.98 MG/DL (ref 0.57–1)
DEPRECATED RDW RBC AUTO: 41.5 FL (ref 37–54)
DEPRECATED RDW RBC AUTO: 43 FL (ref 37–54)
EOSINOPHIL # BLD AUTO: 0.3 10*3/MM3 (ref 0–0.4)
EOSINOPHIL NFR BLD AUTO: 4.3 % (ref 0.3–6.2)
ERYTHROCYTE [DISTWIDTH] IN BLOOD BY AUTOMATED COUNT: 16 % (ref 12.3–15.4)
ERYTHROCYTE [DISTWIDTH] IN BLOOD BY AUTOMATED COUNT: 16.2 % (ref 12.3–15.4)
GAS FLOW AIRWAY: 10 LPM
GAS FLOW AIRWAY: 2 LPM
GFR SERPL CREATININE-BSD FRML MDRD: 58 ML/MIN/1.73
GLUCOSE BLDC GLUCOMTR-MCNC: 105 MG/DL (ref 70–130)
GLUCOSE BLDC GLUCOMTR-MCNC: 144 MG/DL (ref 70–130)
GLUCOSE BLDC GLUCOMTR-MCNC: 248 MG/DL (ref 70–130)
GLUCOSE BLDC GLUCOMTR-MCNC: 255 MG/DL (ref 70–130)
GLUCOSE SERPL-MCNC: 107 MG/DL (ref 65–99)
HCO3 BLDA-SCNC: 32 MMOL/L (ref 22–28)
HCO3 BLDA-SCNC: 33.7 MMOL/L (ref 22–28)
HCT VFR BLD AUTO: 33.2 % (ref 34–46.6)
HCT VFR BLD AUTO: 34.7 % (ref 34–46.6)
HGB BLD-MCNC: 10.3 G/DL (ref 12–15.9)
HGB BLD-MCNC: 10.6 G/DL (ref 12–15.9)
INR PPP: 0.94 (ref 0.9–1.1)
LYMPHOCYTES # BLD AUTO: 1.83 10*3/MM3 (ref 0.7–3.1)
LYMPHOCYTES NFR BLD AUTO: 26.1 % (ref 19.6–45.3)
MCH RBC QN AUTO: 22.6 PG (ref 26.6–33)
MCH RBC QN AUTO: 23.1 PG (ref 26.6–33)
MCHC RBC AUTO-ENTMCNC: 30.5 G/DL (ref 31.5–35.7)
MCHC RBC AUTO-ENTMCNC: 31 G/DL (ref 31.5–35.7)
MCV RBC AUTO: 74 FL (ref 79–97)
MCV RBC AUTO: 74.6 FL (ref 79–97)
MODALITY: ABNORMAL
MODALITY: ABNORMAL
MONOCYTES # BLD AUTO: 0.56 10*3/MM3 (ref 0.1–0.9)
MONOCYTES NFR BLD AUTO: 8 % (ref 5–12)
NEUTROPHILS NFR BLD AUTO: 4.25 10*3/MM3 (ref 1.7–7)
NEUTROPHILS NFR BLD AUTO: 60.6 % (ref 42.7–76)
PCO2 BLDA: 57.8 MM HG (ref 35–45)
PCO2 BLDA: 74.8 MM HG (ref 35–45)
PH BLDA: 7.26 PH UNITS (ref 7.35–7.45)
PH BLDA: 7.35 PH UNITS (ref 7.35–7.45)
PLATELET # BLD AUTO: 218 10*3/MM3 (ref 140–450)
PLATELET # BLD AUTO: 225 10*3/MM3 (ref 140–450)
PMV BLD AUTO: 10.7 FL (ref 6–12)
PMV BLD AUTO: 11 FL (ref 6–12)
PO2 BLDA: 307.3 MM HG (ref 80–100)
PO2 BLDA: 60.9 MM HG (ref 80–100)
POTASSIUM SERPL-SCNC: 3.5 MMOL/L (ref 3.5–5.2)
PROTHROMBIN TIME: 12.3 SECONDS (ref 11.7–14.2)
RBC # BLD AUTO: 4.45 10*6/MM3 (ref 3.77–5.28)
RBC # BLD AUTO: 4.69 10*6/MM3 (ref 3.77–5.28)
SAO2 % BLDCOA: 89 % (ref 92–99)
SAO2 % BLDCOA: 99.9 % (ref 92–99)
SARS-COV-2 RNA RESP QL NAA+PROBE: NOT DETECTED
SODIUM SERPL-SCNC: 142 MMOL/L (ref 136–145)
TOTAL RATE: 20 BREATHS/MINUTE
TOTAL RATE: 24 BREATHS/MINUTE
WBC # BLD AUTO: 6.8 10*3/MM3 (ref 3.4–10.8)
WBC # BLD AUTO: 7.01 10*3/MM3 (ref 3.4–10.8)

## 2020-11-16 PROCEDURE — 36415 COLL VENOUS BLD VENIPUNCTURE: CPT | Performed by: NURSE PRACTITIONER

## 2020-11-16 PROCEDURE — 85027 COMPLETE CBC AUTOMATED: CPT | Performed by: NURSE PRACTITIONER

## 2020-11-16 PROCEDURE — 85730 THROMBOPLASTIN TIME PARTIAL: CPT | Performed by: NURSE PRACTITIONER

## 2020-11-16 PROCEDURE — 63710000001 PREDNISONE PER 1 MG: Performed by: NURSE PRACTITIONER

## 2020-11-16 PROCEDURE — 85730 THROMBOPLASTIN TIME PARTIAL: CPT | Performed by: INTERNAL MEDICINE

## 2020-11-16 PROCEDURE — 36600 WITHDRAWAL OF ARTERIAL BLOOD: CPT

## 2020-11-16 PROCEDURE — 25010000002 HEPARIN (PORCINE) PER 1000 UNITS: Performed by: NURSE PRACTITIONER

## 2020-11-16 PROCEDURE — 85025 COMPLETE CBC W/AUTO DIFF WBC: CPT | Performed by: NURSE PRACTITIONER

## 2020-11-16 PROCEDURE — 92610 EVALUATE SWALLOWING FUNCTION: CPT

## 2020-11-16 PROCEDURE — 94799 UNLISTED PULMONARY SVC/PX: CPT

## 2020-11-16 PROCEDURE — 63710000001 INSULIN LISPRO (HUMAN) PER 5 UNITS: Performed by: NURSE PRACTITIONER

## 2020-11-16 PROCEDURE — 63710000001 INSULIN GLARGINE PER 5 UNITS: Performed by: NURSE PRACTITIONER

## 2020-11-16 PROCEDURE — 93971 EXTREMITY STUDY: CPT

## 2020-11-16 PROCEDURE — 80048 BASIC METABOLIC PNL TOTAL CA: CPT | Performed by: NURSE PRACTITIONER

## 2020-11-16 PROCEDURE — 82962 GLUCOSE BLOOD TEST: CPT

## 2020-11-16 PROCEDURE — 85610 PROTHROMBIN TIME: CPT | Performed by: NURSE PRACTITIONER

## 2020-11-16 PROCEDURE — 82803 BLOOD GASES ANY COMBINATION: CPT

## 2020-11-16 RX ORDER — ESCITALOPRAM OXALATE 20 MG/1
20 TABLET ORAL DAILY
Status: DISCONTINUED | OUTPATIENT
Start: 2020-11-16 | End: 2020-11-18 | Stop reason: HOSPADM

## 2020-11-16 RX ORDER — ACETAMINOPHEN 650 MG/1
650 SUPPOSITORY RECTAL EVERY 4 HOURS PRN
Status: DISCONTINUED | OUTPATIENT
Start: 2020-11-16 | End: 2020-11-18 | Stop reason: HOSPADM

## 2020-11-16 RX ORDER — NICOTINE POLACRILEX 4 MG
15 LOZENGE BUCCAL
Status: DISCONTINUED | OUTPATIENT
Start: 2020-11-16 | End: 2020-11-18 | Stop reason: HOSPADM

## 2020-11-16 RX ORDER — HEPARIN SODIUM 10000 [USP'U]/100ML
18 INJECTION, SOLUTION INTRAVENOUS
Status: DISCONTINUED | OUTPATIENT
Start: 2020-11-16 | End: 2020-11-18 | Stop reason: HOSPADM

## 2020-11-16 RX ORDER — BUDESONIDE 0.5 MG/2ML
0.5 INHALANT ORAL 2 TIMES DAILY
Status: DISCONTINUED | OUTPATIENT
Start: 2020-11-16 | End: 2020-11-18 | Stop reason: HOSPADM

## 2020-11-16 RX ORDER — CLOTRIMAZOLE AND BETAMETHASONE DIPROPIONATE 10; .64 MG/G; MG/G
CREAM TOPICAL EVERY 12 HOURS SCHEDULED
Status: DISCONTINUED | OUTPATIENT
Start: 2020-11-16 | End: 2020-11-18 | Stop reason: HOSPADM

## 2020-11-16 RX ORDER — ONDANSETRON 4 MG/1
4 TABLET, FILM COATED ORAL EVERY 6 HOURS PRN
Status: DISCONTINUED | OUTPATIENT
Start: 2020-11-16 | End: 2020-11-18 | Stop reason: HOSPADM

## 2020-11-16 RX ORDER — ACETAMINOPHEN 160 MG/5ML
650 SOLUTION ORAL EVERY 4 HOURS PRN
Status: DISCONTINUED | OUTPATIENT
Start: 2020-11-16 | End: 2020-11-18 | Stop reason: HOSPADM

## 2020-11-16 RX ORDER — MULTIPLE VITAMINS W/ MINERALS TAB 9MG-400MCG
1 TAB ORAL DAILY
Status: DISCONTINUED | OUTPATIENT
Start: 2020-11-16 | End: 2020-11-18 | Stop reason: HOSPADM

## 2020-11-16 RX ORDER — ALUMINA, MAGNESIA, AND SIMETHICONE 2400; 2400; 240 MG/30ML; MG/30ML; MG/30ML
15 SUSPENSION ORAL EVERY 6 HOURS PRN
Status: DISCONTINUED | OUTPATIENT
Start: 2020-11-16 | End: 2020-11-18 | Stop reason: HOSPADM

## 2020-11-16 RX ORDER — HEPARIN SODIUM 5000 [USP'U]/ML
80 INJECTION, SOLUTION INTRAVENOUS; SUBCUTANEOUS ONCE
Status: COMPLETED | OUTPATIENT
Start: 2020-11-16 | End: 2020-11-16

## 2020-11-16 RX ORDER — SODIUM CHLORIDE 0.9 % (FLUSH) 0.9 %
10 SYRINGE (ML) INJECTION AS NEEDED
Status: DISCONTINUED | OUTPATIENT
Start: 2020-11-16 | End: 2020-11-18 | Stop reason: HOSPADM

## 2020-11-16 RX ORDER — ACETAMINOPHEN 325 MG/1
650 TABLET ORAL EVERY 4 HOURS PRN
Status: DISCONTINUED | OUTPATIENT
Start: 2020-11-16 | End: 2020-11-18 | Stop reason: HOSPADM

## 2020-11-16 RX ORDER — DEXTROSE MONOHYDRATE 25 G/50ML
25 INJECTION, SOLUTION INTRAVENOUS
Status: DISCONTINUED | OUTPATIENT
Start: 2020-11-16 | End: 2020-11-18 | Stop reason: HOSPADM

## 2020-11-16 RX ORDER — IPRATROPIUM BROMIDE AND ALBUTEROL SULFATE 2.5; .5 MG/3ML; MG/3ML
3 SOLUTION RESPIRATORY (INHALATION)
Status: DISCONTINUED | OUTPATIENT
Start: 2020-11-16 | End: 2020-11-18 | Stop reason: HOSPADM

## 2020-11-16 RX ORDER — HEPARIN SODIUM 5000 [USP'U]/ML
40-80 INJECTION, SOLUTION INTRAVENOUS; SUBCUTANEOUS EVERY 6 HOURS PRN
Status: DISCONTINUED | OUTPATIENT
Start: 2020-11-16 | End: 2020-11-18 | Stop reason: HOSPADM

## 2020-11-16 RX ORDER — OXYCODONE HYDROCHLORIDE AND ACETAMINOPHEN 5; 325 MG/1; MG/1
1 TABLET ORAL ONCE
Status: COMPLETED | OUTPATIENT
Start: 2020-11-16 | End: 2020-11-16

## 2020-11-16 RX ORDER — METOPROLOL SUCCINATE 25 MG/1
25 TABLET, EXTENDED RELEASE ORAL DAILY
Status: DISCONTINUED | OUTPATIENT
Start: 2020-11-16 | End: 2020-11-18 | Stop reason: HOSPADM

## 2020-11-16 RX ORDER — PREDNISONE 20 MG/1
60 TABLET ORAL DAILY
Status: DISCONTINUED | OUTPATIENT
Start: 2020-11-16 | End: 2020-11-17

## 2020-11-16 RX ORDER — BUSPIRONE HYDROCHLORIDE 5 MG/1
5 TABLET ORAL 3 TIMES DAILY
Status: DISCONTINUED | OUTPATIENT
Start: 2020-11-16 | End: 2020-11-18 | Stop reason: HOSPADM

## 2020-11-16 RX ORDER — BISACODYL 10 MG
10 SUPPOSITORY, RECTAL RECTAL DAILY PRN
Status: DISCONTINUED | OUTPATIENT
Start: 2020-11-16 | End: 2020-11-18 | Stop reason: HOSPADM

## 2020-11-16 RX ORDER — INSULIN GLARGINE 100 [IU]/ML
5 INJECTION, SOLUTION SUBCUTANEOUS 2 TIMES DAILY
Status: DISCONTINUED | OUTPATIENT
Start: 2020-11-16 | End: 2020-11-18 | Stop reason: HOSPADM

## 2020-11-16 RX ORDER — GABAPENTIN 300 MG/1
300 CAPSULE ORAL 3 TIMES DAILY
Status: DISCONTINUED | OUTPATIENT
Start: 2020-11-16 | End: 2020-11-18 | Stop reason: HOSPADM

## 2020-11-16 RX ORDER — BISACODYL 5 MG/1
5 TABLET, DELAYED RELEASE ORAL DAILY PRN
Status: DISCONTINUED | OUTPATIENT
Start: 2020-11-16 | End: 2020-11-18 | Stop reason: HOSPADM

## 2020-11-16 RX ORDER — ONDANSETRON 2 MG/ML
4 INJECTION INTRAMUSCULAR; INTRAVENOUS EVERY 6 HOURS PRN
Status: DISCONTINUED | OUTPATIENT
Start: 2020-11-16 | End: 2020-11-18 | Stop reason: HOSPADM

## 2020-11-16 RX ORDER — THIAMINE MONONITRATE (VIT B1) 100 MG
100 TABLET ORAL DAILY
Status: DISCONTINUED | OUTPATIENT
Start: 2020-11-16 | End: 2020-11-18 | Stop reason: HOSPADM

## 2020-11-16 RX ORDER — AMLODIPINE BESYLATE 5 MG/1
5 TABLET ORAL
Status: DISCONTINUED | OUTPATIENT
Start: 2020-11-16 | End: 2020-11-18 | Stop reason: HOSPADM

## 2020-11-16 RX ORDER — OXYCODONE HYDROCHLORIDE AND ACETAMINOPHEN 5; 325 MG/1; MG/1
1 TABLET ORAL EVERY 4 HOURS PRN
Status: DISCONTINUED | OUTPATIENT
Start: 2020-11-16 | End: 2020-11-18 | Stop reason: HOSPADM

## 2020-11-16 RX ORDER — NITROGLYCERIN 0.4 MG/1
0.4 TABLET SUBLINGUAL
Status: DISCONTINUED | OUTPATIENT
Start: 2020-11-16 | End: 2020-11-18 | Stop reason: HOSPADM

## 2020-11-16 RX ORDER — ASPIRIN 81 MG/1
81 TABLET, CHEWABLE ORAL DAILY
Status: DISCONTINUED | OUTPATIENT
Start: 2020-11-16 | End: 2020-11-18 | Stop reason: HOSPADM

## 2020-11-16 RX ORDER — PRAMIPEXOLE DIHYDROCHLORIDE 0.25 MG/1
0.25 TABLET ORAL NIGHTLY
Status: DISCONTINUED | OUTPATIENT
Start: 2020-11-16 | End: 2020-11-18 | Stop reason: HOSPADM

## 2020-11-16 RX ORDER — PANTOPRAZOLE SODIUM 40 MG/1
40 TABLET, DELAYED RELEASE ORAL EVERY MORNING
Status: DISCONTINUED | OUTPATIENT
Start: 2020-11-16 | End: 2020-11-18 | Stop reason: HOSPADM

## 2020-11-16 RX ADMIN — GABAPENTIN 300 MG: 300 CAPSULE ORAL at 21:04

## 2020-11-16 RX ADMIN — MULTIPLE VITAMINS W/ MINERALS TAB 1 TABLET: TAB at 12:43

## 2020-11-16 RX ADMIN — HEPARIN SODIUM 6200 UNITS: 5000 INJECTION INTRAVENOUS; SUBCUTANEOUS at 12:50

## 2020-11-16 RX ADMIN — OXYCODONE HYDROCHLORIDE AND ACETAMINOPHEN 1 TABLET: 5; 325 TABLET ORAL at 16:36

## 2020-11-16 RX ADMIN — OXYCODONE HYDROCHLORIDE AND ACETAMINOPHEN 1 TABLET: 5; 325 TABLET ORAL at 20:42

## 2020-11-16 RX ADMIN — METOPROLOL SUCCINATE 25 MG: 25 TABLET, EXTENDED RELEASE ORAL at 12:43

## 2020-11-16 RX ADMIN — GABAPENTIN 300 MG: 300 CAPSULE ORAL at 15:30

## 2020-11-16 RX ADMIN — OXYCODONE HYDROCHLORIDE AND ACETAMINOPHEN 1 TABLET: 5; 325 TABLET ORAL at 05:05

## 2020-11-16 RX ADMIN — PANTOPRAZOLE SODIUM 40 MG: 40 TABLET, DELAYED RELEASE ORAL at 12:42

## 2020-11-16 RX ADMIN — ASPIRIN 81 MG: 81 TABLET, CHEWABLE ORAL at 12:42

## 2020-11-16 RX ADMIN — CLOTRIMAZOLE AND BETAMETHASONE DIPROPIONATE: 10; .5 CREAM TOPICAL at 20:34

## 2020-11-16 RX ADMIN — Medication 100 MG: at 18:36

## 2020-11-16 RX ADMIN — GABAPENTIN 300 MG: 300 CAPSULE ORAL at 05:13

## 2020-11-16 RX ADMIN — INSULIN GLARGINE 5 UNITS: 100 INJECTION, SOLUTION SUBCUTANEOUS at 12:43

## 2020-11-16 RX ADMIN — PRAMIPEXOLE DIHYDROCHLORIDE 0.25 MG: 0.25 TABLET ORAL at 20:34

## 2020-11-16 RX ADMIN — BUSPIRONE HYDROCHLORIDE 5 MG: 5 TABLET ORAL at 12:43

## 2020-11-16 RX ADMIN — INSULIN GLARGINE 5 UNITS: 100 INJECTION, SOLUTION SUBCUTANEOUS at 20:42

## 2020-11-16 RX ADMIN — BUSPIRONE HYDROCHLORIDE 5 MG: 5 TABLET ORAL at 16:36

## 2020-11-16 RX ADMIN — HEPARIN SODIUM 18 UNITS/KG/HR: 10000 INJECTION, SOLUTION INTRAVENOUS at 12:44

## 2020-11-16 RX ADMIN — ESCITALOPRAM 20 MG: 20 TABLET, FILM COATED ORAL at 12:42

## 2020-11-16 RX ADMIN — ACETAMINOPHEN 650 MG: 325 TABLET, FILM COATED ORAL at 03:47

## 2020-11-16 RX ADMIN — AMLODIPINE BESYLATE 5 MG: 5 TABLET ORAL at 12:42

## 2020-11-16 RX ADMIN — INSULIN LISPRO 6 UNITS: 100 INJECTION, SOLUTION INTRAVENOUS; SUBCUTANEOUS at 18:36

## 2020-11-16 RX ADMIN — BUSPIRONE HYDROCHLORIDE 5 MG: 5 TABLET ORAL at 20:33

## 2020-11-16 RX ADMIN — PREDNISONE 60 MG: 20 TABLET ORAL at 12:42

## 2020-11-16 RX ADMIN — OXYCODONE HYDROCHLORIDE AND ACETAMINOPHEN 1 TABLET: 5; 325 TABLET ORAL at 12:41

## 2020-11-16 NOTE — PLAN OF CARE
"Pt seen for clinical swallow assessment. Pt with narrow stoma, which impacts her resp support for voice. Pt reports eating regular foods and liquids at home. She was unable to provide information about her decannulation. VFSS completed 10/8/19 which revealed the following, \"Video swallow completed. Pt with size #4 shiley with aqua PMV in place. Pt exhibited silent aspiration with nectar. SLP recs upgrade to [facility] soft, no mixed, and to continue honey (no straws, small drinks)\". Pt with harsh voice, air escape from stoma noted. No overt s/s of aspiration with thins, puree, or regular solids. Laryngeal elevation was reduced. The patient is currently on a regular diet with thins. SLP recs VFSS to further assess swallow function, will continue current diet d/t no overt s/s of asp noted this date and non compliance with diet recs at home. Pt in agreement.    "

## 2020-11-16 NOTE — THERAPY EVALUATION
Acute Care - Speech Language Pathology   Swallow Initial Evaluation [facility]     Patient Name: Swetha Luis  : 1960  MRN: [id number]  Today's Date: 2020               Admit Date: 11/15/2020    Visit Dx:     ICD-10-CM ICD-9-CM   1. Acute deep vein thrombosis (DVT) of proximal vein of left lower extremity (CMS/[facility])  I82.4Y2 453.41   2. Stage 3a chronic kidney disease  N18.31 585.3     Patient Active Problem List   Diagnosis   • Tracheostomy complication (CMS/[facility])   • Gangrene of toe (CMS/[facility])   • Acute diastolic congestive heart failure (CMS/[facility])   • ARF (acute renal failure) (CMS/[facility])   • Stage 3 chronic kidney disease   • Elevated troponin   • Acute respiratory failure with hypoxemia (CMS/[facility])   • Acute osteomyelitis (CMS/[facility])   • UTI due to extended-spectrum beta lactamase (ESBL) producing Escherichia coli   • Thrush, oral   • Tracheostomy malfunction (CMS/[facility])   • COPD (chronic obstructive pulmonary disease) (CMS/[facility])   • Chronic respiratory failure with hypoxia (CMS/[facility])   • PEG (percutaneous endoscopic gastrostomy) status (CMS/[facility])   • History of osteomyelitis   • Polysubstance abuse (CMS/[facility])   • History of tracheal stenosis   • Chronic diastolic CHF (congestive heart failure) (CMS/[facility])   • Peripheral artery disease (CMS/[facility])   • Medically noncompliant   • WENDI and COPD overlap syndrome (CMS/[facility])   • Dyspnea   • Elevated LFTs   • Elevated troponin   • Tracheostomy present (CMS/[facility])   • Essential hypertension   • Dysphagia   • Healthcare associated bacterial pneumonia   • Infection due to ESBL-producing Escherichia coli   • Sepsis due to Gram negative bacteria (CMS/[facility])   • Ureteral stone with hydronephrosis   • UTI (urinary tract infection)   • Acute tracheobronchitis   • Chronic diastolic CHF (congestive heart failure) (CMS/[facility])   • Bacteremia   • Acute deep vein thrombosis (DVT) of proximal vein of left lower extremity (CMS/[facility])   • Elevated d-dimer   • Type 2 diabetes mellitus with  hyperglycemia, with long-term current use of insulin (CMS/[facility])     Past Medical History:   Diagnosis Date   • Acute congestive heart failure (CMS/HCC) 12/24/2018   • Acute osteomyelitis (CMS/[facility])     Right shoulder due to IVDA   • Acute renal failure on dialysis (CMS/[facility])    • Anxiety    • Pugh esophagus    • CKD (chronic kidney disease)    • COPD (chronic obstructive pulmonary disease) (CMS/[facility])    • MRSA infection    • Nontraumatic subarachnoid hemorrhage (CMS/[facility])    • Seizures (CMS/[facility])    • Tracheostomy present (CMS/[facility])      Past Surgical History:   Procedure Laterality Date   • TRACHEOSTOMY     • URETEROSCOPY LASER LITHOTRIPSY WITH STENT INSERTION Left 10/11/2019    Procedure: CYSTOSCOPY,  URETEROSCOPY, LEFT RETROGRADE, LEFT PYELOGRAM, LASER LITHOTRIPSY, PLACEMENT OF STENT.;  Surgeon: Clyde Selby MD;  [facility];  Service: Urology     Patient was not wearing a face mask during this therapy encounter. Therapist used appropriate personal protective equipment including mask, eye protection and gloves.  Mask used was N95/duckbill. Appropriate PPE was worn during the entire therapy session. Hand hygiene was completed before and after therapy session. Patient is not in enhanced droplet precautions.        SWALLOW EVALUATION (last 72 hours)      SLP Adult Swallow Evaluation     Row Name 11/16/20 1500                   Rehab Evaluation    Document Type  evaluation  -SH        Patient Effort  adequate  -           General Information    Patient Profile Reviewed  yes  -SH        Pertinent History Of Current Problem  DVT, chronic resp failure/COPD, CKD, and CHF. Silent aspiration on previous VFSS.   -SH        Current Method of Nutrition  regular textures;thin liquids  -SH        Precautions/Limitations, Vision  WFL;for purposes of eval  -SH        Precautions/Limitations, Hearing  WFL;for purposes of eval  -SH        Prior Level of Function-Communication  other (see comments);unknown pt is a  "poor informant  -        Prior Level of Function-Swallowing  mechanical soft with no mixed consistencies;honey thick liquids  -        Plans/Goals Discussed with  patient;agreed upon  -        Barriers to Rehab  previous functional deficit  -        Patient's Goals for Discharge  patient did not state  -           Oral Motor Structure and Function    Dentition Assessment  teeth are in poor condition;missing teeth  -        Volitional Swallow  delayed  -        Volitional Cough  reduced respiratory support  -           Oral Musculature and Cranial Nerve Assessment    Oral Motor General Assessment  WFL  -        Vocal Impairment, Detail. Cranial Nerve X (Vagus)  vocal quality abnormality (see comments);other (see comments) harsh  -           Clinical Swallow Eval    Clinical Swallow Evaluation Summary  Pt seen for clinical swallow assessment. Pt with narrow stoma, which impacts her resp support for voice. Pt reports eating regular foods and liquids at home. She was unable to provide information about her decannulation. VFSS completed 10/8/19 which revealed the following, \"Video swallow completed. Pt with size #4 shiley with aqua PMV in place. Pt exhibited silent aspiration with nectar. SLP recs upgrade to mech soft, no mixed, and to continue honey (no straws, small drinks)\". Pt with harsh voice, air escape from stoma noted. No overt s/s of aspiration with thins, puree, or regular solids. Laryngeal elevation was reduced. The patient is currently on a regular diet with thins. SLP recs VFSS to further assess swallow function, will continue current diet d/t no overt s/s of asp noted this date and non compliance with diet recs at home. Pt in agreement.    -           Clinical Impression    SLP Swallowing Diagnosis  suspected pharyngeal dysphagia  -        Functional Impact  risk of aspiration/pneumonia  -        Rehab Potential/Prognosis, Swallowing  good, to achieve stated therapy goals  -     "    Swallow Criteria for Skilled Therapeutic Interventions Met  demonstrates skilled criteria  -           Recommendations    Therapy Frequency (Swallow)  PRN  -        Predicted Duration Therapy Intervention (Days)  until discharge  -        SLP Diet Recommendation  other (see comments) VFSS to further assess  -        Recommended Diagnostics  reassess via VFSS (MBS)  -        Monitor for Signs of Aspiration  yes;notify SLP if any concerns  -        Anticipated Discharge Disposition (SLP)  unknown  -           Swallow Goals (SLP)    Oral Nutrition/Hydration Goal Selection (SLP)  oral nutrition/hydration, SLP goal 1  -           Oral Nutrition/Hydration Goal 1 (SLP)    Oral Nutrition/Hydration Goal 1, SLP  Pt will participate in VFSS.   -          User Key  (r) = Recorded By, (t) = Taken By, (c) = Cosigned By    Initials Name Effective Dates    Kirsten Rocha MS CCC-SLP 03/07/18 -           EDUCATION  The patient has been educated in the following areas:   Dysphagia (Swallowing Impairment).    SLP Recommendation and Plan  SLP Swallowing Diagnosis: suspected pharyngeal dysphagia  SLP Diet Recommendation: other (see comments)(VFSS to further assess)           Monitor for Signs of Aspiration: yes, notify SLP if any concerns  Recommended Diagnostics: reassess via VFSS (MBS)  Swallow Criteria for Skilled Therapeutic Interventions Met: demonstrates skilled criteria  Anticipated Discharge Disposition (SLP): unknown  Rehab Potential/Prognosis, Swallowing: good, to achieve stated therapy goals  Therapy Frequency (Swallow): PRN  Predicted Duration Therapy Intervention (Days): until discharge                              SLP GOALS     Row Name 11/16/20 1500             Oral Nutrition/Hydration Goal 1 (SLP)    Oral Nutrition/Hydration Goal 1, SLP  Pt will participate in VFSS.   -        User Key  (r) = Recorded By, (t) = Taken By, (c) = Cosigned By    Initials Name Provider Type    Kirsten Rocha MS  CCC-SLP Speech and Language Pathologist           SLP Outcome Measures (last 72 hours)      SLP Outcome Measures     Row Name 11/16/20 1500             SLP Outcome Measures    Outcome Measure Used?  Adult NOMS  -         Adult FCM Scores    FCM Chosen  Swallowing  -      Swallowing FCM Score  4  -        User Key  (r) = Recorded By, (t) = Taken By, (c) = Cosigned By    Initials Name Effective Dates     Kirsten North MS CCC-SLP 03/07/18 -            Time Calculation:   Time Calculation- SLP     Row Name 11/16/20 1514             Time Calculation- SLP    SLP Start Time  1415  -      SLP Received On  11/16/20  -        User Key  (r) = Recorded By, (t) = Taken By, (c) = Cosigned By    Initials Name Provider Type     Kirsten North MS CCC-SLP Speech and Language Pathologist          Therapy Charges for Today     Code Description Service Date Service Provider Modifiers Qty    [id number] HC ST EVAL ORAL PHARYNG SWALLOW 4 11/16/2020 Kirsten North MS CCC-SLP GN 1               Kirsten North MS CCC-SLP  11/16/2020

## 2020-11-16 NOTE — PLAN OF CARE
Goal Outcome Evaluation:         Pt in no distress at this time. Pt currently resting with eyes close in bed. Lung sounds are diminished. Respiration are even and unlabored at this time. Pt alert and oriented x 4.

## 2020-11-16 NOTE — PLAN OF CARE
Goal Outcome Evaluation:      Vitals as charted, c/o LLE pain that is treated w/ PRN pain meds, A&Ox4, heparin gtt as ordered, will continue to monitor.

## 2020-11-16 NOTE — ED NOTES
Pt sats dropped to 82%, RN placed pt on 6L NC. Pt sats continued to drop to 80%, placed on 10L NRB. pts sats came up to 100% & admitting doctor notified.        Jaylin Reagan RN  11/16/20 [id number]

## 2020-11-16 NOTE — PROGRESS NOTES
Left leg venous Doppler preliminary report: positive for dvt.  Patient on hold in the ER to be admitted. Gave report to Abby Osman RN in the ER.

## 2020-11-16 NOTE — PAYOR COMM NOTE
"Neo Spencer (60 y.o. Female)     ATTN: INITIAL CLINICALS FOR INPATIENT ADMIT: REF# [id number]    PLEASE REPLY TO [facility] DEPT: ESTEBAN ALICIA RN/CCP: [phone],  [phone]        Date of Birth Social Security Number Address Home Phone MRN    1960  [address] [address] [phone] [id number]    Shinto Marital Status          Non-Sabianism        Admission Date Admission Type Admitting Provider Attending Provider Department, Room/Bed    11/15/20 Emergency Luciano Bocanegra MD Masden, Troy Andrew, MD [facility] Emergency Department, 50/50    Discharge Date Discharge Disposition Discharge Destination                       Attending Provider: Ramses Elam MD    Allergies: Cephalexin, Hydrocodone, Strawberry, Zithromax [Azithromycin]    Isolation: None   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), 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)   Code Status: CPR    Ht: 162.6 cm (64\")   Wt: 77.1 kg (169 lb 15.6 oz)    Admission Cmt: None   Principal Problem: Acute deep vein thrombosis (DVT) of proximal vein of left lower extremity (CMS/HCC) [I82.4Y2]                 Active Insurance as of 11/15/2020     Primary Coverage     Payor Plan Insurance Group Employer/Plan Group    [place] [id number]     Payor Plan Address Payor Plan Phone Number Payor Plan Fax Number Effective Dates    [employer] Claims Office - [address] [address] [phone]  4/1/2020 - None Entered    [facility] [id number]       Subscriber Name Subscriber Birth Date Member ID       ENO SPENCER 1960 [id number]                 Emergency Contacts      (Rel.) Home Phone Work Phone Mobile Phone    TJMATT CARRILLO (Spouse) [phone] -- --    Shazia Spencer (Daughter) [phone] -- [phone]    Sister, \"Hattie\" (Sister) [phone] -- [phone]    Monserrat Taylor (Sister) [phone] -- [phone]    Ken Spencer (Son) -- -- [phone]             "   History & Physical      Mary Lou Freitas JASPER OLIVON at 11/15/20 9662     Attestation signed by Luciano Bocanegra MD at 11/16/20 8473    Please note nurse practitioner's history and physical, assessment plan has been reviewed and I do concur.  Patient has known history of COPD, tracheostomy, chronic diastolic heart failure, hypertension and DVT in the past presented to the hospital with complaints of left lower extremity swelling for 2 days duration.  She denies any shortness of breath, pleuritic chest pain.  She denies any sore throat, fevers, loss of taste or smell.  Her D-dimer in the emergency room was elevated.  Given the above she is being hospitalized for suspected acute DVT and is on anticoagulation.  Venous Dopplers will be obtained in a.m..  She is otherwise hemodynamically stable.  Currently not in any acute respiratory distress.    HEENT:  PERRLA, extraocular movements intact.    Neck:  Tracheostomy noted no JVD.    Cardiovascular: Regular rate and rhythm with normal S1-S2.    GI: Soft, nontender, bowel sounds are present.    Extremities:  Left lower extremity has 2+ edema, pedal pulses are palpable.  Right lower extremity with no cyanosis clubbing or edema and pedal pulses are palpable.                      Patient Name:  Swetha Luis  YOB: 1960  MRN:  [id number]  Admit Date:  11/15/2020  Patient Care Team:  Provider, No Known as PCP - General      Subjective   History Present Illness     Chief Complaint   Patient presents with   • Leg Pain     left leg pain and swelling since yesterday hx of DVT     History of Present Illness   Ms. uLis is a 60 y.o. smoker with a history of CKD stage III, PAD, HTN, COPD, chronic respiratory failure with hypoxia, and chronic diastolic CHF that presents to [facility] complaining of nonradiating left leg pain and swelling.  Patient reports this has been ongoing for the last 2 to 3 days.  She denies any chest pain, worsening shortness of  air, fever, chills, nausea, vomiting, diarrhea, and loss of taste or smell.  Labs obtained in the emergency department show a D-dimer 2.39.  CTA chest was negative for pulmonary emboli.    Review of Systems   Constitutional: Negative for chills and fever.   HENT: Negative for congestion and rhinorrhea.    Eyes: Negative for photophobia and visual disturbance.   Respiratory: Negative for cough and shortness of breath.    Cardiovascular: Positive for leg swelling. Negative for chest pain and palpitations.   Gastrointestinal: Negative for constipation, diarrhea, nausea and vomiting.   Endocrine: Negative for cold intolerance and heat intolerance.   Genitourinary: Negative for difficulty urinating and dysuria.   Musculoskeletal: Negative for gait problem and joint swelling.   Skin: Negative for rash and wound.   Neurological: Negative for dizziness, light-headedness and headaches.   Psychiatric/Behavioral: Negative for sleep disturbance and suicidal ideas.        Personal History     Past Medical History:   Diagnosis Date   • Acute congestive heart failure (CMS/HCC) 12/24/2018   • Acute osteomyelitis (CMS/[facility])     Right shoulder due to IVDA   • Acute renal failure on dialysis (CMS/HCC)    • Anxiety    • Pugh esophagus    • CKD (chronic kidney disease)    • COPD (chronic obstructive pulmonary disease) (CMS/HCC)    • MRSA infection    • Nontraumatic subarachnoid hemorrhage (CMS/HCC)    • Seizures (CMS/HCC)    • Tracheostomy present (CMS/HCC)      Past Surgical History:   Procedure Laterality Date   • TRACHEOSTOMY     • URETEROSCOPY LASER LITHOTRIPSY WITH STENT INSERTION Left 10/11/2019    Procedure: CYSTOSCOPY,  URETEROSCOPY, LEFT RETROGRADE, LEFT PYELOGRAM, LASER LITHOTRIPSY, PLACEMENT OF STENT.;  Surgeon: Clyde Selby MD;  Location: [facility];  Service: Urology     Family History   Problem Relation Age of Onset   • Diabetes Mother    • Hypertension Mother      Social History     Tobacco Use   • Smoking  "status: Current Every Day Smoker     Packs/day: 0.50     Types: Cigarettes   • Smokeless tobacco: Never Used   • Tobacco comment: \"1 CIGARETTE A DAY\"   Substance Use Topics   • Alcohol use: No     Frequency: Never   • Drug use: Yes     Types: Cocaine(coke)     Comment: 20 years ago occasional     No current facility-administered medications on file prior to encounter.      Current Outpatient Medications on File Prior to Encounter   Medication Sig Dispense Refill   • Alcohol Swabs 70 % pads Inject 1 each under the skin into the appropriate area as directed 4 (Four) Times a Day.     • Blood Glucose Monitoring Suppl (FreeStyle Lite) device Inject 1 each under the skin into the appropriate area as directed 4 (Four) Times a Day.     • DULoxetine HCl (DRIZALMA) 60 MG capsule Take 50 mg by mouth 2 (Two) Times a Day.     • glucose blood (FREESTYLE TEST STRIPS) test strip 1 strip by Other route 4 (Four) Times a Day.     • insulin glargine (LANTUS) 100 UNIT/ML injection Inject 5 Units under the skin into the appropriate area as directed 2 (two) times a day.     • Insulin Glulisine (Apidra SoloStar) 100 UNIT/ML solution pen-injector Inject 5 Units under the skin into the appropriate area as directed 3 (Three) Times a Day.     • metFORMIN (GLUCOPHAGE) 500 MG tablet Take 500 mg by mouth 2 (Two) Times a Day With Meals.     • omeprazole (priLOSEC) 40 MG capsule Take 40 mg by mouth Daily.     • acetaminophen (TYLENOL) 325 MG tablet Take 2 tablets by mouth Every 4 (Four) Hours As Needed for Mild Pain .     • amLODIPine (NORVASC) 5 MG tablet Take 1 tablet by mouth Daily. 30 tablet 0   • Ascorbic Acid 500 MG capsule Take  by mouth.     • aspirin 81 MG chewable tablet Chew 1 tablet Daily.     • budesonide (PULMICORT) 0.5 MG/2ML nebulizer solution Inhale 2 mL by nebulization via trach 2 (Two) Times a Day. 120 mL 0   • busPIRone (BUSPAR) 5 MG tablet Take 1 tablet by mouth 3 (Three) times a day. 90 tablet 0   • clotrimazole-betamethasone " (LOTRISONE) 1-0.05 % cream Apply  topically to the appropriate area as directed Every 12 (Twelve) Hours. Apply to perivaginal area and perineum after washing. 15 g 0   • escitalopram (LEXAPRO) 20 MG tablet Take 1 tablet by mouth daily. 30 tablet 0   • gabapentin (NEURONTIN) 300 MG capsule Take 1 capsule by mouth 3 (Three) Times a Day. 9 capsule 0   • ipratropium-albuterol (DUO-NEB) 0.5-2.5 mg/3 ml nebulizer Inhale 3 mL by nebulization every 4 (Four) hours as needed for wheezing. 360 mL 0   • metoprolol succinate XL (TOPROL-XL) 25 MG 24 hr tablet Take 1 tablet by mouth Daily. 30 tablet 0   • MULTIPLE VITAMINS-MINERALS PO Take  by mouth.     • oxyCODONE-acetaminophen (PERCOCET) 5-325 MG per tablet Take 1 tablet by mouth Every 6 (Six) Hours As Needed for Moderate Pain . 12 tablet 0   • pramipexole (MIRAPEX) 0.25 MG tablet Take 1 tablet by mouth Every Night. 30 tablet 0   • predniSONE (DELTASONE) 20 MG tablet Take 3 tablets by mouth Daily. 9 tablet 0   • thiamine (VITAMIN B-1) 100 MG tablet Take 1 tablet by mouth Daily. 30 tablet 0     Allergies   Allergen Reactions   • Cephalexin Unknown (See Comments)     unk     • Hydrocodone Unknown (See Comments)     unk   • Cuero Unknown (See Comments)     unk   • Zithromax [Azithromycin] Unknown (See Comments)     unk       Objective    Objective     Vital Signs  Temp:  [98.4 °F (36.9 °C)] 98.4 °F (36.9 °C)  Heart Rate:  [76-96] 78  Resp:  [20] 20  BP: (120-144)/(67-80) 144/79  SpO2:  [96 %-99 %] 99 %  on  Flow (L/min):  [4] 4;   Device (Oxygen Therapy): nasal cannula  Body mass index is 29.18 kg/m².    Physical Exam  Constitutional:       General: She is not in acute distress.     Appearance: She is well-developed. She is not toxic-appearing.      Comments: Chronically ill-appearing   HENT:      Head: Normocephalic and atraumatic.   Eyes:      General: No scleral icterus.        Right eye: No discharge.         Left eye: No discharge.      Conjunctiva/sclera: Conjunctivae  normal.   Neck:      Musculoskeletal: Normal range of motion and neck supple.      Vascular: No JVD.   Cardiovascular:      Rate and Rhythm: Normal rate and regular rhythm.      Heart sounds: Normal heart sounds. No murmur. No friction rub. No gallop.    Pulmonary:      Effort: Pulmonary effort is normal. No respiratory distress.      Breath sounds: Decreased breath sounds, wheezing ( Expiratory) and rhonchi present. No rales.      Comments: 4 L of O2  Abdominal:      General: Bowel sounds are normal. There is no distension.      Palpations: Abdomen is soft.      Tenderness: There is no abdominal tenderness. There is no guarding.   Musculoskeletal: Normal range of motion.         General: No tenderness or deformity.   Skin:     General: Skin is warm and dry.      Capillary Refill: Capillary refill takes less than 2 seconds.      Findings: Erythema ( Left lower leg) present.   Neurological:      Mental Status: She is alert and oriented to person, place, and time.   Psychiatric:         Behavior: Behavior normal.       Results Review:  I reviewed the patient's new clinical results.  Discussed with ED provider.    Lab Results (last 24 hours)     Procedure Component Value Units Date/Time    CBC & Differential [[id number]]  (Abnormal) Collected: 11/15/20 1957    Specimen: Blood Updated: 11/15/20 2012    Narrative:      The following orders were created for panel order CBC & Differential.  Procedure                               Abnormality         Status                     ---------                               -----------         ------                     CBC Auto Differential[[id number]]        Abnormal            Final result                 Please view results for these tests on the individual orders.    Comprehensive Metabolic Panel [[id number]]  (Abnormal) Collected: 11/15/20 1957    Specimen: Blood Updated: 11/15/20 2034     Glucose 154 mg/dL      BUN 9 mg/dL      Creatinine 1.14 mg/dL      Sodium 141 mmol/L       Potassium 3.9 mmol/L      Chloride 102 mmol/L      CO2 30.1 mmol/L      Calcium 9.2 mg/dL      Total Protein 6.3 g/dL      Albumin 3.80 g/dL      ALT (SGPT) 18 U/L      AST (SGOT) 21 U/L      Alkaline Phosphatase 197 U/L      Total Bilirubin <0.2 mg/dL      eGFR Non African Amer 49 mL/min/1.73      Globulin 2.5 gm/dL      A/G Ratio 1.5 g/dL      BUN/Creatinine Ratio 7.9     Anion Gap 8.9 mmol/L     Narrative:      GFR Normal >60  Chronic Kidney Disease <60  Kidney Failure <15      Protime-INR [[id number]]  (Abnormal) Collected: 11/15/20 1957    Specimen: Blood Updated: 11/15/20 2026     Protime 11.8 Seconds      INR 0.89    aPTT [[id number]]  (Normal) Collected: 11/15/20 1957    Specimen: Blood Updated: 11/15/20 2026     PTT 30.9 seconds     Troponin [[id number]]  (Normal) Collected: 11/15/20 1957    Specimen: Blood Updated: 11/15/20 2037     Troponin T <0.010 ng/mL     Narrative:      Troponin T Reference Range:  <= 0.03 ng/mL-   Negative for AMI  >0.03 ng/mL-     Abnormal for myocardial necrosis.  Clinicians would have to utilize clinical acumen, EKG, Troponin and serial changes to determine if it is an Acute Myocardial Infarction or myocardial injury due to an underlying chronic condition.       Results may be falsely decreased if patient taking Biotin.      BNP [[id number]]  (Normal) Collected: 11/15/20 1957    Specimen: Blood Updated: 11/15/20 2037     proBNP 125.3 pg/mL     Narrative:      Among patients with dyspnea, NT-proBNP is highly sensitive for the detection of acute congestive heart failure. In addition NT-proBNP of <300 pg/ml effectively rules out acute congestive heart failure with 99% negative predictive value.    Results may be falsely decreased if patient taking Biotin.      D-dimer, Quantitative [[id number]]  (Abnormal) Collected: 11/15/20 1957    Specimen: Blood Updated: 11/15/20 2026     D-Dimer, Quantitative 2.39 MCGFEU/mL     Narrative:      The Stago D-Dimer test used in conjunction with a  clinical pretest probability (PTP) assessment model, has been approved by the FDA to rule out the presence of venous thromboembolism (VTE) in outpatients suspected of deep venous thrombosis (DVT) or pulmonary embolism (PE). The cut-off for negative predictive value is <0.50 MCGFEU/mL.    CBC Auto Differential [[id number]]  (Abnormal) Collected: 11/15/20 1957    Specimen: Blood Updated: 11/15/20 2012     WBC 7.54 10*3/mm3      RBC 4.65 10*6/mm3      Hemoglobin 10.5 g/dL      Hematocrit 35.1 %      MCV 75.5 fL      MCH 22.6 pg      MCHC 29.9 g/dL      RDW 16.1 %      RDW-SD 43.3 fl      MPV 10.3 fL      Platelets 211 10*3/mm3      Neutrophil % 68.1 %      Lymphocyte % 19.9 %      Monocyte % 7.8 %      Eosinophil % 3.4 %      Basophil % 0.5 %      Immature Grans % 0.3 %      Neutrophils, Absolute 5.13 10*3/mm3      Lymphocytes, Absolute 1.50 10*3/mm3      Monocytes, Absolute 0.59 10*3/mm3      Eosinophils, Absolute 0.26 10*3/mm3      Basophils, Absolute 0.04 10*3/mm3      Immature Grans, Absolute 0.02 10*3/mm3      nRBC 0.0 /100 WBC     COVID PRE-OP / PRE-PROCEDURE SCREENING ORDER (NO ISOLATION) - Swab, Nasopharynx [[id number]] Collected: 11/15/20 1959    Specimen: Swab from Nasopharynx Updated: 11/15/20 2017    Narrative:      The following orders were created for panel order COVID PRE-OP / PRE-PROCEDURE SCREENING ORDER (NO ISOLATION) - Swab, Nasopharynx.  Procedure                               Abnormality         Status                     ---------                               -----------         ------                     COVID-19,BIOTAP, NP/OP S...[[id number]]                      In process                   Please view results for these tests on the individual orders.    COVID-19,BIOTAP, NP/OP SWAB IN TRANSPORT MEDIA OR SALINE 24-36 HR TAT - Swab, Nasopharynx [[id number]] Collected: 11/15/20 1959    Specimen: Swab from Nasopharynx Updated: 11/15/20 2017          Imaging Results (Last 24 Hours)     Procedure  Component Value Units Date/Time    CT Angiogram Chest [[id number]] Collected: 11/15/20 2301     Updated: 11/15/20 2316    Narrative:      CT ANGIOGRAM OF THE CHEST     HISTORY: Leg swelling and shortness of air. Elevated d-dimer.     COMPARISON: None available.     TECHNIQUE: Axial CT imaging was obtained through the thorax. IV contrast  was administered. Three-D reformatted images were obtained.     FINDINGS:  Examination is degraded by poor timing of the contrast bolus. No obvious  pulmonary thromboembolus is seen. The thoracic aorta is normal in  caliber. There is no evidence of dissection. There are really no  significant coronary artery calcifications. The thyroid gland, trachea,  and esophagus appear unremarkable. There is no pleural or pericardial  effusion. The advanced background emphysematous changes are seen. Areas  of chronic scarring are seen at the lung bases bilaterally. These have  progressed when compared to prior study. There is an area of nodularity  noted within the left lower lobe measuring up to 7 mm in size. This was  not apparent on the prior exam. Short-term CT follow-up in 3-6 months is  suggested. Consolidation within the right lower lobe has increased when  compared to prior exam, as has bronchial wall thickening, and  nonspecific bronchitis is not excluded. Mediastinal lymph nodes do not  appear pathologically enlarged. Images through the upper abdomen  demonstrate persistent mild left-sided hydronephrosis. This was also  present on prior exam from 10/11/2019. Areas of cortical thinning are  noted within the right kidney, suggesting sequela prior insults.  Gastrostomy tube is present. There is a focal intimal flap involving the  distal celiac axis however, branch vessels appear patent. There is  dilatation of the distal celiac artery measuring up to 1 cm. 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             Impression:      1. No acute pulmonary  thromboembolus seen.  2. Advanced emphysematous changes. Patient is noted to have bibasilar  scarring. Consolidation within the right lower lobe has increased when  compared to prior exam, and there is increasing bronchial wall  thickening within this area. FINDINGS may reflect nonspecific  bronchitis.  3. 7 mm noncalcified pulmonary nodule at the left lung base. CT  follow-up in 3-6 months is suggested.  Radiation dose reduction techniques were utilized, including automated  exposure control and exposure modulation based on body size.     This report was finalized on 11/15/2020 11:12 PM by Dr. Emilia Olivo M.D.       XR Chest 1 View [[id number]] Collected: 11/15/20 2059     Updated: 11/15/20 2103    Narrative:      SINGLE VIEW CHEST     HISTORY: Shortness of air     COMPARISON: 12/29/2019     FINDINGS:  Cardiomegaly is present. Vascular congestion is suspected. There is  tortuosity and ectasia of the thoracic aorta. No pneumothorax is seen.  There is some blunting of the costophrenic angles bilaterally. This may  represent atelectasis or scarring, although trace effusions are not  excluded.       Impression:      Cardiomegaly with vascular congestion.     This report was finalized on 11/15/2020 9:00 PM by Dr. Emilia Olivo M.D.             Results for orders placed during the hospital encounter of 09/21/19   Adult Transthoracic Echo Complete W/ Cont if Necessary Per Protocol    Narrative · Left ventricular systolic function is normal. Calculated EF = 64%.   Estimated EF was in disagreement with the calculated EF. Estimated EF =   55%. Normal left ventricular cavity size and wall thickness noted. All   left ventricular wall segments contract normally. Left ventricular   diastolic function is normal.          ECG 12 Lead   Final Result   HEART RATE= 78  bpm   RR Interval= 764  ms   PA Interval= 160  ms   P Horizontal Axis= -45  deg   P Front Axis= 31  deg   QRSD Interval= 84  ms   QT Interval= 386  ms   QRS  Axis= 8  deg   T Wave Axis= 44  deg   - BORDERLINE ECG -   Sinus rhythm   Probable left atrial enlargement   When compared with ECG of 29-Dec-2019 18:09:18,   No significant change   Electronically Signed By: Tyler Reynolds ([facility]) 15-Nov-2020 21:00:49   Date and Time of Study: 2020-11-15 20:23:24           Assessment/Plan     Active Hospital Problems    Diagnosis  POA   • **Acute deep vein thrombosis (DVT) of proximal vein of left lower extremity (CMS/[facility]) [I82.4Y2]  Yes   • Type 2 diabetes mellitus with hyperglycemia, with long-term current use of insulin (CMS/[facility]) [E11.65, Z79.4]  Not Applicable   • Elevated d-dimer [R79.89]  Yes   • Essential hypertension [I10]  Yes   • Peripheral artery disease (CMS/[facility]) [I73.9]  Yes   • COPD (chronic obstructive pulmonary disease) (CMS/[facility]) [J44.9]  Yes   • Chronic diastolic CHF (congestive heart failure) (CMS/[facility]) [I50.32]  Yes   • Chronic respiratory failure with hypoxia (CMS/[facility]) [J96.11]  Yes   • Stage 3 chronic kidney disease [N18.30]  Yes      Resolved Hospital Problems   No resolved problems to display.       Ms. Luis is a 60 y.o. smoker with a history of hypertension, PAC, COPD, chronic diastolic CHF, chronic respiratory failure with hypoxia, and CKD stage III who presents with left leg pain and swelling.    Lower left leg pain  -D-dimer 2.39, CTA of chest negative for PE  -Lower extremity Dopplers in a.m.  -Continue Lovenox therapy  -Pain management    Chronic respiratory failure with hypoxia  -Continue supplemental O2 at 4 L  -Monitor O2 status closely    CKD stage III  -Creat 1.14, eGFR 49, baseline creat 1.10  -Avoid nephrotoxins  -Trend BMP    Type 2 diabetes mellitus  -Accu-Cheks 4 times a day with meals and at bedtime  -Moderate dose correctional factor with hypoglycemic protocol  -Last hemoglobin A1c 9.3 on 9/28/2020  -Hold oral diabetic medication  -Continue basal insulin    COPD  -No signs of exacerbation on exam  -Resume home regimen    Chronic diastolic  CHF  -No signs of fluid overload on exam  -Daily weights  -Monitor fluid status closely  -Resume home medications    Essential hypertension  -Stable, resume home regimen      I discussed the patient's findings and my recommendations with patient, ED provider and Dr. Bocanegra.    VTE Prophylaxis - Lovenox   Code Status - Full code.       CherellereaagnAMINATA Marin  [facility]  11/15/20  23:53 EST    Electronically signed by Luciano Bocanegra MD at 11/16/20 0059          Emergency Department Notes      Cristopher Gee MD at 11/15/20 1943           EMERGENCY DEPARTMENT ENCOUNTER    Room Number:  50/50  Date of encounter:  11/16/2020  PCP: Provider, No Known  Historian: Patient      HPI:  Chief Complaint: Left leg pain  A complete HPI/ROS/PMH/PSH/SH/FH are unobtainable due to: Very poor historian    Context: Swetha Luis is a 60 y.o. female who presents to the ED c/o moderate left lower leg pain and swelling for the last 2 days.  Patient states she noticed it a couple days ago and its become more swollen and more painful.  The pain is roughly from the knee down, does not radiate, and nothing makes it better or worse.    Patient states she has a history of DVT, but is not really able to give details about the circumstances surrounding that.  She is not anticoagulated currently.  She does not complain of chest pain, and she is pretty equivocal regarding shortness of breath.  She does not feel that she is had a fever.      PAST MEDICAL HISTORY  Active Ambulatory Problems     Diagnosis Date Noted   • Tracheostomy complication (CMS/[facility]) 12/18/2018   • Gangrene of toe (CMS/[facility]) 12/20/2018   • Acute diastolic congestive heart failure (CMS/[facility]) 12/24/2018   • ARF (acute renal failure) (CMS/[facility]) 12/24/2018   • Stage 3 chronic kidney disease 12/24/2018   • Elevated troponin 12/24/2018   • Acute respiratory failure with hypoxemia (CMS/[facility]) 12/24/2018   • Acute osteomyelitis (CMS/[facility]) 12/26/2018   • UTI due to  extended-spectrum beta lactamase (ESBL) producing Escherichia coli 12/27/2018   • Thrush, oral 12/28/2018   • Tracheostomy malfunction (CMS/HCC) 07/10/2019   • COPD (chronic obstructive pulmonary disease) (CMS/HCC) 07/10/2019   • Chronic respiratory failure with hypoxia (CMS/HCC) 07/10/2019   • PEG (percutaneous endoscopic gastrostomy) status (CMS/HCC) 07/10/2019   • History of osteomyelitis 07/10/2019   • Polysubstance abuse (CMS/HCC) 07/10/2019   • History of tracheal stenosis 07/10/2019   • Chronic diastolic CHF (congestive heart failure) (CMS/HCC) 07/10/2019   • Peripheral artery disease (CMS/HCC) 07/10/2019   • Medically noncompliant 07/10/2019   • WENDI and COPD overlap syndrome (CMS/HCC) 07/10/2019   • Dyspnea 09/22/2019   • Elevated LFTs 09/22/2019   • Elevated troponin 09/22/2019   • Tracheostomy present (CMS/HCC) 09/22/2019   • Essential hypertension 09/22/2019   • Dysphagia 09/22/2019   • Healthcare associated bacterial pneumonia 10/04/2019   • Infection due to ESBL-producing Escherichia coli 10/06/2019   • Sepsis due to Gram negative bacteria (CMS/HCC) 10/06/2019   • Ureteral stone with hydronephrosis 10/08/2019   • UTI (urinary tract infection) 10/08/2019   • Acute tracheobronchitis 10/19/2019   • Chronic diastolic CHF (congestive heart failure) (CMS/HCC) 10/19/2019   • Bacteremia 10/19/2019     Resolved Ambulatory Problems     Diagnosis Date Noted   • No Resolved Ambulatory Problems     Past Medical History:   Diagnosis Date   • Acute congestive heart failure (CMS/HCC) 12/24/2018   • Acute renal failure on dialysis (CMS/HCC)    • Anxiety    • Pugh esophagus    • CKD (chronic kidney disease)    • MRSA infection    • Nontraumatic subarachnoid hemorrhage (CMS/HCC)    • Seizures (CMS/HCC)          PAST SURGICAL HISTORY  Past Surgical History:   Procedure Laterality Date   • TRACHEOSTOMY     • URETEROSCOPY LASER LITHOTRIPSY WITH STENT INSERTION Left 10/11/2019    Procedure: CYSTOSCOPY,  URETEROSCOPY, LEFT  "RETROGRADE, LEFT PYELOGRAM, LASER LITHOTRIPSY, PLACEMENT OF STENT.;  Surgeon: Clyde Selby MD;  Location: [facility] OR;  Service: Urology         FAMILY HISTORY  Family History   Problem Relation Age of Onset   • Diabetes Mother    • Hypertension Mother          SOCIAL HISTORY  Social History     Socioeconomic History   • Marital status:      Spouse name: Not on file   • Number of children: Not on file   • Years of education: Not on file   • Highest education level: Not on file   Tobacco Use   • Smoking status: Current Every Day Smoker     Packs/day: 0.50     Types: Cigarettes   • Smokeless tobacco: Never Used   • Tobacco comment: \"1 CIGARETTE A DAY\"   Substance and Sexual Activity   • Alcohol use: No     Frequency: Never   • Drug use: Yes     Types: Cocaine(coke)     Comment: 20 years ago occasional   • Sexual activity: Defer         ALLERGIES  Cephalexin, Hydrocodone, Strawberry, and Zithromax [azithromycin]        REVIEW OF SYSTEMS  Review of Systems   Limited due to poor historian      PHYSICAL EXAM    I have reviewed the triage vital signs and nursing notes.    ED Triage Vitals [11/15/20 1923]   Temp Heart Rate Resp BP SpO2   98.4 °F (36.9 °C) 90 20 130/80 98 %      Temp src Heart Rate Source Patient Position BP Location FiO2 (%)   Tympanic Monitor -- -- --       Physical Exam  GENERAL: Wake and alert, nontoxic.  Patient has a tracheostomy and is very difficult for her to communicate  HENT: nares patent  EYES: no scleral icterus  CV: regular rhythm, regular rate  RESPIRATORY: normal effort, CTA bilaterally, no increased effort  ABDOMEN: soft  MUSCULOSKELETAL: no deformity, there is moderate swelling of the left lower leg from the knee to the dorsum of the foot.  There is tenderness medially along with 1+ pitting edema.  Distal pulses are intact, there is no erythema or warmth, and there is no palpable cord  NEURO: alert, moves all extremities, follows commands  SKIN: warm, dry        LAB " RESULTS  Recent Results (from the past 24 hour(s))   Comprehensive Metabolic Panel    Collection Time: 11/15/20  7:57 PM    Specimen: Blood   Result Value Ref Range    Glucose 154 (H) 65 - 99 mg/dL    BUN 9 8 - 23 mg/dL    Creatinine 1.14 (H) 0.57 - 1.00 mg/dL    Sodium 141 136 - 145 mmol/L    Potassium 3.9 3.5 - 5.2 mmol/L    Chloride 102 98 - 107 mmol/L    CO2 30.1 (H) 22.0 - 29.0 mmol/L    Calcium 9.2 8.6 - 10.5 mg/dL    Total Protein 6.3 6.0 - 8.5 g/dL    Albumin 3.80 3.50 - 5.20 g/dL    ALT (SGPT) 18 1 - 33 U/L    AST (SGOT) 21 1 - 32 U/L    Alkaline Phosphatase 197 (H) 39 - 117 U/L    Total Bilirubin <0.2 0.0 - 1.2 mg/dL    eGFR Non African Amer 49 (L) >60 mL/min/1.73    Globulin 2.5 gm/dL    A/G Ratio 1.5 g/dL    BUN/Creatinine Ratio 7.9 7.0 - 25.0    Anion Gap 8.9 5.0 - 15.0 mmol/L   Protime-INR    Collection Time: 11/15/20  7:57 PM    Specimen: Blood   Result Value Ref Range    Protime 11.8 11.7 - 14.2 Seconds    INR 0.89 (L) 0.90 - 1.10   aPTT    Collection Time: 11/15/20  7:57 PM    Specimen: Blood   Result Value Ref Range    PTT 30.9 22.7 - 35.4 seconds   Troponin    Collection Time: 11/15/20  7:57 PM    Specimen: Blood   Result Value Ref Range    Troponin T <0.010 0.000 - 0.030 ng/mL   BNP    Collection Time: 11/15/20  7:57 PM    Specimen: Blood   Result Value Ref Range    proBNP 125.3 0.0 - 900.0 pg/mL   D-dimer, Quantitative    Collection Time: 11/15/20  7:57 PM    Specimen: Blood   Result Value Ref Range    D-Dimer, Quantitative 2.39 (H) 0.00 - 0.49 MCGFEU/mL   CBC Auto Differential    Collection Time: 11/15/20  7:57 PM    Specimen: Blood   Result Value Ref Range    WBC 7.54 3.40 - 10.80 10*3/mm3    RBC 4.65 3.77 - 5.28 10*6/mm3    Hemoglobin 10.5 (L) 12.0 - 15.9 g/dL    Hematocrit 35.1 34.0 - 46.6 %    MCV 75.5 (L) 79.0 - 97.0 fL    MCH 22.6 (L) 26.6 - 33.0 pg    MCHC 29.9 (L) 31.5 - 35.7 g/dL    RDW 16.1 (H) 12.3 - 15.4 %    RDW-SD 43.3 37.0 - 54.0 fl    MPV 10.3 6.0 - 12.0 fL    Platelets 211 140  - 450 10*3/mm3    Neutrophil % 68.1 42.7 - 76.0 %    Lymphocyte % 19.9 19.6 - 45.3 %    Monocyte % 7.8 5.0 - 12.0 %    Eosinophil % 3.4 0.3 - 6.2 %    Basophil % 0.5 0.0 - 1.5 %    Immature Grans % 0.3 0.0 - 0.5 %    Neutrophils, Absolute 5.13 1.70 - 7.00 10*3/mm3    Lymphocytes, Absolute 1.50 0.70 - 3.10 10*3/mm3    Monocytes, Absolute 0.59 0.10 - 0.90 10*3/mm3    Eosinophils, Absolute 0.26 0.00 - 0.40 10*3/mm3    Basophils, Absolute 0.04 0.00 - 0.20 10*3/mm3    Immature Grans, Absolute 0.02 0.00 - 0.05 10*3/mm3    nRBC 0.0 0.0 - 0.2 /100 WBC   ECG 12 Lead    Collection Time: 11/15/20  8:23 PM   Result Value Ref Range    QT Interval 386 ms       Ordered the above labs and independently reviewed the results.        RADIOLOGY  Xr Chest 1 View    Result Date: 11/15/2020  SINGLE VIEW CHEST  HISTORY: Shortness of air  COMPARISON: 12/29/2019  FINDINGS: Cardiomegaly is present. Vascular congestion is suspected. There is tortuosity and ectasia of the thoracic aorta. No pneumothorax is seen. There is some blunting of the costophrenic angles bilaterally. This may represent atelectasis or scarring, although trace effusions are not excluded.      Cardiomegaly with vascular congestion.  This report was finalized on 11/15/2020 9:00 PM by Dr. Emilia Olivo M.D.      Ct Angiogram Chest    Result Date: 11/15/2020  CT ANGIOGRAM OF THE CHEST  HISTORY: Leg swelling and shortness of air. Elevated d-dimer.  COMPARISON: None available.  TECHNIQUE: Axial CT imaging was obtained through the thorax. IV contrast was administered. Three-D reformatted images were obtained.  FINDINGS: Examination is degraded by poor timing of the contrast bolus. No obvious pulmonary thromboembolus is seen. The thoracic aorta is normal in caliber. There is no evidence of dissection. There are really no significant coronary artery calcifications. The thyroid gland, trachea, and esophagus appear unremarkable. There is no pleural or pericardial effusion. The  advanced background emphysematous changes are seen. Areas of chronic scarring are seen at the lung bases bilaterally. These have progressed when compared to prior study. There is an area of nodularity noted within the left lower lobe measuring up to 7 mm in size. This was not apparent on the prior exam. Short-term CT follow-up in 3-6 months is suggested. Consolidation within the right lower lobe has increased when compared to prior exam, as has bronchial wall thickening, and nonspecific bronchitis is not excluded. Mediastinal lymph nodes do not appear pathologically enlarged. Images through the upper abdomen demonstrate persistent mild left-sided hydronephrosis. This was also present on prior exam from 10/11/2019. Areas of cortical thinning are noted within the right kidney, suggesting sequela prior insults. Gastrostomy tube is present. There is a focal intimal flap involving the distal celiac axis however, branch vessels appear patent. There is dilatation of the distal celiac artery measuring up to 1 cm. Chronic compression deformity with bony ankylosis is noted at T6-T7 and T3-T4.        1. No acute pulmonary thromboembolus seen. 2. Advanced emphysematous changes. Patient is noted to have bibasilar scarring. Consolidation within the right lower lobe has increased when compared to prior exam, and there is increasing bronchial wall thickening within this area. FINDINGS may reflect nonspecific bronchitis. 3. 7 mm noncalcified pulmonary nodule at the left lung base. CT follow-up in 3-6 months is suggested. Radiation dose reduction techniques were utilized, including automated exposure control and exposure modulation based on body size.  This report was finalized on 11/15/2020 11:12 PM by Dr. Emilia Olivo M.D.        I ordered the above noted radiological studies. Reviewed by me and discussed with radiologist.  See dictation for official radiology interpretation.      PROCEDURES    Procedures      MEDICATIONS  GIVEN IN ER    Medications   sodium chloride 0.9 % flush 10 mL (has no administration in time range)   sodium chloride 0.9 % flush 10 mL (has no administration in time range)   nitroglycerin (NITROSTAT) SL tablet 0.4 mg (has no administration in time range)   acetaminophen (TYLENOL) tablet 650 mg (has no administration in time range)     Or   acetaminophen (TYLENOL) 160 MG/5ML solution 650 mg (has no administration in time range)     Or   acetaminophen (TYLENOL) suppository 650 mg (has no administration in time range)   bisacodyl (DULCOLAX) suppository 10 mg (has no administration in time range)   bisacodyl (DULCOLAX) EC tablet 5 mg (has no administration in time range)   ondansetron (ZOFRAN) tablet 4 mg (has no administration in time range)     Or   ondansetron (ZOFRAN) injection 4 mg (has no administration in time range)   aluminum-magnesium hydroxide-simethicone (MAALOX MAX) 400-400-40 MG/5ML suspension 15 mL (has no administration in time range)   dextrose (GLUTOSE) oral gel 15 g (has no administration in time range)   dextrose (D50W) 25 g/ 50mL Intravenous Solution 25 g (has no administration in time range)   glucagon (human recombinant) (GLUCAGEN DIAGNOSTIC) injection 1 mg (has no administration in time range)   insulin lispro (humaLOG) injection 0-9 Units (has no administration in time range)   enoxaparin (LOVENOX) syringe 80 mg (has no administration in time range)   enoxaparin (LOVENOX) syringe 80 mg (80 mg Subcutaneous Given 11/15/20 2154)   acetaminophen (TYLENOL) tablet 1,000 mg (1,000 mg Oral Given 11/15/20 2238)   iopamidol (ISOVUE-370) 76 % injection 100 mL (95 mL Intravenous Given by Other 11/15/20 2219)   iopamidol (ISOVUE-370) 76 % injection  - ADS Override Pull (has no administration in time range)         PROGRESS, DATA ANALYSIS, CONSULTS, AND MEDICAL DECISION MAKING    All labs have been independently reviewed by me.  All radiology studies have been reviewed by me and discussed with radiologist  dictating the report.   EKG's independently viewed and interpreted by me.  Discussion below represents my analysis of pertinent findings related to patient's condition, differential diagnosis, treatment plan and final disposition.        ED Course as of Nov 16 0141   Mon Nov 16, 2020 0139 CBC shows normal white count, stable chronic anemia when compared to previous labs in [facility]    [DP]   0139 Chemistry shows a glucose of 154 along with chronic stable CKD    [DP]   0139 Her troponin is normal, proBNP is normal    [DP]   0139 D-dimer is 2.4    [DP]   0139 Unfortunately the vascular tech is not available to perform a Doppler of the left lower extremity.  My index of suspicion is quite high so she was given a dose of Lovenox empirically    [DP]   0140 Patient has multiple comorbidities, and she is not anticoagulated.  I think having her go home and return for an outpatient duplex could be problematic.  For that reason I called and spoke with the nurse practitioner from [facility] who agrees to admit the patient to the hospital on behalf of Dr. Bocanegra.  I suspect that hematology may need to be consulted, she will need a Doppler in the morning, and perhaps she can be discharged home on oral anticoagulants    [DP]   0141 CT scan of the chest was performed due to the elevated dimer, and there is no evidence for pulmonary embolus.    [DP]      ED Course User Index  [DP] Cristopher Gee MD           PPE: Both the patient and I wore a surgical mask throughout the entire patient encounter. I wore protective goggles.     AS OF 01:41 EST VITALS:    BP - 144/79  HR - 78  TEMP - 98.4 °F (36.9 °C) (Tympanic)  O2 SATS - 99%        DIAGNOSIS  Final diagnoses:   Acute deep vein thrombosis (DVT) of proximal vein of left lower extremity (CMS/HCC)   Stage 3a chronic kidney disease         DISPOSITION  Admit to telemetry           Cristopher Gee MD  11/16/20 0141      Electronically signed by Cristopher Gee MD at 11/16/20 0141     [place]  Jaylin RN at 11/16/20 0209        Pt sats dropped to 82%, RN placed pt on 6L NC. Pt sats continued to drop to 80%, placed on 10L NRB. pts sats came up to 100% & admitting doctor notified.        Jaylin Reagan RN  11/16/20 0211      Electronically signed by Jaylin Reagan RN at 11/16/20 0211       {Outbreak/Travel/Exposure Documentation......;  Question Available Choices Patient Response   Outbreak Screen: Do you currently have a new onset of the following symptoms?        Fever/Chils, Cough, Shortness of air, Loss of taste or smell, No, Unknown  (!) Shortness of Air;Cough (11/16/20 0251)   Outbreak Screen: In the last 14 days, have you had contact with anyone who is ill, has show any of the symptoms listed above and/or has been diagnosis with the 2019 Novel Coronavirus? This includes any immediate household members but excludes any patients with whom you have been in contact within your normal work duties wearing proper PPE, if you are a healthcare worker.  Yes, No, Unknown              No (11/16/20 0251)   Outbreak Screen: Who was notified?    Free text  (not recorded)   Travel Screen: Have you traveled in the last month? If so, to what country have you traveled? If [place] what state? Yes, No, Unknown  List of all countries  List of all States No (11/16/20 0219)  (not recorded)  (not recorded)   Infection Risk: Do you currently have the following symptoms?  (If cough is selected, the Tuberculosis Screen is performed.) Cough, Fever, Rash, No (!) No;Cough (11/16/20 0219)   Tuberculosis Screen: Do you have any of the following Tuberculosis Risks?  · Have you lived or spent time with anyone who had or may have TB?  · Have you lived in or visited any of the following areas for more than one month: [place], [place], [place], [place] or [place], the [place] or [place]?  · Do you have HIV/AIDS?  · Have you lived in or worked in a nursing home, homeless shelter, correctional facility, or substance abuse treatment  facility?   · No    If Yes do you have any of the following symptoms? Yes responses display to the right    If Yes, symptoms listed are:  Cough greater than or equal to 3 weeks, Loss of appetite, Unexplained weight loss, Night sweats, Bloody sputum or hemoptysis, Hoarseness, Fever, Fatigue, Chest pain, No No (11/16/20 0219)  (not recorded)   Exposure Screen: Have you been exposed to any of these contagious diseases in the last month? Measles, Chickenpox, Meningitis, Pertussis, Whooping Cough, No No (11/16/20 0219)     Vital Signs (last day)     Date/Time   Temp   Temp src   Pulse   Resp   BP   Patient Position   SpO2    11/16/20 1018   98.5 (36.9)   Oral   --   16   134/73   Lying   --    11/16/20 0200   --   --   --   --   --   --   100    11/16/20 0149   --   --   76   18   142/76   --   100    11/16/20 0000   --   --   78   --   144/79   Lying   99    11/15/20 2330   --   --   76   --   125/74   --   --    11/15/20 2300   --   --   77   --   140/73   Lying   99    11/15/20 2241   --   --   96   --   121/75   Lying   96    11/15/20 2156   --   --   85   --   120/67   Lying   99    11/15/20 1923   98.4 (36.9)   Tympanic   90   20   130/80   --   98              Oxygen Therapy (last day)     Date/Time   SpO2   Device (Oxygen Therapy)   Flow (L/min)   Oxygen Concentration (%)   ETCO2 (mmHg)    11/16/20 1100   --   room air   --   --   --    11/16/20 1018   --   nasal cannula   1   --   --    11/16/20 0219   --   nasal cannula   1   --   --    11/16/20 0200   100   nasal cannula   4   --   --    11/16/20 0149   100   nonrebreather mask   10   --   --    11/16/20 0000   99   nasal cannula   4   --   --    11/15/20 2300   99   nasal cannula   4   --   --    11/15/20 2241   96   nasal cannula   4   --   --    11/15/20 2156   99   nasal cannula   4   --   --    11/15/20 1923   98   nasal cannula   4   --   --              Intake & Output (last day)       11/15 0701 - 11/16 0700 11/16 0701 - 11/17 0700    P.O.  240     Total Intake(mL/kg)  240 (3.1)    Net  +240          Urine Unmeasured Occurrence  2 x        Lines, Drains & Airways    Active LDAs     Name:   Placement date:   Placement time:   Site:   Days:    Peripheral IV 11/15/20 2214 Left Antecubital   11/15/20    2214    Antecubital   less than 1    Gastrostomy/Enterostomy PEG-jejunostomy Umbilicus   10/04/19    1202    Umbilicus   409    External Urinary Catheter   11/15/20    2249    --   less than 1    Surgical Airway Shiley Uncuffed 4   07/10/19    0950    4   495         Inactive LDAs     Name:   Placement date:   Placement time:   Removal date:   Removal time:   Site:   Days:    [REMOVED] Peripheral IV 11/15/20 1951 Right Antecubital   11/15/20    1951    11/15/20    2216    Antecubital   less than 1    [REMOVED] Ureteral Drain/Stent Left ureter 4.8 Fr.   10/11/19    1755    11/15/20 removed pta    2136    Left ureter   401                Lab Results (last 24 hours)     Procedure Component Value Units Date/Time    POC Glucose Once [[id number]]  (Abnormal) Collected: 11/16/20 1227    Specimen: Blood Updated: 11/16/20 1231     Glucose 144 mg/dL     COVID PRE-OP / PRE-PROCEDURE SCREENING ORDER (NO ISOLATION) - Swab, Nasopharynx [[id number]] Collected: 11/15/20 1959    Specimen: Swab from Nasopharynx Updated: 11/16/20 1225    Narrative:      The following orders were created for panel order COVID PRE-OP / PRE-PROCEDURE SCREENING ORDER (NO ISOLATION) - Swab, Nasopharynx.  Procedure                               Abnormality         Status                     ---------                               -----------         ------                     COVID-19,BIOTAP, NP/OP S...[[id number]]                      Final result                 Please view results for these tests on the individual orders.    COVID-19,BIOTAP, NP/OP SWAB IN TRANSPORT MEDIA OR SALINE 24-36 HR TAT - Swab, Nasopharynx [[id number]] Collected: 11/15/20 1959    Specimen: Swab from Nasopharynx Updated: 11/16/20 1229      SARS-CoV-2 PCR Not Detected     Comment: Nucleic acid specific to SARS-CoV-2 (COVID-19) virus was not detected in  this sample by the TaqPath (TM) COVID-19 Combo Kit.          SARS-CoV-2 (COVID-19) nucleic acid testing performed using [employer] Aptima (R) SARS-CoV-2 Assay or [facility] TaqPath (TM)  COVID-19 Combo Kit as indicated above under Emergency Use Authorization (EUA) from the FDA. Aptima (R) and TaqPath (TM) test performance  characteristics verified by [facility] in accordance with the FDAs Guidance Document (Policy for Diagnostic Tests for Coronavirus Disease-2019  during the Public Health Emergency) issued on March 16, 2020. The laboratory is regulated under CLIA as qualified to perform high-complexity testing  Unless otherwise noted, all testing was performed at [facility], CLIA No. [id number], [place] State Licensee No. [id number]       Protime-INR [[id number]]  (Normal) Collected: 11/16/20 1151    Specimen: Blood Updated: 11/16/20 1223     Protime 12.3 Seconds      INR 0.94    aPTT [[id number]]  (Normal) Collected: 11/16/20 1151    Specimen: Blood Updated: 11/16/20 1223     PTT 32.9 seconds     CBC & Differential [[id number]]  (Abnormal) Collected: 11/16/20 0704    Specimen: Blood Updated: 11/16/20 1137    Narrative:      The following orders were created for panel order CBC & Differential.  Procedure                               Abnormality         Status                     ---------                               -----------         ------                     CBC Auto Differential[[id number]]        Abnormal            Final result                 Please view results for these tests on the individual orders.    CBC Auto Differential [[id number]]  (Abnormal) Collected: 11/16/20 0704    Specimen: Blood Updated: 11/16/20 1137     WBC 7.01 10*3/mm3      RBC 4.69 10*6/mm3      Hemoglobin 10.6 g/dL      Hematocrit 34.7 %      MCV 74.0 fL      MCH 22.6 pg      MCHC 30.5 g/dL      RDW 16.0 %       RDW-SD 41.5 fl      MPV 11.0 fL      Platelets 225 10*3/mm3      Neutrophil % 60.6 %      Lymphocyte % 26.1 %      Monocyte % 8.0 %      Eosinophil % 4.3 %      Basophil % 0.7 %      Neutrophils, Absolute 4.25 10*3/mm3      Lymphocytes, Absolute 1.83 10*3/mm3      Monocytes, Absolute 0.56 10*3/mm3      Eosinophils, Absolute 0.30 10*3/mm3      Basophils, Absolute 0.05 10*3/mm3     POC Glucose Once [[id number]]  (Normal) Collected: 11/16/20 0919    Specimen: Blood Updated: 11/16/20 0920     Glucose 105 mg/dL     Basic Metabolic Panel [[id number]]  (Abnormal) Collected: 11/16/20 0704    Specimen: Blood Updated: 11/16/20 0808     Glucose 107 mg/dL      BUN 7 mg/dL      Creatinine 0.98 mg/dL      Sodium 142 mmol/L      Potassium 3.5 mmol/L      Chloride 104 mmol/L      CO2 28.2 mmol/L      Calcium 9.4 mg/dL      eGFR Non African Amer 58 mL/min/1.73      BUN/Creatinine Ratio 7.1     Anion Gap 9.8 mmol/L     Narrative:      GFR Normal >60  Chronic Kidney Disease <60  Kidney Failure <15      CBC (No Diff) [[id number]]  (Abnormal) Collected: 11/16/20 0704    Specimen: Blood Updated: 11/16/20 0805     WBC 6.80 10*3/mm3      RBC 4.45 10*6/mm3      Hemoglobin 10.3 g/dL      Hematocrit 33.2 %      MCV 74.6 fL      MCH 23.1 pg      MCHC 31.0 g/dL      RDW 16.2 %      RDW-SD 43.0 fl      MPV 10.7 fL      Platelets 218 10*3/mm3     Blood Gas, Arterial - [[id number]]  (Abnormal) Collected: 11/16/20 0352    Specimen: Arterial Blood Updated: 11/16/20 0355     Site Arterial: left radial     Candelario's Test Positive     pH, Arterial 7.351 pH units      pCO2, Arterial 57.8 mm Hg      Comment: Critical:Notify JEMIMA TOVAR RN (16-Nov-20 03:54:29)Read back ok        pO2, Arterial 60.9 mm Hg      HCO3, Arterial 32.0 mmol/L      Base Excess, Arterial 4.9 mmol/L      O2 Saturation Calculated 89.0 %      Barometric Pressure for Blood Gas 752.8 mmHg      Modality Cannula     Flow Rate 2 lpm      Rate 24 Breaths/minute     Blood Gas, Arterial -  [[id number]]  (Abnormal) Collected: 11/16/20 0152    Specimen: Arterial Blood Updated: 11/16/20 0156     Site Arterial: right radial     Candelario's Test Positive     pH, Arterial 7.262 pH units      Comment: Critical:Notify JEMIMA TOVAR RN (16-Nov-20 01:55:19)Read back ok        pCO2, Arterial 74.8 mm Hg      Comment: Critical:Notify JEMIMA TOVAR RN (16-Nov-20 01:55:19)Read back ok        pO2, Arterial 307.3 mm Hg      HCO3, Arterial 33.7 mmol/L      Base Excess, Arterial 4.7 mmol/L      O2 Saturation Calculated 99.9 %      Barometric Pressure for Blood Gas 752.5 mmHg      Modality NRB     Flow Rate 10 lpm      Rate 20 Breaths/minute     BNP [[id number]]  (Normal) Collected: 11/15/20 1957    Specimen: Blood Updated: 11/15/20 2037     proBNP 125.3 pg/mL     Narrative:      Among patients with dyspnea, NT-proBNP is highly sensitive for the detection of acute congestive heart failure. In addition NT-proBNP of <300 pg/ml effectively rules out acute congestive heart failure with 99% negative predictive value.    Results may be falsely decreased if patient taking Biotin.      Troponin [[id number]]  (Normal) Collected: 11/15/20 1957    Specimen: Blood Updated: 11/15/20 2037     Troponin T <0.010 ng/mL     Narrative:      Troponin T Reference Range:  <= 0.03 ng/mL-   Negative for AMI  >0.03 ng/mL-     Abnormal for myocardial necrosis.  Clinicians would have to utilize clinical acumen, EKG, Troponin and serial changes to determine if it is an Acute Myocardial Infarction or myocardial injury due to an underlying chronic condition.       Results may be falsely decreased if patient taking Biotin.      Comprehensive Metabolic Panel [[id number]]  (Abnormal) Collected: 11/15/20 1957    Specimen: Blood Updated: 11/15/20 2034     Glucose 154 mg/dL      BUN 9 mg/dL      Creatinine 1.14 mg/dL      Sodium 141 mmol/L      Potassium 3.9 mmol/L      Chloride 102 mmol/L      CO2 30.1 mmol/L      Calcium 9.2 mg/dL      Total Protein 6.3  g/dL      Albumin 3.80 g/dL      ALT (SGPT) 18 U/L      AST (SGOT) 21 U/L      Alkaline Phosphatase 197 U/L      Total Bilirubin <0.2 mg/dL      eGFR Non African Amer 49 mL/min/1.73      Globulin 2.5 gm/dL      A/G Ratio 1.5 g/dL      BUN/Creatinine Ratio 7.9     Anion Gap 8.9 mmol/L     Narrative:      GFR Normal >60  Chronic Kidney Disease <60  Kidney Failure <15      aPTT [[id number]]  (Normal) Collected: 11/15/20 1957    Specimen: Blood Updated: 11/15/20 2026     PTT 30.9 seconds     D-dimer, Quantitative [[id number]]  (Abnormal) Collected: 11/15/20 1957    Specimen: Blood Updated: 11/15/20 2026     D-Dimer, Quantitative 2.39 MCGFEU/mL     Narrative:      The Stago D-Dimer test used in conjunction with a clinical pretest probability (PTP) assessment model, has been approved by the FDA to rule out the presence of venous thromboembolism (VTE) in outpatients suspected of deep venous thrombosis (DVT) or pulmonary embolism (PE). The cut-off for negative predictive value is <0.50 MCGFEU/mL.    Protime-INR [[id number]]  (Abnormal) Collected: 11/15/20 1957    Specimen: Blood Updated: 11/15/20 2026     Protime 11.8 Seconds      INR 0.89    CBC & Differential [[id number]]  (Abnormal) Collected: 11/15/20 1957    Specimen: Blood Updated: 11/15/20 2012    Narrative:      The following orders were created for panel order CBC & Differential.  Procedure                               Abnormality         Status                     ---------                               -----------         ------                     CBC Auto Differential[[id number]]        Abnormal            Final result                 Please view results for these tests on the individual orders.    CBC Auto Differential [[id number]]  (Abnormal) Collected: 11/15/20 1957    Specimen: Blood Updated: 11/15/20 2012     WBC 7.54 10*3/mm3      RBC 4.65 10*6/mm3      Hemoglobin 10.5 g/dL      Hematocrit 35.1 %      MCV 75.5 fL      MCH 22.6 pg      MCHC 29.9 g/dL      RDW  16.1 %      RDW-SD 43.3 fl      MPV 10.3 fL      Platelets 211 10*3/mm3      Neutrophil % 68.1 %      Lymphocyte % 19.9 %      Monocyte % 7.8 %      Eosinophil % 3.4 %      Basophil % 0.5 %      Immature Grans % 0.3 %      Neutrophils, Absolute 5.13 10*3/mm3      Lymphocytes, Absolute 1.50 10*3/mm3      Monocytes, Absolute 0.59 10*3/mm3      Eosinophils, Absolute 0.26 10*3/mm3      Basophils, Absolute 0.04 10*3/mm3      Immature Grans, Absolute 0.02 10*3/mm3      nRBC 0.0 /100 WBC         Imaging Results (Last 24 Hours)     Procedure Component Value Units Date/Time    CT Angiogram Chest [[id number]] Collected: 11/15/20 2301     Updated: 11/15/20 2316    Narrative:      CT ANGIOGRAM OF THE CHEST     HISTORY: Leg swelling and shortness of air. Elevated d-dimer.     COMPARISON: None available.     TECHNIQUE: Axial CT imaging was obtained through the thorax. IV contrast  was administered. Three-D reformatted images were obtained.     FINDINGS:  Examination is degraded by poor timing of the contrast bolus. No obvious  pulmonary thromboembolus is seen. The thoracic aorta is normal in  caliber. There is no evidence of dissection. There are really no  significant coronary artery calcifications. The thyroid gland, trachea,  and esophagus appear unremarkable. There is no pleural or pericardial  effusion. The advanced background emphysematous changes are seen. Areas  of chronic scarring are seen at the lung bases bilaterally. These have  progressed when compared to prior study. There is an area of nodularity  noted within the left lower lobe measuring up to 7 mm in size. This was  not apparent on the prior exam. Short-term CT follow-up in 3-6 months is  suggested. Consolidation within the right lower lobe has increased when  compared to prior exam, as has bronchial wall thickening, and  nonspecific bronchitis is not excluded. Mediastinal lymph nodes do not  appear pathologically enlarged. Images through the upper  abdomen  demonstrate persistent mild left-sided hydronephrosis. This was also  present on prior exam from 10/11/2019. Areas of cortical thinning are  noted within the right kidney, suggesting sequela prior insults.  Gastrostomy tube is present. There is a focal intimal flap involving the  distal celiac axis however, branch vessels appear patent. There is  dilatation of the distal celiac artery measuring up to 1 cm. 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             Impression:      1. No acute pulmonary thromboembolus seen.  2. Advanced emphysematous changes. Patient is noted to have bibasilar  scarring. Consolidation within the right lower lobe has increased when  compared to prior exam, and there is increasing bronchial wall  thickening within this area. FINDINGS may reflect nonspecific  bronchitis.  3. 7 mm noncalcified pulmonary nodule at the left lung base. CT  follow-up in 3-6 months is suggested.  Radiation dose reduction techniques were utilized, including automated  exposure control and exposure modulation based on body size.     This report was finalized on 11/15/2020 11:12 PM by Dr. Emilia Olivo M.D.       XR Chest 1 View [[id number]] Collected: 11/15/20 2059     Updated: 11/15/20 2103    Narrative:      SINGLE VIEW CHEST     HISTORY: Shortness of air     COMPARISON: 12/29/2019     FINDINGS:  Cardiomegaly is present. Vascular congestion is suspected. There is  tortuosity and ectasia of the thoracic aorta. No pneumothorax is seen.  There is some blunting of the costophrenic angles bilaterally. This may  represent atelectasis or scarring, although trace effusions are not  excluded.       Impression:      Cardiomegaly with vascular congestion.     This report was finalized on 11/15/2020 9:00 PM by Dr. Emilia Olivo M.D.           Orders (last 24 hrs)      Start     Ordered    11/17/20 0430  aPTT  Daily     Comments: Cancel If Patient Has Infusion Changes       11/16/20 1059    11/17/20 0430  CBC & Differential  Daily     Comments: Discontinue After Heparin Stopped      11/16/20 1059    11/16/20 2100  pramipexole (MIRAPEX) tablet 0.25 mg  Nightly      11/16/20 0455    11/16/20 1900  aPTT  Timed      11/16/20 1305    11/16/20 1233  oxyCODONE-acetaminophen (PERCOCET) 5-325 MG per tablet 1 tablet  Every 4 Hours PRN      11/16/20 1234    11/16/20 1232  POC Glucose Once  Once      11/16/20 1227    11/16/20 1109  Manual Differential  Once,   Status:  Canceled     Comments: Prior to Initial Heparin Bolus      11/16/20 1108    11/16/20 1106  CBC Auto Differential  Once     Comments: Prior to Initial Heparin Bolus      11/16/20 1105    11/16/20 1101  heparin (porcine) 5000 UNIT/ML injection 6,200 Units  Once      11/16/20 1059    11/16/20 1101  heparin 50732 units/250 mL (100 units/mL) in 0.45 % NaCl infusion  Titrated      11/16/20 1059    11/16/20 1101  Inpatient Vascular Surgery Consult  Once     Specialty:  Vascular Surgery  Provider:  Edison Del Valle Jr., MD    11/16/20 1100    11/16/20 1100  Adjust Heparin Rate Based on aPTT Using Nomogram  Continuous      11/16/20 1059    11/16/20 1100  Verify All Anticoagulant Orders Are Discontinued Upon Initiation of Heparin Protocol (eg Enoxaparin, Fondaparinux, Apixaban, Dabigatran, Edoxaban, or Rivaroxaban)  Once      11/16/20 1059    11/16/20 1100  Protime-INR  Once     Comments: Prior to Initial Heparin Bolus      11/16/20 1059    11/16/20 1100  aPTT  Once     Comments: Prior to Initial Heparin Bolus      11/16/20 1059    11/16/20 1100  RN To Release aPTT Order 6 Hours After Heparin Bolus & 6 Hours After Any Heparin Rate Change  Continuous      11/16/20 1059    11/16/20 1100  CBC & Differential  Once     Comments: Prior to Initial Heparin Bolus      11/16/20 1059    11/16/20 1059  heparin (porcine) 5000 UNIT/ML injection 3,100-6,200 Units  Every 6 Hours PRN      11/16/20 1059    11/16/20 0921  POC Glucose Once  Once       11/16/20 0919 11/16/20 0900  enoxaparin (LOVENOX) syringe 80 mg  Every 12 Hours,   Status:  Discontinued      11/16/20 0100 11/16/20 0900  amLODIPine (NORVASC) tablet 5 mg  Every 24 Hours Scheduled      11/16/20 0455 11/16/20 0900  aspirin chewable tablet 81 mg  Daily      11/16/20 0455 11/16/20 0900  budesonide (PULMICORT) nebulizer solution 0.5 mg  2 Times Daily      11/16/20 0455 11/16/20 0900  busPIRone (BUSPAR) tablet 5 mg  3 Times Daily      11/16/20 0455 11/16/20 0900  escitalopram (LEXAPRO) tablet 20 mg  Daily      11/16/20 0455 11/16/20 0900  insulin glargine (LANTUS) injection 5 Units  2 times daily      11/16/20 0455 11/16/20 0900  metoprolol succinate XL (TOPROL-XL) 24 hr tablet 25 mg  Daily      11/16/20 0455 11/16/20 0900  multivitamin with minerals 1 tablet  Daily      11/16/20 0455 11/16/20 0900  thiamine (VITAMIN B-1) tablet 100 mg  Daily      11/16/20 0455 11/16/20 0900  predniSONE (DELTASONE) tablet 60 mg  Daily      11/16/20 0455 11/16/20 0900  clotrimazole-betamethasone (LOTRISONE) 1-0.05 % cream  Every 12 Hours Scheduled      11/16/20 0457 11/16/20 0830  ipratropium-albuterol (DUO-NEB) nebulizer solution 3 mL  4 Times Daily - RT      11/16/20 0254    11/16/20 0730  insulin lispro (humaLOG) injection 0-9 Units  3 Times Daily Before Meals      11/16/20 0042    11/16/20 0702  Inpatient Pulmonology Consult  IN AM,   Status:  Canceled     Specialty:  Pulmonary Disease  Provider:  (Not yet assigned)    11/16/20 0155    11/16/20 0700  POC Glucose 4x Daily AC & at Bedtime  4 Times Daily Before Meals & at Bedtime     Comments: If bedtime blood glucose is greater than 350 mg/dl, call MD.      11/16/20 0042    11/16/20 0700  pantoprazole (PROTONIX) EC tablet 40 mg  Every Morning      11/16/20 0455    11/16/20 0600  Incentive Spirometry  Every 4 Hours While Awake      11/16/20 0040 11/16/20 0600  CBC (No Diff)  Morning Draw      11/16/20 0040 11/16/20 0600   Basic Metabolic Panel  Morning Draw      11/16/20 0040    11/16/20 0600  gabapentin (NEURONTIN) capsule 300 mg  3 Times Daily      11/16/20 0457    11/16/20 0456  oxyCODONE-acetaminophen (PERCOCET) 5-325 MG per tablet 1 tablet  Once      11/16/20 0454    11/16/20 0454  PT Consult: Eval & Treat Functional Mobility Below Baseline  Once     Comments: Reason Why PT Needed: DIFFCULTY WALKING    11/16/20 0454    11/16/20 0454  OT Consult: Eval & Treat  Once     Comments: Reason Why ot Needed: DIFFCULTY WITH ADL'S    11/16/20 0454    11/16/20 0454  SLP Consult: Eval & Treat RN Dysphagia Screen Failed  Once     Comments: Reason Why SLP Needed: DIFFCULTY SWALLOWING    11/16/20 0454    11/16/20 0400  Vital Signs  Every 4 Hours      11/16/20 0040    11/16/20 0400  Blood Gas, Arterial -  Morning Draw      11/16/20 0254    11/16/20 0356  Blood Gas, Arterial -  Once      11/16/20 0352    11/16/20 0310  Blood Gas, Arterial -  Morning Draw,   Status:  Canceled      11/16/20 0210    11/16/20 0255  Inpatient Case Management  Consult  Once     Provider:  (Not yet assigned)    11/16/20 0255    11/16/20 0211  NIPPV (CPAP or BIPAP)  Until Discontinued      11/16/20 0210    11/16/20 0204  Inpatient Pulmonology Consult  STAT     Specialty:  Pulmonary Disease  Provider:  Raimundo Love MD    11/16/20 0203    11/16/20 0157  Blood Gas, Arterial -  Once      11/16/20 0152    11/16/20 0147  Blood Gas, Arterial -  STAT      11/16/20 0147    11/16/20 0052  Pharmacy to Dose enoxaparin (LOVENOX)  Continuous PRN,   Status:  Discontinued      11/16/20 0052    11/16/20 0052  Please call when med rec is complete  Nursing Communication  Once     Comments: Please call when med rec is complete    11/16/20 0051    11/16/20 0042  Duplex Venous Lower Extremity - Left CAR  Once      11/16/20 0041    11/16/20 0042  Do NOT Hold Basal or Correction Scale Insulin When Patient is NPO, Hold Scheduled Mealtime (Bolus) Insulin if NPO  Continuous       11/16/20 0042 11/16/20 0042  Follow [facility] Hypoglycemia Standing Orders For Blood Glucose Less Than 70 mg/dL  Until Discontinued     Comments: ALERT PATIENT - NOT NPO & CAN SAFELY SWALLOW  Administer 4 oz Fruit Juice OR 4 oz Regular Soda OR 8 oz Milk OR 15-30 grams (1 tube) of Glucose Gel.  Recheck Blood Glucose Approximately 15 Minutes After Ingestion, Repeat Treatment & Continue to Recheck Blood Sugar Approximately Every 15 Minutes Until Blood Glucose is 70 or Higher.  Once Blood Glucose is 70 or Higher & if It Will Be More Than 60 Minutes Until Next Meal, Provide Appropriate Snack (Including Carbohydrate Food) Based on Meal Plan Order. Give Meal Tray As Soon As Possible.    PATIENT HAS IV ACCESS - UNRESPONSIVE, NPO OR UNABLE TO SAFELY SWALLOW  Administer 25g (50ml) D50W IV Push.  Recheck Blood Glucose Approximately 15 Minutes After Administration, if Blood Glucose Remains Less Than 70, Repeat Treatment   Recheck Blood Glucose Approximately 15 Minutes After 2nd Administration, if Blood Glucose Remains Less Than 70 After 2nd Dose of D50W, Contact Provider for Further Treatment Orders & Consider Adding IVF With D5W for Maintenance    PATIENT WITHOUT IV ACCESS - UNRESPONSIVE, NPO OR UNABLE TO SAFELY SWALLOW  Administer 1mg Glucagon SQ & Establish IV Access.  Turn Patient on Side - Nausea / Vomiting May Occur.  Recheck Blood Glucose Approximately 15 Minutes After Administration.  If Blood Glucose Remains Less Than 70, Administer 25g D50W IV Push (50ml).  Recheck Blood Glucose Approximately 15 Minutes After Administration of D50W, if Blood Glucose Remains Less Than 70, Contact Provider for Further Treatment Orders & Consider Adding IVF With D5 for Maintenance    Document Event & Patient Response to Interventions in EMR, Document Medications on MAR  Notify Provider if Hypoglycemia Treatment Needed    11/16/20 0042 11/16/20 0041  dextrose (GLUTOSE) oral gel 15 g  Every 15 Minutes PRN      11/16/20 0042     11/16/20 0041  dextrose (D50W) 25 g/ 50mL Intravenous Solution 25 g  Every 15 Minutes PRN      11/16/20 0042 11/16/20 0041  glucagon (human recombinant) (GLUCAGEN DIAGNOSTIC) injection 1 mg  Every 15 Minutes PRN      11/16/20 0042 11/16/20 0041  Daily Weights  Daily      11/16/20 0040 11/16/20 0040  aluminum-magnesium hydroxide-simethicone (MAALOX MAX) 400-400-40 MG/5ML suspension 15 mL  Every 6 Hours PRN      11/16/20 0040 11/16/20 0040  ondansetron (ZOFRAN) tablet 4 mg  Every 6 Hours PRN      11/16/20 0040 11/16/20 0040  ondansetron (ZOFRAN) injection 4 mg  Every 6 Hours PRN      11/16/20 0040 11/16/20 0040  bisacodyl (DULCOLAX) EC tablet 5 mg  Daily PRN      11/16/20 0040 11/16/20 0040  VTE Prophylaxis Not Indicated: Other: Patient Currently Anticoagulated / Receiving Prophylaxis  Once      11/16/20 0040 11/16/20 0040  Telemetry - Maintain IV Access  Continuous      11/16/20 0040 11/16/20 0040  Continuous Cardiac Monitoring  Continuous      11/16/20 0040 11/16/20 0040  May Be Off Telemetry for Tests  Continuous      11/16/20 0040 11/16/20 0040  ACLS Protocol For Life Threatening Dysrhythmias (Unless Code Status Indicates Otherwise)  Continuous      11/16/20 0040 11/16/20 0040  Notify Provider if ACLS Protocol Activated  Until Discontinued      11/16/20 0040 11/16/20 0040  Notify Provider (With Default Parameters)  Until Discontinued      11/16/20 0040 11/16/20 0040  Diet Regular; Consistent Carbohydrate  Diet Effective Now      11/16/20 0040 11/16/20 0040  bisacodyl (DULCOLAX) suppository 10 mg  Daily PRN      11/16/20 0040 11/16/20 0039  acetaminophen (TYLENOL) tablet 650 mg  Every 4 Hours PRN      11/16/20 0040 11/16/20 0039  acetaminophen (TYLENOL) 160 MG/5ML solution 650 mg  Every 4 Hours PRN      11/16/20 0040 11/16/20 0039  acetaminophen (TYLENOL) suppository 650 mg  Every 4 Hours PRN      11/16/20 0040 11/16/20 0039  Oxygen Therapy- Nasal Cannula;  Titrate for SPO2: 90% - 95%  Continuous PRN,   Status:  Canceled     Comments: If Patient Develops Unresponsiveness, Acute Dyspnea, Cyanosis or Suspected Hypoxemia Start Continuous Pulse Ox Monitoring, Apply Oxygen & Notify Provider    11/16/20 0040 11/16/20 0039  nitroglycerin (NITROSTAT) SL tablet 0.4 mg  Every 5 Minutes PRN      11/16/20 0040 11/16/20 0039  Code Status and Medical Interventions:  Continuous      11/16/20 0040 11/16/20 0039  Intake & Output  Every Shift      11/16/20 0040 11/16/20 0039  Saline Lock & Maintain IV Access  Continuous,   Status:  Canceled      11/16/20 0040 11/16/20 0038  sodium chloride 0.9 % flush 10 mL  As Needed      11/16/20 0040    11/15/20 2313  Inpatient Admission  Once      11/15/20 2312    11/15/20 2256  [facility] (on-call MD unless specified) Details  Once     Specialty:  Hospitalist  Provider:  (Not yet assigned)    11/15/20 2255    11/15/20 2217  iopamidol (ISOVUE-370) 76 % injection 100 mL  Once in Imaging      11/15/20 2215    11/15/20 2159  acetaminophen (TYLENOL) tablet 1,000 mg  Once      11/15/20 2157    11/15/20 2039  enoxaparin (LOVENOX) syringe 80 mg  Once      11/15/20 2037    11/15/20 2039  CT Angiogram Chest  1 Time Imaging      11/15/20 2038    11/15/20 1953  ECG 12 Lead  Once      11/15/20 1952    11/15/20 1953  COVID PRE-OP / PRE-PROCEDURE SCREENING ORDER (NO ISOLATION) - Swab, Nasopharynx  Once      11/15/20 1952    11/15/20 1953  COVID-19,BIOTAP, NP/OP SWAB IN TRANSPORT MEDIA OR SALINE 24-36 HR TAT - Swab, Nasopharynx  PROCEDURE ONCE      11/15/20 1952    11/15/20 1952  CBC & Differential  Once      11/15/20 1952    11/15/20 1952  Comprehensive Metabolic Panel  Once      11/15/20 1952    11/15/20 1952  Protime-INR  Once      11/15/20 1952    11/15/20 1952  aPTT  Once      11/15/20 1952    11/15/20 1952  Troponin  Once      11/15/20 1952    11/15/20 1952  BNP  Once      11/15/20 1952    11/15/20 1952  D-dimer, Quantitative  Once      11/15/20 1952     11/15/20 1952  Insert peripheral IV  Once      11/15/20 1952    11/15/20 1952  XR Chest 1 View  1 Time Imaging      11/15/20 1952    11/15/20 1952  CBC Auto Differential  PROCEDURE ONCE      11/15/20 1952    11/15/20 1951  sodium chloride 0.9 % flush 10 mL  As Needed      11/15/20 1952    09/29/20 0000  insulin glargine (LANTUS) 100 UNIT/ML injection  2 times daily      11/15/20 1955    09/29/20 0000  Blood Glucose Monitoring Suppl (FreeStyle Lite) device  4 Times Daily      11/15/20 1955    09/29/20 0000  Alcohol Swabs 70 % pads  4 Times Daily      11/15/20 1955    09/29/20 0000  glucose blood (FREESTYLE TEST STRIPS) test strip  4 Times Daily      11/15/20 1955    Unscheduled  Telemetry - Pulse Oximetry  Continuous PRN     Comments: If Patient Develops Unresponsiveness, Acute Dyspnea, Cyanosis or Suspected Hypoxemia Start Continuous Pulse Ox Monitoring, Apply Oxygen & Notify Provider    11/16/20 0040    Unscheduled  ECG 12 Lead  As Needed     Comments: Nurse to Release if Patient Expericences Acute Chest Pain or Dysrhythmias    11/16/20 0040    Unscheduled  Potassium  As Needed     Comments: For Ventricular Arrhythmias      11/16/20 0040    Unscheduled  Magnesium  As Needed     Comments: For Ventricular Arrhythmias      11/16/20 0040    Unscheduled  Troponin  As Needed     Comments: For Chest Pain      11/16/20 0040    Unscheduled  Blood Gas, Arterial -  As Needed     Comments: Per O2 PolicyNotify Physician      11/16/20 0040    Unscheduled  Up With Assistance  As Needed      11/16/20 0040    Unscheduled  Oxygen Therapy- Nasal Cannula; Titrate for SPO2: 88% - 92%  Continuous PRN     Comments: If Patient Develops Unresponsiveness, Acute Dyspnea, Cyanosis or Suspected Hypoxemia Start Continuous Pulse Ox Monitoring, Apply Oxygen & Notify Provider    11/16/20 0254    Unscheduled  aPTT  As Needed      11/16/20 1059    --  metFORMIN (GLUCOPHAGE) 500 MG tablet  2 Times Daily With Meals      11/15/20 1954    --   DULoxetine HCl (DRIZALMA) 60 MG capsule  2 Times Daily      11/15/20 1954    --  Insulin Glulisine (Apidra SoloStar) 100 UNIT/ML solution pen-injector  3 Times Daily      11/15/20 1956                 Physician Progress Notes (last 24 hours) (Notes from 11/15/20 1359 through 20 1359)      Angely Carolina APRN at 20 1000     Attestation signed by Ramses Elam MD at 20 1330    I have reviewed this documentation and agree.    Notified about extensive DVT in right lower extremity.  Went down immediately to evaluate the patient and as soon as I walked in the room she immediately began crying as she was not doing this prior to coming in.  She states that her mother  of a blood clot and she is freaking out a bit.  I tried to reassure her and stated that she is already therapeutically covered and her vital signs at this juncture also stable.  No family present at bedside.  Patient was alert and oriented x3.  She is difficult to understand time secondary to her tracheostomy past history.  Otherwise nontoxic at this point.  MMM, tracheostomy stoma, no LAD.  S1-S2 RRR, CTA B no increased work of breathing.  LLE with 1-2+ edema.  Cranial nerves II through XII are otherwise intact and patient moving all while supine.    Labs and chart reviewed    Acute DVT   -Heparin drip with vascular consultation placed given extensiveness of that clot in addition to family history    Chronic respiratory failure/COPD/chronic diastolic CHF  overall stable  -We will neurology consulted and appreciate input and assistance    CKD 3/HTN - stable    DM2 with CKD with an A1c of 9.3  -Continue home regimen with SSI    Currently on prednisone 60 mg p.o. daily?                          Name: Swetha Luis ADMIT: 11/15/2020   : 1960  PCP: Provider, No Known    MRN: [id number] LOS: 1 days   AGE/SEX: 60 y.o. female  ROOM: 50/50     Subjective   Subjective     Complains of continued left lower extremity pain.   Denies any other complaints.  Denies any history of DVT.  Denies recent air travel.  No known recent contacts with COVID-19.  Does report chronic sedentary lifestyle.  No shortness of breath or chest pain.  Just quit smoking 3 months ago.  Nursing denies further concerns.        Objective   Objective   Vital Signs  Temp:  [98.4 °F (36.9 °C)] 98.4 °F (36.9 °C)  Heart Rate:  [76-96] 76  Resp:  [18-20] 18  BP: (120-144)/(67-80) 142/76  SpO2:  [96 %-100 %] 100 %  on  Flow (L/min):  [1-10] 1;   Device (Oxygen Therapy): nasal cannula  Body mass index is 29.18 kg/m².  Physical Exam  Constitutional:       General: She is not in acute distress.     Appearance: She is obese. She is ill-appearing. She is not diaphoretic.   HENT:      Head: Normocephalic and atraumatic.      Nose: Nose normal.      Mouth/Throat:      Mouth: Mucous membranes are moist.   Eyes:      Extraocular Movements: Extraocular movements intact.      Conjunctiva/sclera: Conjunctivae normal.   Neck:      Musculoskeletal: Normal range of motion.   Cardiovascular:      Rate and Rhythm: Normal rate and regular rhythm.      Pulses: Normal pulses.      Heart sounds: Normal heart sounds.   Pulmonary:      Effort: Pulmonary effort is normal. No respiratory distress.      Comments: Wheezes bilateral lower lobes. Rhonchi bilateral upper lobes. Audible rhonchi. Tracheal stoma without device, clear sputum production.   Wearing 2  Liters oxygen via NC, sats 92-93%  Abdominal:      General: Bowel sounds are normal.      Palpations: Abdomen is soft.   Musculoskeletal: Normal range of motion.      Comments: General weakness. Moderate edema LLE, some tenderness.    Skin:     General: Skin is warm and dry.      Capillary Refill: Capillary refill takes less than 2 seconds.   Neurological:      General: No focal deficit present.      Mental Status: She is alert and oriented to person, place, and time.   Psychiatric:         Mood and Affect: Mood normal.         Results  Review     I reviewed the patient's new clinical results.  Results from last 7 days   Lab Units 11/16/20  0704 11/15/20  1957   WBC 10*3/mm3 6.80 7.54   HEMOGLOBIN g/dL 10.3* 10.5*   PLATELETS 10*3/mm3 218 211     Results from last 7 days   Lab Units 11/16/20  0704 11/15/20  1957   SODIUM mmol/L 142 141   POTASSIUM mmol/L 3.5 3.9   CHLORIDE mmol/L 104 102   CO2 mmol/L 28.2 30.1*   BUN mg/dL 7* 9   CREATININE mg/dL 0.98 1.14*   GLUCOSE mg/dL 107* 154*   Estimated Creatinine Clearance: 61.4 mL/min (by C-G formula based on SCr of 0.98 mg/dL).  Results from last 7 days   Lab Units 11/15/20  1957   ALBUMIN g/dL 3.80   BILIRUBIN mg/dL <0.2   ALK PHOS U/L 197*   AST (SGOT) U/L 21   ALT (SGPT) U/L 18     Results from last 7 days   Lab Units 11/16/20  0704 11/15/20  1957   CALCIUM mg/dL 9.4 9.2   ALBUMIN g/dL  --  3.80     No results found for: COVID19  Glucose   Date/Time Value Ref Range Status   11/16/2020 0919 105 70 - 130 mg/dL Final       Duplex Venous Lower Extremity - Left CAR  · Acute left lower extremity deep vein thrombosis noted in the external   iliac, common femoral, deep femoral, proximal femoral, mid femoral, distal   femoral, popliteal, posterial tibial, peroneal, gastrocnemius and soleal.  · Acute left lower extremity superficial thrombophlebitis noted in the   saphenofemoral junction.  · Chronic left lower extremity superficial thrombophlebitis noted in the   small saphenous.  · All other left sided veins appeared normal.       Scheduled Medications  amLODIPine, 5 mg, Oral, Q24H  aspirin, 81 mg, Oral, Daily  budesonide, 0.5 mg, Nebulization, BID  busPIRone, 5 mg, Oral, TID  clotrimazole-betamethasone, , Topical, Q12H  enoxaparin, 1 mg/kg, Subcutaneous, Q12H  escitalopram, 20 mg, Oral, Daily  gabapentin, 300 mg, Oral, TID  insulin glargine, 5 Units, Subcutaneous, BID  insulin lispro, 0-9 Units, Subcutaneous, TID AC  ipratropium-albuterol, 3 mL, Nebulization, 4x Daily - RT  metoprolol succinate XL, 25 mg,  Oral, Daily  multivitamin with minerals, 1 tablet, Oral, Daily  pantoprazole, 40 mg, Oral, QAM  pramipexole, 0.25 mg, Oral, Nightly  predniSONE, 60 mg, Oral, Daily  thiamine, 100 mg, Oral, Daily    Infusions   Diet  Diet Regular; Consistent Carbohydrate      Assessment/Plan     Active Hospital Problems    Diagnosis  POA   • **Acute deep vein thrombosis (DVT) of proximal vein of left lower extremity (CMS/[facility]) [I82.4Y2]  Yes   • Type 2 diabetes mellitus with hyperglycemia, with long-term current use of insulin (CMS/[facility]) [E11.65, Z79.4]  Not Applicable   • Elevated d-dimer [R79.89]  Yes   • Essential hypertension [I10]  Yes   • Peripheral artery disease (CMS/[facility]) [I73.9]  Yes   • COPD (chronic obstructive pulmonary disease) (CMS/[facility]) [J44.9]  Yes   • Chronic diastolic CHF (congestive heart failure) (CMS/[facility]) [I50.32]  Yes   • Chronic respiratory failure with hypoxia (CMS/[facility]) [J96.11]  Yes   • Stage 3 chronic kidney disease [N18.30]  Yes      Resolved Hospital Problems   No resolved problems to display.       60 y.o. female admitted with Acute deep vein thrombosis (DVT) of proximal vein of left lower extremity (CMS/[facility]).    DVT LLE  -D-dimer 2.39, CTA of chest negative for PE  -Lower extremity Dopplers positive for acute left lower extremity DVT and external iliac, common femoral, deep femoral, proximal femoral, mid femoral, distal femoral, popliteal, posterior tibial, peroneal gastrocnemeius and soleal.  Also some chronic left lower extremity superficial thrombophlebitis.  -Will stop Lovenox every 12 hours and start IV heparin drip for DVT treatment, considering extensive DVT of LLE.  Last dose of Lovenox was last night at 2154, will start heparin stat.  -Consult vascular surgery  -Monitor for bleeding  -Pain management     Chronic respiratory failure with hypoxia/COPD  -Wearing 2 L nasal cannula, sats 92 to 93%.  Some CO2 retention, PCO2 57.8, improved from previous.  Will ask nursing to continue to wean to keep sats  88 to 92%.  Not in any distress currently.  Some adventitious lung sounds.  She recently quit smoking.  Pulmonary following.  -Monitor O2 status closely  -No signs of COPD exacerbation on exam  -Resume home COPD regimen     CKD stage III  -Creat 0.98, eGFR 58, baseline creat 1.10  -Avoid nephrotoxins  -Trend BMP     Type 2 diabetes mellitus  -Accu-Cheks 4 times a day with meals and at bedtime  -Moderate dose correctional factor with hypoglycemic protocol  -Last hemoglobin A1c 9.3 on 9/28/2020  -Hold oral diabetic medication  -Continue basal insulin  -Blood glucose readings stable.     Chronic diastolic CHF  -No signs of fluid overload on exam  -Daily weights  -Monitor fluid status closely  -Resume home medications     Essential hypertension  -Stable, resume home regimen      AMINATA Bhakta  [facility]  11/16/20  10:00 EST    Patient was placed in face mask on first look.  I wore protective equipment throughout this patient encounter including a face mask, gloves and protective eyewear.  Hand hygiene was performed before donning protective equipment and after removal when leaving the room.        Electronically signed by Ramses Elam MD at 11/16/20 1330          Consult Notes (last 24 hours) (Notes from 11/15/20 1359 through 11/16/20 1359)      Ye Love MD at 11/16/20 0243      Consult Orders    1. Inpatient Pulmonology Consult [[id number]] ordered by Luciano Bocanegra MD at 11/16/20 0203               [facility]    Reason for consult: acute hypoxemic and hypercapnic respiratory failure    HPI:  Mrs. Luis is a 59yo wF with copd and tracheostomy.  She was admitted through the ER today with left leg pain and swellling since yesterday.  She was admitted here and had a CT angio chest showing no acute pe or infiltrate.  She was noted to have a drop in oxygen saturation and she was placed on oxygen.  She placed her on a nonrebreather and oxygen  "saturations improved, but then patient became less responsive and a blood gas was ordered showing 7.26/74/307.  I was called to see patient stat and ordered patient be placed on bipap.  When I arrived to see patient she was awake in bed and told me she had pain in her leg but denies any increased shortness of breath.  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  She is still has sats 99% and I have asked that her oxygen be weaned down to keep sats 88-92% and we will repeat a gas again in an hour.    Past Medical History:   Diagnosis Date   • Acute congestive heart failure (CMS/HCC) 12/24/2018   • Acute osteomyelitis (CMS/[facility])     Right shoulder due to IVDA   • Acute renal failure on dialysis (CMS/[facility])    • Anxiety    • Pugh esophagus    • CKD (chronic kidney disease)    • COPD (chronic obstructive pulmonary disease) (CMS/[facility])    • MRSA infection    • Nontraumatic subarachnoid hemorrhage (CMS/[facility])    • Seizures (CMS/[facility])    • Tracheostomy present (CMS/[facility])      Social History     Socioeconomic History   • Marital status:      Spouse name: Not on file   • Number of children: Not on file   • Years of education: Not on file   • Highest education level: Not on file   Tobacco Use   • Smoking status: Current Every Day Smoker     Packs/day: 0.50     Types: Cigarettes   • Smokeless tobacco: Never Used   • Tobacco comment: \"1 CIGARETTE A DAY\"   Substance and Sexual Activity   • Alcohol use: No     Frequency: Never   • Drug use: Yes     Types: Cocaine(coke)     Comment: 20 years ago occasional   • Sexual activity: Defer     Family History   Problem Relation Age of Onset   • Diabetes Mother    • Hypertension Mother      MEDS: reviewed as per chart  ALL: list of 4 as per chart  ROS: 12 point negative except as in HPI    Vital Sign Min/Max for last 24 hours  Temp  Min: 98.4 °F (36.9 °C)  Max: 98.4 °F (36.9 °C)   BP  Min: 120/67  Max: 144/79 "   Pulse  Min: 76  Max: 96   Resp  Min: 18  Max: 20   SpO2  Min: 96 %  Max: 100 %   Flow (L/min)  Min: 1  Max: 10   Weight  Min: 77.1 kg (169 lb 15.6 oz)  Max: 77.1 kg (170 lb)     GEN:   appears ill, obese, AxOx3  HEENT: PERRL, EOMI, no icterus, mmm, no jvd, trachea midline, neck supple --fistula present at site of prior tracheostomy  CHEST: decreased ae, slightly coarse bilat, no wheezes, no crackles, no use of accessory muscles  CV: RRR, no m/g/r  ABD: soft, nt, nd +bs, no hepatosplenomegaly  EXT: no c/c/ +edema le  SKIN: no rashes, no xanthomas, nl turgor, warm, dry  LYMPH: no palpable cervical or supraclavicular lymphadenopathy  NEURO: CN 2-12 intact and symmetric bilaterally  PSYCH: nl affect, nl orientation, nl judgement, nl mood  MSK: no kyphoscoliosis, 5/5 strength ue and le bilaterally    Labs: 11/16: reviewed:  7.26/74/307 on non rebreather  Glucose 154  Bun 9  Cr 1.14  Bicarb 30  inr 0.89  Trop 0.01  bnp 125  D dimer 2.4  Wbc 7.5  hgb 10.5  plts 211    Ct chest: reviewed: some atelectasis, emphysema, 7mm non calcified nodule    A/P:  1. Acute hypoxemic and hypercapnic respiratory failure -- avoid hypreoxia, she is currently acting normally, so will plan repeat gas here shortly.  Should she have continued uncomopensated hypercapnia, will have to consider dilation of her stoma and placement of tracheostomy or attempt pap therapy and try and cover stoma.  2. COPD -- no acute exacerbation -- would continue bronchodilators.  3. Obesity -- likely melissa  4. Anemia  5. Possible dvt -- on anticoagulation as per lha  6. 7mm noncalcified lung nodule -- repeat ct chest in 6 months time.    Discussed with RT    Patient's acute hypercapnic failure was critical issue requiring immediate attention and put her at risk for deterioration    CC 35 mins.      Electronically signed by Ye Love MD at 11/16/20 7203

## 2020-11-16 NOTE — H&P
Patient Name:  Swetha Luis  YOB: 1960  MRN:  [id number]  Admit Date:  11/15/2020  Patient Care Team:  Provider, No Known as PCP - General      Subjective   History Present Illness     Chief Complaint   Patient presents with   • Leg Pain     left leg pain and swelling since yesterday hx of DVT     History of Present Illness   Ms. Luis is a 60 y.o. smoker with a history of CKD stage III, PAD, HTN, COPD, chronic respiratory failure with hypoxia, and chronic diastolic CHF that presents to [facility] complaining of nonradiating left leg pain and swelling.  Patient reports this has been ongoing for the last 2 to 3 days.  She denies any chest pain, worsening shortness of air, fever, chills, nausea, vomiting, diarrhea, and loss of taste or smell.  Labs obtained in the emergency department show a D-dimer 2.39.  CTA chest was negative for pulmonary emboli.    Review of Systems   Constitutional: Negative for chills and fever.   HENT: Negative for congestion and rhinorrhea.    Eyes: Negative for photophobia and visual disturbance.   Respiratory: Negative for cough and shortness of breath.    Cardiovascular: Positive for leg swelling. Negative for chest pain and palpitations.   Gastrointestinal: Negative for constipation, diarrhea, nausea and vomiting.   Endocrine: Negative for cold intolerance and heat intolerance.   Genitourinary: Negative for difficulty urinating and dysuria.   Musculoskeletal: Negative for gait problem and joint swelling.   Skin: Negative for rash and wound.   Neurological: Negative for dizziness, light-headedness and headaches.   Psychiatric/Behavioral: Negative for sleep disturbance and suicidal ideas.        Personal History     Past Medical History:   Diagnosis Date   • Acute congestive heart failure (CMS/HCC) 12/24/2018   • Acute osteomyelitis (CMS/HCC)     Right shoulder due to IVDA   • Acute renal failure on dialysis (CMS/HCC)    • Anxiety    • Pugh esophagus   "  • CKD (chronic kidney disease)    • COPD (chronic obstructive pulmonary disease) (CMS/HCC)    • MRSA infection    • Nontraumatic subarachnoid hemorrhage (CMS/HCC)    • Seizures (CMS/HCC)    • Tracheostomy present (CMS/HCC)      Past Surgical History:   Procedure Laterality Date   • TRACHEOSTOMY     • URETEROSCOPY LASER LITHOTRIPSY WITH STENT INSERTION Left 10/11/2019    Procedure: CYSTOSCOPY,  URETEROSCOPY, LEFT RETROGRADE, LEFT PYELOGRAM, LASER LITHOTRIPSY, PLACEMENT OF STENT.;  Surgeon: Clyde Selby MD;  Location: [facility];  Service: Urology     Family History   Problem Relation Age of Onset   • Diabetes Mother    • Hypertension Mother      Social History     Tobacco Use   • Smoking status: Current Every Day Smoker     Packs/day: 0.50     Types: Cigarettes   • Smokeless tobacco: Never Used   • Tobacco comment: \"1 CIGARETTE A DAY\"   Substance Use Topics   • Alcohol use: No     Frequency: Never   • Drug use: Yes     Types: Cocaine(coke)     Comment: 20 years ago occasional     No current facility-administered medications on file prior to encounter.      Current Outpatient Medications on File Prior to Encounter   Medication Sig Dispense Refill   • Alcohol Swabs 70 % pads Inject 1 each under the skin into the appropriate area as directed 4 (Four) Times a Day.     • Blood Glucose Monitoring Suppl (FreeStyle Lite) device Inject 1 each under the skin into the appropriate area as directed 4 (Four) Times a Day.     • DULoxetine HCl (DRIZALMA) 60 MG capsule Take 50 mg by mouth 2 (Two) Times a Day.     • glucose blood (FREESTYLE TEST STRIPS) test strip 1 strip by Other route 4 (Four) Times a Day.     • insulin glargine (LANTUS) 100 UNIT/ML injection Inject 5 Units under the skin into the appropriate area as directed 2 (two) times a day.     • Insulin Glulisine (Apidra SoloStar) 100 UNIT/ML solution pen-injector Inject 5 Units under the skin into the appropriate area as directed 3 (Three) Times a Day.     • " metFORMIN (GLUCOPHAGE) 500 MG tablet Take 500 mg by mouth 2 (Two) Times a Day With Meals.     • omeprazole (priLOSEC) 40 MG capsule Take 40 mg by mouth Daily.     • acetaminophen (TYLENOL) 325 MG tablet Take 2 tablets by mouth Every 4 (Four) Hours As Needed for Mild Pain .     • amLODIPine (NORVASC) 5 MG tablet Take 1 tablet by mouth Daily. 30 tablet 0   • Ascorbic Acid 500 MG capsule Take  by mouth.     • aspirin 81 MG chewable tablet Chew 1 tablet Daily.     • budesonide (PULMICORT) 0.5 MG/2ML nebulizer solution Inhale 2 mL by nebulization via trach 2 (Two) Times a Day. 120 mL 0   • busPIRone (BUSPAR) 5 MG tablet Take 1 tablet by mouth 3 (Three) times a day. 90 tablet 0   • clotrimazole-betamethasone (LOTRISONE) 1-0.05 % cream Apply  topically to the appropriate area as directed Every 12 (Twelve) Hours. Apply to perivaginal area and perineum after washing. 15 g 0   • escitalopram (LEXAPRO) 20 MG tablet Take 1 tablet by mouth daily. 30 tablet 0   • gabapentin (NEURONTIN) 300 MG capsule Take 1 capsule by mouth 3 (Three) Times a Day. 9 capsule 0   • ipratropium-albuterol (DUO-NEB) 0.5-2.5 mg/3 ml nebulizer Inhale 3 mL by nebulization every 4 (Four) hours as needed for wheezing. 360 mL 0   • metoprolol succinate XL (TOPROL-XL) 25 MG 24 hr tablet Take 1 tablet by mouth Daily. 30 tablet 0   • MULTIPLE VITAMINS-MINERALS PO Take  by mouth.     • oxyCODONE-acetaminophen (PERCOCET) 5-325 MG per tablet Take 1 tablet by mouth Every 6 (Six) Hours As Needed for Moderate Pain . 12 tablet 0   • pramipexole (MIRAPEX) 0.25 MG tablet Take 1 tablet by mouth Every Night. 30 tablet 0   • predniSONE (DELTASONE) 20 MG tablet Take 3 tablets by mouth Daily. 9 tablet 0   • thiamine (VITAMIN B-1) 100 MG tablet Take 1 tablet by mouth Daily. 30 tablet 0     Allergies   Allergen Reactions   • Cephalexin Unknown (See Comments)     unk     • Hydrocodone Unknown (See Comments)     unk   • [place] Unknown (See Comments)     unk   • Zithromax  [Azithromycin] Unknown (See Comments)     unk       Objective    Objective     Vital Signs  Temp:  [98.4 °F (36.9 °C)] 98.4 °F (36.9 °C)  Heart Rate:  [76-96] 78  Resp:  [20] 20  BP: (120-144)/(67-80) 144/79  SpO2:  [96 %-99 %] 99 %  on  Flow (L/min):  [4] 4;   Device (Oxygen Therapy): nasal cannula  Body mass index is 29.18 kg/m².    Physical Exam  Constitutional:       General: She is not in acute distress.     Appearance: She is well-developed. She is not toxic-appearing.      Comments: Chronically ill-appearing   HENT:      Head: Normocephalic and atraumatic.   Eyes:      General: No scleral icterus.        Right eye: No discharge.         Left eye: No discharge.      Conjunctiva/sclera: Conjunctivae normal.   Neck:      Musculoskeletal: Normal range of motion and neck supple.      Vascular: No JVD.   Cardiovascular:      Rate and Rhythm: Normal rate and regular rhythm.      Heart sounds: Normal heart sounds. No murmur. No friction rub. No gallop.    Pulmonary:      Effort: Pulmonary effort is normal. No respiratory distress.      Breath sounds: Decreased breath sounds, wheezing ( Expiratory) and rhonchi present. No rales.      Comments: 4 L of O2  Abdominal:      General: Bowel sounds are normal. There is no distension.      Palpations: Abdomen is soft.      Tenderness: There is no abdominal tenderness. There is no guarding.   Musculoskeletal: Normal range of motion.         General: No tenderness or deformity.   Skin:     General: Skin is warm and dry.      Capillary Refill: Capillary refill takes less than 2 seconds.      Findings: Erythema ( Left lower leg) present.   Neurological:      Mental Status: She is alert and oriented to person, place, and time.   Psychiatric:         Behavior: Behavior normal.       Results Review:  I reviewed the patient's new clinical results.  Discussed with ED provider.    Lab Results (last 24 hours)     Procedure Component Value Units Date/Time    CBC & Differential  [[id number]]  (Abnormal) Collected: 11/15/20 1957    Specimen: Blood Updated: 11/15/20 2012    Narrative:      The following orders were created for panel order CBC & Differential.  Procedure                               Abnormality         Status                     ---------                               -----------         ------                     CBC Auto Differential[[id number]]        Abnormal            Final result                 Please view results for these tests on the individual orders.    Comprehensive Metabolic Panel [[id number]]  (Abnormal) Collected: 11/15/20 1957    Specimen: Blood Updated: 11/15/20 2034     Glucose 154 mg/dL      BUN 9 mg/dL      Creatinine 1.14 mg/dL      Sodium 141 mmol/L      Potassium 3.9 mmol/L      Chloride 102 mmol/L      CO2 30.1 mmol/L      Calcium 9.2 mg/dL      Total Protein 6.3 g/dL      Albumin 3.80 g/dL      ALT (SGPT) 18 U/L      AST (SGOT) 21 U/L      Alkaline Phosphatase 197 U/L      Total Bilirubin <0.2 mg/dL      eGFR Non African Amer 49 mL/min/1.73      Globulin 2.5 gm/dL      A/G Ratio 1.5 g/dL      BUN/Creatinine Ratio 7.9     Anion Gap 8.9 mmol/L     Narrative:      GFR Normal >60  Chronic Kidney Disease <60  Kidney Failure <15      Protime-INR [[id number]]  (Abnormal) Collected: 11/15/20 1957    Specimen: Blood Updated: 11/15/20 2026     Protime 11.8 Seconds      INR 0.89    aPTT [[id number]]  (Normal) Collected: 11/15/20 1957    Specimen: Blood Updated: 11/15/20 2026     PTT 30.9 seconds     Troponin [[id number]]  (Normal) Collected: 11/15/20 1957    Specimen: Blood Updated: 11/15/20 2037     Troponin T <0.010 ng/mL     Narrative:      Troponin T Reference Range:  <= 0.03 ng/mL-   Negative for AMI  >0.03 ng/mL-     Abnormal for myocardial necrosis.  Clinicians would have to utilize clinical acumen, EKG, Troponin and serial changes to determine if it is an Acute Myocardial Infarction or myocardial injury due to an underlying chronic condition.       Results  may be falsely decreased if patient taking Biotin.      BNP [[id number]]  (Normal) Collected: 11/15/20 1957    Specimen: Blood Updated: 11/15/20 2037     proBNP 125.3 pg/mL     Narrative:      Among patients with dyspnea, NT-proBNP is highly sensitive for the detection of acute congestive heart failure. In addition NT-proBNP of <300 pg/ml effectively rules out acute congestive heart failure with 99% negative predictive value.    Results may be falsely decreased if patient taking Biotin.      D-dimer, Quantitative [[id number]]  (Abnormal) Collected: 11/15/20 1957    Specimen: Blood Updated: 11/15/20 2026     D-Dimer, Quantitative 2.39 MCGFEU/mL     Narrative:      The [place]ago D-Dimer test used in conjunction with a clinical pretest probability (PTP) assessment model, has been approved by the FDA to rule out the presence of venous thromboembolism (VTE) in outpatients suspected of deep venous thrombosis (DVT) or pulmonary embolism (PE). The cut-off for negative predictive value is <0.50 MCGFEU/mL.    CBC Auto Differential [[id number]]  (Abnormal) Collected: 11/15/20 1957    Specimen: Blood Updated: 11/15/20 2012     WBC 7.54 10*3/mm3      RBC 4.65 10*6/mm3      Hemoglobin 10.5 g/dL      Hematocrit 35.1 %      MCV 75.5 fL      MCH 22.6 pg      MCHC 29.9 g/dL      RDW 16.1 %      RDW-SD 43.3 fl      MPV 10.3 fL      Platelets 211 10*3/mm3      Neutrophil % 68.1 %      Lymphocyte % 19.9 %      Monocyte % 7.8 %      Eosinophil % 3.4 %      Basophil % 0.5 %      Immature Grans % 0.3 %      Neutrophils, Absolute 5.13 10*3/mm3      Lymphocytes, Absolute 1.50 10*3/mm3      Monocytes, Absolute 0.59 10*3/mm3      Eosinophils, Absolute 0.26 10*3/mm3      Basophils, Absolute 0.04 10*3/mm3      Immature Grans, Absolute 0.02 10*3/mm3      nRBC 0.0 /100 WBC     COVID PRE-OP / PRE-PROCEDURE SCREENING ORDER (NO ISOLATION) - Swab, Nasopharynx [[id number]] Collected: 11/15/20 1959    Specimen: Swab from Nasopharynx Updated: 11/15/20 2017     Narrative:      The following orders were created for panel order COVID PRE-OP / PRE-PROCEDURE SCREENING ORDER (NO ISOLATION) - Swab, Nasopharynx.  Procedure                               Abnormality         Status                     ---------                               -----------         ------                     COVID-19,BIOTAP, NP/OP S...[[id number]]                      In process                   Please view results for these tests on the individual orders.    COVID-19,BIOTAP, NP/OP SWAB IN TRANSPORT MEDIA OR SALINE 24-36 HR TAT - Swab, Nasopharynx [[id number]] Collected: 11/15/20 1959    Specimen: Swab from Nasopharynx Updated: 11/15/20 2017          Imaging Results (Last 24 Hours)     Procedure Component Value Units Date/Time    CT Angiogram Chest [[id number]] Collected: 11/15/20 2301     Updated: 11/15/20 2316    Narrative:      CT ANGIOGRAM OF THE CHEST     HISTORY: Leg swelling and shortness of air. Elevated d-dimer.     COMPARISON: None available.     TECHNIQUE: Axial CT imaging was obtained through the thorax. IV contrast  was administered. Three-D reformatted images were obtained.     FINDINGS:  Examination is degraded by poor timing of the contrast bolus. No obvious  pulmonary thromboembolus is seen. The thoracic aorta is normal in  caliber. There is no evidence of dissection. There are really no  significant coronary artery calcifications. The thyroid gland, trachea,  and esophagus appear unremarkable. There is no pleural or pericardial  effusion. The advanced background emphysematous changes are seen. Areas  of chronic scarring are seen at the lung bases bilaterally. These have  progressed when compared to prior study. There is an area of nodularity  noted within the left lower lobe measuring up to 7 mm in size. This was  not apparent on the prior exam. Short-term CT follow-up in 3-6 months is  suggested. Consolidation within the right lower lobe has increased when  compared to prior exam, as has  bronchial wall thickening, and  nonspecific bronchitis is not excluded. Mediastinal lymph nodes do not  appear pathologically enlarged. Images through the upper abdomen  demonstrate persistent mild left-sided hydronephrosis. This was also  present on prior exam from 10/11/2019. Areas of cortical thinning are  noted within the right kidney, suggesting sequela prior insults.  Gastrostomy tube is present. There is a focal intimal flap involving the  distal celiac axis however, branch vessels appear patent. There is  dilatation of the distal celiac artery measuring up to 1 cm. 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             Impression:      1. No acute pulmonary thromboembolus seen.  2. Advanced emphysematous changes. Patient is noted to have bibasilar  scarring. Consolidation within the right lower lobe has increased when  compared to prior exam, and there is increasing bronchial wall  thickening within this area. FINDINGS may reflect nonspecific  bronchitis.  3. 7 mm noncalcified pulmonary nodule at the left lung base. CT  follow-up in 3-6 months is suggested.  Radiation dose reduction techniques were utilized, including automated  exposure control and exposure modulation based on body size.     This report was finalized on 11/15/2020 11:12 PM by Dr. Emilia Olivo M.D.       XR Chest 1 View [[id number]] Collected: 11/15/20 2059     Updated: 11/15/20 2103    Narrative:      SINGLE VIEW CHEST     HISTORY: Shortness of air     COMPARISON: 12/29/2019     FINDINGS:  Cardiomegaly is present. Vascular congestion is suspected. There is  tortuosity and ectasia of the thoracic aorta. No pneumothorax is seen.  There is some blunting of the costophrenic angles bilaterally. This may  represent atelectasis or scarring, although trace effusions are not  excluded.       Impression:      Cardiomegaly with vascular congestion.     This report was finalized on 11/15/2020 9:00 PM by   Emilia Olivo M.D.             Results for orders placed during the hospital encounter of 09/21/19   Adult Transthoracic Echo Complete W/ Cont if Necessary Per Protocol    Narrative · Left ventricular systolic function is normal. Calculated EF = 64%.   Estimated EF was in disagreement with the calculated EF. Estimated EF =   55%. Normal left ventricular cavity size and wall thickness noted. All   left ventricular wall segments contract normally. Left ventricular   diastolic function is normal.          ECG 12 Lead   Final Result   HEART RATE= 78  bpm   RR Interval= 764  ms   OH Interval= 160  ms   P Horizontal Axis= -45  deg   P Front Axis= 31  deg   QRSD Interval= 84  ms   QT Interval= 386  ms   QRS Axis= 8  deg   T Wave Axis= 44  deg   - BORDERLINE ECG -   Sinus rhythm   Probable left atrial enlargement   When compared with ECG of 29-Dec-2019 18:09:18,   No significant change   Electronically Signed By: Tyler Reynolds ([facility]) 15-Nov-2020 21:00:49   Date and Time of Study: 2020-11-15 20:23:24           Assessment/Plan     Active Hospital Problems    Diagnosis  POA   • **Acute deep vein thrombosis (DVT) of proximal vein of left lower extremity (CMS/[facility]) [I82.4Y2]  Yes   • Type 2 diabetes mellitus with hyperglycemia, with long-term current use of insulin (CMS/[facility]) [E11.65, Z79.4]  Not Applicable   • Elevated d-dimer [R79.89]  Yes   • Essential hypertension [I10]  Yes   • Peripheral artery disease (CMS/[facility]) [I73.9]  Yes   • COPD (chronic obstructive pulmonary disease) (CMS/[facility]) [J44.9]  Yes   • Chronic diastolic CHF (congestive heart failure) (CMS/[facility]) [I50.32]  Yes   • Chronic respiratory failure with hypoxia (CMS/[facility]) [J96.11]  Yes   • Stage 3 chronic kidney disease [N18.30]  Yes      Resolved Hospital Problems   No resolved problems to display.       Ms. Luis is a 60 y.o. smoker with a history of hypertension, PAC, COPD, chronic diastolic CHF, chronic respiratory failure with hypoxia, and CKD stage III who  presents with left leg pain and swelling.    Lower left leg pain  -D-dimer 2.39, CTA of chest negative for PE  -Lower extremity Dopplers in a.m.  -Continue Lovenox therapy  -Pain management    Chronic respiratory failure with hypoxia  -Continue supplemental O2 at 4 L  -Monitor O2 status closely    CKD stage III  -Creat 1.14, eGFR 49, baseline creat 1.10  -Avoid nephrotoxins  -Trend BMP    Type 2 diabetes mellitus  -Accu-Cheks 4 times a day with meals and at bedtime  -Moderate dose correctional factor with hypoglycemic protocol  -Last hemoglobin A1c 9.3 on 9/28/2020  -Hold oral diabetic medication  -Continue basal insulin    COPD  -No signs of exacerbation on exam  -Resume home regimen    Chronic diastolic CHF  -No signs of fluid overload on exam  -Daily weights  -Monitor fluid status closely  -Resume home medications    Essential hypertension  -Stable, resume home regimen      I discussed the patient's findings and my recommendations with patient, ED provider and Dr. Bocanegra.    VTE Prophylaxis - Lovenox   Code Status - Full code.       AMINATA Mccormick  [facility]  11/15/20  23:53 EST

## 2020-11-16 NOTE — PROGRESS NOTES
Name: Swetha Luis ADMIT: 11/15/2020   : 1960  PCP: Provider, No Known    MRN: [id number] LOS: 1 days   AGE/SEX: 60 y.o. female  ROOM: 50/50     Subjective   Subjective     Complains of continued left lower extremity pain.  Denies any other complaints.  Denies any history of DVT.  Denies recent air travel.  No known recent contacts with COVID-19.  Does report chronic sedentary lifestyle.  No shortness of breath or chest pain.  Just quit smoking 3 months ago.  Nursing denies further concerns.         Objective   Objective   Vital Signs  Temp:  [98.4 °F (36.9 °C)] 98.4 °F (36.9 °C)  Heart Rate:  [76-96] 76  Resp:  [18-20] 18  BP: (120-144)/(67-80) 142/76  SpO2:  [96 %-100 %] 100 %  on  Flow (L/min):  [1-10] 1;   Device (Oxygen Therapy): nasal cannula  Body mass index is 29.18 kg/m².  Physical Exam  Constitutional:       General: She is not in acute distress.     Appearance: She is obese. She is ill-appearing. She is not diaphoretic.   HENT:      Head: Normocephalic and atraumatic.      Nose: Nose normal.      Mouth/Throat:      Mouth: Mucous membranes are moist.   Eyes:      Extraocular Movements: Extraocular movements intact.      Conjunctiva/sclera: Conjunctivae normal.   Neck:      Musculoskeletal: Normal range of motion.   Cardiovascular:      Rate and Rhythm: Normal rate and regular rhythm.      Pulses: Normal pulses.      Heart sounds: Normal heart sounds.   Pulmonary:      Effort: Pulmonary effort is normal. No respiratory distress.      Comments: Wheezes bilateral lower lobes. Rhonchi bilateral upper lobes. Audible rhonchi. Tracheal stoma without device, clear sputum production.   Wearing 2  Liters oxygen via NC, sats 92-93%  Abdominal:      General: Bowel sounds are normal.      Palpations: Abdomen is soft.   Musculoskeletal: Normal range of motion.      Comments: General weakness. Moderate edema LLE, some tenderness.    Skin:     General: Skin is warm and dry.      Capillary Refill: Capillary  refill takes less than 2 seconds.   Neurological:      General: No focal deficit present.      Mental Status: She is alert and oriented to person, place, and time.   Psychiatric:         Mood and Affect: Mood normal.         Results Review     I reviewed the patient's new clinical results.  Results from last 7 days   Lab Units 11/16/20  0704 11/15/20  1957   WBC 10*3/mm3 6.80 7.54   HEMOGLOBIN g/dL 10.3* 10.5*   PLATELETS 10*3/mm3 218 211     Results from last 7 days   Lab Units 11/16/20  0704 11/15/20  1957   SODIUM mmol/L 142 141   POTASSIUM mmol/L 3.5 3.9   CHLORIDE mmol/L 104 102   CO2 mmol/L 28.2 30.1*   BUN mg/dL 7* 9   CREATININE mg/dL 0.98 1.14*   GLUCOSE mg/dL 107* 154*   Estimated Creatinine Clearance: 61.4 mL/min (by C-G formula based on SCr of 0.98 mg/dL).  Results from last 7 days   Lab Units 11/15/20  1957   ALBUMIN g/dL 3.80   BILIRUBIN mg/dL <0.2   ALK PHOS U/L 197*   AST (SGOT) U/L 21   ALT (SGPT) U/L 18     Results from last 7 days   Lab Units 11/16/20  0704 11/15/20  1957   CALCIUM mg/dL 9.4 9.2   ALBUMIN g/dL  --  3.80     No results found for: COVID19  Glucose   Date/Time Value Ref Range Status   11/16/2020 0919 105 70 - 130 mg/dL Final       Duplex Venous Lower Extremity - Left CAR  · Acute left lower extremity deep vein thrombosis noted in the external   iliac, common femoral, deep femoral, proximal femoral, mid femoral, distal   femoral, popliteal, posterial tibial, peroneal, gastrocnemius and soleal.  · Acute left lower extremity superficial thrombophlebitis noted in the   saphenofemoral junction.  · Chronic left lower extremity superficial thrombophlebitis noted in the   small saphenous.  · All other left sided veins appeared normal.       Scheduled Medications  amLODIPine, 5 mg, Oral, Q24H  aspirin, 81 mg, Oral, Daily  budesonide, 0.5 mg, Nebulization, BID  busPIRone, 5 mg, Oral, TID  clotrimazole-betamethasone, , Topical, Q12H  enoxaparin, 1 mg/kg, Subcutaneous, Q12H  escitalopram, 20 mg,  Oral, Daily  gabapentin, 300 mg, Oral, TID  insulin glargine, 5 Units, Subcutaneous, BID  insulin lispro, 0-9 Units, Subcutaneous, TID AC  ipratropium-albuterol, 3 mL, Nebulization, 4x Daily - RT  metoprolol succinate XL, 25 mg, Oral, Daily  multivitamin with minerals, 1 tablet, Oral, Daily  pantoprazole, 40 mg, Oral, QAM  pramipexole, 0.25 mg, Oral, Nightly  predniSONE, 60 mg, Oral, Daily  thiamine, 100 mg, Oral, Daily    Infusions   Diet  Diet Regular; Consistent Carbohydrate       Assessment/Plan     Active Hospital Problems    Diagnosis  POA   • **Acute deep vein thrombosis (DVT) of proximal vein of left lower extremity (CMS/[facility]) [I82.4Y2]  Yes   • Type 2 diabetes mellitus with hyperglycemia, with long-term current use of insulin (CMS/[facility]) [E11.65, Z79.4]  Not Applicable   • Elevated d-dimer [R79.89]  Yes   • Essential hypertension [I10]  Yes   • Peripheral artery disease (CMS/[facility]) [I73.9]  Yes   • COPD (chronic obstructive pulmonary disease) (CMS/[facility]) [J44.9]  Yes   • Chronic diastolic CHF (congestive heart failure) (CMS/[facility]) [I50.32]  Yes   • Chronic respiratory failure with hypoxia (CMS/[facility]) [J96.11]  Yes   • Stage 3 chronic kidney disease [N18.30]  Yes      Resolved Hospital Problems   No resolved problems to display.       60 y.o. female admitted with Acute deep vein thrombosis (DVT) of proximal vein of left lower extremity (CMS/[facility]).    DVT LLE  -D-dimer 2.39, CTA of chest negative for PE  -Lower extremity Dopplers positive for acute left lower extremity DVT and external iliac, common femoral, deep femoral, proximal femoral, mid femoral, distal femoral, popliteal, posterior tibial, peroneal gastrocnemeius and soleal.  Also some chronic left lower extremity superficial thrombophlebitis.  -Will stop Lovenox every 12 hours and start IV heparin drip for DVT treatment, considering extensive DVT of LLE.  Last dose of Lovenox was last night at 2154, will start heparin stat.  -Consult vascular surgery  -Monitor  for bleeding  -Pain management     Chronic respiratory failure with hypoxia/COPD  -Wearing 2 L nasal cannula, sats 92 to 93%.  Some CO2 retention, PCO2 57.8, improved from previous.  Will ask nursing to continue to wean to keep sats 88 to 92%.  Not in any distress currently.  Some adventitious lung sounds.  She recently quit smoking.  Pulmonary following.  -Monitor O2 status closely  -No signs of COPD exacerbation on exam  -Resume home COPD regimen     CKD stage III  -Creat 0.98, eGFR 58, baseline creat 1.10  -Avoid nephrotoxins  -Trend BMP     Type 2 diabetes mellitus  -Accu-Cheks 4 times a day with meals and at bedtime  -Moderate dose correctional factor with hypoglycemic protocol  -Last hemoglobin A1c 9.3 on 9/28/2020  -Hold oral diabetic medication  -Continue basal insulin  -Blood glucose readings stable.     Chronic diastolic CHF  -No signs of fluid overload on exam  -Daily weights  -Monitor fluid status closely  -Resume home medications     Essential hypertension  -Stable, resume home regimen      AMINATA Bhakta  [facility]  11/16/20  10:00 EST    Patient was placed in face mask on first look.  I wore protective equipment throughout this patient encounter including a face mask, gloves and protective eyewear.  Hand hygiene was performed before donning protective equipment and after removal when leaving the room.

## 2020-11-16 NOTE — CONSULTS
"[facility]    Reason for consult: acute hypoxemic and hypercapnic respiratory failure    HPI:  Mrs. Luis is a 61yo wF with copd and tracheostomy.  She was admitted through the ER today with left leg pain and swellling since yesterday.  She was admitted here and had a CT angio chest showing no acute pe or infiltrate.  She was noted to have a drop in oxygen saturation and she was placed on oxygen.  She placed her on a nonrebreather and oxygen saturations improved, but then patient became less responsive and a blood gas was ordered showing 7.26/74/307.  I was called to see patient stat and ordered patient be placed on bipap.  When I arrived to see patient she was awake in bed and told me she had pain in her leg but denies any increased shortness of breath.  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  She is still has sats 99% and I have asked that her oxygen be weaned down to keep sats 88-92% and we will repeat a gas again in an hour.    Past Medical History:   Diagnosis Date   • Acute congestive heart failure (CMS/HCC) 12/24/2018   • Acute osteomyelitis (CMS/HCC)     Right shoulder due to IVDA   • Acute renal failure on dialysis (CMS/HCC)    • Anxiety    • Pugh esophagus    • CKD (chronic kidney disease)    • COPD (chronic obstructive pulmonary disease) (CMS/HCC)    • MRSA infection    • Nontraumatic subarachnoid hemorrhage (CMS/HCC)    • Seizures (CMS/HCC)    • Tracheostomy present (CMS/HCC)      Social History     Socioeconomic History   • Marital status:      Spouse name: Not on file   • Number of children: Not on file   • Years of education: Not on file   • Highest education level: Not on file   Tobacco Use   • Smoking status: Current Every Day Smoker     Packs/day: 0.50     Types: Cigarettes   • Smokeless tobacco: Never Used   • Tobacco comment: \"1 CIGARETTE A DAY\"   Substance and Sexual Activity   • Alcohol " use: No     Frequency: Never   • Drug use: Yes     Types: Cocaine(coke)     Comment: 20 years ago occasional   • Sexual activity: Defer     Family History   Problem Relation Age of Onset   • Diabetes Mother    • Hypertension Mother      MEDS: reviewed as per chart  ALL: list of 4 as per chart  ROS: 12 point negative except as in HPI    Vital Sign Min/Max for last 24 hours  Temp  Min: 98.4 °F (36.9 °C)  Max: 98.4 °F (36.9 °C)   BP  Min: 120/67  Max: 144/79   Pulse  Min: 76  Max: 96   Resp  Min: 18  Max: 20   SpO2  Min: 96 %  Max: 100 %   Flow (L/min)  Min: 1  Max: 10   Weight  Min: 77.1 kg (169 lb 15.6 oz)  Max: 77.1 kg (170 lb)     GEN:   appears ill, obese, AxOx3  HEENT: PERRL, EOMI, no icterus, mmm, no jvd, trachea midline, neck supple --fistula present at site of prior tracheostomy  CHEST: decreased ae, slightly coarse bilat, no wheezes, no crackles, no use of accessory muscles  CV: RRR, no m/g/r  ABD: soft, nt, nd +bs, no hepatosplenomegaly  EXT: no c/c/ +edema le  SKIN: no rashes, no xanthomas, nl turgor, warm, dry  LYMPH: no palpable cervical or supraclavicular lymphadenopathy  NEURO: CN 2-12 intact and symmetric bilaterally  PSYCH: nl affect, nl orientation, nl judgement, nl mood  MSK: no kyphoscoliosis, 5/5 strength ue and le bilaterally    Labs: 11/16: reviewed:  7.26/74/307 on non rebreather  Glucose 154  Bun 9  Cr 1.14  Bicarb 30  inr 0.89  Trop 0.01  bnp 125  D dimer 2.4  Wbc 7.5  hgb 10.5  plts 211    Ct chest: reviewed: some atelectasis, emphysema, 7mm non calcified nodule    A/P:  1. Acute hypoxemic and hypercapnic respiratory failure -- avoid hypreoxia, she is currently acting normally, so will plan repeat gas here shortly.  Should she have continued uncomopensated hypercapnia, will have to consider dilation of her stoma and placement of tracheostomy or attempt pap therapy and try and cover stoma.  2. COPD -- no acute exacerbation -- would continue bronchodilators.  3. Obesity -- likely melissa  4.  Anemia  5. Possible dvt -- on anticoagulation as per lha  6. 7mm noncalcified lung nodule -- repeat ct chest in 6 months time.    Discussed with RT    Patient's acute hypercapnic failure was critical issue requiring immediate attention and put her at risk for deterioration    CC 35 mins.

## 2020-11-16 NOTE — CONSULTS
Patient Name: Swetha Luis Account #: [id number]    MRN: [id number] Admission Date: 11/15/2020      Consulting Service: Vascular Surgery Date of Evaluation: November 16, 2020    Requesting Provider: JESSE    CHIEF COMPLAINT: Left leg blood clot  HPI: Swetha Luis is a 60 y.o. female whose past medical record I have reviewed and summarize below in addition to the findings from today's exam.   The patient is being seen for a consultation and evaluation/management of DVT of the left lower extremity from the external iliac vein to the calf veins, including the saphenofemoral junction.  She states she first noticed some swelling in the left leg about 1 week ago.  Couple days later she began noticing pain.  Both increased which brought her to the hospital.  Since she has been on the heparin drip she states that both are somewhat better.  She describes her pain as moderate although when I walked into the room she was sitting on the bed with her legs dependent playing games on her mobile phone.    Patient denies any previous history of DVT or superficial thrombophlebitis.  No history of bleeding disorder, blood dyscrasia, or hypercoagulable state.  Only potential provoking factor would be that she has been rather immobile recently.  There is a family history of DVT with pulmonary embolism in her mother.  The patient has a history of COPD and had previous tracheostomy which is closing.  Also history of congestive heart failure.    PAST MEDICAL HISTORY:   Past Medical History:   Diagnosis Date   • Acute congestive heart failure (CMS/HCC) 12/24/2018   • Acute osteomyelitis (CMS/[facility])     Right shoulder due to IVDA   • Acute renal failure on dialysis (CMS/[facility])    • Anxiety    • Pugh esophagus    • CKD (chronic kidney disease)    • COPD (chronic obstructive pulmonary disease) (CMS/HCC)    • MRSA infection    • Nontraumatic subarachnoid hemorrhage (CMS/HCC)    • Seizures (CMS/HCC)    • Tracheostomy present (CMS/[facility])       PAST  "SURGICAL HISTORY:   Past Surgical History:   Procedure Laterality Date   • TRACHEOSTOMY     • URETEROSCOPY LASER LITHOTRIPSY WITH STENT INSERTION Left 10/11/2019    Procedure: CYSTOSCOPY,  URETEROSCOPY, LEFT RETROGRADE, LEFT PYELOGRAM, LASER LITHOTRIPSY, PLACEMENT OF STENT.;  Surgeon: Clyde Selby MD;  Location: [facility];  Service: Urology      FAMILY HISTORY:   Family History   Problem Relation Age of Onset   • Diabetes Mother    • Hypertension Mother       SOCIAL HISTORY:   Social History     Tobacco Use   • Smoking status: Current Every Day Smoker     Packs/day: 0.50     Types: Cigarettes   • Smokeless tobacco: Never Used   • Tobacco comment: \"1 CIGARETTE A DAY\"   Substance Use Topics   • Alcohol use: No     Frequency: Never   • Drug use: Yes     Types: Cocaine(coke)     Comment: 20 years ago occasional      MEDICATIONS:   No current facility-administered medications on file prior to encounter.      Current Outpatient Medications on File Prior to Encounter   Medication Sig Dispense Refill   • acetaminophen (TYLENOL) 325 MG tablet Take 2 tablets by mouth Every 4 (Four) Hours As Needed for Mild Pain .     • Alcohol Swabs 70 % pads Inject 1 each under the skin into the appropriate area as directed 4 (Four) Times a Day.     • amLODIPine (NORVASC) 5 MG tablet Take 1 tablet by mouth Daily. 30 tablet 0   • Ascorbic Acid 500 MG capsule Take  by mouth.     • aspirin 81 MG chewable tablet Chew 1 tablet Daily.     • Blood Glucose Monitoring Suppl (FreeStyle Lite) device Inject 1 each under the skin into the appropriate area as directed 4 (Four) Times a Day.     • budesonide (PULMICORT) 0.5 MG/2ML nebulizer solution Inhale 2 mL by nebulization via trach 2 (Two) Times a Day. 120 mL 0   • busPIRone (BUSPAR) 5 MG tablet Take 1 tablet by mouth 3 (Three) times a day. 90 tablet 0   • clotrimazole-betamethasone (LOTRISONE) 1-0.05 % cream Apply  topically to the appropriate area as directed Every 12 (Twelve) Hours. " Apply to perivaginal area and perineum after washing. 15 g 0   • DULoxetine HCl (DRIZALMA) 60 MG capsule Take 50 mg by mouth 2 (Two) Times a Day.     • escitalopram (LEXAPRO) 20 MG tablet Take 1 tablet by mouth daily. 30 tablet 0   • gabapentin (NEURONTIN) 300 MG capsule Take 1 capsule by mouth 3 (Three) Times a Day. 9 capsule 0   • glucose blood (FREESTYLE TEST STRIPS) test strip 1 strip by Other route 4 (Four) Times a Day.     • insulin glargine (LANTUS) 100 UNIT/ML injection Inject 5 Units under the skin into the appropriate area as directed 2 (two) times a day.     • Insulin Glulisine (Apidra SoloStar) 100 UNIT/ML solution pen-injector Inject 5 Units under the skin into the appropriate area as directed 3 (Three) Times a Day.     • ipratropium-albuterol (DUO-NEB) 0.5-2.5 mg/3 ml nebulizer Inhale 3 mL by nebulization every 4 (Four) hours as needed for wheezing. 360 mL 0   • metFORMIN (GLUCOPHAGE) 500 MG tablet Take 500 mg by mouth 2 (Two) Times a Day With Meals.     • metoprolol succinate XL (TOPROL-XL) 25 MG 24 hr tablet Take 1 tablet by mouth Daily. 30 tablet 0   • MULTIPLE VITAMINS-MINERALS PO Take  by mouth.     • omeprazole (priLOSEC) 40 MG capsule Take 40 mg by mouth Daily.     • oxyCODONE-acetaminophen (PERCOCET) 5-325 MG per tablet Take 1 tablet by mouth Every 6 (Six) Hours As Needed for Moderate Pain . (Patient taking differently: Take 1 tablet by mouth Every 4 (Four) Hours As Needed for Moderate Pain .) 12 tablet 0   • pramipexole (MIRAPEX) 0.25 MG tablet Take 1 tablet by mouth Every Night. 30 tablet 0   • predniSONE (DELTASONE) 20 MG tablet Take 3 tablets by mouth Daily. 9 tablet 0   • thiamine (VITAMIN B-1) 100 MG tablet Take 1 tablet by mouth Daily. 30 tablet 0             ALLERGIES: Cephalexin, Hydrocodone, Strawberry, and Zithromax [azithromycin]   COMPLETE REVIEW OF SYSTEMS:     Review of Systems:   10 system review of system performed.  Pertinent positives are as described above.  Remaining review  of systems have been reviewed and are negative.      Vital Signs  Temp:  [98.4 °F (36.9 °C)-98.5 °F (36.9 °C)] 98.4 °F (36.9 °C)  Heart Rate:  [76-96] 76  Resp:  [16-20] 18  BP: (120-144)/(49-80) 121/49    Physical Exam: Well-developed mildly obese female no acute distress awake, alert, and oriented x3.  HEENT: Normocephalic and atraumatic.  Extraocular movements intact.  Sclera nonicteric.  Neck: Supple, full range of motion, no JVD.  There is a tracheostomy which is nearly closed.  Chest: Equal bilateral expansion and symmetrical.  Unlabored breathing.  Scattered rhonchi noted throughout both lung fields.  Abdomen: Soft, nontender, protuberant.  No masses palpated.  Bowel sounds are present.  Neuro: Grossly intact without focal deficit.  Extremities no swelling involving the right lower extremity.  There is absence of the first 2 toes on the right from previous amputation.  These are well-healed.  There is trace to 1+ edema of the right leg below the knee.  The calf measures 2-1/2 cm larger on the left than the right.  Modest swelling in the thigh as well.  No calf tenderness or Homans' sign.  There is marked venous congestion of the left lower leg and foot.    Vascular Exam: 2+ palpable brachial and radial pulses with 2+ palpable femoral pulses.  There are only trace palpable popliteal pulses with and no palpable pulses at the ankle bilaterally.      LABS:      Results Review:       I reviewed the patient's new clinical results.  Results from last 7 days   Lab Units 11/16/20  0704 11/15/20  1957   WBC 10*3/mm3 7.01  6.80 7.54   HEMOGLOBIN g/dL 10.6*  10.3* 10.5*   PLATELETS 10*3/mm3 225  218 211     Results from last 7 days   Lab Units 11/16/20  0704 11/15/20  1957   SODIUM mmol/L 142 141   POTASSIUM mmol/L 3.5 3.9   CHLORIDE mmol/L 104 102   CO2 mmol/L 28.2 30.1*   BUN mg/dL 7* 9   CREATININE mg/dL 0.98 1.14*   GLUCOSE mg/dL 107* 154*   Estimated Creatinine Clearance: 61.4 mL/min (by C-G formula based on SCr  of 0.98 mg/dL).  Results from last 7 days   Lab Units 11/16/20  0704 11/15/20  1957   CALCIUM mg/dL 9.4 9.2   ALBUMIN g/dL  --  3.80     Results from last 7 days   Lab Units 11/16/20  1151 11/15/20  1957   PROTIME Seconds 12.3 11.8   INR  0.94 0.89*       The following radiologic or non-invasive studies have been reviewed by me: Vascular lab studies performed on 11/16/2020    Active Hospital Problems    Diagnosis  POA   • **Acute deep vein thrombosis (DVT) of proximal vein of left lower extremity (CMS/[facility]) [I82.4Y2]  Yes   • Type 2 diabetes mellitus with hyperglycemia, with long-term current use of insulin (CMS/[facility]) [E11.65, Z79.4]  Not Applicable   • Elevated d-dimer [R79.89]  Yes   • Essential hypertension [I10]  Yes   • Peripheral artery disease (CMS/[facility]) [I73.9]  Yes   • COPD (chronic obstructive pulmonary disease) (CMS/[facility]) [J44.9]  Yes   • Chronic diastolic CHF (congestive heart failure) (CMS/[facility]) [I50.32]  Yes   • Chronic respiratory failure with hypoxia (CMS/[facility]) [J96.11]  Yes   • Stage 3 chronic kidney disease [N18.30]  Yes      Resolved Hospital Problems   No resolved problems to display.        Problem Points:  3:  Patient has a new problem, with no additional work-up planned (max of 1)  Total problem points:3    Data Points:  2:  I have personally and independently review of image, tracing, or specimen  2:  I have reviewed and summation of old records and/or discussed the patients care with another health care provider  Total data points:4 or more    Risk Points:  Moderate: New diagnosis with unknown prognosis    MDM Prob point Data point Risk   SF 1 1 Minimal   Low 2 2 Low   Mod 3 3 Moderate   High 4 4 High     Code MDM History Exam Time   28782 SF/Low Detailed Detailed 30   07708 Mod Comprehensive Comprehensive 50   83927 High Comprehensive Comprehensive 70     Detailed history:  4 elements HPI or status of 3 chronic problems; 2-9 system ROS  Comprehensive:  4 elements HPI or status of 3 chronic  problems;  10 system ROS    Detailed Exam:  12 findings from any organ system  Comprehensive Exam:  2 findings from each of 9 systems.     [place]       ASSESSMENT/PLAN: 60 y.o. female with acute DVT of the left lower extremity from the external iliac vein down into the calf veins.  She is minimally symptomatic with this, i.e., mild swelling and mild pain.  She understands that anticoagulation is the mainstay of therapy, along with compression.  She is on a heparin drip and this will be continued.  I recommend proceeding to oral anticoagulants.  Will defer that to the medical service.   She will be placed in a compression stocking.  Her only real provoking reason would be her immobility.  CT scan of the abdomen and pelvis from just over 1 year ago was unremarkable with no findings of malignancy.    We will see in the office in follow-up in 2 months with a left lower extremity venous scan.     Edison Del Valle Jr., MD   20

## 2020-11-16 NOTE — ED PROVIDER NOTES
EMERGENCY DEPARTMENT ENCOUNTER    Room Number:  50/50  Date of encounter:  11/16/2020  PCP: Provider, No Known  Historian: Patient      HPI:  Chief Complaint: Left leg pain  A complete HPI/ROS/PMH/PSH/SH/FH are unobtainable due to: Very poor historian    Context: Swetha Luis is a 60 y.o. female who presents to the ED c/o moderate left lower leg pain and swelling for the last 2 days.  Patient states she noticed it a couple days ago and its become more swollen and more painful.  The pain is roughly from the knee down, does not radiate, and nothing makes it better or worse.    Patient states she has a history of DVT, but is not really able to give details about the circumstances surrounding that.  She is not anticoagulated currently.  She does not complain of chest pain, and she is pretty equivocal regarding shortness of breath.  She does not feel that she is had a fever.      PAST MEDICAL HISTORY  Active Ambulatory Problems     Diagnosis Date Noted   • Tracheostomy complication (CMS/[facility]) 12/18/2018   • Gangrene of toe (CMS/[facility]) 12/20/2018   • Acute diastolic congestive heart failure (CMS/[facility]) 12/24/2018   • ARF (acute renal failure) (CMS/[facility]) 12/24/2018   • Stage 3 chronic kidney disease 12/24/2018   • Elevated troponin 12/24/2018   • Acute respiratory failure with hypoxemia (CMS/[facility]) 12/24/2018   • Acute osteomyelitis (CMS/[facility]) 12/26/2018   • UTI due to extended-spectrum beta lactamase (ESBL) producing Escherichia coli 12/27/2018   • Thrush, oral 12/28/2018   • Tracheostomy malfunction (CMS/[facility]) 07/10/2019   • COPD (chronic obstructive pulmonary disease) (CMS/[facility]) 07/10/2019   • Chronic respiratory failure with hypoxia (CMS/HCC) 07/10/2019   • PEG (percutaneous endoscopic gastrostomy) status (CMS/HCC) 07/10/2019   • History of osteomyelitis 07/10/2019   • Polysubstance abuse (CMS/[facility]) 07/10/2019   • History of tracheal stenosis 07/10/2019   • Chronic diastolic CHF (congestive heart failure) (CMS/[facility]) 07/10/2019   •  Peripheral artery disease (CMS/HCC) 07/10/2019   • Medically noncompliant 07/10/2019   • WENDI and COPD overlap syndrome (CMS/HCC) 07/10/2019   • Dyspnea 09/22/2019   • Elevated LFTs 09/22/2019   • Elevated troponin 09/22/2019   • Tracheostomy present (CMS/HCC) 09/22/2019   • Essential hypertension 09/22/2019   • Dysphagia 09/22/2019   • Healthcare associated bacterial pneumonia 10/04/2019   • Infection due to ESBL-producing Escherichia coli 10/06/2019   • Sepsis due to Gram negative bacteria (CMS/HCC) 10/06/2019   • Ureteral stone with hydronephrosis 10/08/2019   • UTI (urinary tract infection) 10/08/2019   • Acute tracheobronchitis 10/19/2019   • Chronic diastolic CHF (congestive heart failure) (CMS/HCC) 10/19/2019   • Bacteremia 10/19/2019     Resolved Ambulatory Problems     Diagnosis Date Noted   • No Resolved Ambulatory Problems     Past Medical History:   Diagnosis Date   • Acute congestive heart failure (CMS/HCC) 12/24/2018   • Acute renal failure on dialysis (CMS/HCC)    • Anxiety    • Pugh esophagus    • CKD (chronic kidney disease)    • MRSA infection    • Nontraumatic subarachnoid hemorrhage (CMS/HCC)    • Seizures (CMS/HCC)          PAST SURGICAL HISTORY  Past Surgical History:   Procedure Laterality Date   • TRACHEOSTOMY     • URETEROSCOPY LASER LITHOTRIPSY WITH STENT INSERTION Left 10/11/2019    Procedure: CYSTOSCOPY,  URETEROSCOPY, LEFT RETROGRADE, LEFT PYELOGRAM, LASER LITHOTRIPSY, PLACEMENT OF STENT.;  Surgeon: Clyde Selby MD;  Location: [facility];  Service: Urology         FAMILY HISTORY  Family History   Problem Relation Age of Onset   • Diabetes Mother    • Hypertension Mother          SOCIAL HISTORY  Social History     Socioeconomic History   • Marital status:      Spouse name: Not on file   • Number of children: Not on file   • Years of education: Not on file   • Highest education level: Not on file   Tobacco Use   • Smoking status: Current Every Day Smoker     Packs/day:  "0.50     Types: Cigarettes   • Smokeless tobacco: Never Used   • Tobacco comment: \"1 CIGARETTE A DAY\"   Substance and Sexual Activity   • Alcohol use: No     Frequency: Never   • Drug use: Yes     Types: Cocaine(coke)     Comment: 20 years ago occasional   • Sexual activity: Defer         ALLERGIES  Cephalexin, Hydrocodone, Strawberry, and Zithromax [azithromycin]        REVIEW OF SYSTEMS  Review of Systems   Limited due to poor historian      PHYSICAL EXAM    I have reviewed the triage vital signs and nursing notes.    ED Triage Vitals [11/15/20 1923]   Temp Heart Rate Resp BP SpO2   98.4 °F (36.9 °C) 90 20 130/80 98 %      Temp src Heart Rate Source Patient Position BP Location FiO2 (%)   Tympanic Monitor -- -- --       Physical Exam  GENERAL: Wake and alert, nontoxic.  Patient has a tracheostomy and is very difficult for her to communicate  HENT: nares patent  EYES: no scleral icterus  CV: regular rhythm, regular rate  RESPIRATORY: normal effort, CTA bilaterally, no increased effort  ABDOMEN: soft  MUSCULOSKELETAL: no deformity, there is moderate swelling of the left lower leg from the knee to the dorsum of the foot.  There is tenderness medially along with 1+ pitting edema.  Distal pulses are intact, there is no erythema or warmth, and there is no palpable cord  NEURO: alert, moves all extremities, follows commands  SKIN: warm, dry        LAB RESULTS  Recent Results (from the past 24 hour(s))   Comprehensive Metabolic Panel    Collection Time: 11/15/20  7:57 PM    Specimen: Blood   Result Value Ref Range    Glucose 154 (H) 65 - 99 mg/dL    BUN 9 8 - 23 mg/dL    Creatinine 1.14 (H) 0.57 - 1.00 mg/dL    Sodium 141 136 - 145 mmol/L    Potassium 3.9 3.5 - 5.2 mmol/L    Chloride 102 98 - 107 mmol/L    CO2 30.1 (H) 22.0 - 29.0 mmol/L    Calcium 9.2 8.6 - 10.5 mg/dL    Total Protein 6.3 6.0 - 8.5 g/dL    Albumin 3.80 3.50 - 5.20 g/dL    ALT (SGPT) 18 1 - 33 U/L    AST (SGOT) 21 1 - 32 U/L    Alkaline Phosphatase 197 " (H) 39 - 117 U/L    Total Bilirubin <0.2 0.0 - 1.2 mg/dL    eGFR Non African Amer 49 (L) >60 mL/min/1.73    Globulin 2.5 gm/dL    A/G Ratio 1.5 g/dL    BUN/Creatinine Ratio 7.9 7.0 - 25.0    Anion Gap 8.9 5.0 - 15.0 mmol/L   Protime-INR    Collection Time: 11/15/20  7:57 PM    Specimen: Blood   Result Value Ref Range    Protime 11.8 11.7 - 14.2 Seconds    INR 0.89 (L) 0.90 - 1.10   aPTT    Collection Time: 11/15/20  7:57 PM    Specimen: Blood   Result Value Ref Range    PTT 30.9 22.7 - 35.4 seconds   Troponin    Collection Time: 11/15/20  7:57 PM    Specimen: Blood   Result Value Ref Range    Troponin T <0.010 0.000 - 0.030 ng/mL   BNP    Collection Time: 11/15/20  7:57 PM    Specimen: Blood   Result Value Ref Range    proBNP 125.3 0.0 - 900.0 pg/mL   D-dimer, Quantitative    Collection Time: 11/15/20  7:57 PM    Specimen: Blood   Result Value Ref Range    D-Dimer, Quantitative 2.39 (H) 0.00 - 0.49 MCGFEU/mL   CBC Auto Differential    Collection Time: 11/15/20  7:57 PM    Specimen: Blood   Result Value Ref Range    WBC 7.54 3.40 - 10.80 10*3/mm3    RBC 4.65 3.77 - 5.28 10*6/mm3    Hemoglobin 10.5 (L) 12.0 - 15.9 g/dL    Hematocrit 35.1 34.0 - 46.6 %    MCV 75.5 (L) 79.0 - 97.0 fL    MCH 22.6 (L) 26.6 - 33.0 pg    MCHC 29.9 (L) 31.5 - 35.7 g/dL    RDW 16.1 (H) 12.3 - 15.4 %    RDW-SD 43.3 37.0 - 54.0 fl    MPV 10.3 6.0 - 12.0 fL    Platelets 211 140 - 450 10*3/mm3    Neutrophil % 68.1 42.7 - 76.0 %    Lymphocyte % 19.9 19.6 - 45.3 %    Monocyte % 7.8 5.0 - 12.0 %    Eosinophil % 3.4 0.3 - 6.2 %    Basophil % 0.5 0.0 - 1.5 %    Immature Grans % 0.3 0.0 - 0.5 %    Neutrophils, Absolute 5.13 1.70 - 7.00 10*3/mm3    Lymphocytes, Absolute 1.50 0.70 - 3.10 10*3/mm3    Monocytes, Absolute 0.59 0.10 - 0.90 10*3/mm3    Eosinophils, Absolute 0.26 0.00 - 0.40 10*3/mm3    Basophils, Absolute 0.04 0.00 - 0.20 10*3/mm3    Immature Grans, Absolute 0.02 0.00 - 0.05 10*3/mm3    nRBC 0.0 0.0 - 0.2 /100 WBC   ECG 12 Lead    Collection  Time: 11/15/20  8:23 PM   Result Value Ref Range    QT Interval 386 ms       Ordered the above labs and independently reviewed the results.        RADIOLOGY  Xr Chest 1 View    Result Date: 11/15/2020  SINGLE VIEW CHEST  HISTORY: Shortness of air  COMPARISON: 12/29/2019  FINDINGS: Cardiomegaly is present. Vascular congestion is suspected. There is tortuosity and ectasia of the thoracic aorta. No pneumothorax is seen. There is some blunting of the costophrenic angles bilaterally. This may represent atelectasis or scarring, although trace effusions are not excluded.      Cardiomegaly with vascular congestion.  This report was finalized on 11/15/2020 9:00 PM by Dr. Emilia Olivo M.D.      Ct Angiogram Chest    Result Date: 11/15/2020  CT ANGIOGRAM OF THE CHEST  HISTORY: Leg swelling and shortness of air. Elevated d-dimer.  COMPARISON: None available.  TECHNIQUE: Axial CT imaging was obtained through the thorax. IV contrast was administered. Three-D reformatted images were obtained.  FINDINGS: Examination is degraded by poor timing of the contrast bolus. No obvious pulmonary thromboembolus is seen. The thoracic aorta is normal in caliber. There is no evidence of dissection. There are really no significant coronary artery calcifications. The thyroid gland, trachea, and esophagus appear unremarkable. There is no pleural or pericardial effusion. The advanced background emphysematous changes are seen. Areas of chronic scarring are seen at the lung bases bilaterally. These have progressed when compared to prior study. There is an area of nodularity noted within the left lower lobe measuring up to 7 mm in size. This was not apparent on the prior exam. Short-term CT follow-up in 3-6 months is suggested. Consolidation within the right lower lobe has increased when compared to prior exam, as has bronchial wall thickening, and nonspecific bronchitis is not excluded. Mediastinal lymph nodes do not appear pathologically  enlarged. Images through the upper abdomen demonstrate persistent mild left-sided hydronephrosis. This was also present on prior exam from 10/11/2019. Areas of cortical thinning are noted within the right kidney, suggesting sequela prior insults. Gastrostomy tube is present. There is a focal intimal flap involving the distal celiac axis however, branch vessels appear patent. There is dilatation of the distal celiac artery measuring up to 1 cm. Chronic compression deformity with bony ankylosis is noted at T6-T7 and T3-T4.        1. No acute pulmonary thromboembolus seen. 2. Advanced emphysematous changes. Patient is noted to have bibasilar scarring. Consolidation within the right lower lobe has increased when compared to prior exam, and there is increasing bronchial wall thickening within this area. FINDINGS may reflect nonspecific bronchitis. 3. 7 mm noncalcified pulmonary nodule at the left lung base. CT follow-up in 3-6 months is suggested. Radiation dose reduction techniques were utilized, including automated exposure control and exposure modulation based on body size.  This report was finalized on 11/15/2020 11:12 PM by Dr. Emilia Olivo M.D.        I ordered the above noted radiological studies. Reviewed by me and discussed with radiologist.  See dictation for official radiology interpretation.      PROCEDURES    Procedures      MEDICATIONS GIVEN IN ER    Medications   sodium chloride 0.9 % flush 10 mL (has no administration in time range)   sodium chloride 0.9 % flush 10 mL (has no administration in time range)   nitroglycerin (NITROSTAT) SL tablet 0.4 mg (has no administration in time range)   acetaminophen (TYLENOL) tablet 650 mg (has no administration in time range)     Or   acetaminophen (TYLENOL) 160 MG/5ML solution 650 mg (has no administration in time range)     Or   acetaminophen (TYLENOL) suppository 650 mg (has no administration in time range)   bisacodyl (DULCOLAX) suppository 10 mg (has no  administration in time range)   bisacodyl (DULCOLAX) EC tablet 5 mg (has no administration in time range)   ondansetron (ZOFRAN) tablet 4 mg (has no administration in time range)     Or   ondansetron (ZOFRAN) injection 4 mg (has no administration in time range)   aluminum-magnesium hydroxide-simethicone (MAALOX MAX) 400-400-40 MG/5ML suspension 15 mL (has no administration in time range)   dextrose (GLUTOSE) oral gel 15 g (has no administration in time range)   dextrose (D50W) 25 g/ 50mL Intravenous Solution 25 g (has no administration in time range)   glucagon (human recombinant) (GLUCAGEN DIAGNOSTIC) injection 1 mg (has no administration in time range)   insulin lispro (humaLOG) injection 0-9 Units (has no administration in time range)   enoxaparin (LOVENOX) syringe 80 mg (has no administration in time range)   enoxaparin (LOVENOX) syringe 80 mg (80 mg Subcutaneous Given 11/15/20 2154)   acetaminophen (TYLENOL) tablet 1,000 mg (1,000 mg Oral Given 11/15/20 2238)   iopamidol (ISOVUE-370) 76 % injection 100 mL (95 mL Intravenous Given by Other 11/15/20 2219)   iopamidol (ISOVUE-370) 76 % injection  - ADS Override Pull (has no administration in time range)         PROGRESS, DATA ANALYSIS, CONSULTS, AND MEDICAL DECISION MAKING    All labs have been independently reviewed by me.  All radiology studies have been reviewed by me and discussed with radiologist dictating the report.   EKG's independently viewed and interpreted by me.  Discussion below represents my analysis of pertinent findings related to patient's condition, differential diagnosis, treatment plan and final disposition.        ED Course as of Nov 16 0141   Mon Nov 16, 2020 0139 CBC shows normal white count, stable chronic anemia when compared to previous labs in [facility]    [DP]   0139 Chemistry shows a glucose of 154 along with chronic stable CKD    [DP]   0139 Her troponin is normal, proBNP is normal    [DP]   0139 D-dimer is 2.4    [DP]   0139  Unfortunately the vascular tech is not available to perform a Doppler of the left lower extremity.  My index of suspicion is quite high so she was given a dose of Lovenox empirically    [DP]   0140 Patient has multiple comorbidities, and she is not anticoagulated.  I think having her go home and return for an outpatient duplex could be problematic.  For that reason I called and spoke with the nurse practitioner from [facility] who agrees to admit the patient to the hospital on behalf of Dr. Bocanegra.  I suspect that hematology may need to be consulted, she will need a Doppler in the morning, and perhaps she can be discharged home on oral anticoagulants    [DP]   0141 CT scan of the chest was performed due to the elevated dimer, and there is no evidence for pulmonary embolus.    [DP]      ED Course User Index  [DP] Cristopher Gee MD           PPE: Both the patient and I wore a surgical mask throughout the entire patient encounter. I wore protective goggles.     AS OF 01:41 EST VITALS:    BP - 144/79  HR - 78  TEMP - 98.4 °F (36.9 °C) (Tympanic)  O2 SATS - 99%        DIAGNOSIS  Final diagnoses:   Acute deep vein thrombosis (DVT) of proximal vein of left lower extremity (CMS/HCC)   Stage 3a chronic kidney disease         DISPOSITION  Admit to telemetry           Cristopher Gee MD  11/16/20 0141

## 2020-11-16 NOTE — PROGRESS NOTES
"Pharmacy Consult - Lovenox    Pt Name: Swetha Luis   Indication:  DVT/PE.  Consulted by:  Cherellereaganjuliocesar FortinoAMINATA    MRN: [id number]  : 1960  / Age: 60 y.o.  77.1 kg (170 lb)  Body mass index is 29.18 kg/m².      Relevant clinical data and objective history reviewed:  60 y.o. female 162.6 cm (64\") 77.1 kg (170 lb)    Home Anticoagulation:      Past Medical History:   Diagnosis Date   • Acute congestive heart failure (CMS/HCC) 2018   • Acute osteomyelitis (CMS/HCC)     Right shoulder due to IVDA   • Acute renal failure on dialysis (CMS/[facility])    • Anxiety    • Pugh esophagus    • CKD (chronic kidney disease)    • COPD (chronic obstructive pulmonary disease) (CMS/[facility])    • MRSA infection    • Nontraumatic subarachnoid hemorrhage (CMS/HCC)    • Seizures (CMS/[facility])    • Tracheostomy present (CMS/[facility])      is allergic to cephalexin; hydrocodone; strawberry; and zithromax [azithromycin].    Lab Results   Component Value Date    WBC 7.54 11/15/2020    HGB 10.5 (L) 11/15/2020    HCT 35.1 11/15/2020    MCV 75.5 (L) 11/15/2020     11/15/2020     Lab Results   Component Value Date    GLUCOSE 154 (H) 11/15/2020    CALCIUM 9.2 11/15/2020     11/15/2020    K 3.9 11/15/2020    CO2 30.1 (H) 11/15/2020     11/15/2020    BUN 9 11/15/2020    CREATININE 1.14 (H) 11/15/2020    EGFRIFNONA 49 (L) 11/15/2020    BCR 7.9 11/15/2020    ANIONGAP 8.9 11/15/2020       Estimated Creatinine Clearance: 52.8 mL/min (A) (by C-G formula based on SCr of 1.14 mg/dL (H)).    Active Inpatient Anticoagulation Orders:      Assessment/Plan    Will start patient on 80 mg (1 mg/kg) subcutaneous every 12 hours, adjusted for renal function.     Pharmacy will discontinue the Pharmacy to Dose consult at this time. Renal function will continue to be monitored and dosing adjustments will be made by pharmacy based on renal function if necessary    Paul Gibbs [facility]      "

## 2020-11-17 ENCOUNTER — APPOINTMENT (OUTPATIENT)
Dept: GENERAL RADIOLOGY | Facility: HOSPITAL | Age: 60
End: 2020-11-17

## 2020-11-17 LAB
APTT PPP: 83.7 SECONDS (ref 22.7–35.4)
BASOPHILS # BLD AUTO: 0.03 10*3/MM3 (ref 0–0.2)
BASOPHILS NFR BLD AUTO: 0.4 % (ref 0–1.5)
DEPRECATED RDW RBC AUTO: 41.8 FL (ref 37–54)
EOSINOPHIL # BLD AUTO: 0.01 10*3/MM3 (ref 0–0.4)
EOSINOPHIL NFR BLD AUTO: 0.1 % (ref 0.3–6.2)
ERYTHROCYTE [DISTWIDTH] IN BLOOD BY AUTOMATED COUNT: 15.9 % (ref 12.3–15.4)
GLUCOSE BLDC GLUCOMTR-MCNC: 153 MG/DL (ref 70–130)
GLUCOSE BLDC GLUCOMTR-MCNC: 224 MG/DL (ref 70–130)
GLUCOSE BLDC GLUCOMTR-MCNC: 231 MG/DL (ref 70–130)
GLUCOSE BLDC GLUCOMTR-MCNC: 294 MG/DL (ref 70–130)
HCT VFR BLD AUTO: 37 % (ref 34–46.6)
HGB BLD-MCNC: 11 G/DL (ref 12–15.9)
LYMPHOCYTES # BLD AUTO: 1.07 10*3/MM3 (ref 0.7–3.1)
LYMPHOCYTES NFR BLD AUTO: 12.5 % (ref 19.6–45.3)
MCH RBC QN AUTO: 22.2 PG (ref 26.6–33)
MCHC RBC AUTO-ENTMCNC: 29.7 G/DL (ref 31.5–35.7)
MCV RBC AUTO: 74.6 FL (ref 79–97)
MONOCYTES # BLD AUTO: 0.66 10*3/MM3 (ref 0.1–0.9)
MONOCYTES NFR BLD AUTO: 7.7 % (ref 5–12)
NEUTROPHILS NFR BLD AUTO: 6.71 10*3/MM3 (ref 1.7–7)
NEUTROPHILS NFR BLD AUTO: 78.7 % (ref 42.7–76)
PLATELET # BLD AUTO: 252 10*3/MM3 (ref 140–450)
PMV BLD AUTO: 11.1 FL (ref 6–12)
RBC # BLD AUTO: 4.96 10*6/MM3 (ref 3.77–5.28)
WBC # BLD AUTO: 8.53 10*3/MM3 (ref 3.4–10.8)

## 2020-11-17 PROCEDURE — 92611 MOTION FLUOROSCOPY/SWALLOW: CPT

## 2020-11-17 PROCEDURE — 63710000001 INSULIN GLARGINE PER 5 UNITS: Performed by: NURSE PRACTITIONER

## 2020-11-17 PROCEDURE — 85730 THROMBOPLASTIN TIME PARTIAL: CPT | Performed by: NURSE PRACTITIONER

## 2020-11-17 PROCEDURE — 94799 UNLISTED PULMONARY SVC/PX: CPT

## 2020-11-17 PROCEDURE — 25010000002 HEPARIN (PORCINE) PER 1000 UNITS: Performed by: NURSE PRACTITIONER

## 2020-11-17 PROCEDURE — 85025 COMPLETE CBC W/AUTO DIFF WBC: CPT | Performed by: NURSE PRACTITIONER

## 2020-11-17 PROCEDURE — 63710000001 INSULIN LISPRO (HUMAN) PER 5 UNITS: Performed by: NURSE PRACTITIONER

## 2020-11-17 PROCEDURE — 82962 GLUCOSE BLOOD TEST: CPT

## 2020-11-17 PROCEDURE — 94640 AIRWAY INHALATION TREATMENT: CPT

## 2020-11-17 PROCEDURE — 74230 X-RAY XM SWLNG FUNCJ C+: CPT

## 2020-11-17 RX ORDER — RIVAROXABAN 15 MG-20MG
KIT ORAL 2 TIMES DAILY WITH MEALS
Qty: 51 TABLET | Refills: 0 | Status: SHIPPED | OUTPATIENT
Start: 2020-11-17 | End: 2020-12-08 | Stop reason: HOSPADM

## 2020-11-17 RX ORDER — ALPRAZOLAM 0.5 MG/1
0.5 TABLET ORAL ONCE
Status: COMPLETED | OUTPATIENT
Start: 2020-11-17 | End: 2020-11-17

## 2020-11-17 RX ADMIN — BARIUM SULFATE 50 ML: 400 SUSPENSION ORAL at 11:17

## 2020-11-17 RX ADMIN — CLOTRIMAZOLE AND BETAMETHASONE DIPROPIONATE: 10; .5 CREAM TOPICAL at 22:06

## 2020-11-17 RX ADMIN — BARIUM SULFATE 4 ML: 980 POWDER, FOR SUSPENSION ORAL at 11:17

## 2020-11-17 RX ADMIN — MULTIPLE VITAMINS W/ MINERALS TAB 1 TABLET: TAB at 11:27

## 2020-11-17 RX ADMIN — BUSPIRONE HYDROCHLORIDE 5 MG: 5 TABLET ORAL at 11:27

## 2020-11-17 RX ADMIN — BUDESONIDE 0.5 MG: 0.5 INHALANT ORAL at 21:11

## 2020-11-17 RX ADMIN — PANTOPRAZOLE SODIUM 40 MG: 40 TABLET, DELAYED RELEASE ORAL at 06:29

## 2020-11-17 RX ADMIN — BARIUM SULFATE 55 ML: 0.81 POWDER, FOR SUSPENSION ORAL at 11:16

## 2020-11-17 RX ADMIN — OXYCODONE HYDROCHLORIDE AND ACETAMINOPHEN 1 TABLET: 5; 325 TABLET ORAL at 22:11

## 2020-11-17 RX ADMIN — AMLODIPINE BESYLATE 5 MG: 5 TABLET ORAL at 11:27

## 2020-11-17 RX ADMIN — INSULIN GLARGINE 5 UNITS: 100 INJECTION, SOLUTION SUBCUTANEOUS at 11:28

## 2020-11-17 RX ADMIN — GABAPENTIN 300 MG: 300 CAPSULE ORAL at 13:56

## 2020-11-17 RX ADMIN — OXYCODONE HYDROCHLORIDE AND ACETAMINOPHEN 1 TABLET: 5; 325 TABLET ORAL at 05:57

## 2020-11-17 RX ADMIN — IPRATROPIUM BROMIDE AND ALBUTEROL SULFATE 3 ML: 2.5; .5 SOLUTION RESPIRATORY (INHALATION) at 00:21

## 2020-11-17 RX ADMIN — RIVAROXABAN 15 MG: 15 TABLET, FILM COATED ORAL at 17:55

## 2020-11-17 RX ADMIN — OXYCODONE HYDROCHLORIDE AND ACETAMINOPHEN 1 TABLET: 5; 325 TABLET ORAL at 01:21

## 2020-11-17 RX ADMIN — PRAMIPEXOLE DIHYDROCHLORIDE 0.25 MG: 0.25 TABLET ORAL at 23:43

## 2020-11-17 RX ADMIN — ASPIRIN 81 MG: 81 TABLET, CHEWABLE ORAL at 11:28

## 2020-11-17 RX ADMIN — RIVAROXABAN 15 MG: 15 TABLET, FILM COATED ORAL at 11:27

## 2020-11-17 RX ADMIN — INSULIN GLARGINE 5 UNITS: 100 INJECTION, SOLUTION SUBCUTANEOUS at 22:06

## 2020-11-17 RX ADMIN — IPRATROPIUM BROMIDE AND ALBUTEROL SULFATE 3 ML: 2.5; .5 SOLUTION RESPIRATORY (INHALATION) at 09:48

## 2020-11-17 RX ADMIN — METOPROLOL SUCCINATE 25 MG: 25 TABLET, EXTENDED RELEASE ORAL at 11:31

## 2020-11-17 RX ADMIN — GABAPENTIN 300 MG: 300 CAPSULE ORAL at 22:06

## 2020-11-17 RX ADMIN — Medication 100 MG: at 13:56

## 2020-11-17 RX ADMIN — IPRATROPIUM BROMIDE AND ALBUTEROL SULFATE 3 ML: 2.5; .5 SOLUTION RESPIRATORY (INHALATION) at 21:11

## 2020-11-17 RX ADMIN — BUSPIRONE HYDROCHLORIDE 5 MG: 5 TABLET ORAL at 22:07

## 2020-11-17 RX ADMIN — HEPARIN SODIUM 16 UNITS/KG/HR: 10000 INJECTION, SOLUTION INTRAVENOUS at 07:20

## 2020-11-17 RX ADMIN — BUDESONIDE 0.5 MG: 0.5 INHALANT ORAL at 09:49

## 2020-11-17 RX ADMIN — OXYCODONE HYDROCHLORIDE AND ACETAMINOPHEN 1 TABLET: 5; 325 TABLET ORAL at 14:42

## 2020-11-17 RX ADMIN — ALPRAZOLAM 0.5 MG: 0.5 TABLET ORAL at 23:43

## 2020-11-17 RX ADMIN — INSULIN LISPRO 2 UNITS: 100 INJECTION, SOLUTION INTRAVENOUS; SUBCUTANEOUS at 17:55

## 2020-11-17 RX ADMIN — GABAPENTIN 300 MG: 300 CAPSULE ORAL at 05:57

## 2020-11-17 RX ADMIN — BUDESONIDE 0.5 MG: 0.5 INHALANT ORAL at 00:22

## 2020-11-17 RX ADMIN — OXYCODONE HYDROCHLORIDE AND ACETAMINOPHEN 1 TABLET: 5; 325 TABLET ORAL at 10:33

## 2020-11-17 RX ADMIN — ESCITALOPRAM 20 MG: 20 TABLET, FILM COATED ORAL at 11:27

## 2020-11-17 RX ADMIN — INSULIN LISPRO 4 UNITS: 100 INJECTION, SOLUTION INTRAVENOUS; SUBCUTANEOUS at 11:34

## 2020-11-17 NOTE — ED NOTES
pts  called asking about coming to see pt call back @[phone]     Jaelyn Solis  11/16/20 2049

## 2020-11-17 NOTE — PROGRESS NOTES
Clinical Pharmacy Services: Medication History    Swetha Luis is a 60 y.o. female presenting to [facility] for Acute deep vein thrombosis (DVT) of proximal vein of left lower extremity (CMS/[facility]) [I82.4Y2]    She  has a past medical history of Acute congestive heart failure (CMS/[facility]) (12/24/2018), Acute osteomyelitis (CMS/[facility]), Acute renal failure on dialysis (CMS/[facility]), Anxiety, Pugh esophagus, CKD (chronic kidney disease), COPD (chronic obstructive pulmonary disease) (CMS/[facility]), MRSA infection, Nontraumatic subarachnoid hemorrhage (CMS/[facility]), Seizures (CMS/[facility]), and Tracheostomy present (CMS/[facility]).    Allergies as of 11/15/2020 - Reviewed 11/15/2020   Allergen Reaction Noted   • Cephalexin Unknown (See Comments) 07/10/2019   • Hydrocodone Unknown (See Comments) 12/18/2018   • [place] Unknown (See Comments) 12/18/2018   • Zithromax [azithromycin] Unknown (See Comments) 12/18/2018       Medication information was obtained from: Patient at bedside  Pharmacy and Phone Number:   [facility] [place]  [address] [address]  Phone: [phone] Fax: [phone]    [id number] [address] AT [facility] - [phone]  - [phone] [address] [address]  Phone: [phone] Fax: [phone]        Prior to Admission Medications     Prescriptions Last Dose Informant Patient Reported? Taking?    acetaminophen (TYLENOL) 325 MG tablet Past Week  No Yes    Take 2 tablets by mouth Every 4 (Four) Hours As Needed for Mild Pain .    Alcohol Swabs 70 % pads Past Week  Yes Yes    Inject 1 each under the skin into the appropriate area as directed 4 (Four) Times a Day.    amLODIPine (NORVASC) 5 MG tablet 11/16/2020  No Yes    Take 1 tablet by mouth Daily.    aspirin 81 MG chewable tablet 11/16/2020  No Yes    Chew 1 tablet Daily.    Blood Glucose Monitoring Suppl (FreeStyle Lite) device 11/16/2020  Yes  Yes    Inject 1 each under the skin into the appropriate area as directed 4 (Four) Times a Day.    budesonide (PULMICORT) 0.5 MG/2ML nebulizer solution 11/16/2020  No Yes    Inhale 2 mL by nebulization via trach 2 (Two) Times a Day.    busPIRone (BUSPAR) 5 MG tablet 11/16/2020  No Yes    Take 1 tablet by mouth 3 (Three) times a day.    clotrimazole-betamethasone (LOTRISONE) 1-0.05 % cream 11/16/2020  No Yes    Apply  topically to the appropriate area as directed Every 12 (Twelve) Hours. Apply to perivaginal area and perineum after washing.    escitalopram (LEXAPRO) 20 MG tablet 11/16/2020  No Yes    Take 1 tablet by mouth daily.    gabapentin (NEURONTIN) 300 MG capsule 11/16/2020 Self No Yes    Take 1 capsule by mouth 3 (Three) Times a Day.    Patient taking differently:  Take 900 mg by mouth 3 (Three) Times a Day. Patient reports taking 3 caps (900 mg) three times daily    glucose blood (FREESTYLE TEST STRIPS) test strip 11/16/2020  Yes Yes    1 strip by Other route 4 (Four) Times a Day.    insulin glargine (LANTUS) 100 UNIT/ML injection 11/16/2020  Yes Yes    Inject 5 Units under the skin into the appropriate area as directed 2 (two) times a day.    Insulin Glulisine (Apidra SoloStar) 100 UNIT/ML solution pen-injector 11/16/2020  Yes Yes    Inject 5 Units under the skin into the appropriate area as directed 3 (Three) Times a Day.    ipratropium-albuterol (DUO-NEB) 0.5-2.5 mg/3 ml nebulizer 11/16/2020  No Yes    Inhale 3 mL by nebulization every 4 (Four) hours as needed for wheezing.    metFORMIN (GLUCOPHAGE) 500 MG tablet 11/16/2020  Yes Yes    Take 500 mg by mouth 2 (Two) Times a Day With Meals.    metoprolol succinate XL (TOPROL-XL) 25 MG 24 hr tablet 11/16/2020  No Yes    Take 1 tablet by mouth Daily.    MULTIPLE VITAMINS-MINERALS PO 11/16/2020  Yes Yes    Take  by mouth.    omeprazole (priLOSEC) 40 MG capsule 11/16/2020  Yes Yes    Take 40 mg by mouth Daily.    oxyCODONE-acetaminophen (PERCOCET) 5-325 MG per  tablet 11/16/2020  No Yes    Take 1 tablet by mouth Every 6 (Six) Hours As Needed for Moderate Pain .    Patient taking differently:  Take 1 tablet by mouth Every 4 (Four) Hours As Needed for Moderate Pain . Patient reports taking 7.5/325 mg strength    pramipexole (MIRAPEX) 0.25 MG tablet 11/16/2020  No Yes    Take 1 tablet by mouth Every Night.    thiamine (VITAMIN B-1) 100 MG tablet 11/16/2020  No Yes    Take 1 tablet by mouth Daily.    Ascorbic Acid 500 MG capsule Unknown  Yes No    Take  by mouth.    predniSONE (DELTASONE) 20 MG tablet More than a month Self No No    Take 3 tablets by mouth Daily.    Patient not taking:  Reported on 11/17/2020            Medication notes:     · Updated gabapentin instructions to match how the patient is taking at home; updated to 900 mg PO TID.   · Patient reports that she hasn't taken prednisone in quite a long time. Her admission medication list shows that she is taking 60 mg daily. Last time prednisone was filled was on 2/29/20 with the directions to take 20 mg PO BID x 5 days.   · Removed Duloxetine from med list; patient taking escitalopram 20 mg daily.     This medication list is complete to the best of my knowledge as of 11/17/2020    Please call if questions.    Kathryn Lara, PharmD    11/17/2020 13:54 EST

## 2020-11-17 NOTE — PROGRESS NOTES
"                                              LOS: 2 days   Patient Care Team:  Provider, No Known as PCP - General    Chief Complaint:  F/up respiratory failure, tracheostomy, COPD and medical problems listed below    Subjective   Interval History  I reviewed the admission note, progress notes, PMH, PSH, Family hx, social history, imagings and prior records on this admission, summarized the finding in my note and formulated a transition of care plan.     She denies worsening cough, shortness of breath or sputum production for me but upon review of the history, it was noted on arrival to the hospital that she had increased cough.    REVIEW OF SYSTEMS:   CARDIOVASCULAR: No chest pain, chest pressure or chest discomfort. No palpitations or edema.   Constitutional: No fever or chills m.   GASTROINTESTINAL: No anorexia, nausea, vomiting or diarrhea. No abdominal pain or blood.           Physical Exam:     Vital Signs  Temp:  [97 °F (36.1 °C)-98.7 °F (37.1 °C)] 98.2 °F (36.8 °C)  Heart Rate:  [65-68] 67  Resp:  [16-20] 18  BP: (125-135)/(75-80) 128/77    Intake/Output Summary (Last 24 hours) at 11/17/2020 1721  Last data filed at 11/17/2020 1327  Gross per 24 hour   Intake 840 ml   Output --   Net 840 ml     Flowsheet Rows      First Filed Value   Admission Height  162.6 cm (64\") Documented at 11/15/2020 1923   Admission Weight  77.1 kg (170 lb) Documented at 11/15/2020 1923          General Appearance:   Alert, cooperative, in no acute distress   ENMT:  Mallampati score 4, moist mucous membrane   Eyes:  Pupils equal and reactive to light. EOMI   Neck:   Large. Trachea midline. No thyromegaly.  Tracheocutaneous fistula noted   Lungs:    Significantly diminished breath sounds bilaterally.  Mild expiratory wheezing.  Nonlabored breathing.    Heart:   Regular rhythm and normal rate, normal S1 and S2, no         murmur   Skin:   No rash   Abdomen:    Obese. Soft. No tenderness. No HSM.   Neuro:  Conscious, alert, oriented " x3. Strength 5/5 in upper and lower  ext   Extremities:  No cyanosis, clubbing or edema.  Warm extremities and well-perfused          Results Review:        Results from last 7 days   Lab Units 11/16/20  0704 11/15/20  1957   SODIUM mmol/L 142 141   POTASSIUM mmol/L 3.5 3.9   CHLORIDE mmol/L 104 102   CO2 mmol/L 28.2 30.1*   BUN mg/dL 7* 9   CREATININE mg/dL 0.98 1.14*   GLUCOSE mg/dL 107* 154*   CALCIUM mg/dL 9.4 9.2     Results from last 7 days   Lab Units 11/15/20  1957   TROPONIN T ng/mL <0.010     Results from last 7 days   Lab Units 11/17/20  0538 11/16/20  0704 11/15/20  1957   WBC 10*3/mm3 8.53 7.01  6.80 7.54   HEMOGLOBIN g/dL 11.0* 10.6*  10.3* 10.5*   HEMATOCRIT % 37.0 34.7  33.2* 35.1   PLATELETS 10*3/mm3 252 225  218 211     Results from last 7 days   Lab Units 11/17/20  0538 11/16/20  2201 11/16/20  1151 11/15/20  1957   INR   --   --  0.94 0.89*   APTT seconds 83.7* 97.9* 32.9 30.9     Results from last 7 days   Lab Units 11/15/20  1957   PROBNP pg/mL 125.3       I reviewed the patient's new clinical results.        Medication Review:   amLODIPine, 5 mg, Oral, Q24H  aspirin, 81 mg, Oral, Daily  budesonide, 0.5 mg, Nebulization, BID  busPIRone, 5 mg, Oral, TID  clotrimazole-betamethasone, , Topical, Q12H  escitalopram, 20 mg, Oral, Daily  gabapentin, 300 mg, Oral, TID  insulin glargine, 5 Units, Subcutaneous, BID  insulin lispro, 0-9 Units, Subcutaneous, TID AC  ipratropium-albuterol, 3 mL, Nebulization, 4x Daily - RT  metoprolol succinate XL, 25 mg, Oral, Daily  multivitamin with minerals, 1 tablet, Oral, Daily  pantoprazole, 40 mg, Oral, QAM  pramipexole, 0.25 mg, Oral, Nightly  rivaroxaban, 15 mg, Oral, BID With Meals    Followed by  [START ON 12/9/2020] rivaroxaban, 20 mg, Oral, Daily With Dinner  thiamine, 100 mg, Oral, Daily        heparin (porcine), 18 Units/kg/hr, Last Rate: Stopped (11/17/20 1430)        Diagnostic imaging:  I personally and independently reviewed the following  images:    CTA chest 11/15/2020: Bilateral lower lobe atelectasis.  Diffuse emphysematous changes.      Assessment     1. Acute hypercapnic respiratory failure  2. Chronic hypoxic respiratory failure, on oxygen 4 L/minute  3. COPD/emphysema with exacerbation  4. Bilateral lower lobe atelectasis  5. S/p trach after prolonged hospitalization at [facility], decannulated 2 months ago.  Now with persistent small stoma/tracheocutaneous fistula  6. Acute LLE DVT, no PE  7. 7 mm left lower lobe pulmonary nodule  8. Obesity (BMI = 30)  9. Probable sleep apnea    All problems new to me    Plan     · DuoNeb 4 times a day and budesonide twice a day for COPD  · Antibiotics with doxycycline for bronchitis/COPD exacerbation along with prednisone 4 mg daily  · Eliquis for DVT.  She has no PE  · Consult ENT regarding her stoma/tracheal fistula.  It is currently impeding her ability to talk and obtain nebulizer therapy effectively.  May need surgical closure  · Oxygen by nasal cannula.  Avoid hyperoxia in the setting of hypercapnia.  Titrate to keep SPO2 89-92%        Esperanza Garcia MD  11/17/20  17:21 EST            This note was dictated utilizing Dattch dictation

## 2020-11-17 NOTE — PLAN OF CARE
VFSS completed. Pt had inconsistent penetration during the study. Silent aspiration was noted with consecutive sips of thin via straw. Feel pt's swallow is at baseline. Pt has been non-compliant with a dysphagia diet in the past. Recommend: mech soft no mixed and thin w/ strategies of small single cup sips; no straws; upright for meals and 30 min after; slow rate; small bites; double swallow; meds whole in pureed. ST will follow for diet tolerance. Monitor closely for s/s due to risk of fatigue.

## 2020-11-17 NOTE — PROGRESS NOTES
Patient was counseled extensively on Xarelto including the followin) Xarelto's indication, mechanism of action, and dosing  2) Enforced the importance of taking Xarelto as instructed every day and to take with food  3) Explained possible side effects of Xarelto therapy, including increased risk of bleeding, s/sx of bleeding, and s/sx of any additional clots/PE/CVA.   4) Emphasized the importance of going to the emergency room if any of the following occur: Falling and hitting your head; noticing bright red blood in urine or dark/tarry stools; vomiting up blood or vomit has a coffee-ground like texture; coughing up blood  5) Discussed the importance of speaking with physician/pharmacist when starting any new medications to check for drug interactions with Xarelto  6) Discussed the importance of informing any surgeon or proceduralist that you are on Xarelto and may need to go off prior to the procedure (including spinal/epidural procedures)  7) Discussed what to do about a missed dose (For 20mg daily dosing: Take as soon as you remember and if it is closer to the time for your next dose, skip the missed dose and go back to your normal time; DO NOT double up doses.  For 15mg twice daily dosing, be sure to get 30mg in one day; may take 2 at the same time to achieve this then resume normal schedule)  8) Instructed the pt not to take or discontinue any medications without informing his physician/pharmacist     I also explained to the patient that this medication may have a high copay associated with it and to let her provider know if it is unaffordable. Explained that there is a one-time use 0$ co-pay card available for the starter pack that we will apply to her prescription at discharge; the only caveat is that if she has used a free trial coupon for Xarelto in the past that it wouldn't work again. Luckily I do not see that Xarelo was ordered in the past per her medication history so I think we are in good shape.      Hospitalist to send Xarelto prescription to [facility] so that we can apply the free-trial co-pay card.      Patient expressed understanding and had no further questions.       Kathryn Lara, PharmD   13:49 EST

## 2020-11-17 NOTE — PROGRESS NOTES
"    DAILY PROGRESS NOTE  [facility]    Patient Identification:  Name: Swetha Luis  Age: 60 y.o.  Sex: female  :  1960  MRN: [id number]         Primary Care Physician: Provider, No Known    Subjective:  Interval History: Tolerating anticoagulation with no aspects of bleeding to this point.  Labs are stable with a hemoglobin of 11.0.  Denies nausea or vomiting confusion or hemorrhage    Objective: No family at bedside.  Discussed case with RN.  Patient interactive pleasant and seems more relaxed today and certainly nontoxic-appearing    Scheduled Meds:amLODIPine, 5 mg, Oral, Q24H  aspirin, 81 mg, Oral, Daily  budesonide, 0.5 mg, Nebulization, BID  busPIRone, 5 mg, Oral, TID  clotrimazole-betamethasone, , Topical, Q12H  escitalopram, 20 mg, Oral, Daily  gabapentin, 300 mg, Oral, TID  insulin glargine, 5 Units, Subcutaneous, BID  insulin lispro, 0-9 Units, Subcutaneous, TID AC  ipratropium-albuterol, 3 mL, Nebulization, 4x Daily - RT  metoprolol succinate XL, 25 mg, Oral, Daily  multivitamin with minerals, 1 tablet, Oral, Daily  pantoprazole, 40 mg, Oral, QAM  pramipexole, 0.25 mg, Oral, Nightly  predniSONE, 60 mg, Oral, Daily  rivaroxaban, 15 mg, Oral, BID With Meals    Followed by  [START ON 2020] rivaroxaban, 20 mg, Oral, Daily With Dinner  thiamine, 100 mg, Oral, Daily      Continuous Infusions:heparin (porcine), 18 Units/kg/hr, Last Rate: 16 Units/kg/hr (20 0720)        Vital signs in last 24 hours:  Temp:  [97 °F (36.1 °C)-98.7 °F (37.1 °C)] 97.8 °F (36.6 °C)  Heart Rate:  [65-68] 65  Resp:  [16-20] 20  BP: (121-135)/(49-80) 135/77    Intake/Output:    Intake/Output Summary (Last 24 hours) at 2020 1030  Last data filed at 2020 0917  Gross per 24 hour   Intake 840 ml   Output --   Net 840 ml       Exam:  /77 (BP Location: Left arm)   Pulse 65   Temp 97.8 °F (36.6 °C) (Oral)   Resp 20   Ht 162.6 cm (64\")   Wt 77.1 kg (169 lb 15.6 oz)   SpO2 92%   BMI 29.18 " kg/m²     General Appearance:    Alert, cooperative, AAOx3                          Head:    Normocephalic, without obvious abnormality, atraumatic                         Throat:   Oral mucosa pink and moist                           Neck: Chronic trach stoma                         Lungs:    Clear to auscultation bilaterally, respirations unlabored                 Chest Wall:    No tenderness or deformity                          Heart:    Regular rate and rhythm, S1 and S2 normal                 Abdomen:     Soft, nontender, bowel sounds active                 Extremities: Moving all with 1+ edema to left lower extremity                        pulses:   Pulses palpable in lower extremities                            Skin:   Skin is warm and dry                  Neurologic:   CNII-XII intact, no focal deficits noted     Data Review:  Labs in chart were reviewed.    Assessment:  Active Hospital Problems    Diagnosis  POA   • **Acute deep vein thrombosis (DVT) of proximal vein of left lower extremity (CMS/[facility]) [I82.4Y2]  Yes   • Type 2 diabetes mellitus with hyperglycemia, with long-term current use of insulin (CMS/[facility]) [E11.65, Z79.4]  Not Applicable   • Elevated d-dimer [R79.89]  Yes   • Essential hypertension [I10]  Yes   • Peripheral artery disease (CMS/[facility]) [I73.9]  Yes   • COPD (chronic obstructive pulmonary disease) (CMS/[facility]) [J44.9]  Yes   • Chronic diastolic CHF (congestive heart failure) (CMS/[facility]) [I50.32]  Yes   • Chronic respiratory failure with hypoxia (CMS/[facility]) [J96.11]  Yes   • Stage 3 chronic kidney disease [N18.30]  Yes      Resolved Hospital Problems   No resolved problems to display.       Plan:    Acute LLE DVT without PE   -Heparin drip transition to Xarelto this evening.  Discussed different options of anticoagulation as well as risk and benefits with those and feel Xarelto would be a good choice and patient understands and accepts those risk   -Vascular surgery saw in consultation and  recommends no further intervention   -Xarelto prescription electronically prescribed to [facility] pharmacy so CCP can get working on insurance approval    Chronic hypoxic respiratory failure with COPD and chronic diastolic CHF euvolemic   -Stable from pulmonary perspective.  Appreciate their consultation    CKD 3/HTN stable    DM2 with CKD/circulatory disorder with an A1c of 9.3   -Mild reactive hyperglycemia.  See below    Prednisone p.o. daily at 60 mg?  Asked RN to work with pharmacy to look into correctness of this      Addendum -called by pharmacy and patient is not on prednisone so that medication is now discontinued.  I asked pharmacist to look into safe report to prevent further med errors at admission because the MD is provided in an adequate and an accurate med rec.  Also informed that patient not on duloxetine and that was transitioned over to Lexapro.    Ramses Elam MD  11/17/2020  10:30 EST

## 2020-11-17 NOTE — MBS/VFSS/FEES
Acute Care - Speech Language Pathology   Swallow Initial Evaluation [facility]     Patient Name: Swetha Luis  : 1960  MRN: [id number]  Today's Date: 2020               Admit Date: 11/15/2020    Visit Dx:     ICD-10-CM ICD-9-CM   1. Acute deep vein thrombosis (DVT) of proximal vein of left lower extremity (CMS/[facility])  I82.4Y2 453.41   2. Stage 3a chronic kidney disease  N18.31 585.3     Patient Active Problem List   Diagnosis   • Tracheostomy complication (CMS/[facility])   • Gangrene of toe (CMS/[facility])   • Acute diastolic congestive heart failure (CMS/[facility])   • ARF (acute renal failure) (CMS/[facility])   • Stage 3 chronic kidney disease   • Elevated troponin   • Acute respiratory failure with hypoxemia (CMS/[facility])   • Acute osteomyelitis (CMS/[facility])   • UTI due to extended-spectrum beta lactamase (ESBL) producing Escherichia coli   • Thrush, oral   • Tracheostomy malfunction (CMS/[facility])   • COPD (chronic obstructive pulmonary disease) (CMS/[facility])   • Chronic respiratory failure with hypoxia (CMS/[facility])   • PEG (percutaneous endoscopic gastrostomy) status (CMS/[facility])   • History of osteomyelitis   • Polysubstance abuse (CMS/[facility])   • History of tracheal stenosis   • Chronic diastolic CHF (congestive heart failure) (CMS/[facility])   • Peripheral artery disease (CMS/[facility])   • Medically noncompliant   • WENDI and COPD overlap syndrome (CMS/[facility])   • Dyspnea   • Elevated LFTs   • Elevated troponin   • Tracheostomy present (CMS/[facility])   • Essential hypertension   • Dysphagia   • Healthcare associated bacterial pneumonia   • Infection due to ESBL-producing Escherichia coli   • Sepsis due to Gram negative bacteria (CMS/[facility])   • Ureteral stone with hydronephrosis   • UTI (urinary tract infection)   • Acute tracheobronchitis   • Chronic diastolic CHF (congestive heart failure) (CMS/[facility])   • Bacteremia   • Acute deep vein thrombosis (DVT) of proximal vein of left lower extremity (CMS/[facility])   • Elevated d-dimer   • Type 2 diabetes mellitus with  hyperglycemia, with long-term current use of insulin (CMS/[facility])     Past Medical History:   Diagnosis Date   • Acute congestive heart failure (CMS/HCC) 12/24/2018   • Acute osteomyelitis (CMS/HCC)     Right shoulder due to IVDA   • Acute renal failure on dialysis (CMS/HCC)    • Anxiety    • Pugh esophagus    • CKD (chronic kidney disease)    • COPD (chronic obstructive pulmonary disease) (CMS/HCC)    • MRSA infection    • Nontraumatic subarachnoid hemorrhage (CMS/HCC)    • Seizures (CMS/HCC)    • Tracheostomy present (CMS/[facility])      Past Surgical History:   Procedure Laterality Date   • TRACHEOSTOMY     • URETEROSCOPY LASER LITHOTRIPSY WITH STENT INSERTION Left 10/11/2019    Procedure: CYSTOSCOPY,  URETEROSCOPY, LEFT RETROGRADE, LEFT PYELOGRAM, LASER LITHOTRIPSY, PLACEMENT OF STENT.;  Surgeon: Clyde Selby MD;  Location: [facility];  Service: Urology     Patient was not wearing a face mask during this therapy encounter. Therapist used appropriate personal protective equipment including mask, eye protection and gloves.  Mask used was standard procedure mask. Appropriate PPE was worn during the entire therapy session. Hand hygiene was completed before and after therapy session. Patient is not in enhanced droplet precautions.        SWALLOW EVALUATION (last 72 hours)      SLP Adult Swallow Evaluation     Row Name 11/17/20 1300 11/16/20 1500                Rehab Evaluation    Document Type  evaluation  -AW  evaluation  -SH       Subjective Information  no complaints  -AW  --       Patient Observations  alert;cooperative;agree to therapy  -AW  --       Patient/Family/Caregiver Comments/Observations  Pt tearful prior to study due to missing her family, reassurance provided.  -AW  --       Care Plan Review  evaluation/treatment results reviewed;patient/other agree to care plan  -AW  --       Patient Effort  good  -AW  adequate  -SH       Symptoms Noted During/After Treatment  none  -AW  --          General  Information    Patient Profile Reviewed  yes  -AW  yes  -SH       Pertinent History Of Current Problem  DVT, chronic resp failure/COPD, CKD, and CHF. Silent aspiration on previous VFSS on 10/8/2019.  -AW  DVT, chronic resp failure/COPD, CKD, and CHF. Silent aspiration on previous VFSS.   -SH       Current Method of Nutrition  regular textures;thin liquids  -AW  regular textures;thin liquids  -       Precautions/Limitations, Vision  WFL;for purposes of eval  -AW  WFL;for purposes of eval  -SH       Precautions/Limitations, Hearing  WFL;for purposes of eval  -AW  WFL;for purposes of eval  -SH       Prior Level of Function-Communication  --  other (see comments);unknown pt is a poor informant  -       Prior Level of Function-Swallowing  regular textures;thin liquids;other (see comments) at home; VFSS 10/8/19 REC mech soft no mixed/HTL  -AW  mechanical soft with no mixed consistencies;honey thick liquids  -       Plans/Goals Discussed with  patient;agreed upon  -AW  patient;agreed upon  -       Barriers to Rehab  previous functional deficit  -AW  previous functional deficit  -       Patient's Goals for Discharge  return home  -AW  patient did not state  -          Pain    Additional Documentation  Pain Scale: Numbers Pre/Post-Treatment (Group)  -AW  --          Pain Scale: Numbers Pre/Post-Treatment    Pretreatment Pain Rating  0/10 - no pain  -AW  --       Posttreatment Pain Rating  0/10 - no pain  -AW  --          Oral Motor Structure and Function    Dentition Assessment  missing teeth;teeth are in poor condition  -AW  teeth are in poor condition;missing teeth  -       Secretion Management  WNL/WFL  -AW  --       Volitional Swallow  --  delayed  -SH       Volitional Cough  --  reduced respiratory support  -          Oral Musculature and Cranial Nerve Assessment    Oral Motor General Assessment  --  WFL  -SH       Vocal Impairment, Detail. Cranial Nerve X (Vagus)  vocal quality abnormality (see  "comments);impaired throat clear/cough (see comments);other (see comments) harsh, breathy voice, poor cough d/t air escape from stoma  -AW  vocal quality abnormality (see comments);other (see comments) harsh  -SH          General Eating/Swallowing Observations    Eating/Swallowing Skills  fed by SLP  -AW  --       Positioning During Eating  upright in bed  -AW  --          Clinical Swallow Eval    Clinical Swallow Evaluation Summary  --  Pt seen for clinical swallow assessment. Pt with narrow stoma, which impacts her resp support for voice. Pt reports eating regular foods and liquids at home. She was unable to provide information about her decannulation. VFSS completed 10/8/19 which revealed the following, \"Video swallow completed. Pt with size #4 shiley with aqua PMV in place. Pt exhibited silent aspiration with nectar. SLP recs upgrade to [facility] soft, no mixed, and to continue honey (no straws, small drinks)\". Pt with harsh voice, air escape from stoma noted. No overt s/s of aspiration with thins, puree, or regular solids. Laryngeal elevation was reduced. The patient is currently on a regular diet with thins. SLP recs VFSS to further assess swallow function, will continue current diet d/t no overt s/s of asp noted this date and non compliance with diet recs at home. Pt in agreement.    -          MBS/VFSS    Utensils Used  spoon;cup;straw  -AW  --       Consistencies Trialed  regular textures;soft textures;mixed consistency;pureed;thin liquids;nectar/syrup-thick liquids;honey-thick liquids  -AW  --          MBS/VFSS Interpretation    Oral Prep Phase  WFL  -AW  --       Oral Transit Phase  impaired  -AW  --       Oral Residue  WFL  -AW  --       VFSS Summary  Pt exhibited mild to moderate oropharygneal dysphagia characterized by lingual and base of tongue weakness, mistiming/delayed swallow, decreased hyolaryngeal excursion, and decreased airway protection. Pt had premature spillage past the valleculae or to the " pyriforms with all consistencies. Pt had inconsistent penetration with single cup sips of thin and nectar consistencies. Silent aspiration was noted with straw sips of thin (consecutive sips). Pt was unable to clear penetrated/aspirated material with cough. Pt took trials of honey thick and pureed with no additional penetration noted. Mastication and bolus formation and propulsion was slow for soft and regular consistencies. Penetration noted with juice of soft solids/mixed. Mild pharyngeal residuals were noted (tongue base, valleculae, pyriforms) and were assisted in clearing with a spontaneous swallow. Feel pt's swallow is at baseline. Pt has been non-compliant with a dysphagia diet in the past. Recommend: [facility] soft no mixed and thin w/ strategies of small single cup sips; no straws; upright for meals and 30 min after; slow rate; small bites; meds whole in pureed. ST will follow for diet tolerance. Monitor closely for s/s due to risk of fatigue.     -AW  --          Initiation of Pharyngeal Swallow    Pharyngeal Phase  impaired pharyngeal phase of swallowing  -AW  --          SLP Communication to Radiology    Summary Statement  Pt exhibited mild to moderate oropharygneal dysphagia characterized by lingual and base of tongue weakness, mistiming/delayed swallow, decreased hyolaryngeal excursion, and decreased airway protection. Pt had premature spillage past the valleculae or to the pyriforms with all consistencies. Pt had inconsistent penetration with single cup sips of thin and nectar consistencies. Silent aspiration was noted with straw sips of thin (consecutive sips). Pt was unable to clear penetrated/aspirated material with cough. Pt took trials of honey thick and pureed with no additional penetration noted. Mastication and bolus formation and propulsion was slow for soft and regular consistencies. Penetration noted with juice of soft solids/mixed. Mild pharyngeal residuals were noted (tongue base, valleculae,  pyriforms) and were assisted in clearing with a spontaneous swallow.   -AW  --          Clinical Impression    SLP Swallowing Diagnosis  mild-moderate;oral dysphagia;pharyngeal dysphagia  -AW  suspected pharyngeal dysphagia  -       Functional Impact  risk of aspiration/pneumonia  -AW  risk of aspiration/pneumonia  -       Rehab Potential/Prognosis, Swallowing  good, to achieve stated therapy goals  -AW  good, to achieve stated therapy goals  -       Swallow Criteria for Skilled Therapeutic Interventions Met  demonstrates skilled criteria  -AW  demonstrates skilled criteria  -          Recommendations    Therapy Frequency (Swallow)  PRN  -AW  PRN  -       Predicted Duration Therapy Intervention (Days)  until discharge  -AW  until discharge  -       SLP Diet Recommendation  mechanical soft with no mixed consistencies;thin liquids  -AW  other (see comments) VFSS to further assess  -       Recommended Diagnostics  --  reassess via VFSS (MBS)  -       Recommended Precautions and Strategies  upright posture during/after eating;small bites of food and sips of liquid;no straw;multiple swallows per bite of food;multiple swallows per sip of liquid  -AW  --       Oral Care Recommendations  Oral Care BID/PRN  -AW  --       SLP Rec. for Method of Medication Administration  meds whole;with pudding or applesauce  -AW  --       Monitor for Signs of Aspiration  yes;notify SLP if any concerns  -AW  yes;notify SLP if any concerns  -       Anticipated Discharge Disposition (SLP)  unknown  -AW  unknown  -          Swallow Goals (SLP)    Oral Nutrition/Hydration Goal Selection (SLP)  --  oral nutrition/hydration, SLP goal 1  -          Oral Nutrition/Hydration Goal 1 (SLP)    Oral Nutrition/Hydration Goal 1, SLP  --  Pt will participate in VFSS.   -         User Key  (r) = Recorded By, (t) = Taken By, (c) = Cosigned By    Initials Name Effective Dates    AW Berenice Roblero, MS CCC-SLP 06/08/18 -      Kirsten North  MS CCC-SLP 03/07/18 -           EDUCATION  The patient has been educated in the following areas:   Dysphagia (Swallowing Impairment) Oral Care/Hydration Modified Diet Instruction.    SLP Recommendation and Plan  SLP Swallowing Diagnosis: mild-moderate, oral dysphagia, pharyngeal dysphagia  SLP Diet Recommendation: mechanical soft with no mixed consistencies, thin liquids  Recommended Precautions and Strategies: upright posture during/after eating, small bites of food and sips of liquid, no straw, multiple swallows per bite of food, multiple swallows per sip of liquid  SLP Rec. for Method of Medication Administration: meds whole, with pudding or applesauce     Monitor for Signs of Aspiration: yes, notify SLP if any concerns     Swallow Criteria for Skilled Therapeutic Interventions Met: demonstrates skilled criteria  Anticipated Discharge Disposition (SLP): unknown  Rehab Potential/Prognosis, Swallowing: good, to achieve stated therapy goals  Therapy Frequency (Swallow): PRN  Predicted Duration Therapy Intervention (Days): until discharge                              SLP GOALS     Row Name 11/16/20 1500             Oral Nutrition/Hydration Goal 1 (SLP)    Oral Nutrition/Hydration Goal 1, SLP  Pt will participate in VFSS.   -        User Key  (r) = Recorded By, (t) = Taken By, (c) = Cosigned By    Initials Name Provider Type    Kirsten Rocha MS CCC-SLP Speech and Language Pathologist           SLP Outcome Measures (last 72 hours)      SLP Outcome Measures     Row Name 11/17/20 1400 11/16/20 1500          SLP Outcome Measures    Outcome Measure Used?  Adult NOMS  -AW  Adult NOMS  -SH        Adult FCM Scores    FCM Chosen  Swallowing  -AW  Swallowing  -SH     Swallowing FCM Score  4  -AW  4  -SH       User Key  (r) = Recorded By, (t) = Taken By, (c) = Cosigned By    Initials Name Effective Dates    Berenice Maza MS CCC-SLP 06/08/18 -      Kirsten North MS CCC-SLP 03/07/18 -            Time Calculation:   Time  Calculation- SLP     Row Name 11/17/20 1427             Time Calculation- SLP    SLP Start Time  1100  -AW      SLP Received On  11/17/20  -AW        User Key  (r) = Recorded By, (t) = Taken By, (c) = Cosigned By    Initials Name Provider Type    Berenice Maza, MS CCC-SLP Speech and Language Pathologist          Therapy Charges for Today     Code Description Service Date Service Provider Modifiers Qty    [id number] HC ST MOTION FLUORO EVAL SWALLOW 5 11/17/2020 Berenice Roblero, MS CCC-SLP GN 1               Berenice Roblero MS CCC-SLP  11/17/2020

## 2020-11-17 NOTE — PLAN OF CARE
VS WNL.  C/o pain in LLE at times, relieved by PO pain meds.  Pt. A&O x4.  Pt. Up with assist x1.  Pt. With adequate UOP.  Pt. With trach stoma, no trach present, stoma cared completed.  Pt. With PEG, clamped, site cleansed with NS and gauze applied.  Pt. Heparin drip Dc'd, started on PO Xarelto.  Pt. Resting comfortably at present, will continue to monitor closely.      Problem: Adult Inpatient Plan of Care  Goal: Plan of Care Review  Outcome: Ongoing, Progressing

## 2020-11-18 VITALS
DIASTOLIC BLOOD PRESSURE: 65 MMHG | RESPIRATION RATE: 20 BRPM | TEMPERATURE: 97.9 F | HEART RATE: 73 BPM | WEIGHT: 167.3 LBS | BODY MASS INDEX: 28.56 KG/M2 | OXYGEN SATURATION: 99 % | SYSTOLIC BLOOD PRESSURE: 113 MMHG | HEIGHT: 64 IN

## 2020-11-18 LAB
APTT PPP: 38.9 SECONDS (ref 22.7–35.4)
BASOPHILS # BLD AUTO: 0.08 10*3/MM3 (ref 0–0.2)
BASOPHILS NFR BLD AUTO: 0.8 % (ref 0–1.5)
DEPRECATED RDW RBC AUTO: 43.7 FL (ref 37–54)
EOSINOPHIL # BLD AUTO: 0.35 10*3/MM3 (ref 0–0.4)
EOSINOPHIL NFR BLD AUTO: 3.5 % (ref 0.3–6.2)
ERYTHROCYTE [DISTWIDTH] IN BLOOD BY AUTOMATED COUNT: 15.9 % (ref 12.3–15.4)
GLUCOSE BLDC GLUCOMTR-MCNC: 121 MG/DL (ref 70–130)
GLUCOSE BLDC GLUCOMTR-MCNC: 147 MG/DL (ref 70–130)
HCT VFR BLD AUTO: 33.4 % (ref 34–46.6)
HGB BLD-MCNC: 9.9 G/DL (ref 12–15.9)
IMM GRANULOCYTES # BLD AUTO: 0.05 10*3/MM3 (ref 0–0.05)
IMM GRANULOCYTES NFR BLD AUTO: 0.5 % (ref 0–0.5)
LYMPHOCYTES # BLD AUTO: 2.55 10*3/MM3 (ref 0.7–3.1)
LYMPHOCYTES NFR BLD AUTO: 25.2 % (ref 19.6–45.3)
MCH RBC QN AUTO: 22.7 PG (ref 26.6–33)
MCHC RBC AUTO-ENTMCNC: 29.6 G/DL (ref 31.5–35.7)
MCV RBC AUTO: 76.6 FL (ref 79–97)
MONOCYTES # BLD AUTO: 1.02 10*3/MM3 (ref 0.1–0.9)
MONOCYTES NFR BLD AUTO: 10.1 % (ref 5–12)
NEUTROPHILS NFR BLD AUTO: 59.9 % (ref 42.7–76)
NEUTROPHILS NFR BLD AUTO: 6.05 10*3/MM3 (ref 1.7–7)
NRBC BLD AUTO-RTO: 0 /100 WBC (ref 0–0.2)
PLATELET # BLD AUTO: 263 10*3/MM3 (ref 140–450)
PMV BLD AUTO: 11.2 FL (ref 6–12)
RBC # BLD AUTO: 4.36 10*6/MM3 (ref 3.77–5.28)
WBC # BLD AUTO: 10.1 10*3/MM3 (ref 3.4–10.8)

## 2020-11-18 PROCEDURE — 97535 SELF CARE MNGMENT TRAINING: CPT

## 2020-11-18 PROCEDURE — 82962 GLUCOSE BLOOD TEST: CPT

## 2020-11-18 PROCEDURE — 85730 THROMBOPLASTIN TIME PARTIAL: CPT | Performed by: NURSE PRACTITIONER

## 2020-11-18 PROCEDURE — 63710000001 INSULIN GLARGINE PER 5 UNITS: Performed by: NURSE PRACTITIONER

## 2020-11-18 PROCEDURE — 94799 UNLISTED PULMONARY SVC/PX: CPT

## 2020-11-18 PROCEDURE — 97166 OT EVAL MOD COMPLEX 45 MIN: CPT

## 2020-11-18 PROCEDURE — 85025 COMPLETE CBC W/AUTO DIFF WBC: CPT | Performed by: NURSE PRACTITIONER

## 2020-11-18 RX ADMIN — ASPIRIN 81 MG: 81 TABLET, CHEWABLE ORAL at 09:20

## 2020-11-18 RX ADMIN — GABAPENTIN 300 MG: 300 CAPSULE ORAL at 06:29

## 2020-11-18 RX ADMIN — AMLODIPINE BESYLATE 5 MG: 5 TABLET ORAL at 09:20

## 2020-11-18 RX ADMIN — INSULIN GLARGINE 5 UNITS: 100 INJECTION, SOLUTION SUBCUTANEOUS at 09:33

## 2020-11-18 RX ADMIN — IPRATROPIUM BROMIDE AND ALBUTEROL SULFATE 3 ML: 2.5; .5 SOLUTION RESPIRATORY (INHALATION) at 10:42

## 2020-11-18 RX ADMIN — OXYCODONE HYDROCHLORIDE AND ACETAMINOPHEN 1 TABLET: 5; 325 TABLET ORAL at 13:45

## 2020-11-18 RX ADMIN — IPRATROPIUM BROMIDE AND ALBUTEROL SULFATE 3 ML: 2.5; .5 SOLUTION RESPIRATORY (INHALATION) at 07:07

## 2020-11-18 RX ADMIN — OXYCODONE HYDROCHLORIDE AND ACETAMINOPHEN 1 TABLET: 5; 325 TABLET ORAL at 09:32

## 2020-11-18 RX ADMIN — METOPROLOL SUCCINATE 25 MG: 25 TABLET, EXTENDED RELEASE ORAL at 09:21

## 2020-11-18 RX ADMIN — CLOTRIMAZOLE AND BETAMETHASONE DIPROPIONATE: 10; .5 CREAM TOPICAL at 09:21

## 2020-11-18 RX ADMIN — BUSPIRONE HYDROCHLORIDE 5 MG: 5 TABLET ORAL at 09:20

## 2020-11-18 RX ADMIN — RIVAROXABAN 15 MG: 15 TABLET, FILM COATED ORAL at 09:20

## 2020-11-18 RX ADMIN — MULTIPLE VITAMINS W/ MINERALS TAB 1 TABLET: TAB at 09:20

## 2020-11-18 RX ADMIN — PANTOPRAZOLE SODIUM 40 MG: 40 TABLET, DELAYED RELEASE ORAL at 06:29

## 2020-11-18 RX ADMIN — ESCITALOPRAM 20 MG: 20 TABLET, FILM COATED ORAL at 09:20

## 2020-11-18 RX ADMIN — BUDESONIDE 0.5 MG: 0.5 INHALANT ORAL at 07:07

## 2020-11-18 NOTE — PLAN OF CARE
Pt admitted to [facility] 2/2 to acute DVT of the left lower extremity from the external iliac vein down into the calf veins. Pt with a recent lengthy hospital admission to [place] for respiratory failure where she required a trach. Today, pt on 4L O2 nc. She transferred to [facility] with Mod I. Mod I to mobilize to BSC, complete toileting and transition to sinkside to complete ADL's from a standing position. No further skilled inpatient OT services indicated at this time due to being at baseline performance. Pt plans on d/c home with her husbands assist. OT to sign off.     Patient was not wearing a face mask during this therapy encounter. Therapist used appropriate personal protective equipment including mask, goggles and gloves. Mask used was standard procedure mask. Appropriate PPE was worn during the entire therapy session. Hand hygiene was completed before and after therapy session. Patient is not in enhanced droplet precautions.

## 2020-11-18 NOTE — PLAN OF CARE
Goal Outcome Evaluation:  Plan of Care Reviewed With: patient  Progress: improving  Outcome Summary: VSS. Plan for D/C home today. Spouse will provide transportation and is bringing home o2. RN scheduled ENT appointment for outpatient follow tomorrow at 1400, pt is aware of this appointment.

## 2020-11-18 NOTE — PLAN OF CARE
Problem: Skin Injury Risk Increased  Goal: Skin Health and Integrity  11/18/2020 1422 by Juana Wood RN  Outcome: Met  11/18/2020 1420 by Juana Wood RN  Outcome: Ongoing, Progressing  Intervention: Optimize Skin Protection  Recent Flowsheet Documentation  Taken 11/18/2020 0932 by Juana Wood RN  Pressure Reduction Techniques: frequent weight shift encouraged     Problem: Respiratory Compromise COPD (Chronic Obstructive Pulmonary Disease)  Goal: Effective Oxygenation and Ventilation  11/18/2020 1422 by Juana Wood RN  Outcome: Met  11/18/2020 1420 by Juana Wood RN  Outcome: Ongoing, Progressing  Intervention: Promote Airway Secretion Clearance  Recent Flowsheet Documentation  Taken 11/18/2020 1205 by Juana Wood RN  Activity Management: activity adjusted per tolerance  Taken 11/18/2020 1020 by Juana Wood RN  Activity Management: sitting, edge of bed  Taken 11/18/2020 0932 by Juana Wood RN  Activity Management: standing at bedside  Taken 11/18/2020 0805 by Juana Wood RN  Activity Management: activity adjusted per tolerance     Problem: Adult Behavioral Health Plan of Care  Goal: Plan of Care Review  11/18/2020 1422 by Juana Wood RN  Outcome: Met  11/18/2020 1420 by Juana Wood RN  Outcome: Ongoing, Progressing  Flowsheets  Taken 11/18/2020 1420 by Juana Wood RN  Outcome Summary: VSS. Plan for D/C home today. Spouse will provide transportation and is bringing home o2.  Taken 11/18/2020 0523 by Danna Gallardo RN  Plan of Care Reviewed With: patient     Problem: Adult Inpatient Plan of Care  Goal: Plan of Care Review  11/18/2020 1422 by Juana Wood RN  Outcome: Met  11/18/2020 1420 by Juana Wood RN  Outcome: Ongoing, Progressing  Flowsheets (Taken 11/18/2020 1420)  Outcome Summary: VSS. Plan for D/C home today. Spouse will provide transportation and is bringing home o2.  Goal:  Patient-Specific Goal (Individualized)  11/18/2020 1422 by Juana Wood RN  Outcome: Met  11/18/2020 1420 by Juana Wood RN  Outcome: Ongoing, Progressing  Goal: Absence of Hospital-Acquired Illness or Injury  11/18/2020 1422 by Juana Wood RN  Outcome: Met  11/18/2020 1420 by Juana Wood RN  Outcome: Ongoing, Progressing  Intervention: Identify and Manage Fall Risk  Recent Flowsheet Documentation  Taken 11/18/2020 1205 by Juana Wood RN  Safety Promotion/Fall Prevention:   safety round/check completed   room organization consistent   nonskid shoes/slippers when out of bed   fall prevention program maintained   clutter free environment maintained   assistive device/personal items within reach  Taken 11/18/2020 1020 by Juana Wood RN  Safety Promotion/Fall Prevention:   safety round/check completed   room organization consistent   nonskid shoes/slippers when out of bed   fall prevention program maintained   clutter free environment maintained   assistive device/personal items within reach  Taken 11/18/2020 0932 by Juana Wood RN  Safety Promotion/Fall Prevention:   safety round/check completed   room organization consistent   nonskid shoes/slippers when out of bed   fall prevention program maintained   clutter free environment maintained   assistive device/personal items within reach  Taken 11/18/2020 0805 by Juana Wood RN  Safety Promotion/Fall Prevention:   safety round/check completed   room organization consistent   nonskid shoes/slippers when out of bed   fall prevention program maintained   clutter free environment maintained   assistive device/personal items within reach  Intervention: Prevent Skin Injury  Recent Flowsheet Documentation  Taken 11/18/2020 1020 by Juana Wood RN  Body Position: dangle, side of bed  Intervention: Prevent Infection  Recent Flowsheet Documentation  Taken 11/18/2020 1020 by Juana Wood  RN  Infection Prevention: personal protective equipment utilized  Taken 11/18/2020 0932 by Juana Wood RN  Infection Prevention: personal protective equipment utilized   Goal Outcome Evaluation:  Plan of Care Reviewed With: patient  Progress: improving  Outcome Summary: VSS. Plan for D/C home today. Spouse will provide transportation and is bringing home o2.

## 2020-11-18 NOTE — THERAPY EVALUATION
Patient Name: Swetha Luis  : 1960    MRN: [id number]                              Today's Date: 2020       Admit Date: 11/15/2020    Visit Dx:     ICD-10-CM ICD-9-CM   1. Acute deep vein thrombosis (DVT) of proximal vein of left lower extremity (CMS/[facility])  I82.4Y2 453.41   2. Stage 3a chronic kidney disease  N18.31 585.3     Patient Active Problem List   Diagnosis   • Tracheostomy complication (CMS/[facility])   • Gangrene of toe (CMS/[facility])   • Acute diastolic congestive heart failure (CMS/[facility])   • ARF (acute renal failure) (CMS/[facility])   • Stage 3 chronic kidney disease   • Elevated troponin   • Acute respiratory failure with hypoxemia (CMS/HCC)   • Acute osteomyelitis (CMS/HCC)   • UTI due to extended-spectrum beta lactamase (ESBL) producing Escherichia coli   • Thrush, oral   • Tracheostomy malfunction (CMS/[facility])   • COPD (chronic obstructive pulmonary disease) (CMS/HCC)   • Chronic respiratory failure with hypoxia (CMS/HCC)   • PEG (percutaneous endoscopic gastrostomy) status (CMS/HCC)   • History of osteomyelitis   • Polysubstance abuse (CMS/[facility])   • History of tracheal stenosis   • Chronic diastolic CHF (congestive heart failure) (CMS/HCC)   • Peripheral artery disease (CMS/HCC)   • Medically noncompliant   • WENDI and COPD overlap syndrome (CMS/[facility])   • Dyspnea   • Elevated LFTs   • Elevated troponin   • Tracheostomy present (CMS/[facility])   • Essential hypertension   • Dysphagia   • Healthcare associated bacterial pneumonia   • Infection due to ESBL-producing Escherichia coli   • Sepsis due to Gram negative bacteria (CMS/[facility])   • Ureteral stone with hydronephrosis   • UTI (urinary tract infection)   • Acute tracheobronchitis   • Chronic diastolic CHF (congestive heart failure) (CMS/[facility])   • Bacteremia   • Acute deep vein thrombosis (DVT) of proximal vein of left lower extremity (CMS/[facility])   • Elevated d-dimer   • Type 2 diabetes mellitus with hyperglycemia, with long-term current use of insulin (CMS/[facility])     Past  Medical History:   Diagnosis Date   • Acute congestive heart failure (CMS/HCC) 12/24/2018   • Acute osteomyelitis (CMS/HCC)     Right shoulder due to IVDA   • Acute renal failure on dialysis (CMS/HCC)    • Anxiety    • Pugh esophagus    • CKD (chronic kidney disease)    • COPD (chronic obstructive pulmonary disease) (CMS/HCC)    • MRSA infection    • Nontraumatic subarachnoid hemorrhage (CMS/HCC)    • Seizures (CMS/HCC)    • Tracheostomy present (CMS/HCC)      Past Surgical History:   Procedure Laterality Date   • TRACHEOSTOMY     • URETEROSCOPY LASER LITHOTRIPSY WITH STENT INSERTION Left 10/11/2019    Procedure: CYSTOSCOPY,  URETEROSCOPY, LEFT RETROGRADE, LEFT PYELOGRAM, LASER LITHOTRIPSY, PLACEMENT OF STENT.;  Surgeon: Clyde Selby MD;  Location: [facility];  Service: Urology     General Information     Row Name 11/18/20 1333          OT Time and Intention    Document Type  evaluation  -RB     Mode of Treatment  individual therapy;occupational therapy  -RB     Row Name 11/18/20 1333          General Information    Patient Profile Reviewed  yes  -RB     Prior Level of Function  independent:;ADL's;transfer  -RB     Existing Precautions/Restrictions  fall;oxygen therapy device and L/min  -RB     Barriers to Rehab  medically complex  -RB     Row Name 11/18/20 1333          Living Environment    Lives With  spouse  -RB     Row Name 11/18/20 1333          Cognition    Orientation Status (Cognition)  oriented x 3  -RB     Row Name 11/18/20 1333          Safety Issues, Functional Mobility    Impairments Affecting Function (Mobility)  endurance/activity tolerance  -RB       User Key  (r) = Recorded By, (t) = Taken By, (c) = Cosigned By    Initials Name Provider Type    RB Andree Regan OT Occupational Therapist        Mobility/ADL's     Row Name 11/18/20 1339          Bed Mobility    Bed Mobility  bed mobility (all) activities  -RB     All Activities, [place] (Bed Mobility)  modified independence   -RB     Assistive Device (Bed Mobility)  bed rails  -RB     Row Name 11/18/20 1334          Functional Mobility    Functional Mobility- Ind. Level  conditional independence  -RB     Functional Mobility- Device  rolling walker  -RB     Row Name 11/18/20 1334          Activities of Daily Living    BADL Assessment/Intervention  lower body dressing;toileting;grooming  -RB     Row Name 11/18/20 1334          Lower Body Dressing Assessment/Training    [place] Level (Lower Body Dressing)  lower body dressing skills;modified independence  -RB     Position (Lower Body Dressing)  edge of bed sitting  -RB     Row Name 11/18/20 1334          Toileting Assessment/Training    [place] Level (Toileting)  toileting skills;modified independence  -RB     Position (Toileting)  supported sitting;supported standing  -RB     Row Name 11/18/20 1334          Grooming Assessment/Training    [place] Level (Grooming)  grooming skills;modified independence  -RB     Position (Grooming)  sink side  -RB       User Key  (r) = Recorded By, (t) = Taken By, (c) = Cosigned By    Initials Name Provider Type    RB Andree Regan OT Occupational Therapist        Obj/Interventions     Row Name 11/18/20 1335          Sensory Assessment (Somatosensory)    Sensory Assessment (Somatosensory)  sensation intact  -RB     Row Name 11/18/20 1335          Vision Assessment/Intervention    Visual Impairment/Limitations  WNL  -RB     Row Name 11/18/20 1335          Range of Motion Comprehensive    General Range of Motion  no range of motion deficits identified  -RB     Row Name 11/18/20 1335          Strength Comprehensive (MMT)    General Manual Muscle Testing (MMT) Assessment  upper extremity strength deficits identified  -RB     Comment, General Manual Muscle Testing (MMT) Assessment  generalized weakness - grossly 3+/5  -RB     Row Name 11/18/20 1335          Balance    Balance Assessment  sitting static balance;sitting dynamic balance;sit to  stand dynamic balance;standing static balance;standing dynamic balance  -RB     Static Sitting Balance  WFL  -RB     Dynamic Sitting Balance  WFL  -RB     Static Standing Balance  WFL  -RB     Dynamic Standing Balance  WFL  -RB     Comment, Balance  Rwx for support during standing  -RB       User Key  (r) = Recorded By, (t) = Taken By, (c) = Cosigned By    Initials Name Provider Type    Andree Rapp OT Occupational Therapist        Goals/Plan     Row Name 11/18/20 8369          Dressing Goal 1 (OT)    Activity/Device (Dressing Goal 1, OT)  dressing skills, all  -RB     [place]/Cues Needed (Dressing Goal 1, OT)  modified independence  -RB     Time Frame (Dressing Goal 1, OT)  short term goal (STG);2 weeks  -RB     Progress/Outcome (Dressing Goal 1, OT)  goal met  -RB     Row Name 11/18/20 7935          Toileting Goal 1 (OT)    Activity/Device (Toileting Goal 1, OT)  toileting skills, all  -RB     [place] Level/Cues Needed (Toileting Goal 1, OT)  modified independence  -RB     Time Frame (Toileting Goal 1, OT)  short term goal (STG);2 weeks  -RB     Progress/Outcome (Toileting Goal 1, OT)  goal met  -RB     Row Name 11/18/20 2599          Grooming Goal 1 (OT)    Activity/Device (Grooming Goal 1, OT)  grooming skills, all  -RB     [place] (Grooming Goal 1, OT)  modified independence  -RB     Time Frame (Grooming Goal 1, OT)  short term goal (STG);2 weeks  -RB     Progress/Outcome (Grooming Goal 1, OT)  goal met  -RB     Row Name 11/18/20 2004          Therapy Assessment/Plan (OT)    Planned Therapy Interventions (OT)  BADL retraining;transfer/mobility retraining;patient/caregiver education/training;activity tolerance training  -RB       User Key  (r) = Recorded By, (t) = Taken By, (c) = Cosigned By    Initials Name Provider Type    Andree Rapp OT Occupational Therapist        Clinical Impression     Row Name 11/18/20 8568          Pain Scale: Numbers Pre/Post-Treatment    Pretreatment  Pain Rating  0/10 - no pain  -RB     Posttreatment Pain Rating  0/10 - no pain  -RB     Row Name 11/18/20 1336          Plan of Care Review    Progress  improving  -RB     Row Name 11/18/20 1336          Therapy Assessment/Plan (OT)    Rehab Potential (OT)  good, to achieve stated therapy goals  -RB     Criteria for Skilled Therapeutic Interventions Met (OT)  no;skilled treatment is necessary  -RB     Therapy Frequency (OT)  evaluation only  -RB     Row Name 11/18/20 1336          Therapy Plan Review/Discharge Plan (OT)    Anticipated Discharge Disposition (OT)  home with assist  -RB     Row Name 11/18/20 1336          Vital Signs    Pre SpO2 (%)  92  -RB     O2 Delivery Pre Treatment  supplemental O2  -RB     Intra SpO2 (%)  92  -RB     O2 Delivery Intra Treatment  supplemental O2  -RB     Post SpO2 (%)  93  -RB     O2 Delivery Post Treatment  supplemental O2  -RB     Pre Patient Position  Supine  -RB     Intra Patient Position  Standing  -RB     Post Patient Position  Supine  -RB     Row Name 11/18/20 1336          Positioning and Restraints    Pre-Treatment Position  in bed  -RB     Post Treatment Position  bed  -RB     In Bed  notified nsg;supine;fowlers;call light within reach;encouraged to call for assist;exit alarm on  -RB       User Key  (r) = Recorded By, (t) = Taken By, (c) = Cosigned By    Initials Name Provider Type    Andree Rapp OT Occupational Therapist        Outcome Measures     Row Name 11/18/20 7371          How much help from another is currently needed...    Putting on and taking off regular lower body clothing?  4  -RB     Bathing (including washing, rinsing, and drying)  4  -RB     Toileting (which includes using toilet bed pan or urinal)  4  -RB     Putting on and taking off regular upper body clothing  4  -RB     Taking care of personal grooming (such as brushing teeth)  4  -RB     Eating meals  4  -RB     AM-PAC 6 Clicks Score (OT)  24  -RB     Row Name 11/18/20 5505           Functional Assessment    Outcome Measure Options  AM-PAC 6 Clicks Daily Activity (OT)  -RB       User Key  (r) = Recorded By, (t) = Taken By, (c) = Cosigned By    Initials Name Provider Type    Andree Rapp OT Occupational Therapist        Occupational Therapy Education                 Title: PT OT SLP Therapies (Done)     Topic: Occupational Therapy (Done)     Point: ADL training (Done)     Description:   Instruct learner(s) on proper safety adaptation and remediation techniques during self care or transfers.   Instruct in proper use of assistive devices.              Learning Progress Summary           Patient Acceptance, E,TB, VU,DU by RB at 11/18/2020 1338    Comment: Pt educated on helpful DME for bathroom  environment                   Point: Home exercise program (Done)     Description:   Instruct learner(s) on appropriate technique for monitoring, assisting and/or progressing therapeutic exercises/activities.              Learning Progress Summary           Patient Acceptance, E,TB, VU,DU by RB at 11/18/2020 1338    Comment: Pt educated on helpful DME for bathroom  environment                   Point: Precautions (Done)     Description:   Instruct learner(s) on prescribed precautions during self-care and functional transfers.              Learning Progress Summary           Patient Acceptance, E,TB, VU,DU by RB at 11/18/2020 1338    Comment: Pt educated on helpful DME for bathroom  environment                   Point: Body mechanics (Done)     Description:   Instruct learner(s) on proper positioning and spine alignment during self-care, functional mobility activities and/or exercises.              Learning Progress Summary           Patient Acceptance, E,TB, VU,DU by RB at 11/18/2020 1338    Comment: Pt educated on helpful DME for bathroom  environment                               User Key     Initials Effective Dates Name Provider Type [facility] 04/02/20 -  Andree Regan OT  Occupational Therapist OT              OT Recommendation and Plan  Planned Therapy Interventions (OT): BADL retraining, transfer/mobility retraining, patient/caregiver education/training, activity tolerance training  Therapy Frequency (OT): evaluation only  Plan of Care Review  Progress: improving     Time Calculation:   Time Calculation- OT     Row Name 11/18/20 1339             Time Calculation- OT    OT Start Time  0851  -RB      OT Stop Time  0907  -RB      OT Time Calculation (min)  16 min  -RB      Total Timed Code Minutes- OT  8 minute(s)  -RB        User Key  (r) = Recorded By, (t) = Taken By, (c) = Cosigned By    Initials Name Provider Type    RB Andree Regan OT Occupational Therapist        Therapy Charges for Today     Code Description Service Date Service Provider Modifiers Qty    [id number]  OT EVAL MOD COMPLEXITY 2 11/18/2020 Andree Regan OT GO 1    [id number]  OT SELF CARE/MGMT/TRAIN EA 15 MIN 11/18/2020 Andree Regan OT GO 1               Andree Regan OT  11/18/2020

## 2020-11-18 NOTE — PLAN OF CARE
Outcome: Ongoing, Progressing  Flowsheets (Taken 11/18/2020 0523)  Progress: improving  Plan of Care Reviewed With: patient  Outcome Summary: Patient was very anxious overnight, given a one time dose of xanax. Heparin bridge. Alert and oriented x4. Old trach stoma sight, MD noted possible surgery at a later date for closer. Old PEG tube, last used in March 2020. VSS. Will continue to monitor.

## 2020-11-18 NOTE — DISCHARGE SUMMARY
[facility]    Date of Discharge:  11/18/2020    PCP: Provider, No Known    Discharge Diagnosis:   Active Hospital Problems    Diagnosis  POA   • **Acute deep vein thrombosis (DVT) of proximal vein of left lower extremity (CMS/[facility]) [I82.4Y2]  Yes   • Type 2 diabetes mellitus with hyperglycemia, with long-term current use of insulin (CMS/[facility]) [E11.65, Z79.4]  Not Applicable   • Elevated d-dimer [R79.89]  Yes   • Essential hypertension [I10]  Yes   • Peripheral artery disease (CMS/[facility]) [I73.9]  Yes   • COPD (chronic obstructive pulmonary disease) (CMS/[facility]) [J44.9]  Yes   • Chronic diastolic CHF (congestive heart failure) (CMS/[facility]) [I50.32]  Yes   • Chronic respiratory failure with hypoxia (CMS/[facility]) [J96.11]  Yes   • Stage 3 chronic kidney disease [N18.30]  Yes      Resolved Hospital Problems   No resolved problems to display.          Consults     Date and Time Order Name Status Description    11/18/2020 0031 Inpatient ENT Consult      11/16/2020 1101 Inpatient Vascular Surgery Consult Completed     11/16/2020 0203 Inpatient Pulmonology Consult Completed     11/15/2020 7504 [facility] (on-call MD unless specified) Details Completed         Hospital Course  Please see history and physical for details. Patient is a 60 y.o. female initially admitted for leg pain.  Patient does have a past history of DVT without PE though came to us on no anticoagulation.  She also has a family history of DVT and PE in which her mother passed away from this.  Patient was blanketed with anticoagulation with heparin drip.  I have since transitioned this over to Xarelto and counseled patient in regards to medication risk and benefits.  She has been counseled in regards to increased risk for bleeding as well as what to do if that were to occur.  So far she is tolerating all therapy with no new issues.  I did consult vascular surgery secondary to the extensiveness of that clot and they recommend  compression and anticoagulation but do not feel any further work-up or treatment is warranted.  Vital signs remained stable as well as afebrile and patient has issues with chronic hypoxic respiratory failure of which she is on 4 L at baseline and she is currently sat 98% on 4 L and she has no respiratory distress.  Phonology did consult ENT prior to discharge due to concerns of stomal/tracheal fistula as she has lost the ability to talk over the last week.  This may indeed need surgical closure and ENT consult has been placed.  I discussed the case with the RN as we are having a hard time getting ENT to evaluate.  I counseled her that we are not stopping anticoagulation in the near future and ENT can likely be ascertained in an outpatient setting if able but also okay if they want to evaluate while she is still here.  All was explained to the patient and all questions answered to the best my ability.  She is very thankful for the care she received while here as well as minimal to the above plan for discharge.  No family present at bedside.  RN was present at bedside and case was also discussed in multidisciplinary rounds.      Condition on Discharge: Improved.     Temp:  [97.9 °F (36.6 °C)-98.6 °F (37 °C)] 97.9 °F (36.6 °C)  Heart Rate:  [52-69] 64  Resp:  [18-20] 20  BP: (102-135)/(46-77) 102/46  Body mass index is 28.72 kg/m².    Physical Exam  HENT:      Head: Normocephalic.      Nose: Nose normal.      Mouth/Throat:      Mouth: Mucous membranes are moist.      Pharynx: Oropharynx is clear.   Eyes:      Conjunctiva/sclera: Conjunctivae normal.   Neck:      Comments: Tracheostomy stoma without surrounding cellulitis  Cardiovascular:      Rate and Rhythm: Normal rate and regular rhythm.   Pulmonary:      Effort: Pulmonary effort is normal. No respiratory distress.      Breath sounds: Normal breath sounds.   Abdominal:      General: Bowel sounds are normal. There is no distension.      Palpations: Abdomen is soft.    Musculoskeletal:         General: No swelling or tenderness.      Comments: No calf tenderness palpation   Skin:     General: Skin is warm and dry.      Coloration: Skin is not jaundiced.   Neurological:      General: No focal deficit present.      Mental Status: She is alert and oriented to person, place, and time.     [unfilled]    Disposition: Home or Self Care       Discharge Medications      New Medications      Instructions Start Date   Xarelto Starter Pack tablet therapy pack starter pack  Generic drug: Rivaroxaban   Take 1 tablet by mouth 2 (Two) Times a Day With Meals for 21 days. Then take 1 tablet daily with dinner. Indications: DVT/PE (active thrombosis)         Changes to Medications      Instructions Start Date   gabapentin 300 MG capsule  Commonly known as: NEURONTIN  What changed:   · how much to take  · additional instructions   300 mg, Oral, 3 Times Daily      oxyCODONE-acetaminophen 5-325 MG per tablet  Commonly known as: PERCOCET  What changed:   · when to take this  · additional instructions   1 tablet, Oral, Every 6 Hours PRN         Continue These Medications      Instructions Start Date   acetaminophen 325 MG tablet  Commonly known as: TYLENOL   650 mg, Oral, Every 4 Hours PRN      Alcohol Swabs 70 % pads   1 each, Subcutaneous, 4 Times Daily      amLODIPine 5 MG tablet  Commonly known as: NORVASC   5 mg, Oral, Every 24 Hours Scheduled      Ascorbic Acid 500 MG capsule   Oral      aspirin 81 MG chewable tablet   81 mg, Oral, Daily      busPIRone 5 MG tablet  Commonly known as: BUSPAR   Take 1 tablet by mouth 3 (Three) times a day.      clotrimazole-betamethasone 1-0.05 % cream  Commonly known as: LOTRISONE   Topical, Every 12 Hours Scheduled, Apply to perivaginal area and perineum after washing.      escitalopram 20 MG tablet  Commonly known as: LEXAPRO   Take 1 tablet by mouth daily.      FreeStyle Lite device   1 each, Subcutaneous, 4 Times Daily      FREESTYLE TEST STRIPS test strip  Generic  drug: glucose blood   1 strip, Other, 4 Times Daily      insulin glargine 100 UNIT/ML injection  Commonly known as: LANTUS   5 Units, Subcutaneous, 2 times daily      ipratropium-albuterol 0.5-2.5 mg/3 ml nebulizer  Commonly known as: DUO-NEB   Inhale 3 mL by nebulization every 4 (Four) hours as needed for wheezing.      metFORMIN 500 MG tablet  Commonly known as: GLUCOPHAGE   500 mg, Oral, 2 Times Daily With Meals      metoprolol succinate XL 25 MG 24 hr tablet  Commonly known as: TOPROL-XL   25 mg, Oral, Daily      multivitamin with minerals tablet tablet   Oral      omeprazole 40 MG capsule  Commonly known as: priLOSEC   40 mg, Oral, Daily      pramipexole 0.25 MG tablet  Commonly known as: MIRAPEX   0.25 mg, Oral, Nightly      thiamine 100 MG tablet  Commonly known as: VITAMIN B-1   100 mg, Oral, Daily         Stop These Medications    Apidra SoloStar 100 UNIT/ML solution pen-injector  Generic drug: Insulin Glulisine     budesonide 0.5 MG/2ML nebulizer solution  Commonly known as: PULMICORT     predniSONE 20 MG tablet  Commonly known as: DELTASONE             Additional Instructions for the Follow-ups that You Need to Schedule     Discharge Follow-up with PCP   As directed       Currently Documented PCP:    Provider, No Known    PCP Phone Number:    [phone]     Follow Up Details: PCP 1 to 2 weeks.  Pulmonology per their recommendations.  Discussed ENT consult with RN and nava if transitioned to outpatient           Follow-up Information     Provider, No Known .    Why: PCP 1 to 2 weeks.  Pulmonology per their recommendations.  Discussed ENT consult with RN and nava if transitioned to outpatient  Contact information:  [facility] [id number]  [phone]                     Ramses Elam MD  11/18/20  09:49 EST    Discharge time spent greater than 30 minutes.

## 2020-11-18 NOTE — PROGRESS NOTES
Discharge Planning Assessment  [facility]     Patient Name: Swetha Luis  MRN: [id number]  Today's Date: 11/18/2020    Admit Date: 11/15/2020    Discharge Needs Assessment     Row Name 11/18/20 1007       Living Environment    Lives With  spouse    Name(s) of Who Lives With Patient  , Cristopher Luis, [phone]    Current Living Arrangements  home/apartment/condo    Provides Primary Care For  no one    Family Caregiver if Needed  spouse;child(karthik), adult    Quality of Family Relationships  helpful;involved;supportive    Able to Return to Prior Arrangements  yes       Resource/Environmental Concerns    Resource/Environmental Concerns  none       Transition Planning    Patient/Family Anticipates Transition to  home with family    Patient/Family Anticipated Services at Transition  none    Transportation Anticipated  family or friend will provide       Discharge Needs Assessment    Equipment Currently Used at Home  walker, rolling;trach supplies;oxygen    Concerns to be Addressed  discharge planning    Discharge Coordination/Progress  Home with family        Discharge Plan     Row Name 11/18/20 1008       Plan    Plan  Home with family assist    Provided Post Acute Provider List?  Refused    Refused Provider List Comment  Declines HH referral    Patient/Family in Agreement with Plan  yes    Plan Comments  [facility] met with pt at bedside to verify information and discuss d/c planning. Pt resides in an apartment with her , uses walker as needed, and O2 via [employer]. Pt denies h/o home health and declines referral at this time. Pt is enrolled in Meds to [facility], [facility] (Keisha) confirmed cost for new Xarelto prescription will be $0 after [employer] is applied. Pt's  to transport home today. Valery Latif LCSW        Continued Care and Services - Admitted Since 11/15/2020    Coordination has not been started for this encounter.       Expected Discharge Date and Time     Expected Discharge Date  Expected Discharge Time    Nov 18, 2020         Demographic Summary     Row Name 11/18/20 1006       General Information    Admission Type  inpatient    Arrived From  home    Referral Source  admission list    Reason for Consult  discharge planning    Preferred Language  English        Functional Status     Row Name 11/18/20 1006       Functional Status    Usual Activity Tolerance  good    Current Activity Tolerance  good       Functional Status, IADL    Medications  assistive equipment and person    Meal Preparation  assistive equipment and person    Housekeeping  assistive equipment and person    Laundry  assistive equipment and person    Shopping  assistive equipment and person       Mental Status Summary    Recent Changes in Mental Status/Cognitive Functioning  no changes        Psychosocial    No documentation.       Abuse/Neglect    No documentation.       Legal    No documentation.       Substance Abuse    No documentation.       Patient Forms    No documentation.           Yasemin Latif LCSW

## 2020-11-18 NOTE — NURSING NOTE
RN spoke to Libby with case management about discharge follow up with PCP. Case management will arrange follow up care.

## 2020-11-18 NOTE — PROGRESS NOTES
Continued Stay Note  [facility]     Patient Name: Swetha Luis  MRN: [id number]  Today's Date: 11/18/2020    Admit Date: 11/15/2020    Discharge Plan     Row Name 11/18/20 1543       Plan    Plan Comments  Spoke with pt who agreed to referral being made to [facility] to see pt at home as her PCP.  Referral emailed to [email or web address] to request [facility] to contact pt to schedule an appt to see pt at home.        Discharge Codes    No documentation.       Expected Discharge Date and Time     Expected Discharge Date Expected Discharge Time    Nov 18, 2020             RICH Funk

## 2020-11-19 ENCOUNTER — READMISSION MANAGEMENT (OUTPATIENT)
Dept: CALL CENTER | Facility: HOSPITAL | Age: 60
End: 2020-11-19

## 2020-11-19 NOTE — OUTREACH NOTE
Prep Survey      Responses   Congregational facility patient discharged from?  [place]   Is LACE score < 7 ?  No   Eligibility  Readm Mgmt   Discharge diagnosis  Acute deep vein thrombosis    Does the patient have one of the following disease processes/diagnoses(primary or secondary)?  Other   Does the patient have Home health ordered?  Yes   What is the Home health agency?   [facility]    Is there a DME ordered?  No   Prep survey completed?  Yes          Nirmala Perales RN

## 2020-11-19 NOTE — PROGRESS NOTES
Case Management Discharge Note      Final Note: Pt discharged home wiith  transporting.  brought portable O2 tank for transport. CCP received return contact from [employer] (Lena) following pt's departure. Lena states pt's O2 DME was supplied via [employer] though they plan to discontinue services to pt, and will no longer supply her additional portable tanks for discharge. Pt will require new O2 set up with a new company should she re-admit. Valery Latif LCSW    Provided Post Acute Provider List?: Refused  Refused Provider List Comment: Declines HH referral    Selected Continued Care - Discharged on 11/18/2020 Admission date: 11/15/2020 - Discharge disposition: Home or Self Care    Destination    No services have been selected for the patient.              Durable Medical Equipment    No services have been selected for the patient.              Dialysis/Infusion    No services have been selected for the patient.              Home Medical Care    No services have been selected for the patient.              Therapy    No services have been selected for the patient.              Community Resources    No services have been selected for the patient.                  Transportation Services  Private: Car    Final Discharge Disposition Code: 01 - home or self-care

## 2020-11-19 NOTE — PAYOR COMM NOTE
"Neo Spencer (60 y.o. Female)     ATTN: DISCHARGE SUMMARY FOR REVIEW; REF# [id number]     DEPT: [id number],  [phone]        Date of Birth Social Security Number Address Home Phone MRN    1960  [id number] [address] [phone] [id number]    Sabianist Marital Status          Non-Jewish        Admission Date Admission Type Admitting Provider Attending Provider Department, Room/Bed    11/15/20 Emergency Luciano Bocanegra MD  [address], [address]1    Discharge Date Discharge Disposition Discharge Destination        11/18/2020 Home or Self Care              Attending Provider: (none)   Allergies: Cephalexin, Hydrocodone, Strawberry, Zithromax [Azithromycin]    Isolation: None   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), 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)   Code Status: Prior    Ht: 162.6 cm (64\")   Wt: 75.9 kg (167 lb 4.8 oz)    Admission Cmt: None   Principal Problem: Acute deep vein thrombosis (DVT) of proximal vein of left lower extremity (CMS/[facility]) [I82.4Y2]                 Active Insurance as of 11/15/2020     Primary Coverage     Payor Plan Insurance Group Employer/Plan Group    [place] [id number]     Payor Plan Address Payor Plan Phone Number Payor Plan Fax Number Effective Dates    [employer] Claims Office - [address] [address] [phone]  4/1/2020 - None Entered    [facility] [id number]       Subscriber Name Subscriber Birth Date Member ID       NEO SPENCER 1960 [id number]                 Emergency Contacts      (Rel.) Home Phone Work Phone Mobile Phone    ELIAS SPENCERALD (Spouse) [phone] -- --    Shazia Spencer (Daughter) [phone] -- [phone]    Sister, \"Hattie\" (Sister) [phone] -- [phone]    Monserrat Taylor (Sister) [phone] -- [phone]    Ken Spencer (Son) -- -- [phone]               Discharge Summary      Ramses Elam MD at 11/18/20 0949              "                 [facility]    Date of Discharge:  11/18/2020    PCP: Provider, No Known    Discharge Diagnosis:   Active Hospital Problems    Diagnosis  POA   • **Acute deep vein thrombosis (DVT) of proximal vein of left lower extremity (CMS/[facility]) [I82.4Y2]  Yes   • Type 2 diabetes mellitus with hyperglycemia, with long-term current use of insulin (CMS/[facility]) [E11.65, Z79.4]  Not Applicable   • Elevated d-dimer [R79.89]  Yes   • Essential hypertension [I10]  Yes   • Peripheral artery disease (CMS/[facility]) [I73.9]  Yes   • COPD (chronic obstructive pulmonary disease) (CMS/[facility]) [J44.9]  Yes   • Chronic diastolic CHF (congestive heart failure) (CMS/[facility]) [I50.32]  Yes   • Chronic respiratory failure with hypoxia (CMS/[facility]) [J96.11]  Yes   • Stage 3 chronic kidney disease [N18.30]  Yes      Resolved Hospital Problems   No resolved problems to display.          Consults     Date and Time Order Name Status Description    11/18/2020 0031 Inpatient ENT Consult      11/16/2020 1101 Inpatient Vascular Surgery Consult Completed     11/16/2020 0203 Inpatient Pulmonology Consult Completed     11/15/2020 3219 [facility] (on-call MD unless specified) Details Completed         Hospital Course  Please see history and physical for details. Patient is a 60 y.o. female initially admitted for leg pain.  Patient does have a past history of DVT without PE though came to us on no anticoagulation.  She also has a family history of DVT and PE in which her mother passed away from this.  Patient was blanketed with anticoagulation with heparin drip.  I have since transitioned this over to Xarelto and counseled patient in regards to medication risk and benefits.  She has been counseled in regards to increased risk for bleeding as well as what to do if that were to occur.  So far she is tolerating all therapy with no new issues.  I did consult vascular surgery secondary to the extensiveness of that clot and they recommend  compression and anticoagulation but do not feel any further work-up or treatment is warranted.  Vital signs remained stable as well as afebrile and patient has issues with chronic hypoxic respiratory failure of which she is on 4 L at baseline and she is currently sat 98% on 4 L and she has no respiratory distress.  Phonology did consult ENT prior to discharge due to concerns of stomal/tracheal fistula as she has lost the ability to talk over the last week.  This may indeed need surgical closure and ENT consult has been placed.  I discussed the case with the RN as we are having a hard time getting ENT to evaluate.  I counseled her that we are not stopping anticoagulation in the near future and ENT can likely be ascertained in an outpatient setting if able but also okay if they want to evaluate while she is still here.  All was explained to the patient and all questions answered to the best my ability.  She is very thankful for the care she received while here as well as minimal to the above plan for discharge.  No family present at bedside.  RN was present at bedside and case was also discussed in multidisciplinary rounds.      Condition on Discharge: Improved.     Temp:  [97.9 °F (36.6 °C)-98.6 °F (37 °C)] 97.9 °F (36.6 °C)  Heart Rate:  [52-69] 64  Resp:  [18-20] 20  BP: (102-135)/(46-77) 102/46  Body mass index is 28.72 kg/m².    Physical Exam  HENT:      Head: Normocephalic.      Nose: Nose normal.      Mouth/Throat:      Mouth: Mucous membranes are moist.      Pharynx: Oropharynx is clear.   Eyes:      Conjunctiva/sclera: Conjunctivae normal.   Neck:      Comments: Tracheostomy stoma without surrounding cellulitis  Cardiovascular:      Rate and Rhythm: Normal rate and regular rhythm.   Pulmonary:      Effort: Pulmonary effort is normal. No respiratory distress.      Breath sounds: Normal breath sounds.   Abdominal:      General: Bowel sounds are normal. There is no distension.      Palpations: Abdomen is soft.    Musculoskeletal:         General: No swelling or tenderness.      Comments: No calf tenderness palpation   Skin:     General: Skin is warm and dry.      Coloration: Skin is not jaundiced.   Neurological:      General: No focal deficit present.      Mental Status: She is alert and oriented to person, place, and time.     [unfilled]    Disposition: Home or Self Care       Discharge Medications      New Medications      Instructions Start Date   Xarelto Starter Pack tablet therapy pack starter pack  Generic drug: Rivaroxaban   Take 1 tablet by mouth 2 (Two) Times a Day With Meals for 21 days. Then take 1 tablet daily with dinner. Indications: DVT/PE (active thrombosis)         Changes to Medications      Instructions Start Date   gabapentin 300 MG capsule  Commonly known as: NEURONTIN  What changed:   · how much to take  · additional instructions   300 mg, Oral, 3 Times Daily      oxyCODONE-acetaminophen 5-325 MG per tablet  Commonly known as: PERCOCET  What changed:   · when to take this  · additional instructions   1 tablet, Oral, Every 6 Hours PRN         Continue These Medications      Instructions Start Date   acetaminophen 325 MG tablet  Commonly known as: TYLENOL   650 mg, Oral, Every 4 Hours PRN      Alcohol Swabs 70 % pads   1 each, Subcutaneous, 4 Times Daily      amLODIPine 5 MG tablet  Commonly known as: NORVASC   5 mg, Oral, Every 24 Hours Scheduled      Ascorbic Acid 500 MG capsule   Oral      aspirin 81 MG chewable tablet   81 mg, Oral, Daily      busPIRone 5 MG tablet  Commonly known as: BUSPAR   Take 1 tablet by mouth 3 (Three) times a day.      clotrimazole-betamethasone 1-0.05 % cream  Commonly known as: LOTRISONE   Topical, Every 12 Hours Scheduled, Apply to perivaginal area and perineum after washing.      escitalopram 20 MG tablet  Commonly known as: LEXAPRO   Take 1 tablet by mouth daily.      FreeStyle Lite device   1 each, Subcutaneous, 4 Times Daily      FREESTYLE TEST STRIPS test strip  Generic  drug: glucose blood   1 strip, Other, 4 Times Daily      insulin glargine 100 UNIT/ML injection  Commonly known as: LANTUS   5 Units, Subcutaneous, 2 times daily      ipratropium-albuterol 0.5-2.5 mg/3 ml nebulizer  Commonly known as: DUO-NEB   Inhale 3 mL by nebulization every 4 (Four) hours as needed for wheezing.      metFORMIN 500 MG tablet  Commonly known as: GLUCOPHAGE   500 mg, Oral, 2 Times Daily With Meals      metoprolol succinate XL 25 MG 24 hr tablet  Commonly known as: TOPROL-XL   25 mg, Oral, Daily      multivitamin with minerals tablet tablet   Oral      omeprazole 40 MG capsule  Commonly known as: priLOSEC   40 mg, Oral, Daily      pramipexole 0.25 MG tablet  Commonly known as: MIRAPEX   0.25 mg, Oral, Nightly      thiamine 100 MG tablet  Commonly known as: VITAMIN B-1   100 mg, Oral, Daily         Stop These Medications    Apidra SoloStar 100 UNIT/ML solution pen-injector  Generic drug: Insulin Glulisine     budesonide 0.5 MG/2ML nebulizer solution  Commonly known as: PULMICORT     predniSONE 20 MG tablet  Commonly known as: DELTASONE             Additional Instructions for the Follow-ups that You Need to Schedule     Discharge Follow-up with PCP   As directed       Currently Documented PCP:    Provider, No Known    PCP Phone Number:    [phone]     Follow Up Details: PCP 1 to 2 weeks.  Pulmonology per their recommendations.  Discussed ENT consult with RN and nava if transitioned to outpatient           Follow-up Information     Provider, No Known .    Why: PCP 1 to 2 weeks.  Pulmonology per their recommendations.  Discussed ENT consult with RN and nava if transitioned to outpatient  Contact information:  [facility] [address]  [phone]                     Ramses Elam MD  11/18/20  09:49 EST    Discharge time spent greater than 30 minutes.    Electronically signed by Ramses Elam MD at 11/18/20 0953       Yasemin Latif LCSW      Case  Management   Progress Notes   Signed   Date of Service:  11/18/20 1514   Creation Time:  11/19/20 1004            Signed             Show:Clear all  []Manual[x]Template[]Copied    Added by:  [x]Yasemin Latif LCSW    []Jennifer for details  Case Management Discharge Note        Final Note: Pt discharged home wiith  transporting.  brought portable O2 tank for transport. CCP received return contact from [employer] (Lena) following pt's departure. Lena states pt's O2 DME was supplied via [employer] though they plan to discontinue services to pt, and will no longer supply her additional portable tanks for discharge. Pt will require new O2 set up with a new company should she re-admit. Valery Latif LCSW     Provided Post Acute Provider List?: Refused  Refused Provider List Comment: Declines HH referral         Selected Continued Care - Discharged on 11/18/2020 Admission date: 11/15/2020 - Discharge disposition: Home or Self Care     Destination    No services have been selected for the patient.                 Durable Medical Equipment    No services have been selected for the patient.                 Dialysis/Infusion    No services have been selected for the patient.                 Home Medical Care    No services have been selected for the patient.                 Therapy    No services have been selected for the patient.                 Community Resources    No services have been selected for the patient.                      Transportation Services  Private: Car     Final Discharge Disposition Code: 01 - home or self-care

## 2020-11-21 ENCOUNTER — HOSPITAL ENCOUNTER (EMERGENCY)
Facility: HOSPITAL | Age: 60
Discharge: HOME | End: 2020-11-21
Attending: EMERGENCY MEDICINE | Admitting: EMERGENCY MEDICINE

## 2020-11-21 VITALS
SYSTOLIC BLOOD PRESSURE: 148 MMHG | OXYGEN SATURATION: 99 % | WEIGHT: 167.55 LBS | HEART RATE: 84 BPM | TEMPERATURE: 98.6 F | BODY MASS INDEX: 28.76 KG/M2 | RESPIRATION RATE: 18 BRPM | DIASTOLIC BLOOD PRESSURE: 78 MMHG

## 2020-11-21 DIAGNOSIS — I82.4Y2 ACUTE DEEP VEIN THROMBOSIS (DVT) OF PROXIMAL VEIN OF LEFT LOWER EXTREMITY (HCC): ICD-10-CM

## 2020-11-21 DIAGNOSIS — M79.605 LEFT LEG PAIN: Primary | ICD-10-CM

## 2020-11-21 PROCEDURE — 99283 EMERGENCY DEPT VISIT LOW MDM: CPT

## 2020-11-21 RX ORDER — OXYCODONE HYDROCHLORIDE AND ACETAMINOPHEN 5; 325 MG/1; MG/1
1 TABLET ORAL EVERY 6 HOURS PRN
Qty: 12 TABLET | Refills: 0 | Status: ON HOLD | OUTPATIENT
Start: 2020-11-21 | End: 2020-12-08

## 2020-11-21 RX ORDER — OXYCODONE HYDROCHLORIDE AND ACETAMINOPHEN 5; 325 MG/1; MG/1
1 TABLET ORAL ONCE
Status: COMPLETED | OUTPATIENT
Start: 2020-11-21 | End: 2020-11-21

## 2020-11-21 RX ADMIN — OXYCODONE AND ACETAMINOPHEN 1 TABLET: 5; 325 TABLET ORAL at 11:25

## 2020-11-21 NOTE — DISCHARGE INSTRUCTIONS
Take pain medication as prescribed.  Continue taking Xarelto.  Call the patient liaison for assistance in obtaining a primary care physician for follow-up.  Return to emergency department worsening pain, leg swelling, chest pain, increased shortness of breath, fever, or other concern.

## 2020-11-21 NOTE — ED TRIAGE NOTES
Hx dvt in left leg - has had pain in that leg x 2 weeks.  Is on o2 4L at baseline.  Uses walker at baseline    Patient was placed in face mask during first look triage.  Patient was wearing a face mask throughout encounter.  I wore personal protective equipment throughout the encounter.  Hand hygiene was performed before and after patient encounter.

## 2020-11-21 NOTE — ED PROVIDER NOTES
EMERGENCY DEPARTMENT ENCOUNTER    Room Number:  22/22  Date of encounter:  11/21/2020  PCP: Provider, No Known  Historian: Patient     I used full protective equipment while examining this patient.  This includes face mask, gloves and protective eyewear.  I washed my hands before entering the room and immediately upon leaving the room.  Patient was wearing a surgical mask.      HPI:  Chief Complaint: Left leg pain   A complete HPI/ROS/PMH/PSH/SH/FH are unobtainable due to: None    Context: Swetha Luis is a 60 y.o. female who presents to the ED c/o left leg pain for approximately 1 week.  Patient was admitted here 11/15 through 11/18 after being diagnosed with an extensive DVT in her left leg.  She was started on Xarelto and reports being compliant with this.  Patient states the pain is constant and is worse with movement.  Patient states she is not taking any pain medication currently.  She denies numbness/tingling/weakness in the left leg, chest pain, or worsening shortness of breath.  Patient is chronically on 4 L of oxygen at home.  Pain is moderate in intensity.      PAST MEDICAL HISTORY  Active Ambulatory Problems     Diagnosis Date Noted   • Tracheostomy complication (CMS/[facility]) 12/18/2018   • Gangrene of toe (CMS/[facility]) 12/20/2018   • Acute diastolic congestive heart failure (CMS/[facility]) 12/24/2018   • ARF (acute renal failure) (CMS/[facility]) 12/24/2018   • Stage 3 chronic kidney disease 12/24/2018   • Elevated troponin 12/24/2018   • Acute respiratory failure with hypoxemia (CMS/[facility]) 12/24/2018   • Acute osteomyelitis (CMS/[facility]) 12/26/2018   • UTI due to extended-spectrum beta lactamase (ESBL) producing Escherichia coli 12/27/2018   • Thrush, oral 12/28/2018   • Tracheostomy malfunction (CMS/[facility]) 07/10/2019   • COPD (chronic obstructive pulmonary disease) (CMS/[facility]) 07/10/2019   • Chronic respiratory failure with hypoxia (CMS/[facility]) 07/10/2019   • PEG (percutaneous endoscopic gastrostomy) status (CMS/[facility]) 07/10/2019   •  History of osteomyelitis 07/10/2019   • Polysubstance abuse (CMS/HCC) 07/10/2019   • History of tracheal stenosis 07/10/2019   • Chronic diastolic CHF (congestive heart failure) (CMS/HCC) 07/10/2019   • Peripheral artery disease (CMS/HCC) 07/10/2019   • Medically noncompliant 07/10/2019   • WENDI and COPD overlap syndrome (CMS/HCC) 07/10/2019   • Dyspnea 09/22/2019   • Elevated LFTs 09/22/2019   • Elevated troponin 09/22/2019   • Tracheostomy present (CMS/HCC) 09/22/2019   • Essential hypertension 09/22/2019   • Dysphagia 09/22/2019   • Healthcare associated bacterial pneumonia 10/04/2019   • Infection due to ESBL-producing Escherichia coli 10/06/2019   • Sepsis due to Gram negative bacteria (CMS/HCC) 10/06/2019   • Ureteral stone with hydronephrosis 10/08/2019   • UTI (urinary tract infection) 10/08/2019   • Acute tracheobronchitis 10/19/2019   • Chronic diastolic CHF (congestive heart failure) (CMS/HCC) 10/19/2019   • Bacteremia 10/19/2019   • Acute deep vein thrombosis (DVT) of proximal vein of left lower extremity (CMS/HCC) 11/15/2020   • Elevated d-dimer 11/15/2020   • Type 2 diabetes mellitus with hyperglycemia, with long-term current use of insulin (CMS/HCC) 11/16/2020     Resolved Ambulatory Problems     Diagnosis Date Noted   • No Resolved Ambulatory Problems     Past Medical History:   Diagnosis Date   • Acute congestive heart failure (CMS/HCC) 12/24/2018   • Acute renal failure on dialysis (CMS/HCC)    • Anxiety    • Pugh esophagus    • CKD (chronic kidney disease)    • MRSA infection    • Nontraumatic subarachnoid hemorrhage (CMS/HCC)    • Seizures (CMS/HCC)          PAST SURGICAL HISTORY  Past Surgical History:   Procedure Laterality Date   • TRACHEOSTOMY     • URETEROSCOPY LASER LITHOTRIPSY WITH STENT INSERTION Left 10/11/2019    Procedure: CYSTOSCOPY,  URETEROSCOPY, LEFT RETROGRADE, LEFT PYELOGRAM, LASER LITHOTRIPSY, PLACEMENT OF STENT.;  Surgeon: Clyde Selby MD;  Location: [facility] OR;   "Service: Urology         FAMILY HISTORY  Family History   Problem Relation Age of Onset   • Diabetes Mother    • Hypertension Mother          SOCIAL HISTORY  Social History     Socioeconomic History   • Marital status:      Spouse name: Not on file   • Number of children: Not on file   • Years of education: Not on file   • Highest education level: Not on file   Tobacco Use   • Smoking status: Current Every Day Smoker     Packs/day: 0.50     Types: Cigarettes   • Smokeless tobacco: Never Used   • Tobacco comment: \"1 CIGARETTE A DAY\"   Substance and Sexual Activity   • Alcohol use: No     Frequency: Never   • Drug use: Yes     Types: Cocaine(coke)     Comment: 20 years ago occasional   • Sexual activity: Defer         ALLERGIES  Cephalexin, Hydrocodone, Strawberry, and Zithromax [azithromycin]       REVIEW OF SYSTEMS  Review of Systems      All systems have been reviewed and are negative except as as discussed in the HPI    PHYSICAL EXAM    I have reviewed the triage vital signs and nursing notes.    ED Triage Vitals [11/21/20 0907]   Temp Heart Rate Resp BP SpO2   98.6 °F (37 °C) 90 16 (!) 144/112 100 %      Temp src Heart Rate Source Patient Position BP Location FiO2 (%)   Tympanic Monitor -- -- --       Physical Exam  GENERAL: Awake, alert  HENT: NCAT, nares patent, moist mucous membranes  NECK: supple, tracheostomy is present  EYES: no scleral icterus  CV: regular rhythm, regular rate, no murmur  RESPIRATORY: normal effort, scattered rhonchi bilaterally  ABDOMEN: soft, nontender, nondistended  MUSCULOSKELETAL: Extremities are  without obvious deformity.  There is normal range of motion in all extremities.  There is soft tissue tenderness over the medial left thigh, left popliteal fossa, and left calf.  There is brisk cap refill in the left toes.  NEURO: Strength, sensation, and coordination are grossly intact.  Speech and mentation are unremarkable.  No facial droop.  SKIN: warm, dry, no rash  PSYCH: Normal " mood and affect      LAB RESULTS  No results found for this or any previous visit (from the past 24 hour(s)).    Ordered the above labs and independently reviewed the results.      RADIOLOGY  No Radiology Exams Resulted Within Past 24 Hours    I ordered the above noted radiological studies. Reviewed by me and discussed with radiologist.  See dictation for official radiology interpretation.      PROCEDURES  Procedures      MEDICATIONS GIVEN IN ER    Medications   oxyCODONE-acetaminophen (PERCOCET) 5-325 MG per tablet 1 tablet (1 tablet Oral Given 11/21/20 1125)         PROGRESS, DATA ANALYSIS, CONSULTS, AND MEDICAL DECISION MAKING    All labs have been independently reviewed by me.  All radiology studies have been reviewed by me and discussed with radiologist dictating the report.   EKG's independently viewed and interpreted by me.  I have reviewed the nurse's notes, vital signs, past medical history, and medication list.  Discussion below represents my analysis of pertinent findings related to patient's condition, differential diagnosis, treatment plan and final disposition.      ED Course as of Nov 21 1210   Sat Nov 21, 2020   1106 Old records reviewed.  Patient was admitted here 11/15 through 11/18/2020 for acute DVT in the left external iliac, common femoral, deep femoral, proximal femoral, mid femoral, distal femoral, popliteal, posterior tibial, peroneal, gastrocnemius, and soleal veins.  She was started on Xarelto.  She was seen by vascular surgery and conservative treatment was recommended.    [WH]   1122 MOLLY query complete. Treatment plan to include limited course of prescribed  controlled substance. Risks including addiction, benefits, and alternatives presented to patient.   Request #[id number]  Molly does not show any prescribed controlled substances in the past 1 year.    [WH]   1204 Patient is resting comfortably.  Pain is improved after Percocet.  Patient does have a known extensive acute DVT in her  left leg which I am sure is uncomfortable.  I will discharge the patient with a prescription for Percocet.  Return precautions were discussed.    [WH]      ED Course User Index  [WH] Juancho Garrison MD       AS OF 12:10 EST VITALS:    BP - (!) 144/112  HR - 90  TEMP - 98.6 °F (37 °C) (Tympanic)  O2 SATS - 100%      DIAGNOSIS  Final diagnoses:   Acute deep vein thrombosis (DVT) of proximal vein of left lower extremity (CMS/HCC)   Left leg pain         DISPOSITION  Discharge    DISCHARGE    Patient discharged in stable condition.    Reviewed implications of results, diagnosis, meds, responsibility to follow up, warning signs and symptoms of possible worsening, potential complications and reasons to return to ER, including increased pain, leg swelling, chest pain, worsening shortness of breath, fever, or other concern.    Patient/Family voiced understanding of above instructions.    Discussed plan for discharge, as there is no emergent indication for admission. Patient referred to primary care provider for BP management due to today's BP. Pt/family is agreeable and understands need for follow up and repeat testing.  Pt is aware that discharge does not mean that nothing is wrong but it indicates no emergency is present that requires admission and they must continue care with follow-up as given below or physician of their choice.     FOLLOW-UP  PATIENT LIAISON [place] [address]  [phone]  Call in 2 days           Medication List      Changed    gabapentin 300 MG capsule  Commonly known as: NEURONTIN  Take 1 capsule by mouth 3 (Three) Times a Day.  What changed:   · how much to take  · additional instructions     oxyCODONE-acetaminophen 5-325 MG per tablet  Commonly known as: PERCOCET  Take 1 tablet by mouth Every 6 (Six) Hours As Needed for Severe Pain .  What changed: reasons to take this           Where to Get Your Medications      You can get these medications from any pharmacy    Bring a  paper prescription for each of these medications  · oxyCODONE-acetaminophen 5-325 MG per tablet           Dictated utilizing Dragon dictation:  Much of this encounter note is an electronic transcription/translation of spoken language to printed text. The electronic translation of spoken language may permit erroneous, or at times, nonsensical words or phrases to be inadvertently transcribed; Although I have reviewed the note for such errors, some may still exist.     Juancho Garrison MD  11/21/20 8175

## 2020-11-21 NOTE — ED NOTES
Cristopher has been called - he will come to take patient home     Will Robles RN  11/21/20 9519

## 2020-11-21 NOTE — ED NOTES
Patient was placed in face mask in first look. Patient was wearing facemask when I entered the room and throughout our encounter. I wore full protective equipment throughout this patient encounter including a face mask, and gloves. Hand hygiene was performed before donning protective equipment and after removal when leaving the room.       Will Robles, RN  11/21/20 6311

## 2020-11-21 NOTE — ED NOTES
has called back saying he is now able to pick patient up - not cancelling EMS until patient leaves, just in case there is another change in plans     Will Robles RN  11/21/20 7288

## 2020-11-24 ENCOUNTER — READMISSION MANAGEMENT (OUTPATIENT)
Dept: CALL CENTER | Facility: HOSPITAL | Age: 60
End: 2020-11-24

## 2020-11-24 NOTE — OUTREACH NOTE
Medical Week 1 Survey      Responses   [facility] patient discharged from?  [place]   Does the patient have one of the following disease processes/diagnoses(primary or secondary)?  Other   Week 1 attempt successful?  No   Unsuccessful attempts  Attempt 1          Tracie Evans RN

## 2020-11-29 ENCOUNTER — HOSPITAL ENCOUNTER (EMERGENCY)
Facility: HOSPITAL | Age: 60
Discharge: HOME | End: 2020-11-30
Attending: EMERGENCY MEDICINE | Admitting: EMERGENCY MEDICINE

## 2020-11-29 ENCOUNTER — APPOINTMENT (OUTPATIENT)
Dept: CT IMAGING | Facility: HOSPITAL | Age: 60
End: 2020-11-29

## 2020-11-29 DIAGNOSIS — R91.1 PULMONARY NODULE: ICD-10-CM

## 2020-11-29 DIAGNOSIS — R06.02 SHORTNESS OF BREATH: ICD-10-CM

## 2020-11-29 DIAGNOSIS — J44.1 COPD EXACERBATION (HCC): ICD-10-CM

## 2020-11-29 DIAGNOSIS — I82.402 ACUTE DEEP VEIN THROMBOSIS (DVT) OF LEFT LOWER EXTREMITY, UNSPECIFIED VEIN (HCC): ICD-10-CM

## 2020-11-29 DIAGNOSIS — M79.605 PAIN OF LEFT LOWER EXTREMITY: Primary | ICD-10-CM

## 2020-11-29 LAB
ALBUMIN SERPL-MCNC: 4.1 G/DL (ref 3.5–5.2)
ALBUMIN/GLOB SERPL: 1.4 G/DL
ALP SERPL-CCNC: 209 U/L (ref 39–117)
ALT SERPL W P-5'-P-CCNC: 19 U/L (ref 1–33)
ANION GAP SERPL CALCULATED.3IONS-SCNC: 10.9 MMOL/L (ref 5–15)
AST SERPL-CCNC: 22 U/L (ref 1–32)
BASOPHILS # BLD AUTO: 0.06 10*3/MM3 (ref 0–0.2)
BASOPHILS NFR BLD AUTO: 0.7 % (ref 0–1.5)
BILIRUB SERPL-MCNC: <0.2 MG/DL (ref 0–1.2)
BUN SERPL-MCNC: 14 MG/DL (ref 8–23)
BUN/CREAT SERPL: 11.6 (ref 7–25)
CALCIUM SPEC-SCNC: 9.2 MG/DL (ref 8.6–10.5)
CHLORIDE SERPL-SCNC: 103 MMOL/L (ref 98–107)
CO2 SERPL-SCNC: 26.1 MMOL/L (ref 22–29)
CREAT SERPL-MCNC: 1.21 MG/DL (ref 0.57–1)
D DIMER PPP FEU-MCNC: 0.69 MCGFEU/ML (ref 0–0.49)
DEPRECATED RDW RBC AUTO: 43.1 FL (ref 37–54)
EOSINOPHIL # BLD AUTO: 0.36 10*3/MM3 (ref 0–0.4)
EOSINOPHIL NFR BLD AUTO: 4.1 % (ref 0.3–6.2)
ERYTHROCYTE [DISTWIDTH] IN BLOOD BY AUTOMATED COUNT: 15.9 % (ref 12.3–15.4)
GFR SERPL CREATININE-BSD FRML MDRD: 45 ML/MIN/1.73
GLOBULIN UR ELPH-MCNC: 2.9 GM/DL
GLUCOSE SERPL-MCNC: 199 MG/DL (ref 65–99)
HCT VFR BLD AUTO: 34.2 % (ref 34–46.6)
HGB BLD-MCNC: 10.2 G/DL (ref 12–15.9)
IMM GRANULOCYTES # BLD AUTO: 0.03 10*3/MM3 (ref 0–0.05)
IMM GRANULOCYTES NFR BLD AUTO: 0.3 % (ref 0–0.5)
INR PPP: 2.02 (ref 0.9–1.1)
LYMPHOCYTES # BLD AUTO: 1.63 10*3/MM3 (ref 0.7–3.1)
LYMPHOCYTES NFR BLD AUTO: 18.7 % (ref 19.6–45.3)
MCH RBC QN AUTO: 22.5 PG (ref 26.6–33)
MCHC RBC AUTO-ENTMCNC: 29.8 G/DL (ref 31.5–35.7)
MCV RBC AUTO: 75.5 FL (ref 79–97)
MONOCYTES # BLD AUTO: 0.62 10*3/MM3 (ref 0.1–0.9)
MONOCYTES NFR BLD AUTO: 7.1 % (ref 5–12)
NEUTROPHILS NFR BLD AUTO: 6.02 10*3/MM3 (ref 1.7–7)
NEUTROPHILS NFR BLD AUTO: 69.1 % (ref 42.7–76)
NRBC BLD AUTO-RTO: 0 /100 WBC (ref 0–0.2)
NT-PROBNP SERPL-MCNC: 397 PG/ML (ref 0–900)
PLATELET # BLD AUTO: 226 10*3/MM3 (ref 140–450)
PMV BLD AUTO: 11.2 FL (ref 6–12)
POTASSIUM SERPL-SCNC: 4.2 MMOL/L (ref 3.5–5.2)
PROCALCITONIN SERPL-MCNC: 0.06 NG/ML (ref 0–0.25)
PROT SERPL-MCNC: 7 G/DL (ref 6–8.5)
PROTHROMBIN TIME: 22.6 SECONDS (ref 11.7–14.2)
RBC # BLD AUTO: 4.53 10*6/MM3 (ref 3.77–5.28)
SODIUM SERPL-SCNC: 140 MMOL/L (ref 136–145)
TROPONIN T SERPL-MCNC: <0.01 NG/ML (ref 0–0.03)
WBC # BLD AUTO: 8.72 10*3/MM3 (ref 3.4–10.8)

## 2020-11-29 PROCEDURE — 96374 THER/PROPH/DIAG INJ IV PUSH: CPT

## 2020-11-29 PROCEDURE — 99284 EMERGENCY DEPT VISIT MOD MDM: CPT

## 2020-11-29 PROCEDURE — 93010 ELECTROCARDIOGRAM REPORT: CPT | Performed by: INTERNAL MEDICINE

## 2020-11-29 PROCEDURE — 85379 FIBRIN DEGRADATION QUANT: CPT | Performed by: EMERGENCY MEDICINE

## 2020-11-29 PROCEDURE — 85025 COMPLETE CBC W/AUTO DIFF WBC: CPT | Performed by: EMERGENCY MEDICINE

## 2020-11-29 PROCEDURE — 84145 PROCALCITONIN (PCT): CPT | Performed by: PHYSICIAN ASSISTANT

## 2020-11-29 PROCEDURE — 93005 ELECTROCARDIOGRAM TRACING: CPT | Performed by: PHYSICIAN ASSISTANT

## 2020-11-29 PROCEDURE — 83880 ASSAY OF NATRIURETIC PEPTIDE: CPT | Performed by: PHYSICIAN ASSISTANT

## 2020-11-29 PROCEDURE — 80053 COMPREHEN METABOLIC PANEL: CPT | Performed by: PHYSICIAN ASSISTANT

## 2020-11-29 PROCEDURE — 71275 CT ANGIOGRAPHY CHEST: CPT

## 2020-11-29 PROCEDURE — 84484 ASSAY OF TROPONIN QUANT: CPT | Performed by: PHYSICIAN ASSISTANT

## 2020-11-29 PROCEDURE — 85610 PROTHROMBIN TIME: CPT | Performed by: EMERGENCY MEDICINE

## 2020-11-29 PROCEDURE — 0202U NFCT DS 22 TRGT SARS-COV-2: CPT | Performed by: PHYSICIAN ASSISTANT

## 2020-11-30 ENCOUNTER — HOSPITAL ENCOUNTER (OUTPATIENT)
Dept: CARDIOLOGY | Facility: HOSPITAL | Age: 60
End: 2020-11-30

## 2020-11-30 ENCOUNTER — READMISSION MANAGEMENT (OUTPATIENT)
Dept: CALL CENTER | Facility: HOSPITAL | Age: 60
End: 2020-11-30

## 2020-11-30 VITALS
TEMPERATURE: 97.8 F | HEART RATE: 93 BPM | WEIGHT: 170 LBS | OXYGEN SATURATION: 93 % | HEIGHT: 66 IN | BODY MASS INDEX: 27.32 KG/M2 | DIASTOLIC BLOOD PRESSURE: 90 MMHG | SYSTOLIC BLOOD PRESSURE: 155 MMHG | RESPIRATION RATE: 18 BRPM

## 2020-11-30 LAB
B PARAPERT DNA SPEC QL NAA+PROBE: NOT DETECTED
B PERT DNA SPEC QL NAA+PROBE: NOT DETECTED
C PNEUM DNA NPH QL NAA+NON-PROBE: NOT DETECTED
FLUAV SUBTYP SPEC NAA+PROBE: NOT DETECTED
FLUBV RNA ISLT QL NAA+PROBE: NOT DETECTED
HADV DNA SPEC NAA+PROBE: NOT DETECTED
HCOV 229E RNA SPEC QL NAA+PROBE: NOT DETECTED
HCOV HKU1 RNA SPEC QL NAA+PROBE: NOT DETECTED
HCOV NL63 RNA SPEC QL NAA+PROBE: NOT DETECTED
HCOV OC43 RNA SPEC QL NAA+PROBE: NOT DETECTED
HMPV RNA NPH QL NAA+NON-PROBE: NOT DETECTED
HPIV1 RNA SPEC QL NAA+PROBE: NOT DETECTED
HPIV2 RNA SPEC QL NAA+PROBE: NOT DETECTED
HPIV3 RNA NPH QL NAA+PROBE: NOT DETECTED
HPIV4 P GENE NPH QL NAA+PROBE: NOT DETECTED
M PNEUMO IGG SER IA-ACNC: NOT DETECTED
QT INTERVAL: 361 MS
RHINOVIRUS RNA SPEC NAA+PROBE: NOT DETECTED
RSV RNA NPH QL NAA+NON-PROBE: NOT DETECTED
SARS-COV-2 RNA NPH QL NAA+NON-PROBE: NOT DETECTED

## 2020-11-30 PROCEDURE — 94799 UNLISTED PULMONARY SVC/PX: CPT

## 2020-11-30 PROCEDURE — 25010000002 METHYLPREDNISOLONE PER 125 MG: Performed by: PHYSICIAN ASSISTANT

## 2020-11-30 PROCEDURE — 0 IOPAMIDOL PER 1 ML: Performed by: EMERGENCY MEDICINE

## 2020-11-30 PROCEDURE — 94640 AIRWAY INHALATION TREATMENT: CPT

## 2020-11-30 PROCEDURE — 96374 THER/PROPH/DIAG INJ IV PUSH: CPT

## 2020-11-30 RX ORDER — METHYLPREDNISOLONE SODIUM SUCCINATE 125 MG/2ML
125 INJECTION, POWDER, LYOPHILIZED, FOR SOLUTION INTRAMUSCULAR; INTRAVENOUS ONCE
Status: COMPLETED | OUTPATIENT
Start: 2020-11-30 | End: 2020-11-30

## 2020-11-30 RX ORDER — METHYLPREDNISOLONE 4 MG/1
TABLET ORAL
Qty: 1 EACH | Refills: 0 | Status: SHIPPED | OUTPATIENT
Start: 2020-11-30 | End: 2020-12-08 | Stop reason: HOSPADM

## 2020-11-30 RX ORDER — ALBUTEROL SULFATE 2.5 MG/3ML
2.5 SOLUTION RESPIRATORY (INHALATION)
Status: DISPENSED | OUTPATIENT
Start: 2020-11-30 | End: 2020-11-30

## 2020-11-30 RX ORDER — IPRATROPIUM BROMIDE AND ALBUTEROL SULFATE 2.5; .5 MG/3ML; MG/3ML
3 SOLUTION RESPIRATORY (INHALATION) ONCE
Status: COMPLETED | OUTPATIENT
Start: 2020-11-30 | End: 2020-11-30

## 2020-11-30 RX ADMIN — IPRATROPIUM BROMIDE AND ALBUTEROL SULFATE 3 ML: 2.5; .5 SOLUTION RESPIRATORY (INHALATION) at 02:42

## 2020-11-30 RX ADMIN — METHYLPREDNISOLONE SODIUM SUCCINATE 125 MG: 125 INJECTION, POWDER, FOR SOLUTION INTRAMUSCULAR; INTRAVENOUS at 02:32

## 2020-11-30 RX ADMIN — IOPAMIDOL 95 ML: 755 INJECTION, SOLUTION INTRAVENOUS at 00:06

## 2020-11-30 RX ADMIN — ALBUTEROL SULFATE 2.5 MG: 2.5 SOLUTION RESPIRATORY (INHALATION) at 02:53

## 2020-12-01 ENCOUNTER — APPOINTMENT (OUTPATIENT)
Dept: GENERAL RADIOLOGY | Facility: HOSPITAL | Age: 60
End: 2020-12-01

## 2020-12-01 ENCOUNTER — HOSPITAL ENCOUNTER (INPATIENT)
Facility: HOSPITAL | Age: 60
LOS: 6 days | Discharge: HOME | End: 2020-12-08
Attending: EMERGENCY MEDICINE | Admitting: INTERNAL MEDICINE

## 2020-12-01 DIAGNOSIS — J96.02 ACUTE RESPIRATORY FAILURE WITH HYPOXIA AND HYPERCAPNIA (HCC): Primary | ICD-10-CM

## 2020-12-01 DIAGNOSIS — J96.01 ACUTE RESPIRATORY FAILURE WITH HYPOXIA AND HYPERCAPNIA (HCC): Primary | ICD-10-CM

## 2020-12-01 LAB
ALBUMIN SERPL-MCNC: 3.8 G/DL (ref 3.5–5.2)
ALBUMIN/GLOB SERPL: 1.4 G/DL
ALP SERPL-CCNC: 204 U/L (ref 39–117)
ALT SERPL W P-5'-P-CCNC: 16 U/L (ref 1–33)
ANION GAP SERPL CALCULATED.3IONS-SCNC: 10.1 MMOL/L (ref 5–15)
ARTERIAL PATENCY WRIST A: ABNORMAL
AST SERPL-CCNC: 16 U/L (ref 1–32)
ATMOSPHERIC PRESS: 757.5 MMHG
BASE EXCESS BLDA CALC-SCNC: -2.2 MMOL/L (ref 0–2)
BDY SITE: ABNORMAL
BILIRUB SERPL-MCNC: <0.2 MG/DL (ref 0–1.2)
BUN SERPL-MCNC: 18 MG/DL (ref 8–23)
BUN/CREAT SERPL: 10.9 (ref 7–25)
CALCIUM SPEC-SCNC: 8.5 MG/DL (ref 8.6–10.5)
CHLORIDE SERPL-SCNC: 106 MMOL/L (ref 98–107)
CO2 SERPL-SCNC: 23.9 MMOL/L (ref 22–29)
CREAT SERPL-MCNC: 1.65 MG/DL (ref 0.57–1)
D-LACTATE SERPL-SCNC: 1.1 MMOL/L (ref 0.5–2)
GFR SERPL CREATININE-BSD FRML MDRD: 32 ML/MIN/1.73
GLOBULIN UR ELPH-MCNC: 2.8 GM/DL
GLUCOSE SERPL-MCNC: 229 MG/DL (ref 65–99)
HCO3 BLDA-SCNC: 28.9 MMOL/L (ref 22–28)
INHALED O2 CONCENTRATION: 100 %
MODALITY: ABNORMAL
NT-PROBNP SERPL-MCNC: 570.4 PG/ML (ref 0–900)
O2 A-A PPRESDIFF RESPIRATORY: 0.4 MMHG
PCO2 BLDA: 90.7 MM HG (ref 35–45)
PEEP RESPIRATORY: 5 CM[H2O]
PH BLDA: 7.11 PH UNITS (ref 7.35–7.45)
PO2 BLDA: 281.5 MM HG (ref 80–100)
POTASSIUM SERPL-SCNC: 4.6 MMOL/L (ref 3.5–5.2)
PROCALCITONIN SERPL-MCNC: 0.07 NG/ML (ref 0–0.25)
PROT SERPL-MCNC: 6.6 G/DL (ref 6–8.5)
SAO2 % BLDCOA: 99.7 % (ref 92–99)
SET MECH RESP RATE: 30
SODIUM SERPL-SCNC: 140 MMOL/L (ref 136–145)
TOTAL RATE: 30 BREATHS/MINUTE
TROPONIN T SERPL-MCNC: <0.01 NG/ML (ref 0–0.03)
VENTILATOR MODE: ABNORMAL

## 2020-12-01 PROCEDURE — 0202U NFCT DS 22 TRGT SARS-COV-2: CPT | Performed by: EMERGENCY MEDICINE

## 2020-12-01 PROCEDURE — 83605 ASSAY OF LACTIC ACID: CPT | Performed by: EMERGENCY MEDICINE

## 2020-12-01 PROCEDURE — 94002 VENT MGMT INPAT INIT DAY: CPT

## 2020-12-01 PROCEDURE — 85610 PROTHROMBIN TIME: CPT | Performed by: EMERGENCY MEDICINE

## 2020-12-01 PROCEDURE — 94799 UNLISTED PULMONARY SVC/PX: CPT

## 2020-12-01 PROCEDURE — 36415 COLL VENOUS BLD VENIPUNCTURE: CPT | Performed by: EMERGENCY MEDICINE

## 2020-12-01 PROCEDURE — 80053 COMPREHEN METABOLIC PANEL: CPT | Performed by: EMERGENCY MEDICINE

## 2020-12-01 PROCEDURE — 25010000002 HEPARIN (PORCINE) PER 1000 UNITS: Performed by: EMERGENCY MEDICINE

## 2020-12-01 PROCEDURE — 36600 WITHDRAWAL OF ARTERIAL BLOOD: CPT

## 2020-12-01 PROCEDURE — 85730 THROMBOPLASTIN TIME PARTIAL: CPT | Performed by: EMERGENCY MEDICINE

## 2020-12-01 PROCEDURE — 84145 PROCALCITONIN (PCT): CPT | Performed by: EMERGENCY MEDICINE

## 2020-12-01 PROCEDURE — 93005 ELECTROCARDIOGRAM TRACING: CPT | Performed by: EMERGENCY MEDICINE

## 2020-12-01 PROCEDURE — 87040 BLOOD CULTURE FOR BACTERIA: CPT | Performed by: EMERGENCY MEDICINE

## 2020-12-01 PROCEDURE — 99285 EMERGENCY DEPT VISIT HI MDM: CPT

## 2020-12-01 PROCEDURE — 25010000002 PROPOFOL 10 MG/ML EMULSION: Performed by: EMERGENCY MEDICINE

## 2020-12-01 PROCEDURE — 71045 X-RAY EXAM CHEST 1 VIEW: CPT

## 2020-12-01 PROCEDURE — 84484 ASSAY OF TROPONIN QUANT: CPT | Performed by: EMERGENCY MEDICINE

## 2020-12-01 PROCEDURE — 83880 ASSAY OF NATRIURETIC PEPTIDE: CPT | Performed by: EMERGENCY MEDICINE

## 2020-12-01 PROCEDURE — 25010000002 MIDAZOLAM PER 1 MG: Performed by: EMERGENCY MEDICINE

## 2020-12-01 PROCEDURE — 31500 INSERT EMERGENCY AIRWAY: CPT

## 2020-12-01 PROCEDURE — 93010 ELECTROCARDIOGRAM REPORT: CPT | Performed by: INTERNAL MEDICINE

## 2020-12-01 PROCEDURE — 82803 BLOOD GASES ANY COMBINATION: CPT

## 2020-12-01 PROCEDURE — 85025 COMPLETE CBC W/AUTO DIFF WBC: CPT | Performed by: EMERGENCY MEDICINE

## 2020-12-01 PROCEDURE — 25010000002 METHYLPREDNISOLONE PER 125 MG: Performed by: EMERGENCY MEDICINE

## 2020-12-01 RX ORDER — HEPARIN SODIUM 5000 [USP'U]/ML
80 INJECTION, SOLUTION INTRAVENOUS; SUBCUTANEOUS ONCE
Status: COMPLETED | OUTPATIENT
Start: 2020-12-01 | End: 2020-12-01

## 2020-12-01 RX ORDER — MIDAZOLAM HYDROCHLORIDE 1 MG/ML
2 INJECTION INTRAMUSCULAR; INTRAVENOUS ONCE
Status: DISCONTINUED | OUTPATIENT
Start: 2020-12-01 | End: 2020-12-05

## 2020-12-01 RX ORDER — SODIUM CHLORIDE 0.9 % (FLUSH) 0.9 %
10 SYRINGE (ML) INJECTION AS NEEDED
Status: DISCONTINUED | OUTPATIENT
Start: 2020-12-01 | End: 2020-12-08 | Stop reason: HOSPADM

## 2020-12-01 RX ORDER — ALBUTEROL SULFATE 90 UG/1
6 AEROSOL, METERED RESPIRATORY (INHALATION) ONCE
Status: COMPLETED | OUTPATIENT
Start: 2020-12-01 | End: 2020-12-02

## 2020-12-01 RX ORDER — HEPARIN SODIUM 10000 [USP'U]/100ML
18 INJECTION, SOLUTION INTRAVENOUS
Status: DISCONTINUED | OUTPATIENT
Start: 2020-12-01 | End: 2020-12-03

## 2020-12-01 RX ORDER — MIDAZOLAM HYDROCHLORIDE 1 MG/ML
INJECTION INTRAMUSCULAR; INTRAVENOUS
Status: COMPLETED | OUTPATIENT
Start: 2020-12-01 | End: 2020-12-01

## 2020-12-01 RX ORDER — NOREPINEPHRINE BIT/0.9 % NACL 8 MG/250ML
.02-.3 INFUSION BOTTLE (ML) INTRAVENOUS
Status: DISCONTINUED | OUTPATIENT
Start: 2020-12-01 | End: 2020-12-05

## 2020-12-01 RX ORDER — HEPARIN SODIUM 5000 [USP'U]/ML
40-80 INJECTION, SOLUTION INTRAVENOUS; SUBCUTANEOUS EVERY 6 HOURS PRN
Status: DISCONTINUED | OUTPATIENT
Start: 2020-12-01 | End: 2020-12-03

## 2020-12-01 RX ORDER — PROPOFOL 10 MG/ML
VIAL (ML) INTRAVENOUS
Status: DISPENSED
Start: 2020-12-01 | End: 2020-12-02

## 2020-12-01 RX ORDER — METHYLPREDNISOLONE SODIUM SUCCINATE 125 MG/2ML
125 INJECTION, POWDER, LYOPHILIZED, FOR SOLUTION INTRAMUSCULAR; INTRAVENOUS ONCE
Status: COMPLETED | OUTPATIENT
Start: 2020-12-01 | End: 2020-12-01

## 2020-12-01 RX ORDER — MIDAZOLAM HYDROCHLORIDE 1 MG/ML
INJECTION INTRAMUSCULAR; INTRAVENOUS
Status: DISPENSED
Start: 2020-12-01 | End: 2020-12-02

## 2020-12-01 RX ADMIN — MIDAZOLAM 2.5 MG: 1 INJECTION INTRAMUSCULAR; INTRAVENOUS at 22:11

## 2020-12-01 RX ADMIN — PROPOFOL 10 MCG/KG/MIN: 10 INJECTION, EMULSION INTRAVENOUS at 22:24

## 2020-12-01 RX ADMIN — MIDAZOLAM 2.5 MG: 1 INJECTION INTRAMUSCULAR; INTRAVENOUS at 22:13

## 2020-12-01 RX ADMIN — METHYLPREDNISOLONE SODIUM SUCCINATE 125 MG: 125 INJECTION, POWDER, FOR SOLUTION INTRAMUSCULAR; INTRAVENOUS at 23:55

## 2020-12-01 RX ADMIN — HEPARIN SODIUM 6200 UNITS: 5000 INJECTION INTRAVENOUS; SUBCUTANEOUS at 23:28

## 2020-12-01 RX ADMIN — SODIUM CHLORIDE 1000 ML: 9 INJECTION, SOLUTION INTRAVENOUS at 22:25

## 2020-12-01 RX ADMIN — Medication 0.04 MCG/KG/MIN: at 23:47

## 2020-12-01 RX ADMIN — HEPARIN SODIUM 18 UNITS/KG/HR: 10000 INJECTION, SOLUTION INTRAVENOUS at 23:40

## 2020-12-02 ENCOUNTER — READMISSION MANAGEMENT (OUTPATIENT)
Dept: CALL CENTER | Facility: HOSPITAL | Age: 60
End: 2020-12-02

## 2020-12-02 ENCOUNTER — APPOINTMENT (OUTPATIENT)
Dept: GENERAL RADIOLOGY | Facility: HOSPITAL | Age: 60
End: 2020-12-02

## 2020-12-02 ENCOUNTER — APPOINTMENT (OUTPATIENT)
Dept: CARDIOLOGY | Facility: HOSPITAL | Age: 60
End: 2020-12-02

## 2020-12-02 PROBLEM — J96.01 ACUTE RESPIRATORY FAILURE WITH HYPOXIA AND HYPERCAPNIA (HCC): Status: ACTIVE | Noted: 2020-12-02

## 2020-12-02 PROBLEM — J96.02 ACUTE RESPIRATORY FAILURE WITH HYPOXIA AND HYPERCAPNIA (HCC): Status: ACTIVE | Noted: 2020-12-02

## 2020-12-02 LAB
APTT PPP: 40.2 SECONDS (ref 22.7–35.4)
APTT PPP: 58.2 SECONDS (ref 22.7–35.4)
APTT PPP: 95.9 SECONDS (ref 22.7–35.4)
APTT PPP: >200 SECONDS (ref 22.7–35.4)
ARTERIAL PATENCY WRIST A: POSITIVE
ATMOSPHERIC PRESS: 761.2 MMHG
B PARAPERT DNA SPEC QL NAA+PROBE: NOT DETECTED
B PERT DNA SPEC QL NAA+PROBE: NOT DETECTED
BASE EXCESS BLDA CALC-SCNC: -1.8 MMOL/L (ref 0–2)
BASOPHILS # BLD AUTO: 0.01 10*3/MM3 (ref 0–0.2)
BASOPHILS # BLD AUTO: 0.08 10*3/MM3 (ref 0–0.2)
BASOPHILS NFR BLD AUTO: 0.1 % (ref 0–1.5)
BASOPHILS NFR BLD AUTO: 0.5 % (ref 0–1.5)
BDY SITE: ABNORMAL
BH CV ECHO MEAS - ACS: 2 CM
BH CV ECHO MEAS - AO MAX PG (FULL): 2.9 MMHG
BH CV ECHO MEAS - AO MAX PG: 5.5 MMHG
BH CV ECHO MEAS - AO MEAN PG (FULL): 2 MMHG
BH CV ECHO MEAS - AO MEAN PG: 3 MMHG
BH CV ECHO MEAS - AO ROOT AREA (BSA CORRECTED): 1.7
BH CV ECHO MEAS - AO ROOT AREA: 8.6 CM^2
BH CV ECHO MEAS - AO ROOT DIAM: 3.3 CM
BH CV ECHO MEAS - AO V2 MAX: 117 CM/SEC
BH CV ECHO MEAS - AO V2 MEAN: 82.9 CM/SEC
BH CV ECHO MEAS - AO V2 VTI: 29 CM
BH CV ECHO MEAS - AVA(I,A): 2.3 CM^2
BH CV ECHO MEAS - AVA(I,D): 2.3 CM^2
BH CV ECHO MEAS - AVA(V,A): 2.2 CM^2
BH CV ECHO MEAS - AVA(V,D): 2.2 CM^2
BH CV ECHO MEAS - BSA(HAYCOCK): 2 M^2
BH CV ECHO MEAS - BSA: 1.9 M^2
BH CV ECHO MEAS - BZI_BMI: 29.4 KILOGRAMS/M^2
BH CV ECHO MEAS - BZI_METRIC_HEIGHT: 167.6 CM
BH CV ECHO MEAS - BZI_METRIC_WEIGHT: 82.6 KG
BH CV ECHO MEAS - EDV(CUBED): 64 ML
BH CV ECHO MEAS - EDV(MOD-SP2): 82 ML
BH CV ECHO MEAS - EDV(MOD-SP4): 78 ML
BH CV ECHO MEAS - EDV(TEICH): 70 ML
BH CV ECHO MEAS - EF(CUBED): 65.7 %
BH CV ECHO MEAS - EF(MOD-BP): 62.8 %
BH CV ECHO MEAS - EF(MOD-SP2): 65.9 %
BH CV ECHO MEAS - EF(MOD-SP4): 60.3 %
BH CV ECHO MEAS - EF(TEICH): 57.8 %
BH CV ECHO MEAS - ESV(CUBED): 22 ML
BH CV ECHO MEAS - ESV(MOD-SP2): 28 ML
BH CV ECHO MEAS - ESV(MOD-SP4): 31 ML
BH CV ECHO MEAS - ESV(TEICH): 29.6 ML
BH CV ECHO MEAS - FS: 30 %
BH CV ECHO MEAS - IVS/LVPW: 0.92
BH CV ECHO MEAS - IVSD: 1.1 CM
BH CV ECHO MEAS - LAT PEAK E' VEL: 5.9 CM/SEC
BH CV ECHO MEAS - LV DIASTOLIC VOL/BSA (35-75): 40.6 ML/M^2
BH CV ECHO MEAS - LV MASS(C)D: 155.4 GRAMS
BH CV ECHO MEAS - LV MASS(C)DI: 80.9 GRAMS/M^2
BH CV ECHO MEAS - LV MAX PG: 2.6 MMHG
BH CV ECHO MEAS - LV MEAN PG: 1 MMHG
BH CV ECHO MEAS - LV SYSTOLIC VOL/BSA (12-30): 16.1 ML/M^2
BH CV ECHO MEAS - LV V1 MAX: 80.9 CM/SEC
BH CV ECHO MEAS - LV V1 MEAN: 51.4 CM/SEC
BH CV ECHO MEAS - LV V1 VTI: 21.2 CM
BH CV ECHO MEAS - LVIDD: 4 CM
BH CV ECHO MEAS - LVIDS: 2.8 CM
BH CV ECHO MEAS - LVLD AP2: 7.9 CM
BH CV ECHO MEAS - LVLD AP4: 8 CM
BH CV ECHO MEAS - LVLS AP2: 6.1 CM
BH CV ECHO MEAS - LVLS AP4: 6.3 CM
BH CV ECHO MEAS - LVOT AREA (M): 3.1 CM^2
BH CV ECHO MEAS - LVOT AREA: 3.1 CM^2
BH CV ECHO MEAS - LVOT DIAM: 2 CM
BH CV ECHO MEAS - LVPWD: 1.2 CM
BH CV ECHO MEAS - MED PEAK E' VEL: 5.6 CM/SEC
BH CV ECHO MEAS - MV A DUR: 0.2 SEC
BH CV ECHO MEAS - MV A MAX VEL: 113 CM/SEC
BH CV ECHO MEAS - MV DEC SLOPE: 197 CM/SEC^2
BH CV ECHO MEAS - MV DEC TIME: 0.21 SEC
BH CV ECHO MEAS - MV E MAX VEL: 68.4 CM/SEC
BH CV ECHO MEAS - MV E/A: 0.6
BH CV ECHO MEAS - MV MAX PG: 5.1 MMHG
BH CV ECHO MEAS - MV MEAN PG: 1 MMHG
BH CV ECHO MEAS - MV P1/2T MAX VEL: 58.2 CM/SEC
BH CV ECHO MEAS - MV P1/2T: 86.5 MSEC
BH CV ECHO MEAS - MV V2 MAX: 113 CM/SEC
BH CV ECHO MEAS - MV V2 MEAN: 52.8 CM/SEC
BH CV ECHO MEAS - MV V2 VTI: 20.2 CM
BH CV ECHO MEAS - MVA P1/2T LCG: 3.8 CM^2
BH CV ECHO MEAS - MVA(P1/2T): 2.5 CM^2
BH CV ECHO MEAS - MVA(VTI): 3.3 CM^2
BH CV ECHO MEAS - PA ACC TIME: 0.13 SEC
BH CV ECHO MEAS - PA MAX PG (FULL): 0.74 MMHG
BH CV ECHO MEAS - PA MAX PG: 1.7 MMHG
BH CV ECHO MEAS - PA PR(ACCEL): 21.9 MMHG
BH CV ECHO MEAS - PA V2 MAX: 65.2 CM/SEC
BH CV ECHO MEAS - PULM A REVS DUR: 0.16 SEC
BH CV ECHO MEAS - PULM A REVS VEL: 27.5 CM/SEC
BH CV ECHO MEAS - PULM DIAS VEL: 26.6 CM/SEC
BH CV ECHO MEAS - PULM S/D: 1.3
BH CV ECHO MEAS - PULM SYS VEL: 33.5 CM/SEC
BH CV ECHO MEAS - PVA(V,A): 3.1 CM^2
BH CV ECHO MEAS - PVA(V,D): 3.1 CM^2
BH CV ECHO MEAS - QP/QS: 0.79
BH CV ECHO MEAS - RV MAX PG: 0.96 MMHG
BH CV ECHO MEAS - RV MEAN PG: 1 MMHG
BH CV ECHO MEAS - RV V1 MAX: 49 CM/SEC
BH CV ECHO MEAS - RV V1 MEAN: 35 CM/SEC
BH CV ECHO MEAS - RV V1 VTI: 12.6 CM
BH CV ECHO MEAS - RVOT AREA: 4.2 CM^2
BH CV ECHO MEAS - RVOT DIAM: 2.3 CM
BH CV ECHO MEAS - SI(AO): 129.1 ML/M^2
BH CV ECHO MEAS - SI(CUBED): 21.9 ML/M^2
BH CV ECHO MEAS - SI(LVOT): 34.7 ML/M^2
BH CV ECHO MEAS - SI(MOD-SP2): 28.1 ML/M^2
BH CV ECHO MEAS - SI(MOD-SP4): 24.5 ML/M^2
BH CV ECHO MEAS - SI(TEICH): 21.1 ML/M^2
BH CV ECHO MEAS - SV(AO): 248 ML
BH CV ECHO MEAS - SV(CUBED): 42 ML
BH CV ECHO MEAS - SV(LVOT): 66.6 ML
BH CV ECHO MEAS - SV(MOD-SP2): 54 ML
BH CV ECHO MEAS - SV(MOD-SP4): 47 ML
BH CV ECHO MEAS - SV(RVOT): 52.3 ML
BH CV ECHO MEAS - SV(TEICH): 40.4 ML
BH CV ECHO MEAS - TAPSE (>1.6): 2.6 CM
BH CV ECHO MEASUREMENTS AVERAGE E/E' RATIO: 11.9
BH CV XLRA - RV BASE: 3 CM
BH CV XLRA - RV LENGTH: 7 CM
BH CV XLRA - RV MID: 2.9 CM
BH CV XLRA - TDI S': 12.6 CM/SEC
C PNEUM DNA NPH QL NAA+NON-PROBE: NOT DETECTED
DEPRECATED RDW RBC AUTO: 45.6 FL (ref 37–54)
DEPRECATED RDW RBC AUTO: 46.5 FL (ref 37–54)
EOSINOPHIL # BLD AUTO: 0.01 10*3/MM3 (ref 0–0.4)
EOSINOPHIL # BLD AUTO: 0.21 10*3/MM3 (ref 0–0.4)
EOSINOPHIL NFR BLD AUTO: 0.1 % (ref 0.3–6.2)
EOSINOPHIL NFR BLD AUTO: 1.3 % (ref 0.3–6.2)
ERYTHROCYTE [DISTWIDTH] IN BLOOD BY AUTOMATED COUNT: 16.1 % (ref 12.3–15.4)
ERYTHROCYTE [DISTWIDTH] IN BLOOD BY AUTOMATED COUNT: 16.3 % (ref 12.3–15.4)
FLUAV SUBTYP SPEC NAA+PROBE: NOT DETECTED
FLUBV RNA ISLT QL NAA+PROBE: NOT DETECTED
GLUCOSE BLDC GLUCOMTR-MCNC: 164 MG/DL (ref 70–130)
GLUCOSE BLDC GLUCOMTR-MCNC: 166 MG/DL (ref 70–130)
GLUCOSE BLDC GLUCOMTR-MCNC: 179 MG/DL (ref 70–130)
GLUCOSE BLDC GLUCOMTR-MCNC: 188 MG/DL (ref 70–130)
GLUCOSE BLDC GLUCOMTR-MCNC: 211 MG/DL (ref 70–130)
GLUCOSE BLDC GLUCOMTR-MCNC: 259 MG/DL (ref 70–130)
HADV DNA SPEC NAA+PROBE: NOT DETECTED
HCO3 BLDA-SCNC: 25 MMOL/L (ref 22–28)
HCOV 229E RNA SPEC QL NAA+PROBE: NOT DETECTED
HCOV HKU1 RNA SPEC QL NAA+PROBE: NOT DETECTED
HCOV NL63 RNA SPEC QL NAA+PROBE: NOT DETECTED
HCOV OC43 RNA SPEC QL NAA+PROBE: NOT DETECTED
HCT VFR BLD AUTO: 31.7 % (ref 34–46.6)
HCT VFR BLD AUTO: 31.8 % (ref 34–46.6)
HGB BLD-MCNC: 9.2 G/DL (ref 12–15.9)
HGB BLD-MCNC: 9.5 G/DL (ref 12–15.9)
HMPV RNA NPH QL NAA+NON-PROBE: NOT DETECTED
HPIV1 RNA SPEC QL NAA+PROBE: NOT DETECTED
HPIV2 RNA SPEC QL NAA+PROBE: NOT DETECTED
HPIV3 RNA NPH QL NAA+PROBE: NOT DETECTED
HPIV4 P GENE NPH QL NAA+PROBE: NOT DETECTED
IMM GRANULOCYTES # BLD AUTO: 0.05 10*3/MM3 (ref 0–0.05)
IMM GRANULOCYTES # BLD AUTO: 0.09 10*3/MM3 (ref 0–0.05)
IMM GRANULOCYTES NFR BLD AUTO: 0.5 % (ref 0–0.5)
IMM GRANULOCYTES NFR BLD AUTO: 0.5 % (ref 0–0.5)
INHALED O2 CONCENTRATION: 30 %
INR PPP: 1.9 (ref 0.9–1.1)
LEFT ATRIUM VOLUME INDEX: 29 ML/M2
LYMPHOCYTES # BLD AUTO: 0.82 10*3/MM3 (ref 0.7–3.1)
LYMPHOCYTES # BLD AUTO: 1.28 10*3/MM3 (ref 0.7–3.1)
LYMPHOCYTES NFR BLD AUTO: 7.7 % (ref 19.6–45.3)
LYMPHOCYTES NFR BLD AUTO: 8.5 % (ref 19.6–45.3)
M PNEUMO IGG SER IA-ACNC: NOT DETECTED
MAXIMAL PREDICTED HEART RATE: 160 BPM
MCH RBC QN AUTO: 22.9 PG (ref 26.6–33)
MCH RBC QN AUTO: 22.9 PG (ref 26.6–33)
MCHC RBC AUTO-ENTMCNC: 29 G/DL (ref 31.5–35.7)
MCHC RBC AUTO-ENTMCNC: 29.9 G/DL (ref 31.5–35.7)
MCV RBC AUTO: 76.8 FL (ref 79–97)
MCV RBC AUTO: 79.1 FL (ref 79–97)
MODALITY: ABNORMAL
MONOCYTES # BLD AUTO: 0.11 10*3/MM3 (ref 0.1–0.9)
MONOCYTES # BLD AUTO: 0.99 10*3/MM3 (ref 0.1–0.9)
MONOCYTES NFR BLD AUTO: 1.1 % (ref 5–12)
MONOCYTES NFR BLD AUTO: 5.9 % (ref 5–12)
NEUTROPHILS NFR BLD AUTO: 14.06 10*3/MM3 (ref 1.7–7)
NEUTROPHILS NFR BLD AUTO: 8.7 10*3/MM3 (ref 1.7–7)
NEUTROPHILS NFR BLD AUTO: 84.1 % (ref 42.7–76)
NEUTROPHILS NFR BLD AUTO: 89.7 % (ref 42.7–76)
NRBC BLD AUTO-RTO: 0 /100 WBC (ref 0–0.2)
NRBC BLD AUTO-RTO: 0 /100 WBC (ref 0–0.2)
NT-PROBNP SERPL-MCNC: 746.2 PG/ML (ref 0–900)
O2 A-A PPRESDIFF RESPIRATORY: 0.4 MMHG
PCO2 BLDA: 51 MM HG (ref 35–45)
PEEP RESPIRATORY: 5 CM[H2O]
PH BLDA: 7.3 PH UNITS (ref 7.35–7.45)
PLATELET # BLD AUTO: 232 10*3/MM3 (ref 140–450)
PLATELET # BLD AUTO: 265 10*3/MM3 (ref 140–450)
PMV BLD AUTO: 11.4 FL (ref 6–12)
PMV BLD AUTO: 11.5 FL (ref 6–12)
PO2 BLDA: 67.3 MM HG (ref 80–100)
PROTHROMBIN TIME: 21.6 SECONDS (ref 11.7–14.2)
QT INTERVAL: 356 MS
RBC # BLD AUTO: 4.01 10*6/MM3 (ref 3.77–5.28)
RBC # BLD AUTO: 4.14 10*6/MM3 (ref 3.77–5.28)
RHINOVIRUS RNA SPEC NAA+PROBE: NOT DETECTED
RSV RNA NPH QL NAA+NON-PROBE: NOT DETECTED
SAO2 % BLDCOA: 90.6 % (ref 92–99)
SARS-COV-2 RNA NPH QL NAA+NON-PROBE: NOT DETECTED
SET MECH RESP RATE: 32
SINUS: 3.2 CM
STJ: 3 CM
STRESS TARGET HR: 136 BPM
TOTAL RATE: 32 BREATHS/MINUTE
VENTILATOR MODE: ABNORMAL
VT ON VENT VENT: 419 ML
WBC # BLD AUTO: 16.71 10*3/MM3 (ref 3.4–10.8)
WBC # BLD AUTO: 9.7 10*3/MM3 (ref 3.4–10.8)

## 2020-12-02 PROCEDURE — 85730 THROMBOPLASTIN TIME PARTIAL: CPT | Performed by: INTERNAL MEDICINE

## 2020-12-02 PROCEDURE — 94003 VENT MGMT INPAT SUBQ DAY: CPT

## 2020-12-02 PROCEDURE — 5A1945Z RESPIRATORY VENTILATION, 24-96 CONSECUTIVE HOURS: ICD-10-PCS | Performed by: EMERGENCY MEDICINE

## 2020-12-02 PROCEDURE — 25010000002 PROPOFOL 10 MG/ML EMULSION: Performed by: EMERGENCY MEDICINE

## 2020-12-02 PROCEDURE — 87070 CULTURE OTHR SPECIMN AEROBIC: CPT | Performed by: INTERNAL MEDICINE

## 2020-12-02 PROCEDURE — 94799 UNLISTED PULMONARY SVC/PX: CPT

## 2020-12-02 PROCEDURE — 87186 SC STD MICRODIL/AGAR DIL: CPT | Performed by: INTERNAL MEDICINE

## 2020-12-02 PROCEDURE — 85025 COMPLETE CBC W/AUTO DIFF WBC: CPT | Performed by: EMERGENCY MEDICINE

## 2020-12-02 PROCEDURE — C1751 CATH, INF, PER/CENT/MIDLINE: HCPCS

## 2020-12-02 PROCEDURE — 63710000001 INSULIN LISPRO (HUMAN) PER 5 UNITS: Performed by: INTERNAL MEDICINE

## 2020-12-02 PROCEDURE — 93306 TTE W/DOPPLER COMPLETE: CPT | Performed by: INTERNAL MEDICINE

## 2020-12-02 PROCEDURE — 87205 SMEAR GRAM STAIN: CPT | Performed by: INTERNAL MEDICINE

## 2020-12-02 PROCEDURE — 82962 GLUCOSE BLOOD TEST: CPT

## 2020-12-02 PROCEDURE — 0BH17EZ INSERTION OF ENDOTRACHEAL AIRWAY INTO TRACHEA, VIA NATURAL OR ARTIFICIAL OPENING: ICD-10-PCS | Performed by: EMERGENCY MEDICINE

## 2020-12-02 PROCEDURE — 36600 WITHDRAWAL OF ARTERIAL BLOOD: CPT

## 2020-12-02 PROCEDURE — 25010000002 FENTANYL CITRATE (PF) 100 MCG/2ML SOLUTION: Performed by: INTERNAL MEDICINE

## 2020-12-02 PROCEDURE — 93306 TTE W/DOPPLER COMPLETE: CPT

## 2020-12-02 PROCEDURE — 94640 AIRWAY INHALATION TREATMENT: CPT

## 2020-12-02 PROCEDURE — 02HV33Z INSERTION OF INFUSION DEVICE INTO SUPERIOR VENA CAVA, PERCUTANEOUS APPROACH: ICD-10-PCS | Performed by: INTERNAL MEDICINE

## 2020-12-02 PROCEDURE — 25010000002 HEPARIN (PORCINE) PER 1000 UNITS: Performed by: EMERGENCY MEDICINE

## 2020-12-02 PROCEDURE — 71045 X-RAY EXAM CHEST 1 VIEW: CPT

## 2020-12-02 PROCEDURE — 83880 ASSAY OF NATRIURETIC PEPTIDE: CPT | Performed by: INTERNAL MEDICINE

## 2020-12-02 PROCEDURE — 82803 BLOOD GASES ANY COMBINATION: CPT

## 2020-12-02 PROCEDURE — 25010000002 METHYLPREDNISOLONE PER 40 MG: Performed by: INTERNAL MEDICINE

## 2020-12-02 PROCEDURE — 85730 THROMBOPLASTIN TIME PARTIAL: CPT | Performed by: EMERGENCY MEDICINE

## 2020-12-02 PROCEDURE — 25010000002 PIPERACILLIN SOD-TAZOBACTAM PER 1 G: Performed by: INTERNAL MEDICINE

## 2020-12-02 RX ORDER — SODIUM CHLORIDE 9 MG/ML
75 INJECTION, SOLUTION INTRAVENOUS CONTINUOUS
Status: DISCONTINUED | OUTPATIENT
Start: 2020-12-02 | End: 2020-12-03

## 2020-12-02 RX ORDER — ESCITALOPRAM OXALATE 20 MG/1
20 TABLET ORAL DAILY
Status: DISCONTINUED | OUTPATIENT
Start: 2020-12-02 | End: 2020-12-08 | Stop reason: HOSPADM

## 2020-12-02 RX ORDER — FAMOTIDINE 10 MG/ML
20 INJECTION, SOLUTION INTRAVENOUS DAILY
Status: DISCONTINUED | OUTPATIENT
Start: 2020-12-02 | End: 2020-12-05

## 2020-12-02 RX ORDER — FENTANYL CITRATE 50 UG/ML
INJECTION, SOLUTION INTRAMUSCULAR; INTRAVENOUS
Status: DISPENSED
Start: 2020-12-02 | End: 2020-12-02

## 2020-12-02 RX ORDER — BUSPIRONE HYDROCHLORIDE 5 MG/1
5 TABLET ORAL 3 TIMES DAILY
Status: DISCONTINUED | OUTPATIENT
Start: 2020-12-02 | End: 2020-12-08 | Stop reason: HOSPADM

## 2020-12-02 RX ORDER — DEXTROSE MONOHYDRATE 25 G/50ML
25 INJECTION, SOLUTION INTRAVENOUS
Status: DISCONTINUED | OUTPATIENT
Start: 2020-12-02 | End: 2020-12-08 | Stop reason: HOSPADM

## 2020-12-02 RX ORDER — FENTANYL CITRATE 50 UG/ML
100 INJECTION, SOLUTION INTRAMUSCULAR; INTRAVENOUS
Status: DISCONTINUED | OUTPATIENT
Start: 2020-12-02 | End: 2020-12-03

## 2020-12-02 RX ORDER — NYSTATIN 100000 [USP'U]/G
POWDER TOPICAL EVERY 12 HOURS SCHEDULED
Status: DISCONTINUED | OUTPATIENT
Start: 2020-12-02 | End: 2020-12-08 | Stop reason: HOSPADM

## 2020-12-02 RX ORDER — FAMOTIDINE 10 MG/ML
20 INJECTION, SOLUTION INTRAVENOUS EVERY 12 HOURS SCHEDULED
Status: DISCONTINUED | OUTPATIENT
Start: 2020-12-02 | End: 2020-12-02

## 2020-12-02 RX ORDER — CHLORHEXIDINE GLUCONATE 0.12 MG/ML
15 RINSE ORAL EVERY 12 HOURS SCHEDULED
Status: DISCONTINUED | OUTPATIENT
Start: 2020-12-02 | End: 2020-12-07

## 2020-12-02 RX ORDER — NICOTINE POLACRILEX 4 MG
15 LOZENGE BUCCAL
Status: DISCONTINUED | OUTPATIENT
Start: 2020-12-02 | End: 2020-12-08 | Stop reason: HOSPADM

## 2020-12-02 RX ORDER — ALBUTEROL SULFATE 90 UG/1
6 AEROSOL, METERED RESPIRATORY (INHALATION)
Status: DISCONTINUED | OUTPATIENT
Start: 2020-12-02 | End: 2020-12-03

## 2020-12-02 RX ORDER — METHYLPREDNISOLONE SODIUM SUCCINATE 40 MG/ML
40 INJECTION, POWDER, LYOPHILIZED, FOR SOLUTION INTRAMUSCULAR; INTRAVENOUS EVERY 8 HOURS
Status: DISCONTINUED | OUTPATIENT
Start: 2020-12-02 | End: 2020-12-04

## 2020-12-02 RX ORDER — FENTANYL CITRATE 50 UG/ML
100 INJECTION, SOLUTION INTRAMUSCULAR; INTRAVENOUS ONCE
Status: DISCONTINUED | OUTPATIENT
Start: 2020-12-02 | End: 2020-12-03

## 2020-12-02 RX ORDER — OXYCODONE HYDROCHLORIDE AND ACETAMINOPHEN 5; 325 MG/1; MG/1
1 TABLET ORAL EVERY 6 HOURS PRN
Status: DISCONTINUED | OUTPATIENT
Start: 2020-12-02 | End: 2020-12-08 | Stop reason: HOSPADM

## 2020-12-02 RX ADMIN — TAZOBACTAM SODIUM AND PIPERACILLIN SODIUM 3.38 G: 375; 3 INJECTION, SOLUTION INTRAVENOUS at 05:30

## 2020-12-02 RX ADMIN — FENTANYL CITRATE 100 MCG: 50 INJECTION INTRAMUSCULAR; INTRAVENOUS at 13:11

## 2020-12-02 RX ADMIN — TAZOBACTAM SODIUM AND PIPERACILLIN SODIUM 3.38 G: 375; 3 INJECTION, SOLUTION INTRAVENOUS at 12:50

## 2020-12-02 RX ADMIN — NYSTATIN 1 APPLICATION: 100000 POWDER TOPICAL at 10:33

## 2020-12-02 RX ADMIN — ALBUTEROL SULFATE 6 PUFF: 90 AEROSOL, METERED RESPIRATORY (INHALATION) at 20:00

## 2020-12-02 RX ADMIN — PROPOFOL 30 MCG/KG/MIN: 10 INJECTION, EMULSION INTRAVENOUS at 12:48

## 2020-12-02 RX ADMIN — BUSPIRONE HYDROCHLORIDE 5 MG: 5 TABLET ORAL at 15:36

## 2020-12-02 RX ADMIN — HEPARIN SODIUM 13 UNITS/KG/HR: 10000 INJECTION, SOLUTION INTRAVENOUS at 19:49

## 2020-12-02 RX ADMIN — BUSPIRONE HYDROCHLORIDE 5 MG: 5 TABLET ORAL at 20:32

## 2020-12-02 RX ADMIN — INSULIN LISPRO 2 UNITS: 100 INJECTION, SOLUTION INTRAVENOUS; SUBCUTANEOUS at 20:34

## 2020-12-02 RX ADMIN — CHLORHEXIDINE GLUCONATE 15 ML: 1.2 RINSE ORAL at 12:49

## 2020-12-02 RX ADMIN — ALBUTEROL SULFATE 6 PUFF: 90 AEROSOL, METERED RESPIRATORY (INHALATION) at 00:23

## 2020-12-02 RX ADMIN — CHLORHEXIDINE GLUCONATE 15 ML: 1.2 RINSE ORAL at 20:33

## 2020-12-02 RX ADMIN — METHYLPREDNISOLONE SODIUM SUCCINATE 40 MG: 40 INJECTION, POWDER, FOR SOLUTION INTRAMUSCULAR; INTRAVENOUS at 10:34

## 2020-12-02 RX ADMIN — PROPOFOL 50 MCG/KG/MIN: 10 INJECTION, EMULSION INTRAVENOUS at 04:52

## 2020-12-02 RX ADMIN — SODIUM CHLORIDE, PRESERVATIVE FREE 10 ML: 5 INJECTION INTRAVENOUS at 20:33

## 2020-12-02 RX ADMIN — PROPOFOL 40 MCG/KG/MIN: 10 INJECTION, EMULSION INTRAVENOUS at 22:15

## 2020-12-02 RX ADMIN — NYSTATIN: 100000 POWDER TOPICAL at 20:33

## 2020-12-02 RX ADMIN — ALBUTEROL SULFATE 6 PUFF: 90 AEROSOL, METERED RESPIRATORY (INHALATION) at 11:18

## 2020-12-02 RX ADMIN — ALBUTEROL SULFATE 6 PUFF: 90 AEROSOL, METERED RESPIRATORY (INHALATION) at 15:55

## 2020-12-02 RX ADMIN — PROPOFOL 30 MCG/KG/MIN: 10 INJECTION, EMULSION INTRAVENOUS at 16:36

## 2020-12-02 RX ADMIN — BUSPIRONE HYDROCHLORIDE 5 MG: 5 TABLET ORAL at 10:05

## 2020-12-02 RX ADMIN — METHYLPREDNISOLONE SODIUM SUCCINATE 40 MG: 40 INJECTION, POWDER, FOR SOLUTION INTRAMUSCULAR; INTRAVENOUS at 17:47

## 2020-12-02 RX ADMIN — SODIUM CHLORIDE 75 ML/HR: 9 INJECTION, SOLUTION INTRAVENOUS at 08:00

## 2020-12-02 RX ADMIN — PROPOFOL 50 MCG/KG/MIN: 10 INJECTION, EMULSION INTRAVENOUS at 01:56

## 2020-12-02 RX ADMIN — FAMOTIDINE 20 MG: 10 INJECTION INTRAVENOUS at 12:49

## 2020-12-02 RX ADMIN — INSULIN LISPRO 3 UNITS: 100 INJECTION, SOLUTION INTRAVENOUS; SUBCUTANEOUS at 06:56

## 2020-12-02 RX ADMIN — ALBUTEROL SULFATE 6 PUFF: 90 AEROSOL, METERED RESPIRATORY (INHALATION) at 07:07

## 2020-12-02 RX ADMIN — INSULIN LISPRO 5 UNITS: 100 INJECTION, SOLUTION INTRAVENOUS; SUBCUTANEOUS at 10:33

## 2020-12-02 RX ADMIN — ESCITALOPRAM 20 MG: 20 TABLET, FILM COATED ORAL at 10:06

## 2020-12-02 RX ADMIN — PROPOFOL 50 MCG/KG/MIN: 10 INJECTION, EMULSION INTRAVENOUS at 08:41

## 2020-12-02 RX ADMIN — TAZOBACTAM SODIUM AND PIPERACILLIN SODIUM 3.38 G: 375; 3 INJECTION, SOLUTION INTRAVENOUS at 20:32

## 2020-12-02 RX ADMIN — INSULIN LISPRO 8 UNITS: 100 INJECTION, SOLUTION INTRAVENOUS; SUBCUTANEOUS at 12:00

## 2020-12-02 NOTE — PROCEDURES
Central Venous Catheter Insertion Procedure Note          Indications:   1) Vascular access     Could not obtain 2nd PIV    Medications:  Fentanyl 100 mcg     Procedure Details:  Informed consent was obtained for the procedure, including sedation. Risks of lung perforation, hemorrhage, arrhythmia, and adverse drug reaction were discussed.       Maximum sterile technique was used including usual patient drapes, antiseptics and physician sterile garments.       Under sterile conditions the skin over the right IJ vein was prepped with chlorhexidine and covered with a sterile drape. Local anesthesia was applied to the skin and subcutaneous tissues with lidocaine 1%. An 18-gauge needle was then inserted into the vein. A guide wire was then passed easily through the catheter. A quad-lumen was then inserted into the vessel over the guide wire. The catheter was sutured into place and dressed following sterile protocol with Biopatch placed. Ultrasound used to place needle and visualized in vein prior to dilation      Findings:  There were no changes to vital signs. All catheter ports were flushed with saline. Patient did tolerate procedure well. CXR was ordered..

## 2020-12-02 NOTE — PROGRESS NOTES
Discharge Planning Assessment  [facility]     Patient Name: Swetha Luis  MRN: [id number]  Today's Date: 12/2/2020    Admit Date: 12/1/2020    Discharge Needs Assessment     Row Name 12/02/20 1447       Living Environment    Lives With  spouse    Current Living Arrangements  home/apartment/condo    Potentially Unsafe Housing Conditions  unable to assess    Primary Care Provided by  self    Provides Primary Care For  no one    Family Caregiver if Needed  none    Quality of Family Relationships  supportive    Able to Return to Prior Arrangements  yes       Resource/Environmental Concerns    Resource/Environmental Concerns  none    Transportation Concerns  car, none       Transition Planning    Patient/Family Anticipates Transition to  home with family    Patient/Family Anticipated Services at Transition  none    Transportation Anticipated  family or friend will provide       Discharge Needs Assessment    Equipment Currently Used at Home  walker, rolling    Concerns to be Addressed  discharge planning    Anticipated Changes Related to Illness  none    Equipment Needed After Discharge  none        Discharge Plan     Row Name 12/02/20 1448       Plan    Plan  Home with [facility]    Plan Comments  CCP spoke to patient’s  Cristopher, [phone]  via telephone.  CCP role explained and discharge planning discussed. Face sheet verified. Pt. emergency contact is her .  Pt lives in an apartment with her .     She uses a walker to ambulate.   She is independent with ADL’s.   She  has a past history of Home Health with [facility]  She would like a referral.  Natalia will follow   She has no past history of rehab.    Pt’s plan is Home with Home Health.  Pt obtains her medications from [facility]’s pharmacy on [address].     CCP following clinical course to determined DC needs        Continued Care and Services - Admitted Since 12/1/2020    Coordination has not been started for this  encounter.         Demographic Summary    No documentation.       Functional Status    No documentation.       Psychosocial    No documentation.       Abuse/Neglect    No documentation.       Legal    No documentation.       Substance Abuse    No documentation.       Patient Forms    No documentation.           Karen Mayo RN

## 2020-12-02 NOTE — H&P
Patient Care Team:  Provider, No Known as PCP - General    Chief complaint:  Altered mental status    History of present illness:  Subjective   This is a 60-year-old with history of chronic respiratory failure, on oxygen 2 L/min.  She had a prior history of tracheostomy as well.    She recently visited the ED on 11/3/20 for symptoms of left leg pain.  She was stable.  CTA chest showed no PE.  She was discharged home with plan for outpatient Doppler as she was already on anticoagulation..    EMS was called again today for altered mental status and cyanosis per family report.  Patient arrived to the ED, unconscious, bagged by EMS.  Dr. Gee attempted briefly to insert a tracheostomy into the previous site but it was very tight and therefore she was intubated with ET tube 6.5.  She developed hypotension and required pressors.  She was a very difficult stick and had only 1 peripheral IV and therefore IO was placed.    On my assessment, patient is on AC PC, RR 32, PI 24, FiO2 40% and PEEP 5.  She is sedated and not following commands.      Labs: ABG 7.1/90/281 100% FiO2; proBNP 570; glucose 229; creatinine 1.65; WBC 16; hemoglobin 9.    Data: Echo 9/24/2019: Normal EF    Review of Systems:  Cannot obtain.  Patient is on mechanical ventilation    History  Past Medical History:   Diagnosis Date   • Acute congestive heart failure (CMS/HCC) 12/24/2018   • Acute osteomyelitis (CMS/HCC)     Right shoulder due to IVDA   • Acute renal failure on dialysis (CMS/HCC)    • Anxiety    • Pugh esophagus    • CKD (chronic kidney disease)    • COPD (chronic obstructive pulmonary disease) (CMS/HCC)    • MRSA infection    • Nontraumatic subarachnoid hemorrhage (CMS/HCC)    • Seizures (CMS/HCC)    • Tracheostomy present (CMS/HCC)      Past Surgical History:   Procedure Laterality Date   • TRACHEOSTOMY     • URETEROSCOPY LASER LITHOTRIPSY WITH STENT INSERTION Left 10/11/2019    Procedure: CYSTOSCOPY,  URETEROSCOPY, LEFT  "RETROGRADE, LEFT PYELOGRAM, LASER LITHOTRIPSY, PLACEMENT OF STENT.;  Surgeon: Clyde Selby MD;  Location: [facility] OR;  Service: Urology     Family History   Problem Relation Age of Onset   • Diabetes Mother    • Hypertension Mother      Social History     Tobacco Use   • Smoking status: Current Every Day Smoker     Packs/day: 0.50     Types: Cigarettes   • Smokeless tobacco: Never Used   • Tobacco comment: \"1 CIGARETTE A DAY\"   Substance Use Topics   • Alcohol use: No     Frequency: Never   • Drug use: Yes     Types: Cocaine(coke)     Comment: 20 years ago occasional       Medications Prior to Admission   Medication Sig Dispense Refill Last Dose   • acetaminophen (TYLENOL) 325 MG tablet Take 2 tablets by mouth Every 4 (Four) Hours As Needed for Mild Pain .      • Alcohol Swabs 70 % pads Inject 1 each under the skin into the appropriate area as directed 4 (Four) Times a Day.      • amLODIPine (NORVASC) 5 MG tablet Take 1 tablet by mouth Daily. 30 tablet 0    • Ascorbic Acid 500 MG capsule Take  by mouth.      • aspirin 81 MG chewable tablet Chew 1 tablet Daily.      • Blood Glucose Monitoring Suppl (FreeStyle Lite) device Inject 1 each under the skin into the appropriate area as directed 4 (Four) Times a Day.      • busPIRone (BUSPAR) 5 MG tablet Take 1 tablet by mouth 3 (Three) times a day. 90 tablet 0    • clotrimazole-betamethasone (LOTRISONE) 1-0.05 % cream Apply  topically to the appropriate area as directed Every 12 (Twelve) Hours. Apply to perivaginal area and perineum after washing. 15 g 0    • escitalopram (LEXAPRO) 20 MG tablet Take 1 tablet by mouth daily. 30 tablet 0    • gabapentin (NEURONTIN) 300 MG capsule Take 1 capsule by mouth 3 (Three) Times a Day. (Patient taking differently: Take 900 mg by mouth 3 (Three) Times a Day. Patient reports taking 3 caps (900 mg) three times daily) 9 capsule 0    • glucose blood (FREESTYLE TEST STRIPS) test strip 1 strip by Other route 4 (Four) Times a Day.  "     • insulin glargine (LANTUS) 100 UNIT/ML injection Inject 5 Units under the skin into the appropriate area as directed 2 (two) times a day.      • ipratropium-albuterol (DUO-NEB) 0.5-2.5 mg/3 ml nebulizer Inhale 3 mL by nebulization every 4 (Four) hours as needed for wheezing. 360 mL 0    • metFORMIN (GLUCOPHAGE) 500 MG tablet Take 500 mg by mouth 2 (Two) Times a Day With Meals.      • methylPREDNISolone (MEDROL) 4 MG dose pack Take as directed on package instructions. 1 each 0    • metoprolol succinate XL (TOPROL-XL) 25 MG 24 hr tablet Take 1 tablet by mouth Daily. 30 tablet 0    • MULTIPLE VITAMINS-MINERALS PO Take  by mouth.      • omeprazole (priLOSEC) 40 MG capsule Take 40 mg by mouth Daily.      • oxyCODONE-acetaminophen (PERCOCET) 5-325 MG per tablet Take 1 tablet by mouth Every 6 (Six) Hours As Needed for Severe Pain . 12 tablet 0    • pramipexole (MIRAPEX) 0.25 MG tablet Take 1 tablet by mouth Every Night. 30 tablet 0    • Rivaroxaban (Xarelto Starter Pack) tablet therapy pack starter pack Take 1 tablet by mouth 2 (Two) Times a Day With Meals for 21 days. Then take 1 tablet daily with dinner. Indications: DVT/PE (active thrombosis) 51 tablet 0    • thiamine (VITAMIN B-1) 100 MG tablet Take 1 tablet by mouth Daily. 30 tablet 0      Allergies:  Cephalexin, Hydrocodone, Strawberry, and Zithromax [azithromycin]    Objective   Vital Signs  Temp:  [97.3 °F (36.3 °C)] 97.3 °F (36.3 °C)  Heart Rate:  [] 90  Resp:  [28-32] 32  BP: ()/(52-84) 127/76  FiO2 (%):  [60 %-100 %] 60 %    Physical Exam:  Constitutional: Middle-aged white female, appears to be older than stated age.  On the ventilator.  Not in acute distress.  Eyes: Injected conjunctivae, EOMI. Pupils equal and reactive to light.   ENMT: Saini 3. No oral thrush. Tonsils grade  Neck: Large.  Trachea midline.  Tracheostomy noted  Heart: RRR, no murmur.  Mild grade 1-2 pitting edema in lower legs  Lungs: Equal and good breath sounds  bilaterally.  Slightly coarse breath sounds but no crackles or wheezing.  Nonlabored breathing on the ventilator.  Abdomen: Obese.  PEG tube noted.  Lower midline scar noted.  Soft. No tenderness or dullness.  Positive bowel sounds  Extremities: No cyanosis, clubbing.   Warm extremities and well-perfused.  Right first and second toe amputated.  Neuro: Conscious, alert, oriented x3.  Strength 5/5 overall  Psych: Appropriate mood and affect.    Integumentary: No rash or ecchymosis  Lymphatic: No palpable cervical or supraclavicular lymph nodes.        Diagnostic imaging:  I personally and independently reviewed the following images:   CT chest 11/30/2020: No PE.  Upper lobe predominant emphysema.    CXR 12/1/2020: Increased interstitial markings bilaterally.  Mild left pleural effusion.      Assessment   1. Acute hypercapnic and hypoxic respiratory failure, on mechanical ventilation  2. COPD exacerbation  3. Bilateral pulmonary infiltrates, edema versus infection  4. Possible acute diastolic CHF  5. Small left pleural effusion  6. Hypotension, transient, post intubation  7. 6 mm RLL pulmonary nodule on CT 11/30/2020  8. CKD stage III  9. Chronic anemia  10. History of tracheostomy  11. Left lower extremity DVT, external iliac extending into the gastrocnemius on Doppler 11/16/2020  12. Diabetes mellitus type 2  13. Depression    Plan:  · Admit to ICU  · Vent settings adjusted.  Rate decreased to 18.  Continue PI 24.  TV is reaching 400.  Check ABG now  · Sedation with propofol and as needed fentanyl  · Heparin drip for DVT  · Solu-Medrol 125 mg every 8 hours for COPD  · Albuterol HFA through the ventilator  · As needed insulin for hyperglycemia  · Initiate empiric Zosyn .  Check sputum culture.  Repeat pro Hugo in a.m.  · Check echocardiogram.  Rule out CHF  · Tube feeding per nutrition  · Resume home escitalopram and bupropion for depression          Esperanza Garcia MD  12/02/20  00:41 EST    Time: Critical care 42 min  excluding procedures      This note was dictated utilizing Dragon dictation

## 2020-12-02 NOTE — OUTREACH NOTE
Medical Week 3 Survey      Responses   [facility] patient discharged from?  [place]   Does the patient have one of the following disease processes/diagnoses(primary or secondary)?  Other   Week 3 attempt successful?  No   Revoke  Readmitted          Monserrat Zablaa RN

## 2020-12-02 NOTE — NURSING NOTE
Iv team called to assess pt for a piv. Pt gina has a IO, and 22 in HOWARD and 22 in CESAR. Assessed pt's bilat upper extremities with U/S and no assessable vein found for any piv, midline, or picc at this time. I would recommend pt be evaluated for a central line due to her critical status at this time.

## 2020-12-02 NOTE — PROGRESS NOTES
[facility]     Mar/chart reviewed  Follow up respiratory failure, copd,   Sedated on vent    Vital Sign Min/Max for last 24 hours  Temp  Min: 97.3 °F (36.3 °C)  Max: 97.4 °F (36.3 °C)   BP  Min: 75/52  Max: 174/99   Pulse  Min: 66  Max: 130   Resp  Min: 28  Max: 32   SpO2  Min: 70 %  Max: 100 %   Flow (L/min)  Min: 40  Max: 60   No data recorded     Appears ill, sedated on vent  perrl, normal sclera, no palpable thyroid nodules  mmm, no jvd, trachea midline, neck supple,  chest cta bilaterally, no crackles, no wheezes,   rrr,   soft, nt, nd +bs,  no c/c/e  Skin warm, dry on rashes    Labs: 12/2: reviewed:  Wbc 9.7  hgb 9.5  plts 232  7.3/51/67    12/2: CXR: reviewed, left base infiltrate    A/P:  1. Acute on chronic hypoxemic and hypercapnic respiratory failure -- she has had two recent instances of acute hypercapnic failure, she likely has melissa and she appears to have severe COPD, I think best answer is probably just permanent tracheostomy  2. Tracheo/cutaneous fistula -- possible could just dilate this tract for tracheotomy  3. Pneumonia -- continue antibiotics, sputum culture  4. Copd with ae -- steroids, bronchodilators  5. Hypotension -- better  6. Anemia  7. DVT -- on heparin  8. CKD III  9. DMII  10. Feeding tube  11. gerd -- on home ppi, will switch to h2 blocker for now  Start tube feeds,     Spoke with  over the phone, he says she was miserable with the tracheostomy in and would really prefer not to have tracheostomy. So we will work towards extubation and then see how much the fistula impedes use of NIPPV and if closure is an option.      Discussed with RN  CC 45 mins

## 2020-12-02 NOTE — PAYOR COMM NOTE
"Neo Spencer (60 y.o. Female)                      ATTENTION;   NOTIFICATION OF PATIENT ADMISSION - AUTH# [id number]                   REPLY TO [facility] DEPT, KEYONNA BRYANT LPN  [phone] UR  [phone]         Date of Birth Social Security Number Address Home Phone MRN    1960  [id number] [id number] [phone] [id number]    Jehovah's witness Marital Status          Hinduism        Admission Date Admission Type Admitting Provider Attending Provider Department, Room/Bed    12/1/20 Emergency Esperanza Garcia MD Jambeih, Rami, MD [facility] INTENSIVE CARE, I375/1    Discharge Date Discharge Disposition Discharge Destination                       Attending Provider: Esperanza Garcia MD    Allergies: Cephalexin, Hydrocodone, Strawberry, Zithromax [Azithromycin]    Isolation: None   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), 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), COVID (rule out) (11/29/20)   Code Status: Prior    Ht: 167.6 cm (66\")   Wt: 82.7 kg (182 lb 5.1 oz)    Admission Cmt: None   Principal Problem: None                Active Insurance as of 12/1/2020     Primary Coverage     Payor Plan Insurance Group Employer/Plan Group    [place] [id number]     Payor Plan Address Payor Plan Phone Number Payor Plan Fax Number Effective Dates    [employer] Claims Office - [address] [address] [phone]  4/1/2020 - None Entered    [facility] [id number]       Subscriber Name Subscriber Birth Date Member ID       NEO SPENCER 1960 [id number]                 Emergency Contacts      (Rel.) Home Phone Work Phone Mobile Phone    TJMATT CARRILLO (Spouse) [phone] -- --    Joshua Spenceranda (Daughter) [phone] -- [phone]    Sister, \"Hattie\" (Sister) [phone] -- [phone]    Monserrat Taylor (Sister) [phone] -- [phone]    TjBonitar (Son) -- -- [phone]               History & Physical      Esperanza Garcia MD at 12/02/20 0041            "             Patient Care Team:  Provider, No Known as PCP - General    Chief complaint:  Altered mental status    History of present illness:  Subjective   This is a 60-year-old with history of chronic respiratory failure, on oxygen 2 L/min.  She had a prior history of tracheostomy as well.    She recently visited the ED on 11/3/20 for symptoms of left leg pain.  She was stable.  CTA chest showed no PE.  She was discharged home with plan for outpatient Doppler as she was already on anticoagulation..    EMS was called again today for altered mental status and cyanosis per family report.  Patient arrived to the ED, unconscious, bagged by EMS.  Dr. Gee attempted briefly to insert a tracheostomy into the previous site but it was very tight and therefore she was intubated with ET tube 6.5.  She developed hypotension and required pressors.  She was a very difficult stick and had only 1 peripheral IV and therefore IO was placed.    On my assessment, patient is on AC PC, RR 32, PI 24, FiO2 40% and PEEP 5.  She is sedated and not following commands.      Labs: ABG 7.1/90/281 100% FiO2; proBNP 570; glucose 229; creatinine 1.65; WBC 16; hemoglobin 9.    Data: Echo 9/24/2019: Normal EF    Review of Systems:  Cannot obtain.  Patient is on mechanical ventilation    History  Past Medical History:   Diagnosis Date   • Acute congestive heart failure (CMS/HCC) 12/24/2018   • Acute osteomyelitis (CMS/HCC)     Right shoulder due to IVDA   • Acute renal failure on dialysis (CMS/HCC)    • Anxiety    • Pugh esophagus    • CKD (chronic kidney disease)    • COPD (chronic obstructive pulmonary disease) (CMS/HCC)    • MRSA infection    • Nontraumatic subarachnoid hemorrhage (CMS/HCC)    • Seizures (CMS/HCC)    • Tracheostomy present (CMS/HCC)      Past Surgical History:   Procedure Laterality Date   • TRACHEOSTOMY     • URETEROSCOPY LASER LITHOTRIPSY WITH STENT INSERTION Left 10/11/2019    Procedure: CYSTOSCOPY,  URETEROSCOPY, LEFT  "RETROGRADE, LEFT PYELOGRAM, LASER LITHOTRIPSY, PLACEMENT OF STENT.;  Surgeon: Clyde Selby MD;  Location: [facility] OR;  Service: Urology     Family History   Problem Relation Age of Onset   • Diabetes Mother    • Hypertension Mother      Social History     Tobacco Use   • Smoking status: Current Every Day Smoker     Packs/day: 0.50     Types: Cigarettes   • Smokeless tobacco: Never Used   • Tobacco comment: \"1 CIGARETTE A DAY\"   Substance Use Topics   • Alcohol use: No     Frequency: Never   • Drug use: Yes     Types: Cocaine(coke)     Comment: 20 years ago occasional       Medications Prior to Admission   Medication Sig Dispense Refill Last Dose   • acetaminophen (TYLENOL) 325 MG tablet Take 2 tablets by mouth Every 4 (Four) Hours As Needed for Mild Pain .      • Alcohol Swabs 70 % pads Inject 1 each under the skin into the appropriate area as directed 4 (Four) Times a Day.      • amLODIPine (NORVASC) 5 MG tablet Take 1 tablet by mouth Daily. 30 tablet 0    • Ascorbic Acid 500 MG capsule Take  by mouth.      • aspirin 81 MG chewable tablet Chew 1 tablet Daily.      • Blood Glucose Monitoring Suppl (FreeStyle Lite) device Inject 1 each under the skin into the appropriate area as directed 4 (Four) Times a Day.      • busPIRone (BUSPAR) 5 MG tablet Take 1 tablet by mouth 3 (Three) times a day. 90 tablet 0    • clotrimazole-betamethasone (LOTRISONE) 1-0.05 % cream Apply  topically to the appropriate area as directed Every 12 (Twelve) Hours. Apply to perivaginal area and perineum after washing. 15 g 0    • escitalopram (LEXAPRO) 20 MG tablet Take 1 tablet by mouth daily. 30 tablet 0    • gabapentin (NEURONTIN) 300 MG capsule Take 1 capsule by mouth 3 (Three) Times a Day. (Patient taking differently: Take 900 mg by mouth 3 (Three) Times a Day. Patient reports taking 3 caps (900 mg) three times daily) 9 capsule 0    • glucose blood (FREESTYLE TEST STRIPS) test strip 1 strip by Other route 4 (Four) Times a Day.  "     • insulin glargine (LANTUS) 100 UNIT/ML injection Inject 5 Units under the skin into the appropriate area as directed 2 (two) times a day.      • ipratropium-albuterol (DUO-NEB) 0.5-2.5 mg/3 ml nebulizer Inhale 3 mL by nebulization every 4 (Four) hours as needed for wheezing. 360 mL 0    • metFORMIN (GLUCOPHAGE) 500 MG tablet Take 500 mg by mouth 2 (Two) Times a Day With Meals.      • methylPREDNISolone (MEDROL) 4 MG dose pack Take as directed on package instructions. 1 each 0    • metoprolol succinate XL (TOPROL-XL) 25 MG 24 hr tablet Take 1 tablet by mouth Daily. 30 tablet 0    • MULTIPLE VITAMINS-MINERALS PO Take  by mouth.      • omeprazole (priLOSEC) 40 MG capsule Take 40 mg by mouth Daily.      • oxyCODONE-acetaminophen (PERCOCET) 5-325 MG per tablet Take 1 tablet by mouth Every 6 (Six) Hours As Needed for Severe Pain . 12 tablet 0    • pramipexole (MIRAPEX) 0.25 MG tablet Take 1 tablet by mouth Every Night. 30 tablet 0    • Rivaroxaban (Xarelto Starter Pack) tablet therapy pack starter pack Take 1 tablet by mouth 2 (Two) Times a Day With Meals for 21 days. Then take 1 tablet daily with dinner. Indications: DVT/PE (active thrombosis) 51 tablet 0    • thiamine (VITAMIN B-1) 100 MG tablet Take 1 tablet by mouth Daily. 30 tablet 0      Allergies:  Cephalexin, Hydrocodone, Strawberry, and Zithromax [azithromycin]    Objective   Vital Signs  Temp:  [97.3 °F (36.3 °C)] 97.3 °F (36.3 °C)  Heart Rate:  [] 90  Resp:  [28-32] 32  BP: ()/(52-84) 127/76  FiO2 (%):  [60 %-100 %] 60 %    Physical Exam:  Constitutional: Middle-aged white female, appears to be older than stated age.  On the ventilator.  Not in acute distress.  Eyes: Injected conjunctivae, EOMI. Pupils equal and reactive to light.   ENMT: Saini 3. No oral thrush. Tonsils grade  Neck: Large.  Trachea midline.  Tracheostomy noted  Heart: RRR, no murmur.  Mild grade 1-2 pitting edema in lower legs  Lungs: Equal and good breath sounds  bilaterally.  Slightly coarse breath sounds but no crackles or wheezing.  Nonlabored breathing on the ventilator.  Abdomen: Obese.  PEG tube noted.  Lower midline scar noted.  Soft. No tenderness or dullness.  Positive bowel sounds  Extremities: No cyanosis, clubbing.   Warm extremities and well-perfused.  Right first and second toe amputated.  Neuro: Conscious, alert, oriented x3.  Strength 5/5 overall  Psych: Appropriate mood and affect.    Integumentary: No rash or ecchymosis  Lymphatic: No palpable cervical or supraclavicular lymph nodes.        Diagnostic imaging:  I personally and independently reviewed the following images:   CT chest 11/30/2020: No PE.  Upper lobe predominant emphysema.    CXR 12/1/2020: Increased interstitial markings bilaterally.  Mild left pleural effusion.      Assessment   1. Acute hypercapnic and hypoxic respiratory failure, on mechanical ventilation  2. COPD exacerbation  3. Bilateral pulmonary infiltrates, edema versus infection  4. Possible acute diastolic CHF  5. Small left pleural effusion  6. Hypotension, transient, post intubation  7. 6 mm RLL pulmonary nodule on CT 11/30/2020  8. CKD stage III  9. Chronic anemia  10. History of tracheostomy  11. Left lower extremity DVT, external iliac extending into the gastrocnemius on Doppler 11/16/2020  12. Diabetes mellitus type 2  13. Depression    Plan:  · Admit to ICU  · Vent settings adjusted.  Rate decreased to 18.  Continue PI 24.  TV is reaching 400.  Check ABG now  · Sedation with propofol and as needed fentanyl  · Heparin drip for DVT  · Solu-Medrol 125 mg every 8 hours for COPD  · Albuterol HFA through the ventilator  · As needed insulin for hyperglycemia  · Initiate empiric Zosyn .  Check sputum culture.  Repeat pro Hugo in a.m.  · Check echocardiogram.  Rule out CHF  · Tube feeding per nutrition  · Resume home escitalopram and bupropion for depression          Esperanza Garcia MD  12/02/20  00:41 EST    Time: Critical care 42 min  excluding procedures      This note was dictated utilizing Dragon dictation    Electronically signed by Esperanza Garcia MD at 12/02/20 0512          Emergency Department Notes      Coretta Alvarado, RN at 12/01/20 2158        Pt to ED from home per [facility] with reports of acute onset altered mental status/apnea/cyanosis from nipples up twenty minutes prior to their arrival.      Pt arrives to ED unconscious and being bagged.  Viral filter applied prior to entering ED.    All triage performed with this RN wearing appropriate PPE.  Pt placed in mask upon arrival to ED.     Coretta Alvarado, RN  12/01/20 2158      Electronically signed by Coretta Alvarado RN at 12/01/20 2158       Vital Signs (last day)     Date/Time   Temp   Temp src   Pulse   Resp   BP   Patient Position   SpO2    12/02/20 0707   --   --   68   (!) 32   --   --   94    12/02/20 0600   --   --   68   --   112/71   --   94    12/02/20 0500   --   --   67   --   116/82   --   93    12/02/20 0400   --   --   66   --   111/66   --   98    12/02/20 0340   --   --   69   (!) 32   --   --   100    12/02/20 0300   --   --   68   --   135/71   --   100    12/02/20 0245   97.4 (36.3)   Oral   70   (!) 29   174/99   --   100    12/02/20 0230   --   --   72   --   --   --   --    12/02/20 0225   --   --   --   --   --   --   100    12/02/20 0200   --   --   70   --   114/76   --   100    12/02/20 0140   --   --   69   --   102/66   --   100    12/02/20 0135   --   --   71   --   100/65   --   100    12/02/20 0130   --   --   71   --   98/63   --   100    12/02/20 0120   --   --   72   --   103/66   --   100    12/02/20 0110   --   --   77   --   101/61   --   100    12/02/20 0105   --   --   77   --   100/65   --   100    12/02/20 0100   --   --   78   --   102/68   --   100    12/02/20 0055   --   --   77   --   108/64   --   100    12/02/20 0050   --   --   77   --   142/82   --   100    12/02/20 0045   --   --   81   --   149/89   --   100    12/02/20 0040   --   --   83    --   140/88   --   100    12/02/20 0035   --   --   87   --   133/80   --   100    12/02/20 0030   --   --   90   --   127/76   --   100    12/02/20 0025   --   --   90   --   123/76   --   100    12/02/20 0023   --   --   92   (!) 32   --   --   100    12/02/20 0020   --   --   94   --   121/76   --   100    12/02/20 0015   --   --   93   --   112/69   --   99    12/02/20 0010   --   --   97   --   121/64   --   94    12/02/20 0005   --   --   92   --   147/79   --   100    12/02/20 0000   --   --   74   --   111/70   --   (!) 74    12/01/20 2352   --   --   89   --   90/70   --   --    12/01/20 2335   --   --   96   --   (!) 79/53   --   --    12/01/20 2315   --   --   108   --   96/66   --   99    12/01/20 2310   --   --   108   --   101/61   --   100 12/01/20 2300   --   --   106   --   110/66   --   100 12/01/20 2255   --   --   109   --   92/66   --   100 12/01/20 2254   --   --   105   --   --   --   99 12/01/20 2252   --   --   107   --   --   --   99 12/01/20 2250   --   --   --   --   93/68   --   --    12/01/20 2248   --   --   108   --   --   --   99    12/01/20 2245   --   --   111   --   107/80   --   99 12/01/20 2243   --   --   --   28   --   --   --    12/01/20 2242   --   --   111   --   100/83   --   100    12/01/20 2240   --   --   110   --   100/83   --   99 12/01/20 2239   --   --   112   28   --   --   100    12/01/20 2237   --   --   --   --   (!) 75/52   --   --    12/01/20 2236   --   --   114   --   --   --   99    12/01/20 2234   --   --   117   --   (!) 82/70   --   98    12/01/20 2233   --   --   116   --   --   --   100    12/01/20 2231   --   --   119   --   --   --   98    12/01/20 2230   --   --   --   --   (!) 82/57   --   --    12/01/20 2229   --   --   120   --   --   --   98    12/01/20 2227   --   --   (!) 121   --   --   --   99    12/01/20 2225   --   --   --   --   113/77   --   --    12/01/20 2224   --   --   (!) 123   --   --   --   99    12/01/20  2221   --   --   (!) 128   --   --   --   91    12/01/20 2220   --   --   --   --   128/79   --   --    12/01/20 2219   --   --   (!) 123   --   --   --   (!) 84    12/01/20 2218   --   --   (!) 130   --   --   --   (!) 87    12/01/20 2215   --   --   116   --   127/84   --   (!) 84    12/01/20 2212   --   --   110   --   --   --   (!) 88    12/01/20 22:10:58   --   --   --   --   116/73   --   --    12/01/20 22:10:28   --   --   105   (!) 30   --   --   (!) 70    12/01/20 2210   --   --   107   --   116/73   --   (!) 73    12/01/20 2209   97.3 (36.3)   Tympanic   107   --   --   --   (!) 77    12/01/20 2201   --   --   107   --   --   --   100    12/01/20 2158   --   --   --   --   121/78   --   --              Oxygen Therapy (last day)     Date/Time   SpO2   Device (Oxygen Therapy)   Flow (L/min)   Oxygen Concentration (%)   ETCO2 (mmHg)    12/02/20 0707   94   ventilator   --   30   --    12/02/20 0600   94   --   --   --   --    12/02/20 0500   93   --   --   --   --    12/02/20 0400   98   --   --   --   --    12/02/20 0340   100   ventilator   40   --   --    12/02/20 0300   100   --   --   --   --    12/02/20 0245   100   ventilator   60   --   --    12/02/20 0225   100   ventilator   --   60   --    12/02/20 0200   100   --   --   --   --    12/02/20 0140   100   --   --   --   --    12/02/20 0135   100   --   --   --   --    12/02/20 0130   100   --   --   --   --    12/02/20 0120   100   --   --   --   --    12/02/20 0110   100   --   --   --   --    12/02/20 0105 100   --   --   --   --    12/02/20 0100 100   --   --   --   --    12/02/20 0055 100   --   --   --   --    12/02/20 0050 100   --   --   --   --    12/02/20 0045 100   --   --   --   --    12/02/20 0040 100   --   --   --   --    12/02/20 0035   100   --   --   --   --    12/02/20 0030 100   --   --   --   --    12/02/20 0025 100   --   --   --   --    12/02/20 0023   100   ventilator   --   60   --    12/02/20 0020   100   --    --   --   --    12/02/20 0015   99   --   --   --   --    12/02/20 0010   94   --   --   --   --    12/02/20 0005   100   --   --   --   --    12/02/20 0000   (!) 74   --   --   --   --    12/01/20 2315   99   --   --   --   --    12/01/20 2310   100   --   --   --   --    12/01/20 2300   100   --   --   --   --    12/01/20 2255   100   --   --   --   --    12/01/20 2254   99   --   --   --   --    12/01/20 2252   99   --   --   --   --    12/01/20 2248   99   --   --   --   --    12/01/20 2245   99   --   --   --   --    12/01/20 2242   100   --   --   --   --    12/01/20 2240   99   --   --   --   --    12/01/20 2239   100   ventilator   --   100   --    12/01/20 2236   99   --   --   --   --    12/01/20 2234   98   --   --   --   --    12/01/20 2233   100   --   --   --   --    12/01/20 2231   98   --   --   --   --    12/01/20 2229   98   --   --   --   --    12/01/20 2227   99   --   --   --   --    12/01/20 2224   99   --   --   --   --    12/01/20 2221   91   --   --   --   --    12/01/20 2219   (!) 84   --   --   --   --    12/01/20 2218   (!) 87   --   --   --   --    12/01/20 2215   (!) 84   --   --   --   --    12/01/20 2212   (!) 88   --   --   --   --    12/01/20 22:10:28   (!) 70   --   --   --   --    12/01/20 2210   (!) 73   --   --   --   --    12/01/20 2209   (!) 77   --   --   --   --    12/01/20 2201   100   --   --   --   --              Lines, Drains & Airways    Active LDAs     Name:   Placement date:   Placement time:   Site:   Days:    Peripheral IV 12/01/20 2157 Left;Upper Arm   12/01/20 2157    Arm   less than 1    NG/OG Tube Nasogastric 16 Fr Right nostril   12/02/20    0021    Right nostril   less than 1    Gastrostomy/Enterostomy PEG-jejunostomy Umbilicus   10/04/19    1202    Umbilicus   424    ETT    12/01/20    2215     less than 1    Surgical Airway Luis Uncuffed 4   07/10/19    0950    4   511    Intraosseous Line 12/01/20 Tibia   12/01/20    --    Right   1                   Facility-Administered Medications as of 12/2/2020   Medication Dose Route Frequency Provider Last Rate Last Admin   • [COMPLETED] albuterol sulfate HFA (PROVENTIL HFA;VENTOLIN HFA;PROAIR HFA) inhaler 6 puff  6 puff Inhalation Once Cristopher Gee MD   6 puff at 12/02/20 0023   • albuterol sulfate HFA (PROVENTIL HFA;VENTOLIN HFA;PROAIR HFA) inhaler 6 puff  6 puff Endotracheal 4x Daily - RT Esperanza Garcia MD   6 puff at 12/02/20 0707   • busPIRone (BUSPAR) tablet 5 mg  5 mg Oral TID Esperanza Garcia MD       • dextrose (D50W) 25 g/ 50mL Intravenous Solution 25 g  25 g Intravenous Q15 Min PRN Esperanza Garcia MD       • dextrose (GLUTOSE) oral gel 15 g  15 g Oral Q15 Min PRN Esperanza Garcia MD       • escitalopram (LEXAPRO) tablet 20 mg  20 mg Oral Daily Esperanza Garcia MD       • famotidine (PEPCID) injection 20 mg  20 mg Intravenous Q12H Ye Love MD       • fentaNYL citrate (PF) (SUBLIMAZE) 100 MCG/2ML injection  - ADS Override Pull            • fentaNYL citrate (PF) (SUBLIMAZE) injection 100 mcg  100 mcg Intravenous Once Esperanza Garcia MD       • fentaNYL citrate (PF) (SUBLIMAZE) injection 100 mcg  100 mcg Intravenous Q1H PRN Esperanza Garcia MD       • glucagon (human recombinant) (GLUCAGEN DIAGNOSTIC) injection 1 mg  1 mg Subcutaneous Q15 Min PRN Esperanza Garcia MD       • heparin (porcine) 5000 UNIT/ML injection 3,100-6,200 Units  40-80 Units/kg Intravenous Q6H PRN Cristopher Gee MD   Stopped at 12/02/20 0826   • [COMPLETED] heparin (porcine) 5000 UNIT/ML injection 6,200 Units  80 Units/kg Intravenous Once Cristopher Gee MD   6,200 Units at 12/01/20 2328   • heparin 09628 units/250 mL (100 units/mL) in 0.45 % NaCl infusion  18 Units/kg/hr Intravenous Titrated Cristopher Gee MD   Stopped at 12/02/20 0828   • insulin lispro (humaLOG) injection 0-14 Units  0-14 Units Subcutaneous Q4H Ye Love MD       • [COMPLETED] methylPREDNISolone sodium succinate (SOLU-Medrol) injection 125 mg  125 mg  Intravenous Once Cristopher Gee MD   125 mg at 12/01/20 2355   • methylPREDNISolone sodium succinate (SOLU-Medrol) injection 40 mg  40 mg Intravenous Q8H Ye Love MD       • midazolam (VERSED) injection 2 mg  2 mg Intravenous Once Cristopher Gee MD       • [COMPLETED] midazolam (VERSED) injection   Intravenous Code / Trauma / Sedation Medication Cristopher Gee MD   2.5 mg at 12/01/20 2213   • norepinephrine (LEVOPHED) 8 mg in 250 mL NS infusion (premix)  0.02-0.3 mcg/kg/min Intravenous Titrated Cristopher Gee MD   Stopped at 12/02/20 0248   • nystatin (MYCOSTATIN) powder   Topical Q12H Esperanza Garcia MD       • oxyCODONE-acetaminophen (PERCOCET) 5-325 MG per tablet 1 tablet  1 tablet Oral Q6H PRN Esperanza Garcia MD       • [COMPLETED] piperacillin-tazobactam (ZOSYN) 3.375 g in iso-osmotic dextrose 50 ml (premix)  3.375 g Intravenous Once Esperanza Garcia MD   3.375 g at 12/02/20 0530   • piperacillin-tazobactam (ZOSYN) 3.375 g in iso-osmotic dextrose 50 ml (premix)  3.375 g Intravenous Q8H Esperanza Garcia MD       • propofol (DIPRIVAN) infusion 10 mg/mL 100 mL  5-50 mcg/kg/min Intravenous Titrated Cristopher Gee MD 23.1 mL/hr at 12/02/20 0452 50 mcg/kg/min at 12/02/20 0452   • [COMPLETED] sodium chloride 0.9 % bolus 1,000 mL  1,000 mL Intravenous Once Cristopher Gee MD   Stopped at 12/01/20 2257   • sodium chloride 0.9 % flush 10 mL  10 mL Intravenous PRN Cristopher Gee MD         Orders (last 24 hrs)      Start     Ordered    12/03/20 0600  CBC & Differential  Daily      12/02/20 0515    12/03/20 0600  Basic Metabolic Panel  Daily      12/02/20 0515    12/02/20 1200  piperacillin-tazobactam (ZOSYN) 3.375 g in iso-osmotic dextrose 50 ml (premix)  Every 8 Hours      12/02/20 0515    12/02/20 1200  POC Glucose Q6H  Every 6 Hours      12/02/20 0908    12/02/20 1000  methylPREDNISolone sodium succinate (SOLU-Medrol) injection 40 mg  Every 8 Hours      12/02/20 0907 12/02/20 1000  famotidine (PEPCID)  injection 20 mg  Every 12 Hours Scheduled      12/02/20 0908    12/02/20 0930  insulin lispro (humaLOG) injection 0-14 Units  Every 4 Hours Scheduled      12/02/20 0834    12/02/20 0900  busPIRone (BUSPAR) tablet 5 mg  3 Times Daily      12/02/20 0511    12/02/20 0900  escitalopram (LEXAPRO) tablet 20 mg  Daily      12/02/20 0511    12/02/20 0900  nystatin (MYCOSTATIN) powder  Every 12 Hours Scheduled      12/02/20 0754    12/02/20 0830  albuterol sulfate HFA (PROVENTIL HFA;VENTOLIN HFA;PROAIR HFA) inhaler 6 puff  4 Times Daily - RT      12/02/20 0515    12/02/20 0815  fentaNYL citrate (PF) (SUBLIMAZE) injection 100 mcg  Once      12/02/20 0723    12/02/20 0800  POC Glucose Q4H  Every 4 Hours,   Status:  Canceled      12/02/20 0515    12/02/20 0752  fentaNYL citrate (PF) (SUBLIMAZE) injection 100 mcg  Every 1 Hour PRN      12/02/20 0754    12/02/20 0752  XR Chest 1 View  1 Time Imaging      12/02/20 0751    12/02/20 0730  insulin lispro (humaLOG) injection 0-7 Units  3 Times Daily Before Meals,   Status:  Discontinued      12/02/20 0515    12/02/20 0722  fentaNYL citrate (PF) (SUBLIMAZE) 100 MCG/2ML injection  - ADS Override Pull     Note to Pharmacy: Created by cabinet override    12/02/20 0722    12/02/20 0615  piperacillin-tazobactam (ZOSYN) 3.375 g in iso-osmotic dextrose 50 ml (premix)  Once      12/02/20 0515    12/02/20 0600  CBC Auto Differential  PROCEDURE ONCE     Comments: Discontinue After Heparin Stopped      12/02/20 0003    12/02/20 0600  BNP  Morning Draw      12/02/20 0515    12/02/20 0555  Inpatient Nutrition Consult  Once     Provider:  (Not yet assigned)    12/02/20 0554    12/02/20 0550  Blood Gas, Arterial -  Once      12/02/20 0545    12/02/20 0516  Adult Transthoracic Echo Complete W/ Cont if Necessary Per Protocol  Once      12/02/20 0515    12/02/20 0515  Respiratory Culture - Sputum, Bronchus  Once      12/02/20 0515    12/02/20 0514  Do NOT Hold Basal or Correction Scale Insulin When  Patient is NPO, Hold Scheduled Mealtime (Bolus) Insulin if NPO  Continuous      12/02/20 0515    12/02/20 0514  Follow [facility] Hypoglycemia Standing Orders For Blood Glucose Less Than 70 mg/dL  Until Discontinued     Comments: ALERT PATIENT - NOT NPO & CAN SAFELY SWALLOW  Administer 4 oz Fruit Juice OR 4 oz Regular Soda OR 8 oz Milk OR 15-30 grams (1 tube) of Glucose Gel.  Recheck Blood Glucose Approximately 15 Minutes After Ingestion, Repeat Treatment & Continue to Recheck Blood Sugar Approximately Every 15 Minutes Until Blood Glucose is 70 or Higher.  Once Blood Glucose is 70 or Higher & if It Will Be More Than 60 Minutes Until Next Meal, Provide Appropriate Snack (Including Carbohydrate Food) Based on Meal Plan Order. Give Meal Tray As Soon As Possible.    PATIENT HAS IV ACCESS - UNRESPONSIVE, NPO OR UNABLE TO SAFELY SWALLOW  Administer 25g (50ml) D50W IV Push.  Recheck Blood Glucose Approximately 15 Minutes After Administration, if Blood Glucose Remains Less Than 70, Repeat Treatment   Recheck Blood Glucose Approximately 15 Minutes After 2nd Administration, if Blood Glucose Remains Less Than 70 After 2nd Dose of D50W, Contact Provider for Further Treatment Orders & Consider Adding IVF With D5W for Maintenance    PATIENT WITHOUT IV ACCESS - UNRESPONSIVE, NPO OR UNABLE TO SAFELY SWALLOW  Administer 1mg Glucagon SQ & Establish IV Access.  Turn Patient on Side - Nausea / Vomiting May Occur.  Recheck Blood Glucose Approximately 15 Minutes After Administration.  If Blood Glucose Remains Less Than 70, Administer 25g D50W IV Push (50ml).  Recheck Blood Glucose Approximately 15 Minutes After Administration of D50W, if Blood Glucose Remains Less Than 70, Contact Provider for Further Treatment Orders & Consider Adding IVF With D5 for Maintenance    Document Event & Patient Response to Interventions in EMR, Document Medications on MAR  Notify Provider if Hypoglycemia Treatment Needed    12/02/20 0515 12/02/20 0513  dextrose  (GLUTOSE) oral gel 15 g  Every 15 Minutes PRN      12/02/20 0515    12/02/20 0513  dextrose (D50W) 25 g/ 50mL Intravenous Solution 25 g  Every 15 Minutes PRN      12/02/20 0515    12/02/20 0513  glucagon (human recombinant) (GLUCAGEN DIAGNOSTIC) injection 1 mg  Every 15 Minutes PRN      12/02/20 0515    12/02/20 0511  oxyCODONE-acetaminophen (PERCOCET) 5-325 MG per tablet 1 tablet  Every 6 Hours PRN      12/02/20 0511    12/02/20 0503  Blood Gas, Arterial -  STAT      12/02/20 0502    12/02/20 0430  aPTT  Daily     Comments: Cancel If Patient Has Infusion Changes      12/01/20 2252    12/02/20 0430  CBC & Differential  Daily     Comments: Discontinue After Heparin Stopped      12/01/20 2252    12/02/20 0430  CBC Auto Differential  PROCEDURE ONCE     Comments: Discontinue After Heparin Stopped      12/01/20 2252    12/02/20 0359  POC Glucose Once  Once      12/02/20 0357    12/02/20 0231  POC Glucose Once  Once      12/02/20 0229    12/02/20 0148  Critical Care  Once     Comments: This order was created via procedure documentation    12/02/20 0147    12/02/20 0148  Intubation  Once     Comments: This order was created via procedure documentation    12/02/20 0147    12/02/20 0050  Inpatient Admission  Once      12/02/20 0049    12/02/20 0025  Ventilator - AC/PC; (32); 60; 90%; 5; 24  Continuous      12/02/20 0025    12/02/20 0021  Nasogastric tube insertion  Once      12/02/20 0020    12/02/20 0014  Discontinue Patient Isolation  Once      12/02/20 0013    12/01/20 2344  norepinephrine (LEVOPHED) 8 mg in 250 mL NS infusion (premix)  Titrated      12/01/20 2342    12/01/20 2341  Pulmonology (on-call MD unless specified)  Once     Specialty:  Pulmonary Disease  Provider:  (Not yet assigned)    12/01/20 2340    12/01/20 2335  methylPREDNISolone sodium succinate (SOLU-Medrol) injection 125 mg  Once      12/01/20 2333    12/01/20 2335  albuterol sulfate HFA (PROVENTIL HFA;VENTOLIN HFA;PROAIR HFA) inhaler 6 puff  Once       12/01/20 2333    12/01/20 2334  Blood Gas, Arterial -  Once      12/01/20 2326    12/01/20 2257  Protime-INR  Once      12/01/20 2223    12/01/20 2257  aPTT  Once      12/01/20 2223    12/01/20 2253  heparin (porcine) 5000 UNIT/ML injection 6,200 Units  Once      12/01/20 2252    12/01/20 2253  heparin 44934 units/250 mL (100 units/mL) in 0.45 % NaCl infusion  Titrated      12/01/20 2252    12/01/20 2252  Adjust Heparin Rate Based on aPTT Using Nomogram  Continuous      12/01/20 2252    12/01/20 2252  Verify All Anticoagulant Orders Are Discontinued Upon Initiation of Heparin Protocol (eg Enoxaparin, Fondaparinux, Apixaban, Dabigatran, Edoxaban, or Rivaroxaban)  Once      12/01/20 2252    12/01/20 2252  RN To Release aPTT Order 6 Hours After Heparin Bolus & 6 Hours After Any Heparin Rate Change  Continuous      12/01/20 2252    12/01/20 2252  heparin (porcine) 5000 UNIT/ML injection 3,100-6,200 Units  Every 6 Hours PRN      12/01/20 2252    12/01/20 2224  Blood Culture - Blood, Arm, Left  Once      12/01/20 2223    12/01/20 2224  Blood Culture - Blood, Arm, Left  Once      12/01/20 2223    12/01/20 2224  Lactic Acid, Plasma  Once      12/01/20 2223    12/01/20 2224  Procalcitonin  Once      12/01/20 2223    12/01/20 2224  Troponin  Once      12/01/20 2223    12/01/20 2224  BNP  Once      12/01/20 2223    12/01/20 2224  ECG 12 Lead  Once      12/01/20 2223    12/01/20 2224  Insert peripheral IV  Once      12/01/20 2223    12/01/20 2224  Blood Gas, Arterial -  STAT      12/01/20 2223    12/01/20 2223  sodium chloride 0.9 % flush 10 mL  As Needed      12/01/20 2223    12/01/20 2223  Comprehensive Metabolic Panel  Once      12/01/20 2223    12/01/20 2223  Basic Metabolic Panel  Once,   Status:  Canceled      12/01/20 2223 12/01/20 2223  Respiratory Panel PCR w/COVID-19(SARS-CoV-2) NATASHA/MICHELLE/ANNA/PAD/COR/MAD/KIARA In-House, NP Swab in UTM/VTM, 3-4 HR TAT - Swab, Nasopharynx  STAT      12/01/20 2223 12/01/20 2220  sodium  chloride 0.9 % bolus 1,000 mL  Once      12/01/20 2218 12/01/20 2219  midazolam (VERSED) injection 2 mg  Once      12/01/20 2217 12/01/20 2219  XR Chest 1 View  1 Time Imaging      12/01/20 2218 12/01/20 2218  propofol (DIPRIVAN) infusion 10 mg/mL 100 mL  Titrated      12/01/20 2217 12/01/20 2211  midazolam (VERSED) injection  Code / Trauma / Sedation Medication      12/01/20 2212                   Physician Progress Notes (last 24 hours)      Ye Love MD at 12/02/20 0857          [facility]     Mar/chart reviewed  Follow up respiratory failure, copd,   Sedated on vent    Vital Sign Min/Max for last 24 hours  Temp  Min: 97.3 °F (36.3 °C)  Max: 97.4 °F (36.3 °C)   BP  Min: 75/52  Max: 174/99   Pulse  Min: 66  Max: 130   Resp  Min: 28  Max: 32   SpO2  Min: 70 %  Max: 100 %   Flow (L/min)  Min: 40  Max: 60   No data recorded     Appears ill, sedated on vent  perrl, normal sclera, no palpable thyroid nodules  mmm, no jvd, trachea midline, neck supple,  chest cta bilaterally, no crackles, no wheezes,   rrr,   soft, nt, nd +bs,  no c/c/e  Skin warm, dry on rashes    Labs: 12/2: reviewed:  Wbc 9.7  hgb 9.5  plts 232  7.3/51/67    12/2: CXR: reviewed, left base infiltrate    A/P:  1. Acute on chronic hypoxemic and hypercapnic respiratory failure -- she has had two recent instances of acute hypercapnic failure, she likely has melissa and she appears to have severe COPD, I think best answer is probably just permanent tracheostomy  2. Tracheo/cutaneous fistula -- possible could just dilate this tract for tracheotomy  3. Pneumonia -- continue antibiotics, sputum culture  4. Copd with ae -- steroids, bronchodilators  5. Hypotension -- better  6. Anemia  7. DVT -- on heparin  8. CKD III  9. DMII  10. Feeding tube  11. gerd -- on home ppi, will switch to h2 blocker for now  Start tube feeds,     Spoke with  over the phone, he says she was miserable with the tracheostomy in and would really  prefer not to have tracheostomy. So we will work towards extubation and then see how much the fistula impedes use of NIPPV and if closure is an option.      Discussed with RN  CC 45 mins      Electronically signed by Ye Love MD at 12/02/20 8872

## 2020-12-02 NOTE — ED PROVIDER NOTES
EMERGENCY DEPARTMENT ENCOUNTER    Room Number:  I375/1  Date of encounter:  12/2/2020  PCP: Provider, No Known  Historian: EMS      HPI:  Chief Complaint: Respiratory failure  A complete HPI/ROS/PMH/PSH/SH/FH are unobtainable due to: Acuity of condition    Context: Swetha Luis is a 60 y.o. female who presents to the ED c/o EMS reports that they were called to the scene for respiratory distress.  Patient has long history of multiple comorbidities which are poorly controlled.  She has a documented history of opiate dependency and abuse.  She has a PEG tube and chronic respiratory failure.    It would appear that she was not acutely ill prior to this evening, and from review of the records she was here in the ED a couple nights ago for other complaints.    It also appears that the patient has an acute DVT in the right leg for which she is been placed on Xarelto.      PAST MEDICAL HISTORY  Active Ambulatory Problems     Diagnosis Date Noted   • Tracheostomy complication (CMS/Formerly [facility]) 12/18/2018   • Gangrene of toe (CMS/Formerly [facility]) 12/20/2018   • Acute diastolic congestive heart failure (CMS/Formerly [facility]) 12/24/2018   • ARF (acute renal failure) (CMS/Formerly [facility]) 12/24/2018   • Stage 3 chronic kidney disease 12/24/2018   • Elevated troponin 12/24/2018   • Acute respiratory failure with hypoxemia (CMS/Formerly [facility]) 12/24/2018   • Acute osteomyelitis (CMS/Formerly [facility]) 12/26/2018   • UTI due to extended-spectrum beta lactamase (ESBL) producing Escherichia coli 12/27/2018   • Thrush, oral 12/28/2018   • Tracheostomy malfunction (CMS/Formerly [facility]) 07/10/2019   • COPD (chronic obstructive pulmonary disease) (CMS/Formerly [facility]) 07/10/2019   • Chronic respiratory failure with hypoxia (CMS/Formerly [facility]) 07/10/2019   • PEG (percutaneous endoscopic gastrostomy) status (CMS/Formerly [facility]) 07/10/2019   • History of osteomyelitis 07/10/2019   • Polysubstance abuse (CMS/Formerly [facility]) 07/10/2019   • History of tracheal stenosis 07/10/2019   • Chronic diastolic CHF (congestive heart failure) (CMS/Formerly [facility]) 07/10/2019   • Peripheral  artery disease (CMS/HCC) 07/10/2019   • Medically noncompliant 07/10/2019   • WENDI and COPD overlap syndrome (CMS/HCC) 07/10/2019   • Dyspnea 09/22/2019   • Elevated LFTs 09/22/2019   • Elevated troponin 09/22/2019   • Tracheostomy present (CMS/HCC) 09/22/2019   • Essential hypertension 09/22/2019   • Dysphagia 09/22/2019   • Healthcare associated bacterial pneumonia 10/04/2019   • Infection due to ESBL-producing Escherichia coli 10/06/2019   • Sepsis due to Gram negative bacteria (CMS/HCC) 10/06/2019   • Ureteral stone with hydronephrosis 10/08/2019   • UTI (urinary tract infection) 10/08/2019   • Acute tracheobronchitis 10/19/2019   • Chronic diastolic CHF (congestive heart failure) (CMS/HCC) 10/19/2019   • Bacteremia 10/19/2019   • Acute deep vein thrombosis (DVT) of proximal vein of left lower extremity (CMS/HCC) 11/15/2020   • Elevated d-dimer 11/15/2020   • Type 2 diabetes mellitus with hyperglycemia, with long-term current use of insulin (CMS/HCC) 11/16/2020     Resolved Ambulatory Problems     Diagnosis Date Noted   • No Resolved Ambulatory Problems     Past Medical History:   Diagnosis Date   • Acute congestive heart failure (CMS/HCC) 12/24/2018   • Acute renal failure on dialysis (CMS/HCC)    • Anxiety    • Pugh esophagus    • CKD (chronic kidney disease)    • MRSA infection    • Nontraumatic subarachnoid hemorrhage (CMS/HCC)    • Seizures (CMS/HCC)          PAST SURGICAL HISTORY  Past Surgical History:   Procedure Laterality Date   • TRACHEOSTOMY     • URETEROSCOPY LASER LITHOTRIPSY WITH STENT INSERTION Left 10/11/2019    Procedure: CYSTOSCOPY,  URETEROSCOPY, LEFT RETROGRADE, LEFT PYELOGRAM, LASER LITHOTRIPSY, PLACEMENT OF STENT.;  Surgeon: Clyde Selby MD;  Location: [facility] OR;  Service: Urology         FAMILY HISTORY  Family History   Problem Relation Age of Onset   • Diabetes Mother    • Hypertension Mother          SOCIAL HISTORY  Social History     Socioeconomic History   • Marital  "status:      Spouse name: Not on file   • Number of children: Not on file   • Years of education: Not on file   • Highest education level: Not on file   Tobacco Use   • Smoking status: Current Every Day Smoker     Packs/day: 0.50     Types: Cigarettes   • Smokeless tobacco: Never Used   • Tobacco comment: \"1 CIGARETTE A DAY\"   Substance and Sexual Activity   • Alcohol use: No     Frequency: Never   • Drug use: Yes     Types: Cocaine(coke)     Comment: 20 years ago occasional   • Sexual activity: Defer         ALLERGIES  Cephalexin, Hydrocodone, Strawberry, and Zithromax [azithromycin]        REVIEW OF SYSTEMS  Review of Systems   Limited due to acuity of condition      PHYSICAL EXAM    I have reviewed the triage vital signs and nursing notes.    ED Triage Vitals   Temp Heart Rate Resp BP SpO2   12/01/20 2209 12/01/20 2201 12/01/20 2210 12/01/20 2158 12/01/20 2201   97.3 °F (36.3 °C) 107 (!) 30 121/78 100 %      Temp src Heart Rate Source Patient Position BP Location FiO2 (%)   12/01/20 2209 -- -- -- --   Tympanic           Physical Exam  GENERAL: Moderate respiratory distress, ill-appearing, anxious  HENT: nares patent, stenotic tracheostomy is noted  EYES: no scleral icterus  CV: Narrow complex tachycardia  RESPIRATORY: Moderate respiratory distress with diminished breath sounds throughout and coarse rhonchi in all lung fields.  Accessory muscle use is present  ABDOMEN: soft, nontender palpation, bowel sounds present.  Feeding tube in the upper abdomen is noted  MUSCULOSKELETAL: no deformity, no calf tenderness or pedal edema NEURO: alert, anxious and borderline hysterical  SKIN: warm, dry        LAB RESULTS  Recent Results (from the past 24 hour(s))   Comprehensive Metabolic Panel    Collection Time: 12/01/20 10:32 PM    Specimen: Blood   Result Value Ref Range    Glucose 229 (H) 65 - 99 mg/dL    BUN 18 8 - 23 mg/dL    Creatinine 1.65 (H) 0.57 - 1.00 mg/dL    Sodium 140 136 - 145 mmol/L    Potassium 4.6 3.5 " - 5.2 mmol/L    Chloride 106 98 - 107 mmol/L    CO2 23.9 22.0 - 29.0 mmol/L    Calcium 8.5 (L) 8.6 - 10.5 mg/dL    Total Protein 6.6 6.0 - 8.5 g/dL    Albumin 3.80 3.50 - 5.20 g/dL    ALT (SGPT) 16 1 - 33 U/L    AST (SGOT) 16 1 - 32 U/L    Alkaline Phosphatase 204 (H) 39 - 117 U/L    Total Bilirubin <0.2 0.0 - 1.2 mg/dL    eGFR Non African Amer 32 (L) >60 mL/min/1.73    Globulin 2.8 gm/dL    A/G Ratio 1.4 g/dL    BUN/Creatinine Ratio 10.9 7.0 - 25.0    Anion Gap 10.1 5.0 - 15.0 mmol/L   Lactic Acid, Plasma    Collection Time: 12/01/20 10:32 PM    Specimen: Blood   Result Value Ref Range    Lactate 1.1 0.5 - 2.0 mmol/L   Procalcitonin    Collection Time: 12/01/20 10:32 PM    Specimen: Blood   Result Value Ref Range    Procalcitonin 0.07 0.00 - 0.25 ng/mL   Troponin    Collection Time: 12/01/20 10:32 PM    Specimen: Blood   Result Value Ref Range    Troponin T <0.010 0.000 - 0.030 ng/mL   BNP    Collection Time: 12/01/20 10:32 PM    Specimen: Blood   Result Value Ref Range    proBNP 570.4 0.0 - 900.0 pg/mL   Respiratory Panel PCR w/COVID-19(SARS-CoV-2) NATASHA/MICHELLE/ANNA/PAD/COR/MAD/KIARA In-House, NP Swab in [facility], 3-4 HR TAT - Swab, Nasopharynx    Collection Time: 12/01/20 10:35 PM    Specimen: Nasopharynx; Swab   Result Value Ref Range    ADENOVIRUS, PCR Not Detected Not Detected    Coronavirus 229E Not Detected Not Detected    Coronavirus HKU1 Not Detected Not Detected    Coronavirus NL63 Not Detected Not Detected    Coronavirus OC43 Not Detected Not Detected    COVID19 Not Detected Not Detected - Ref. Range    Human Metapneumovirus Not Detected Not Detected    Human Rhinovirus/Enterovirus Not Detected Not Detected    Influenza A PCR Not Detected Not Detected    Influenza B PCR Not Detected Not Detected    Parainfluenza Virus 1 Not Detected Not Detected    Parainfluenza Virus 2 Not Detected Not Detected    Parainfluenza Virus 3 Not Detected Not Detected    Parainfluenza Virus 4 Not Detected Not Detected    RSV, PCR Not  Detected Not Detected    Bordetella pertussis pcr Not Detected Not Detected    Bordetella parapertussis PCR Not Detected Not Detected    Chlamydophila pneumoniae PCR Not Detected Not Detected    Mycoplasma pneumo by PCR Not Detected Not Detected   ECG 12 Lead    Collection Time: 12/01/20 10:46 PM   Result Value Ref Range    QT Interval 356 ms   Blood Gas, Arterial -    Collection Time: 12/01/20 11:26 PM    Specimen: Arterial Blood   Result Value Ref Range    Site Arterial: right brachial     Candelario's Test N/A     pH, Arterial 7.111 (C) 7.350 - 7.450 pH units    pCO2, Arterial 90.7 (C) 35.0 - 45.0 mm Hg    pO2, Arterial 281.5 (H) 80.0 - 100.0 mm Hg    HCO3, Arterial 28.9 (H) 22.0 - 28.0 mmol/L    Base Excess, Arterial -2.2 (L) 0.0 - 2.0 mmol/L    O2 Saturation Calculated 99.7 (H) 92.0 - 99.0 %    A-a Gradiant 0.4 mmHg    Barometric Pressure for Blood Gas 757.5 mmHg    Modality Adult Vent     FIO2 100 %    Ventilator Mode PC     Set Mech Resp Rate 30     Rate 30 Breaths/minute    PEEP 5    Protime-INR    Collection Time: 12/01/20 11:56 PM    Specimen: Blood   Result Value Ref Range    Protime 21.6 (H) 11.7 - 14.2 Seconds    INR 1.90 (H) 0.90 - 1.10   aPTT    Collection Time: 12/01/20 11:56 PM    Specimen: Blood   Result Value Ref Range    PTT 40.2 (H) 22.7 - 35.4 seconds   CBC Auto Differential    Collection Time: 12/01/20 11:56 PM    Specimen: Blood   Result Value Ref Range    WBC 16.71 (H) 3.40 - 10.80 10*3/mm3    RBC 4.01 3.77 - 5.28 10*6/mm3    Hemoglobin 9.2 (L) 12.0 - 15.9 g/dL    Hematocrit 31.7 (L) 34.0 - 46.6 %    MCV 79.1 79.0 - 97.0 fL    MCH 22.9 (L) 26.6 - 33.0 pg    MCHC 29.0 (L) 31.5 - 35.7 g/dL    RDW 16.1 (H) 12.3 - 15.4 %    RDW-SD 46.5 37.0 - 54.0 fl    MPV 11.4 6.0 - 12.0 fL    Platelets 265 140 - 450 10*3/mm3    Neutrophil % 84.1 (H) 42.7 - 76.0 %    Lymphocyte % 7.7 (L) 19.6 - 45.3 %    Monocyte % 5.9 5.0 - 12.0 %    Eosinophil % 1.3 0.3 - 6.2 %    Basophil % 0.5 0.0 - 1.5 %    Immature Grans %  0.5 0.0 - 0.5 %    Neutrophils, Absolute 14.06 (H) 1.70 - 7.00 10*3/mm3    Lymphocytes, Absolute 1.28 0.70 - 3.10 10*3/mm3    Monocytes, Absolute 0.99 (H) 0.10 - 0.90 10*3/mm3    Eosinophils, Absolute 0.21 0.00 - 0.40 10*3/mm3    Basophils, Absolute 0.08 0.00 - 0.20 10*3/mm3    Immature Grans, Absolute 0.09 (H) 0.00 - 0.05 10*3/mm3    nRBC 0.0 0.0 - 0.2 /100 WBC   POC Glucose Once    Collection Time: 12/02/20  2:29 AM    Specimen: Blood   Result Value Ref Range    Glucose 188 (H) 70 - 130 mg/dL   POC Glucose Once    Collection Time: 12/02/20  3:57 AM    Specimen: Blood   Result Value Ref Range    Glucose 211 (H) 70 - 130 mg/dL   Blood Gas, Arterial -    Collection Time: 12/02/20  5:45 AM    Specimen: Arterial Blood   Result Value Ref Range    Site Arterial: left radial     Candelario's Test Positive     pH, Arterial 7.299 (C) 7.350 - 7.450 pH units    pCO2, Arterial 51.0 (H) 35.0 - 45.0 mm Hg    pO2, Arterial 67.3 (L) 80.0 - 100.0 mm Hg    HCO3, Arterial 25.0 22.0 - 28.0 mmol/L    Base Excess, Arterial -1.8 (L) 0.0 - 2.0 mmol/L    O2 Saturation Calculated 90.6 (L) 92.0 - 99.0 %    A-a Gradiant 0.4 mmHg    Barometric Pressure for Blood Gas 761.2 mmHg    Modality Adult Vent     FIO2 30 %    Ventilator Mode PC     Set Tidal Volume 419     Set Mech Resp Rate 32     Rate 32 Breaths/minute    PEEP 5    Respiratory Culture - Sputum, Bronchus    Collection Time: 12/02/20  6:06 AM    Specimen: Bronchus; Sputum   Result Value Ref Range    Gram Stain Few (2+) WBCs seen     Gram Stain Few (2+) Epithelial cells per low power field     Gram Stain Few (2+) Gram positive cocci in chains     Gram Stain Occasional Yeast    aPTT    Collection Time: 12/02/20  7:11 AM    Specimen: Arm, Right; Blood   Result Value Ref Range    PTT >200.0 (C) 22.7 - 35.4 seconds   CBC Auto Differential    Collection Time: 12/02/20  7:11 AM    Specimen: Blood   Result Value Ref Range    WBC 9.70 3.40 - 10.80 10*3/mm3    RBC 4.14 3.77 - 5.28 10*6/mm3     Hemoglobin 9.5 (L) 12.0 - 15.9 g/dL    Hematocrit 31.8 (L) 34.0 - 46.6 %    MCV 76.8 (L) 79.0 - 97.0 fL    MCH 22.9 (L) 26.6 - 33.0 pg    MCHC 29.9 (L) 31.5 - 35.7 g/dL    RDW 16.3 (H) 12.3 - 15.4 %    RDW-SD 45.6 37.0 - 54.0 fl    MPV 11.5 6.0 - 12.0 fL    Platelets 232 140 - 450 10*3/mm3    Neutrophil % 89.7 (H) 42.7 - 76.0 %    Lymphocyte % 8.5 (L) 19.6 - 45.3 %    Monocyte % 1.1 (L) 5.0 - 12.0 %    Eosinophil % 0.1 (L) 0.3 - 6.2 %    Basophil % 0.1 0.0 - 1.5 %    Immature Grans % 0.5 0.0 - 0.5 %    Neutrophils, Absolute 8.70 (H) 1.70 - 7.00 10*3/mm3    Lymphocytes, Absolute 0.82 0.70 - 3.10 10*3/mm3    Monocytes, Absolute 0.11 0.10 - 0.90 10*3/mm3    Eosinophils, Absolute 0.01 0.00 - 0.40 10*3/mm3    Basophils, Absolute 0.01 0.00 - 0.20 10*3/mm3    Immature Grans, Absolute 0.05 0.00 - 0.05 10*3/mm3    nRBC 0.0 0.0 - 0.2 /100 WBC   BNP    Collection Time: 12/02/20  7:11 AM    Specimen: Blood   Result Value Ref Range    proBNP 746.2 0.0 - 900.0 pg/mL   Adult Transthoracic Echo Complete W/ Cont if Necessary Per Protocol    Collection Time: 12/02/20 11:48 AM   Result Value Ref Range    BSA 1.9 m^2    IVSd 1.1 cm    LVIDd 4.0 cm    LVIDs 2.8 cm    LVPWd 1.2 cm    IVS/LVPW 0.92     FS 30.0 %    EDV(Teich) 70.0 ml    ESV(Teich) 29.6 ml    EF(Teich) 57.8 %    EDV(cubed) 64.0 ml    ESV(cubed) 22.0 ml    EF(cubed) 65.7 %    LV mass(C)d 155.4 grams    LV mass(C)dI 80.9 grams/m^2    SV(Teich) 40.4 ml    SI(Teich) 21.1 ml/m^2    SV(cubed) 42.0 ml    SI(cubed) 21.9 ml/m^2    Ao root diam 3.3 cm    Ao root area 8.6 cm^2    ACS 2.0 cm    LVOT diam 2.0 cm    LVOT area 3.1 cm^2    LVOT area(traced) 3.1 cm^2    RVOT diam 2.3 cm    RVOT area 4.2 cm^2    LVLd ap4 8.0 cm    EDV(MOD-sp4) 78.0 ml    LVLs ap4 6.3 cm    ESV(MOD-sp4) 31.0 ml    EF(MOD-sp4) 60.3 %    LVLd ap2 7.9 cm    EDV(MOD-sp2) 82.0 ml    LVLs ap2 6.1 cm    ESV(MOD-sp2) 28.0 ml    EF(MOD-sp2) 65.9 %    SV(MOD-sp4) 47.0 ml    SI(MOD-sp4) 24.5 ml/m^2    SV(MOD-sp2) 54.0  ml    SI(MOD-sp2) 28.1 ml/m^2    Ao root area (BSA corrected) 1.7     LV Yoon Vol (BSA corrected) 40.6 ml/m^2    LV Sys Vol (BSA corrected) 16.1 ml/m^2    EF(MOD-bp) 62.8 %    MV A dur 0.2 sec    MV E max brandan 68.4 cm/sec    MV A max brandan 113.0 cm/sec    MV E/A 0.6     MV V2 max 113.0 cm/sec    MV max PG 5.1 mmHg    MV V2 mean 52.8 cm/sec    MV mean PG 1.0 mmHg    MV V2 VTI 20.2 cm    MVA(VTI) 3.3 cm^2    MV P1/2t max brandan 58.2 cm/sec    MV P1/2t 86.5 msec    MVA(P1/2t) 2.5 cm^2    MV dec slope 197.0 cm/sec^2    MV dec time 0.21 sec    Ao pk brandan 117.0 cm/sec    Ao max PG 5.5 mmHg    Ao max PG (full) 2.9 mmHg    Ao V2 mean 82.9 cm/sec    Ao mean PG 3.0 mmHg    Ao mean PG (full) 2.0 mmHg    Ao V2 VTI 29.0 cm    ROMAINE(I,A) 2.3 cm^2    ROMAINE(I,D) 2.3 cm^2    ROMAINE(V,A) 2.2 cm^2    ROMAINE(V,D) 2.2 cm^2    LV V1 max PG 2.6 mmHg    LV V1 mean PG 1.0 mmHg    LV V1 max 80.9 cm/sec    LV V1 mean 51.4 cm/sec    LV V1 VTI 21.2 cm    SV(Ao) 248.0 ml    SI(Ao) 129.1 ml/m^2    SV(LVOT) 66.6 ml    SV(RVOT) 52.3 ml    SI(LVOT) 34.7 ml/m^2    PA V2 max 65.2 cm/sec    PA max PG 1.7 mmHg    PA max PG (full) 0.74 mmHg    BH CV ECHO JACE - PVA(V,A) 3.1 cm^2    BH CV ECHO JACE - PVA(V,D) 3.1 cm^2    PA acc time 0.13 sec    RV V1 max PG 0.96 mmHg    RV V1 mean PG 1.0 mmHg    RV V1 max 49.0 cm/sec    RV V1 mean 35.0 cm/sec    RV V1 VTI 12.6 cm    PA pr(Accel) 21.9 mmHg    Pulm Sys Brandan 33.5 cm/sec    Pulm Yoon Brandan 26.6 cm/sec    Pulm S/D 1.3     Qp/Qs 0.79     Pulm A Revs Dur 0.16 sec    Pulm A Revs Brandan 27.5 cm/sec    MVA P1/2T LCG 3.8 cm^2    RV Base 3.0 cm    RV Length 7.0 cm    RV Mid 2.9 cm    Lat Peak E' Brandan 5.9 cm/sec    Med Peak E' Brandan 5.6 cm/sec    RV S' 12.6 cm/sec     CV ECHO JACE - BZI_BMI 29.4 kilograms/m^2     CV ECHO JACE - BSA(HAYCOCK) 2.0 m^2     CV ECHO JACE - BZI_METRIC_WEIGHT 82.6 kg     CV ECHO JACE - BZI_METRIC_HEIGHT 167.6 cm    Avg E/e' ratio 11.90     Target HR (85%) 136 bpm    Max. Pred. HR (100%) 160 bpm    Sinus 3.2 cm     STJ 3.0 cm    LA Volume Index 29.0 mL/m2    TAPSE (>1.6) 2.60 cm   POC Glucose Once    Collection Time: 12/02/20 12:56 PM    Specimen: Blood   Result Value Ref Range    Glucose 259 (H) 70 - 130 mg/dL   POC Glucose Once    Collection Time: 12/02/20  3:21 PM    Specimen: Blood   Result Value Ref Range    Glucose 179 (H) 70 - 130 mg/dL   aPTT    Collection Time: 12/02/20  3:30 PM    Specimen: Blood   Result Value Ref Range    PTT 95.9 (H) 22.7 - 35.4 seconds       Ordered the above labs and independently reviewed the results.        RADIOLOGY  Xr Chest 1 View    Result Date: 12/2/2020  AP PORTABLE UPRIGHT CHEST  HISTORY: Intubated. Follow-up exam.  COMPARISON: Portable chest 12/01/2020, CT angiogram chest 11/30/2020.  FINDINGS: Endotracheal tube is at the aguila and slightly directed toward the right mainstem bronchus and recommend withdrawal 2-3 cm. There are chronic increased interstitial markings associated with emphysema. Mild patchy infiltrates present in the peripheral aspect of the right midlung and within the left base. There is no pneumothorax or evidence for perihilar edema. Right IJ central venous catheter tip extends into the right atrium. Cardiac monitoring leads are noted. Small pleural effusions are suspected.      1. ET tube tip is at the aguila and directed slightly toward the right mainstem bronchus and recommend withdrawal 2-3 cm for better positioning. 2. Interval placement right IJ central venous catheter with tip in the SVC. 3. Mild peripheral right midlung and left basilar patchy infiltrates.  This report was finalized on 12/2/2020 9:06 AM by Dr. Chad Truong M.D.      Xr Chest 1 View    Result Date: 12/1/2020  SINGLE VIEW OF THE CHEST  HISTORY: Intubation  COMPARISON: 11/30/2020 and other prior imaging  FINDINGS: Endotracheal tube extends into the proximal right mainstem. Retraction by approximately 2 cm is suggested. Cardiac silhouette is stable. However, the patient has developed  bilateral alveolar and interstitial infiltrates. There are also small bilateral pleural effusions, right greater than left. No pneumothorax is seen       1. Right mainstem intubation. Retraction by approximately 2 cm is suggested. 2. Interval development of bilateral alveolar and interstitial infiltrates. Appearance may reflect edema, although correlation with any evidence of infection is recommended. There are also small bilateral pleural effusions.  FINDINGS were relayed to Dr. Gee in the emergency department at 11:05 PM.  This report was finalized on 12/1/2020 11:07 PM by Dr. Emilia Olivo M.D.        I ordered the above noted radiological studies. Reviewed by me and discussed with radiologist.  See dictation for official radiology interpretation.      PROCEDURES    Critical Care  Performed by: Cristopher Gee MD  Authorized by: Cristopher Gee MD     Critical care provider statement:     Critical care time (minutes):  40    Critical care time was exclusive of:  Separately billable procedures and treating other patients    Critical care was necessary to treat or prevent imminent or life-threatening deterioration of the following conditions:  Respiratory failure    Critical care was time spent personally by me on the following activities:  Development of treatment plan with patient or surrogate, discussions with consultants, evaluation of patient's response to treatment, examination of patient, ordering and performing treatments and interventions, ordering and review of laboratory studies, ordering and review of radiographic studies, pulse oximetry, re-evaluation of patient's condition and review of old charts    I assumed direction of critical care for this patient from another provider in my specialty: no    Intubation    Date/Time: 12/2/2020 1:47 AM  Performed by: Cristopher Gee MD  Authorized by: Cristopher Gee MD     Consent:     Consent obtained:  Emergent situation  Pre-procedure details:      Patient status:  Altered mental status    Mallampati score:  III    Pretreatment medications:  Midazolam    Paralytics:  None  Procedure details:     Preoxygenation:  Nonrebreather mask    CPR in progress: no      Intubation method:  Oral    Oral intubation technique:  Video-assisted    Laryngoscope blade:  Mac 4    Tube size (mm):  6.5    Tube type:  Cuffed    Number of attempts:  1    Cricoid pressure: no      Tube visualized through cords: yes    Placement assessment:     ETT to lip:  26    Tube secured with:  ETT alejandro    Breath sounds:  Reduced on left    Placement verification: CXR verification and ETCO2 detector      CXR findings:  ETT in right main stem    Tube repositioned: yes    Post-procedure details:     Patient tolerance of procedure:  Tolerated well, no immediate complications          MEDICATIONS GIVEN IN ER    Medications   propofol (DIPRIVAN) infusion 10 mg/mL 100 mL (25 mcg/kg/min × 77.1 kg Intravenous Rate/Dose Change 12/2/20 1737)   midazolam (VERSED) injection 2 mg (has no administration in time range)   sodium chloride 0.9 % flush 10 mL (has no administration in time range)   heparin 14753 units/250 mL (100 units/mL) in 0.45 % NaCl infusion (13 Units/kg/hr × 77.1 kg Intravenous Rate Change (DUAL SIGN) 12/2/20 1738)   heparin (porcine) 5000 UNIT/ML injection 3,100-6,200 Units (0 Units Intravenous Hold 12/2/20 0826)   norepinephrine (LEVOPHED) 8 mg in 250 mL NS infusion (premix) (0 mcg/kg/min × 77.1 kg Intravenous Stopped 12/2/20 0248)   busPIRone (BUSPAR) tablet 5 mg (5 mg Oral Given 12/2/20 1536)   escitalopram (LEXAPRO) tablet 20 mg (20 mg Oral Given 12/2/20 1006)   oxyCODONE-acetaminophen (PERCOCET) 5-325 MG per tablet 1 tablet (has no administration in time range)   piperacillin-tazobactam (ZOSYN) 3.375 g in iso-osmotic dextrose 50 ml (premix) (3.375 g Intravenous New Bag 12/2/20 1250)   dextrose (GLUTOSE) oral gel 15 g (has no administration in time range)   dextrose (D50W) 25 g/ 50mL  Intravenous Solution 25 g (has no administration in time range)   glucagon (human recombinant) (GLUCAGEN DIAGNOSTIC) injection 1 mg (has no administration in time range)   albuterol sulfate HFA (PROVENTIL HFA;VENTOLIN HFA;PROAIR HFA) inhaler 6 puff (6 puffs Endotracheal Given 12/2/20 1555)   fentaNYL citrate (PF) (SUBLIMAZE) 100 MCG/2ML injection  - ADS Override Pull (has no administration in time range)   fentaNYL citrate (PF) (SUBLIMAZE) injection 100 mcg (has no administration in time range)   fentaNYL citrate (PF) (SUBLIMAZE) injection 100 mcg (100 mcg Intravenous Given 12/2/20 1311)   nystatin (MYCOSTATIN) powder (1 application Topical Given 12/2/20 1033)   insulin lispro (humaLOG) injection 0-14 Units (3 Units Subcutaneous Incomplete 12/2/20 1608)   methylPREDNISolone sodium succinate (SOLU-Medrol) injection 40 mg (40 mg Intravenous Given 12/2/20 1747)   famotidine (PEPCID) injection 20 mg (20 mg Intravenous Given 12/2/20 1249)   chlorhexidine (PERIDEX) 0.12 % solution 15 mL (15 mL Mouth/Throat Given 12/2/20 1249)   sodium chloride 0.9 % infusion (75 mL/hr Intravenous New Bag 12/2/20 0800)   midazolam (VERSED) injection (2.5 mg Intravenous Given 12/1/20 2213)   sodium chloride 0.9 % bolus 1,000 mL (0 mL Intravenous Stopped 12/1/20 2257)   heparin (porcine) 5000 UNIT/ML injection 6,200 Units (6,200 Units Intravenous Given 12/1/20 2328)   methylPREDNISolone sodium succinate (SOLU-Medrol) injection 125 mg (125 mg Intravenous Given 12/1/20 2355)   albuterol sulfate HFA (PROVENTIL HFA;VENTOLIN HFA;PROAIR HFA) inhaler 6 puff (6 puffs Inhalation Given 12/2/20 0023)   piperacillin-tazobactam (ZOSYN) 3.375 g in iso-osmotic dextrose 50 ml (premix) (3.375 g Intravenous New Bag 12/2/20 0530)         PROGRESS, DATA ANALYSIS, CONSULTS, AND MEDICAL DECISION MAKING    All labs have been independently reviewed by me.  All radiology studies have been reviewed by me and discussed with radiologist dictating the report.   EKG's  independently viewed and interpreted by me.  Discussion below represents my analysis of pertinent findings related to patient's condition, differential diagnosis, treatment plan and final disposition.        ED Course as of Dec 02 1853   Wed Dec 02, 2020   0148 Spoke with Dr. Garcia who agrees to admit the patient to the ICU.    [DP]   1851 ABG after intubation shows pH 7.1/91/281 and this is on 100% FiO2    [DP]   1851 CBC shows a chronic stable anemia, normal white count    [DP]   1852 Chemistry shows some baseline chronic kidney disease is present and stable    [DP]   1852 Pro, lactate normal    [DP]   1852 Troponin and proBNP normal    [DP]   1852 Chest x-ray post intubation showed diffuse interstitial prominence which could represent edema versus infection.  Also noted the ET tube was in the right mainstem and was subsequently withdrawn by 2 cm    [DP]      ED Course User Index  [DP] Cristopher Gee MD           PPE: Both the patient and I wore a surgical mask throughout the entire patient encounter. I wore protective C APR, gown and gloves    AS OF 18:53 EST VITALS:    BP - 113/66  HR - 55  TEMP - 98 °F (36.7 °C) (Oral)  O2 SATS - 98%        DIAGNOSIS  Final diagnoses:   Acute respiratory failure with hypoxia and hypercapnia (CMS/HCC)         DISPOSITION  Admit to ICU           Cristopher Gee MD  12/02/20 1853

## 2020-12-02 NOTE — CONSULTS
"Adult Nutrition  Assessment/PES    Patient Name:  Swetha Luis  YOB: 1960  MRN: [id number]  Admit Date:  12/1/2020    Assessment Date:  12/2/2020    Comments:  Nutrition Consult. Pt admitted for resp failure- sedated on the vent with propofol.  Hx of PEG and Trach placement. PEG site is excoriated and currently has an NG to suction with ~320cc out.  Per RN, plan is to pull this and feed through the PEG.  She was not using her PEG for tube feeds at home and her last discharge diet was Mech Soft, thin liquids after VFSS.  She was eating well in the hospital, not sure about po at home. Skin issues noted. Labs, scans reviewed.      Would begin TF with Peptamen AF.  Goal is 45cc/hr while maxed on propofol.  Min free water at this time -will monitor Na+, urine output. Follow clinical course, nutrition support needs.    Reason for Assessment     Row Name 12/02/20 0910          Reason for Assessment    Reason For Assessment  TF/PN;physician consult     Diagnosis  pulmonary disease;endocrine conditions;renal disease Resp failure-vent, COPD, Bilateral pulmonary infiltrates, CKD, DVT, DM, depression         Nutrition/Diet History     Row Name 12/02/20 0912          Nutrition/Diet History    Typical Food/Fluid Intake  last admit - was on MS with thin liquids, had VFSS     Factors Affecting Nutritional Intake  compromised airway         Anthropometrics     Row Name 12/02/20 0912          Anthropometrics    Height  167.6 cm (66\")     Current Weight Method  measured     Weight  82.7 kg (182 lb 5.1 oz)        Admit Weight    Admit Weight Method  stated     Admit Weight  77.1 kg (170 lb)        Ideal Body Weight (IBW)    Ideal Body Weight (IBW) (kg)  59.58     % Ideal Body Weight  138.81     % of Ideal Body Weight Assessment  -- 129% IBW        Body Mass Index (BMI)    BMI (kg/m2)  29.49     BMI Assessment  BMI 25-29.9: overweight BMI 29         Labs/Tests/Procedures/Meds     Row Name 12/02/20 0914          " "Labs/Procedures/Meds    Lab Results Reviewed  reviewed, pertinent     Lab Results Comments  Glu, H/H. wbc        Diagnostic Tests/Procedures    Diagnostic Test/Procedure Reviewed  reviewed, pertinent     Diagnostic Test/Procedures Comments  CT Chest        Medications    Pertinent Medications Reviewed  reviewed, pertinent     Pertinent Medications Comments  lexapro, fentanyl, insulin, zosyn, heparin, levo, propofol         Physical Findings     Row Name 12/02/20 0918          Physical Findings    Overall Physical Appearance  on ventilator support;overweight     Gastrointestinal  feeding tube     Tubes  gastrostomy tube;nasogastric tube     Oral/Mouth Cavity  poor dentition     Skin  other (see comments);surgical incision MASD's, excoriation, B=13         Estimated/Assessed Needs     Row Name 12/02/20 0921 12/02/20 0912       Calculation Measurements    Weight Used For Calculations  77.1 kg (169 lb 15.6 oz)  --    Height  --  167.6 cm (66\")       Estimated/Assessed Needs    Additional Documentation  KCAL/KG (Group);Protein Requirements (Group);Fluid Requirements (Group)  --       KCAL/KG    KCAL/KG  20 Kcal/Kg (kcal);25 Kcal/Kg (kcal)  --    20 Kcal/Kg (kcal)  1542  --    25 Kcal/Kg (kcal)  1927.5  --       Protein Requirements    Weight Used For Protein Calculations  77.1 kg (169 lb 15.6 oz)  --    Est Protein Requirement Amount (gms/kg)  1.2 gm protein  --    Estimated Protein Requirements (gms/day)  92.52  --       Fluid Requirements    Fluid Requirements (mL/day)  1800  --       --        Nutrition Prescription Ordered     Row Name 12/02/20 0922          Nutrition Prescription PO    Current PO Diet  NPO        Nutrition Prescription EN    Enteral Route  PEG     Product  -- per RD     TF Delivery Method  Continuous        Propofol Considerations    Propofol (mL/hr)  23.1 mL/hr     Propofol (Kcal/day)  609.84 Kcal/day         Evaluation of Received Nutrient/Fluid Intake     Row Name 12/02/20 0940          EN " Evaluation    TF Residual  320cc out NG - plan is to pull out     HOB  Greater than or equal to 30 degress         Problem/Interventions:  Problem 1     Row Name 12/02/20 0936          Nutrition Diagnoses Problem 1    Problem 1  Needs Alternate Route     Etiology (related to)  Medical Diagnosis     Pulmonary/Critical Care  Acute respiratory failure;Ventilator         Intervention Goal     Row Name 12/02/20 0936          Intervention Goal    General  Maintain nutrition;Nutrition support treatment;Disease management/therapy;Improved nutrition related lab(s);Reduce/improve symptoms;Meet nutritional needs for age/condition     TF/PN  Inititiate TF/PN;Tolerate TF at goal     Transition  TF to PO     Weight  No significant weight loss         Nutrition Intervention     Row Name 12/02/20 0936          Nutrition Intervention    RD/Tech Action  Follow Tx progress;Care plan reviewd;Recommend/ordered     Recommended/Ordered  EN         Nutrition Prescription     Row Name 12/02/20 0936          Nutrition Prescription EN    Enteral Prescription  Enteral begin/change;Enteral to supply     Enteral Route  PEG     Product  Peptamen AF     TF Delivery Method  Continuous     Continuous TF Goal Rate (mL/hr)  45 mL/hr     Continuous TF Starting Rate (mL/hr)  20 mL/hr     New EN Prescription Ordered?  Yes        EN to Supply    Kcal/Day  1296 Kcal/Day     Kcal/day with Propofol   1906 Kcal/day with Propofol     Protein (gm/day)  82.08 gm/day     Meet Estimated Kcal Need (%)  100 %     Meet Estimated Protein Need (%)  88 %     TF Free H2O (mL)  874.8 mL     Total Free H2O (mL/day)  1055 mL/day         Education/Evaluation     Row Name 12/02/20 0938          Education    Education  Will Instruct as appropriate        Monitor/Evaluation    Monitor  Per protocol;Skin status;Symptoms;I&O;Pertinent labs;TF delivery/tolerance;Weight           Electronically signed by:  Damaris Rodriguez RD  12/02/20 09:41 EST

## 2020-12-02 NOTE — ED NOTES
Pt to ED from home per [facility] with reports of acute onset altered mental status/apnea/cyanosis from nipples up twenty minutes prior to their arrival.      Pt arrives to ED unconscious and being bagged.  Viral filter applied prior to entering ED.    All triage performed with this RN wearing appropriate PPE.  Pt placed in mask upon arrival to ED.     Coretta Alvarado RN  12/01/20 4981

## 2020-12-02 NOTE — DISCHARGE PLACEMENT REQUEST
"Neo Spencer (60 y.o. 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), 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)   Code Status: Prior    Ht: 167.6 cm (66\")   Wt: 82.6 kg (182 lb)    Admission Cmt: None   Principal Problem: None                Active Insurance as of 12/1/2020     Primary Coverage     Payor Plan Insurance Group Employer/Plan Group    [place] [id number]     Payor Plan Address Payor Plan Phone Number Payor Plan Fax Number Effective Dates    [employer] Claims Office - [address] [address] [phone]  4/1/2020 - None Entered    [facility] [id number]       Subscriber Name Subscriber Birth Date Member ID       NEO SPENCER 1960 [id number]                 Emergency Contacts      (Rel.) Home Phone Work Phone Mobile Phone    MATT SPENCER (Spouse) [phone] -- --    Shazia Spencer (Daughter) [phone] -- [phone]    Sister, \"Hattie\" (Sister) [phone] -- [phone]    Monserrat Taylor (Sister) [phone] -- [phone]    Ken Spencer (Son) -- -- [phone]              "

## 2020-12-03 LAB
ANION GAP SERPL CALCULATED.3IONS-SCNC: 8.7 MMOL/L (ref 5–15)
ANISOCYTOSIS BLD QL: ABNORMAL
APTT PPP: 186.2 SECONDS (ref 22.7–35.4)
ARTERIAL PATENCY WRIST A: POSITIVE
ATMOSPHERIC PRESS: 760.5 MMHG
BASE EXCESS BLDA CALC-SCNC: -0.6 MMOL/L (ref 0–2)
BDY SITE: ABNORMAL
BUN SERPL-MCNC: 19 MG/DL (ref 8–23)
BUN/CREAT SERPL: 16.2 (ref 7–25)
CALCIUM SPEC-SCNC: 8.8 MG/DL (ref 8.6–10.5)
CHLORIDE SERPL-SCNC: 108 MMOL/L (ref 98–107)
CO2 SERPL-SCNC: 24.3 MMOL/L (ref 22–29)
CREAT SERPL-MCNC: 1.17 MG/DL (ref 0.57–1)
DEPRECATED RDW RBC AUTO: 43.6 FL (ref 37–54)
ERYTHROCYTE [DISTWIDTH] IN BLOOD BY AUTOMATED COUNT: 16.2 % (ref 12.3–15.4)
GFR SERPL CREATININE-BSD FRML MDRD: 47 ML/MIN/1.73
GLUCOSE BLDC GLUCOMTR-MCNC: 107 MG/DL (ref 70–130)
GLUCOSE BLDC GLUCOMTR-MCNC: 199 MG/DL (ref 70–130)
GLUCOSE BLDC GLUCOMTR-MCNC: 202 MG/DL (ref 70–130)
GLUCOSE BLDC GLUCOMTR-MCNC: 222 MG/DL (ref 70–130)
GLUCOSE SERPL-MCNC: 241 MG/DL (ref 65–99)
HCO3 BLDA-SCNC: 24.9 MMOL/L (ref 22–28)
HCT VFR BLD AUTO: 28.9 % (ref 34–46.6)
HGB BLD-MCNC: 8.7 G/DL (ref 12–15.9)
HYPOCHROMIA BLD QL: ABNORMAL
INHALED O2 CONCENTRATION: 30 %
LYMPHOCYTES # BLD MANUAL: 0.56 10*3/MM3 (ref 0.7–3.1)
LYMPHOCYTES NFR BLD MANUAL: 1 % (ref 5–12)
LYMPHOCYTES NFR BLD MANUAL: 6.1 % (ref 19.6–45.3)
MCH RBC QN AUTO: 22.5 PG (ref 26.6–33)
MCHC RBC AUTO-ENTMCNC: 30.1 G/DL (ref 31.5–35.7)
MCV RBC AUTO: 74.7 FL (ref 79–97)
MICROCYTES BLD QL: ABNORMAL
MODALITY: ABNORMAL
MONOCYTES # BLD AUTO: 0.09 10*3/MM3 (ref 0.1–0.9)
NEUTROPHILS # BLD AUTO: 8.57 10*3/MM3 (ref 1.7–7)
NEUTROPHILS NFR BLD MANUAL: 92.9 % (ref 42.7–76)
O2 A-A PPRESDIFF RESPIRATORY: 0.5 MMHG
PCO2 BLDA: 43.9 MM HG (ref 35–45)
PEEP RESPIRATORY: 5 CM[H2O]
PH BLDA: 7.36 PH UNITS (ref 7.35–7.45)
PLAT MORPH BLD: NORMAL
PLATELET # BLD AUTO: 215 10*3/MM3 (ref 140–450)
PMV BLD AUTO: 11.4 FL (ref 6–12)
PO2 BLDA: 79.7 MM HG (ref 80–100)
POIKILOCYTOSIS BLD QL SMEAR: ABNORMAL
POTASSIUM SERPL-SCNC: 4 MMOL/L (ref 3.5–5.2)
PSV: 9 CMH2O
RBC # BLD AUTO: 3.87 10*6/MM3 (ref 3.77–5.28)
SAO2 % BLDCOA: 95.2 % (ref 92–99)
SCHISTOCYTES BLD QL SMEAR: ABNORMAL
SODIUM SERPL-SCNC: 141 MMOL/L (ref 136–145)
TOTAL RATE: 12 BREATHS/MINUTE
TRIGL SERPL-MCNC: 109 MG/DL (ref 0–150)
VENTILATOR MODE: ABNORMAL
VT ON VENT VENT: 887 ML
WBC # BLD AUTO: 9.22 10*3/MM3 (ref 3.4–10.8)
WBC MORPH BLD: NORMAL

## 2020-12-03 PROCEDURE — 25010000002 PIPERACILLIN SOD-TAZOBACTAM PER 1 G: Performed by: INTERNAL MEDICINE

## 2020-12-03 PROCEDURE — 80048 BASIC METABOLIC PNL TOTAL CA: CPT | Performed by: INTERNAL MEDICINE

## 2020-12-03 PROCEDURE — 94003 VENT MGMT INPAT SUBQ DAY: CPT

## 2020-12-03 PROCEDURE — 94640 AIRWAY INHALATION TREATMENT: CPT

## 2020-12-03 PROCEDURE — 94799 UNLISTED PULMONARY SVC/PX: CPT

## 2020-12-03 PROCEDURE — 25010000002 METHYLPREDNISOLONE PER 40 MG: Performed by: INTERNAL MEDICINE

## 2020-12-03 PROCEDURE — 36600 WITHDRAWAL OF ARTERIAL BLOOD: CPT

## 2020-12-03 PROCEDURE — 85025 COMPLETE CBC W/AUTO DIFF WBC: CPT | Performed by: EMERGENCY MEDICINE

## 2020-12-03 PROCEDURE — 84478 ASSAY OF TRIGLYCERIDES: CPT | Performed by: INTERNAL MEDICINE

## 2020-12-03 PROCEDURE — 85007 BL SMEAR W/DIFF WBC COUNT: CPT | Performed by: EMERGENCY MEDICINE

## 2020-12-03 PROCEDURE — 25010000002 FENTANYL CITRATE (PF) 100 MCG/2ML SOLUTION: Performed by: INTERNAL MEDICINE

## 2020-12-03 PROCEDURE — 82803 BLOOD GASES ANY COMBINATION: CPT

## 2020-12-03 PROCEDURE — 25010000002 PROPOFOL 10 MG/ML EMULSION: Performed by: EMERGENCY MEDICINE

## 2020-12-03 PROCEDURE — 85730 THROMBOPLASTIN TIME PARTIAL: CPT | Performed by: EMERGENCY MEDICINE

## 2020-12-03 PROCEDURE — 82962 GLUCOSE BLOOD TEST: CPT

## 2020-12-03 PROCEDURE — 63710000001 INSULIN LISPRO (HUMAN) PER 5 UNITS: Performed by: INTERNAL MEDICINE

## 2020-12-03 PROCEDURE — 25010000002 HEPARIN (PORCINE) PER 1000 UNITS: Performed by: EMERGENCY MEDICINE

## 2020-12-03 RX ORDER — FENTANYL CITRATE 50 UG/ML
50 INJECTION, SOLUTION INTRAMUSCULAR; INTRAVENOUS
Status: DISCONTINUED | OUTPATIENT
Start: 2020-12-03 | End: 2020-12-04

## 2020-12-03 RX ORDER — IPRATROPIUM BROMIDE AND ALBUTEROL SULFATE 2.5; .5 MG/3ML; MG/3ML
3 SOLUTION RESPIRATORY (INHALATION) EVERY 6 HOURS PRN
Status: DISCONTINUED | OUTPATIENT
Start: 2020-12-03 | End: 2020-12-08 | Stop reason: HOSPADM

## 2020-12-03 RX ORDER — IPRATROPIUM BROMIDE AND ALBUTEROL SULFATE 2.5; .5 MG/3ML; MG/3ML
3 SOLUTION RESPIRATORY (INHALATION)
Status: DISCONTINUED | OUTPATIENT
Start: 2020-12-03 | End: 2020-12-08 | Stop reason: HOSPADM

## 2020-12-03 RX ADMIN — FAMOTIDINE 20 MG: 10 INJECTION INTRAVENOUS at 09:22

## 2020-12-03 RX ADMIN — ALBUTEROL SULFATE 6 PUFF: 90 AEROSOL, METERED RESPIRATORY (INHALATION) at 08:19

## 2020-12-03 RX ADMIN — NYSTATIN: 100000 POWDER TOPICAL at 20:06

## 2020-12-03 RX ADMIN — FENTANYL CITRATE 50 MCG: 50 INJECTION, SOLUTION INTRAMUSCULAR; INTRAVENOUS at 17:16

## 2020-12-03 RX ADMIN — OXYCODONE HYDROCHLORIDE AND ACETAMINOPHEN 1 TABLET: 5; 325 TABLET ORAL at 16:15

## 2020-12-03 RX ADMIN — CHLORHEXIDINE GLUCONATE 15 ML: 1.2 RINSE ORAL at 20:07

## 2020-12-03 RX ADMIN — ESCITALOPRAM 20 MG: 20 TABLET, FILM COATED ORAL at 09:22

## 2020-12-03 RX ADMIN — PROPOFOL 35 MCG/KG/MIN: 10 INJECTION, EMULSION INTRAVENOUS at 02:08

## 2020-12-03 RX ADMIN — METHYLPREDNISOLONE SODIUM SUCCINATE 40 MG: 40 INJECTION, POWDER, FOR SOLUTION INTRAMUSCULAR; INTRAVENOUS at 09:22

## 2020-12-03 RX ADMIN — HEPARIN SODIUM 6168 UNITS: 5000 INJECTION, SOLUTION INTRAVENOUS; SUBCUTANEOUS at 00:13

## 2020-12-03 RX ADMIN — BUSPIRONE HYDROCHLORIDE 5 MG: 5 TABLET ORAL at 09:22

## 2020-12-03 RX ADMIN — RIVAROXABAN 20 MG: 20 TABLET, FILM COATED ORAL at 17:17

## 2020-12-03 RX ADMIN — TAZOBACTAM SODIUM AND PIPERACILLIN SODIUM 3.38 G: 375; 3 INJECTION, SOLUTION INTRAVENOUS at 03:34

## 2020-12-03 RX ADMIN — TAZOBACTAM SODIUM AND PIPERACILLIN SODIUM 3.38 G: 375; 3 INJECTION, SOLUTION INTRAVENOUS at 12:10

## 2020-12-03 RX ADMIN — SODIUM CHLORIDE 75 ML/HR: 9 INJECTION, SOLUTION INTRAVENOUS at 05:55

## 2020-12-03 RX ADMIN — CHLORHEXIDINE GLUCONATE 15 ML: 1.2 RINSE ORAL at 09:24

## 2020-12-03 RX ADMIN — TAZOBACTAM SODIUM AND PIPERACILLIN SODIUM 3.38 G: 375; 3 INJECTION, SOLUTION INTRAVENOUS at 20:06

## 2020-12-03 RX ADMIN — SODIUM CHLORIDE, PRESERVATIVE FREE 10 ML: 5 INJECTION INTRAVENOUS at 20:08

## 2020-12-03 RX ADMIN — BUSPIRONE HYDROCHLORIDE 5 MG: 5 TABLET ORAL at 16:14

## 2020-12-03 RX ADMIN — NYSTATIN: 100000 POWDER TOPICAL at 09:22

## 2020-12-03 RX ADMIN — RIVAROXABAN 20 MG: 20 TABLET, FILM COATED ORAL at 12:10

## 2020-12-03 RX ADMIN — INSULIN LISPRO 3 UNITS: 100 INJECTION, SOLUTION INTRAVENOUS; SUBCUTANEOUS at 04:15

## 2020-12-03 RX ADMIN — ALBUTEROL SULFATE 6 PUFF: 90 AEROSOL, METERED RESPIRATORY (INHALATION) at 10:48

## 2020-12-03 RX ADMIN — OXYCODONE HYDROCHLORIDE AND ACETAMINOPHEN 1 TABLET: 5; 325 TABLET ORAL at 23:00

## 2020-12-03 RX ADMIN — INSULIN LISPRO 3 UNITS: 100 INJECTION, SOLUTION INTRAVENOUS; SUBCUTANEOUS at 09:36

## 2020-12-03 RX ADMIN — FENTANYL CITRATE 100 MCG: 50 INJECTION INTRAMUSCULAR; INTRAVENOUS at 07:34

## 2020-12-03 RX ADMIN — METHYLPREDNISOLONE SODIUM SUCCINATE 40 MG: 40 INJECTION, POWDER, FOR SOLUTION INTRAMUSCULAR; INTRAVENOUS at 17:17

## 2020-12-03 RX ADMIN — PROPOFOL 35 MCG/KG/MIN: 10 INJECTION, EMULSION INTRAVENOUS at 07:01

## 2020-12-03 RX ADMIN — INSULIN LISPRO 3 UNITS: 100 INJECTION, SOLUTION INTRAVENOUS; SUBCUTANEOUS at 18:57

## 2020-12-03 RX ADMIN — FENTANYL CITRATE 50 MCG: 50 INJECTION, SOLUTION INTRAMUSCULAR; INTRAVENOUS at 23:18

## 2020-12-03 RX ADMIN — FENTANYL CITRATE 50 MCG: 50 INJECTION, SOLUTION INTRAMUSCULAR; INTRAVENOUS at 12:08

## 2020-12-03 RX ADMIN — INSULIN LISPRO 2 UNITS: 100 INJECTION, SOLUTION INTRAVENOUS; SUBCUTANEOUS at 00:22

## 2020-12-03 RX ADMIN — FENTANYL CITRATE 50 MCG: 50 INJECTION, SOLUTION INTRAMUSCULAR; INTRAVENOUS at 20:06

## 2020-12-03 RX ADMIN — BUSPIRONE HYDROCHLORIDE 5 MG: 5 TABLET ORAL at 20:06

## 2020-12-03 RX ADMIN — METHYLPREDNISOLONE SODIUM SUCCINATE 40 MG: 40 INJECTION, POWDER, FOR SOLUTION INTRAMUSCULAR; INTRAVENOUS at 02:04

## 2020-12-03 RX ADMIN — IPRATROPIUM BROMIDE AND ALBUTEROL SULFATE 3 ML: 2.5; .5 SOLUTION RESPIRATORY (INHALATION) at 19:15

## 2020-12-03 NOTE — PLAN OF CARE
Goal Outcome Evaluation:  Plan of Care Reviewed With: patient      30% oxygen patricia well sedation decreased to 25 on Propofol from 50mcg.  Patricia vent settings well, states pt hated having trach, plan to try to wean off vent next 24-48 hrs.  TF started increased to 35 cc/hr.  Accu checks being tx with insulin per protocal and Heparin reduced per protocal.

## 2020-12-03 NOTE — PROGRESS NOTES
[facility]      Mar/chart reviewed  Follow up respiratory failure, copd,   Sedated on vent, does open eyes, make eye contact,   Follows a command    Vital Sign Min/Max for last 24 hours  Temp  Min: 97.4 °F (36.3 °C)  Max: 98.6 °F (37 °C)   BP  Min: 107/62  Max: 143/75   Pulse  Min: 53  Max: 94   Resp  Min: 16  Max: 42   SpO2  Min: 91 %  Max: 100 %   No data recorded   Weight  Min: 82.6 kg (182 lb)  Max: 82.7 kg (182 lb 5.1 oz)   3367/1100    Appears ill, sedated on vent  perrl, normal sclera, no palpable thyroid nodules  mmm, no jvd, trachea midline, neck supple,  chest coarse bilaterally, no crackles, no wheezes,   rrr,   soft, nt, nd +bs,  no c/c/ trace edema  Skin warm, dry on rashes    12/3: reviewed:  Wbc 9.2  hgb 8.7  plts 215  Glucose 241  Bun 19  Cr 1.17  Bicarb 24    A/P:  1. Acute on chronic hypoxemic and hypercapnic respiratory failure -- she has had two recent instances of acute hypercapnic failure, she likely has melissa and she appears to have severe COPD, I think best answer is probably just permanent tracheostomy--but per  she would not want this -- will try and work towards extubation.  2. Tracheo/cutaneous fistula -- possible could just dilate this tract for tracheotomy -- might be worth exploring closure options  3. Pneumonia -- continue antibiotics, sputum culture pending  4. Copd with ae -- steroids, bronchodilators  5. Hypotension -- better  6. Anemia --hgb slightly down since admit  7. DVT -- on heparin gtt, resume xarelto, stop heparin gtt  8. CKD III  9. DMII -- ssI some high accuchecks  10. Feeding tube  11. gerd -- on home ppi, will switch to h2 blocker for now    Weaning trail as able    CC 35 mins.

## 2020-12-04 LAB
ANION GAP SERPL CALCULATED.3IONS-SCNC: 8.2 MMOL/L (ref 5–15)
APTT PPP: 29 SECONDS (ref 22.7–35.4)
ARTERIAL PATENCY WRIST A: POSITIVE
ATMOSPHERIC PRESS: 748.2 MMHG
BASE EXCESS BLDA CALC-SCNC: 2.6 MMOL/L (ref 0–2)
BASOPHILS # BLD AUTO: 0.01 10*3/MM3 (ref 0–0.2)
BASOPHILS NFR BLD AUTO: 0.1 % (ref 0–1.5)
BDY SITE: ABNORMAL
BUN SERPL-MCNC: 22 MG/DL (ref 8–23)
BUN/CREAT SERPL: 21.4 (ref 7–25)
CALCIUM SPEC-SCNC: 9.2 MG/DL (ref 8.6–10.5)
CHLORIDE SERPL-SCNC: 104 MMOL/L (ref 98–107)
CO2 SERPL-SCNC: 30.8 MMOL/L (ref 22–29)
CREAT SERPL-MCNC: 1.03 MG/DL (ref 0.57–1)
DEPRECATED RDW RBC AUTO: 44.7 FL (ref 37–54)
EOSINOPHIL # BLD AUTO: 0 10*3/MM3 (ref 0–0.4)
EOSINOPHIL NFR BLD AUTO: 0 % (ref 0.3–6.2)
ERYTHROCYTE [DISTWIDTH] IN BLOOD BY AUTOMATED COUNT: 16.5 % (ref 12.3–15.4)
GAS FLOW AIRWAY: 4 LPM
GFR SERPL CREATININE-BSD FRML MDRD: 55 ML/MIN/1.73
GLUCOSE BLDC GLUCOMTR-MCNC: 200 MG/DL (ref 70–130)
GLUCOSE BLDC GLUCOMTR-MCNC: 243 MG/DL (ref 70–130)
GLUCOSE BLDC GLUCOMTR-MCNC: 246 MG/DL (ref 70–130)
GLUCOSE SERPL-MCNC: 178 MG/DL (ref 65–99)
HCO3 BLDA-SCNC: 27.6 MMOL/L (ref 22–28)
HCT VFR BLD AUTO: 32 % (ref 34–46.6)
HGB BLD-MCNC: 9.6 G/DL (ref 12–15.9)
IMM GRANULOCYTES # BLD AUTO: 0.14 10*3/MM3 (ref 0–0.05)
IMM GRANULOCYTES NFR BLD AUTO: 1.2 % (ref 0–0.5)
LYMPHOCYTES # BLD AUTO: 0.73 10*3/MM3 (ref 0.7–3.1)
LYMPHOCYTES NFR BLD AUTO: 6.4 % (ref 19.6–45.3)
MCH RBC QN AUTO: 22.7 PG (ref 26.6–33)
MCHC RBC AUTO-ENTMCNC: 30 G/DL (ref 31.5–35.7)
MCV RBC AUTO: 75.8 FL (ref 79–97)
MODALITY: ABNORMAL
MONOCYTES # BLD AUTO: 0.56 10*3/MM3 (ref 0.1–0.9)
MONOCYTES NFR BLD AUTO: 4.9 % (ref 5–12)
NEUTROPHILS NFR BLD AUTO: 10.04 10*3/MM3 (ref 1.7–7)
NEUTROPHILS NFR BLD AUTO: 87.4 % (ref 42.7–76)
NRBC BLD AUTO-RTO: 0.2 /100 WBC (ref 0–0.2)
PCO2 BLDA: 43.5 MM HG (ref 35–45)
PH BLDA: 7.41 PH UNITS (ref 7.35–7.45)
PLATELET # BLD AUTO: 238 10*3/MM3 (ref 140–450)
PMV BLD AUTO: 11.3 FL (ref 6–12)
PO2 BLDA: 79.3 MM HG (ref 80–100)
POTASSIUM SERPL-SCNC: 4.1 MMOL/L (ref 3.5–5.2)
RBC # BLD AUTO: 4.22 10*6/MM3 (ref 3.77–5.28)
SAO2 % BLDCOA: 95.7 % (ref 92–99)
SODIUM SERPL-SCNC: 143 MMOL/L (ref 136–145)
TOTAL RATE: 18 BREATHS/MINUTE
WBC # BLD AUTO: 11.48 10*3/MM3 (ref 3.4–10.8)

## 2020-12-04 PROCEDURE — 25010000002 PIPERACILLIN SOD-TAZOBACTAM PER 1 G: Performed by: INTERNAL MEDICINE

## 2020-12-04 PROCEDURE — 80048 BASIC METABOLIC PNL TOTAL CA: CPT | Performed by: INTERNAL MEDICINE

## 2020-12-04 PROCEDURE — 25010000002 HALOPERIDOL LACTATE PER 5 MG: Performed by: INTERNAL MEDICINE

## 2020-12-04 PROCEDURE — 94799 UNLISTED PULMONARY SVC/PX: CPT

## 2020-12-04 PROCEDURE — 85730 THROMBOPLASTIN TIME PARTIAL: CPT | Performed by: EMERGENCY MEDICINE

## 2020-12-04 PROCEDURE — 63710000001 INSULIN LISPRO (HUMAN) PER 5 UNITS: Performed by: INTERNAL MEDICINE

## 2020-12-04 PROCEDURE — 25010000002 METHYLPREDNISOLONE PER 40 MG: Performed by: INTERNAL MEDICINE

## 2020-12-04 PROCEDURE — 82803 BLOOD GASES ANY COMBINATION: CPT

## 2020-12-04 PROCEDURE — 36600 WITHDRAWAL OF ARTERIAL BLOOD: CPT

## 2020-12-04 PROCEDURE — 85025 COMPLETE CBC W/AUTO DIFF WBC: CPT | Performed by: EMERGENCY MEDICINE

## 2020-12-04 PROCEDURE — 25010000002 MORPHINE PER 10 MG: Performed by: INTERNAL MEDICINE

## 2020-12-04 PROCEDURE — 63710000001 INSULIN GLARGINE PER 5 UNITS: Performed by: INTERNAL MEDICINE

## 2020-12-04 PROCEDURE — 82962 GLUCOSE BLOOD TEST: CPT

## 2020-12-04 RX ORDER — AMOXICILLIN 875 MG/1
875 TABLET, COATED ORAL EVERY 12 HOURS SCHEDULED
Status: DISCONTINUED | OUTPATIENT
Start: 2020-12-04 | End: 2020-12-05

## 2020-12-04 RX ORDER — ENALAPRILAT 2.5 MG/2ML
1.25 INJECTION INTRAVENOUS EVERY 6 HOURS PRN
Status: DISCONTINUED | OUTPATIENT
Start: 2020-12-04 | End: 2020-12-08 | Stop reason: HOSPADM

## 2020-12-04 RX ORDER — GABAPENTIN 300 MG/1
300 CAPSULE ORAL 3 TIMES DAILY
Status: DISCONTINUED | OUTPATIENT
Start: 2020-12-04 | End: 2020-12-08 | Stop reason: HOSPADM

## 2020-12-04 RX ORDER — METHYLPREDNISOLONE SODIUM SUCCINATE 40 MG/ML
40 INJECTION, POWDER, LYOPHILIZED, FOR SOLUTION INTRAMUSCULAR; INTRAVENOUS EVERY 12 HOURS SCHEDULED
Status: DISCONTINUED | OUTPATIENT
Start: 2020-12-04 | End: 2020-12-05

## 2020-12-04 RX ORDER — INSULIN GLARGINE 100 [IU]/ML
10 INJECTION, SOLUTION SUBCUTANEOUS NIGHTLY
Status: DISCONTINUED | OUTPATIENT
Start: 2020-12-04 | End: 2020-12-05

## 2020-12-04 RX ORDER — HALOPERIDOL 5 MG/ML
5 INJECTION INTRAMUSCULAR ONCE
Status: COMPLETED | OUTPATIENT
Start: 2020-12-04 | End: 2020-12-04

## 2020-12-04 RX ORDER — MORPHINE SULFATE 2 MG/ML
2 INJECTION, SOLUTION INTRAMUSCULAR; INTRAVENOUS EVERY 4 HOURS PRN
Status: CANCELLED | OUTPATIENT
Start: 2020-12-03 | End: 2020-12-10

## 2020-12-04 RX ORDER — MORPHINE SULFATE 2 MG/ML
2 INJECTION, SOLUTION INTRAMUSCULAR; INTRAVENOUS EVERY 4 HOURS PRN
Status: DISCONTINUED | OUTPATIENT
Start: 2020-12-04 | End: 2020-12-05

## 2020-12-04 RX ADMIN — INSULIN LISPRO 5 UNITS: 100 INJECTION, SOLUTION INTRAVENOUS; SUBCUTANEOUS at 14:19

## 2020-12-04 RX ADMIN — TAZOBACTAM SODIUM AND PIPERACILLIN SODIUM 3.38 G: 375; 3 INJECTION, SOLUTION INTRAVENOUS at 03:27

## 2020-12-04 RX ADMIN — OXYCODONE HYDROCHLORIDE AND ACETAMINOPHEN 1 TABLET: 5; 325 TABLET ORAL at 17:11

## 2020-12-04 RX ADMIN — INSULIN LISPRO 3 UNITS: 100 INJECTION, SOLUTION INTRAVENOUS; SUBCUTANEOUS at 02:17

## 2020-12-04 RX ADMIN — NYSTATIN: 100000 POWDER TOPICAL at 08:24

## 2020-12-04 RX ADMIN — HALOPERIDOL LACTATE 5 MG: 5 INJECTION, SOLUTION INTRAMUSCULAR at 05:04

## 2020-12-04 RX ADMIN — BUSPIRONE HYDROCHLORIDE 5 MG: 5 TABLET ORAL at 16:33

## 2020-12-04 RX ADMIN — OXYCODONE HYDROCHLORIDE AND ACETAMINOPHEN 1 TABLET: 5; 325 TABLET ORAL at 10:45

## 2020-12-04 RX ADMIN — MORPHINE SULFATE 2 MG: 2 INJECTION, SOLUTION INTRAMUSCULAR; INTRAVENOUS at 12:16

## 2020-12-04 RX ADMIN — METHYLPREDNISOLONE SODIUM SUCCINATE 40 MG: 40 INJECTION, POWDER, FOR SOLUTION INTRAMUSCULAR; INTRAVENOUS at 01:52

## 2020-12-04 RX ADMIN — BUSPIRONE HYDROCHLORIDE 5 MG: 5 TABLET ORAL at 21:54

## 2020-12-04 RX ADMIN — GABAPENTIN 300 MG: 300 CAPSULE ORAL at 16:33

## 2020-12-04 RX ADMIN — NYSTATIN 1 APPLICATION: 100000 POWDER TOPICAL at 21:54

## 2020-12-04 RX ADMIN — IPRATROPIUM BROMIDE AND ALBUTEROL SULFATE 3 ML: 2.5; .5 SOLUTION RESPIRATORY (INHALATION) at 19:19

## 2020-12-04 RX ADMIN — IPRATROPIUM BROMIDE AND ALBUTEROL SULFATE 3 ML: 2.5; .5 SOLUTION RESPIRATORY (INHALATION) at 12:06

## 2020-12-04 RX ADMIN — INSULIN LISPRO 5 UNITS: 100 INJECTION, SOLUTION INTRAVENOUS; SUBCUTANEOUS at 19:01

## 2020-12-04 RX ADMIN — AMOXICILLIN 875 MG: 875 TABLET, FILM COATED ORAL at 21:54

## 2020-12-04 RX ADMIN — ESCITALOPRAM 20 MG: 20 TABLET, FILM COATED ORAL at 08:22

## 2020-12-04 RX ADMIN — FAMOTIDINE 20 MG: 10 INJECTION INTRAVENOUS at 08:23

## 2020-12-04 RX ADMIN — INSULIN LISPRO 3 UNITS: 100 INJECTION, SOLUTION INTRAVENOUS; SUBCUTANEOUS at 06:01

## 2020-12-04 RX ADMIN — MORPHINE SULFATE 2 MG: 2 INJECTION, SOLUTION INTRAMUSCULAR; INTRAVENOUS at 06:01

## 2020-12-04 RX ADMIN — IPRATROPIUM BROMIDE AND ALBUTEROL SULFATE 3 ML: 2.5; .5 SOLUTION RESPIRATORY (INHALATION) at 07:09

## 2020-12-04 RX ADMIN — GABAPENTIN 300 MG: 300 CAPSULE ORAL at 21:56

## 2020-12-04 RX ADMIN — AMOXICILLIN 875 MG: 875 TABLET, FILM COATED ORAL at 10:45

## 2020-12-04 RX ADMIN — GABAPENTIN 300 MG: 300 CAPSULE ORAL at 08:22

## 2020-12-04 RX ADMIN — IPRATROPIUM BROMIDE AND ALBUTEROL SULFATE 3 ML: 2.5; .5 SOLUTION RESPIRATORY (INHALATION) at 02:10

## 2020-12-04 RX ADMIN — METHYLPREDNISOLONE SODIUM SUCCINATE 40 MG: 40 INJECTION, POWDER, FOR SOLUTION INTRAMUSCULAR; INTRAVENOUS at 10:45

## 2020-12-04 RX ADMIN — CHLORHEXIDINE GLUCONATE 15 ML: 1.2 RINSE ORAL at 21:55

## 2020-12-04 RX ADMIN — BUSPIRONE HYDROCHLORIDE 5 MG: 5 TABLET ORAL at 08:22

## 2020-12-04 RX ADMIN — RIVAROXABAN 20 MG: 20 TABLET, FILM COATED ORAL at 17:11

## 2020-12-04 RX ADMIN — IPRATROPIUM BROMIDE AND ALBUTEROL SULFATE 3 ML: 2.5; .5 SOLUTION RESPIRATORY (INHALATION) at 15:49

## 2020-12-04 RX ADMIN — METHYLPREDNISOLONE SODIUM SUCCINATE 40 MG: 40 INJECTION, POWDER, FOR SOLUTION INTRAMUSCULAR; INTRAVENOUS at 21:53

## 2020-12-04 RX ADMIN — CHLORHEXIDINE GLUCONATE 15 ML: 1.2 RINSE ORAL at 08:12

## 2020-12-04 RX ADMIN — MORPHINE SULFATE 2 MG: 2 INJECTION, SOLUTION INTRAMUSCULAR; INTRAVENOUS at 01:53

## 2020-12-04 RX ADMIN — INSULIN GLARGINE 10 UNITS: 100 INJECTION, SOLUTION SUBCUTANEOUS at 21:58

## 2020-12-04 NOTE — PLAN OF CARE
Goal Outcome Evaluation:  Plan of Care Reviewed With: patient, spouse   Extubated on 2 l N/C as wears at home.  Heparin gtt d/c.  Pt awake restless c/o various discomforts back abd.  Good results per pt from pain meds.  Central line dsg changed x3 pt. Pulled dsg loose x2.   Restless legs this evening pt became tearful at change of shift states having leg pain.  Pt kept NPO tube feedings cont. Niko well.  Purwick in place with increased u/o.   updated verbalized understanding.

## 2020-12-04 NOTE — PROGRESS NOTES
Adult Nutrition  Assessment/PES    Patient Name:  Swetha Luis  YOB: 1960  MRN: [id number]  Admit Date:  12/1/2020    Assessment Date:  12/4/2020    Comments:  Nutrition follow up.  Extubated yesterday.  Tolerating TF at 45cc/hr. 2 BM' yesterday.  Confused, restless.  Labs and skin reviewed.  Not ready for swallow eval per RN.      Will adjust TF to Diabetisource AC at 60cc/hr to maximize nutrition until pt can participate in swallow eval.  Will cont to follow clinical course, nutrition support needs.     Reason for Assessment     Row Name 12/04/20 0849          Reason for Assessment    Reason For Assessment  follow-up protocol;TF/PN     Diagnosis  -- extubated 12/3         Nutrition/Diet History     Row Name 12/04/20 0889          Nutrition/Diet History    Factors Affecting Nutritional Intake  impaired cognitive status/motor control           Labs/Tests/Procedures/Meds     Row Name 12/04/20 0850          Labs/Procedures/Meds    Lab Results Reviewed  reviewed, pertinent     Lab Results Comments  glu, h/h        Diagnostic Tests/Procedures    Diagnostic Test/Procedure Reviewed  reviewed, pertinent        Medications    Pertinent Medications Reviewed  reviewed, pertinent     Pertinent Medications Comments  lexapro, pepcid, neurontin, insulin, solumedrol, zosyn, xarelto,         Physical Findings     Row Name 12/04/20 0841          Physical Findings    Overall Physical Appearance  overweight;on oxygen therapy     Gastrointestinal  feeding tube     Tubes  gastrostomy tube     Oral/Mouth Cavity  poor dentition     Skin  other (see comments);surgical incision MASD's, Excoriation, B=13           Nutrition Prescription Ordered     Row Name 12/04/20 0853          Nutrition Prescription PO    Current PO Diet  NPO        Nutrition Prescription EN    Enteral Route  PEG     Product  Peptamen AF (Vital AF 1.2)     TF Delivery Method  Continuous     Continuous TF Goal Rate (mL/hr)  45 mL/hr     Continuous TF Current  Rate (mL/hr)  45 mL/hr     Water flush (mL)   30 mL     Water Flush Frequency  Every 4 hours         Evaluation of Received Nutrient/Fluid Intake     Row Name 12/04/20 0853          Calories Evaluation    Enteral Calories (kcal)  1296     % of Kcal Needs  84        Protein Evaluation    Enteral Protein (gm)  82     % of Protein Needs  88        Intake Assessment    Energy/Calorie Requirement Assessment  not meeting needs     Protein Requirement Assessment  not meeting needs     Fluid Requirement Assessment  meeting needs     Tolerance  tolerating        Fluid Intake Evaluation    Enteral (Free Water) Fluid (mL)  875     Free Water Flush Fluid (mL)  180     Total Free Water Intake (mL)  1055 mL        EN Evaluation    TF Changes  Rate increased     TF Residual  0     TF Tolerance  -- large BM x 2 on 12/3     HOB  Greater than or equal to 30 degress         Problem/Interventions:    Intervention Goal     Row Name 12/04/20 0857          Intervention Goal    General  Maintain nutrition;Reduce/improve symptoms;Meet nutritional needs for age/condition;Nutrition support treatment;Disease management/therapy;Improved nutrition related lab(s)     PO  Initiate feeding     TF/PN  Tolerate TF at goal;Maintain TF/PN     Transition  TF to PO     Weight  No significant weight loss         Nutrition Intervention     Row Name 12/04/20 0857          Nutrition Intervention    RD/Tech Action  Follow Tx progress;Care plan reviewd;Await begin PO     Recommended/Ordered  EN         Nutrition Prescription     Row Name 12/04/20 0857          Nutrition Prescription EN    Enteral Prescription  Enteral to supply;Enteral begin/change     Enteral Route  PEG     Product  Diabetisource     TF Delivery Method  Continuous     Continuous TF Goal Rate (mL/hr)  60 mL/hr     Water flush (mL)   30 mL     Water Flush Frequency  Every 4 hours     New EN Prescription Ordered?  Yes        EN to Supply    Kcal/Day  1728 Kcal/Day     Protein (gm/day)  86.4  gm/day     Meet Estimated Kcal Need (%)  100 %     Meet Estimated Protein Need (%)  92 %     TF Free H2O (mL)  1180.8 mL     Total Free H2O (mL/day)  1360 mL/day        Other Orders    Other  SLP eval when less confused         Education/Evaluation     Row Name 12/04/20 0901          Education    Education  Education not appropriate at this time     Please explain  Patient confusion        Monitor/Evaluation    Monitor  Per protocol;Weight;Skin status;I&O;Symptoms;Pertinent labs;TF delivery/tolerance           Electronically signed by:  Damaris Rodriguez RD  12/04/20 09:03 EST

## 2020-12-04 NOTE — SIGNIFICANT NOTE
12/04/20 1310   OTHER   Discipline physical therapist   Rehab Time/Intention   Session Not Performed other (see comments)  (spoke with JEMIMA Monterroso who reports patient is confused and agitated and not appropriate for PT today)   Recommendation   PT - Next Appointment 12/05/20

## 2020-12-04 NOTE — PROGRESS NOTES
[facility]      Mar/chart reviewed  Follow up respiratory failure, copd,  Extubated 12/3  Confused, agitated, unable to provide subjective    Vital Sign Min/Max for last 24 hours  Temp  Min: 97.5 °F (36.4 °C)  Max: 98.9 °F (37.2 °C)   BP  Min: 108/68  Max: 172/96   Pulse  Min: 55  Max: 125   Resp  Min: 20  Max: 26   SpO2  Min: 83 %  Max: 100 %   Flow (L/min)  Min: 3  Max: 30   No data recorded   1999/3100    Appears ill, confused, oriented times 1, inattention. Loud breathing significant air passing through tracheostomy and mouth both  perrl, normal sclera, no palpable thyroid nodules  mmm, no jvd, trachea midline, neck supple,  chest coarse bilaterally, no crackles, no wheezes,   rrr,   soft, nt, nd +bs,  no c/c/ trace edema  Skin warm, dry on rashes    Labs: 12/4: reviewed:  7.4/43/79  Glucose 178  Bun 22  Cr 1.03  Na 143  k 4.1  Bicarb 30  Wbc 11.5  hgb 9.6  plts 238  12/2 sputum culture: 4+strep pneumonia    A/P:  1. Acute on chronic hypoxemic and hypercapnic respiratory failure -- she has had two recent instances of acute hypercapnic failure, she likely has melissa and she appears to have severe COPD, I think best answer is probably just permanent tracheostomy--but per  she would not want this -- will try and work towards extubation. --doing ok, will plan to check a gas here tommorrow  2. Tracheo/cutaneous fistula -- possible could just dilate this tract for tracheotomy -- might be worth exploring closure options  3. Pneumonia -- strep pneumonia -- she is on zosyn can de-escalate antibiotics, she has a listed allergy to keflex.   4. Copd with ae -- steroids, bronchodilators --wean steroids  5. Hypotension -- better  6. Anemia --hgb slightly down since admit -- ok today  7. DVT --  resumed xarelto,   8. CKD III  9. DMII -- ssI some high accuchecks --add lantus today  10. Feeding tube --swallow eval  11. gerd -- on home ppi,  switched to h2 blocker for now  12. Encephalopathy --she is on  multiple pain meds at home and neurontin and buspar, she has been cocaine positive on past admissions and there may be some element of withdrawal/polypharmacy here.     Pt reid,   Would continue observation in unit for now given encephalopathy and airway issues.    Discussed with RN

## 2020-12-05 LAB
ANION GAP SERPL CALCULATED.3IONS-SCNC: 7.8 MMOL/L (ref 5–15)
BACTERIA SPEC RESP CULT: ABNORMAL
BACTERIA SPEC RESP CULT: ABNORMAL
BASOPHILS # BLD AUTO: 0 10*3/MM3 (ref 0–0.2)
BASOPHILS NFR BLD AUTO: 0 % (ref 0–1.5)
BUN SERPL-MCNC: 31 MG/DL (ref 8–23)
BUN/CREAT SERPL: 29.5 (ref 7–25)
CALCIUM SPEC-SCNC: 9.2 MG/DL (ref 8.6–10.5)
CHLORIDE SERPL-SCNC: 98 MMOL/L (ref 98–107)
CO2 SERPL-SCNC: 30.2 MMOL/L (ref 22–29)
CREAT SERPL-MCNC: 1.05 MG/DL (ref 0.57–1)
DEPRECATED RDW RBC AUTO: 44.6 FL (ref 37–54)
EOSINOPHIL # BLD AUTO: 0.03 10*3/MM3 (ref 0–0.4)
EOSINOPHIL NFR BLD AUTO: 0.3 % (ref 0.3–6.2)
ERYTHROCYTE [DISTWIDTH] IN BLOOD BY AUTOMATED COUNT: 16.1 % (ref 12.3–15.4)
GFR SERPL CREATININE-BSD FRML MDRD: 53 ML/MIN/1.73
GLUCOSE BLDC GLUCOMTR-MCNC: 109 MG/DL (ref 70–130)
GLUCOSE BLDC GLUCOMTR-MCNC: 210 MG/DL (ref 70–130)
GLUCOSE BLDC GLUCOMTR-MCNC: 263 MG/DL (ref 70–130)
GLUCOSE BLDC GLUCOMTR-MCNC: 277 MG/DL (ref 70–130)
GLUCOSE BLDC GLUCOMTR-MCNC: 289 MG/DL (ref 70–130)
GLUCOSE BLDC GLUCOMTR-MCNC: 307 MG/DL (ref 70–130)
GLUCOSE SERPL-MCNC: 273 MG/DL (ref 65–99)
GRAM STN SPEC: ABNORMAL
HCT VFR BLD AUTO: 34.3 % (ref 34–46.6)
HGB BLD-MCNC: 10.2 G/DL (ref 12–15.9)
IMM GRANULOCYTES # BLD AUTO: 0.06 10*3/MM3 (ref 0–0.05)
IMM GRANULOCYTES NFR BLD AUTO: 0.5 % (ref 0–0.5)
LYMPHOCYTES # BLD AUTO: 0.99 10*3/MM3 (ref 0.7–3.1)
LYMPHOCYTES NFR BLD AUTO: 8.4 % (ref 19.6–45.3)
MCH RBC QN AUTO: 22.9 PG (ref 26.6–33)
MCHC RBC AUTO-ENTMCNC: 29.7 G/DL (ref 31.5–35.7)
MCV RBC AUTO: 77.1 FL (ref 79–97)
MONOCYTES # BLD AUTO: 0.74 10*3/MM3 (ref 0.1–0.9)
MONOCYTES NFR BLD AUTO: 6.3 % (ref 5–12)
NEUTROPHILS NFR BLD AUTO: 84.5 % (ref 42.7–76)
NEUTROPHILS NFR BLD AUTO: 9.9 10*3/MM3 (ref 1.7–7)
NRBC BLD AUTO-RTO: 0.1 /100 WBC (ref 0–0.2)
PLATELET # BLD AUTO: 210 10*3/MM3 (ref 140–450)
PMV BLD AUTO: 11.7 FL (ref 6–12)
POTASSIUM SERPL-SCNC: 4.4 MMOL/L (ref 3.5–5.2)
RBC # BLD AUTO: 4.45 10*6/MM3 (ref 3.77–5.28)
SODIUM SERPL-SCNC: 136 MMOL/L (ref 136–145)
WBC # BLD AUTO: 11.72 10*3/MM3 (ref 3.4–10.8)

## 2020-12-05 PROCEDURE — 63710000001 INSULIN LISPRO (HUMAN) PER 5 UNITS: Performed by: INTERNAL MEDICINE

## 2020-12-05 PROCEDURE — 92610 EVALUATE SWALLOWING FUNCTION: CPT

## 2020-12-05 PROCEDURE — 80048 BASIC METABOLIC PNL TOTAL CA: CPT | Performed by: INTERNAL MEDICINE

## 2020-12-05 PROCEDURE — 94799 UNLISTED PULMONARY SVC/PX: CPT

## 2020-12-05 PROCEDURE — 82962 GLUCOSE BLOOD TEST: CPT

## 2020-12-05 PROCEDURE — 97161 PT EVAL LOW COMPLEX 20 MIN: CPT | Performed by: PHYSICAL THERAPIST

## 2020-12-05 PROCEDURE — 94770: CPT

## 2020-12-05 PROCEDURE — 97116 GAIT TRAINING THERAPY: CPT | Performed by: PHYSICAL THERAPIST

## 2020-12-05 PROCEDURE — 25010000002 METHYLPREDNISOLONE PER 40 MG: Performed by: INTERNAL MEDICINE

## 2020-12-05 PROCEDURE — 85025 COMPLETE CBC W/AUTO DIFF WBC: CPT | Performed by: INTERNAL MEDICINE

## 2020-12-05 PROCEDURE — 25010000002 MORPHINE PER 10 MG: Performed by: INTERNAL MEDICINE

## 2020-12-05 PROCEDURE — 63710000001 PREDNISONE PER 1 MG: Performed by: INTERNAL MEDICINE

## 2020-12-05 PROCEDURE — 63710000001 INSULIN GLARGINE PER 5 UNITS: Performed by: INTERNAL MEDICINE

## 2020-12-05 RX ORDER — FAMOTIDINE 20 MG/1
20 TABLET, FILM COATED ORAL DAILY
Status: DISCONTINUED | OUTPATIENT
Start: 2020-12-06 | End: 2020-12-08 | Stop reason: HOSPADM

## 2020-12-05 RX ORDER — INSULIN GLARGINE 100 [IU]/ML
10 INJECTION, SOLUTION SUBCUTANEOUS EVERY 12 HOURS SCHEDULED
Status: DISCONTINUED | OUTPATIENT
Start: 2020-12-05 | End: 2020-12-08 | Stop reason: HOSPADM

## 2020-12-05 RX ORDER — AMOXICILLIN AND CLAVULANATE POTASSIUM 875; 125 MG/1; MG/1
1 TABLET, FILM COATED ORAL EVERY 12 HOURS SCHEDULED
Status: DISCONTINUED | OUTPATIENT
Start: 2020-12-05 | End: 2020-12-08 | Stop reason: HOSPADM

## 2020-12-05 RX ORDER — CEFTRIAXONE SODIUM 1 G/50ML
1 INJECTION, SOLUTION INTRAVENOUS EVERY 24 HOURS
Status: DISCONTINUED | OUTPATIENT
Start: 2020-12-05 | End: 2020-12-05

## 2020-12-05 RX ORDER — AMLODIPINE BESYLATE 5 MG/1
5 TABLET ORAL
Status: DISCONTINUED | OUTPATIENT
Start: 2020-12-05 | End: 2020-12-08 | Stop reason: HOSPADM

## 2020-12-05 RX ORDER — NITROGLYCERIN 0.4 MG/1
0.4 TABLET SUBLINGUAL
Status: DISCONTINUED | OUTPATIENT
Start: 2020-12-05 | End: 2020-12-08 | Stop reason: HOSPADM

## 2020-12-05 RX ORDER — PREDNISONE 20 MG/1
20 TABLET ORAL EVERY 12 HOURS SCHEDULED
Status: DISCONTINUED | OUTPATIENT
Start: 2020-12-05 | End: 2020-12-06

## 2020-12-05 RX ORDER — FAMOTIDINE 20 MG/1
20 TABLET, FILM COATED ORAL DAILY
Status: DISCONTINUED | OUTPATIENT
Start: 2020-12-05 | End: 2020-12-05

## 2020-12-05 RX ADMIN — GABAPENTIN 300 MG: 300 CAPSULE ORAL at 20:32

## 2020-12-05 RX ADMIN — AMLODIPINE BESYLATE 5 MG: 5 TABLET ORAL at 14:00

## 2020-12-05 RX ADMIN — INSULIN GLARGINE 10 UNITS: 100 INJECTION, SOLUTION SUBCUTANEOUS at 20:33

## 2020-12-05 RX ADMIN — ESCITALOPRAM 20 MG: 20 TABLET, FILM COATED ORAL at 08:49

## 2020-12-05 RX ADMIN — IPRATROPIUM BROMIDE AND ALBUTEROL SULFATE 3 ML: 2.5; .5 SOLUTION RESPIRATORY (INHALATION) at 12:02

## 2020-12-05 RX ADMIN — INSULIN LISPRO 10 UNITS: 100 INJECTION, SOLUTION INTRAVENOUS; SUBCUTANEOUS at 17:01

## 2020-12-05 RX ADMIN — GABAPENTIN 300 MG: 300 CAPSULE ORAL at 17:01

## 2020-12-05 RX ADMIN — AMOXICILLIN AND CLAVULANATE POTASSIUM 1 TABLET: 875; 125 TABLET, FILM COATED ORAL at 20:32

## 2020-12-05 RX ADMIN — METHYLPREDNISOLONE SODIUM SUCCINATE 40 MG: 40 INJECTION, POWDER, FOR SOLUTION INTRAMUSCULAR; INTRAVENOUS at 08:50

## 2020-12-05 RX ADMIN — PREDNISONE 20 MG: 20 TABLET ORAL at 20:32

## 2020-12-05 RX ADMIN — OXYCODONE HYDROCHLORIDE AND ACETAMINOPHEN 1 TABLET: 5; 325 TABLET ORAL at 08:49

## 2020-12-05 RX ADMIN — CHLORHEXIDINE GLUCONATE 15 ML: 1.2 RINSE ORAL at 10:37

## 2020-12-05 RX ADMIN — BUSPIRONE HYDROCHLORIDE 5 MG: 5 TABLET ORAL at 17:01

## 2020-12-05 RX ADMIN — IPRATROPIUM BROMIDE AND ALBUTEROL SULFATE 3 ML: 2.5; .5 SOLUTION RESPIRATORY (INHALATION) at 15:37

## 2020-12-05 RX ADMIN — IPRATROPIUM BROMIDE AND ALBUTEROL SULFATE 3 ML: 2.5; .5 SOLUTION RESPIRATORY (INHALATION) at 07:47

## 2020-12-05 RX ADMIN — INSULIN LISPRO 8 UNITS: 100 INJECTION, SOLUTION INTRAVENOUS; SUBCUTANEOUS at 23:39

## 2020-12-05 RX ADMIN — RIVAROXABAN 20 MG: 20 TABLET, FILM COATED ORAL at 17:02

## 2020-12-05 RX ADMIN — FAMOTIDINE 20 MG: 10 INJECTION INTRAVENOUS at 08:49

## 2020-12-05 RX ADMIN — AMOXICILLIN AND CLAVULANATE POTASSIUM 1 TABLET: 875; 125 TABLET, FILM COATED ORAL at 14:00

## 2020-12-05 RX ADMIN — MORPHINE SULFATE 2 MG: 2 INJECTION, SOLUTION INTRAMUSCULAR; INTRAVENOUS at 02:28

## 2020-12-05 RX ADMIN — GABAPENTIN 300 MG: 300 CAPSULE ORAL at 08:49

## 2020-12-05 RX ADMIN — OXYCODONE HYDROCHLORIDE AND ACETAMINOPHEN 1 TABLET: 5; 325 TABLET ORAL at 15:00

## 2020-12-05 RX ADMIN — INSULIN LISPRO 8 UNITS: 100 INJECTION, SOLUTION INTRAVENOUS; SUBCUTANEOUS at 02:47

## 2020-12-05 RX ADMIN — CHLORHEXIDINE GLUCONATE 15 ML: 1.2 RINSE ORAL at 20:32

## 2020-12-05 RX ADMIN — BUSPIRONE HYDROCHLORIDE 5 MG: 5 TABLET ORAL at 08:49

## 2020-12-05 RX ADMIN — NYSTATIN: 100000 POWDER TOPICAL at 20:32

## 2020-12-05 RX ADMIN — IPRATROPIUM BROMIDE AND ALBUTEROL SULFATE 3 ML: 2.5; .5 SOLUTION RESPIRATORY (INHALATION) at 20:45

## 2020-12-05 RX ADMIN — INSULIN LISPRO 8 UNITS: 100 INJECTION, SOLUTION INTRAVENOUS; SUBCUTANEOUS at 06:49

## 2020-12-05 RX ADMIN — PREDNISONE 20 MG: 20 TABLET ORAL at 14:00

## 2020-12-05 RX ADMIN — NYSTATIN: 100000 POWDER TOPICAL at 08:49

## 2020-12-05 RX ADMIN — INSULIN LISPRO 8 UNITS: 100 INJECTION, SOLUTION INTRAVENOUS; SUBCUTANEOUS at 14:00

## 2020-12-05 RX ADMIN — OXYCODONE HYDROCHLORIDE AND ACETAMINOPHEN 1 TABLET: 5; 325 TABLET ORAL at 20:32

## 2020-12-05 RX ADMIN — OXYCODONE HYDROCHLORIDE AND ACETAMINOPHEN 1 TABLET: 5; 325 TABLET ORAL at 02:20

## 2020-12-05 RX ADMIN — AMOXICILLIN 875 MG: 875 TABLET, FILM COATED ORAL at 08:49

## 2020-12-05 RX ADMIN — BUSPIRONE HYDROCHLORIDE 5 MG: 5 TABLET ORAL at 20:32

## 2020-12-05 NOTE — THERAPY EVALUATION
Patient Name: Swetha Luis  : 1960    MRN: [id number]                              Today's Date: 2020       Admit Date: 2020    Visit Dx:     ICD-10-CM ICD-9-CM   1. Acute respiratory failure with hypoxia and hypercapnia (CMS/[facility])  J96.01 518.81    J96.02      Patient Active Problem List   Diagnosis   • Tracheostomy complication (CMS/[facility])   • Gangrene of toe (CMS/[facility])   • Acute diastolic congestive heart failure (CMS/[facility])   • ARF (acute renal failure) (CMS/[facility])   • Stage 3 chronic kidney disease   • Elevated troponin   • Acute respiratory failure with hypoxemia (CMS/[facility])   • Acute osteomyelitis (CMS/[facility])   • UTI due to extended-spectrum beta lactamase (ESBL) producing Escherichia coli   • Thrush, oral   • Tracheostomy malfunction (CMS/[facility])   • COPD (chronic obstructive pulmonary disease) (CMS/[facility])   • Chronic respiratory failure with hypoxia (CMS/H[facility])   • PEG (percutaneous endoscopic gastrostomy) status (CMS/HCC)   • History of osteomyelitis   • Polysubstance abuse (CMS/[facility])   • History of tracheal stenosis   • Chronic diastolic CHF (congestive heart failure) (CMS/[facility])   • Peripheral artery disease (CMS/[facility])   • Medically noncompliant   • WENDI and COPD overlap syndrome (CMS/[facility])   • Dyspnea   • Elevated LFTs   • Elevated troponin   • Tracheostomy present (CMS/[facility])   • Essential hypertension   • Dysphagia   • Healthcare associated bacterial pneumonia   • Infection due to ESBL-producing Escherichia coli   • Sepsis due to Gram negative bacteria (CMS/[facility])   • Ureteral stone with hydronephrosis   • UTI (urinary tract infection)   • Acute tracheobronchitis   • Chronic diastolic CHF (congestive heart failure) (CMS/[facility])   • Bacteremia   • Acute deep vein thrombosis (DVT) of proximal vein of left lower extremity (CMS/[facility])   • Elevated d-dimer   • Type 2 diabetes mellitus with hyperglycemia, with long-term current use of insulin (CMS/[facility])   • Acute respiratory failure with hypoxia and hypercapnia (CMS/[facility])      Past Medical History:   Diagnosis Date   • Acute congestive heart failure (CMS/HCC) 12/24/2018   • Acute osteomyelitis (CMS/[facility])     Right shoulder due to IVDA   • Acute renal failure on dialysis (CMS/[facility])    • Anxiety    • Pugh esophagus    • CKD (chronic kidney disease)    • COPD (chronic obstructive pulmonary disease) (CMS/HCC)    • MRSA infection    • Nontraumatic subarachnoid hemorrhage (CMS/HCC)    • Seizures (CMS/HCC)    • Tracheostomy present (CMS/[facility])      Past Surgical History:   Procedure Laterality Date   • TRACHEOSTOMY     • URETEROSCOPY LASER LITHOTRIPSY WITH STENT INSERTION Left 10/11/2019    Procedure: CYSTOSCOPY,  URETEROSCOPY, LEFT RETROGRADE, LEFT PYELOGRAM, LASER LITHOTRIPSY, PLACEMENT OF STENT.;  Surgeon: Clyde Selby MD;  Location: [facility];  Service: Urology     General Information     Row Name 12/05/20 9750          Physical Therapy Time and Intention    Document Type  evaluation  -     Mode of Treatment  individual therapy;physical therapy  -     Row Name 12/05/20 3908          General Information    Patient Profile Reviewed  yes  -     Prior Level of Function  independent:;all household mobility;community mobility;gait;transfer;bed mobility  -     Existing Precautions/Restrictions  fall;oxygen therapy device and L/min  -     Barriers to Rehab  previous functional deficit  -     Row Name 12/05/20 1356          Living Environment    Lives With  spouse  -     Row Name 12/05/20 3667          Cognition    Orientation Status (Cognition)  oriented x 4  -     Row Name 12/05/20 1355          Safety Issues, Functional Mobility    Impairments Affecting Function (Mobility)  endurance/activity tolerance;strength;shortness of breath  -       User Key  (r) = Recorded By, (t) = Taken By, (c) = Cosigned By    Initials Name Provider Type    LC Matthew Guevara, PT DPT Physical Therapist        Mobility     Row Name 12/05/20 0876          Bed Mobility    Comment (Bed  Mobility)  up in chair, but per RN she requires CGA to min x 1  -     Row Name 12/05/20 1403          Sit-Stand Transfer    Sit-Stand [place] (Transfers)  contact guard  -     Assistive Device (Sit-Stand Transfers)  walker, front-wheeled  -LC     Row Name 12/05/20 1403          Gait/Stairs (Locomotion)    [place] Level (Gait)  contact guard  -     Assistive Device (Gait)  walker, front-wheeled  -     Distance in Feet (Gait)  90  -LC     Deviations/Abnormal Patterns (Gait)  balaji decreased;gait speed decreased;base of support, wide;stride length decreased;weight shifting decreased  -       User Key  (r) = Recorded By, (t) = Taken By, (c) = Cosigned By    Initials Name Provider Type     Matthew Guevara, PT DPT Physical Therapist        Obj/Interventions     Row Name 12/05/20 1406          Range of Motion Comprehensive    Comment, General Range of Motion  BLE AROM WfL  -     Row Name 12/05/20 1406          Strength Comprehensive (MMT)    Comment, General Manual Muscle Testing (MMT) Assessment  BLE MMT grossly 3+/5  -     Row Name 12/05/20 1406          Balance    Balance Assessment  sitting static balance;standing static balance;standing dynamic balance;sitting dynamic balance  -     Static Sitting Balance  WNL  -LC     Dynamic Sitting Balance  WNL  -LC     Static Standing Balance  WFL  -LC     Dynamic Standing Balance  WFL  -LC       User Key  (r) = Recorded By, (t) = Taken By, (c) = Cosigned By    Initials Name Provider Type    Matthew Joyce, PT DPT Physical Therapist        Goals/Plan     Row Name 12/05/20 1409          Bed Mobility Goal 1 (PT)    Activity/Assistive Device (Bed Mobility Goal 1, PT)  bed mobility activities, all  -     [place] Level/Cues Needed (Bed Mobility Goal 1, PT)  independent  -     Time Frame (Bed Mobility Goal 1, PT)  1 week  -     Row Name 12/05/20 1409          Transfer Goal 1 (PT)    Activity/Assistive Device (Transfer Goal 1, PT)  transfers,  all;walker, rolling  -LC     [place] Level/Cues Needed (Transfer Goal 1, PT)  supervision required  -LC     Time Frame (Transfer Goal 1, PT)  1 week  -     Row Name 12/05/20 1409          Gait Training Goal 1 (PT)    Activity/Assistive Device (Gait Training Goal 1, PT)  gait (walking locomotion);walker, rolling  -LC     [place] Level (Gait Training Goal 1, PT)  supervision required  -LC     Distance (Gait Training Goal 1, PT)  200  -LC     Time Frame (Gait Training Goal 1, PT)  1 week  -       User Key  (r) = Recorded By, (t) = Taken By, (c) = Cosigned By    Initials Name Provider Type    LC Matthew Guevara, PT DPT Physical Therapist        Clinical Impression     Row Name 12/05/20 1405          Plan of Care Review    Plan of Care Reviewed With  patient  -     Outcome Summary  PT EVAL completed. Pt AO x 4 up in chair. Pt on 4L O2/NC. Pt transferred sit to stand with CGA x 1 and RW. Pt ambulated 90 ft with CGA x 1 and RW and 4L O2/NC. Pt returned to sit in chair. Pt requries skilled therapy due to decreased functional activity tolerance, decreased ambulation, and decreased strength. Recommend DC home with home health vs inptaient rehab.  -     Row Name 12/05/20 1409          Therapy Assessment/Plan (PT)    Patient/Family Therapy Goals Statement (PT)  return home with assist  -LC     Rehab Potential (PT)  good, to achieve stated therapy goals  -     Criteria for Skilled Interventions Met (PT)  yes;skilled treatment is necessary  -     Predicted Duration of Therapy Intervention (PT)  1 week  -     Row Name 12/05/20 1406          Vital Signs    O2 Delivery Pre Treatment  supplemental O2  -LC     O2 Delivery Intra Treatment  supplemental O2  -LC     O2 Delivery Post Treatment  supplemental O2  -LC     Row Name 12/05/20 1403          Positioning and Restraints    Pre-Treatment Position  sitting in chair/recliner  -LC     Post Treatment Position  chair  -LC     In Chair  notified nsg;sitting;call  light within reach;encouraged to call for assist  -       User Key  (r) = Recorded By, (t) = Taken By, (c) = Cosigned By    Initials Name Provider Type    Matthew Joyce, PT DPT Physical Therapist        Outcome Measures     Row Name 12/05/20 1410          How much help from another person do you currently need...    Turning from your back to your side while in flat bed without using bedrails?  3  -LC     Moving from lying on back to sitting on the side of a flat bed without bedrails?  3  -LC     Moving to and from a bed to a chair (including a wheelchair)?  3  -LC     Standing up from a chair using your arms (e.g., wheelchair, bedside chair)?  3  -LC     Climbing 3-5 steps with a railing?  2  -LC     To walk in hospital room?  3  -LC     AM-PAC 6 Clicks Score (PT)  17  -     Row Name 12/05/20 1410          Functional Assessment    Outcome Measure Options  AM-PAC 6 Clicks Basic Mobility (PT)  -       User Key  (r) = Recorded By, (t) = Taken By, (c) = Cosigned By    Initials Name Provider Type    Matthew Joyce, PT DPT Physical Therapist        Physical Therapy Education                 Title: PT OT SLP Therapies (Done)     Topic: Physical Therapy (Done)     Point: Mobility training (Done)     Learning Progress Summary           Patient Acceptance, E,D, VU,DU by  at 12/5/2020 1410    Comment: safety during transfers and ambulation                   Point: Home exercise program (Done)     Learning Progress Summary           Patient Acceptance, E,D, VU,DU by  at 12/5/2020 1410    Comment: safety during transfers and ambulation                   Point: Body mechanics (Done)     Learning Progress Summary           Patient Acceptance, E,D, VU,DU by  at 12/5/2020 1410    Comment: safety during transfers and ambulation                   Point: Precautions (Done)     Learning Progress Summary           Patient Acceptance, E,D, VU,DU by  at 12/5/2020 1410    Comment: safety during transfers and  ambulation                               User Key     Initials Effective Dates Name Provider Type Discipline    LC 07/02/20 -  Matthew Guevara, PT DPT Physical Therapist PT              PT Recommendation and Plan     Plan of Care Reviewed With: patient  Outcome Summary: PT EVAL completed. Pt AO x 4 up in chair. Pt on 4L O2/NC. Pt transferred sit to stand with CGA x 1 and RW. Pt ambulated 90 ft with CGA x 1 and RW and 4L O2/NC. Pt returned to sit in chair. Pt requries skilled therapy due to decreased functional activity tolerance, decreased ambulation, and decreased strength. Recommend DC home with home health vs inptaient rehab.     Time Calculation:   PT Charges     Row Name 12/05/20 1411             Time Calculation    Start Time  1345  -      Stop Time  1408  -      Time Calculation (min)  23 min  -      PT Received On  12/05/20  -      PT - Next Appointment  12/06/20  -      PT Goal Re-Cert Due Date  12/12/20  -         Time Calculation- PT    Total Timed Code Minutes- PT  23 minute(s)  -         Timed Charges    01196 - Gait Training Minutes   15  -        User Key  (r) = Recorded By, (t) = Taken By, (c) = Cosigned By    Initials Name Provider Type    LC Matthew Guevara, PT DPT Physical Therapist        Therapy Charges for Today     Code Description Service Date Service Provider Modifiers Qty    22315377001 HC GAIT TRAINING EA 15 MIN 12/5/2020 Matthew Guevara, PT DPT GP 1    36127943973 HC PT EVAL LOW COMPLEXITY 1 12/5/2020 Matthew Guevara, PT DPT GP 1          PT G-Codes  Outcome Measure Options: AM-PAC 6 Clicks Basic Mobility (PT)  AM-PAC 6 Clicks Score (PT): 17    Matthew Guevara PT DPT  12/5/2020

## 2020-12-05 NOTE — PLAN OF CARE
Goal Outcome Evaluation:  Plan of Care Reviewed With: patient     Outcome Summary: Clinical swallowing evaluation completed. Pt presents with suspected at least moderate oropharyngeal dysphagia s/p most recent respiratory failure and extubation. Pt exhibited mild labial spillage of thin liquids, delayed swallow initiation, and suspected impaired airway closure indicated by overt cough with thin via spoon. Suspect pt with high risk of further silent aspiration as well due to hx. Pt exhibited no overt s/s of pen/asp with puree. Due to dysphagia hx (silent aspiration), absent vocal quality, and noted s/s of pen/asp during trials of thin by spoon this date, recommend NPO with non-oral meds. Pt can have ice chips after oral care. Recommend VFSS to further evaluate swallowing function and risk of aspiration.

## 2020-12-05 NOTE — PLAN OF CARE
Goal Outcome Evaluation:  Plan of Care Reviewed With: patient     Outcome Summary: PT EVAL completed. Pt AO x 4 up in chair. Pt on 4L O2/NC. Pt transferred sit to stand with CGA x 1 and RW. Pt ambulated 90 ft with CGA x 1 and RW and 4L O2/NC. Pt returned to sit in chair. Pt requries skilled therapy due to decreased functional activity tolerance, decreased ambulation, and decreased strength. Recommend DC home with home health vs inptaient rehab.    PPE: Patient was placed in face mask in first look. Patient was wearing facemask when I entered the room and throughout our encounter. I wore full protective equipment throughout this patient encounter including a face mask, and gloves. Hand hygiene was performed before donning protective equipment and after removal when leaving the room.

## 2020-12-05 NOTE — PROGRESS NOTES
LOS: 3 days   Patient Care Team:  Provider, No Known as PCP - General    Subjective     Patient reports she is doing fairly well.  She does not feel short of breath she is having some pain in her left ankle which is chronic secondary to an old fracture.  Not having other pain.  I talked with her about if she got into trouble again a tracheostomy and she said she would agree to another trach if she got into trouble again.    Review of Systems:          Objective     Vital Signs  Vital Sign Min/Max for last 24 hours  Temp  Min: 97.6 °F (36.4 °C)  Max: 99.2 °F (37.3 °C)   BP  Min: 129/79  Max: 184/112   Pulse  Min: 71  Max: 114   Resp  Min: 12  Max: 22   SpO2  Min: 90 %  Max: 98 %   Flow (L/min)  Min: 2  Max: 3   Weight  Min: 79.2 kg (174 lb 9.7 oz)  Max: 79.2 kg (174 lb 9.7 oz)        Ventilator/Non-Invasive Ventilation Settings (From admission, onward)     Start     Ordered    12/02/20 0025  Ventilator - AC/PC; (32); 60; 90%; 5; 24  Continuous,   Status:  Canceled     Question Answer Comment   Vent Mode AC/PC    Breath rate  32   FiO2 60    FiO2 titrate for Sp02% =/> 90%    PEEP 5    Inspiratory Pressure 24        12/02/20 0025                             Body mass index is 28.18 kg/m².  I/O last 3 completed shifts:  In: 2448 [I.V.:352; Other:260; NG/GT:1743; IV Piggyback:93]  Out: 2850 [Urine:2850]  No intake/output data recorded.        Physical Exam:  General Appearance: Well-developed obese white female she is resting in bed she is on 3 L nasal cannula O2 and does not appear in acute distress her oxygen saturations are 91 to 92%.  Eyes: Conjunctiva are clear and anicteric  ENT: Mucous membranes are little dry no erythema no exudates, nasal septum midline  Neck: Trachea is midline she has a tracheal stoma with tracheocutaneous fistula fairly good size.  No palpable adenopathy  Lungs: Decreased but clear no wheezes no rales no rhonchi she is nonlabored she is actually able to talk a little bit  Cardiac:  Regular rate rhythm no murmur  Abdomen: Obese soft nontender no palpable hepatosplenomegaly she has a left upper quadrant PEG type tube the site looks okay  : Not examined  Musculoskeletal: Grossly normal  Skin: No jaundice no petechiae skin is warm and dry  Neuro: She is alert and oriented she is cooperative moving extremities following commands conversing  Extremities/P Vascular: No clubbing no cyanosis no edema palpable radial and dorsalis pedis pulses bilaterally  MSE: She is actually fairly relaxed       Labs:  Results from last 7 days   Lab Units 12/05/20  0518 12/04/20  0357 12/03/20 0335 12/01/20 2232 11/29/20  2259   GLUCOSE mg/dL 273* 178* 241* 229* 199*   SODIUM mmol/L 136 143 141 140 140   POTASSIUM mmol/L 4.4 4.1 4.0 4.6 4.2   CO2 mmol/L 30.2* 30.8* 24.3 23.9 26.1   CHLORIDE mmol/L 98 104 108* 106 103   ANION GAP mmol/L 7.8 8.2 8.7 10.1 10.9   CREATININE mg/dL 1.05* 1.03* 1.17* 1.65* 1.21*   BUN mg/dL 31* 22 19 18 14   BUN / CREAT RATIO  29.5* 21.4 16.2 10.9 11.6   CALCIUM mg/dL 9.2 9.2 8.8 8.5* 9.2   EGFR IF NONAFRICN AM mL/min/1.73 53* 55* 47* 32* 45*   ALK PHOS U/L  --   --   --  204* 209*   TOTAL PROTEIN g/dL  --   --   --  6.6 7.0   ALT (SGPT) U/L  --   --   --  16 19   AST (SGOT) U/L  --   --   --  16 22   BILIRUBIN mg/dL  --   --   --  <0.2 <0.2   ALBUMIN g/dL  --   --   --  3.80 4.10   GLOBULIN gm/dL  --   --   --  2.8 2.9     Estimated Creatinine Clearance: 60.5 mL/min (A) (by C-G formula based on SCr of 1.05 mg/dL (H)).      Results from last 7 days   Lab Units 12/05/20 0518 12/04/20  0357 12/03/20  0339 12/02/20  0711 12/01/20  2356 11/29/20  2215   WBC 10*3/mm3 11.72* 11.48* 9.22 9.70 16.71* 8.72   RBC 10*6/mm3 4.45 4.22 3.87 4.14 4.01 4.53   HEMOGLOBIN g/dL 10.2* 9.6* 8.7* 9.5* 9.2* 10.2*   HEMATOCRIT % 34.3 32.0* 28.9* 31.8* 31.7* 34.2   MCV fL 77.1* 75.8* 74.7* 76.8* 79.1 75.5*   MCH pg 22.9* 22.7* 22.5* 22.9* 22.9* 22.5*   MCHC g/dL 29.7* 30.0* 30.1* 29.9* 29.0* 29.8*   RDW % 16.1*  16.5* 16.2* 16.3* 16.1* 15.9*   RDW-SD fl 44.6 44.7 43.6 45.6 46.5 43.1   MPV fL 11.7 11.3 11.4 11.5 11.4 11.2   PLATELETS 10*3/mm3 210 238 215 232 265 226   NEUTROPHIL % % 84.5* 87.4*  --  89.7* 84.1* 69.1   LYMPHOCYTE % % 8.4* 6.4*  --  8.5* 7.7* 18.7*   MONOCYTES % % 6.3 4.9*  --  1.1* 5.9 7.1   EOSINOPHIL % % 0.3 0.0*  --  0.1* 1.3 4.1   BASOPHIL % % 0.0 0.1  --  0.1 0.5 0.7   IMM GRAN % % 0.5 1.2*  --  0.5 0.5 0.3   NEUTROS ABS 10*3/mm3 9.90* 10.04* 8.57* 8.70* 14.06* 6.02   LYMPHS ABS 10*3/mm3 0.99 0.73  --  0.82 1.28 1.63   MONOS ABS 10*3/mm3 0.74 0.56  --  0.11 0.99* 0.62   EOS ABS 10*3/mm3 0.03 0.00  --  0.01 0.21 0.36   BASOS ABS 10*3/mm3 0.00 0.01  --  0.01 0.08 0.06   IMMATURE GRANS (ABS) 10*3/mm3 0.06* 0.14*  --  0.05 0.09* 0.03   NRBC /100 WBC 0.1 0.2  --  0.0 0.0 0.0     Results from last 7 days   Lab Units 12/04/20  0710   PH, ARTERIAL pH units 7.411   PO2 ART mm Hg 79.3*   PCO2, ARTERIAL mm Hg 43.5   HCO3 ART mmol/L 27.6     Results from last 7 days   Lab Units 12/01/20 2232 11/29/20  2259   TROPONIN T ng/mL <0.010 <0.010     Results from last 7 days   Lab Units 12/02/20  0711 12/01/20 2232 11/29/20  2259   PROBNP pg/mL 746.2 570.4 397.0         Results from last 7 days   Lab Units 12/01/20  2232 11/29/20  2259   LACTATE mmol/L 1.1  --    PROCALCITONIN ng/mL 0.07 0.06     Results from last 7 days   Lab Units 12/01/20  2356 11/29/20  2215   INR  1.90* 2.02*     Microbiology Results (last 10 days)     Procedure Component Value - Date/Time    Respiratory Culture - Sputum, Bronchus [[id number]]  (Abnormal) Collected: 12/02/20 0606    Lab Status: Preliminary result Specimen: Sputum from Bronchus Updated: 12/04/20 0757     Respiratory Culture Heavy growth (4+) Streptococcus pneumoniae      Moderate growth (3+) Normal Respiratory Dalia: NO S.aureus/MRSA or Pseudomonas aeruginosa     Gram Stain Few (2+) WBCs seen      Few (2+) Epithelial cells per low power field      Few (2+) Gram positive cocci in chains       Occasional Yeast    Blood Culture - Blood, Arm, Left [[id number]] Collected: 12/01/20 2355    Lab Status: Preliminary result Specimen: Blood from Arm, Left Updated: 12/05/20 0645     Blood Culture No growth at 3 days    Blood Culture - Blood, Arm, Left [[id number]] Collected: 12/01/20 2355    Lab Status: Preliminary result Specimen: Blood from Arm, Left Updated: 12/05/20 0000     Blood Culture No growth at 3 days    Respiratory Panel PCR w/COVID-19(SARS-CoV-2) NATASHA/MICHELLE/ANNA/PAD/COR/MAD/KIARA In-House, NP Swab in UTM/VTM, 3-4 HR TAT - Swab, Nasopharynx [[id number]]  (Normal) Collected: 12/01/20 2235    Lab Status: Final result Specimen: Swab from Nasopharynx Updated: 12/02/20 0008     ADENOVIRUS, PCR Not Detected     Coronavirus 229E Not Detected     Coronavirus HKU1 Not Detected     Coronavirus NL63 Not Detected     Coronavirus OC43 Not Detected     COVID19 Not Detected     Human Metapneumovirus Not Detected     Human Rhinovirus/Enterovirus Not Detected     Influenza A PCR Not Detected     Influenza B PCR Not Detected     Parainfluenza Virus 1 Not Detected     Parainfluenza Virus 2 Not Detected     Parainfluenza Virus 3 Not Detected     Parainfluenza Virus 4 Not Detected     RSV, PCR Not Detected     Bordetella pertussis pcr Not Detected     Bordetella parapertussis PCR Not Detected     Chlamydophila pneumoniae PCR Not Detected     Mycoplasma pneumo by PCR Not Detected    Narrative:      Fact sheet for providers: https://docs.[employer]/wp-content/uploads/DIS2414-7289-JF2.1-EUA-Provider-Fact-Sheet-3.pdf    Fact sheet for patients: https://docs.[employer]/wp-content/uploads/RIR6558-1669-XN8.1-EUA-Patient-Fact-Sheet-1.pdf    Respiratory Panel PCR w/COVID-19(SARS-CoV-2) NATASHA/MICHELLE/ANNA/PAD/COR/MAD/KIARA In-House, NP Swab in UTM/VTM, 3-4 HR TAT - Swab, Nasopharynx [[id number]]  (Normal) Collected: 11/29/20 2300    Lab Status: Final result Specimen: Swab from Nasopharynx Updated: 11/30/20 0002     ADENOVIRUS, PCR Not  Detected     Coronavirus 229E Not Detected     Coronavirus HKU1 Not Detected     Coronavirus NL63 Not Detected     Coronavirus OC43 Not Detected     COVID19 Not Detected     Human Metapneumovirus Not Detected     Human Rhinovirus/Enterovirus Not Detected     Influenza A PCR Not Detected     Influenza B PCR Not Detected     Parainfluenza Virus 1 Not Detected     Parainfluenza Virus 2 Not Detected     Parainfluenza Virus 3 Not Detected     Parainfluenza Virus 4 Not Detected     RSV, PCR Not Detected     Bordetella pertussis pcr Not Detected     Bordetella parapertussis PCR Not Detected     Chlamydophila pneumoniae PCR Not Detected     Mycoplasma pneumo by PCR Not Detected    Narrative:      Fact sheet for providers: https://docs.[employer]/wp-content/uploads/ZOE4676-2497-YO7.1-EUA-Provider-Fact-Sheet-3.pdf    Fact sheet for patients: https://docs.[employer]/wp-content/uploads/BSL3860-2254-MU2.1-EUA-Patient-Fact-Sheet-1.pdf              amoxicillin, 875 mg, Oral, Q12H  busPIRone, 5 mg, Oral, TID  chlorhexidine, 15 mL, Mouth/Throat, Q12H  escitalopram, 20 mg, Oral, Daily  famotidine, 20 mg, Intravenous, Daily  gabapentin, 300 mg, Oral, TID  insulin glargine, 10 Units, Subcutaneous, Nightly  insulin lispro, 0-14 Units, Subcutaneous, Q6H  ipratropium-albuterol, 3 mL, Nebulization, 4x Daily - RT  methylPREDNISolone sodium succinate, 40 mg, Intravenous, Q12H  midazolam, 2 mg, Intravenous, Once  nystatin, , Topical, Q12H  rivaroxaban, 20 mg, Oral, Daily With Dinner      norepinephrine, 0.02-0.3 mcg/kg/min, Last Rate: Stopped (12/02/20 0248)        Diagnostics:  Fl Video Swallow With Speech Single Contrast    Result Date: 11/17/2020  VIDEO SWALLOWING EXAMINATION BY SPEECH PATHOLOGY  Clinical: Dysphasia  Video swallowing examination performed under the direction of speech pathology. Imaging reviewed by radiologist who concurs with the findings.  Speech pathology summary:Pt exhibited mild to moderate oropharyngeal  dysphagia characterized by lingual and base of tongue weakness, mistiming/delayed swallow, decreased hyolaryngeal excursion, and decreased airway protection. Pt had premature spillage past the valleculae or to the pyriforms with all consistencies. Pt had inconsistent penetration with single cup sips of thin and nectar consistencies. Silent aspiration was noted with straw sips of thin (consecutive sips). Pt was unable to clear penetrated/aspirated material with cough. Pt took trials of honey thick and pureed with no additional penetration noted. Mastication and bolus formation and propulsion was slow for soft and regular consistencies. Penetration noted with juice of soft solids/mixed. Mild pharyngeal residuals were noted (tongue base, valleculae, pyriforms) and were assisted in clearing with a spontaneous swallow.   FLUOROSCOPY TIME: 5 minutes 6 seconds, 6102 images.  This report was finalized on 11/17/2020 5:42 PM by Dr. Jayson Godwin M.D.      Xr Chest 1 View    Result Date: 12/2/2020  AP PORTABLE UPRIGHT CHEST  HISTORY: Intubated. Follow-up exam.  COMPARISON: Portable chest 12/01/2020, CT angiogram chest 11/30/2020.  FINDINGS: Endotracheal tube is at the aguila and slightly directed toward the right mainstem bronchus and recommend withdrawal 2-3 cm. There are chronic increased interstitial markings associated with emphysema. Mild patchy infiltrates present in the peripheral aspect of the right midlung and within the left base. There is no pneumothorax or evidence for perihilar edema. Right IJ central venous catheter tip extends into the right atrium. Cardiac monitoring leads are noted. Small pleural effusions are suspected.      1. ET tube tip is at the aguila and directed slightly toward the right mainstem bronchus and recommend withdrawal 2-3 cm for better positioning. 2. Interval placement right IJ central venous catheter with tip in the SVC. 3. Mild peripheral right midlung and left basilar patchy  infiltrates.  This report was finalized on 12/2/2020 9:06 AM by Dr. Chad Truong M.D.      Xr Chest 1 View    Result Date: 12/1/2020  SINGLE VIEW OF THE CHEST  HISTORY: Intubation  COMPARISON: 11/30/2020 and other prior imaging  FINDINGS: Endotracheal tube extends into the proximal right mainstem. Retraction by approximately 2 cm is suggested. Cardiac silhouette is stable. However, the patient has developed bilateral alveolar and interstitial infiltrates. There are also small bilateral pleural effusions, right greater than left. No pneumothorax is seen       1. Right mainstem intubation. Retraction by approximately 2 cm is suggested. 2. Interval development of bilateral alveolar and interstitial infiltrates. Appearance may reflect edema, although correlation with any evidence of infection is recommended. There are also small bilateral pleural effusions.  FINDINGS were relayed to Dr. Gee in the emergency department at 11:05 PM.  This report was finalized on 12/1/2020 11:07 PM by Dr. Emilia Olivo M.D.      Xr Chest 1 View    Result Date: 11/15/2020  SINGLE VIEW CHEST  HISTORY: Shortness of air  COMPARISON: 12/29/2019  FINDINGS: Cardiomegaly is present. Vascular congestion is suspected. There is tortuosity and ectasia of the thoracic aorta. No pneumothorax is seen. There is some blunting of the costophrenic angles bilaterally. This may represent atelectasis or scarring, although trace effusions are not excluded.      Cardiomegaly with vascular congestion.  This report was finalized on 11/15/2020 9:00 PM by Dr. Emilia Olivo M.D.      Ct Angiogram Chest    Result Date: 11/30/2020  CT ANGIOGRAM OF THE CHEST  HISTORY: Sudden onset of shortness of air  COMPARISON: 11/15/2020  TECHNIQUE: Axial CT imaging was obtained through the thorax. IV contrast was administered. Three-D reformatted images were obtained. The  FINDINGS: No acute pulmonary thromboembolus is identified. Please note examination is degraded by  motion artifact. Thoracic aorta is normal in caliber. There is no evidence of dissection. There are mild coronary artery calcifications. There is no pleural effusion. There is a trace pericardial effusion. The thyroid gland appears unremarkable. The patient's trachea at the level of thoracic inlet appears there is an area. Patient does have a history of a tracheostomy, but the narrowing is more apparent on today's exam. This may represent some tracheal stenosis and/or tracheomalacia. Esophagus appears within normal limits. No acute infiltrates are seen on today's exam. Advanced background emphysematous changes are present. 6 to 7 mm nodule within the left lower lobe is unchanged. No new nodules are seen. Images through the upper abdomen are degraded by motion artifact. Gastrostomy tube is again identified. Previously identified mild left-sided pelviectasis is noted. There is a probable left renal cyst. Areas of cortical thinning are seen within the right kidney. Previously identified dissection flap involving the distal celiac axis is not well-seen on this exam. There is enlargement of the main pulmonary artery, which can be seen in the setting of pulmonary arterial hypertension. Chronic compression deformity at T6-T7 and T3-T4 is unchanged. Mediastinal lymph nodes do not appear pathologically enlarged.       1. Exam is degraded by motion artifact. No obvious acute pulmonary thromboembolus seen. 2. The patient's trachea at the level of the thoracic inlet appears narrowed. This is more apparent than on the prior exam. Patient does have a history of tracheostomy, and this may represent tracheal stenosis/tracheomalacia. 3. 6 to 7 mm nodule within the left lower lobe. Short-term CT follow-up in 3-6 months suggested.  Radiation dose reduction techniques were utilized, including automated exposure control and exposure modulation based on body size.  This report was finalized on 11/30/2020 12:22 AM by Dr. Emilia Olivo,  M.D.      Ct Angiogram Chest    Result Date: 11/15/2020  CT ANGIOGRAM OF THE CHEST  HISTORY: Leg swelling and shortness of air. Elevated d-dimer.  COMPARISON: None available.  TECHNIQUE: Axial CT imaging was obtained through the thorax. IV contrast was administered. Three-D reformatted images were obtained.  FINDINGS: Examination is degraded by poor timing of the contrast bolus. No obvious pulmonary thromboembolus is seen. The thoracic aorta is normal in caliber. There is no evidence of dissection. There are really no significant coronary artery calcifications. The thyroid gland, trachea, and esophagus appear unremarkable. There is no pleural or pericardial effusion. The advanced background emphysematous changes are seen. Areas of chronic scarring are seen at the lung bases bilaterally. These have progressed when compared to prior study. There is an area of nodularity noted within the left lower lobe measuring up to 7 mm in size. This was not apparent on the prior exam. Short-term CT follow-up in 3-6 months is suggested. Consolidation within the right lower lobe has increased when compared to prior exam, as has bronchial wall thickening, and nonspecific bronchitis is not excluded. Mediastinal lymph nodes do not appear pathologically enlarged. Images through the upper abdomen demonstrate persistent mild left-sided hydronephrosis. This was also present on prior exam from 10/11/2019. Areas of cortical thinning are noted within the right kidney, suggesting sequela prior insults. Gastrostomy tube is present. There is a focal intimal flap involving the distal celiac axis however, branch vessels appear patent. There is dilatation of the distal celiac artery measuring up to 1 cm. Chronic compression deformity with bony ankylosis is noted at T6-T7 and T3-T4.        1. No acute pulmonary thromboembolus seen. 2. Advanced emphysematous changes. Patient is noted to have bibasilar scarring. Consolidation within the right lower  lobe has increased when compared to prior exam, and there is increasing bronchial wall thickening within this area. FINDINGS may reflect nonspecific bronchitis. 3. 7 mm noncalcified pulmonary nodule at the left lung base. CT follow-up in 3-6 months is suggested. Radiation dose reduction techniques were utilized, including automated exposure control and exposure modulation based on body size.  This report was finalized on 11/15/2020 11:12 PM by Dr. Emilia Olivo M.D.      Results for orders placed during the hospital encounter of 12/01/20   Adult Transthoracic Echo Complete W/ Cont if Necessary Per Protocol    Narrative · Left ventricular wall thickness is consistent with concentric   hypertrophy.  · Calculated left ventricular EF = 62.8% Estimated left ventricular EF was   in agreement with the calculated left ventricular EF. Left ventricular   systolic function is normal.  · Left ventricular diastolic function is consistent with (grade I)   impaired relaxation.  · Saline test results are negative.              Active Hospital Problems    Diagnosis  POA   • Acute respiratory failure with hypoxia and hypercapnia (CMS/HCC) [J96.01, J96.02]  Yes      Resolved Hospital Problems   No resolved problems to display.         Assessment/Plan     1. Acute on chronic hypoxemic and hypercapnic respiratory failure patient was extubated yesterday she is doing pretty well we will continue O2 with SPO2 and capnography monitoring  2. Possible sleep apnea will have to observe she has a pretty good tracheal cutaneous fistula that might help prevent some problems here.  3. 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 Pneumonia pneumococcal she has been receiving Augmentin and looks like she is doing pretty well will continue.  5. Acute exacerbation COPD continue steroids and bronchodilators  6. Hypotension resolved  7. Anemia hemoglobin  stable  8. DVT on Xarelto chronically.  9. Chronic kidney disease stage III stable  10. Diabetes mellitus type 2 but sugars up probably secondary to steroids we may need to increase some scheduled insulin as her blood sugars remain quite high  11. Gastroesophageal reflux disease  on H2 blocker here  12. Fluids/electrolytes/nutrition feeding tube tube feeds  13. Diastolic dysfunction by echocardiogram  14. Anemia hemoglobin is actually up from yesterday we will continue to monitor  15. Hypertension    Plan for disposition: Transfer to monitored unit with capnography and SPO2 monitoring    Dany Baez MD  12/05/20  09:24 EST    Time: I spent over 40 minutes on patient's care today

## 2020-12-05 NOTE — THERAPY TREATMENT NOTE
Acute Care - Speech Language Pathology   Swallow Initial Evaluation [facility]     Patient Name: Swetha Luis  : 1960  MRN: [id number]  Today's Date: 2020               Admit Date: 2020    Visit Dx:     ICD-10-CM ICD-9-CM   1. Acute respiratory failure with hypoxia and hypercapnia (CMS/[facility])  J96.01 518.81    J96.02      Patient Active Problem List   Diagnosis   • Tracheostomy complication (CMS/[facility])   • Gangrene of toe (CMS/[facility])   • Acute diastolic congestive heart failure (CMS/[facility])   • ARF (acute renal failure) (CMS/[facility])   • Stage 3 chronic kidney disease   • Elevated troponin   • Acute respiratory failure with hypoxemia (CMS/[facility])   • Acute osteomyelitis (CMS/[facility])   • UTI due to extended-spectrum beta lactamase (ESBL) producing Escherichia coli   • Thrush, oral   • Tracheostomy malfunction (CMS/[facility])   • COPD (chronic obstructive pulmonary disease) (CMS/[facility])   • Chronic respiratory failure with hypoxia (CMS/[facility])   • PEG (percutaneous endoscopic gastrostomy) status (CMS/[facility])   • History of osteomyelitis   • Polysubstance abuse (CMS/[facility])   • History of tracheal stenosis   • Chronic diastolic CHF (congestive heart failure) (CMS/[facility])   • Peripheral artery disease (CMS/[facility])   • Medically noncompliant   • WENDI and COPD overlap syndrome (CMS/[facility])   • Dyspnea   • Elevated LFTs   • Elevated troponin   • Tracheostomy present (CMS/[facility])   • Essential hypertension   • Dysphagia   • Healthcare associated bacterial pneumonia   • Infection due to ESBL-producing Escherichia coli   • Sepsis due to Gram negative bacteria (CMS/[facility])   • Ureteral stone with hydronephrosis   • UTI (urinary tract infection)   • Acute tracheobronchitis   • Chronic diastolic CHF (congestive heart failure) (CMS/[facility])   • Bacteremia   • Acute deep vein thrombosis (DVT) of proximal vein of left lower extremity (CMS/[facility])   • Elevated d-dimer   • Type 2 diabetes mellitus with hyperglycemia, with long-term current use of insulin (CMS/[facility])   •  Acute respiratory failure with hypoxia and hypercapnia (CMS/HCC)     Past Medical History:   Diagnosis Date   • Acute congestive heart failure (CMS/HCC) 12/24/2018   • Acute osteomyelitis (CMS/HCC)     Right shoulder due to IVDA   • Acute renal failure on dialysis (CMS/HCC)    • Anxiety    • Pugh esophagus    • CKD (chronic kidney disease)    • COPD (chronic obstructive pulmonary disease) (CMS/HCC)    • MRSA infection    • Nontraumatic subarachnoid hemorrhage (CMS/HCC)    • Seizures (CMS/HCC)    • Tracheostomy present (CMS/HCC)      Past Surgical History:   Procedure Laterality Date   • TRACHEOSTOMY     • URETEROSCOPY LASER LITHOTRIPSY WITH STENT INSERTION Left 10/11/2019    Procedure: CYSTOSCOPY,  URETEROSCOPY, LEFT RETROGRADE, LEFT PYELOGRAM, LASER LITHOTRIPSY, PLACEMENT OF STENT.;  Surgeon: Clyde Selby MD;  Location: [facility];  Service: Urology     Patient was not wearing a face mask during this therapy encounter. Therapist used appropriate personal protective equipment including mask, eye protection and gloves.  Mask used was standard procedure mask. Appropriate PPE was worn during the entire therapy session. Hand hygiene was completed before and after therapy session. Patient is not in enhanced droplet precautions.          SWALLOW EVALUATION (last 72 hours)      SLP Adult Swallow Evaluation     Row Name 12/05/20 1415          Document Type  evaluation  -ML    Subjective Information  no complaints  -ML    Patient Observations  alert;cooperative  -ML    Care Plan Review  evaluation/treatment results reviewed  -ML    Patient Effort  good  -ML    Symptoms Noted During/After Treatment  none  -ML          Patient Profile Reviewed  yes  -ML    Pertinent History Of Current Problem  Pt admitted with respiratory failure and was intubated from 12/1-12/3. Pt with many recent intubations and hx of trach and PEG. 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 Pt  "with hx of dysphagia and silent aspiration of thin and nectar thick liquids. Most recent VFSS was 11/17/20 recommending mechanical soft/no mixed and thin liquids via small sip by cup. Pt reports was eating/drinking \"anythiny/everything\" at home prior to admission.   -ML    Current Method of Nutrition  NPO;gastrostomy feedings  -ML    Precautions/Limitations, Vision  WFL;for purposes of eval  -ML    Precautions/Limitations, Hearing  WFL;for purposes of eval  -ML    Prior Level of Function-Communication  -- voice impairement  -ML    Plans/Goals Discussed with  patient;agreed upon  -ML    Barriers to Rehab  previous functional deficit  -ML    Patient's Goals for Discharge  return to PO diet  -ML          Additional Documentation  Pain Scale: Numbers Pre/Post-Treatment (Group)  -ML          Pretreatment Pain Rating  0/10 - no pain  -ML    Posttreatment Pain Rating  0/10 - no pain  -ML          Dentition Assessment  -- natural anterior lower, edentulous upper  -ML    Secretion Management  WNL/WFL  -ML    Mucosal Quality  dry  -ML    Volitional Cough  reduced respiratory support  -ML          Oral Motor General Assessment  WFL;vocal impairment  -ML    Vocal Impairment, Detail. Cranial Nerve X (Vagus)  vocal quality abnormality (see comments)  -ML    Oral Motor, Comment  Pt initially exhibited absent vocal quality with no presence of obvious airflow from mouth but instead escaping through fistula. With pt closing fistula, able to exhibit severely breathy vocal quality   -ML          Respiratory Support Currently in Use  nasal cannula  -ML    Eating/Swallowing Skills  fed by SLP  -ML    Positioning During Eating  upright 90 degree;upright in chair  -ML    Utensils Used  spoon  -ML    Consistencies Trialed  ice chips;thin liquids;pureed  -ML          Clinical Swallow Evaluation Summary  Clinical swallowing evaluation completed. Pt presents with suspected at least moderate oropharyngeal dysphagia s/p most recent respiratory " failure and extubation. Pt exhibited mild labial spillage of thin liquids, delayed swallow initiation, and suspected impaired airway closure indicated by overt cough with thin via spoon. Suspect pt with high risk of further silent aspiration as well due to hx. Pt exhibited no overt s/s of pen/asp with puree. Due to dysphagia hx (silent aspiration), absent vocal quality, and noted s/s of pen/asp during trials of thin by spoon this date, recommend NPO with non-oral meds. Pt can have ice chips after oral care. Recommend VFSS to further evaluate swallowing function and risk of aspiration.   -ML          SLP Swallowing Diagnosis  moderate;oral dysphagia;suspected pharyngeal dysphagia  -ML    Functional Impact  risk of aspiration/pneumonia  -ML    Rehab Potential/Prognosis, Swallowing  good, to achieve stated therapy goals  -ML    Swallow Criteria for Skilled Therapeutic Interventions Met  demonstrates skilled criteria  -ML          Therapy Frequency (Swallow)  PRN  -ML    Predicted Duration Therapy Intervention (Days)  until discharge  -ML    SLP Diet Recommendation  NPO;ice chips between meals after oral care, with supervision  -ML    Recommended Diagnostics  VFSS (MBS)  -ML    Oral Care Recommendations  Oral Care BID/PRN;Before ice/water  -ML    SLP Rec. for Method of Medication Administration  meds via alternate route  -ML    Monitor for Signs of Aspiration  notify SLP if any concerns  -ML          Oral Nutrition/Hydration Goal Selection (SLP)  -- goals to be established after VFSS  -ML      User Key  (r) = Recorded By, (t) = Taken By, (c) = Cosigned By    Initials Name Effective Dates    ML Carolina Salas, MS CCC-SLP 10/04/18 -           EDUCATION  The patient has been educated in the following areas:   Dysphagia (Swallowing Impairment).    SLP Recommendation and Plan  SLP Swallowing Diagnosis: moderate, oral dysphagia, suspected pharyngeal dysphagia  SLP Diet Recommendation: NPO, ice chips between meals after  oral care, with supervision     SLP Rec. for Method of Medication Administration: meds via alternate route     Monitor for Signs of Aspiration: notify SLP if any concerns  Recommended Diagnostics: VFSS ([facility])  Swallow Criteria for Skilled Therapeutic Interventions Met: demonstrates skilled criteria     Rehab Potential/Prognosis, Swallowing: good, to achieve stated therapy goals  Therapy Frequency (Swallow): PRN  Predicted Duration Therapy Intervention (Days): until discharge       Plan of Care Reviewed With: patient  Outcome Summary: Clinical swallowing evaluation completed. Pt presents with suspected at least moderate oropharyngeal dysphagia s/p most recent respiratory failure and extubation. Pt exhibited mild labial spillage of thin liquids, delayed swallow initiation, and suspected impaired airway closure indicated by overt cough with thin via spoon. Suspect pt with high risk of further silent aspiration as well due to hx. Pt exhibited no overt s/s of pen/asp with puree. Due to dysphagia hx (silent aspiration), absent vocal quality, and noted s/s of pen/asp during trials of thin by spoon this date, recommend NPO with non-oral meds. Pt can have ice chips after oral care. Recommend VFSS to further evaluate swallowing function and risk of aspiration.         SLP Outcome Measures (last 72 hours)      SLP Outcome Measures     Row Name 12/05/20 1500             SLP Outcome Measures    Outcome Measure Used?  Adult NOMS  -ML         Adult FCM Scores    FCM Chosen  Swallowing  -ML      Swallowing FCM Score  1  -ML        User Key  (r) = Recorded By, (t) = Taken By, (c) = Cosigned By    Initials Name Effective Dates    ML Carolina Salas, MS CCC-SLP 10/04/18 -            Time Calculation:   Time Calculation- SLP     Row Name 12/05/20 1505             Time Calculation- SLP    SLP Start Time  1415  -ML      SLP Received On  12/05/20  -ML        User Key  (r) = Recorded By, (t) = Taken By, (c) = Cosigned By    Initials  Name Provider Type     Carolina Salas MS CCC-SLP Speech and Language Pathologist          Therapy Charges for Today     Code Description Service Date Service Provider Modifiers Qty    [id number] HC ST EVAL ORAL PHARYNG SWALLOW 3 12/5/2020 Carolina Salas MS CCC-SLP GN 1               Carolina Salas MS CCC-SAJAN  12/5/2020

## 2020-12-06 ENCOUNTER — APPOINTMENT (OUTPATIENT)
Dept: GENERAL RADIOLOGY | Facility: HOSPITAL | Age: 60
End: 2020-12-06

## 2020-12-06 LAB
ANION GAP SERPL CALCULATED.3IONS-SCNC: 9.8 MMOL/L (ref 5–15)
BASOPHILS # BLD AUTO: 0.02 10*3/MM3 (ref 0–0.2)
BASOPHILS NFR BLD AUTO: 0.1 % (ref 0–1.5)
BUN SERPL-MCNC: 32 MG/DL (ref 8–23)
BUN/CREAT SERPL: 30.5 (ref 7–25)
CALCIUM SPEC-SCNC: 9.4 MG/DL (ref 8.6–10.5)
CHLORIDE SERPL-SCNC: 98 MMOL/L (ref 98–107)
CO2 SERPL-SCNC: 31.2 MMOL/L (ref 22–29)
CREAT SERPL-MCNC: 1.05 MG/DL (ref 0.57–1)
DEPRECATED RDW RBC AUTO: 44.2 FL (ref 37–54)
EOSINOPHIL # BLD AUTO: 0.05 10*3/MM3 (ref 0–0.4)
EOSINOPHIL NFR BLD AUTO: 0.4 % (ref 0.3–6.2)
ERYTHROCYTE [DISTWIDTH] IN BLOOD BY AUTOMATED COUNT: 16.2 % (ref 12.3–15.4)
GFR SERPL CREATININE-BSD FRML MDRD: 53 ML/MIN/1.73
GLUCOSE BLDC GLUCOMTR-MCNC: 174 MG/DL (ref 70–130)
GLUCOSE BLDC GLUCOMTR-MCNC: 187 MG/DL (ref 70–130)
GLUCOSE BLDC GLUCOMTR-MCNC: 221 MG/DL (ref 70–130)
GLUCOSE BLDC GLUCOMTR-MCNC: 248 MG/DL (ref 70–130)
GLUCOSE SERPL-MCNC: 244 MG/DL (ref 65–99)
HCT VFR BLD AUTO: 35.2 % (ref 34–46.6)
HGB BLD-MCNC: 10.6 G/DL (ref 12–15.9)
LYMPHOCYTES # BLD AUTO: 1.19 10*3/MM3 (ref 0.7–3.1)
LYMPHOCYTES NFR BLD AUTO: 8.6 % (ref 19.6–45.3)
MCH RBC QN AUTO: 22.9 PG (ref 26.6–33)
MCHC RBC AUTO-ENTMCNC: 30.1 G/DL (ref 31.5–35.7)
MCV RBC AUTO: 76 FL (ref 79–97)
MONOCYTES # BLD AUTO: 1.19 10*3/MM3 (ref 0.1–0.9)
MONOCYTES NFR BLD AUTO: 8.6 % (ref 5–12)
NEUTROPHILS NFR BLD AUTO: 11.25 10*3/MM3 (ref 1.7–7)
NEUTROPHILS NFR BLD AUTO: 81.7 % (ref 42.7–76)
PLATELET # BLD AUTO: 191 10*3/MM3 (ref 140–450)
PMV BLD AUTO: ABNORMAL FL
POTASSIUM SERPL-SCNC: 4.4 MMOL/L (ref 3.5–5.2)
RBC # BLD AUTO: 4.63 10*6/MM3 (ref 3.77–5.28)
SODIUM SERPL-SCNC: 139 MMOL/L (ref 136–145)
WBC # BLD AUTO: 13.78 10*3/MM3 (ref 3.4–10.8)

## 2020-12-06 PROCEDURE — 71045 X-RAY EXAM CHEST 1 VIEW: CPT

## 2020-12-06 PROCEDURE — 94799 UNLISTED PULMONARY SVC/PX: CPT

## 2020-12-06 PROCEDURE — 80048 BASIC METABOLIC PNL TOTAL CA: CPT | Performed by: INTERNAL MEDICINE

## 2020-12-06 PROCEDURE — 82962 GLUCOSE BLOOD TEST: CPT

## 2020-12-06 PROCEDURE — 94770: CPT

## 2020-12-06 PROCEDURE — 63710000001 INSULIN LISPRO (HUMAN) PER 5 UNITS: Performed by: INTERNAL MEDICINE

## 2020-12-06 PROCEDURE — 97116 GAIT TRAINING THERAPY: CPT | Performed by: PHYSICAL THERAPIST

## 2020-12-06 PROCEDURE — 85025 COMPLETE CBC W/AUTO DIFF WBC: CPT | Performed by: INTERNAL MEDICINE

## 2020-12-06 PROCEDURE — 63710000001 INSULIN GLARGINE PER 5 UNITS: Performed by: INTERNAL MEDICINE

## 2020-12-06 RX ORDER — BUDESONIDE 0.5 MG/2ML
0.5 INHALANT ORAL
Status: DISCONTINUED | OUTPATIENT
Start: 2020-12-06 | End: 2020-12-08 | Stop reason: HOSPADM

## 2020-12-06 RX ADMIN — BUSPIRONE HYDROCHLORIDE 5 MG: 5 TABLET ORAL at 15:05

## 2020-12-06 RX ADMIN — AMOXICILLIN AND CLAVULANATE POTASSIUM 1 TABLET: 875; 125 TABLET, FILM COATED ORAL at 08:48

## 2020-12-06 RX ADMIN — INSULIN LISPRO 5 UNITS: 100 INJECTION, SOLUTION INTRAVENOUS; SUBCUTANEOUS at 05:59

## 2020-12-06 RX ADMIN — NYSTATIN: 100000 POWDER TOPICAL at 08:50

## 2020-12-06 RX ADMIN — BUSPIRONE HYDROCHLORIDE 5 MG: 5 TABLET ORAL at 08:48

## 2020-12-06 RX ADMIN — BUSPIRONE HYDROCHLORIDE 5 MG: 5 TABLET ORAL at 21:52

## 2020-12-06 RX ADMIN — FAMOTIDINE 20 MG: 20 TABLET, FILM COATED ORAL at 08:52

## 2020-12-06 RX ADMIN — BUDESONIDE 0.5 MG: 0.5 INHALANT ORAL at 20:13

## 2020-12-06 RX ADMIN — AMLODIPINE BESYLATE 5 MG: 5 TABLET ORAL at 11:26

## 2020-12-06 RX ADMIN — OXYCODONE HYDROCHLORIDE AND ACETAMINOPHEN 1 TABLET: 5; 325 TABLET ORAL at 02:35

## 2020-12-06 RX ADMIN — IPRATROPIUM BROMIDE AND ALBUTEROL SULFATE 3 ML: 2.5; .5 SOLUTION RESPIRATORY (INHALATION) at 20:10

## 2020-12-06 RX ADMIN — ESCITALOPRAM 20 MG: 20 TABLET, FILM COATED ORAL at 11:26

## 2020-12-06 RX ADMIN — INSULIN LISPRO 3 UNITS: 100 INJECTION, SOLUTION INTRAVENOUS; SUBCUTANEOUS at 11:26

## 2020-12-06 RX ADMIN — OXYCODONE HYDROCHLORIDE AND ACETAMINOPHEN 1 TABLET: 5; 325 TABLET ORAL at 15:05

## 2020-12-06 RX ADMIN — CHLORHEXIDINE GLUCONATE 15 ML: 1.2 RINSE ORAL at 08:48

## 2020-12-06 RX ADMIN — INSULIN GLARGINE 10 UNITS: 100 INJECTION, SOLUTION SUBCUTANEOUS at 09:40

## 2020-12-06 RX ADMIN — INSULIN LISPRO 5 UNITS: 100 INJECTION, SOLUTION INTRAVENOUS; SUBCUTANEOUS at 16:54

## 2020-12-06 RX ADMIN — GABAPENTIN 300 MG: 300 CAPSULE ORAL at 08:48

## 2020-12-06 RX ADMIN — IPRATROPIUM BROMIDE AND ALBUTEROL SULFATE 3 ML: 2.5; .5 SOLUTION RESPIRATORY (INHALATION) at 13:30

## 2020-12-06 RX ADMIN — IPRATROPIUM BROMIDE AND ALBUTEROL SULFATE 3 ML: 2.5; .5 SOLUTION RESPIRATORY (INHALATION) at 07:21

## 2020-12-06 RX ADMIN — INSULIN GLARGINE 10 UNITS: 100 INJECTION, SOLUTION SUBCUTANEOUS at 20:50

## 2020-12-06 RX ADMIN — BUDESONIDE 0.5 MG: 0.5 INHALANT ORAL at 11:19

## 2020-12-06 RX ADMIN — GABAPENTIN 300 MG: 300 CAPSULE ORAL at 15:05

## 2020-12-06 RX ADMIN — OXYCODONE HYDROCHLORIDE AND ACETAMINOPHEN 1 TABLET: 5; 325 TABLET ORAL at 08:48

## 2020-12-06 RX ADMIN — OXYCODONE HYDROCHLORIDE AND ACETAMINOPHEN 1 TABLET: 5; 325 TABLET ORAL at 21:06

## 2020-12-06 RX ADMIN — NYSTATIN: 100000 POWDER TOPICAL at 20:50

## 2020-12-06 RX ADMIN — AMOXICILLIN AND CLAVULANATE POTASSIUM 1 TABLET: 875; 125 TABLET, FILM COATED ORAL at 21:52

## 2020-12-06 RX ADMIN — RIVAROXABAN 20 MG: 20 TABLET, FILM COATED ORAL at 16:54

## 2020-12-06 RX ADMIN — GABAPENTIN 300 MG: 300 CAPSULE ORAL at 20:50

## 2020-12-06 RX ADMIN — IPRATROPIUM BROMIDE AND ALBUTEROL SULFATE 3 ML: 2.5; .5 SOLUTION RESPIRATORY (INHALATION) at 11:19

## 2020-12-06 RX ADMIN — CHLORHEXIDINE GLUCONATE 15 ML: 1.2 RINSE ORAL at 20:50

## 2020-12-06 NOTE — PLAN OF CARE
Goal Outcome Evaluation:  Plan of Care Reviewed With: patient  Progress: improving  Outcome Summary: Pt AO x 3. Pt upin chair. Pt transferred sit to stand with supervision and RW. Pt ambulated 100 ft with RW and CGA x 1 with 2L O2/NC. Pt returned to sit edge of bed and transferred sit to supine with supervision using bed rails. Pt scooting with independence. Rolling side to side with independence.    PPE: Patient was placed in face mask in first look. Patient was wearing facemask when I entered the room and throughout our encounter. I wore full protective equipment throughout this patient encounter including a face mask, and gloves. Hand hygiene was performed before donning protective equipment and after removal when leaving the room.

## 2020-12-06 NOTE — PROGRESS NOTES
LOS: 4 days   Patient Care Team:  Provider, No Known as PCP - General    Subjective     Patient reports she is doing very well today she is not really having any pain she is not really short of breath she has been on her home 2 L O2 and doing very well    Review of Systems:          Objective     Vital Signs  Vital Sign Min/Max for last 24 hours  Temp  Min: 97.1 °F (36.2 °C)  Max: 98.3 °F (36.8 °C)   BP  Min: 138/94  Max: 180/106   Pulse  Min: 63  Max: 120   Resp  Min: 12  Max: 18   SpO2  Min: 89 %  Max: 98 %   Flow (L/min)  Min: 2  Max: 3   No data recorded        Ventilator/Non-Invasive Ventilation Settings (From admission, onward)     Start     Ordered    12/02/20 0025  Ventilator - AC/PC; (32); 60; 90%; 5; 24  Continuous,   Status:  Canceled     Question Answer Comment   Vent Mode AC/PC    Breath rate  32   FiO2 60    FiO2 titrate for Sp02% =/> 90%    PEEP 5    Inspiratory Pressure 24        12/02/20 0025                             Body mass index is 28.18 kg/m².  I/O last 3 completed shifts:  In: 2128 [I.V.:183; Other:260; NG/GT:1685]  Out: 1550 [Urine:1550]  No intake/output data recorded.        Physical Exam:  General Appearance: Well-developed obese white female she is resting in bed she is on 2 L nasal cannula O2 and does not appear in acute distress her oxygen saturations are  92%.  End-tidal CO2 is 30  Eyes: Conjunctiva are clear and anicteric  ENT: Mucous membranes are little dry no erythema no exudates, nasal septum midline  Neck: Trachea is midline she has a tracheal stoma with tracheocutaneous fistula fairly good size.  No palpable adenopathy  Lungs: Decreased but clear no wheezes no rales no rhonchi she is nonlabored she is  able to talk   Cardiac: Regular rate rhythm no murmur  Abdomen: Obese soft nontender no palpable hepatosplenomegaly she has a left upper quadrant PEG type tube the site looks okay  : Not examined  Musculoskeletal: The right first and second toe are surgically  absent  Skin: No jaundice no petechiae skin is warm and dry  Neuro: She is alert and oriented she is cooperative moving extremities following commands conversing  Extremities/P Vascular: No clubbing no cyanosis no edema palpable radial and dorsalis pedis pulses bilaterally  MSE: She is actually  relaxed       Labs:  Results from last 7 days   Lab Units 12/06/20 0335 12/05/20  0518 12/04/20  0357 12/03/20 0335 12/01/20 2232 11/29/20  2259   GLUCOSE mg/dL 244* 273* 178* 241* 229* 199*   SODIUM mmol/L 139 136 143 141 140 140   POTASSIUM mmol/L 4.4 4.4 4.1 4.0 4.6 4.2   CO2 mmol/L 31.2* 30.2* 30.8* 24.3 23.9 26.1   CHLORIDE mmol/L 98 98 104 108* 106 103   ANION GAP mmol/L 9.8 7.8 8.2 8.7 10.1 10.9   CREATININE mg/dL 1.05* 1.05* 1.03* 1.17* 1.65* 1.21*   BUN mg/dL 32* 31* 22 19 18 14   BUN / CREAT RATIO  30.5* 29.5* 21.4 16.2 10.9 11.6   CALCIUM mg/dL 9.4 9.2 9.2 8.8 8.5* 9.2   EGFR IF NONAFRICN AM mL/min/1.73 53* 53* 55* 47* 32* 45*   ALK PHOS U/L  --   --   --   --  204* 209*   TOTAL PROTEIN g/dL  --   --   --   --  6.6 7.0   ALT (SGPT) U/L  --   --   --   --  16 19   AST (SGOT) U/L  --   --   --   --  16 22   BILIRUBIN mg/dL  --   --   --   --  <0.2 <0.2   ALBUMIN g/dL  --   --   --   --  3.80 4.10   GLOBULIN gm/dL  --   --   --   --  2.8 2.9     Estimated Creatinine Clearance: 60.5 mL/min (A) (by C-G formula based on SCr of 1.05 mg/dL (H)).      Results from last 7 days   Lab Units 12/06/20 0335 12/05/20  0518 12/04/20  0357 12/03/20  0339 12/02/20  0711 12/01/20  2356 11/29/20  2215   WBC 10*3/mm3 13.78* 11.72* 11.48* 9.22 9.70 16.71* 8.72   RBC 10*6/mm3 4.63 4.45 4.22 3.87 4.14 4.01 4.53   HEMOGLOBIN g/dL 10.6* 10.2* 9.6* 8.7* 9.5* 9.2* 10.2*   HEMATOCRIT % 35.2 34.3 32.0* 28.9* 31.8* 31.7* 34.2   MCV fL 76.0* 77.1* 75.8* 74.7* 76.8* 79.1 75.5*   MCH pg 22.9* 22.9* 22.7* 22.5* 22.9* 22.9* 22.5*   MCHC g/dL 30.1* 29.7* 30.0* 30.1* 29.9* 29.0* 29.8*   RDW % 16.2* 16.1* 16.5* 16.2* 16.3* 16.1* 15.9*   RDW-SD fl  44.2 44.6 44.7 43.6 45.6 46.5 43.1   MPV fL  --  11.7 11.3 11.4 11.5 11.4 11.2   PLATELETS 10*3/mm3 191 210 238 215 232 265 226   NEUTROPHIL % % 81.7* 84.5* 87.4*  --  89.7* 84.1* 69.1   LYMPHOCYTE % % 8.6* 8.4* 6.4*  --  8.5* 7.7* 18.7*   MONOCYTES % % 8.6 6.3 4.9*  --  1.1* 5.9 7.1   EOSINOPHIL % % 0.4 0.3 0.0*  --  0.1* 1.3 4.1   BASOPHIL % % 0.1 0.0 0.1  --  0.1 0.5 0.7   IMM GRAN % %  --  0.5 1.2*  --  0.5 0.5 0.3   NEUTROS ABS 10*3/mm3 11.25* 9.90* 10.04* 8.57* 8.70* 14.06* 6.02   LYMPHS ABS 10*3/mm3 1.19 0.99 0.73  --  0.82 1.28 1.63   MONOS ABS 10*3/mm3 1.19* 0.74 0.56  --  0.11 0.99* 0.62   EOS ABS 10*3/mm3 0.05 0.03 0.00  --  0.01 0.21 0.36   BASOS ABS 10*3/mm3 0.02 0.00 0.01  --  0.01 0.08 0.06   IMMATURE GRANS (ABS) 10*3/mm3  --  0.06* 0.14*  --  0.05 0.09* 0.03   NRBC /100 WBC  --  0.1 0.2  --  0.0 0.0 0.0     Results from last 7 days   Lab Units 12/04/20  0710   PH, ARTERIAL pH units 7.411   PO2 ART mm Hg 79.3*   PCO2, ARTERIAL mm Hg 43.5   HCO3 ART mmol/L 27.6     Results from last 7 days   Lab Units 12/01/20 2232 11/29/20  2259   TROPONIN T ng/mL <0.010 <0.010     Results from last 7 days   Lab Units 12/02/20  0711 12/01/20 2232 11/29/20  2259   PROBNP pg/mL 746.2 570.4 397.0         Results from last 7 days   Lab Units 12/01/20 2232 11/29/20  2259   LACTATE mmol/L 1.1  --    PROCALCITONIN ng/mL 0.07 0.06     Results from last 7 days   Lab Units 12/01/20  2356 11/29/20  2215   INR  1.90* 2.02*     Microbiology Results (last 10 days)     Procedure Component Value - Date/Time    Respiratory Culture - Sputum, Bronchus [[id number]]  (Abnormal)  (Susceptibility) Collected: 12/02/20 0606    Lab Status: Final result Specimen: Sputum from Bronchus Updated: 12/05/20 0935     Respiratory Culture Heavy growth (4+) Streptococcus pneumoniae      Moderate growth (3+) Normal Respiratory Dalia: NO S.aureus/MRSA or Pseudomonas aeruginosa     Gram Stain Few (2+) WBCs seen      Few (2+) Epithelial cells per low power  field      Few (2+) Gram positive cocci in chains      Occasional Yeast    Susceptibility      Streptococcus pneumoniae     [place]     Ceftriaxone (Meningitis) Intermediate     Ceftriaxone (Non-meningitis) Susceptible     Levofloxacin Susceptible     Penicillin (Meningitis) Resistant     Trimethoprim + Sulfamethoxazole Susceptible                    Blood Culture - Blood, Arm, Left [[id number]] Collected: 12/01/20 2356    Lab Status: Preliminary result Specimen: Blood from Arm, Left Updated: 12/06/20 0645     Blood Culture No growth at 4 days    Blood Culture - Blood, Arm, Left [[id number]] Collected: 12/01/20 2355    Lab Status: Preliminary result Specimen: Blood from Arm, Left Updated: 12/06/20 0000     Blood Culture No growth at 4 days    Respiratory Panel PCR w/COVID-19(SARS-CoV-2) NATASHA/MICHELLE/ANNA/PAD/COR/MAD/KIARA In-House, NP Swab in UTM/VTM, 3-4 HR TAT - Swab, Nasopharynx [[id number]]  (Normal) Collected: 12/01/20 2235    Lab Status: Final result Specimen: Swab from Nasopharynx Updated: 12/02/20 0008     ADENOVIRUS, PCR Not Detected     Coronavirus 229E Not Detected     Coronavirus HKU1 Not Detected     Coronavirus NL63 Not Detected     Coronavirus OC43 Not Detected     COVID19 Not Detected     Human Metapneumovirus Not Detected     Human Rhinovirus/Enterovirus Not Detected     Influenza A PCR Not Detected     Influenza B PCR Not Detected     Parainfluenza Virus 1 Not Detected     Parainfluenza Virus 2 Not Detected     Parainfluenza Virus 3 Not Detected     Parainfluenza Virus 4 Not Detected     RSV, PCR Not Detected     Bordetella pertussis pcr Not Detected     Bordetella parapertussis PCR Not Detected     Chlamydophila pneumoniae PCR Not Detected     Mycoplasma pneumo by PCR Not Detected    Narrative:      Fact sheet for providers: https://docs.[employer]/wp-content/uploads/NMN4805-1481-ZH5.1-EUA-Provider-Fact-Sheet-3.pdf    Fact sheet for patients:  https://docs.[employer]/wp-content/uploads/NVQ3394-0660-LX8.1-EUA-Patient-Fact-Sheet-1.pdf    Respiratory Panel PCR w/COVID-19(SARS-CoV-2) NATASHA/MICHELLE/ANNA/PAD/COR/MAD/KIARA In-House, NP Swab in UTM/VTM, 3-4 HR TAT - Swab, Nasopharynx [[id number]]  (Normal) Collected: 11/29/20 2300    Lab Status: Final result Specimen: Swab from Nasopharynx Updated: 11/30/20 0002     ADENOVIRUS, PCR Not Detected     Coronavirus 229E Not Detected     Coronavirus HKU1 Not Detected     Coronavirus NL63 Not Detected     Coronavirus OC43 Not Detected     COVID19 Not Detected     Human Metapneumovirus Not Detected     Human Rhinovirus/Enterovirus Not Detected     Influenza A PCR Not Detected     Influenza B PCR Not Detected     Parainfluenza Virus 1 Not Detected     Parainfluenza Virus 2 Not Detected     Parainfluenza Virus 3 Not Detected     Parainfluenza Virus 4 Not Detected     RSV, PCR Not Detected     Bordetella pertussis pcr Not Detected     Bordetella parapertussis PCR Not Detected     Chlamydophila pneumoniae PCR Not Detected     Mycoplasma pneumo by PCR Not Detected    Narrative:      Fact sheet for providers: https://docs.[employer]/wp-content/uploads/VUO7644-7010-DM2.1-EUA-Provider-Fact-Sheet-3.pdf    Fact sheet for patients: https://docs.[employer]/wp-content/uploads/XAA2555-8423-QQ0.1-EUA-Patient-Fact-Sheet-1.pdf              amLODIPine, 5 mg, Per G Tube, Q24H  amoxicillin-clavulanate, 1 tablet, Oral, Q12H  busPIRone, 5 mg, Oral, TID  chlorhexidine, 15 mL, Mouth/Throat, Q12H  escitalopram, 20 mg, Oral, Daily  famotidine, 20 mg, Per G Tube, Daily  gabapentin, 300 mg, Oral, TID  insulin glargine, 10 Units, Subcutaneous, Q12H  insulin lispro, 0-14 Units, Subcutaneous, Q6H  ipratropium-albuterol, 3 mL, Nebulization, 4x Daily - RT  nystatin, , Topical, Q12H  predniSONE, 20 mg, Per G Tube, Q12H  rivaroxaban, 20 mg, Oral, Daily With Dinner           Diagnostics:  Fl Video Swallow With Speech Single Contrast    Result Date:  11/17/2020  VIDEO SWALLOWING EXAMINATION BY SPEECH PATHOLOGY  Clinical: Dysphasia  Video swallowing examination performed under the direction of speech pathology. Imaging reviewed by radiologist who concurs with the findings.  Speech pathology summary:Pt exhibited mild to moderate oropharyngeal dysphagia characterized by lingual and base of tongue weakness, mistiming/delayed swallow, decreased hyolaryngeal excursion, and decreased airway protection. Pt had premature spillage past the valleculae or to the pyriforms with all consistencies. Pt had inconsistent penetration with single cup sips of thin and nectar consistencies. Silent aspiration was noted with straw sips of thin (consecutive sips). Pt was unable to clear penetrated/aspirated material with cough. Pt took trials of honey thick and pureed with no additional penetration noted. Mastication and bolus formation and propulsion was slow for soft and regular consistencies. Penetration noted with juice of soft solids/mixed. Mild pharyngeal residuals were noted (tongue base, valleculae, pyriforms) and were assisted in clearing with a spontaneous swallow.   FLUOROSCOPY TIME: 5 minutes 6 seconds, 6102 images.  This report was finalized on 11/17/2020 5:42 PM by Dr. Jayson oGdwin M.D.      Xr Chest 1 View    Result Date: 12/2/2020  AP PORTABLE UPRIGHT CHEST  HISTORY: Intubated. Follow-up exam.  COMPARISON: Portable chest 12/01/2020, CT angiogram chest 11/30/2020.  FINDINGS: Endotracheal tube is at the aguila and slightly directed toward the right mainstem bronchus and recommend withdrawal 2-3 cm. There are chronic increased interstitial markings associated with emphysema. Mild patchy infiltrates present in the peripheral aspect of the right midlung and within the left base. There is no pneumothorax or evidence for perihilar edema. Right IJ central venous catheter tip extends into the right atrium. Cardiac monitoring leads are noted. Small pleural effusions are  suspected.      1. ET tube tip is at the aguila and directed slightly toward the right mainstem bronchus and recommend withdrawal 2-3 cm for better positioning. 2. Interval placement right IJ central venous catheter with tip in the SVC. 3. Mild peripheral right midlung and left basilar patchy infiltrates.  This report was finalized on 12/2/2020 9:06 AM by Dr. Chad Truong M.D.      Xr Chest 1 View    Result Date: 12/1/2020  SINGLE VIEW OF THE CHEST  HISTORY: Intubation  COMPARISON: 11/30/2020 and other prior imaging  FINDINGS: Endotracheal tube extends into the proximal right mainstem. Retraction by approximately 2 cm is suggested. Cardiac silhouette is stable. However, the patient has developed bilateral alveolar and interstitial infiltrates. There are also small bilateral pleural effusions, right greater than left. No pneumothorax is seen       1. Right mainstem intubation. Retraction by approximately 2 cm is suggested. 2. Interval development of bilateral alveolar and interstitial infiltrates. Appearance may reflect edema, although correlation with any evidence of infection is recommended. There are also small bilateral pleural effusions.  FINDINGS were relayed to Dr. Gee in the emergency department at 11:05 PM.  This report was finalized on 12/1/2020 11:07 PM by Dr. Emilia Olivo M.D.      Xr Chest 1 View    Result Date: 11/15/2020  SINGLE VIEW CHEST  HISTORY: Shortness of air  COMPARISON: 12/29/2019  FINDINGS: Cardiomegaly is present. Vascular congestion is suspected. There is tortuosity and ectasia of the thoracic aorta. No pneumothorax is seen. There is some blunting of the costophrenic angles bilaterally. This may represent atelectasis or scarring, although trace effusions are not excluded.      Cardiomegaly with vascular congestion.  This report was finalized on 11/15/2020 9:00 PM by Dr. Emilia Olivo M.D.      Ct Angiogram Chest    Result Date: 11/30/2020  CT ANGIOGRAM OF THE CHEST  HISTORY:  Sudden onset of shortness of air  COMPARISON: 11/15/2020  TECHNIQUE: Axial CT imaging was obtained through the thorax. IV contrast was administered. Three-D reformatted images were obtained. The  FINDINGS: No acute pulmonary thromboembolus is identified. Please note examination is degraded by motion artifact. Thoracic aorta is normal in caliber. There is no evidence of dissection. There are mild coronary artery calcifications. There is no pleural effusion. There is a trace pericardial effusion. The thyroid gland appears unremarkable. The patient's trachea at the level of thoracic inlet appears there is an area. Patient does have a history of a tracheostomy, but the narrowing is more apparent on today's exam. This may represent some tracheal stenosis and/or tracheomalacia. Esophagus appears within normal limits. No acute infiltrates are seen on today's exam. Advanced background emphysematous changes are present. 6 to 7 mm nodule within the left lower lobe is unchanged. No new nodules are seen. Images through the upper abdomen are degraded by motion artifact. Gastrostomy tube is again identified. Previously identified mild left-sided pelviectasis is noted. There is a probable left renal cyst. Areas of cortical thinning are seen within the right kidney. Previously identified dissection flap involving the distal celiac axis is not well-seen on this exam. There is enlargement of the main pulmonary artery, which can be seen in the setting of pulmonary arterial hypertension. Chronic compression deformity at T6-T7 and T3-T4 is unchanged. Mediastinal lymph nodes do not appear pathologically enlarged.       1. Exam is degraded by motion artifact. No obvious acute pulmonary thromboembolus seen. 2. The patient's trachea at the level of the thoracic inlet appears narrowed. This is more apparent than on the prior exam. Patient does have a history of tracheostomy, and this may represent tracheal stenosis/tracheomalacia. 3. 6 to 7  mm nodule within the left lower lobe. Short-term CT follow-up in 3-6 months suggested.  Radiation dose reduction techniques were utilized, including automated exposure control and exposure modulation based on body size.  This report was finalized on 11/30/2020 12:22 AM by Dr. Emilia Olivo M.D.      Ct Angiogram Chest    Result Date: 11/15/2020  CT ANGIOGRAM OF THE CHEST  HISTORY: Leg swelling and shortness of air. Elevated d-dimer.  COMPARISON: None available.  TECHNIQUE: Axial CT imaging was obtained through the thorax. IV contrast was administered. Three-D reformatted images were obtained.  FINDINGS: Examination is degraded by poor timing of the contrast bolus. No obvious pulmonary thromboembolus is seen. The thoracic aorta is normal in caliber. There is no evidence of dissection. There are really no significant coronary artery calcifications. The thyroid gland, trachea, and esophagus appear unremarkable. There is no pleural or pericardial effusion. The advanced background emphysematous changes are seen. Areas of chronic scarring are seen at the lung bases bilaterally. These have progressed when compared to prior study. There is an area of nodularity noted within the left lower lobe measuring up to 7 mm in size. This was not apparent on the prior exam. Short-term CT follow-up in 3-6 months is suggested. Consolidation within the right lower lobe has increased when compared to prior exam, as has bronchial wall thickening, and nonspecific bronchitis is not excluded. Mediastinal lymph nodes do not appear pathologically enlarged. Images through the upper abdomen demonstrate persistent mild left-sided hydronephrosis. This was also present on prior exam from 10/11/2019. Areas of cortical thinning are noted within the right kidney, suggesting sequela prior insults. Gastrostomy tube is present. There is a focal intimal flap involving the distal celiac axis however, branch vessels appear patent. There is dilatation of  the distal celiac artery measuring up to 1 cm. Chronic compression deformity with bony ankylosis is noted at T6-T7 and T3-T4.        1. No acute pulmonary thromboembolus seen. 2. Advanced emphysematous changes. Patient is noted to have bibasilar scarring. Consolidation within the right lower lobe has increased when compared to prior exam, and there is increasing bronchial wall thickening within this area. FINDINGS may reflect nonspecific bronchitis. 3. 7 mm noncalcified pulmonary nodule at the left lung base. CT follow-up in 3-6 months is suggested. Radiation dose reduction techniques were utilized, including automated exposure control and exposure modulation based on body size.  This report was finalized on 11/15/2020 11:12 PM by Dr. Emilia Olivo M.D.      Results for orders placed during the hospital encounter of 12/01/20   Adult Transthoracic Echo Complete W/ Cont if Necessary Per Protocol    Narrative · Left ventricular wall thickness is consistent with concentric   hypertrophy.  · Calculated left ventricular EF = 62.8% Estimated left ventricular EF was   in agreement with the calculated left ventricular EF. Left ventricular   systolic function is normal.  · Left ventricular diastolic function is consistent with (grade I)   impaired relaxation.  · Saline test results are negative.          X-ray looks pretty good minimal if any infiltrate    Active Hospital Problems    Diagnosis  POA   • Acute respiratory failure with hypoxia and hypercapnia (CMS/HCC) [J96.01, J96.02]  Yes      Resolved Hospital Problems   No resolved problems to display.         Assessment/Plan     1. Acute on chronic hypoxemic and hypercapnic respiratory failure patient  she is doing pretty well we will continue O2 with SPO2 and capnography monitoring  2. Possible sleep apnea will have to observe she has a pretty good tracheal cutaneous fistula that might help prevent some problems here.  3. 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 Pneumonia pneumococcal she has been receiving Augmentin and looks like she is doing pretty well will continue.  5. Acute exacerbation COPD continue bronchodilators she is not wheezing I am going to try and stop systemic steroids and just use inhaled steroids.  6. Hypotension resolved  7. Anemia hemoglobin stable  8. DVT on Xarelto chronically.  9. Chronic kidney disease stage III stable  10. Diabetes mellitus type 2 but sugars up probably secondary to steroids I am stopping systemic steroids today that should help  11. Gastroesophageal reflux disease  on H2 blocker here  12. Fluids/electrolytes/nutrition feeding tube tube feeds  13. Diastolic dysfunction by echocardiogram  14. Anemia hemoglobin is actually up from yesterday we will continue to monitor  15. Hypertension    Plan for disposition: Possibly home in the next day or 2    Dany Baez MD  12/06/20  07:57 EST    Time:

## 2020-12-06 NOTE — THERAPY TREATMENT NOTE
Patient Name: Swetha Luis  : 1960    MRN: [id number]                              Today's Date: 2020       Admit Date: 2020    Visit Dx:     ICD-10-CM ICD-9-CM   1. Acute respiratory failure with hypoxia and hypercapnia (CMS/[facility])  J96.01 518.81    J96.02      Patient Active Problem List   Diagnosis   • Tracheostomy complication (CMS/[facility])   • Gangrene of toe (CMS/[facility])   • Acute diastolic congestive heart failure (CMS/[facility])   • ARF (acute renal failure) (CMS/[facility])   • Stage 3 chronic kidney disease   • Elevated troponin   • Acute respiratory failure with hypoxemia (CMS/[facility])   • Acute osteomyelitis (CMS/[facility])   • UTI due to extended-spectrum beta lactamase (ESBL) producing Escherichia coli   • Thrush, oral   • Tracheostomy malfunction (CMS/[facility])   • COPD (chronic obstructive pulmonary disease) (CMS/[facility])   • Chronic respiratory failure with hypoxia (CMS/H[facility])   • PEG (percutaneous endoscopic gastrostomy) status (CMS/HCC)   • History of osteomyelitis   • Polysubstance abuse (CMS/[facility])   • History of tracheal stenosis   • Chronic diastolic CHF (congestive heart failure) (CMS/[facility])   • Peripheral artery disease (CMS/[facility])   • Medically noncompliant   • WENDI and COPD overlap syndrome (CMS/[facility])   • Dyspnea   • Elevated LFTs   • Elevated troponin   • Tracheostomy present (CMS/[facility])   • Essential hypertension   • Dysphagia   • Healthcare associated bacterial pneumonia   • Infection due to ESBL-producing Escherichia coli   • Sepsis due to Gram negative bacteria (CMS/[facility])   • Ureteral stone with hydronephrosis   • UTI (urinary tract infection)   • Acute tracheobronchitis   • Chronic diastolic CHF (congestive heart failure) (CMS/[facility])   • Bacteremia   • Acute deep vein thrombosis (DVT) of proximal vein of left lower extremity (CMS/[facility])   • Elevated d-dimer   • Type 2 diabetes mellitus with hyperglycemia, with long-term current use of insulin (CMS/[facility])   • Acute respiratory failure with hypoxia and hypercapnia (CMS/[facility])      Past Medical History:   Diagnosis Date   • Acute congestive heart failure (CMS/HCC) 12/24/2018   • Acute osteomyelitis (CMS/HCC)     Right shoulder due to IVDA   • Acute renal failure on dialysis (CMS/HCC)    • Anxiety    • Pugh esophagus    • CKD (chronic kidney disease)    • COPD (chronic obstructive pulmonary disease) (CMS/HCC)    • MRSA infection    • Nontraumatic subarachnoid hemorrhage (CMS/HCC)    • Seizures (CMS/HCC)    • Tracheostomy present (CMS/HCC)      Past Surgical History:   Procedure Laterality Date   • TRACHEOSTOMY     • URETEROSCOPY LASER LITHOTRIPSY WITH STENT INSERTION Left 10/11/2019    Procedure: CYSTOSCOPY,  URETEROSCOPY, LEFT RETROGRADE, LEFT PYELOGRAM, LASER LITHOTRIPSY, PLACEMENT OF STENT.;  Surgeon: Clyde Selby MD;  Location: [facility];  Service: Urology     General Information     Row Name 12/06/20 1024          Physical Therapy Time and Intention    Document Type  therapy note (daily note)  -     Mode of Treatment  individual therapy;physical therapy  -     Row Name 12/06/20 1024          General Information    Patient Profile Reviewed  yes  -     Existing Precautions/Restrictions  fall;oxygen therapy device and L/min  -     Row Name 12/06/20 1024          Cognition    Orientation Status (Cognition)  oriented x 4  -     Row Name 12/06/20 1024          Safety Issues, Functional Mobility    Impairments Affecting Function (Mobility)  endurance/activity tolerance;strength;shortness of breath  -       User Key  (r) = Recorded By, (t) = Taken By, (c) = Cosigned By    Initials Name Provider Type    LC Matthew Guevara, PT DPT Physical Therapist        Mobility     Row Name 12/06/20 1024          Bed Mobility    Bed Mobility  sit-supine  -     Sit-Supine [place] (Bed Mobility)  supervision  -     Assistive Device (Bed Mobility)  bed rails  -     Row Name 12/06/20 1024          Sit-Stand Transfer    Sit-Stand [place] (Transfers)  standby assist  -      Assistive Device (Sit-Stand Transfers)  walker, front-wheeled  -LC     Row Name 12/06/20 1024          Gait/Stairs (Locomotion)    [place] Level (Gait)  contact guard  -     Assistive Device (Gait)  walker, front-wheeled  -     Distance in Feet (Gait)  100  -LC     Deviations/Abnormal Patterns (Gait)  balaji decreased;gait speed decreased;base of support, wide;stride length decreased;weight shifting decreased  -       User Key  (r) = Recorded By, (t) = Taken By, (c) = Cosigned By    Initials Name Provider Type    Matthew Joyce, PT DPT Physical Therapist        Obj/Interventions    No documentation.       Goals/Plan    No documentation.       Clinical Impression     Row Name 12/06/20 1025          Pain Scale: Numbers Pre/Post-Treatment    Pretreatment Pain Rating  0/10 - no pain  -     Posttreatment Pain Rating  0/10 - no pain  -     Row Name 12/06/20 1025          Plan of Care Review    Plan of Care Reviewed With  patient  -     Progress  improving  -     Outcome Summary  Pt AO x 3. Pt upin chair. Pt transferred sit to stand with supervision and RW. Pt ambulated 100 ft with RW and CGA x 1 with 2L O2/NC. Pt returned to sit edge of bed and transferred sit to supine with supervision using bed rails. Pt scooting with independence. Rolling side to side with independence.  -     Row Name 12/06/20 1025          Vital Signs    O2 Delivery Pre Treatment  supplemental O2  -     O2 Delivery Intra Treatment  supplemental O2  -LC     O2 Delivery Post Treatment  supplemental O2  -     Row Name 12/06/20 1025          Positioning and Restraints    Pre-Treatment Position  sitting in chair/recliner  -     Post Treatment Position  bed  -LC     In Bed  notified nsg;call light within reach;encouraged to call for assist;exit alarm on;side rails up x2  -       User Key  (r) = Recorded By, (t) = Taken By, (c) = Cosigned By    Initials Name Provider Type    Matthew Joyce, PT DPT Physical Therapist         Outcome Measures     Row Name 12/06/20 1027          How much help from another person do you currently need...    Turning from your back to your side while in flat bed without using bedrails?  4  -LC     Moving from lying on back to sitting on the side of a flat bed without bedrails?  4  -LC     Moving to and from a bed to a chair (including a wheelchair)?  3  -LC     Standing up from a chair using your arms (e.g., wheelchair, bedside chair)?  3  -LC     Climbing 3-5 steps with a railing?  3  -LC     To walk in hospital room?  3  -LC     AM-PAC 6 Clicks Score (PT)  20  -     Row Name 12/06/20 1027          Functional Assessment    Outcome Measure Options  AM-PAC 6 Clicks Basic Mobility (PT)  -       User Key  (r) = Recorded By, (t) = Taken By, (c) = Cosigned By    Initials Name Provider Type    Matthew Joyce, PT DPT Physical Therapist        Physical Therapy Education                 Title: PT OT SLP Therapies (Done)     Topic: Physical Therapy (Done)     Point: Mobility training (Done)     Learning Progress Summary           Patient Acceptance, E,D, VU,NR by  at 12/6/2020 1027    Comment: safety during transfers    Acceptance, E,D, VU,DU by  at 12/5/2020 1410    Comment: safety during transfers and ambulation                   Point: Home exercise program (Done)     Learning Progress Summary           Patient Acceptance, E,D, VU,NR by  at 12/6/2020 1027    Comment: safety during transfers    Acceptance, E,D, VU,DU by  at 12/5/2020 1410    Comment: safety during transfers and ambulation                   Point: Body mechanics (Done)     Learning Progress Summary           Patient Acceptance, E,D, VU,NR by  at 12/6/2020 1027    Comment: safety during transfers    Acceptance, E,D, VU,DU by  at 12/5/2020 1410    Comment: safety during transfers and ambulation                   Point: Precautions (Done)     Learning Progress Summary           Patient Acceptance, E,D, VU,NR by  at 12/6/2020  1027    Comment: safety during transfers    Acceptance, E,D, VU,DU by  at 12/5/2020 1410    Comment: safety during transfers and ambulation                               User Key     Initials Effective Dates Name Provider Type Discipline     07/02/20 -  Matthew Guevara, PT DPT Physical Therapist PT              PT Recommendation and Plan     Plan of Care Reviewed With: patient  Progress: improving  Outcome Summary: Pt AO x 3. Pt upin chair. Pt transferred sit to stand with supervision and RW. Pt ambulated 100 ft with RW and CGA x 1 with 2L O2/NC. Pt returned to sit edge of bed and transferred sit to supine with supervision using bed rails. Pt scooting with independence. Rolling side to side with independence.     Time Calculation:   PT Charges     Row Name 12/06/20 1028             Time Calculation    Start Time  1005  -      Stop Time  1020  -      Time Calculation (min)  15 min  -      PT Received On  12/06/20  -      PT - Next Appointment  12/07/20  -         Time Calculation- PT    Total Timed Code Minutes- PT  15 minute(s)  -         Timed Charges    81712 - Gait Training Minutes   15  -        User Key  (r) = Recorded By, (t) = Taken By, (c) = Cosigned By    Initials Name Provider Type     Matthew Guevara, PT DPT Physical Therapist        Therapy Charges for Today     Code Description Service Date Service Provider Modifiers Qty    35869108393 HC GAIT TRAINING EA 15 MIN 12/5/2020 Matthew Guevara, PT DPT GP 1    40038018039 HC PT EVAL LOW COMPLEXITY 1 12/5/2020 Matthew Guevara, PT DPT GP 1    92683408675 HC GAIT TRAINING EA 15 MIN 12/6/2020 Matthew Guevara, PT DPT GP 1          PT G-Codes  Outcome Measure Options: AM-PAC 6 Clicks Basic Mobility (PT)  AM-PAC 6 Clicks Score (PT): 20    Matthew M MELISSA Guevara DPT  12/6/2020

## 2020-12-06 NOTE — PLAN OF CARE
Goal Outcome Evaluation:  Plan of Care Reviewed With: patient  Progress: improving  Outcome Summary: SR/ST 90-100s. 2L NC. Pain treated with Percocet. tube feedings in place. WCM.

## 2020-12-07 ENCOUNTER — APPOINTMENT (OUTPATIENT)
Dept: GENERAL RADIOLOGY | Facility: HOSPITAL | Age: 60
End: 2020-12-07

## 2020-12-07 LAB
ANION GAP SERPL CALCULATED.3IONS-SCNC: 8.6 MMOL/L (ref 5–15)
BACTERIA SPEC AEROBE CULT: NORMAL
BACTERIA SPEC AEROBE CULT: NORMAL
BUN SERPL-MCNC: 34 MG/DL (ref 8–23)
BUN/CREAT SERPL: 31.8 (ref 7–25)
CALCIUM SPEC-SCNC: 8.9 MG/DL (ref 8.6–10.5)
CHLORIDE SERPL-SCNC: 98 MMOL/L (ref 98–107)
CO2 SERPL-SCNC: 32.4 MMOL/L (ref 22–29)
CREAT SERPL-MCNC: 1.07 MG/DL (ref 0.57–1)
DEPRECATED RDW RBC AUTO: 44.2 FL (ref 37–54)
EOSINOPHIL # BLD MANUAL: 0.66 10*3/MM3 (ref 0–0.4)
EOSINOPHIL NFR BLD MANUAL: 5.3 % (ref 0.3–6.2)
ERYTHROCYTE [DISTWIDTH] IN BLOOD BY AUTOMATED COUNT: 16.2 % (ref 12.3–15.4)
GFR SERPL CREATININE-BSD FRML MDRD: 52 ML/MIN/1.73
GLUCOSE BLDC GLUCOMTR-MCNC: 195 MG/DL (ref 70–130)
GLUCOSE BLDC GLUCOMTR-MCNC: 201 MG/DL (ref 70–130)
GLUCOSE BLDC GLUCOMTR-MCNC: 211 MG/DL (ref 70–130)
GLUCOSE BLDC GLUCOMTR-MCNC: 239 MG/DL (ref 70–130)
GLUCOSE BLDC GLUCOMTR-MCNC: 325 MG/DL (ref 70–130)
GLUCOSE SERPL-MCNC: 193 MG/DL (ref 65–99)
HCT VFR BLD AUTO: 36.4 % (ref 34–46.6)
HGB BLD-MCNC: 10.8 G/DL (ref 12–15.9)
HYPOCHROMIA BLD QL: ABNORMAL
LYMPHOCYTES # BLD MANUAL: 2.24 10*3/MM3 (ref 0.7–3.1)
LYMPHOCYTES NFR BLD MANUAL: 18.1 % (ref 19.6–45.3)
LYMPHOCYTES NFR BLD MANUAL: 6.4 % (ref 5–12)
MCH RBC QN AUTO: 22.5 PG (ref 26.6–33)
MCHC RBC AUTO-ENTMCNC: 29.7 G/DL (ref 31.5–35.7)
MCV RBC AUTO: 76 FL (ref 79–97)
MICROCYTES BLD QL: ABNORMAL
MONOCYTES # BLD AUTO: 0.79 10*3/MM3 (ref 0.1–0.9)
NEUTROPHILS # BLD AUTO: 8.68 10*3/MM3 (ref 1.7–7)
NEUTROPHILS NFR BLD MANUAL: 70.2 % (ref 42.7–76)
PLAT MORPH BLD: NORMAL
PLATELET # BLD AUTO: 169 10*3/MM3 (ref 140–450)
PMV BLD AUTO: ABNORMAL FL
POIKILOCYTOSIS BLD QL SMEAR: ABNORMAL
POTASSIUM SERPL-SCNC: 4 MMOL/L (ref 3.5–5.2)
RBC # BLD AUTO: 4.79 10*6/MM3 (ref 3.77–5.28)
SODIUM SERPL-SCNC: 139 MMOL/L (ref 136–145)
WBC # BLD AUTO: 12.36 10*3/MM3 (ref 3.4–10.8)
WBC MORPH BLD: NORMAL

## 2020-12-07 PROCEDURE — 82962 GLUCOSE BLOOD TEST: CPT

## 2020-12-07 PROCEDURE — 74230 X-RAY XM SWLNG FUNCJ C+: CPT

## 2020-12-07 PROCEDURE — 85025 COMPLETE CBC W/AUTO DIFF WBC: CPT | Performed by: INTERNAL MEDICINE

## 2020-12-07 PROCEDURE — 97116 GAIT TRAINING THERAPY: CPT

## 2020-12-07 PROCEDURE — 85007 BL SMEAR W/DIFF WBC COUNT: CPT | Performed by: INTERNAL MEDICINE

## 2020-12-07 PROCEDURE — 80048 BASIC METABOLIC PNL TOTAL CA: CPT | Performed by: INTERNAL MEDICINE

## 2020-12-07 PROCEDURE — 94799 UNLISTED PULMONARY SVC/PX: CPT

## 2020-12-07 PROCEDURE — 63710000001 INSULIN LISPRO (HUMAN) PER 5 UNITS: Performed by: INTERNAL MEDICINE

## 2020-12-07 PROCEDURE — 92611 MOTION FLUOROSCOPY/SWALLOW: CPT

## 2020-12-07 PROCEDURE — 63710000001 INSULIN GLARGINE PER 5 UNITS: Performed by: INTERNAL MEDICINE

## 2020-12-07 RX ORDER — ALPRAZOLAM 0.5 MG/1
0.5 TABLET ORAL ONCE
Status: COMPLETED | OUTPATIENT
Start: 2020-12-07 | End: 2020-12-07

## 2020-12-07 RX ORDER — PRAMIPEXOLE DIHYDROCHLORIDE 0.25 MG/1
0.25 TABLET ORAL NIGHTLY
Status: DISCONTINUED | OUTPATIENT
Start: 2020-12-07 | End: 2020-12-08 | Stop reason: HOSPADM

## 2020-12-07 RX ADMIN — FAMOTIDINE 20 MG: 20 TABLET, FILM COATED ORAL at 09:50

## 2020-12-07 RX ADMIN — OXYCODONE HYDROCHLORIDE AND ACETAMINOPHEN 1 TABLET: 5; 325 TABLET ORAL at 16:07

## 2020-12-07 RX ADMIN — BUDESONIDE 0.5 MG: 0.5 INHALANT ORAL at 23:46

## 2020-12-07 RX ADMIN — AMLODIPINE BESYLATE 5 MG: 5 TABLET ORAL at 09:50

## 2020-12-07 RX ADMIN — NYSTATIN: 100000 POWDER TOPICAL at 16:07

## 2020-12-07 RX ADMIN — IPRATROPIUM BROMIDE AND ALBUTEROL SULFATE 3 ML: 2.5; .5 SOLUTION RESPIRATORY (INHALATION) at 23:43

## 2020-12-07 RX ADMIN — BUSPIRONE HYDROCHLORIDE 5 MG: 5 TABLET ORAL at 18:07

## 2020-12-07 RX ADMIN — GABAPENTIN 300 MG: 300 CAPSULE ORAL at 20:57

## 2020-12-07 RX ADMIN — INSULIN LISPRO 5 UNITS: 100 INJECTION, SOLUTION INTRAVENOUS; SUBCUTANEOUS at 11:32

## 2020-12-07 RX ADMIN — IPRATROPIUM BROMIDE AND ALBUTEROL SULFATE 3 ML: 2.5; .5 SOLUTION RESPIRATORY (INHALATION) at 16:51

## 2020-12-07 RX ADMIN — BUSPIRONE HYDROCHLORIDE 5 MG: 5 TABLET ORAL at 20:57

## 2020-12-07 RX ADMIN — BARIUM SULFATE 4 ML: 980 POWDER, FOR SUSPENSION ORAL at 09:25

## 2020-12-07 RX ADMIN — BARIUM SULFATE 50 ML: 400 SUSPENSION ORAL at 09:26

## 2020-12-07 RX ADMIN — GABAPENTIN 300 MG: 300 CAPSULE ORAL at 09:51

## 2020-12-07 RX ADMIN — PRAMIPEXOLE DIHYDROCHLORIDE 0.25 MG: 0.25 TABLET ORAL at 21:34

## 2020-12-07 RX ADMIN — INSULIN GLARGINE 10 UNITS: 100 INJECTION, SOLUTION SUBCUTANEOUS at 09:51

## 2020-12-07 RX ADMIN — BUSPIRONE HYDROCHLORIDE 5 MG: 5 TABLET ORAL at 09:50

## 2020-12-07 RX ADMIN — INSULIN GLARGINE 10 UNITS: 100 INJECTION, SOLUTION SUBCUTANEOUS at 21:35

## 2020-12-07 RX ADMIN — AMOXICILLIN AND CLAVULANATE POTASSIUM 1 TABLET: 875; 125 TABLET, FILM COATED ORAL at 09:50

## 2020-12-07 RX ADMIN — IPRATROPIUM BROMIDE AND ALBUTEROL SULFATE 3 ML: 2.5; .5 SOLUTION RESPIRATORY (INHALATION) at 12:35

## 2020-12-07 RX ADMIN — INSULIN LISPRO 5 UNITS: 100 INJECTION, SOLUTION INTRAVENOUS; SUBCUTANEOUS at 06:55

## 2020-12-07 RX ADMIN — ESCITALOPRAM 20 MG: 20 TABLET, FILM COATED ORAL at 09:50

## 2020-12-07 RX ADMIN — AMOXICILLIN AND CLAVULANATE POTASSIUM 1 TABLET: 875; 125 TABLET, FILM COATED ORAL at 20:57

## 2020-12-07 RX ADMIN — BUDESONIDE 0.5 MG: 0.5 INHALANT ORAL at 08:16

## 2020-12-07 RX ADMIN — IPRATROPIUM BROMIDE AND ALBUTEROL SULFATE 3 ML: 2.5; .5 SOLUTION RESPIRATORY (INHALATION) at 08:15

## 2020-12-07 RX ADMIN — ALPRAZOLAM 0.5 MG: 0.5 TABLET ORAL at 20:57

## 2020-12-07 RX ADMIN — BARIUM SULFATE 50 ML: 400 SUSPENSION ORAL at 09:25

## 2020-12-07 RX ADMIN — INSULIN LISPRO 3 UNITS: 100 INJECTION, SOLUTION INTRAVENOUS; SUBCUTANEOUS at 00:32

## 2020-12-07 RX ADMIN — GABAPENTIN 300 MG: 300 CAPSULE ORAL at 16:07

## 2020-12-07 RX ADMIN — RIVAROXABAN 20 MG: 20 TABLET, FILM COATED ORAL at 18:07

## 2020-12-07 RX ADMIN — INSULIN LISPRO 10 UNITS: 100 INJECTION, SOLUTION INTRAVENOUS; SUBCUTANEOUS at 18:07

## 2020-12-07 RX ADMIN — NYSTATIN: 100000 POWDER TOPICAL at 20:57

## 2020-12-07 RX ADMIN — OXYCODONE HYDROCHLORIDE AND ACETAMINOPHEN 1 TABLET: 5; 325 TABLET ORAL at 09:50

## 2020-12-07 NOTE — PLAN OF CARE
Goal Outcome Evaluation:  Plan of Care Reviewed With: patient  Progress: improving  Outcome Summary: Pt with decreased gait distance and and required 2 brief standing rest breaks secondary to increased SOA with activity. Pt ambulated on 4L O2. PT will continue to follow to address strength, mobility, and gait.Patient was intermittently wearing a face mask during this therapy encounter. Therapist used appropriate personal protective equipment including eye protection, mask, and gloves.  Mask used was standard procedure mask. Appropriate PPE was worn during the entire therapy session. Hand hygiene was completed before and after therapy session. Patient is not in enhanced droplet precautions.

## 2020-12-07 NOTE — THERAPY TREATMENT NOTE
Patient Name: Swetha Luis  : 1960    MRN: [id number]                              Today's Date: 2020       Admit Date: 2020    Visit Dx:     ICD-10-CM ICD-9-CM   1. Acute respiratory failure with hypoxia and hypercapnia (CMS/[facility])  J96.01 518.81    J96.02      Patient Active Problem List   Diagnosis   • Tracheostomy complication (CMS/[facility])   • Gangrene of toe (CMS/[facility])   • Acute diastolic congestive heart failure (CMS/[facility])   • ARF (acute renal failure) (CMS/[facility])   • Stage 3 chronic kidney disease   • Elevated troponin   • Acute respiratory failure with hypoxemia (CMS/[facility])   • Acute osteomyelitis (CMS/[facility])   • UTI due to extended-spectrum beta lactamase (ESBL) producing Escherichia coli   • Thrush, oral   • Tracheostomy malfunction (CMS/[facility])   • COPD (chronic obstructive pulmonary disease) (CMS/[facility])   • Chronic respiratory failure with hypoxia (CMS/HCC)   • PEG (percutaneous endoscopic gastrostomy) status (CMS/HCC)   • History of osteomyelitis   • Polysubstance abuse (CMS/[facility])   • History of tracheal stenosis   • Chronic diastolic CHF (congestive heart failure) (CMS/[facility])   • Peripheral artery disease (CMS/[facility])   • Medically noncompliant   • WENDI and COPD overlap syndrome (CMS/[facility])   • Dyspnea   • Elevated LFTs   • Elevated troponin   • Tracheostomy present (CMS/[facility])   • Essential hypertension   • Dysphagia   • Healthcare associated bacterial pneumonia   • Infection due to ESBL-producing Escherichia coli   • Sepsis due to Gram negative bacteria (CMS/[facility])   • Ureteral stone with hydronephrosis   • UTI (urinary tract infection)   • Acute tracheobronchitis   • Chronic diastolic CHF (congestive heart failure) (CMS/[facility])   • Bacteremia   • Acute deep vein thrombosis (DVT) of proximal vein of left lower extremity (CMS/[facility])   • Elevated d-dimer   • Type 2 diabetes mellitus with hyperglycemia, with long-term current use of insulin (CMS/[facility])   • Acute respiratory failure with hypoxia and hypercapnia (CMS/[facility])      Past Medical History:   Diagnosis Date   • Acute congestive heart failure (CMS/HCC) 12/24/2018   • Acute osteomyelitis (CMS/HCC)     Right shoulder due to IVDA   • Acute renal failure on dialysis (CMS/HCC)    • Anxiety    • Pugh esophagus    • CKD (chronic kidney disease)    • COPD (chronic obstructive pulmonary disease) (CMS/HCC)    • MRSA infection    • Nontraumatic subarachnoid hemorrhage (CMS/HCC)    • Seizures (CMS/HCC)    • Tracheostomy present (CMS/[facility])      Past Surgical History:   Procedure Laterality Date   • TRACHEOSTOMY     • URETEROSCOPY LASER LITHOTRIPSY WITH STENT INSERTION Left 10/11/2019    Procedure: CYSTOSCOPY,  URETEROSCOPY, LEFT RETROGRADE, LEFT PYELOGRAM, LASER LITHOTRIPSY, PLACEMENT OF STENT.;  Surgeon: Clyde Selby MD;  Location: [facility];  Service: Urology     General Information     Row Name 12/07/20 1155          Physical Therapy Time and Intention    Document Type  therapy note (daily note)  -     Mode of Treatment  individual therapy;physical therapy  -     Row Name 12/07/20 1155          General Information    Existing Precautions/Restrictions  fall;oxygen therapy device and L/min  -     Row Name 12/07/20 1155          Cognition    Orientation Status (Cognition)  oriented x 4  -     Row Name 12/07/20 1155          Safety Issues, Functional Mobility    Impairments Affecting Function (Mobility)  endurance/activity tolerance;strength;shortness of breath  -       User Key  (r) = Recorded By, (t) = Taken By, (c) = Cosigned By    Initials Name Provider Type     Franci Gupta PT Physical Therapist        Mobility     Row Name 12/07/20 1156          Bed Mobility    Bed Mobility  sit-supine;supine-sit  -     Supine-Sit Ideal (Bed Mobility)  not tested sitting EOB  -     Sit-Supine [place] (Bed Mobility)  supervision  -     Assistive Device (Bed Mobility)  bed rails  -     Row Name 12/07/20 1156          Sit-Stand Transfer    Sit-Stand  [place] (Transfers)  standby assist  -     Assistive Device (Sit-Stand Transfers)  walker, front-wheeled  -CH     Row Name 12/07/20 1156          Gait/Stairs (Locomotion)    [place] Level (Gait)  contact guard  -     Assistive Device (Gait)  walker, front-wheeled  -CH     Distance in Feet (Gait)  80  -CH     Deviations/Abnormal Patterns (Gait)  balaji decreased;gait speed decreased;base of support, wide;stride length decreased;weight shifting decreased  -     Comment (Gait/Stairs)  pt required 2 standing rest breaks secondary to SOA  -       User Key  (r) = Recorded By, (t) = Taken By, (c) = Cosigned By    Initials Name Provider Type    Franci Ness, PT Physical Therapist        Obj/Interventions    No documentation.       Goals/Plan    No documentation.       Clinical Impression     Row Name 12/07/20 1158          Pain Scale: Numbers Pre/Post-Treatment    Pretreatment Pain Rating  0/10 - no pain  -     Posttreatment Pain Rating  0/10 - no pain  -     Row Name 12/07/20 1158          Plan of Care Review    Plan of Care Reviewed With  patient  -CH     Outcome Summary  Pt with decreased gait distance and and required 2 brief standing rest breaks secondary to increased SOA with activity. Pt ambulated on 4L O2. PT will continue to follow to address strength, mobility, and gait.  -     Row Name 12/07/20 1158          Positioning and Restraints    Pre-Treatment Position  in bed  -CH     Post Treatment Position  bed  -CH     In Bed  supine;call light within reach;encouraged to call for assist  -       User Key  (r) = Recorded By, (t) = Taken By, (c) = Cosigned By    Initials Name Provider Type    Franci Ness, PT Physical Therapist        Outcome Measures     Row Name 12/07/20 1200          How much help from another person do you currently need...    Turning from your back to your side while in flat bed without using bedrails?  4  -CH     Moving from lying on back to sitting on  the side of a flat bed without bedrails?  4  -CH     Moving to and from a bed to a chair (including a wheelchair)?  3  -CH     Standing up from a chair using your arms (e.g., wheelchair, bedside chair)?  3  -CH     Climbing 3-5 steps with a railing?  3  -CH     To walk in hospital room?  3  -CH     AM-PAC 6 Clicks Score (PT)  20  -CH     Row Name 12/07/20 1200          Functional Assessment    Outcome Measure Options  Modified [place]  -       User Key  (r) = Recorded By, (t) = Taken By, (c) = Cosigned By    Initials Name Provider Type     Franci Gupta, PT Physical Therapist        Physical Therapy Education                 Title: PT OT SLP Therapies (Done)     Topic: Physical Therapy (Done)     Point: Mobility training (Done)     Learning Progress Summary           Patient Acceptance, E,TB,D, VU,NR by  at 12/7/2020 1200    Acceptance, E,D, VU,NR by  at 12/6/2020 1027    Comment: safety during transfers    Acceptance, E,D, VU,DU by  at 12/5/2020 1410    Comment: safety during transfers and ambulation                   Point: Home exercise program (Done)     Learning Progress Summary           Patient Acceptance, E,TB,D, VU,NR by  at 12/7/2020 1200    Acceptance, E,D, VU,NR by  at 12/6/2020 1027    Comment: safety during transfers    Acceptance, E,D, VU,DU by  at 12/5/2020 1410    Comment: safety during transfers and ambulation                   Point: Body mechanics (Done)     Learning Progress Summary           Patient Acceptance, E,TB,D, VU,NR by  at 12/7/2020 1200    Acceptance, E,D, VU,NR by  at 12/6/2020 1027    Comment: safety during transfers    Acceptance, E,D, VU,DU by  at 12/5/2020 1410    Comment: safety during transfers and ambulation                   Point: Precautions (Done)     Learning Progress Summary           Patient Acceptance, E,TB,D, VU,NR by  at 12/7/2020 1200    Acceptance, E,D, VU,NR by  at 12/6/2020 1027    Comment: safety during transfers    Acceptance, E,D,  MOSHEDU by  at 12/5/2020 1410    Comment: safety during transfers and ambulation                               User Key     Initials Effective Dates Name Provider Type Discipline     04/03/18 -  Franci Gupta, PT Physical Therapist PT    CARMELA 07/02/20 -  Matthew Guevara, PT DPT Physical Therapist PT              PT Recommendation and Plan     Plan of Care Reviewed With: patient  Outcome Summary: Pt with decreased gait distance and and required 2 brief standing rest breaks secondary to increased SOA with activity. Pt ambulated on 4L O2. PT will continue to follow to address strength, mobility, and gait.     Time Calculation:   PT Charges     Row Name 12/07/20 1119             Time Calculation    Start Time  1015  -      Stop Time  1026  -      Time Calculation (min)  11 min  -      PT Received On  12/07/20  -      PT - Next Appointment  12/08/20  -         Time Calculation- PT    Total Timed Code Minutes- PT  10 minute(s)  -        User Key  (r) = Recorded By, (t) = Taken By, (c) = Cosigned By    Initials Name Provider Type     Franci Gupta, PT Physical Therapist        Therapy Charges for Today     Code Description Service Date Service Provider Modifiers Qty    65371654695 HC GAIT TRAINING EA 15 MIN 12/7/2020 Franci Gupta, PT GP 1          PT G-Codes  Outcome Measure Options: Modified [place]  AM-PAC 6 Clicks Score (PT): 20    Franci Gupta PT  12/7/2020

## 2020-12-07 NOTE — PROGRESS NOTES
LOS: 5 days   Patient Care Team:  Provider, No Known as PCP - General    Subjective     Denies chest pain or worsening cough.  She is hoping to go home.  Possibly ready by tomorrow.    I am seeing the patient for the first time today.  All patient problems are new to me.      Review of Systems:          Objective     Vital Signs  Vital Sign Min/Max for last 24 hours  Temp  Min: 98.2 °F (36.8 °C)  Max: 98.4 °F (36.9 °C)   BP  Min: 105/77  Max: 109/76   Pulse  Min: 80  Max: 117   Resp  Min: 18  Max: 20   SpO2  Min: 94 %  Max: 99 %   Flow (L/min)  Min: 2  Max: 5   No data recorded        Ventilator/Non-Invasive Ventilation Settings (From admission, onward)     Start     Ordered    12/02/20 0025  Ventilator - AC/PC; (32); 60; 90%; 5; 24  Continuous,   Status:  Canceled     Question Answer Comment   Vent Mode AC/PC    Breath rate  32   FiO2 60    FiO2 titrate for Sp02% =/> 90%    PEEP 5    Inspiratory Pressure 24        12/02/20 0025                             Body mass index is 28.18 kg/m².  I/O last 3 completed shifts:  In: 1399 [Other:330; NG/GT:1069]  Out: 600 [Urine:600]  No intake/output data recorded.        Physical Exam:  General Appearance: Well-developed obese white female she is resting in bed she is on 2 L nasal cannula O2 and does not appear in acute distress her oxygen saturations are  92%.  End-tidal CO2 is 30  Eyes: Conjunctiva are clear and anicteric  ENT: Mucous membranes are little dry no erythema no exudates, nasal septum midline  Neck: Trachea is midline she has a tracheal stoma with tracheocutaneous fistula fairly good size.  No palpable adenopathy  Lungs: Decreased but clear no wheezes no rales no rhonchi she is nonlabored she is  able to talk   Cardiac: Regular rate rhythm no murmur  Abdomen: Obese soft nontender no palpable hepatosplenomegaly she has a left upper quadrant PEG type tube the site looks okay  : Not examined  Musculoskeletal: The right first and second toe are surgically  absent  Skin: No jaundice no petechiae skin is warm and dry  Neuro: She is alert and oriented she is cooperative moving extremities following commands conversing  Extremities/P Vascular: No clubbing no cyanosis no edema palpable radial and dorsalis pedis pulses bilaterally  MSE: She is actually  relaxed       Labs:  Results from last 7 days   Lab Units 12/07/20 0335 12/06/20 0335 12/05/20  0518 12/04/20  0357 12/03/20 0335 12/01/20  2232   GLUCOSE mg/dL 193* 244* 273* 178* 241* 229*   SODIUM mmol/L 139 139 136 143 141 140   POTASSIUM mmol/L 4.0 4.4 4.4 4.1 4.0 4.6   CO2 mmol/L 32.4* 31.2* 30.2* 30.8* 24.3 23.9   CHLORIDE mmol/L 98 98 98 104 108* 106   ANION GAP mmol/L 8.6 9.8 7.8 8.2 8.7 10.1   CREATININE mg/dL 1.07* 1.05* 1.05* 1.03* 1.17* 1.65*   BUN mg/dL 34* 32* 31* 22 19 18   BUN / CREAT RATIO  31.8* 30.5* 29.5* 21.4 16.2 10.9   CALCIUM mg/dL 8.9 9.4 9.2 9.2 8.8 8.5*   EGFR IF NONAFRICN AM mL/min/1.73 52* 53* 53* 55* 47* 32*   ALK PHOS U/L  --   --   --   --   --  204*   TOTAL PROTEIN g/dL  --   --   --   --   --  6.6   ALT (SGPT) U/L  --   --   --   --   --  16   AST (SGOT) U/L  --   --   --   --   --  16   BILIRUBIN mg/dL  --   --   --   --   --  <0.2   ALBUMIN g/dL  --   --   --   --   --  3.80   GLOBULIN gm/dL  --   --   --   --   --  2.8     Estimated Creatinine Clearance: 59.4 mL/min (A) (by C-G formula based on SCr of 1.07 mg/dL (H)).      Results from last 7 days   Lab Units 12/07/20 0335 12/06/20  0335 12/05/20  0518 12/04/20  0357 12/03/20  0339 12/02/20  0711 12/01/20  2356   WBC 10*3/mm3 12.36* 13.78* 11.72* 11.48* 9.22 9.70 16.71*   RBC 10*6/mm3 4.79 4.63 4.45 4.22 3.87 4.14 4.01   HEMOGLOBIN g/dL 10.8* 10.6* 10.2* 9.6* 8.7* 9.5* 9.2*   HEMATOCRIT % 36.4 35.2 34.3 32.0* 28.9* 31.8* 31.7*   MCV fL 76.0* 76.0* 77.1* 75.8* 74.7* 76.8* 79.1   MCH pg 22.5* 22.9* 22.9* 22.7* 22.5* 22.9* 22.9*   MCHC g/dL 29.7* 30.1* 29.7* 30.0* 30.1* 29.9* 29.0*   RDW % 16.2* 16.2* 16.1* 16.5* 16.2* 16.3* 16.1*      RDW-SD fl 44.2 44.2 44.6 44.7 43.6 45.6 46.5   MPV fL  --   --  11.7 11.3 11.4 11.5 11.4   PLATELETS 10*3/mm3 169 191 210 238 215 232 265   NEUTROPHIL % %  --  81.7* 84.5* 87.4*  --  89.7* 84.1*   LYMPHOCYTE % %  --  8.6* 8.4* 6.4*  --  8.5* 7.7*   MONOCYTES % %  --  8.6 6.3 4.9*  --  1.1* 5.9   EOSINOPHIL % %  --  0.4 0.3 0.0*  --  0.1* 1.3   BASOPHIL % %  --  0.1 0.0 0.1  --  0.1 0.5   IMM GRAN % %  --   --  0.5 1.2*  --  0.5 0.5   NEUTROS ABS 10*3/mm3 8.68* 11.25* 9.90* 10.04* 8.57* 8.70* 14.06*   LYMPHS ABS 10*3/mm3  --  1.19 0.99 0.73  --  0.82 1.28   MONOS ABS 10*3/mm3  --  1.19* 0.74 0.56  --  0.11 0.99*   EOS ABS 10*3/mm3 0.66* 0.05 0.03 0.00  --  0.01 0.21   BASOS ABS 10*3/mm3  --  0.02 0.00 0.01  --  0.01 0.08   IMMATURE GRANS (ABS) 10*3/mm3  --   --  0.06* 0.14*  --  0.05 0.09*   NRBC /100 WBC  --   --  0.1 0.2  --  0.0 0.0     Results from last 7 days   Lab Units 12/04/20  0710   PH, ARTERIAL pH units 7.411   PO2 ART mm Hg 79.3*   PCO2, ARTERIAL mm Hg 43.5   HCO3 ART mmol/L 27.6     Results from last 7 days   Lab Units 12/01/20  2232   TROPONIN T ng/mL <0.010     Results from last 7 days   Lab Units 12/02/20  0711 12/01/20  2232   PROBNP pg/mL 746.2 570.4         Results from last 7 days   Lab Units 12/01/20  2232   LACTATE mmol/L 1.1   PROCALCITONIN ng/mL 0.07     Results from last 7 days   Lab Units 12/01/20  2356   INR  1.90*     Microbiology Results (last 10 days)     Procedure Component Value - Date/Time    Respiratory Culture - Sputum, Bronchus [[id number]]  (Abnormal)  (Susceptibility) Collected: 12/02/20 0606    Lab Status: Final result Specimen: Sputum from Bronchus Updated: 12/05/20 0935     Respiratory Culture Heavy growth (4+) Streptococcus pneumoniae      Moderate growth (3+) Normal Respiratory Dalia: NO S.aureus/MRSA or Pseudomonas aeruginosa     Gram Stain Few (2+) WBCs seen      Few (2+) Epithelial cells per low power field      Few (2+) Gram positive cocci in chains      Occasional  Yeast    Susceptibility      Streptococcus pneumoniae     [place]     Ceftriaxone (Meningitis) Intermediate     Ceftriaxone (Non-meningitis) Susceptible     Levofloxacin Susceptible     Penicillin (Meningitis) Resistant     Trimethoprim + Sulfamethoxazole Susceptible                    Blood Culture - Blood, Arm, Left [[id number]] Collected: 12/01/20 2355    Lab Status: Final result Specimen: Blood from Arm, Left Updated: 12/07/20 0645     Blood Culture No growth at 5 days    Blood Culture - Blood, Arm, Left [[id number]] Collected: 12/01/20 2350    Lab Status: Final result Specimen: Blood from Arm, Left Updated: 12/07/20 0000     Blood Culture No growth at 5 days    Respiratory Panel PCR w/COVID-19(SARS-CoV-2) NATASHA/MICHELLE/ANNA/PAD/COR/MAD/KIARA In-House, NP Swab in UTM/VTM, 3-4 HR TAT - Swab, Nasopharynx [[id number]]  (Normal) Collected: 12/01/20 2231    Lab Status: Final result Specimen: Swab from Nasopharynx Updated: 12/02/20 0008     ADENOVIRUS, PCR Not Detected     Coronavirus 229E Not Detected     Coronavirus HKU1 Not Detected     Coronavirus NL63 Not Detected     Coronavirus OC43 Not Detected     COVID19 Not Detected     Human Metapneumovirus Not Detected     Human Rhinovirus/Enterovirus Not Detected     Influenza A PCR Not Detected     Influenza B PCR Not Detected     Parainfluenza Virus 1 Not Detected     Parainfluenza Virus 2 Not Detected     Parainfluenza Virus 3 Not Detected     Parainfluenza Virus 4 Not Detected     RSV, PCR Not Detected     Bordetella pertussis pcr Not Detected     Bordetella parapertussis PCR Not Detected     Chlamydophila pneumoniae PCR Not Detected     Mycoplasma pneumo by PCR Not Detected    Narrative:      Fact sheet for providers: https://docs.[employer]/wp-content/uploads/YFF7914-8377-NG3.1-EUA-Provider-Fact-Sheet-3.pdf    Fact sheet for patients: https://docs.[employer]/wp-content/uploads/XFC1007-7732-JA9.1-EUA-Patient-Fact-Sheet-1.pdf    Respiratory Panel PCR w/COVID-19(SARS-CoV-2)  NATASHA/MICHELLE/ANNA/PAD/COR/MAD/KIARA In-House, NP Swab in UTM/VTM, 3-4 HR TAT - Swab, Nasopharynx [[id number]]  (Normal) Collected: 11/29/20 2300    Lab Status: Final result Specimen: Swab from Nasopharynx Updated: 11/30/20 0002     ADENOVIRUS, PCR Not Detected     Coronavirus 229E Not Detected     Coronavirus HKU1 Not Detected     Coronavirus NL63 Not Detected     Coronavirus OC43 Not Detected     COVID19 Not Detected     Human Metapneumovirus Not Detected     Human Rhinovirus/Enterovirus Not Detected     Influenza A PCR Not Detected     Influenza B PCR Not Detected     Parainfluenza Virus 1 Not Detected     Parainfluenza Virus 2 Not Detected     Parainfluenza Virus 3 Not Detected     Parainfluenza Virus 4 Not Detected     RSV, PCR Not Detected     Bordetella pertussis pcr Not Detected     Bordetella parapertussis PCR Not Detected     Chlamydophila pneumoniae PCR Not Detected     Mycoplasma pneumo by PCR Not Detected    Narrative:      Fact sheet for providers: https://docs.[employer]/wp-content/uploads/SZJ2523-2744-VG8.1-EUA-Provider-Fact-Sheet-3.pdf    Fact sheet for patients: https://docs.[employer]/wp-content/uploads/PQL2889-1187-ZE3.1-EUA-Patient-Fact-Sheet-1.pdf              amLODIPine, 5 mg, Per G Tube, Q24H  amoxicillin-clavulanate, 1 tablet, Oral, Q12H  budesonide, 0.5 mg, Nebulization, BID - RT  busPIRone, 5 mg, Oral, TID  escitalopram, 20 mg, Oral, Daily  famotidine, 20 mg, Per G Tube, Daily  gabapentin, 300 mg, Oral, TID  insulin glargine, 10 Units, Subcutaneous, Q12H  insulin lispro, 0-14 Units, Subcutaneous, Q6H  ipratropium-albuterol, 3 mL, Nebulization, 4x Daily - RT  nystatin, , Topical, Q12H  rivaroxaban, 20 mg, Oral, Daily With Dinner           Diagnostics:  Fl Video Swallow With Speech Single Contrast    Result Date: 11/17/2020  VIDEO SWALLOWING EXAMINATION BY SPEECH PATHOLOGY  Clinical: Dysphasia  Video swallowing examination performed under the direction of speech pathology. Imaging reviewed by  radiologist who concurs with the findings.  Speech pathology summary:Pt exhibited mild to moderate oropharyngeal dysphagia characterized by lingual and base of tongue weakness, mistiming/delayed swallow, decreased hyolaryngeal excursion, and decreased airway protection. Pt had premature spillage past the valleculae or to the pyriforms with all consistencies. Pt had inconsistent penetration with single cup sips of thin and nectar consistencies. Silent aspiration was noted with straw sips of thin (consecutive sips). Pt was unable to clear penetrated/aspirated material with cough. Pt took trials of honey thick and pureed with no additional penetration noted. Mastication and bolus formation and propulsion was slow for soft and regular consistencies. Penetration noted with juice of soft solids/mixed. Mild pharyngeal residuals were noted (tongue base, valleculae, pyriforms) and were assisted in clearing with a spontaneous swallow.   FLUOROSCOPY TIME: 5 minutes 6 seconds, 6102 images.  This report was finalized on 11/17/2020 5:42 PM by Dr. Jayson Godwin M.D.      Xr Chest 1 View    Result Date: 12/2/2020  AP PORTABLE UPRIGHT CHEST  HISTORY: Intubated. Follow-up exam.  COMPARISON: Portable chest 12/01/2020, CT angiogram chest 11/30/2020.  FINDINGS: Endotracheal tube is at the aguila and slightly directed toward the right mainstem bronchus and recommend withdrawal 2-3 cm. There are chronic increased interstitial markings associated with emphysema. Mild patchy infiltrates present in the peripheral aspect of the right midlung and within the left base. There is no pneumothorax or evidence for perihilar edema. Right IJ central venous catheter tip extends into the right atrium. Cardiac monitoring leads are noted. Small pleural effusions are suspected.      1. ET tube tip is at the aguila and directed slightly toward the right mainstem bronchus and recommend withdrawal 2-3 cm for better positioning. 2. Interval placement right IJ  central venous catheter with tip in the SVC. 3. Mild peripheral right midlung and left basilar patchy infiltrates.  This report was finalized on 12/2/2020 9:06 AM by Dr. Chad Truong M.D.      Xr Chest 1 View    Result Date: 12/1/2020  SINGLE VIEW OF THE CHEST  HISTORY: Intubation  COMPARISON: 11/30/2020 and other prior imaging  FINDINGS: Endotracheal tube extends into the proximal right mainstem. Retraction by approximately 2 cm is suggested. Cardiac silhouette is stable. However, the patient has developed bilateral alveolar and interstitial infiltrates. There are also small bilateral pleural effusions, right greater than left. No pneumothorax is seen       1. Right mainstem intubation. Retraction by approximately 2 cm is suggested. 2. Interval development of bilateral alveolar and interstitial infiltrates. Appearance may reflect edema, although correlation with any evidence of infection is recommended. There are also small bilateral pleural effusions.  FINDINGS were relayed to Dr. Gee in the emergency department at 11:05 PM.  This report was finalized on 12/1/2020 11:07 PM by Dr. Emilia Olivo M.D.      Xr Chest 1 View    Result Date: 11/15/2020  SINGLE VIEW CHEST  HISTORY: Shortness of air  COMPARISON: 12/29/2019  FINDINGS: Cardiomegaly is present. Vascular congestion is suspected. There is tortuosity and ectasia of the thoracic aorta. No pneumothorax is seen. There is some blunting of the costophrenic angles bilaterally. This may represent atelectasis or scarring, although trace effusions are not excluded.      Cardiomegaly with vascular congestion.  This report was finalized on 11/15/2020 9:00 PM by Dr. Emilia Olivo M.D.      Ct Angiogram Chest    Result Date: 11/30/2020  CT ANGIOGRAM OF THE CHEST  HISTORY: Sudden onset of shortness of air  COMPARISON: 11/15/2020  TECHNIQUE: Axial CT imaging was obtained through the thorax. IV contrast was administered. Three-D reformatted images were obtained.  The  FINDINGS: No acute pulmonary thromboembolus is identified. Please note examination is degraded by motion artifact. Thoracic aorta is normal in caliber. There is no evidence of dissection. There are mild coronary artery calcifications. There is no pleural effusion. There is a trace pericardial effusion. The thyroid gland appears unremarkable. The patient's trachea at the level of thoracic inlet appears there is an area. Patient does have a history of a tracheostomy, but the narrowing is more apparent on today's exam. This may represent some tracheal stenosis and/or tracheomalacia. Esophagus appears within normal limits. No acute infiltrates are seen on today's exam. Advanced background emphysematous changes are present. 6 to 7 mm nodule within the left lower lobe is unchanged. No new nodules are seen. Images through the upper abdomen are degraded by motion artifact. Gastrostomy tube is again identified. Previously identified mild left-sided pelviectasis is noted. There is a probable left renal cyst. Areas of cortical thinning are seen within the right kidney. Previously identified dissection flap involving the distal celiac axis is not well-seen on this exam. There is enlargement of the main pulmonary artery, which can be seen in the setting of pulmonary arterial hypertension. Chronic compression deformity at T6-T7 and T3-T4 is unchanged. Mediastinal lymph nodes do not appear pathologically enlarged.       1. Exam is degraded by motion artifact. No obvious acute pulmonary thromboembolus seen. 2. The patient's trachea at the level of the thoracic inlet appears narrowed. This is more apparent than on the prior exam. Patient does have a history of tracheostomy, and this may represent tracheal stenosis/tracheomalacia. 3. 6 to 7 mm nodule within the left lower lobe. Short-term CT follow-up in 3-6 months suggested.  Radiation dose reduction techniques were utilized, including automated exposure control and exposure  modulation based on body size.  This report was finalized on 11/30/2020 12:22 AM by Dr. Emilia Olivo M.D.      Ct Angiogram Chest    Result Date: 11/15/2020  CT ANGIOGRAM OF THE CHEST  HISTORY: Leg swelling and shortness of air. Elevated d-dimer.  COMPARISON: None available.  TECHNIQUE: Axial CT imaging was obtained through the thorax. IV contrast was administered. Three-D reformatted images were obtained.  FINDINGS: Examination is degraded by poor timing of the contrast bolus. No obvious pulmonary thromboembolus is seen. The thoracic aorta is normal in caliber. There is no evidence of dissection. There are really no significant coronary artery calcifications. The thyroid gland, trachea, and esophagus appear unremarkable. There is no pleural or pericardial effusion. The advanced background emphysematous changes are seen. Areas of chronic scarring are seen at the lung bases bilaterally. These have progressed when compared to prior study. There is an area of nodularity noted within the left lower lobe measuring up to 7 mm in size. This was not apparent on the prior exam. Short-term CT follow-up in 3-6 months is suggested. Consolidation within the right lower lobe has increased when compared to prior exam, as has bronchial wall thickening, and nonspecific bronchitis is not excluded. Mediastinal lymph nodes do not appear pathologically enlarged. Images through the upper abdomen demonstrate persistent mild left-sided hydronephrosis. This was also present on prior exam from 10/11/2019. Areas of cortical thinning are noted within the right kidney, suggesting sequela prior insults. Gastrostomy tube is present. There is a focal intimal flap involving the distal celiac axis however, branch vessels appear patent. There is dilatation of the distal celiac artery measuring up to 1 cm. Chronic compression deformity with bony ankylosis is noted at T6-T7 and T3-T4.        1. No acute pulmonary thromboembolus seen. 2. Advanced  emphysematous changes. Patient is noted to have bibasilar scarring. Consolidation within the right lower lobe has increased when compared to prior exam, and there is increasing bronchial wall thickening within this area. FINDINGS may reflect nonspecific bronchitis. 3. 7 mm noncalcified pulmonary nodule at the left lung base. CT follow-up in 3-6 months is suggested. Radiation dose reduction techniques were utilized, including automated exposure control and exposure modulation based on body size.  This report was finalized on 11/15/2020 11:12 PM by Dr. Emilia Olivo M.D.      Results for orders placed during the hospital encounter of 12/01/20   Adult Transthoracic Echo Complete W/ Cont if Necessary Per Protocol    Narrative · Left ventricular wall thickness is consistent with concentric   hypertrophy.  · Calculated left ventricular EF = 62.8% Estimated left ventricular EF was   in agreement with the calculated left ventricular EF. Left ventricular   systolic function is normal.  · Left ventricular diastolic function is consistent with (grade I)   impaired relaxation.  · Saline test results are negative.          X-ray looks pretty good minimal if any infiltrate    Active Hospital Problems    Diagnosis  POA   • Acute respiratory failure with hypoxia and hypercapnia (CMS/HCC) [J96.01, J96.02]  Yes      Resolved Hospital Problems   No resolved problems to display.         Assessment/Plan     1. Acute on chronic hypoxemic and hypercapnic respiratory failure patient  she is doing pretty well we will continue O2 with SPO2 and capnography monitoring  2. Possible sleep apnea will have to observe she has a pretty good tracheal cutaneous fistula that might help prevent some problems here.  3. 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 Pneumonia pneumococcal she has been receiving Augmentin and looks like she is doing pretty well will  continue.  5. Acute exacerbation COPD continue bronchodilators she is not wheezing I am going to try and stop systemic steroids and just use inhaled steroids.  6. Hypotension resolved  7. Anemia hemoglobin stable  8. DVT on Xarelto chronically.  9. Chronic kidney disease stage III stable  10. Diabetes mellitus type 2 but sugars up probably secondary to steroids I am stopping systemic steroids today that should help  11. Gastroesophageal reflux disease  on H2 blocker here  12. Fluids/electrolytes/nutrition feeding tube tube feeds  13. Diastolic dysfunction by echocardiogram  14. Anemia hemoglobin is actually up from yesterday we will continue to monitor  15. Hypertension      Encourage continue work with physical therapy.  Speech therapy following as well and encourage her to follow dysphagia diet recommendations.  She is hoping to go home soon.  If no new issues could potentially discharge tomorrow with family.  Follow-up with primary care.  Follow-up with ENT in several weeks for possibly having closure of tracheocutaneous fistula.    Plan for disposition: Possibly home in the next day or 2    Reinaldo Matos MD  12/07/20  16:18 EST    Time:

## 2020-12-07 NOTE — PLAN OF CARE
Problem: Adult Inpatient Plan of Care  Goal: Plan of Care Review  Flowsheets (Taken 12/7/2020 1034)  Plan of Care Reviewed With: patient  Outcome Summary: VFSS completed- see report for details. Moderate oropharyngeal dysphagia. Recommend:   Regular diet, NO MIXED consistencies/Honey-thick liquids.   Meds whole with puree.   Ice chips ok between meals after oral care.   Upright for PO, small bites and sips.   SLP to follow for diet tolerance.

## 2020-12-07 NOTE — MBS/VFSS/FEES
Acute Care - Speech Language Pathology   Swallow Initial Evaluation [facility]     Patient Name: Swetha Luis  : 1960  MRN: [id number]  Today's Date: 2020               Admit Date: 2020    Visit Dx:     ICD-10-CM ICD-9-CM   1. Acute respiratory failure with hypoxia and hypercapnia (CMS/[facility])  J96.01 518.81    J96.02      Patient Active Problem List   Diagnosis   • Tracheostomy complication (CMS/[facility])   • Gangrene of toe (CMS/[facility])   • Acute diastolic congestive heart failure (CMS/[facility])   • ARF (acute renal failure) (CMS/[facility])   • Stage 3 chronic kidney disease   • Elevated troponin   • Acute respiratory failure with hypoxemia (CMS/[facility])   • Acute osteomyelitis (CMS/[facility])   • UTI due to extended-spectrum beta lactamase (ESBL) producing Escherichia coli   • Thrush, oral   • Tracheostomy malfunction (CMS/[facility])   • COPD (chronic obstructive pulmonary disease) (CMS/[facility])   • Chronic respiratory failure with hypoxia (CMS/[facility])   • PEG (percutaneous endoscopic gastrostomy) status (CMS/[facility])   • History of osteomyelitis   • Polysubstance abuse (CMS/[facility])   • History of tracheal stenosis   • Chronic diastolic CHF (congestive heart failure) (CMS/[facility])   • Peripheral artery disease (CMS/[facility])   • Medically noncompliant   • WENDI and COPD overlap syndrome (CMS/[facility])   • Dyspnea   • Elevated LFTs   • Elevated troponin   • Tracheostomy present (CMS/[facility])   • Essential hypertension   • Dysphagia   • Healthcare associated bacterial pneumonia   • Infection due to ESBL-producing Escherichia coli   • Sepsis due to Gram negative bacteria (CMS/[facility])   • Ureteral stone with hydronephrosis   • UTI (urinary tract infection)   • Acute tracheobronchitis   • Chronic diastolic CHF (congestive heart failure) (CMS/[facility])   • Bacteremia   • Acute deep vein thrombosis (DVT) of proximal vein of left lower extremity (CMS/[facility])   • Elevated d-dimer   • Type 2 diabetes mellitus with hyperglycemia, with long-term current use of insulin (CMS/[facility])   •  Acute respiratory failure with hypoxia and hypercapnia (CMS/HCC)     Past Medical History:   Diagnosis Date   • Acute congestive heart failure (CMS/HCC) 12/24/2018   • Acute osteomyelitis (CMS/HCC)     Right shoulder due to IVDA   • Acute renal failure on dialysis (CMS/HCC)    • Anxiety    • Pugh esophagus    • CKD (chronic kidney disease)    • COPD (chronic obstructive pulmonary disease) (CMS/HCC)    • MRSA infection    • Nontraumatic subarachnoid hemorrhage (CMS/HCC)    • Seizures (CMS/HCC)    • Tracheostomy present (CMS/HCC)      Past Surgical History:   Procedure Laterality Date   • TRACHEOSTOMY     • URETEROSCOPY LASER LITHOTRIPSY WITH STENT INSERTION Left 10/11/2019    Procedure: CYSTOSCOPY,  URETEROSCOPY, LEFT RETROGRADE, LEFT PYELOGRAM, LASER LITHOTRIPSY, PLACEMENT OF STENT.;  Surgeon: Clyde Selby MD;  Location: [facility];  Service: Urology        SWALLOW EVALUATION (last 72 hours)      SLP Adult Swallow Evaluation     Row Name 12/07/20 0900 12/05/20 1412                Rehab Evaluation    Document Type  evaluation  -CP  evaluation  -ML       Subjective Information  no complaints  -CP  no complaints  -ML       Patient Observations  alert;cooperative  -CP  alert;cooperative  -ML       Care Plan Review  evaluation/treatment results reviewed;care plan/treatment goals reviewed;risks/benefits reviewed;patient/other agree to care plan  -CP  evaluation/treatment results reviewed  -ML       Patient Effort  good  -CP  good  -ML       Symptoms Noted During/After Treatment  none  -CP  none  -ML          General Information    Patient Profile Reviewed  yes  -CP  yes  -ML       Pertinent History Of Current Problem  --  Pt admitted with respiratory failure and was intubated from 12/1-12/3. Pt with many recent intubations and hx of trach and PEG. 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 Pt with hx of dysphagia and silent aspiration of thin and nectar thick  "liquids. Most recent VFSS was 11/17/20 recommending mechanical soft/no mixed and thin liquids via small sip by cup. Pt reports was eating/drinking \"anythiny/everything\" at home prior to admission.   -ML       Current Method of Nutrition  NPO;gastrostomy feedings  -CP  NPO;gastrostomy feedings  -ML       Precautions/Limitations, Vision  WFL  -CP  WFL;for purposes of eval  -ML       Precautions/Limitations, Hearing  WFL  -CP  WFL;for purposes of eval  -ML       Prior Level of Function-Communication  --  -- voice impairement  -ML       Prior Level of Function-Swallowing  no diet consistency restrictions  -CP  --       Plans/Goals Discussed with  patient;agreed upon  -CP  patient;agreed upon  -ML       Barriers to Rehab  medically complex;previous functional deficit  -CP  previous functional deficit  -ML       Patient's Goals for Discharge  return to regular diet  -CP  return to PO diet  -ML          Pain    Additional Documentation  --  Pain Scale: Numbers Pre/Post-Treatment (Group)  -ML          Pain Scale: Numbers Pre/Post-Treatment    Pretreatment Pain Rating  0/10 - no pain  -CP  0/10 - no pain  -ML       Posttreatment Pain Rating  0/10 - no pain  -CP  0/10 - no pain  -ML          Oral Motor Structure and Function    Dentition Assessment  --  -- natural anterior lower, edentulous upper  -ML       Secretion Management  --  WNL/WFL  -ML       Mucosal Quality  --  dry  -ML       Volitional Cough  --  reduced respiratory support  -ML          Oral Musculature and Cranial Nerve Assessment    Oral Motor General Assessment  --  WFL;vocal impairment  -ML       Vocal Impairment, Detail. Cranial Nerve X (Vagus)  --  vocal quality abnormality (see comments)  -ML       Oral Motor, Comment  --  Pt initially exhibited absent vocal quality with no presence of obvious airflow from mouth but instead escaping through fistula. With pt closing fistula, able to exhibit severely breathy vocal quality   -ML          General " Eating/Swallowing Observations    Respiratory Support Currently in Use  --  nasal cannula  -ML       Eating/Swallowing Skills  --  fed by SLP  -ML       Positioning During Eating  --  upright 90 degree;upright in chair  -ML       Utensils Used  --  spoon  -ML       Consistencies Trialed  --  ice chips;thin liquids;pureed  -ML          Clinical Swallow Eval    Clinical Swallow Evaluation Summary  --  Clinical swallowing evaluation completed. Pt presents with suspected at least moderate oropharyngeal dysphagia s/p most recent respiratory failure and extubation. Pt exhibited mild labial spillage of thin liquids, delayed swallow initiation, and suspected impaired airway closure indicated by overt cough with thin via spoon. Suspect pt with high risk of further silent aspiration as well due to hx. Pt exhibited no overt s/s of pen/asp with puree. Due to dysphagia hx (silent aspiration), absent vocal quality, and noted s/s of pen/asp during trials of thin by spoon this date, recommend NPO with non-oral meds. Pt can have ice chips after oral care. Recommend VFSS to further evaluate swallowing function and risk of aspiration.   -ML          MBS/VFSS    Utensils Used  spoon;cup;straw  -CP  --       Consistencies Trialed  nectar/syrup-thick liquids;honey-thick liquids;pureed;soft textures;regular textures  -CP  --          MBS/VFSS Interpretation    VFSS Summary  Moderate oropharyngeal dysphagia characterized by delayed initiation of the swallow. Silent aspiration occurred with tsp trial of nectar-thick liquid during the swallow. This occurred posteriorly over the arytenoids as the swallow triggered and the bolus filled the pyriform sinuses. A cued cough reduced aspirated material, with the majority exiting the trachea via the tracheocutaneous fistula. Trace aspiration occurred with the liquid portion of mixed consistencies, which was also silent. No penetration or aspiration occurred with honey thick liquid via cup or straw,  puree, or dry solids, despite consistent 1-3 sec delay across trials. A chin tuck was attempted, but did not appear to significantly improve safety. No abnormal oral or pharyngeal residue present with any consistencies.  -CP  --          SLP Communication to Radiology    Summary Statement  Moderate oropharyngeal dysphagia characterized by delayed initiation of the swallow. Silent aspiration occurred with tsp trial of nectar-thick liquid during the swallow. This occurred posteriorly over the arytenoids as the swallow triggered and the bolus filled the pyriform sinuses. A cued cough reduced aspirated material, with the majority exiting the trachea via the tracheocutaneous fistula. Trace aspiration occurred with the liquid portion of mixed consistencies, which was also silent. No penetration or aspiration occurred with any other consistencies, despite consistent 1-3 sec delay across trials. A chin tuck was attempted, but did not appear to significantly improve safety. No abnormal oral or pharyngeal residue present with any consistencies.  -CP  --          Clinical Impression    Daily Summary of Progress (SLP)  progress toward functional goals is good  -CP  --       SLP Swallowing Diagnosis  moderate;oral dysphagia;pharyngeal dysphagia  -CP  moderate;oral dysphagia;suspected pharyngeal dysphagia  -ML       Functional Impact  risk of aspiration/pneumonia  -CP  risk of aspiration/pneumonia  -ML       Rehab Potential/Prognosis, Swallowing  good, to achieve stated therapy goals  -CP  good, to achieve stated therapy goals  -ML       Swallow Criteria for Skilled Therapeutic Interventions Met  demonstrates skilled criteria  -CP  demonstrates skilled criteria  -ML          Recommendations    Therapy Frequency (Swallow)  PRN  -CP  PRN  -ML       Predicted Duration Therapy Intervention (Days)  until discharge  -CP  until discharge  -ML       SLP Diet Recommendation  regular textures;honey thick liquids;other (see comments);ice  chips between meals after oral care, with supervision no mixed consistencies  -CP  NPO;ice chips between meals after oral care, with supervision  -ML       Recommended Diagnostics  reassess via VFSS (MBS)  -CP  VFSS ([facility])  -ML       Recommended Precautions and Strategies  upright posture during/after eating;small bites of food and sips of liquid  -CP  --       Oral Care Recommendations  Oral Care BID/PRN  -CP  Oral Care BID/PRN;Before ice/water  -ML       SLP Rec. for Method of Medication Administration  meds whole;with pudding or applesauce  -CP  meds via alternate route  -ML       Monitor for Signs of Aspiration  yes;notify SLP if any concerns  -CP  notify SLP if any concerns  -ML       Anticipated Discharge Disposition (SLP)  unknown  -CP  --          Swallow Goals (SLP)    Oral Nutrition/Hydration Goal Selection (SLP)  --  -- goals to be established after VFSS  -ML          Oral Nutrition/Hydration Goal 1 (SLP)    Oral Nutrition/Hydration Goal 1, SLP  Tolerate regular/HTL diet  -CP  --       Time Frame (Oral Nutrition/Hydration Goal 1, SLP)  by discharge  -CP  --         User Key  (r) = Recorded By, (t) = Taken By, (c) = Cosigned By    Initials Name Effective Dates    CP Angelina Ramires, MS CCC-SLP 06/08/18 -     ML Carolina Salas, MS CCC-SLP 10/04/18 -           EDUCATION  The patient has been educated in the following areas:   Dysphagia (Swallowing Impairment) Oral Care/Hydration Modified Diet Instruction.    SLP Recommendation and Plan  SLP Swallowing Diagnosis: moderate, oral dysphagia, pharyngeal dysphagia  SLP Diet Recommendation: regular textures, honey thick liquids, other (see comments), ice chips between meals after oral care, with supervision(no mixed consistencies)  Recommended Precautions and Strategies: upright posture during/after eating, small bites of food and sips of liquid  SLP Rec. for Method of Medication Administration: meds whole, with pudding or applesauce     Monitor for Signs of  Aspiration: yes, notify SLP if any concerns  Recommended Diagnostics: reassess via VFSS ([facility] [place])  Swallow Criteria for Skilled Therapeutic Interventions Met: demonstrates skilled criteria  Anticipated Discharge Disposition (SLP): unknown  Rehab Potential/Prognosis, Swallowing: good, to achieve stated therapy goals  Therapy Frequency (Swallow): PRN  Predicted Duration Therapy Intervention (Days): until discharge     Daily Summary of Progress (SLP): progress toward functional goals is good       Patient was not wearing a face mask during this therapy encounter. Therapist used appropriate personal protective equipment including mask, eye protection and gloves.  Mask used was standard procedure mask. Appropriate PPE was worn during the entire therapy session. Hand hygiene was completed before and after therapy session. Patient is not in enhanced droplet precautions.               Plan of Care Reviewed With: patient  Outcome Summary: VFSS completed- see report for details. Moderate oropharyngeal dysphagia. Recommend: Regular diet, NO MIXED consistencies/Honey-thick liquids. Meds whole with puree. Ice chips ok between meals after oral care. Upright for PO, small bites and sips. SLP to follow for diet tolerance.    SLP GOALS     Row Name 12/07/20 0900             Oral Nutrition/Hydration Goal 1 (SLP)    Oral Nutrition/Hydration Goal 1, SLP  Tolerate regular/HTL diet  -CP      Time Frame (Oral Nutrition/Hydration Goal 1, SLP)  by discharge  -CP        User Key  (r) = Recorded By, (t) = Taken By, (c) = Cosigned By    Initials Name Provider Type    CP Angelina Ramires MS CCC-SLP Speech and Language Pathologist           SLP Outcome Measures (last 72 hours)      SLP Outcome Measures     Row Name 12/05/20 1500             SLP Outcome Measures    Outcome Measure Used?  Adult NOMS  -ML         Adult FCM Scores    FCM Chosen  Swallowing  -ML      Swallowing FCM Score  1  -ML        User Key  (r) = Recorded By, (t) = Taken By, (c) =  Cosigned By    Initials Name Effective Dates    ML Josue Carolina, MS CCC-SLP 10/04/18 -            Time Calculation:   Time Calculation- SLP     Row Name 12/07/20 1036             Time Calculation- SLP    SLP Start Time  0830  -CP      SLP Stop Time  0930  -CP      SLP Time Calculation (min)  60 min  -CP      SLP Received On  12/07/20  -CP        User Key  (r) = Recorded By, (t) = Taken By, (c) = Cosigned By    Initials Name Provider Type    CP Angelina Ramires MS CCC-SLP Speech and Language Pathologist          Therapy Charges for Today     Code Description Service Date Service Provider Modifiers Qty    [id number] HC ST MOTION FLUORO EVAL SWALLOW 4 12/7/2020 Angelina Ramires MS CCC-SLP GN 1               Angelina Ramires MS CCC-SAJAN  12/7/2020

## 2020-12-08 ENCOUNTER — READMISSION MANAGEMENT (OUTPATIENT)
Dept: CALL CENTER | Facility: HOSPITAL | Age: 60
End: 2020-12-08

## 2020-12-08 VITALS
WEIGHT: 171 LBS | RESPIRATION RATE: 18 BRPM | HEIGHT: 66 IN | BODY MASS INDEX: 27.48 KG/M2 | SYSTOLIC BLOOD PRESSURE: 110 MMHG | HEART RATE: 102 BPM | DIASTOLIC BLOOD PRESSURE: 64 MMHG | TEMPERATURE: 98.7 F | OXYGEN SATURATION: 96 %

## 2020-12-08 LAB
ANION GAP SERPL CALCULATED.3IONS-SCNC: 6.2 MMOL/L (ref 5–15)
BASOPHILS # BLD AUTO: 0.06 10*3/MM3 (ref 0–0.2)
BASOPHILS NFR BLD AUTO: 0.3 % (ref 0–1.5)
BUN SERPL-MCNC: 47 MG/DL (ref 8–23)
BUN/CREAT SERPL: 34.6 (ref 7–25)
CALCIUM SPEC-SCNC: 8.7 MG/DL (ref 8.6–10.5)
CHLORIDE SERPL-SCNC: 93 MMOL/L (ref 98–107)
CO2 SERPL-SCNC: 33.8 MMOL/L (ref 22–29)
CREAT SERPL-MCNC: 1.36 MG/DL (ref 0.57–1)
DEPRECATED RDW RBC AUTO: 42.5 FL (ref 37–54)
EOSINOPHIL # BLD AUTO: 0.53 10*3/MM3 (ref 0–0.4)
EOSINOPHIL NFR BLD AUTO: 2.9 % (ref 0.3–6.2)
ERYTHROCYTE [DISTWIDTH] IN BLOOD BY AUTOMATED COUNT: 15.9 % (ref 12.3–15.4)
GFR SERPL CREATININE-BSD FRML MDRD: 40 ML/MIN/1.73
GLUCOSE BLDC GLUCOMTR-MCNC: 198 MG/DL (ref 70–130)
GLUCOSE BLDC GLUCOMTR-MCNC: 271 MG/DL (ref 70–130)
GLUCOSE SERPL-MCNC: 242 MG/DL (ref 65–99)
HCT VFR BLD AUTO: 34.1 % (ref 34–46.6)
HGB BLD-MCNC: 10.2 G/DL (ref 12–15.9)
LYMPHOCYTES # BLD AUTO: 1.32 10*3/MM3 (ref 0.7–3.1)
LYMPHOCYTES NFR BLD AUTO: 7.3 % (ref 19.6–45.3)
MCH RBC QN AUTO: 22.5 PG (ref 26.6–33)
MCHC RBC AUTO-ENTMCNC: 29.9 G/DL (ref 31.5–35.7)
MCV RBC AUTO: 75.3 FL (ref 79–97)
MONOCYTES # BLD AUTO: 1.24 10*3/MM3 (ref 0.1–0.9)
MONOCYTES NFR BLD AUTO: 6.8 % (ref 5–12)
NEUTROPHILS NFR BLD AUTO: 14.84 10*3/MM3 (ref 1.7–7)
NEUTROPHILS NFR BLD AUTO: 81.9 % (ref 42.7–76)
PLATELET # BLD AUTO: 142 10*3/MM3 (ref 140–450)
POTASSIUM SERPL-SCNC: 4.4 MMOL/L (ref 3.5–5.2)
RBC # BLD AUTO: 4.53 10*6/MM3 (ref 3.77–5.28)
SODIUM SERPL-SCNC: 133 MMOL/L (ref 136–145)
WBC # BLD AUTO: 18.13 10*3/MM3 (ref 3.4–10.8)

## 2020-12-08 PROCEDURE — 80048 BASIC METABOLIC PNL TOTAL CA: CPT | Performed by: INTERNAL MEDICINE

## 2020-12-08 PROCEDURE — 85025 COMPLETE CBC W/AUTO DIFF WBC: CPT | Performed by: INTERNAL MEDICINE

## 2020-12-08 PROCEDURE — 94799 UNLISTED PULMONARY SVC/PX: CPT

## 2020-12-08 PROCEDURE — 82962 GLUCOSE BLOOD TEST: CPT

## 2020-12-08 PROCEDURE — 63710000001 INSULIN GLARGINE PER 5 UNITS: Performed by: INTERNAL MEDICINE

## 2020-12-08 PROCEDURE — 63710000001 INSULIN LISPRO (HUMAN) PER 5 UNITS: Performed by: INTERNAL MEDICINE

## 2020-12-08 RX ORDER — OMEPRAZOLE 20 MG/1
20 CAPSULE, DELAYED RELEASE ORAL DAILY
COMMUNITY

## 2020-12-08 RX ORDER — AMOXICILLIN AND CLAVULANATE POTASSIUM 875; 125 MG/1; MG/1
1 TABLET, FILM COATED ORAL EVERY 12 HOURS SCHEDULED
Qty: 4 TABLET | Refills: 0 | Status: SHIPPED | OUTPATIENT
Start: 2020-12-08 | End: 2020-12-10

## 2020-12-08 RX ADMIN — ESCITALOPRAM 20 MG: 20 TABLET, FILM COATED ORAL at 08:13

## 2020-12-08 RX ADMIN — IPRATROPIUM BROMIDE AND ALBUTEROL SULFATE 3 ML: 2.5; .5 SOLUTION RESPIRATORY (INHALATION) at 07:46

## 2020-12-08 RX ADMIN — AMOXICILLIN AND CLAVULANATE POTASSIUM 1 TABLET: 875; 125 TABLET, FILM COATED ORAL at 08:13

## 2020-12-08 RX ADMIN — NYSTATIN: 100000 POWDER TOPICAL at 08:13

## 2020-12-08 RX ADMIN — BUDESONIDE 0.5 MG: 0.5 INHALANT ORAL at 07:46

## 2020-12-08 RX ADMIN — OXYCODONE HYDROCHLORIDE AND ACETAMINOPHEN 1 TABLET: 5; 325 TABLET ORAL at 11:03

## 2020-12-08 RX ADMIN — FAMOTIDINE 20 MG: 20 TABLET, FILM COATED ORAL at 08:13

## 2020-12-08 RX ADMIN — GABAPENTIN 300 MG: 300 CAPSULE ORAL at 08:13

## 2020-12-08 RX ADMIN — INSULIN LISPRO 3 UNITS: 100 INJECTION, SOLUTION INTRAVENOUS; SUBCUTANEOUS at 00:43

## 2020-12-08 RX ADMIN — INSULIN LISPRO 8 UNITS: 100 INJECTION, SOLUTION INTRAVENOUS; SUBCUTANEOUS at 06:38

## 2020-12-08 RX ADMIN — BUSPIRONE HYDROCHLORIDE 5 MG: 5 TABLET ORAL at 08:13

## 2020-12-08 RX ADMIN — INSULIN GLARGINE 10 UNITS: 100 INJECTION, SOLUTION SUBCUTANEOUS at 08:13

## 2020-12-08 RX ADMIN — OXYCODONE HYDROCHLORIDE AND ACETAMINOPHEN 1 TABLET: 5; 325 TABLET ORAL at 00:05

## 2020-12-08 RX ADMIN — AMLODIPINE BESYLATE 5 MG: 5 TABLET ORAL at 08:13

## 2020-12-08 NOTE — DISCHARGE PLACEMENT REQUEST
"Neo Spencer (60 y.o. Female)     Date of Birth Social Security Number Address Home Phone MRN    1960  [id number] [address] [phone] [id number]    Confucianism Marital Status          Taoist        Admission Date Admission Type Admitting Provider Attending Provider Department, Room/Bed    12/1/20 Emergency Esperanza Garcia MD  [facility] CVI, 2201/1    Discharge Date Discharge Disposition Discharge Destination        12/8/2020 Home or Self Care              Attending Provider: (none)   Allergies: Cephalexin, Hydrocodone, Strawberry, Zithromax [Azithromycin]    Isolation: None   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), 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)   Code Status: Prior    Ht: 167.6 cm (66\")   Wt: 77.6 kg (171 lb)    Admission Cmt: None   Principal Problem: None                Active Insurance as of 12/1/2020     Primary Coverage     Payor Plan Insurance Group Employer/Plan Group    [place] [id number]     Payor Plan Address Payor Plan Phone Number Payor Plan Fax Number Effective Dates    [employer] Claims Office - [address] [address] [phone]  4/1/2020 - None Entered    Former[facility] [id number]       Subscriber Name Subscriber Birth Date Member ID       NEO SPENCER 1960 [id number]                 Emergency Contacts      (Rel.) Home Phone Work Phone Mobile Phone    MATT SPENCER (Spouse) [phone] -- --    Shazia Spencer (Daughter) [phone] -- [phone]    Sister, \"Hattie\" (Sister) [phone] -- [phone]    Monserrat Taylor (Sister) [phone] -- [phone]    Ken Spencer (Son) -- -- [phone]              "

## 2020-12-08 NOTE — PAYOR COMM NOTE
"Neo Spencer (60 y.o. Female)                               ATTENTION;    DISCHARGE SUMMARY CASE REF # [id number]          Date of Birth Social Security Number Address Home Phone MRN    1960  [id number] [address] [phone] [id number]    [employer] Mar[employer] Status          Y[employer]ism        Admission Date Admission Type Admitting Provider Attending Provider Department, Room/Bed    12/1/20 Emergency Esperanza Garcia MD  [facility] 2 [facility] CVI, 2201/1    Discharge Date Discharge Disposition Discharge Destination        12/8/2020 Home or Self Care              Attending Provider: (none)   Allergies: Cephalexin, Hydrocodone, Strawberry, Zithromax [Azithromycin]    Isolation: None   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), 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)   Code Status: Prior    Ht: 167.6 cm (66\")   Wt: 77.6 kg (171 lb)    Admission Cmt: None   Principal Problem: None                Active Insurance as of 12/1/2020     Primary Coverage     Payor Plan Insurance Group Employer/Plan Group    [place] [id number]     Payor Plan Address Payor Plan Phone Number Payor Plan Fax Number Effective Dates    [employer] Claims Office - [address] [phone]  4/1/2020 - None Entered    [facility] [id number]       Subscriber Name Subscriber Birth Date Member ID       NEO SPENCER 1960 [id number]                 Emergency Contacts      (Rel.) Home Phone Work Phone Mobile Phone    MATT SPENCER (Spouse) [phone] -- --    Shazia Spencer (Daughter) [phone] -- [phone]    Sister, \"Hattie\" (Sister) [phone] -- [phone]    Monserrat Taylor (Sister) [phone] -- [phone]    Ken Spencer (Son) -- -- [phone]               Discharge Summary      Reinaldo Matos MD at 12/08/20 0752                                                                             PHYSICIAN DISCHARGE SUMMARY                                      "                                   [facility]    Date of admit: 12/1/2020  Date of Discharge:  12/8/2020    PCP: Provider, No Known    Discharge Diagnosis:     Acute respiratory failure with hypoxia and hypercapnia (CMS/HCC)  Acute on chronic hypoxemic and hypercapnic respiratory failure  Suspected sleep apnea  Tracheocutaneous fistula  Pneumonia  COPD exacerbation  Hypotension on admission resolved  DVT  Chronic kidney disease III  Type 2 diabetes  GERD  Diastolic CHF    Secondary Diagnoses:  Past Medical History:   Diagnosis Date   • Acute congestive heart failure (CMS/HCC) 12/24/2018   • Acute osteomyelitis (CMS/[facility])     Right shoulder due to IVDA   • Acute renal failure on dialysis (CMS/[facility])    • Anxiety    • Pugh esophagus    • CKD (chronic kidney disease)    • COPD (chronic obstructive pulmonary disease) (CMS/[facility])    • MRSA infection    • Nontraumatic subarachnoid hemorrhage (CMS/[facility])    • Seizures (CMS/[facility])    • Tracheostomy present (CMS/[facility])        Procedures Performed           Consults:       Hospital Course  Patient is a 60 y.o. female presented with      Chief complaint:  Altered mental status     History of present illness:     Subjective []Expand by Default     This is a 60-year-old with history of chronic respiratory failure, on oxygen 2 L/min.  She had a prior history of tracheostomy as well.     She recently visited the ED on 11/3/20 for symptoms of left leg pain.  She was stable.  CTA chest showed no PE.  She was discharged home with plan for outpatient Doppler as she was already on anticoagulation..     EMS was called again today for altered mental status and cyanosis per family report.  Patient arrived to the ED, unconscious, bagged by EMS.  Dr. Gee attempted briefly to insert a tracheostomy into the previous site but it was very tight and therefore she was intubated with ET tube 6.5.  She developed hypotension and required pressors.  She was a very difficult stick and had only  1 peripheral IV and therefore IO was placed.        Patient admitted with acute on chronic hypoxemic and hypercapnic respiratory failure requiring intubation mechanical ventilation.  Altered mental status due to hypercapnia.  This is resolved.  Predominant care was with Dr. Baez and I took over yesterday evening for him.  She is ready for discharge.  Was successfully extubated from the ventilator 12/4/2020 and has been doing well off.  Uses 2 L of oxygen at home and we are weaning down to her home use.  Treated with antibiotic for pneumonia and this has been switched to Augmentin.  She has a few more days of antibiotics to go.  Continue bronchodilators that she uses at home.  Continuing her chronic anticoagulant for DVT.  Speech therapy recommended healthy heart diet with nectar thick liquids.  She may benefit from follow-up with ENT for possibly having tracheocutaneous fistula closed in several weeks.  She is eager to discharge home.  Follow-up with primary care.    Condition on Discharge:  Stable.      Vital Signs  Temp:  [97.6 °F (36.4 °C)-98.2 °F (36.8 °C)] 98 °F (36.7 °C)  Heart Rate:  [] 109  Resp:  [18] 18  BP: (105-109)/(69-80) 109/69    Physical Exam:  General Appearance: no acute distress, appears comfortable  Heart: regular rate and rhythm  Lungs: clear to auscultation bilaterally, respirations unlabored  Abdomen: soft, nontender, nondistended, no guarding/rebound, nl BS  Extremeties: no edema, no cyanosis  Neurology: speech normal, mental status normal, moving all four extremities  Mental Status: alert, oriented        --  Consults:   IP CONSULT TO PULMONOLOGY  IP CONSULT TO NUTRITION SERVICES  IP CONSULT TO CASE MANAGEMENT     Significant Discharge Diagnostics   Procedures Performed:         Pertinent Lab Results:  Results from last 7 days   Lab Units 12/08/20  0348 12/07/20  0335 12/06/20  0335 12/05/20  0518 12/04/20  0357 12/03/20  0335 12/01/20  2232   SODIUM mmol/L 133* 139  139 136 143 141 140   POTASSIUM mmol/L 4.4 4.0 4.4 4.4 4.1 4.0 4.6   CHLORIDE mmol/L 93* 98 98 98 104 108* 106   CO2 mmol/L 33.8* 32.4* 31.2* 30.2* 30.8* 24.3 23.9   BUN mg/dL 47* 34* 32* 31* 22 19 18   CREATININE mg/dL 1.36* 1.07* 1.05* 1.05* 1.03* 1.17* 1.65*   GLUCOSE mg/dL 242* 193* 244* 273* 178* 241* 229*   CALCIUM mg/dL 8.7 8.9 9.4 9.2 9.2 8.8 8.5*   AST (SGOT) U/L  --   --   --   --   --   --  16   ALT (SGPT) U/L  --   --   --   --   --   --  16     Results from last 7 days   Lab Units 12/01/20  2232   TROPONIN T ng/mL <0.010     Results from last 7 days   Lab Units 12/08/20  0347 12/07/20  0335 12/06/20  0335 12/05/20  0518 12/04/20  0357 12/03/20  0339  12/02/20  0711   WBC 10*3/mm3 18.13* 12.36* 13.78* 11.72* 11.48* 9.22  --  9.70   HEMOGLOBIN g/dL 10.2* 10.8* 10.6* 10.2* 9.6* 8.7*  --  9.5*   HEMATOCRIT % 34.1 36.4 35.2 34.3 32.0* 28.9*  --  31.8*   PLATELETS 10*3/mm3 142 169 191 210 238 215  --  232   MCV fL 75.3* 76.0* 76.0* 77.1* 75.8* 74.7*  --  76.8*   MCH pg 22.5* 22.5* 22.9* 22.9* 22.7* 22.5*  --  22.9*   MCHC g/dL 29.9* 29.7* 30.1* 29.7* 30.0* 30.1*  --  29.9*   RDW % 15.9* 16.2* 16.2* 16.1* 16.5* 16.2*  --  16.3*   RDW-SD fl 42.5 44.2 44.2 44.6 44.7 43.6  --  45.6   MPV fL  --   --   --  11.7 11.3 11.4  --  11.5   NEUTROPHIL % % 81.9*  --  81.7* 84.5* 87.4*  --   --  89.7*   LYMPHOCYTE % % 7.3*  --  8.6* 8.4* 6.4*  --   --  8.5*   MONOCYTES % % 6.8  --  8.6 6.3 4.9*  --   --  1.1*   EOSINOPHIL % % 2.9  --  0.4 0.3 0.0*  --   --  0.1*   BASOPHIL % % 0.3  --  0.1 0.0 0.1  --   --  0.1   IMM GRAN % %  --   --   --  0.5 1.2*  --   --  0.5   NEUTROS ABS 10*3/mm3 14.84* 8.68* 11.25* 9.90* 10.04* 8.57*   < > 8.70*   LYMPHS ABS 10*3/mm3 1.32  --  1.19 0.99 0.73  --   --  0.82   MONOS ABS 10*3/mm3 1.24*  --  1.19* 0.74 0.56  --   --  0.11   EOS ABS 10*3/mm3 0.53* 0.66* 0.05 0.03 0.00  --   --  0.01   BASOS ABS 10*3/mm3 0.06  --  0.02 0.00 0.01  --   --  0.01   IMMATURE GRANS (ABS) 10*3/mm3  --   --   --   0.06* 0.14*  --   --  0.05   NRBC /100 WBC  --   --   --  0.1 0.2  --   --  0.0    < > = values in this interval not displayed.     Results from last 7 days   Lab Units 12/04/20  0357 12/03/20  0448 12/02/20  2301 12/02/20  1530 12/02/20  0711 12/01/20  2356   INR   --   --   --   --   --  1.90*   APTT seconds 29.0 186.2* 58.2* 95.9* >200.0* 40.2*         Results from last 7 days   Lab Units 12/03/20  0335   TRIGLYCERIDES mg/dL 109     Results from last 7 days   Lab Units 12/02/20  0711 12/01/20  2232   PROBNP pg/mL 746.2 570.4         Results from last 7 days   Lab Units 12/04/20  0710   PH, ARTERIAL pH units 7.411   PO2 ART mm Hg 79.3*   PCO2, ARTERIAL mm Hg 43.5   HCO3 ART mmol/L 27.6     Results from last 7 days   Lab Units 12/01/20  2232   PROCALCITONIN ng/mL 0.07   LACTATE mmol/L 1.1             Results from last 7 days   Lab Units 12/02/20  0606 12/01/20  2355   BLOODCX   --  No growth at 5 days  No growth at 5 days   RESPCX  Heavy growth (4+) Streptococcus pneumoniae*  Moderate growth (3+) Normal Respiratory Dalia: NO S.aureus/MRSA or Pseudomonas aeruginosa  --          Results from last 7 days   Lab Units 12/01/20  2235   ADENOVIRUS DETECTION BY PCR  Not Detected   CORONAVIRUS 229E  Not Detected   CORONAVIRUS HKU1  Not Detected   CORONAVIRUS NL63  Not Detected   CORONAVIRUS OC43  Not Detected   HUMAN METAPNEUMOVIRUS  Not Detected   HUMAN RHINOVIRUS/ENTEROVIRUS  Not Detected   INFLUENZA B PCR  Not Detected   PARAINFLUENZA 1  Not Detected   PARAINFLUENZA VIRUS 2  Not Detected   PARAINFLUENZA VIRUS 3  Not Detected   PARAINFLUENZA VIRUS 4  Not Detected   BORDETELLA PERTUSSIS PCR  Not Detected   CHLAMYDOPHILA PNEUMONIAE PCR  Not Detected   MYCOPLAMA PNEUMO PCR  Not Detected   INFLUENZA A PCR  Not Detected   RSV, PCR  Not Detected           Imaging Results:  Imaging Results (All)     Procedure Component Value Units Date/Time    FL Video Swallow With Speech Single Contrast [[id number]] Collected: 12/07/20 1533      Updated: 12/07/20 1548    Narrative:      VIDEO ESOPHAGRAM     HISTORY: Dysphagia.     FINDINGS:  The patient exhibits moderate oropharyngeal dysphagia with  delayed initiation of swallowing. There was silent aspiration with a  teaspoon trial of nectar consistency. There was also trace aspiration  with juice wash following soft solids. Please also see the speech  therapist's report.     8 imaging sequences were obtained and the fluoroscopy time measures 2  minutes 1 second.     This report was finalized on 12/7/2020 3:45 PM by Dr. Mike Rivas M.D.       XR Chest 1 View [[id number]] Collected: 12/06/20 0459     Updated: 12/06/20 0504    Narrative:      CHEST X-RAY 1 View:      HISTORY: Follow-up respiratory failure concur from midline placement,  rule out pneumonia       COMPARISON:   12/02/2020.       Impression:      FINDINGS/IMPRESSION:  Right IJ line terminates at the superior cavoatrial junction. The  cardiomediastinal silhouette is stable. Interval improved aeration of  the lungs. No focal consolidation. No pneumothorax. No pleural  effusions. No acute osseous abnormality.     This report was finalized on 12/6/2020 5:01 AM by Dr. Mayo Whaley M.D.       XR Chest 1 View [[id number]] Collected: 12/02/20 0840     Updated: 12/02/20 0909    Narrative:      AP PORTABLE UPRIGHT CHEST     HISTORY: Intubated. Follow-up exam.     COMPARISON: Portable chest 12/01/2020, CT angiogram chest 11/30/2020.     FINDINGS: Endotracheal tube is at the aguila and slightly directed  toward the right mainstem bronchus and recommend withdrawal 2-3 cm.  There are chronic increased interstitial markings associated with  emphysema. Mild patchy infiltrates present in the peripheral aspect of  the right midlung and within the left base. There is no pneumothorax or  evidence for perihilar edema. Right IJ central venous catheter tip  extends into the right atrium. Cardiac monitoring leads are noted. Small  pleural effusions are  suspected.       Impression:      1. ET tube tip is at the aguila and directed slightly toward the right  mainstem bronchus and recommend withdrawal 2-3 cm for better  positioning.   2. Interval placement right IJ central venous catheter with tip in the  SVC.  3. Mild peripheral right midlung and left basilar patchy infiltrates.     This report was finalized on 12/2/2020 9:06 AM by Dr. Chad Truong M.D.       XR Chest 1 View [[id number]] Collected: 12/01/20 2302     Updated: 12/01/20 2310    Narrative:      SINGLE VIEW OF THE CHEST     HISTORY: Intubation     COMPARISON: 11/30/2020 and other prior imaging     FINDINGS:  Endotracheal tube extends into the proximal right mainstem. Retraction  by approximately 2 cm is suggested. Cardiac silhouette is stable.  However, the patient has developed bilateral alveolar and interstitial  infiltrates. There are also small bilateral pleural effusions, right  greater than left. No pneumothorax is seen       Impression:         1. Right mainstem intubation. Retraction by approximately 2 cm is  suggested.  2. Interval development of bilateral alveolar and interstitial  infiltrates. Appearance may reflect edema, although correlation with any  evidence of infection is recommended. There are also small bilateral  pleural effusions.     FINDINGS were relayed to Dr. Gee in the emergency department at 11:05  PM.      This report was finalized on 12/1/2020 11:07 PM by Dr. Emilia Olivo M.D.           --    Discharge Disposition  Home or Self Care    Discharge Medications     Discharge Medications      New Medications      Instructions Start Date   amoxicillin-clavulanate 875-125 MG per tablet  Commonly known as: AUGMENTIN   1 tablet, Oral, Every 12 Hours Scheduled      Xarelto 20 MG tablet  Generic drug: rivaroxaban  Replaces: Xarelto Starter Pack tablet therapy pack starter pack   20 mg, Oral, Daily With Dinner         Continue These Medications      Instructions Start Date    acetaminophen 325 MG tablet  Commonly known as: TYLENOL   650 mg, Oral, Every 4 Hours PRN      Alcohol Swabs 70 % pads   1 each, Subcutaneous, 4 Times Daily      aspirin 81 MG chewable tablet   81 mg, Oral, Daily      clotrimazole-betamethasone 1-0.05 % cream  Commonly known as: LOTRISONE   Topical, Every 12 Hours Scheduled, Apply to perivaginal area and perineum after washing.      FreeStyle Lite device   1 each, Subcutaneous, 4 Times Daily      FREESTYLE TEST STRIPS test strip  Generic drug: glucose blood   1 strip, Other, 4 Times Daily      insulin glargine 100 UNIT/ML injection  Commonly known as: LANTUS   5 Units, Subcutaneous, 2 times daily      ipratropium-albuterol 0.5-2.5 mg/3 ml nebulizer  Commonly known as: DUO-NEB   Inhale 3 mL by nebulization every 4 (Four) hours as needed for wheezing.      omeprazole 20 MG capsule  Commonly known as: priLOSEC   20 mg, Oral, Daily      pramipexole 0.25 MG tablet  Commonly known as: MIRAPEX   0.25 mg, Oral, Nightly         Stop These Medications    methylPREDNISolone 4 MG dose pack  Commonly known as: MEDROL     Xarelto Starter Pack tablet therapy pack starter pack  Generic drug: Rivaroxaban  Replaced by: Xarelto 20 MG tablet            Discharge Diet: Dysphagia diet with nectar thickened liquids    Activity at Discharge:      Contact information for follow-up providers     Provider, No Known .    Contact information:  [facility] [address] [phone]                   Contact information for after-discharge care     Home Medical Care     [facility] .    Service: Home Health Services  Contact information:  [address] [facility] [address]  [facility] [address] [phone]                           See primary care provider, Provider, No Known, in 1-2 weeks        Test Results Pending at Discharge       Reinaldo Matos MD  [facility], [facility]  Pulmonary and Critical Care Medicine  12/08/20  08:08  EST    Time: greater than 30 minutes.        Total time spent discharging patient including evaluation, post hospitalization follow up, medication and post hospitalization instructions and education total time exceeds 30 minutes.      Electronically signed by Reinaldo Matos MD at 12/08/20 6345

## 2020-12-08 NOTE — PROGRESS NOTES
Continued Stay Note  [facility]     Patient Name: Swetha Luis  MRN: [id number]  Today's Date: 2020    Admit Date: 2020    Discharge Plan     Row Name 20 1616       Plan    Plan  Pt d/c home;  She is current with [facility] but they are firing Pt.  New oxygen eval & new oxygen order sent to [facility] after Pt d/c today.  (Pt still has [facility] equipment).    Plan Comments  Per Pt spouse, Pt oxygen order is  per [facility].  [facility] called [facility] [phone] and she advised that they are no longer going to service Pt due to non-compliance.  CCP relayed this information to Pt spouse [phone].  CCP gave Mr. Luis choices for other DME providers in [facility].  Mr. Luis chose [facility].  Referral called oxygen referral to [facility] for home oxygen [phone] at 4:10 PM.  Pt qualifying sat, progress note and oxygen order are in [facility].  CCP will await Shira call with decision.  CCP following.  JEMIMA ROSE/CCP        Discharge Codes    No documentation.       Expected Discharge Date and Time     Expected Discharge Date Expected Discharge Time    Dec 8, 2020             Faith Oquendo RN

## 2020-12-08 NOTE — PROGRESS NOTES
Nutrition Services    Patient Name:  Swetha Luis  YOB: 1960  MRN: [id number]  Admit Date:  12/1/2020    Nutrition follow up:    Noted pt being discharged to home today.     TF discontinued after VFSS yesterday - SLP recommended: Regular diet, NO MIXED consistencies/Honey-thick liquids.       Electronically signed by:  Sophia Escobedo RD  12/08/20 11:30 EST

## 2020-12-08 NOTE — DISCHARGE SUMMARY
PHYSICIAN DISCHARGE SUMMARY                                                                        [facility]    Date of admit: 12/1/2020  Date of Discharge:  12/8/2020    PCP: Provider, No Known    Discharge Diagnosis:     Acute respiratory failure with hypoxia and hypercapnia (CMS/HCC)  Acute on chronic hypoxemic and hypercapnic respiratory failure  Suspected sleep apnea  Tracheocutaneous fistula  Pneumonia  COPD exacerbation  Hypotension on admission resolved  DVT  Chronic kidney disease III  Type 2 diabetes  GERD  Diastolic CHF    Secondary Diagnoses:  Past Medical History:   Diagnosis Date   • Acute congestive heart failure (CMS/HCC) 12/24/2018   • Acute osteomyelitis (CMS/[facility])     Right shoulder due to IVDA   • Acute renal failure on dialysis (CMS/[facility])    • Anxiety    • Pugh esophagus    • CKD (chronic kidney disease)    • COPD (chronic obstructive pulmonary disease) (CMS/[facility])    • MRSA infection    • Nontraumatic subarachnoid hemorrhage (CMS/[facility])    • Seizures (CMS/[facility])    • Tracheostomy present (CMS/[facility])        Procedures Performed           Consults:       Hospital Course  Patient is a 60 y.o. female presented with      Chief complaint:  Altered mental status     History of present illness:     Subjective []Expand by Default     This is a 60-year-old with history of chronic respiratory failure, on oxygen 2 L/min.  She had a prior history of tracheostomy as well.     She recently visited the ED on 11/3/20 for symptoms of left leg pain.  She was stable.  CTA chest showed no PE.  She was discharged home with plan for outpatient Doppler as she was already on anticoagulation..     EMS was called again today for altered mental status and cyanosis per family report.  Patient arrived to the ED, unconscious, bagged by EMS.  Dr. Gee attempted briefly to insert a tracheostomy into the previous site but it was very tight and therefore  she was intubated with ET tube 6.5.  She developed hypotension and required pressors.  She was a very difficult stick and had only 1 peripheral IV and therefore IO was placed.        Patient admitted with acute on chronic hypoxemic and hypercapnic respiratory failure requiring intubation mechanical ventilation.  Altered mental status due to hypercapnia.  This is resolved.  Predominant care was with Dr. Baez and I took over yesterday evening for him.  She is ready for discharge.  Was successfully extubated from the ventilator 12/4/2020 and has been doing well off.  Uses 2 L of oxygen at home and we are weaning down to her home use.  Treated with antibiotic for pneumonia and this has been switched to Augmentin.  She has a few more days of antibiotics to go.  Continue bronchodilators that she uses at home.  Continuing her chronic anticoagulant for DVT.  Speech therapy recommended healthy heart diet with nectar thick liquids.  She may benefit from follow-up with ENT for possibly having tracheocutaneous fistula closed in several weeks.  She is eager to discharge home.  Follow-up with primary care.    Condition on Discharge:  Stable.      Vital Signs  Temp:  [97.6 °F (36.4 °C)-98.2 °F (36.8 °C)] 98 °F (36.7 °C)  Heart Rate:  [] 109  Resp:  [18] 18  BP: (105-109)/(69-80) 109/69    Physical Exam:  General Appearance: no acute distress, appears comfortable  Heart: regular rate and rhythm  Lungs: clear to auscultation bilaterally, respirations unlabored  Abdomen: soft, nontender, nondistended, no guarding/rebound, nl BS  Extremeties: no edema, no cyanosis  Neurology: speech normal, mental status normal, moving all four extremities  Mental Status: alert, oriented        --  Consults:   IP CONSULT TO PULMONOLOGY  IP CONSULT TO NUTRITION SERVICES  IP CONSULT TO CASE MANAGEMENT     Significant Discharge Diagnostics   Procedures Performed:         Pertinent Lab Results:  Results from last 7 days   Lab Units  12/08/20 0348 12/07/20  0335 12/06/20  0335 12/05/20  0518 12/04/20  0357 12/03/20  0335 12/01/20  2232   SODIUM mmol/L 133* 139 139 136 143 141 140   POTASSIUM mmol/L 4.4 4.0 4.4 4.4 4.1 4.0 4.6   CHLORIDE mmol/L 93* 98 98 98 104 108* 106   CO2 mmol/L 33.8* 32.4* 31.2* 30.2* 30.8* 24.3 23.9   BUN mg/dL 47* 34* 32* 31* 22 19 18   CREATININE mg/dL 1.36* 1.07* 1.05* 1.05* 1.03* 1.17* 1.65*   GLUCOSE mg/dL 242* 193* 244* 273* 178* 241* 229*   CALCIUM mg/dL 8.7 8.9 9.4 9.2 9.2 8.8 8.5*   AST (SGOT) U/L  --   --   --   --   --   --  16   ALT (SGPT) U/L  --   --   --   --   --   --  16     Results from last 7 days   Lab Units 12/01/20  2232   TROPONIN T ng/mL <0.010     Results from last 7 days   Lab Units 12/08/20 0347 12/07/20 0335 12/06/20 0335 12/05/20  0518 12/04/20  0357 12/03/20  0339  12/02/20  0711   WBC 10*3/mm3 18.13* 12.36* 13.78* 11.72* 11.48* 9.22  --  9.70   HEMOGLOBIN g/dL 10.2* 10.8* 10.6* 10.2* 9.6* 8.7*  --  9.5*   HEMATOCRIT % 34.1 36.4 35.2 34.3 32.0* 28.9*  --  31.8*   PLATELETS 10*3/mm3 142 169 191 210 238 215  --  232   MCV fL 75.3* 76.0* 76.0* 77.1* 75.8* 74.7*  --  76.8*   MCH pg 22.5* 22.5* 22.9* 22.9* 22.7* 22.5*  --  22.9*   MCHC g/dL 29.9* 29.7* 30.1* 29.7* 30.0* 30.1*  --  29.9*   RDW % 15.9* 16.2* 16.2* 16.1* 16.5* 16.2*  --  16.3*   RDW-SD fl 42.5 44.2 44.2 44.6 44.7 43.6  --  45.6   MPV fL  --   --   --  11.7 11.3 11.4  --  11.5   NEUTROPHIL % % 81.9*  --  81.7* 84.5* 87.4*  --   --  89.7*   LYMPHOCYTE % % 7.3*  --  8.6* 8.4* 6.4*  --   --  8.5*   MONOCYTES % % 6.8  --  8.6 6.3 4.9*  --   --  1.1*   EOSINOPHIL % % 2.9  --  0.4 0.3 0.0*  --   --  0.1*   BASOPHIL % % 0.3  --  0.1 0.0 0.1  --   --  0.1   IMM GRAN % %  --   --   --  0.5 1.2*  --   --  0.5   NEUTROS ABS 10*3/mm3 14.84* 8.68* 11.25* 9.90* 10.04* 8.57*   < > 8.70*   LYMPHS ABS 10*3/mm3 1.32  --  1.19 0.99 0.73  --   --  0.82   MONOS ABS 10*3/mm3 1.24*  --  1.19* 0.74 0.56  --   --  0.11   EOS ABS 10*3/mm3 0.53* 0.66* 0.05  0.03 0.00  --   --  0.01   BASOS ABS 10*3/mm3 0.06  --  0.02 0.00 0.01  --   --  0.01   IMMATURE GRANS (ABS) 10*3/mm3  --   --   --  0.06* 0.14*  --   --  0.05   NRBC /100 WBC  --   --   --  0.1 0.2  --   --  0.0    < > = values in this interval not displayed.     Results from last 7 days   Lab Units 12/04/20  0357 12/03/20  0448 12/02/20  2301 12/02/20  1530 12/02/20  0711 12/01/20  2356   INR   --   --   --   --   --  1.90*   APTT seconds 29.0 186.2* 58.2* 95.9* >200.0* 40.2*         Results from last 7 days   Lab Units 12/03/20  0335   TRIGLYCERIDES mg/dL 109     Results from last 7 days   Lab Units 12/02/20  0711 12/01/20  2232   PROBNP pg/mL 746.2 570.4         Results from last 7 days   Lab Units 12/04/20  0710   PH, ARTERIAL pH units 7.411   PO2 ART mm Hg 79.3*   PCO2, ARTERIAL mm Hg 43.5   HCO3 ART mmol/L 27.6     Results from last 7 days   Lab Units 12/01/20  2232   PROCALCITONIN ng/mL 0.07   LACTATE mmol/L 1.1             Results from last 7 days   Lab Units 12/02/20  0606 12/01/20  2355   BLOODCX   --  No growth at 5 days  No growth at 5 days   RESPCX  Heavy growth (4+) Streptococcus pneumoniae*  Moderate growth (3+) Normal Respiratory Dalia: NO S.aureus/MRSA or Pseudomonas aeruginosa  --          Results from last 7 days   Lab Units 12/01/20  2235   ADENOVIRUS DETECTION BY PCR  Not Detected   CORONAVIRUS 229E  Not Detected   CORONAVIRUS HKU1  Not Detected   CORONAVIRUS NL63  Not Detected   CORONAVIRUS OC43  Not Detected   HUMAN METAPNEUMOVIRUS  Not Detected   HUMAN RHINOVIRUS/ENTEROVIRUS  Not Detected   INFLUENZA B PCR  Not Detected   PARAINFLUENZA 1  Not Detected   PARAINFLUENZA VIRUS 2  Not Detected   PARAINFLUENZA VIRUS 3  Not Detected   PARAINFLUENZA VIRUS 4  Not Detected   BORDETELLA PERTUSSIS PCR  Not Detected   CHLAMYDOPHILA PNEUMONIAE PCR  Not Detected   MYCOPLAMA PNEUMO PCR  Not Detected   INFLUENZA A PCR  Not Detected   RSV, PCR  Not Detected           Imaging Results:  Imaging Results (All)      Procedure Component Value Units Date/Time    FL Video Swallow With Speech Single Contrast [[id number]] Collected: 12/07/20 1538     Updated: 12/07/20 1548    Narrative:      VIDEO ESOPHAGRAM     HISTORY: Dysphagia.     FINDINGS:  The patient exhibits moderate oropharyngeal dysphagia with  delayed initiation of swallowing. There was silent aspiration with a  teaspoon trial of nectar consistency. There was also trace aspiration  with juice wash following soft solids. Please also see the speech  therapist's report.     8 imaging sequences were obtained and the fluoroscopy time measures 2  minutes 1 second.     This report was finalized on 12/7/2020 3:45 PM by Dr. Mike Rivas M.D.       XR Chest 1 View [[id number]] Collected: 12/06/20 0459     Updated: 12/06/20 0504    Narrative:      CHEST X-RAY 1 View:      HISTORY: Follow-up respiratory failure concur from midline placement,  rule out pneumonia       COMPARISON:   12/02/2020.       Impression:      FINDINGS/IMPRESSION:  Right IJ line terminates at the superior cavoatrial junction. The  cardiomediastinal silhouette is stable. Interval improved aeration of  the lungs. No focal consolidation. No pneumothorax. No pleural  effusions. No acute osseous abnormality.     This report was finalized on 12/6/2020 5:01 AM by Dr. Mayo Whaley M.D.       XR Chest 1 View [[id number]] Collected: 12/02/20 0840     Updated: 12/02/20 0909    Narrative:      AP PORTABLE UPRIGHT CHEST     HISTORY: Intubated. Follow-up exam.     COMPARISON: Portable chest 12/01/2020, CT angiogram chest 11/30/2020.     FINDINGS: Endotracheal tube is at the aguila and slightly directed  toward the right mainstem bronchus and recommend withdrawal 2-3 cm.  There are chronic increased interstitial markings associated with  emphysema. Mild patchy infiltrates present in the peripheral aspect of  the right midlung and within the left base. There is no pneumothorax or  evidence for perihilar edema. Right IJ  central venous catheter tip  extends into the right atrium. Cardiac monitoring leads are noted. Small  pleural effusions are suspected.       Impression:      1. ET tube tip is at the aguila and directed slightly toward the right  mainstem bronchus and recommend withdrawal 2-3 cm for better  positioning.   2. Interval placement right IJ central venous catheter with tip in the  SVC.  3. Mild peripheral right midlung and left basilar patchy infiltrates.     This report was finalized on 12/2/2020 9:06 AM by Dr. Chad Truong M.D.       XR Chest 1 View [[id number]] Collected: 12/01/20 2302     Updated: 12/01/20 2310    Narrative:      SINGLE VIEW OF THE CHEST     HISTORY: Intubation     COMPARISON: 11/30/2020 and other prior imaging     FINDINGS:  Endotracheal tube extends into the proximal right mainstem. Retraction  by approximately 2 cm is suggested. Cardiac silhouette is stable.  However, the patient has developed bilateral alveolar and interstitial  infiltrates. There are also small bilateral pleural effusions, right  greater than left. No pneumothorax is seen       Impression:         1. Right mainstem intubation. Retraction by approximately 2 cm is  suggested.  2. Interval development of bilateral alveolar and interstitial  infiltrates. Appearance may reflect edema, although correlation with any  evidence of infection is recommended. There are also small bilateral  pleural effusions.     FINDINGS were relayed to Dr. Gee in the emergency department at 11:05  PM.      This report was finalized on 12/1/2020 11:07 PM by Dr. Emilia Olivo M.D.           --    Discharge Disposition  Home or Self Care    Discharge Medications     Discharge Medications      New Medications      Instructions Start Date   amoxicillin-clavulanate 875-125 MG per tablet  Commonly known as: AUGMENTIN   1 tablet, Oral, Every 12 Hours Scheduled      Xarelto 20 MG tablet  Generic drug: rivaroxaban  Replaces: Xarelto Starter Pack tablet  therapy pack starter pack   20 mg, Oral, Daily With Dinner         Continue These Medications      Instructions Start Date   acetaminophen 325 MG tablet  Commonly known as: TYLENOL   650 mg, Oral, Every 4 Hours PRN      Alcohol Swabs 70 % pads   1 each, Subcutaneous, 4 Times Daily      aspirin 81 MG chewable tablet   81 mg, Oral, Daily      clotrimazole-betamethasone 1-0.05 % cream  Commonly known as: LOTRISONE   Topical, Every 12 Hours Scheduled, Apply to perivaginal area and perineum after washing.      FreeStyle Lite device   1 each, Subcutaneous, 4 Times Daily      FREESTYLE TEST STRIPS test strip  Generic drug: glucose blood   1 strip, Other, 4 Times Daily      insulin glargine 100 UNIT/ML injection  Commonly known as: LANTUS   5 Units, Subcutaneous, 2 times daily      ipratropium-albuterol 0.5-2.5 mg/3 ml nebulizer  Commonly known as: DUO-NEB   Inhale 3 mL by nebulization every 4 (Four) hours as needed for wheezing.      omeprazole 20 MG capsule  Commonly known as: priLOSEC   20 mg, Oral, Daily      pramipexole 0.25 MG tablet  Commonly known as: MIRAPEX   0.25 mg, Oral, Nightly         Stop These Medications    methylPREDNISolone 4 MG dose pack  Commonly known as: MEDROL     Xarelto Starter Pack tablet therapy pack starter pack  Generic drug: Rivaroxaban  Replaced by: Xarelto 20 MG tablet            Discharge Diet: Dysphagia diet with nectar thickened liquids    Activity at Discharge:      Contact information for follow-up providers     Provider, No Known .    Contact information:  [facility] [address] [phone]                   Contact information for after-discharge care     Home Medical Care     [facility] .    Service: Home Health Services  Contact information:  [address] [facility] [address]  [facility] [address] [phone]                           See primary care provider, Provider, No Known, in 1-2 weeks        Test Results Pending at  Discharge       Reinaldo Matos MD  [facility], [facility]  Pulmonary and Critical Care Medicine  12/08/20  08:08 EST    Time: greater than 30 minutes.        Total time spent discharging patient including evaluation, post hospitalization follow up, medication and post hospitalization instructions and education total time exceeds 30 minutes.

## 2020-12-08 NOTE — PROGRESS NOTES
Pharmacy Services-Discharge Medication Education    Swetha Luis was started on maintenance dose of Xarelto after starter pack for DVT, and Augmentin for PNA. Counseling points included the followin) Explained indication of each new medication, and patient's need for these medications.  2) Went over dosing and frequency of each new medication.  3) Discussed any special administration, storage, or monitoring instructions with medications.  4) Discussed all important drug interactions, including over-the-counter medications and supplements.  5) Explained possible side effects for each new medication.  6) Instructed the patient not to begin or discontinue any medications without informing his/her physician/pharmacist.    Patient expressed understanding and had no further questions.      Radha Liao, Pharm.D., [facility]   Clinical Staff Pharmacist   Phone Extension #[phone]

## 2020-12-09 ENCOUNTER — TRANSITIONAL CARE MANAGEMENT TELEPHONE ENCOUNTER (OUTPATIENT)
Dept: CALL CENTER | Facility: HOSPITAL | Age: 60
End: 2020-12-09

## 2020-12-09 NOTE — PROGRESS NOTES
Case Management Discharge Note      Final Note: Pt d/c home 12/8/2020.  Received a call from [employer] today advising Pt was approved for home oxygen but she was still trying to get a hold of Pt to make delivery.  Of note, Pt still has the oxygen tanks from [place] (they will no longer service Pt per Lena liaison).  JEMIMA ROSE/CCP         Selected Continued Care - Discharged on 12/8/2020 Admission date: 12/1/2020 - Discharge disposition: Home or Self Care    Destination    No services have been selected for the patient.              Durable Medical Equipment Coordination complete    Service Provider Selected Services Address Phone Fax Patient Preferred    [facility]  Durable Medical Equipment [id number] [facility] [address] [phone] --          Dialysis/Infusion    No services have been selected for the patient.              Home Medical Care    No services have been selected for the patient.              Therapy    No services have been selected for the patient.              Community Resources    No services have been selected for the patient.                       Final Discharge Disposition Code: 01 - home or self-care

## 2020-12-09 NOTE — OUTREACH NOTE
Call Center TCM Note      Responses   [facility] patient discharged from?  [place]   Does the patient have one of the following disease processes/diagnoses(primary or secondary)?  COPD/Pneumonia   Was the primary reason for admission:  Pneumonia   TCM attempt successful?  No   Unsuccessful attempts  Attempt 1          Elizabeth Michel LPN    12/9/2020, 14:05 EST

## 2020-12-09 NOTE — OUTREACH NOTE
Call Center TCM Note      Responses   [facility] patient discharged from?  [place]   Does the patient have one of the following disease processes/diagnoses(primary or secondary)?  COPD/Pneumonia   Was the primary reason for admission:  Pneumonia   TCM attempt successful?  No   Unsuccessful attempts  Attempt 2          Elizabeth Michel LPN    12/9/2020, 16:37 EST

## 2020-12-09 NOTE — OUTREACH NOTE
Prep Survey      Responses   [facility] facility patient discharged from?  [place]   Is LACE score < 7 ?  No   Eligibility  [facility]   Date of Admission  12/01/20   Date of Discharge  12/08/20   Discharge Disposition  Home-Health Care Svc   Discharge diagnosis  Acute on chronic hypoxemic and hypercapnic respiratory failure COPD exacerbation/Pneumonia   Does the patient have one of the following disease processes/diagnoses(primary or secondary)?  COPD/Pneumonia   Does the patient have Home health ordered?  Yes   What is the Home health agency?   [facility]   Is there a DME ordered?  Yes   What DME was ordered?  O2 per Veronique    Comments regarding appointments  please see AVS   General alerts for this patient  -- [Trach out]   Prep survey completed?  Yes          Monserrat Zabala RN

## 2020-12-10 ENCOUNTER — TRANSITIONAL CARE MANAGEMENT TELEPHONE ENCOUNTER (OUTPATIENT)
Dept: CALL CENTER | Facility: HOSPITAL | Age: 60
End: 2020-12-10

## 2020-12-10 NOTE — OUTREACH NOTE
Call Center TCM Note      Responses   [facility] facility patient discharged from?  [place]   Does the patient have one of the following disease processes/diagnoses(primary or secondary)?  COPD/Pneumonia   Was the primary reason for admission:  Pneumonia   TCM attempt successful?  No [No verbal release]   Unsuccessful attempts  Attempt 3          Izabel Braker RN    12/10/2020, 08:30 EST

## 2020-12-15 ENCOUNTER — READMISSION MANAGEMENT (OUTPATIENT)
Dept: CALL CENTER | Facility: HOSPITAL | Age: 60
End: 2020-12-15

## 2020-12-15 NOTE — OUTREACH NOTE
COPD/PN Week 2 Survey      Responses   [facility] patient discharged from?  [place]   Does the patient have one of the following disease processes/diagnoses(primary or secondary)?  COPD/Pneumonia   Was the primary reason for admission:  Pneumonia   Week 2 attempt successful?  Yes   Call start time  1719   Call end time  1723   Discharge diagnosis  Acute on chronic hypoxemic and hypercapnic respiratory failure COPD exacerbation/Pneumonia   Does the patient have an appointment with their PCP or specialist within 7 days of discharge?  Yes   Has the patient kept scheduled appointments due by today?  N/A   Comments  Rescheduled appt per    What is the Home health agency?   [facility]   Has home health visited the patient within 72 hours of discharge?  N/A   What DME was ordered?  O2 per [employer] and nebs   DME comments  Home O2 switched to [employer]   Pulse Ox monitoring  None   Comments  Pt is difficult to understand per . She is improving since Dc, although she does not like to do the home nebs.    What is the patient's perception of their health status since discharge?  Improving   Is the patient/caregiver able to teach back the hierarchy of who to call/visit for symptoms/problems? PCP, Specialist, Home health nurse, Urgent Care, ED, 911  Yes   Week 2 call completed?  Yes   Wrap up additional comments  quick call, pt's  said she was doing fine now.           Zofia Damon RN

## 2020-12-22 ENCOUNTER — READMISSION MANAGEMENT (OUTPATIENT)
Dept: CALL CENTER | Facility: HOSPITAL | Age: 60
End: 2020-12-22

## 2020-12-22 NOTE — OUTREACH NOTE
COPD/PN Week 3 Survey      Responses   [facility] patient discharged from?  [place]   Does the patient have one of the following disease processes/diagnoses(primary or secondary)?  COPD/Pneumonia   Was the primary reason for admission:  Pneumonia   Week 3 attempt successful?  No   Unsuccessful attempts  Attempt 1          Lena Solis RN

## 2020-12-28 ENCOUNTER — READMISSION MANAGEMENT (OUTPATIENT)
Dept: CALL CENTER | Facility: HOSPITAL | Age: 60
End: 2020-12-28

## 2020-12-28 NOTE — OUTREACH NOTE
COPD/PN Week 3 Survey      Responses   [facility] patient discharged from?  [place]   Does the patient have one of the following disease processes/diagnoses(primary or secondary)?  COPD/Pneumonia   Was the primary reason for admission:  Pneumonia   Week 3 attempt successful?  No   Unsuccessful attempts  Attempt 2          Radha Mack RN

## 2021-01-18 ENCOUNTER — HOSPITAL ENCOUNTER (EMERGENCY)
Facility: HOSPITAL | Age: 61
Discharge: HOME | End: 2021-01-19
Attending: EMERGENCY MEDICINE | Admitting: EMERGENCY MEDICINE

## 2021-01-18 VITALS
OXYGEN SATURATION: 98 % | HEART RATE: 106 BPM | DIASTOLIC BLOOD PRESSURE: 97 MMHG | RESPIRATION RATE: 22 BRPM | TEMPERATURE: 97.9 F | SYSTOLIC BLOOD PRESSURE: 152 MMHG

## 2021-01-18 DIAGNOSIS — M79.605 PAIN IN BOTH LOWER EXTREMITIES: Primary | ICD-10-CM

## 2021-01-18 DIAGNOSIS — G89.4 CHRONIC PAIN SYNDROME: ICD-10-CM

## 2021-01-18 DIAGNOSIS — M79.604 PAIN IN BOTH LOWER EXTREMITIES: Primary | ICD-10-CM

## 2021-01-18 LAB
ALBUMIN SERPL-MCNC: 3.8 G/DL (ref 3.5–5.2)
ALBUMIN/GLOB SERPL: 1.3 G/DL
ALP SERPL-CCNC: 196 U/L (ref 39–117)
ALT SERPL W P-5'-P-CCNC: 8 U/L (ref 1–33)
ANION GAP SERPL CALCULATED.3IONS-SCNC: 9.6 MMOL/L (ref 5–15)
APTT PPP: 34.8 SECONDS (ref 22.7–35.4)
AST SERPL-CCNC: 10 U/L (ref 1–32)
BASOPHILS # BLD AUTO: 0.05 10*3/MM3 (ref 0–0.2)
BASOPHILS NFR BLD AUTO: 0.4 % (ref 0–1.5)
BILIRUB SERPL-MCNC: <0.2 MG/DL (ref 0–1.2)
BUN SERPL-MCNC: 17 MG/DL (ref 8–23)
BUN/CREAT SERPL: 19.1 (ref 7–25)
CALCIUM SPEC-SCNC: 8.9 MG/DL (ref 8.6–10.5)
CHLORIDE SERPL-SCNC: 106 MMOL/L (ref 98–107)
CO2 SERPL-SCNC: 25.4 MMOL/L (ref 22–29)
CREAT SERPL-MCNC: 0.89 MG/DL (ref 0.57–1)
D DIMER PPP FEU-MCNC: 0.55 MCGFEU/ML (ref 0–0.49)
DEPRECATED RDW RBC AUTO: 41.6 FL (ref 37–54)
EOSINOPHIL # BLD AUTO: 0.36 10*3/MM3 (ref 0–0.4)
EOSINOPHIL NFR BLD AUTO: 3 % (ref 0.3–6.2)
ERYTHROCYTE [DISTWIDTH] IN BLOOD BY AUTOMATED COUNT: 16.2 % (ref 12.3–15.4)
GFR SERPL CREATININE-BSD FRML MDRD: 65 ML/MIN/1.73
GLOBULIN UR ELPH-MCNC: 2.9 GM/DL
GLUCOSE SERPL-MCNC: 120 MG/DL (ref 65–99)
HCT VFR BLD AUTO: 31.7 % (ref 34–46.6)
HGB BLD-MCNC: 9.4 G/DL (ref 12–15.9)
INR PPP: 2.07 (ref 0.9–1.1)
LYMPHOCYTES # BLD AUTO: 1.75 10*3/MM3 (ref 0.7–3.1)
LYMPHOCYTES NFR BLD AUTO: 14.6 % (ref 19.6–45.3)
MCH RBC QN AUTO: 21.6 PG (ref 26.6–33)
MCHC RBC AUTO-ENTMCNC: 29.7 G/DL (ref 31.5–35.7)
MCV RBC AUTO: 72.9 FL (ref 79–97)
MONOCYTES # BLD AUTO: 0.77 10*3/MM3 (ref 0.1–0.9)
MONOCYTES NFR BLD AUTO: 6.4 % (ref 5–12)
NEUTROPHILS NFR BLD AUTO: 75.3 % (ref 42.7–76)
NEUTROPHILS NFR BLD AUTO: 8.99 10*3/MM3 (ref 1.7–7)
PLATELET # BLD AUTO: 296 10*3/MM3 (ref 140–450)
PMV BLD AUTO: 11.6 FL (ref 6–12)
POTASSIUM SERPL-SCNC: 3.9 MMOL/L (ref 3.5–5.2)
PROT SERPL-MCNC: 6.7 G/DL (ref 6–8.5)
PROTHROMBIN TIME: 23.1 SECONDS (ref 11.7–14.2)
RBC # BLD AUTO: 4.35 10*6/MM3 (ref 3.77–5.28)
SODIUM SERPL-SCNC: 141 MMOL/L (ref 136–145)
WBC # BLD AUTO: 11.96 10*3/MM3 (ref 3.4–10.8)

## 2021-01-18 PROCEDURE — 85025 COMPLETE CBC W/AUTO DIFF WBC: CPT | Performed by: EMERGENCY MEDICINE

## 2021-01-18 PROCEDURE — 99283 EMERGENCY DEPT VISIT LOW MDM: CPT

## 2021-01-18 PROCEDURE — 85730 THROMBOPLASTIN TIME PARTIAL: CPT | Performed by: EMERGENCY MEDICINE

## 2021-01-18 PROCEDURE — 85379 FIBRIN DEGRADATION QUANT: CPT | Performed by: EMERGENCY MEDICINE

## 2021-01-18 PROCEDURE — 80053 COMPREHEN METABOLIC PANEL: CPT | Performed by: EMERGENCY MEDICINE

## 2021-01-18 PROCEDURE — 36415 COLL VENOUS BLD VENIPUNCTURE: CPT

## 2021-01-18 PROCEDURE — 85610 PROTHROMBIN TIME: CPT | Performed by: EMERGENCY MEDICINE

## 2021-01-18 RX ORDER — SODIUM CHLORIDE 0.9 % (FLUSH) 0.9 %
10 SYRINGE (ML) INJECTION AS NEEDED
Status: DISCONTINUED | OUTPATIENT
Start: 2021-01-18 | End: 2021-01-19 | Stop reason: HOSPADM

## 2021-01-18 RX ORDER — ALPRAZOLAM 0.25 MG/1
0.25 TABLET ORAL ONCE
Status: COMPLETED | OUTPATIENT
Start: 2021-01-18 | End: 2021-01-18

## 2021-01-18 RX ADMIN — ALPRAZOLAM 0.25 MG: 0.25 TABLET ORAL at 23:30

## 2021-01-19 NOTE — ED PROVIDER NOTES
EMERGENCY DEPARTMENT ENCOUNTER    Room Number:  20/20  Date of encounter:  1/21/2021  PCP: Provider, No Known  Historian: Patient      HPI:  Chief Complaint: Bilateral leg pain  A complete HPI/ROS/PMH/PSH/SH/FH are unobtainable due to: Nothing    Context: Swetha Luis is a 60 y.o. female who presents to the ED c/o severe, aching bilateral leg pain which is an ongoing problem is just been worse last couple days.  Patient has chronic DVTs for which she takes Xarelto.  She is not able to localize the pain very well, she says is just all over in both legs.  She says her left leg is chronically swollen more than the right secondary to the chronic DVT and there is no change.  She is had no fevers, no nausea vomiting.    Patient has a tracheostomy secondary to her significant COPD.  Also looking for medical record in epic, she has history of polysubstance abuse and chronic pain disease.  She also has a history of medical noncompliance.      PAST MEDICAL HISTORY  Active Ambulatory Problems     Diagnosis Date Noted   • Tracheostomy complication (CMS/[facility]) 12/18/2018   • Gangrene of toe (CMS/[facility]) 12/20/2018   • Acute diastolic congestive heart failure (CMS/[facility]) 12/24/2018   • ARF (acute renal failure) (CMS/[facility]) 12/24/2018   • Stage 3 chronic kidney disease 12/24/2018   • Elevated troponin 12/24/2018   • Acute respiratory failure with hypoxemia (CMS/[facility]) 12/24/2018   • Acute osteomyelitis (CMS/[facility]) 12/26/2018   • UTI due to extended-spectrum beta lactamase (ESBL) producing Escherichia coli 12/27/2018   • Thrush, oral 12/28/2018   • Tracheostomy malfunction (CMS/[facility]) 07/10/2019   • COPD (chronic obstructive pulmonary disease) (CMS/[facility]) 07/10/2019   • Chronic respiratory failure with hypoxia (CMS/[facility]) 07/10/2019   • PEG (percutaneous endoscopic gastrostomy) status (CMS/[facility]) 07/10/2019   • History of osteomyelitis 07/10/2019   • Polysubstance abuse (CMS/[facility]) 07/10/2019   • History of tracheal stenosis 07/10/2019   • Chronic  diastolic CHF (congestive heart failure) (CMS/HCC) 07/10/2019   • Peripheral artery disease (CMS/HCC) 07/10/2019   • Medically noncompliant 07/10/2019   • WENDI and COPD overlap syndrome (CMS/HCC) 07/10/2019   • Dyspnea 09/22/2019   • Elevated LFTs 09/22/2019   • Elevated troponin 09/22/2019   • Tracheostomy present (CMS/HCC) 09/22/2019   • Essential hypertension 09/22/2019   • Dysphagia 09/22/2019   • Healthcare associated bacterial pneumonia 10/04/2019   • Infection due to ESBL-producing Escherichia coli 10/06/2019   • Sepsis due to Gram negative bacteria (CMS/HCC) 10/06/2019   • Ureteral stone with hydronephrosis 10/08/2019   • UTI (urinary tract infection) 10/08/2019   • Acute tracheobronchitis 10/19/2019   • Chronic diastolic CHF (congestive heart failure) (CMS/HCC) 10/19/2019   • Bacteremia 10/19/2019   • Acute deep vein thrombosis (DVT) of proximal vein of left lower extremity (CMS/HCC) 11/15/2020   • Elevated d-dimer 11/15/2020   • Type 2 diabetes mellitus with hyperglycemia, with long-term current use of insulin (CMS/HCC) 11/16/2020   • Acute respiratory failure with hypoxia and hypercapnia (CMS/HCC) 12/02/2020     Resolved Ambulatory Problems     Diagnosis Date Noted   • No Resolved Ambulatory Problems     Past Medical History:   Diagnosis Date   • Acute congestive heart failure (CMS/HCC) 12/24/2018   • Acute renal failure on dialysis (CMS/HCC)    • Anxiety    • Pugh esophagus    • CKD (chronic kidney disease)    • MRSA infection    • Nontraumatic subarachnoid hemorrhage (CMS/HCC)    • Seizures (CMS/HCC)          PAST SURGICAL HISTORY  Past Surgical History:   Procedure Laterality Date   • TRACHEOSTOMY     • URETEROSCOPY LASER LITHOTRIPSY WITH STENT INSERTION Left 10/11/2019    Procedure: CYSTOSCOPY,  URETEROSCOPY, LEFT RETROGRADE, LEFT PYELOGRAM, LASER LITHOTRIPSY, PLACEMENT OF STENT.;  Surgeon: Clyde Selby MD;  Location: [facility] OR;  Service: Urology         FAMILY HISTORY  Family History  "  Problem Relation Age of Onset   • Diabetes Mother    • Hypertension Mother          SOCIAL HISTORY  Social History     Socioeconomic History   • Marital status:      Spouse name: Not on file   • Number of children: Not on file   • Years of education: Not on file   • Highest education level: Not on file   Tobacco Use   • Smoking status: Current Every Day Smoker     Packs/day: 0.50     Types: Cigarettes   • Smokeless tobacco: Never Used   • Tobacco comment: \"1 CIGARETTE A DAY\"   Substance and Sexual Activity   • Alcohol use: No     Frequency: Never   • Drug use: Yes     Types: Cocaine(coke)     Comment: 20 years ago occasional   • Sexual activity: Defer         ALLERGIES  Cephalexin, Hydrocodone, Strawberry, and Zithromax [azithromycin]        REVIEW OF SYSTEMS  Review of Systems     All systems reviewed and negative except for those discussed in HPI.       PHYSICAL EXAM    I have reviewed the triage vital signs and nursing notes.    ED Triage Vitals [01/18/21 2155]   Temp Heart Rate Resp BP SpO2   97.9 °F (36.6 °C) 106 22 128/90 99 %      Temp src Heart Rate Source Patient Position BP Location FiO2 (%)   Tympanic Monitor -- -- --       Physical Exam  GENERAL: Awake and alert, older than stated age, has a tracheostomy which appears to be functioning properly.  She does not appear ill and she is very anxious  HENT: nares patent  EYES: no scleral icterus  CV: regular rhythm, regular rate  RESPIRATORY: normal effort, CTA bilaterally with no distress  ABDOMEN: soft, nontender palpation, bowel sounds present  MUSCULOSKELETAL: no deformity.  The left lower extremity has some chronic appearing 1+ pitting edema.  There is no erythema or warmth, it is tender to palpation almost everywhere I touch.  The right leg has no swelling, but again sort of diffuse soft tissue tenderness almost everywhere I touch.  The distal pulses are 1+ and symmetric in the feet.  The cap refill is less than 2 seconds.  NEURO: alert, moves " all extremities, follows commands  SKIN: warm, dry        LAB RESULTS  No results found for this or any previous visit (from the past 24 hour(s)).    Ordered the above labs and independently reviewed the results.        RADIOLOGY  No Radiology Exams Resulted Within Past 24 Hours    I ordered the above noted radiological studies. Reviewed by me and discussed with radiologist.  See dictation for official radiology interpretation.      PROCEDURES    Procedures      MEDICATIONS GIVEN IN ER    Medications   ALPRAZolam (XANAX) tablet 0.25 mg (0.25 mg Oral Given 1/18/21 2330)         PROGRESS, DATA ANALYSIS, CONSULTS, AND MEDICAL DECISION MAKING    All labs have been independently reviewed by me.  All radiology studies have been reviewed by me and discussed with radiologist dictating the report.   EKG's independently viewed and interpreted by me.  Discussion below represents my analysis of pertinent findings related to patient's condition, differential diagnosis, treatment plan and final disposition.        ED Course as of Jan 21 1529   Thu Jan 21, 2021   1526 Patient's CBC shows a very mild leukocytosis, and a chronic anemia which appears stable.    [DP]   1526 Her chemistry is normal    [DP]   1526 INR is therapeutic    [DP]   1526 D-dimer slightly elevated, however when looking back through the chart it appears to be elevated chronically    [DP]   1527 She had a venous Doppler in November 2020 which again demonstrates the subacute DVT in her left leg.  She has no chest pain or shortness of breath.  Her vital signs are stable, and she is therapeutic on her INR with adequate peripheral perfusion.  I spoke with her that narcotic analgesia would not be indicated in this case, I did give her a Xanax because she was anxious, and I suggested that she wear compression stockings on that left leg for the swelling and keep it elevated.  She can follow-up with her PCP for ongoing issues    [DP]   1528 I do not think that reimaging  the venous system in the left leg is of value as this is an ongoing issue in her anticoagulation is adequate.  Certainly if she had any respiratory symptoms or abnormal vital signs I would have considered CT angiography of the chest, but she had none of those at presentation.  Only leg pain    [DP]      ED Course User Index  [DP] Cristopher Gee MD           PPE: Both the patient and I wore a surgical mask throughout the entire patient encounter. I wore protective goggles    AS OF 15:29 EST VITALS:    BP - 152/97  HR - 106  TEMP - 97.9 °F (36.6 °C) (Tympanic)  O2 SATS - 98%        DIAGNOSIS  Final diagnoses:   Pain in both lower extremities   Chronic pain syndrome         DISPOSITION  Discharge           Cristopher Gee MD  01/21/21 9557

## 2021-01-19 NOTE — ED TRIAGE NOTES
Pt arrives via EMS from home. Per EMS pt has R leg pain that started 4 days ago after a fall. Pt has hx of blood clots and reports this feels similar. Pt reports she does take blood thinners. Pt chronically on 3L O2 and has trach.     Pt masked on arrival, staff masked    Run# [id number]

## 2022-01-12 ENCOUNTER — INPATIENT HOSPITAL (OUTPATIENT)
Dept: URBAN - METROPOLITAN AREA HOSPITAL 107 | Facility: HOSPITAL | Age: 62
End: 2022-01-12
Payer: MEDICAID

## 2022-01-12 DIAGNOSIS — R63.30 FEEDING DIFFICULTIES, UNSPECIFIED: ICD-10-CM

## 2022-01-12 DIAGNOSIS — K21.9 GASTRO-ESOPHAGEAL REFLUX DISEASE WITHOUT ESOPHAGITIS: ICD-10-CM

## 2022-01-12 DIAGNOSIS — K94.23 GASTROSTOMY MALFUNCTION: ICD-10-CM

## 2022-01-12 DIAGNOSIS — J44.9 CHRONIC OBSTRUCTIVE PULMONARY DISEASE, UNSPECIFIED: ICD-10-CM

## 2022-01-12 PROCEDURE — 99223 1ST HOSP IP/OBS HIGH 75: CPT | Performed by: PHYSICIAN ASSISTANT

## 2022-01-13 PROCEDURE — 99233 SBSQ HOSP IP/OBS HIGH 50: CPT | Performed by: PHYSICIAN ASSISTANT

## 2022-01-14 ENCOUNTER — INPATIENT HOSPITAL (OUTPATIENT)
Dept: URBAN - METROPOLITAN AREA HOSPITAL 107 | Facility: HOSPITAL | Age: 62
End: 2022-01-14
Payer: MEDICAID

## 2022-01-14 DIAGNOSIS — R10.84 GENERALIZED ABDOMINAL PAIN: ICD-10-CM

## 2022-01-14 DIAGNOSIS — K94.23 GASTROSTOMY MALFUNCTION: ICD-10-CM

## 2022-01-14 DIAGNOSIS — R63.30 FEEDING DIFFICULTIES, UNSPECIFIED: ICD-10-CM

## 2022-01-14 PROCEDURE — 99233 SBSQ HOSP IP/OBS HIGH 50: CPT | Performed by: PHYSICIAN ASSISTANT

## 2022-01-17 ENCOUNTER — INPATIENT HOSPITAL (OUTPATIENT)
Dept: URBAN - METROPOLITAN AREA HOSPITAL 107 | Facility: HOSPITAL | Age: 62
End: 2022-01-17
Payer: MEDICAID

## 2022-01-17 DIAGNOSIS — K94.23 GASTROSTOMY MALFUNCTION: ICD-10-CM

## 2022-01-17 PROCEDURE — 99231 SBSQ HOSP IP/OBS SF/LOW 25: CPT | Performed by: INTERNAL MEDICINE

## 2022-01-19 ENCOUNTER — INPATIENT HOSPITAL (OUTPATIENT)
Dept: URBAN - METROPOLITAN AREA HOSPITAL 107 | Facility: HOSPITAL | Age: 62
End: 2022-01-19
Payer: MEDICAID

## 2022-01-19 DIAGNOSIS — K94.23 GASTROSTOMY MALFUNCTION: ICD-10-CM

## 2022-01-19 PROCEDURE — 99232 SBSQ HOSP IP/OBS MODERATE 35: CPT | Performed by: PHYSICIAN ASSISTANT

## 2022-01-20 ENCOUNTER — INPATIENT HOSPITAL (OUTPATIENT)
Dept: URBAN - METROPOLITAN AREA HOSPITAL 107 | Facility: HOSPITAL | Age: 62
End: 2022-01-20
Payer: MEDICAID

## 2022-01-20 DIAGNOSIS — K94.23 GASTROSTOMY MALFUNCTION: ICD-10-CM

## 2022-01-20 DIAGNOSIS — R63.30 FEEDING DIFFICULTIES, UNSPECIFIED: ICD-10-CM

## 2022-01-20 PROCEDURE — 99232 SBSQ HOSP IP/OBS MODERATE 35: CPT | Performed by: PHYSICIAN ASSISTANT

## 2022-09-12 NOTE — PROGRESS NOTES
"Pharmacokinetic Consult - Vancomycin/Zosyn Dosing (Initial Note)    Swetha Luis has been consulted for pharmacy to dose vancomycin for HCAP per Dr. Moreno's request. Goal trough: 15-20 mcg/mL.    Duration of Therapy: 7 days    Other Antimicrobials: Zosyn (Rx to dose) x 7days    Relevant clinical data and objective history reviewed:  59 y.o. female 167.6 cm (65.98\") 74 kg (163 lb 2.3 oz)    Vitals:    10/04/19 0632 10/04/19 0739 10/04/19 0856 10/04/19 1126   BP:  148/84 129/86 130/74   BP Location:  Right arm Right arm Right arm   Pulse: 91 91 87 79   Resp:  24 22 22   Temp:  99.6 °F (37.6 °C) (!) 101.1 °F (38.4 °C) 98.4 °F (36.9 °C)   TempSrc:  Oral Tympanic Oral   SpO2: 95% 99% 97% 98%     Creatinine   Date Value Ref Range Status   10/04/2019 1.21 (H) 0.57 - 1.00 mg/dL Final     BUN   Date Value Ref Range Status   10/04/2019 19 6 - 20 mg/dL Final     Estimated Creatinine Clearance: 51.5 mL/min (A) (by C-G formula based on SCr of 1.21 mg/dL (H)).    Lab Results   Component Value Date    WBC 12.58 (H) 10/04/2019     Temp Readings from Last 3 Encounters:   10/04/19 98.4 °F (36.9 °C) (Oral)      Baseline culture/source/susceptibility:   10/4: Bcx-pending    Assessment/Plan    1. Patient received a vancomycin loading dose of 1500 mg (20 mg/kg) IV once in the ED around 0900. Will start a maintenance dose of 750 mg (~10 mg/kg) IV q12h tonight based on patient parameters and slightly elevated SCr.    2. Will schedule a trough on Sunday morning 10/6 before the 0900 dose (before the 5th total dose) to determine if regimen is appropriate.    3. Patient received a Zosyn dose of 3.375g IV once over 30 minutes in the ED around 0900. Will start Zosyn 3.375g IV q8h extended infusion now based on risk stratification and patient's renal function (CrCl >20 mL/min). Pharmacy will discontinue the Pharmacy to Dose consult at this time. Renal function will continue to be monitored and dosing adjustments will be made by pharmacy based on " renal function if necessary.    4. Will monitor serum creatinine every 24 hours since patient is on both vancomycin and Zosyn. SCr was 1.21 (elevated from baseline function) before antibiotics were given.    5. Pharmacy will continue to follow daily while on vancomycin and adjust as needed.     Thank you for allowing me to participate in your patient's care,  Mariel Liao, Pharm.D., BCPS   61

## 2024-08-20 NOTE — PROGRESS NOTES
LOS: 0 days   Patient Care Team:  Provider, No Known as PCP - General  Provider, No Known as PCP - Family Medicine    Subjective     Breathing is doing well the only time she's had any air, out of her trach stoma today was when she was bearing down to have a bowel movement.  No difficulties with her breathing.    Review of Systems:         Objective     Vital Signs  Vital Sign Min/Max for last 24 hours  Temp  Min: 97.4 °F (36.3 °C)  Max: 98.4 °F (36.9 °C)   BP  Min: 101/79  Max: 117/65   Pulse  Min: 67  Max: 78   Resp  Min: 18  Max: 20   SpO2  Min: 92 %  Max: 99 %   Flow (L/min)  Min: 2  Max: 2   No Data Recorded        Ventilator/Non-Invasive Ventilation Settings (From admission, onward)    None                       Body mass index is 25.55 kg/m².  I/O last 3 completed shifts:  In: 2440 [P.O.:1440; I.V.:1000]  Out: 300 [Urine:300]  No intake/output data recorded.        Physical Exam:  Patient doesn't appear in any distress resting comfortably in bed.  Chest: Clear good air movement no wheezes or rales  Neck her tracheal ostomy site has almost completely closed and see no hole in when I had her cough I could feel no air coming from her stoma.       Labs:  Results from last 7 days   Lab Units  12/20/18   0437  12/19/18   1246  12/19/18   0546   GLUCOSE mg/dL  92  119*   --    SODIUM mmol/L  142  140   --    POTASSIUM mmol/L  3.4*  3.8   --    CO2 mmol/L  24.5  26.4   --    CHLORIDE mmol/L  104  100   --    ANION GAP mmol/L  13.5  13.6   --    CREATININE mg/dL  2.06*  2.39*  2.36*   BUN mg/dL  33*  30*   --    BUN / CREAT RATIO   16.0  12.6   --    CALCIUM mg/dL  10.5  10.7*   --    EGFR IF NONAFRICN AM mL/min/1.73  25*  21*  21*   ALK PHOS U/L  123*   --    --    TOTAL PROTEIN g/dL  6.8   --    --    ALT (SGPT) U/L  12   --    --    AST (SGOT) U/L  19   --    --    BILIRUBIN mg/dL  0.2   --    --    ALBUMIN g/dL  3.20*   --    --    GLOBULIN gm/dL  3.6   --    --      Estimated Creatinine Clearance: 30.2  Chronic condition.  The patient takes a regimen of fluoxetine 20 mg oral daily, lorazepam 1 mg twice daily as needed, prazosin 5 mg oral daily, quetiapine 75 mg oral daily and 100 mg nightly, trazodone and 100 to 200 mg daily.  Hold potential sedating medications in the setting of altered mental status and hypotension.  Psychiatry consulted.  8/19  No indication to acute inpatient Psychiatric care  Avoiding Seroquel and Prazosin per Dr. Valdivia.    mL/min (A) (by C-G formula based on SCr of 2.06 mg/dL (H)).      Results from last 7 days   Lab Units  12/20/18   0437  12/19/18   1246   WBC 10*3/mm3  6.86  9.07   RBC 10*6/mm3  3.69*  3.92   HEMOGLOBIN g/dL  9.7*  10.4*   HEMATOCRIT %  32.5*  33.0*   MCV fL  88.1  84.2   MCH pg  26.3*  26.5*   MCHC g/dL  29.8*  31.5*   RDW %  15.8*  15.7*   RDW-SD fl  51.1  48.0   MPV fL  11.2  10.8   PLATELETS 10*3/mm3  230  231   NEUTROPHIL % %  58.4  59.7   LYMPHOCYTE % %  27.8  26.5   MONOCYTES % %  7.1  7.4   EOSINOPHIL % %  6.1  6.0   BASOPHIL % %  0.6  0.4   IMM GRAN % %  0.0  0.1   NEUTROS ABS 10*3/mm3  4.00  5.42   LYMPHS ABS 10*3/mm3  1.91  2.40   MONOS ABS 10*3/mm3  0.49  0.67   EOS ABS 10*3/mm3  0.42  0.54   BASOS ABS 10*3/mm3  0.04  0.04   IMMATURE GRANS (ABS) 10*3/mm3  0.00  0.01         Results from last 7 days   Lab Units  12/19/18   1818   CK TOTAL U/L  29     Results from last 7 days   Lab Units  12/20/18   0437   PROBNP pg/mL  1,745.0*     Results from last 7 days   Lab Units  12/20/18   0437   TSH mIU/mL  3.210             Microbiology Results (last 10 days)     Procedure Component Value - Date/Time    Eosinophil Smear - Urine, Urine, Clean Catch [[id number]]  (Normal) Collected:  12/19/18 1747    Lab Status:  Final result Specimen:  Urine, Clean Catch Updated:  12/19/18 1941     Eosinophil Smear 0 % EOS/100 Cells                 allopurinol 100 mg Oral Daily   arformoterol 15 mcg Nebulization BID - RT   artificial tears 1 application Both Eyes Q12H   atorvastatin 40 mg Oral Nightly   clonazePAM 0.5 mg Oral TID   Followed by      [START ON 1/2/2019] clonazePAM 0.5 mg Oral BID   DAPTOmycin 6 mg/kg (Adjusted) Intravenous Q24H   escitalopram 20 mg Oral Daily   metoprolol tartrate 12.5 mg Oral Q12H   multivitamin 1 tablet Oral Daily   nicotine 1 patch Transdermal Q24H   pantoprazole 40 mg Oral Q AM   pramipexole 0.25 mg Oral Nightly   thiamine 100 mg Oral Daily   traZODone 50 mg Oral Nightly   vitamin C 500 mg Oral  Daily       sodium chloride 0.9 % with KCl 20 mEq 75 mL/hr Last Rate: 75 mL/hr (12/20/18 1800)       Diagnostics:  Us Renal Bilateral    Result Date: 12/19/2018  BILATERAL RENAL ULTRASOUND  CLINICAL HISTORY: Acute renal failure  Transverse and longitudinal images of both kidneys were obtained. There is focal cortical thinning in the polar region of the right kidney consistent with scarring, likely due to previous infection. A tiny cyst is also noted in the upper pole. The right kidney is otherwise unremarkable. There is no hydronephrosis. The right kidney measures 9.8 x 3.4 x 4.1 cm. There are no left kidney is normal in size and shape and shows no hydronephrosis. There is a tiny minimally complex cyst in the upper pole measuring 12 mm in diameter. A tiny 9 mm diameter lower pole cyst is also noted. The left kidney measures 12.0 x 4.4 x 3.8 cm. The urinary bladder could not be imaged due to the presence of Dove catheter.  IMPRESSIONS: No evidence of hydronephrosis. Tiny bilateral renal cysts as noted.  This report was finalized on 12/19/2018 8:51 PM by Dr. Andrea Jones M.D.      Xr Chest Post Cva Port    Result Date: 12/19/2018  ONE VIEW PORTABLE CHEST AT 6:23 AM  HISTORY: PICC line placement.  FINDINGS:  There has been recent insertion of a left-sided PICC line which ends in the SVC. There is mild cardiomegaly and slight vascular congestion and no focal infiltrate.  This report was finalized on 12/19/2018 12:48 PM by Dr. Mike Rivas M.D.               Active Hospital Problems    Diagnosis Date Noted   • Gangrene of toe (CMS/HCC) [I96] 12/20/2018   • Tracheostomy complication (CMS/HCC) [J95.00] 12/18/2018      Resolved Hospital Problems   No resolved problems to display.         Assessment/Plan     1. Trach stoma seems to be healing very nicely I think we'll be completely close in another couple of days.  I don't think any therapy is necessary if it doesn't close completely in a couple of weeks then we might  need to do a little silver nitrate to some granulation tissue.  Really don't even see much granulation tissue today  2. Nocturnal hypoxemia she did spend 26 minutes with her oxygen saturations less than 89% last night she needs supplemental O2 with sleep at 2 L.  3. History of sleep apnea with nocturnal desaturation she will need a outpatient sleep study we'll need to wait several weeks at least until her stoma has healed    Plan for disposition: Pulmonary standpoint she could be discharged any time I have put orders for a sleep study and follow-up in the computer    Dany Baez MD  12/20/18  7:20 PM    Time:

## (undated) DEVICE — GOWN,NON-REINFORCED,SIRUS,SET IN SLV,XL: Brand: MEDLINE

## (undated) DEVICE — CATH URETRL PA CONE TP 8F

## (undated) DEVICE — GLV SURG SENSICARE MICRO PF LF 7.5 STRL

## (undated) DEVICE — PRT BIOP SEALS

## (undated) DEVICE — URETERAL DILATATION SYSTEM

## (undated) DEVICE — PK URETSCP 40

## (undated) DEVICE — NITINOL WIRE WITH HYDROPHILIC TIP: Brand: SENSOR

## (undated) DEVICE — FIBR LASR HOLMIUM ACCUMAX 200 1PT USE

## (undated) DEVICE — TIDISHIELD UROLOGY DRAIN BAGS FROSTY VINYL STERILE FITS SIEMENS UROSKOP ACCESS 20 PER CASE: Brand: TIDISHIELD